# Patient Record
Sex: FEMALE | Race: WHITE | NOT HISPANIC OR LATINO
[De-identification: names, ages, dates, MRNs, and addresses within clinical notes are randomized per-mention and may not be internally consistent; named-entity substitution may affect disease eponyms.]

---

## 2017-01-03 ENCOUNTER — APPOINTMENT (OUTPATIENT)
Dept: CARDIOLOGY | Facility: CLINIC | Age: 67
End: 2017-01-03

## 2017-01-09 ENCOUNTER — NON-APPOINTMENT (OUTPATIENT)
Age: 67
End: 2017-01-09

## 2017-01-09 ENCOUNTER — APPOINTMENT (OUTPATIENT)
Dept: CARDIOLOGY | Facility: CLINIC | Age: 67
End: 2017-01-09

## 2017-01-09 VITALS
TEMPERATURE: 97.4 F | WEIGHT: 227 LBS | RESPIRATION RATE: 12 BRPM | HEIGHT: 61 IN | DIASTOLIC BLOOD PRESSURE: 94 MMHG | HEART RATE: 73 BPM | SYSTOLIC BLOOD PRESSURE: 169 MMHG | BODY MASS INDEX: 42.86 KG/M2 | OXYGEN SATURATION: 98 %

## 2017-01-09 VITALS — HEART RATE: 75 BPM

## 2017-01-09 DIAGNOSIS — I73.9 PERIPHERAL VASCULAR DISEASE, UNSPECIFIED: ICD-10-CM

## 2017-01-10 LAB
ALBUMIN SERPL ELPH-MCNC: 4.1 G/DL
ALP BLD-CCNC: 85 U/L
ALT SERPL-CCNC: 16 U/L
ANION GAP SERPL CALC-SCNC: 13 MMOL/L
AST SERPL-CCNC: 11 U/L
BILIRUB SERPL-MCNC: 0.8 MG/DL
BUN SERPL-MCNC: 20 MG/DL
CALCIUM SERPL-MCNC: 9.8 MG/DL
CHLORIDE SERPL-SCNC: 104 MMOL/L
CO2 SERPL-SCNC: 25 MMOL/L
CREAT SERPL-MCNC: 0.71 MG/DL
GLUCOSE SERPL-MCNC: 171 MG/DL
POTASSIUM SERPL-SCNC: 3.8 MMOL/L
PROT SERPL-MCNC: 6.9 G/DL
SODIUM SERPL-SCNC: 142 MMOL/L

## 2017-01-24 ENCOUNTER — APPOINTMENT (OUTPATIENT)
Dept: INTERNAL MEDICINE | Facility: CLINIC | Age: 67
End: 2017-01-24

## 2017-01-24 VITALS
SYSTOLIC BLOOD PRESSURE: 161 MMHG | RESPIRATION RATE: 12 BRPM | DIASTOLIC BLOOD PRESSURE: 87 MMHG | TEMPERATURE: 98.2 F | BODY MASS INDEX: 42.67 KG/M2 | HEIGHT: 61 IN | WEIGHT: 226 LBS | OXYGEN SATURATION: 97 % | HEART RATE: 84 BPM

## 2017-01-24 VITALS — SYSTOLIC BLOOD PRESSURE: 150 MMHG | DIASTOLIC BLOOD PRESSURE: 80 MMHG

## 2017-01-24 DIAGNOSIS — F41.1 GENERALIZED ANXIETY DISORDER: ICD-10-CM

## 2017-01-26 ENCOUNTER — APPOINTMENT (OUTPATIENT)
Dept: SURGERY | Facility: CLINIC | Age: 67
End: 2017-01-26

## 2017-01-26 VITALS
WEIGHT: 226 LBS | SYSTOLIC BLOOD PRESSURE: 147 MMHG | HEART RATE: 63 BPM | HEIGHT: 62 IN | DIASTOLIC BLOOD PRESSURE: 83 MMHG | BODY MASS INDEX: 41.59 KG/M2

## 2017-01-28 LAB
T3 SERPL-MCNC: 114 NG/DL
T4 FREE SERPL-MCNC: 1.1 NG/DL
THYROGLOB AB SERPL-ACNC: <20 IU/ML
THYROPEROXIDASE AB SERPL IA-ACNC: 11.1 IU/ML
TSH SERPL-ACNC: 1.11 UIU/ML

## 2017-01-29 ENCOUNTER — FORM ENCOUNTER (OUTPATIENT)
Age: 67
End: 2017-01-29

## 2017-01-30 ENCOUNTER — OUTPATIENT (OUTPATIENT)
Dept: OUTPATIENT SERVICES | Facility: HOSPITAL | Age: 67
LOS: 1 days | End: 2017-01-30
Payer: MEDICARE

## 2017-01-30 ENCOUNTER — APPOINTMENT (OUTPATIENT)
Dept: CT IMAGING | Facility: IMAGING CENTER | Age: 67
End: 2017-01-30

## 2017-01-30 DIAGNOSIS — Z00.8 ENCOUNTER FOR OTHER GENERAL EXAMINATION: ICD-10-CM

## 2017-01-30 DIAGNOSIS — Z98.49 CATARACT EXTRACTION STATUS, UNSPECIFIED EYE: Chronic | ICD-10-CM

## 2017-01-30 DIAGNOSIS — I86.8 VARICOSE VEINS OF OTHER SPECIFIED SITES: Chronic | ICD-10-CM

## 2017-01-30 PROCEDURE — 70490 CT SOFT TISSUE NECK W/O DYE: CPT

## 2017-02-02 ENCOUNTER — OTHER (OUTPATIENT)
Age: 67
End: 2017-02-02

## 2017-02-07 ENCOUNTER — NON-APPOINTMENT (OUTPATIENT)
Age: 67
End: 2017-02-07

## 2017-02-07 ENCOUNTER — APPOINTMENT (OUTPATIENT)
Dept: CARDIOLOGY | Facility: CLINIC | Age: 67
End: 2017-02-07

## 2017-02-07 VITALS
SYSTOLIC BLOOD PRESSURE: 160 MMHG | HEIGHT: 62 IN | DIASTOLIC BLOOD PRESSURE: 87 MMHG | BODY MASS INDEX: 41.59 KG/M2 | WEIGHT: 226 LBS | RESPIRATION RATE: 12 BRPM | OXYGEN SATURATION: 100 % | HEART RATE: 69 BPM

## 2017-02-07 VITALS — HEART RATE: 69 BPM | DIASTOLIC BLOOD PRESSURE: 72 MMHG | SYSTOLIC BLOOD PRESSURE: 150 MMHG

## 2017-02-21 ENCOUNTER — APPOINTMENT (OUTPATIENT)
Dept: CARDIOLOGY | Facility: CLINIC | Age: 67
End: 2017-02-21

## 2017-02-24 ENCOUNTER — APPOINTMENT (OUTPATIENT)
Dept: CARDIOLOGY | Facility: CLINIC | Age: 67
End: 2017-02-24

## 2017-02-24 VITALS
BODY MASS INDEX: 41.59 KG/M2 | DIASTOLIC BLOOD PRESSURE: 92 MMHG | SYSTOLIC BLOOD PRESSURE: 173 MMHG | HEIGHT: 62 IN | WEIGHT: 226 LBS

## 2017-03-30 PROBLEM — M25.562 LEFT KNEE PAIN: Status: ACTIVE | Noted: 2017-03-30

## 2017-03-30 PROBLEM — M25.561 RIGHT KNEE PAIN: Status: ACTIVE | Noted: 2017-03-30

## 2017-03-31 ENCOUNTER — APPOINTMENT (OUTPATIENT)
Dept: ORTHOPEDIC SURGERY | Facility: CLINIC | Age: 67
End: 2017-03-31

## 2017-03-31 VITALS
HEIGHT: 61 IN | DIASTOLIC BLOOD PRESSURE: 80 MMHG | WEIGHT: 227 LBS | HEART RATE: 66 BPM | SYSTOLIC BLOOD PRESSURE: 155 MMHG | BODY MASS INDEX: 42.86 KG/M2

## 2017-03-31 DIAGNOSIS — E11.69 TYPE 2 DIABETES MELLITUS WITH OTHER SPECIFIED COMPLICATION: ICD-10-CM

## 2017-03-31 DIAGNOSIS — M25.561 PAIN IN RIGHT KNEE: ICD-10-CM

## 2017-03-31 DIAGNOSIS — E66.9 TYPE 2 DIABETES MELLITUS WITH OTHER SPECIFIED COMPLICATION: ICD-10-CM

## 2017-03-31 DIAGNOSIS — M21.161 VARUS DEFORMITY, NOT ELSEWHERE CLASSIFIED, RIGHT KNEE: ICD-10-CM

## 2017-03-31 DIAGNOSIS — M25.562 PAIN IN LEFT KNEE: ICD-10-CM

## 2017-03-31 DIAGNOSIS — M17.0 BILATERAL PRIMARY OSTEOARTHRITIS OF KNEE: ICD-10-CM

## 2017-04-05 ENCOUNTER — NON-APPOINTMENT (OUTPATIENT)
Age: 67
End: 2017-04-05

## 2017-04-05 ENCOUNTER — APPOINTMENT (OUTPATIENT)
Dept: CARDIOLOGY | Facility: CLINIC | Age: 67
End: 2017-04-05

## 2017-04-05 VITALS
DIASTOLIC BLOOD PRESSURE: 84 MMHG | HEART RATE: 65 BPM | HEIGHT: 61 IN | TEMPERATURE: 97.9 F | BODY MASS INDEX: 44.37 KG/M2 | WEIGHT: 235 LBS | SYSTOLIC BLOOD PRESSURE: 138 MMHG | OXYGEN SATURATION: 98 % | RESPIRATION RATE: 12 BRPM

## 2017-04-07 LAB
ALBUMIN SERPL ELPH-MCNC: 3.9 G/DL
ALP BLD-CCNC: 78 U/L
ALT SERPL-CCNC: 16 U/L
ANION GAP SERPL CALC-SCNC: 14 MMOL/L
AST SERPL-CCNC: 12 U/L
BILIRUB SERPL-MCNC: 0.6 MG/DL
BUN SERPL-MCNC: 19 MG/DL
CALCIUM SERPL-MCNC: 9.8 MG/DL
CHLORIDE SERPL-SCNC: 101 MMOL/L
CHOLEST SERPL-MCNC: 167 MG/DL
CHOLEST/HDLC SERPL: 2.8 RATIO
CO2 SERPL-SCNC: 29 MMOL/L
CREAT SERPL-MCNC: 0.62 MG/DL
GLUCOSE SERPL-MCNC: 113 MG/DL
HDLC SERPL-MCNC: 60 MG/DL
LDLC SERPL CALC-MCNC: 67 MG/DL
POTASSIUM SERPL-SCNC: 4.2 MMOL/L
PROT SERPL-MCNC: 6.8 G/DL
SODIUM SERPL-SCNC: 144 MMOL/L
TRIGL SERPL-MCNC: 201 MG/DL

## 2017-04-17 ENCOUNTER — OUTPATIENT (OUTPATIENT)
Dept: OUTPATIENT SERVICES | Facility: HOSPITAL | Age: 67
LOS: 1 days | End: 2017-04-17
Payer: MEDICARE

## 2017-04-17 VITALS
DIASTOLIC BLOOD PRESSURE: 72 MMHG | OXYGEN SATURATION: 99 % | RESPIRATION RATE: 16 BRPM | HEART RATE: 74 BPM | TEMPERATURE: 97 F | HEIGHT: 61 IN | SYSTOLIC BLOOD PRESSURE: 154 MMHG | WEIGHT: 231.93 LBS

## 2017-04-17 DIAGNOSIS — E11.9 TYPE 2 DIABETES MELLITUS WITHOUT COMPLICATIONS: ICD-10-CM

## 2017-04-17 DIAGNOSIS — E04.2 NONTOXIC MULTINODULAR GOITER: ICD-10-CM

## 2017-04-17 DIAGNOSIS — I86.8 VARICOSE VEINS OF OTHER SPECIFIED SITES: Chronic | ICD-10-CM

## 2017-04-17 DIAGNOSIS — E03.9 HYPOTHYROIDISM, UNSPECIFIED: ICD-10-CM

## 2017-04-17 DIAGNOSIS — Z78.9 OTHER SPECIFIED HEALTH STATUS: Chronic | ICD-10-CM

## 2017-04-17 DIAGNOSIS — Z98.49 CATARACT EXTRACTION STATUS, UNSPECIFIED EYE: Chronic | ICD-10-CM

## 2017-04-17 DIAGNOSIS — G47.33 OBSTRUCTIVE SLEEP APNEA (ADULT) (PEDIATRIC): ICD-10-CM

## 2017-04-17 DIAGNOSIS — K29.70 GASTRITIS, UNSPECIFIED, WITHOUT BLEEDING: ICD-10-CM

## 2017-04-17 DIAGNOSIS — I10 ESSENTIAL (PRIMARY) HYPERTENSION: ICD-10-CM

## 2017-04-17 DIAGNOSIS — I48.91 UNSPECIFIED ATRIAL FIBRILLATION: ICD-10-CM

## 2017-04-17 LAB
BUN SERPL-MCNC: 18 MG/DL — SIGNIFICANT CHANGE UP (ref 7–23)
CALCIUM SERPL-MCNC: 9.7 MG/DL — SIGNIFICANT CHANGE UP (ref 8.4–10.5)
CHLORIDE SERPL-SCNC: 100 MMOL/L — SIGNIFICANT CHANGE UP (ref 98–107)
CO2 SERPL-SCNC: 24 MMOL/L — SIGNIFICANT CHANGE UP (ref 22–31)
CREAT SERPL-MCNC: 0.62 MG/DL — SIGNIFICANT CHANGE UP (ref 0.5–1.3)
GLUCOSE SERPL-MCNC: 80 MG/DL — SIGNIFICANT CHANGE UP (ref 70–99)
HBA1C BLD-MCNC: 7.4 % — HIGH (ref 4–5.6)
HCT VFR BLD CALC: 36 % — SIGNIFICANT CHANGE UP (ref 34.5–45)
HGB BLD-MCNC: 11.4 G/DL — LOW (ref 11.5–15.5)
MCHC RBC-ENTMCNC: 26.8 PG — LOW (ref 27–34)
MCHC RBC-ENTMCNC: 31.7 % — LOW (ref 32–36)
MCV RBC AUTO: 84.7 FL — SIGNIFICANT CHANGE UP (ref 80–100)
PLATELET # BLD AUTO: 303 K/UL — SIGNIFICANT CHANGE UP (ref 150–400)
PMV BLD: 10.5 FL — SIGNIFICANT CHANGE UP (ref 7–13)
POTASSIUM SERPL-MCNC: 3.6 MMOL/L — SIGNIFICANT CHANGE UP (ref 3.5–5.3)
POTASSIUM SERPL-SCNC: 3.6 MMOL/L — SIGNIFICANT CHANGE UP (ref 3.5–5.3)
RBC # BLD: 4.25 M/UL — SIGNIFICANT CHANGE UP (ref 3.8–5.2)
RBC # FLD: 13.4 % — SIGNIFICANT CHANGE UP (ref 10.3–14.5)
SODIUM SERPL-SCNC: 141 MMOL/L — SIGNIFICANT CHANGE UP (ref 135–145)
WBC # BLD: 9.75 K/UL — SIGNIFICANT CHANGE UP (ref 3.8–10.5)
WBC # FLD AUTO: 9.75 K/UL — SIGNIFICANT CHANGE UP (ref 3.8–10.5)

## 2017-04-17 PROCEDURE — 93010 ELECTROCARDIOGRAM REPORT: CPT

## 2017-04-17 RX ORDER — SODIUM CHLORIDE 9 MG/ML
1000 INJECTION, SOLUTION INTRAVENOUS
Qty: 0 | Refills: 0 | Status: DISCONTINUED | OUTPATIENT
Start: 2017-04-26 | End: 2017-04-27

## 2017-04-17 NOTE — H&P PST ADULT - NEGATIVE ENMT SYMPTOMS
no sinus symptoms/no nasal congestion/no ear pain/no post-nasal discharge/no nasal obstruction/no tinnitus/no nasal discharge/no vertigo

## 2017-04-17 NOTE — H&P PST ADULT - NSANTHOSAYNRD_GEN_A_CORE
No. NAVARRO screening performed.  STOP BANG Legend: 0-2 = LOW Risk; 3-4 = INTERMEDIATE Risk; 5-8 = HIGH Risk

## 2017-04-17 NOTE — H&P PST ADULT - MUSCULOSKELETAL
details… detailed exam no calf tenderness/ROM intact/normal strength/no joint warmth/no joint swelling/no joint erythema

## 2017-04-17 NOTE — H&P PST ADULT - HISTORY OF PRESENT ILLNESS
65 yo female 67 yo female with hx 67 yo female with hx Afib (on Xarelto), HTN, DM, HLD, hypothyroidism, anxiety, depression presents to have PST evaluation for total thyroidectomy, complex closure on 4/26/2017. Patient reports, hx of thyroid nodules x 15 years, biopsy done - "benign", yearly evaluated by endocrinologist with thyroid sonogram. Patient states, recent sonogram in 2/2017 showed increased size of thyroid nodules, was referred to Dr. Madden for surgical evaluation. 65 yo female with hx Afib (on Xarelto), HTN, DM, HLD, hypothyroidism, anxiety, depression presents to have PST evaluation for total thyroidectomy, complex closure on 4/26/2017. Patient reports, hx of thyroid nodules x 15 years, biopsy done - "benign", yearly evaluated by endocrinologist with thyroid sonogram. Patient states, recent thyroid sonogram in 2/2017 showed increased size of thyroid nodules, was referred to Dr. Madden for surgical evaluation.

## 2017-04-17 NOTE — H&P PST ADULT - PROBLEM SELECTOR PLAN 1
Scheduled for total thyroidectomy, complex closure on 4/26/2017. labs done and results pending. Surgical scrub & preop instruction given and explained.  Verbalized understanding. Patient c/o numbness & tingling on bilateral hands- Dr. Madden's office was called and notified. Scheduled for total thyroidectomy, complex closure on 4/26/2017. labs done and results pending. Surgical scrub & preop instruction given and explained.  Verbalized understanding. Patient c/o numbness & tingling on bilateral hands- Dr. Madden was made aware.

## 2017-04-17 NOTE — H&P PST ADULT - PMH
AF (atrial fibrillation)  on Xarelto  Anxiety    CAD (coronary artery disease)  minimal CAD per cath result in 2014  Depression    Diabetes    Former smoker    Gastritis    Hiatal hernia    HTN (hypertension)    Hypercholesterolemia    Hypothyroid    Morbid obesity    Murmur    Palpitations AF (atrial fibrillation)  on Xarelto  Anxiety    CAD (coronary artery disease)  minimal CAD per cath result in 2014  Depression    Diabetes    Former smoker    Gastritis    Hiatal hernia    HTN (hypertension)    Hypercholesterolemia    Hypothyroid    Morbid obesity    Murmur    Neuropathy    Palpitations

## 2017-04-17 NOTE — H&P PST ADULT - NEGATIVE OPHTHALMOLOGIC SYMPTOMS
no diplopia/no blurred vision R/no lacrimation R/no photophobia/no blurred vision L/no lacrimation L

## 2017-04-17 NOTE — H&P PST ADULT - RS GEN PE MLT RESP DETAILS PC
no wheezes/clear to auscultation bilaterally/respirations non-labored/no rales/no rhonchi/airway patent/good air movement/breath sounds equal

## 2017-04-17 NOTE — H&P PST ADULT - FAMILY HISTORY
Mother  Still living? No  Family history of stroke, Age at diagnosis: Age Unknown  Family history of hypertension, Age at diagnosis: Age Unknown  Family history of breast cancer, Age at diagnosis: Age Unknown

## 2017-04-17 NOTE — H&P PST ADULT - NEGATIVE NEUROLOGICAL SYMPTOMS
no headache/no tremors/no vertigo/no syncope/no difficulty walking/no focal seizures/no generalized seizures

## 2017-04-17 NOTE — H&P PST ADULT - PSH
History of angioplasty of vein  right leg 2016  S/P cataract extraction and insertion of intraocular lens  bilateral > 15 years ago  Varicose vein  with stripping b/l

## 2017-04-17 NOTE — H&P PST ADULT - PROBLEM SELECTOR PLAN 3
HbgA1C sent, pending result. Blood glucose - Accuchek ordered stat on admit. Instructed to hold novolog AM of surgery, instructed to administer 8 units of Tresiba PM before the surgery (80 % of regular dose) Verbalized understanding. HbgA1C sent, pending result. Blood glucose - Accuchek ordered stat on admit. Instructed to hold Novolog AM of surgery, instructed to administer 8 units of Tresiba PM before the surgery (80 % of regular dose) Verbalized understanding.

## 2017-04-17 NOTE — H&P PST ADULT - PROBLEM SELECTOR PLAN 2
hx of Afib- on Xarelto. Instructed to stop on Xarelto 2 days prior to surgery, and continue with aspirin by cardiologist. Will obtain last cardiologist note with instruction. Last echo report in chart. hx of Afib- on Xarelto. Instructed to stop on Xarelto 2 days prior to surgery, and continue with aspirin by cardiologist.  Last cardiologist note with instruction in chart. Last echo report in chart.

## 2017-04-17 NOTE — H&P PST ADULT - PROBLEM SELECTOR PLAN 5
Instructed to take Cartia, sotalol, losartan AM of surgery with a sip of water. Patient's evaluated by PCP for medical clearance. Will obtain.

## 2017-04-17 NOTE — H&P PST ADULT - ITE SK HX ROS MEA POS PC
rash/rash on left thigh - evaluated by dermatologist rash/"sometimes rash on both legs - evaluated by dermatologist- better now"

## 2017-04-18 LAB — TSH SERPL-ACNC: 1.33 UIU/ML

## 2017-04-25 ENCOUNTER — RESULT REVIEW (OUTPATIENT)
Age: 67
End: 2017-04-25

## 2017-04-26 ENCOUNTER — OUTPATIENT (OUTPATIENT)
Dept: INPATIENT UNIT | Facility: HOSPITAL | Age: 67
LOS: 1 days | Discharge: HOME | End: 2017-04-26

## 2017-04-26 ENCOUNTER — APPOINTMENT (OUTPATIENT)
Dept: SURGERY | Facility: HOSPITAL | Age: 67
End: 2017-04-26

## 2017-04-26 ENCOUNTER — OTHER (OUTPATIENT)
Age: 67
End: 2017-04-26

## 2017-04-26 ENCOUNTER — TRANSCRIPTION ENCOUNTER (OUTPATIENT)
Age: 67
End: 2017-04-26

## 2017-04-26 VITALS
TEMPERATURE: 98 F | HEIGHT: 61 IN | DIASTOLIC BLOOD PRESSURE: 69 MMHG | WEIGHT: 231.93 LBS | RESPIRATION RATE: 16 BRPM | SYSTOLIC BLOOD PRESSURE: 160 MMHG | OXYGEN SATURATION: 100 % | HEART RATE: 62 BPM

## 2017-04-26 DIAGNOSIS — Z98.49 CATARACT EXTRACTION STATUS, UNSPECIFIED EYE: Chronic | ICD-10-CM

## 2017-04-26 DIAGNOSIS — Z78.9 OTHER SPECIFIED HEALTH STATUS: Chronic | ICD-10-CM

## 2017-04-26 DIAGNOSIS — I86.8 VARICOSE VEINS OF OTHER SPECIFIED SITES: Chronic | ICD-10-CM

## 2017-04-26 DIAGNOSIS — E04.2 NONTOXIC MULTINODULAR GOITER: ICD-10-CM

## 2017-04-26 LAB
CALCIUM SERPL-MCNC: 9.4 MG/DL — SIGNIFICANT CHANGE UP (ref 8.4–10.5)
CALCIUM SERPL-MCNC: 9.6 MG/DL — SIGNIFICANT CHANGE UP (ref 8.4–10.5)

## 2017-04-26 RX ORDER — LEVOTHYROXINE SODIUM 125 MCG
75 TABLET ORAL DAILY
Qty: 0 | Refills: 0 | Status: DISCONTINUED | OUTPATIENT
Start: 2017-04-26 | End: 2017-04-27

## 2017-04-26 RX ORDER — LEVOTHYROXINE SODIUM 125 MCG
1 TABLET ORAL
Qty: 30 | Refills: 0 | OUTPATIENT
Start: 2017-04-26 | End: 2017-05-26

## 2017-04-26 RX ORDER — RIVAROXABAN 15 MG-20MG
1 KIT ORAL
Qty: 0 | Refills: 0 | COMMUNITY

## 2017-04-26 RX ORDER — SODIUM CHLORIDE 9 MG/ML
1000 INJECTION, SOLUTION INTRAVENOUS
Qty: 0 | Refills: 0 | Status: DISCONTINUED | OUTPATIENT
Start: 2017-04-26 | End: 2017-04-27

## 2017-04-26 RX ORDER — LOSARTAN POTASSIUM 100 MG/1
100 TABLET, FILM COATED ORAL DAILY
Qty: 0 | Refills: 0 | Status: DISCONTINUED | OUTPATIENT
Start: 2017-04-26 | End: 2017-04-27

## 2017-04-26 RX ORDER — ALPRAZOLAM 0.25 MG
0.5 TABLET ORAL EVERY 8 HOURS
Qty: 0 | Refills: 0 | Status: DISCONTINUED | OUTPATIENT
Start: 2017-04-26 | End: 2017-04-27

## 2017-04-26 RX ORDER — CALCIUM CARBONATE 500(1250)
1 TABLET ORAL
Qty: 0 | Refills: 0 | COMMUNITY
Start: 2017-04-26

## 2017-04-26 RX ORDER — DEXTROSE 50 % IN WATER 50 %
12.5 SYRINGE (ML) INTRAVENOUS ONCE
Qty: 0 | Refills: 0 | Status: DISCONTINUED | OUTPATIENT
Start: 2017-04-26 | End: 2017-04-27

## 2017-04-26 RX ORDER — LEVOTHYROXINE SODIUM 125 MCG
1 TABLET ORAL
Qty: 30 | Refills: 2 | OUTPATIENT
Start: 2017-04-26 | End: 2017-07-24

## 2017-04-26 RX ORDER — DEXTROSE 50 % IN WATER 50 %
25 SYRINGE (ML) INTRAVENOUS ONCE
Qty: 0 | Refills: 0 | Status: DISCONTINUED | OUTPATIENT
Start: 2017-04-26 | End: 2017-04-27

## 2017-04-26 RX ORDER — SOTALOL HCL 120 MG
80 TABLET ORAL DAILY
Qty: 0 | Refills: 0 | Status: DISCONTINUED | OUTPATIENT
Start: 2017-04-26 | End: 2017-04-27

## 2017-04-26 RX ORDER — GLUCAGON INJECTION, SOLUTION 0.5 MG/.1ML
1 INJECTION, SOLUTION SUBCUTANEOUS ONCE
Qty: 0 | Refills: 0 | Status: DISCONTINUED | OUTPATIENT
Start: 2017-04-26 | End: 2017-04-27

## 2017-04-26 RX ORDER — CALCIUM CARBONATE 500(1250)
1 TABLET ORAL
Qty: 0 | Refills: 0 | Status: DISCONTINUED | OUTPATIENT
Start: 2017-04-26 | End: 2017-04-27

## 2017-04-26 RX ORDER — LEVOTHYROXINE SODIUM 125 MCG
1 TABLET ORAL
Qty: 0 | Refills: 0 | COMMUNITY
Start: 2017-04-26

## 2017-04-26 RX ORDER — INSULIN LISPRO 100/ML
VIAL (ML) SUBCUTANEOUS
Qty: 0 | Refills: 0 | Status: DISCONTINUED | OUTPATIENT
Start: 2017-04-26 | End: 2017-04-27

## 2017-04-26 RX ORDER — PANTOPRAZOLE SODIUM 20 MG/1
40 TABLET, DELAYED RELEASE ORAL
Qty: 0 | Refills: 0 | Status: DISCONTINUED | OUTPATIENT
Start: 2017-04-26 | End: 2017-04-27

## 2017-04-26 RX ORDER — SODIUM CHLORIDE 9 MG/ML
500 INJECTION, SOLUTION INTRAVENOUS ONCE
Qty: 0 | Refills: 0 | Status: COMPLETED | OUTPATIENT
Start: 2017-04-26 | End: 2017-04-26

## 2017-04-26 RX ORDER — ACETAMINOPHEN 500 MG
650 TABLET ORAL EVERY 6 HOURS
Qty: 0 | Refills: 0 | Status: DISCONTINUED | OUTPATIENT
Start: 2017-04-26 | End: 2017-04-27

## 2017-04-26 RX ORDER — ASPIRIN/CALCIUM CARB/MAGNESIUM 324 MG
81 TABLET ORAL DAILY
Qty: 0 | Refills: 0 | Status: DISCONTINUED | OUTPATIENT
Start: 2017-04-26 | End: 2017-04-27

## 2017-04-26 RX ORDER — INSULIN LISPRO 100/ML
VIAL (ML) SUBCUTANEOUS AT BEDTIME
Qty: 0 | Refills: 0 | Status: DISCONTINUED | OUTPATIENT
Start: 2017-04-26 | End: 2017-04-27

## 2017-04-26 RX ORDER — ONDANSETRON 8 MG/1
4 TABLET, FILM COATED ORAL ONCE
Qty: 0 | Refills: 0 | Status: COMPLETED | OUTPATIENT
Start: 2017-04-26 | End: 2017-04-26

## 2017-04-26 RX ORDER — LEVOTHYROXINE SODIUM 125 MCG
1 TABLET ORAL
Qty: 0 | Refills: 0 | COMMUNITY

## 2017-04-26 RX ORDER — DEXTROSE 50 % IN WATER 50 %
1 SYRINGE (ML) INTRAVENOUS ONCE
Qty: 0 | Refills: 0 | Status: DISCONTINUED | OUTPATIENT
Start: 2017-04-26 | End: 2017-04-27

## 2017-04-26 RX ORDER — CALCIUM GLUCONATE 100 MG/ML
1 VIAL (ML) INTRAVENOUS ONCE
Qty: 0 | Refills: 0 | Status: COMPLETED | OUTPATIENT
Start: 2017-04-26 | End: 2017-04-26

## 2017-04-26 RX ORDER — FENTANYL CITRATE 50 UG/ML
50 INJECTION INTRAVENOUS
Qty: 0 | Refills: 0 | Status: DISCONTINUED | OUTPATIENT
Start: 2017-04-26 | End: 2017-04-26

## 2017-04-26 RX ADMIN — SODIUM CHLORIDE 50 MILLILITER(S): 9 INJECTION, SOLUTION INTRAVENOUS at 17:39

## 2017-04-26 RX ADMIN — SODIUM CHLORIDE 30 MILLILITER(S): 9 INJECTION, SOLUTION INTRAVENOUS at 12:14

## 2017-04-26 RX ADMIN — FENTANYL CITRATE 50 MICROGRAM(S): 50 INJECTION INTRAVENOUS at 16:15

## 2017-04-26 RX ADMIN — Medication 1 TABLET(S): at 17:45

## 2017-04-26 RX ADMIN — Medication 650 MILLIGRAM(S): at 20:15

## 2017-04-26 RX ADMIN — FENTANYL CITRATE 50 MICROGRAM(S): 50 INJECTION INTRAVENOUS at 15:58

## 2017-04-26 RX ADMIN — Medication 200 MILLIGRAM(S): at 17:00

## 2017-04-26 RX ADMIN — Medication 200 GRAM(S): at 16:00

## 2017-04-26 RX ADMIN — FENTANYL CITRATE 50 MICROGRAM(S): 50 INJECTION INTRAVENOUS at 16:20

## 2017-04-26 RX ADMIN — SODIUM CHLORIDE 1000 MILLILITER(S): 9 INJECTION, SOLUTION INTRAVENOUS at 16:45

## 2017-04-26 RX ADMIN — FENTANYL CITRATE 50 MICROGRAM(S): 50 INJECTION INTRAVENOUS at 16:45

## 2017-04-26 RX ADMIN — Medication 650 MILLIGRAM(S): at 21:22

## 2017-04-26 RX ADMIN — ONDANSETRON 4 MILLIGRAM(S): 8 TABLET, FILM COATED ORAL at 15:58

## 2017-04-26 NOTE — DISCHARGE NOTE ADULT - MEDICATION SUMMARY - MEDICATIONS TO STOP TAKING
I will STOP taking the medications listed below when I get home from the hospital:    levothyroxine 25 mcg (0.025 mg) oral capsule  -- 1 cap(s) by mouth once a day    Xarelto 20 mg oral tablet  -- 1 tab(s) by mouth once a day (in the evening) last dose 4/23/2017 I will STOP taking the medications listed below when I get home from the hospital:  None

## 2017-04-26 NOTE — DISCHARGE NOTE ADULT - PATIENT PORTAL LINK FT
“You can access the Follow[employer] Patient Portal, offered by [facility], by registering with the following website: [email or web address]”

## 2017-04-26 NOTE — DISCHARGE NOTE ADULT - CARE PROVIDER_API CALL
Ba Madden), Plastic Surgery; Surgery  [address] [place] [address]  Phone: [phone]  Fax: [phone]

## 2017-04-26 NOTE — DISCHARGE NOTE ADULT - CARE PLAN
Principal Discharge DX:	Nontoxic multinodular goiter  Secondary Diagnosis:	NAVARRO (obstructive sleep apnea)  Secondary Diagnosis:	Hypothyroid  Secondary Diagnosis:	HTN (hypertension)  Secondary Diagnosis:	AF (atrial fibrillation)  Secondary Diagnosis:	Diabetes  Secondary Diagnosis:	CAD (coronary artery disease) Principal Discharge DX:	Nontoxic multinodular goiter  Goal:	s/p total thyroidectomy  Instructions for follow-up, activity and diet:	keep incision clean and dry  Secondary Diagnosis:	NAVARRO (obstructive sleep apnea)  Secondary Diagnosis:	Hypothyroid  Secondary Diagnosis:	HTN (hypertension)  Secondary Diagnosis:	AF (atrial fibrillation)  Secondary Diagnosis:	Diabetes  Secondary Diagnosis:	CAD (coronary artery disease)

## 2017-04-26 NOTE — DISCHARGE NOTE ADULT - SECONDARY DIAGNOSIS.
NAVARRO (obstructive sleep apnea) Hypothyroid HTN (hypertension) AF (atrial fibrillation) Diabetes CAD (coronary artery disease)

## 2017-04-26 NOTE — DISCHARGE NOTE ADULT - MEDICATION SUMMARY - MEDICATIONS TO TAKE
I will START or STAY ON the medications listed below when I get home from the hospital:    Mariusz Aspirin Regimen 81 mg oral delayed release tablet  -- 1 tab(s) by mouth once a day  -- Indication: For Non-toxic multinodular goiter    Tylenol 500 mg oral tablet  -- 2 tab(s) by mouth every 6 hours, As Needed  -- Indication: For Non-toxic multinodular goiter    calcium carbonate 500 mg (200 mg elemental calcium) oral tablet, chewable  -- 1 tab(s) by mouth 4 times a day  -- Indication: For Non-toxic multinodular goiter    Cartia  mg/24 hours oral capsule, extended release  -- 1 cap(s) by mouth once a day  -- Indication: For Non-toxic multinodular goiter    sotalol 80 mg oral tablet  -- 1 tab(s) by mouth once a day  -- Indication: For Non-toxic multinodular goiter    NovoLOG FlexPen 100 units/mL subcutaneous solution  --  subcutaneous 10 units 3 times daily  -- Indication: For Non-toxic multinodular goiter    Tresiba FlexTouch 200 units/mL subcutaneous solution  --  subcutaneous   -- Indication: For Non-toxic multinodular goiter    losartan-hydroCHLOROthiazide 100mg-25mg oral tablet  -- 1 tab(s) by mouth once a day  -- Indication: For Non-toxic multinodular goiter    ALPRAZolam 0.5 mg oral tablet  --  by mouth ,1 tab As Needed  -- Indication: For Non-toxic multinodular goiter    Amitiza 24 mcg oral capsule  -- 1 cap(s) by mouth 2 times a day  -- Indication: For Non-toxic multinodular goiter    Doc-Q-Lace 100 mg oral capsule  --  by mouth 3 times a day  -- Indication: For Non-toxic multinodular goiter    pantoprazole 40 mg oral delayed release tablet  -- 1 tab(s) by mouth once a day  -- Indication: For Non-toxic multinodular goiter    levothyroxine 75 mcg (0.075 mg) oral capsule  -- 1 cap(s) by mouth once a day  -- Indication: For Non-toxic multinodular goiter    levothyroxine 75 mcg (0.075 mg) oral tablet  -- 1 tab(s) by mouth once a day  -- Indication: For Non-toxic multinodular goiter    levothyroxine 75 mcg (0.075 mg) oral tablet  -- 1 tab(s) by mouth once a day  -- It is very important that you take or use this exactly as directed.  Do not skip doses or discontinue unless directed by your doctor.  Medication should be taken with plenty of water.  Some non-prescription drugs may aggravate your condition.  Read all labels carefully.  If a warning appears, check with your doctor before taking.  Take medication on an empty stomach 1 hour before or 2 to 3 hours after a meal unless otherwise directed by your doctor.    -- Indication: For Non-toxic multinodular goiter    Vitamin D3 2000 intl units oral capsule  -- 1 cap(s) by mouth once a day  -- Indication: For Non-toxic multinodular goiter I will START or STAY ON the medications listed below when I get home from the hospital:    Mariusz Aspirin Regimen 81 mg oral delayed release tablet  -- 1 tab(s) by mouth once a day  -- Indication: For Non-toxic multinodular goiter    Tylenol 500 mg oral tablet  -- 2 tab(s) by mouth every 6 hours, As Needed  -- Indication: For Non-toxic multinodular goiter    calcium carbonate 500 mg (200 mg elemental calcium) oral tablet, chewable  -- 1 tab(s) by mouth 4 times a day  -- Indication: For Non-toxic multinodular goiter    Cartia  mg/24 hours oral capsule, extended release  -- 1 cap(s) by mouth once a day  -- Indication: For Non-toxic multinodular goiter    sotalol 80 mg oral tablet  -- 1 tab(s) by mouth once a day  -- Indication: For Non-toxic multinodular goiter    NovoLOG FlexPen 100 units/mL subcutaneous solution  --  subcutaneous 10 units 3 times daily  -- Indication: For Non-toxic multinodular goiter    Tresiba FlexTouch 200 units/mL subcutaneous solution  --  subcutaneous   -- Indication: For Non-toxic multinodular goiter    losartan-hydroCHLOROthiazide 100mg-25mg oral tablet  -- 1 tab(s) by mouth once a day  -- Indication: For Non-toxic multinodular goiter    ALPRAZolam 0.5 mg oral tablet  --  by mouth ,1 tab As Needed  -- Indication: For Non-toxic multinodular goiter    Amitiza 24 mcg oral capsule  -- 1 cap(s) by mouth 2 times a day  -- Indication: For Non-toxic multinodular goiter    Doc-Q-Lace 100 mg oral capsule  --  by mouth 3 times a day  -- Indication: For Non-toxic multinodular goiter    pantoprazole 40 mg oral delayed release tablet  -- 1 tab(s) by mouth once a day  -- Indication: For Non-toxic multinodular goiter    levothyroxine 75 mcg (0.075 mg) oral capsule  -- 1 cap(s) by mouth once a day  -- Indication: For Non-toxic multinodular goiter    levothyroxine 75 mcg (0.075 mg) oral tablet  -- 1 tab(s) by mouth once a day  -- It is very important that you take or use this exactly as directed.  Do not skip doses or discontinue unless directed by your doctor.  Medication should be taken with plenty of water.  Some non-prescription drugs may aggravate your condition.  Read all labels carefully.  If a warning appears, check with your doctor before taking.  Take medication on an empty stomach 1 hour before or 2 to 3 hours after a meal unless otherwise directed by your doctor.    -- Indication: For Non-toxic multinodular goiter    Vitamin D3 2000 intl units oral capsule  -- 1 cap(s) by mouth once a day  -- Indication: For Non-toxic multinodular goiter

## 2017-04-26 NOTE — DISCHARGE NOTE ADULT - CARE PROVIDERS DIRECT ADDRESSES
[email or web address]

## 2017-04-27 VITALS
OXYGEN SATURATION: 97 % | SYSTOLIC BLOOD PRESSURE: 133 MMHG | RESPIRATION RATE: 18 BRPM | HEART RATE: 85 BPM | DIASTOLIC BLOOD PRESSURE: 56 MMHG | TEMPERATURE: 98 F

## 2017-04-27 RX ORDER — LEVOTHYROXINE SODIUM 125 MCG
1 TABLET ORAL
Qty: 30 | Refills: 2 | OUTPATIENT
Start: 2017-04-27 | End: 2017-07-25

## 2017-04-27 RX ADMIN — Medication 650 MILLIGRAM(S): at 08:13

## 2017-04-27 RX ADMIN — Medication 80 MILLIGRAM(S): at 05:30

## 2017-04-27 RX ADMIN — Medication 1 TABLET(S): at 05:29

## 2017-04-27 RX ADMIN — PANTOPRAZOLE SODIUM 40 MILLIGRAM(S): 20 TABLET, DELAYED RELEASE ORAL at 05:31

## 2017-04-27 RX ADMIN — LOSARTAN POTASSIUM 100 MILLIGRAM(S): 100 TABLET, FILM COATED ORAL at 05:30

## 2017-04-27 RX ADMIN — Medication 0.5 MILLIGRAM(S): at 08:13

## 2017-04-27 RX ADMIN — Medication 75 MICROGRAM(S): at 05:30

## 2017-04-27 RX ADMIN — Medication 650 MILLIGRAM(S): at 08:55

## 2017-05-02 LAB — SURGICAL PATHOLOGY STUDY: SIGNIFICANT CHANGE UP

## 2017-05-09 ENCOUNTER — APPOINTMENT (OUTPATIENT)
Dept: SURGERY | Facility: CLINIC | Age: 67
End: 2017-05-09

## 2017-05-26 ENCOUNTER — APPOINTMENT (OUTPATIENT)
Dept: CARDIOLOGY | Facility: CLINIC | Age: 67
End: 2017-05-26

## 2017-05-26 VITALS
WEIGHT: 236 LBS | DIASTOLIC BLOOD PRESSURE: 89 MMHG | HEIGHT: 61 IN | BODY MASS INDEX: 44.56 KG/M2 | OXYGEN SATURATION: 99 % | HEART RATE: 79 BPM | SYSTOLIC BLOOD PRESSURE: 163 MMHG

## 2017-05-26 DIAGNOSIS — I87.2 VENOUS INSUFFICIENCY (CHRONIC) (PERIPHERAL): ICD-10-CM

## 2017-05-31 ENCOUNTER — APPOINTMENT (OUTPATIENT)
Dept: CARDIOLOGY | Facility: CLINIC | Age: 67
End: 2017-05-31

## 2017-05-31 ENCOUNTER — NON-APPOINTMENT (OUTPATIENT)
Age: 67
End: 2017-05-31

## 2017-05-31 VITALS
SYSTOLIC BLOOD PRESSURE: 154 MMHG | HEIGHT: 61 IN | WEIGHT: 243 LBS | OXYGEN SATURATION: 97 % | DIASTOLIC BLOOD PRESSURE: 74 MMHG | BODY MASS INDEX: 45.88 KG/M2 | HEART RATE: 69 BPM | RESPIRATION RATE: 12 BRPM | TEMPERATURE: 97.7 F

## 2017-05-31 VITALS — SYSTOLIC BLOOD PRESSURE: 128 MMHG | HEART RATE: 66 BPM | DIASTOLIC BLOOD PRESSURE: 70 MMHG

## 2017-05-31 RX ORDER — ROSUVASTATIN CALCIUM 5 MG/1
5 TABLET, FILM COATED ORAL
Qty: 30 | Refills: 1 | Status: DISCONTINUED | COMMUNITY
Start: 2017-01-09 | End: 2017-05-31

## 2017-06-20 ENCOUNTER — FORM ENCOUNTER (OUTPATIENT)
Age: 67
End: 2017-06-20

## 2017-06-21 ENCOUNTER — APPOINTMENT (OUTPATIENT)
Dept: ULTRASOUND IMAGING | Facility: HOSPITAL | Age: 67
End: 2017-06-21

## 2017-06-21 ENCOUNTER — OUTPATIENT (OUTPATIENT)
Dept: OUTPATIENT SERVICES | Facility: HOSPITAL | Age: 67
LOS: 1 days | End: 2017-06-21
Payer: MEDICARE

## 2017-06-21 DIAGNOSIS — Z78.9 OTHER SPECIFIED HEALTH STATUS: Chronic | ICD-10-CM

## 2017-06-21 DIAGNOSIS — I87.2 VENOUS INSUFFICIENCY (CHRONIC) (PERIPHERAL): ICD-10-CM

## 2017-06-21 DIAGNOSIS — Z98.49 CATARACT EXTRACTION STATUS, UNSPECIFIED EYE: Chronic | ICD-10-CM

## 2017-06-21 DIAGNOSIS — I86.8 VARICOSE VEINS OF OTHER SPECIFIED SITES: Chronic | ICD-10-CM

## 2017-06-21 PROCEDURE — 93970 EXTREMITY STUDY: CPT

## 2017-08-08 ENCOUNTER — APPOINTMENT (OUTPATIENT)
Dept: SURGERY | Facility: CLINIC | Age: 67
End: 2017-08-08
Payer: MEDICARE

## 2017-08-08 DIAGNOSIS — E04.1 NONTOXIC SINGLE THYROID NODULE: ICD-10-CM

## 2017-08-08 PROCEDURE — 99213 OFFICE O/P EST LOW 20 MIN: CPT

## 2017-08-18 ENCOUNTER — RX RENEWAL (OUTPATIENT)
Age: 67
End: 2017-08-18

## 2017-09-05 ENCOUNTER — APPOINTMENT (OUTPATIENT)
Dept: CARDIOLOGY | Facility: CLINIC | Age: 67
End: 2017-09-05
Payer: MEDICARE

## 2017-09-05 ENCOUNTER — NON-APPOINTMENT (OUTPATIENT)
Age: 67
End: 2017-09-05

## 2017-09-05 VITALS
OXYGEN SATURATION: 96 % | HEART RATE: 86 BPM | SYSTOLIC BLOOD PRESSURE: 157 MMHG | BODY MASS INDEX: 47.95 KG/M2 | WEIGHT: 254 LBS | DIASTOLIC BLOOD PRESSURE: 86 MMHG | HEIGHT: 61 IN | RESPIRATION RATE: 12 BRPM

## 2017-09-05 VITALS — SYSTOLIC BLOOD PRESSURE: 134 MMHG | DIASTOLIC BLOOD PRESSURE: 82 MMHG | HEART RATE: 78 BPM

## 2017-09-05 PROCEDURE — 93000 ELECTROCARDIOGRAM COMPLETE: CPT

## 2017-09-05 PROCEDURE — 99215 OFFICE O/P EST HI 40 MIN: CPT | Mod: 25

## 2017-10-16 ENCOUNTER — APPOINTMENT (OUTPATIENT)
Dept: CARDIOLOGY | Facility: CLINIC | Age: 67
End: 2017-10-16
Payer: MEDICARE

## 2017-10-16 VITALS
OXYGEN SATURATION: 98 % | TEMPERATURE: 98.3 F | HEIGHT: 61 IN | RESPIRATION RATE: 12 BRPM | DIASTOLIC BLOOD PRESSURE: 97 MMHG | BODY MASS INDEX: 48.33 KG/M2 | WEIGHT: 256 LBS | HEART RATE: 85 BPM | SYSTOLIC BLOOD PRESSURE: 171 MMHG

## 2017-10-16 VITALS — SYSTOLIC BLOOD PRESSURE: 138 MMHG | DIASTOLIC BLOOD PRESSURE: 70 MMHG

## 2017-10-16 DIAGNOSIS — R07.9 CHEST PAIN, UNSPECIFIED: ICD-10-CM

## 2017-10-16 PROCEDURE — 93000 ELECTROCARDIOGRAM COMPLETE: CPT

## 2017-10-16 PROCEDURE — 99215 OFFICE O/P EST HI 40 MIN: CPT | Mod: 25

## 2017-12-05 ENCOUNTER — NON-APPOINTMENT (OUTPATIENT)
Age: 67
End: 2017-12-05

## 2017-12-05 ENCOUNTER — APPOINTMENT (OUTPATIENT)
Dept: CARDIOLOGY | Facility: CLINIC | Age: 67
End: 2017-12-05
Payer: MEDICARE

## 2017-12-05 VITALS
TEMPERATURE: 97.5 F | WEIGHT: 260 LBS | RESPIRATION RATE: 14 BRPM | HEIGHT: 61 IN | SYSTOLIC BLOOD PRESSURE: 129 MMHG | DIASTOLIC BLOOD PRESSURE: 80 MMHG | BODY MASS INDEX: 49.09 KG/M2 | HEART RATE: 93 BPM | OXYGEN SATURATION: 95 %

## 2017-12-05 PROCEDURE — 99215 OFFICE O/P EST HI 40 MIN: CPT | Mod: 25

## 2017-12-05 PROCEDURE — 93000 ELECTROCARDIOGRAM COMPLETE: CPT

## 2017-12-14 ENCOUNTER — APPOINTMENT (OUTPATIENT)
Dept: CARDIOLOGY | Facility: CLINIC | Age: 67
End: 2017-12-14
Payer: MEDICARE

## 2017-12-14 PROCEDURE — 93306 TTE W/DOPPLER COMPLETE: CPT

## 2018-05-19 ENCOUNTER — EMERGENCY (EMERGENCY)
Facility: HOSPITAL | Age: 68
LOS: 1 days | Discharge: LEFT BEFORE DISCHARGE | End: 2018-05-19
Attending: EMERGENCY MEDICINE
Payer: MEDICARE

## 2018-05-19 VITALS
HEART RATE: 99 BPM | DIASTOLIC BLOOD PRESSURE: 128 MMHG | TEMPERATURE: 98 F | OXYGEN SATURATION: 99 % | RESPIRATION RATE: 19 BRPM | SYSTOLIC BLOOD PRESSURE: 191 MMHG

## 2018-05-19 VITALS
DIASTOLIC BLOOD PRESSURE: 75 MMHG | SYSTOLIC BLOOD PRESSURE: 133 MMHG | HEART RATE: 77 BPM | OXYGEN SATURATION: 100 % | RESPIRATION RATE: 18 BRPM | TEMPERATURE: 98 F

## 2018-05-19 DIAGNOSIS — Z98.49 CATARACT EXTRACTION STATUS, UNSPECIFIED EYE: Chronic | ICD-10-CM

## 2018-05-19 DIAGNOSIS — I86.8 VARICOSE VEINS OF OTHER SPECIFIED SITES: Chronic | ICD-10-CM

## 2018-05-19 DIAGNOSIS — Z78.9 OTHER SPECIFIED HEALTH STATUS: Chronic | ICD-10-CM

## 2018-05-19 LAB
ALBUMIN SERPL ELPH-MCNC: 4.1 G/DL — SIGNIFICANT CHANGE UP (ref 3.3–5)
ALP SERPL-CCNC: 79 U/L — SIGNIFICANT CHANGE UP (ref 40–120)
ALT FLD-CCNC: 16 U/L — SIGNIFICANT CHANGE UP (ref 10–45)
ANION GAP SERPL CALC-SCNC: 12 MMOL/L — SIGNIFICANT CHANGE UP (ref 5–17)
APTT BLD: 43.5 SEC — HIGH (ref 27.5–37.4)
AST SERPL-CCNC: 15 U/L — SIGNIFICANT CHANGE UP (ref 10–40)
BASOPHILS # BLD AUTO: 0 K/UL — SIGNIFICANT CHANGE UP (ref 0–0.2)
BASOPHILS NFR BLD AUTO: 0.3 % — SIGNIFICANT CHANGE UP (ref 0–2)
BILIRUB SERPL-MCNC: 0.5 MG/DL — SIGNIFICANT CHANGE UP (ref 0.2–1.2)
BUN SERPL-MCNC: 15 MG/DL — SIGNIFICANT CHANGE UP (ref 7–23)
CALCIUM SERPL-MCNC: 9.5 MG/DL — SIGNIFICANT CHANGE UP (ref 8.4–10.5)
CHLORIDE SERPL-SCNC: 99 MMOL/L — SIGNIFICANT CHANGE UP (ref 96–108)
CO2 SERPL-SCNC: 27 MMOL/L — SIGNIFICANT CHANGE UP (ref 22–31)
CREAT SERPL-MCNC: 0.77 MG/DL — SIGNIFICANT CHANGE UP (ref 0.5–1.3)
EOSINOPHIL # BLD AUTO: 0.1 K/UL — SIGNIFICANT CHANGE UP (ref 0–0.5)
EOSINOPHIL NFR BLD AUTO: 1.1 % — SIGNIFICANT CHANGE UP (ref 0–6)
GLUCOSE SERPL-MCNC: 151 MG/DL — HIGH (ref 70–99)
HCT VFR BLD CALC: 35.7 % — SIGNIFICANT CHANGE UP (ref 34.5–45)
HGB BLD-MCNC: 11.6 G/DL — SIGNIFICANT CHANGE UP (ref 11.5–15.5)
INR BLD: 1.93 RATIO — HIGH (ref 0.88–1.16)
LYMPHOCYTES # BLD AUTO: 3.3 K/UL — SIGNIFICANT CHANGE UP (ref 1–3.3)
LYMPHOCYTES # BLD AUTO: 34.4 % — SIGNIFICANT CHANGE UP (ref 13–44)
MCHC RBC-ENTMCNC: 25.1 PG — LOW (ref 27–34)
MCHC RBC-ENTMCNC: 32.5 GM/DL — SIGNIFICANT CHANGE UP (ref 32–36)
MCV RBC AUTO: 77.1 FL — LOW (ref 80–100)
MONOCYTES # BLD AUTO: 0.5 K/UL — SIGNIFICANT CHANGE UP (ref 0–0.9)
MONOCYTES NFR BLD AUTO: 5.5 % — SIGNIFICANT CHANGE UP (ref 2–14)
NEUTROPHILS # BLD AUTO: 5.7 K/UL — SIGNIFICANT CHANGE UP (ref 1.8–7.4)
NEUTROPHILS NFR BLD AUTO: 58.7 % — SIGNIFICANT CHANGE UP (ref 43–77)
PLATELET # BLD AUTO: 366 K/UL — SIGNIFICANT CHANGE UP (ref 150–400)
POTASSIUM SERPL-MCNC: 3.8 MMOL/L — SIGNIFICANT CHANGE UP (ref 3.5–5.3)
POTASSIUM SERPL-SCNC: 3.8 MMOL/L — SIGNIFICANT CHANGE UP (ref 3.5–5.3)
PROT SERPL-MCNC: 7.8 G/DL — SIGNIFICANT CHANGE UP (ref 6–8.3)
PROTHROM AB SERPL-ACNC: 21.1 SEC — HIGH (ref 9.8–12.7)
RBC # BLD: 4.63 M/UL — SIGNIFICANT CHANGE UP (ref 3.8–5.2)
RBC # FLD: 14.1 % — SIGNIFICANT CHANGE UP (ref 10.3–14.5)
SODIUM SERPL-SCNC: 138 MMOL/L — SIGNIFICANT CHANGE UP (ref 135–145)
TROPONIN T SERPL-MCNC: <0.01 NG/ML — SIGNIFICANT CHANGE UP (ref 0–0.06)
WBC # BLD: 9.7 K/UL — SIGNIFICANT CHANGE UP (ref 3.8–10.5)
WBC # FLD AUTO: 9.7 K/UL — SIGNIFICANT CHANGE UP (ref 3.8–10.5)

## 2018-05-19 PROCEDURE — 93010 ELECTROCARDIOGRAM REPORT: CPT

## 2018-05-19 PROCEDURE — 99285 EMERGENCY DEPT VISIT HI MDM: CPT | Mod: 25

## 2018-05-19 PROCEDURE — 71045 X-RAY EXAM CHEST 1 VIEW: CPT | Mod: 26

## 2018-05-19 RX ORDER — ASPIRIN/CALCIUM CARB/MAGNESIUM 324 MG
162 TABLET ORAL DAILY
Qty: 0 | Refills: 0 | Status: DISCONTINUED | OUTPATIENT
Start: 2018-05-19 | End: 2018-05-19

## 2018-05-19 NOTE — ED ADULT NURSE NOTE - OBJECTIVE STATEMENT
67 y f came to the ed with chest pain. states her pmd sent her to the ed b/c she had too much chest pain to go for a stress test. patient has hx of anxiety and is very anxious. patient is a/ox3. denies any fevers, chills. c/o sob with activity. abdomen is soft and nontender. skin is warm and dry. denies n/v/d. states her pmd gave her 325mg of aspirin before sending her to the ed.

## 2018-05-19 NOTE — ED PROVIDER NOTE - OBJECTIVE STATEMENT
67F hx DM/HTN/high cholesterol p/w chest pain. She describes left sided pain that radiates around the left chest to the back, intermittent for the past 3 weeks. Associated with exertional dyspnea. Improves temporary when she takes xanax. No weakness/numbnes. 67F hx DM/HTN/high cholesterol p/w chest pain. She describes left sided pain that radiates around the left chest to the back, intermittent for the past 3 weeks. Associated with exertional dyspnea. Improves temporarily when she takes xanax. No weakness/numbness/abd pain. Scheduled for a stress test last week but this was held due to her pain. At f/u today she was referred to the ED due to ECG changes.

## 2018-05-19 NOTE — ED PROVIDER NOTE - PMH
AF (atrial fibrillation)  on Xarelto  Anxiety    CAD (coronary artery disease)  minimal CAD per cath result in 2014  Depression    Diabetes    Former smoker    Gastritis    Hiatal hernia    HTN (hypertension)    Hypercholesterolemia    Hypothyroid    Morbid obesity    Murmur    Neuropathy    Palpitations

## 2018-05-19 NOTE — ED PROVIDER NOTE - ATTENDING CONTRIBUTION TO CARE
Dr. Fontaine : I have personally seen and examined this patient at the bedside. I have fully participated in the care of this patient. I have reviewed all pertinent clinical information, including history, physical exam, plan and the Resident's note and agree except as noted.     67F hx DM, HTN, HLD, afib, dvt few years ago on xarelto  p/w left sided chest pain. pain radiates around the left chest to the back, intermittent for the past 3 weeks. + exertional dyspnea. At f/u today she was referred to the ED due to ECG changes. stress test last week notes that was having pain so never went to get it. no hx of PE notes sometimes gets numbness to left arm with this pain. +symptoms at rest but mostly with exertion  Denies f/c/n/v/palpitations/cough/abd.pain/d/c/dysuria/hematuria. no sick contacts/recent travel.    PE:  head; atraumatic normocephalic  eyes: perrla  Heart: rrr s1s2  lungs: ctab  abd: soft, nt nd + bs no rebound/guarding no cva ttp  le: no swelling no calf ttp  back: no midline cervical/thoracic/lumbar ttp    -->unstable angina at rest but mostly exertional/acs vs pna less likely pe will d-cimer--will fu labs cxr; ekg--reach out to pts cardiologist admit vs obs for nuclear stress

## 2018-05-19 NOTE — ED PROVIDER NOTE - MEDICAL DECISION MAKING DETAILS
Jonathan Weil, PGY1 - concern for ACS, but also low risk for PE based on symptoms and prior DVT. Will check troponin, d-dimer followed by CTA chest if positive, CDU vs admit to complete ACS w/u if trops and DVT/PE eval negative.

## 2018-05-19 NOTE — ED PROVIDER NOTE - SHIFT CHANGE DETAILS
Lenka Jennings MD - Attending Physician: Pt here with anginal pain, now with worsening. To admit for stress and cards eval

## 2018-05-19 NOTE — ED PROVIDER NOTE - PROGRESS NOTE DETAILS
Already took 325mg ASA at pcp prior to ED arrival Jonathan Weil, PGY1 - recommended patient stay in observation for stress test tomorrow. She does not want to stay due to back discomfort from the stretcher. Suggested analgesics and repositioning, but she refused. Discussed risks of significant morbidity and mortality from ACS, and that the current workup is not sufficient to rule out life threatening pathologies given her presentation. She expressed understanding of these concerns, and still desires to leave. She is of sound mind to make this decision. Given instructions to return to any worsening of her symptoms, or to return at any time to complete the workup.

## 2018-05-22 ENCOUNTER — INPATIENT (INPATIENT)
Facility: HOSPITAL | Age: 68
LOS: 2 days | Discharge: HOME | DRG: 287 | End: 2018-05-25
Attending: HOSPITALIST | Admitting: HOSPITALIST
Payer: MEDICARE

## 2018-05-22 VITALS
RESPIRATION RATE: 93 BRPM | DIASTOLIC BLOOD PRESSURE: 98 MMHG | OXYGEN SATURATION: 98 % | WEIGHT: 255.07 LBS | HEIGHT: 61 IN | HEART RATE: 95 BPM | TEMPERATURE: 98 F | SYSTOLIC BLOOD PRESSURE: 197 MMHG

## 2018-05-22 DIAGNOSIS — I86.8 VARICOSE VEINS OF OTHER SPECIFIED SITES: Chronic | ICD-10-CM

## 2018-05-22 DIAGNOSIS — R07.9 CHEST PAIN, UNSPECIFIED: ICD-10-CM

## 2018-05-22 DIAGNOSIS — Z98.49 CATARACT EXTRACTION STATUS, UNSPECIFIED EYE: Chronic | ICD-10-CM

## 2018-05-22 DIAGNOSIS — Z78.9 OTHER SPECIFIED HEALTH STATUS: Chronic | ICD-10-CM

## 2018-05-22 LAB
ALBUMIN SERPL ELPH-MCNC: 4.1 G/DL — SIGNIFICANT CHANGE UP (ref 3.3–5)
ALP SERPL-CCNC: 75 U/L — SIGNIFICANT CHANGE UP (ref 40–120)
ALT FLD-CCNC: 13 U/L — SIGNIFICANT CHANGE UP (ref 10–45)
ANION GAP SERPL CALC-SCNC: 13 MMOL/L — SIGNIFICANT CHANGE UP (ref 5–17)
APTT BLD: 32.4 SEC — SIGNIFICANT CHANGE UP (ref 27.5–37.4)
AST SERPL-CCNC: 13 U/L — SIGNIFICANT CHANGE UP (ref 10–40)
BASOPHILS # BLD AUTO: 0.1 K/UL — SIGNIFICANT CHANGE UP (ref 0–0.2)
BASOPHILS NFR BLD AUTO: 0.7 % — SIGNIFICANT CHANGE UP (ref 0–2)
BILIRUB SERPL-MCNC: 0.4 MG/DL — SIGNIFICANT CHANGE UP (ref 0.2–1.2)
BUN SERPL-MCNC: 13 MG/DL — SIGNIFICANT CHANGE UP (ref 7–23)
CALCIUM SERPL-MCNC: 9.2 MG/DL — SIGNIFICANT CHANGE UP (ref 8.4–10.5)
CHLORIDE SERPL-SCNC: 101 MMOL/L — SIGNIFICANT CHANGE UP (ref 96–108)
CK MB CFR SERPL CALC: 2 NG/ML — SIGNIFICANT CHANGE UP (ref 0–3.8)
CK SERPL-CCNC: 96 U/L — SIGNIFICANT CHANGE UP (ref 25–170)
CO2 SERPL-SCNC: 28 MMOL/L — SIGNIFICANT CHANGE UP (ref 22–31)
CREAT SERPL-MCNC: 0.77 MG/DL — SIGNIFICANT CHANGE UP (ref 0.5–1.3)
EOSINOPHIL # BLD AUTO: 0.1 K/UL — SIGNIFICANT CHANGE UP (ref 0–0.5)
EOSINOPHIL NFR BLD AUTO: 1.2 % — SIGNIFICANT CHANGE UP (ref 0–6)
GLUCOSE SERPL-MCNC: 172 MG/DL — HIGH (ref 70–99)
HCT VFR BLD CALC: 32.4 % — LOW (ref 34.5–45)
HGB BLD-MCNC: 10.6 G/DL — LOW (ref 11.5–15.5)
INR BLD: 1.01 RATIO — SIGNIFICANT CHANGE UP (ref 0.88–1.16)
LYMPHOCYTES # BLD AUTO: 2.9 K/UL — SIGNIFICANT CHANGE UP (ref 1–3.3)
LYMPHOCYTES # BLD AUTO: 28 % — SIGNIFICANT CHANGE UP (ref 13–44)
MCHC RBC-ENTMCNC: 24.8 PG — LOW (ref 27–34)
MCHC RBC-ENTMCNC: 32.7 GM/DL — SIGNIFICANT CHANGE UP (ref 32–36)
MCV RBC AUTO: 75.7 FL — LOW (ref 80–100)
MONOCYTES # BLD AUTO: 0.6 K/UL — SIGNIFICANT CHANGE UP (ref 0–0.9)
MONOCYTES NFR BLD AUTO: 5.4 % — SIGNIFICANT CHANGE UP (ref 2–14)
NEUTROPHILS # BLD AUTO: 6.6 K/UL — SIGNIFICANT CHANGE UP (ref 1.8–7.4)
NEUTROPHILS NFR BLD AUTO: 64.6 % — SIGNIFICANT CHANGE UP (ref 43–77)
NT-PROBNP SERPL-SCNC: 96 PG/ML — SIGNIFICANT CHANGE UP (ref 0–300)
PLATELET # BLD AUTO: 343 K/UL — SIGNIFICANT CHANGE UP (ref 150–400)
POTASSIUM SERPL-MCNC: 3.5 MMOL/L — SIGNIFICANT CHANGE UP (ref 3.5–5.3)
POTASSIUM SERPL-SCNC: 3.5 MMOL/L — SIGNIFICANT CHANGE UP (ref 3.5–5.3)
PROT SERPL-MCNC: 7.5 G/DL — SIGNIFICANT CHANGE UP (ref 6–8.3)
PROTHROM AB SERPL-ACNC: 11 SEC — SIGNIFICANT CHANGE UP (ref 9.8–12.7)
RBC # BLD: 4.28 M/UL — SIGNIFICANT CHANGE UP (ref 3.8–5.2)
RBC # FLD: 13.8 % — SIGNIFICANT CHANGE UP (ref 10.3–14.5)
SODIUM SERPL-SCNC: 142 MMOL/L — SIGNIFICANT CHANGE UP (ref 135–145)
TROPONIN T SERPL-MCNC: <0.01 NG/ML — SIGNIFICANT CHANGE UP (ref 0–0.06)
WBC # BLD: 10.3 K/UL — SIGNIFICANT CHANGE UP (ref 3.8–10.5)
WBC # FLD AUTO: 10.3 K/UL — SIGNIFICANT CHANGE UP (ref 3.8–10.5)

## 2018-05-22 PROCEDURE — 99223 1ST HOSP IP/OBS HIGH 75: CPT

## 2018-05-22 PROCEDURE — 99285 EMERGENCY DEPT VISIT HI MDM: CPT | Mod: 25,GC

## 2018-05-22 PROCEDURE — 93010 ELECTROCARDIOGRAM REPORT: CPT

## 2018-05-22 RX ORDER — ASPIRIN/CALCIUM CARB/MAGNESIUM 324 MG
325 TABLET ORAL ONCE
Qty: 0 | Refills: 0 | Status: COMPLETED | OUTPATIENT
Start: 2018-05-22 | End: 2018-05-22

## 2018-05-22 RX ADMIN — Medication 325 MILLIGRAM(S): at 23:39

## 2018-05-22 NOTE — ED PROVIDER NOTE - PHYSICAL EXAMINATION
PHYSICAL EXAM:    GENERAL: Comfortable, no acute distress, morbidly obese   HEAD:  Normocephalic, atraumatic  EYES: EOMI, PERRLA  HEENT: Moist mucous membranes  NECK: Supple  NERVOUS SYSTEM:  Alert & Oriented X3, Motor Strength 5/5 B/L upper and lower extremities  CHEST/LUNG: Clear to auscultation bilaterally  HEART: Regular rate and rhythm, no murmur   ABDOMEN: Soft, Nontender, Nondistended, Bowel sounds present  EXTREMITIES:   No clubbing, cyanosis, or edema  MUSCULOSKELETAL: Tenderness to palpation of L chest and L upper back   SKIN:  warm and dry, no rash

## 2018-05-22 NOTE — ED PROVIDER NOTE - MEDICAL DECISION MAKING DETAILS
66 y/o F c hx HTN, DM2, morbid obesity, AF, depression, anxiety, hypothyroid, HLD, hiatal hernia, poor medication compliance, p/w 3 weeks of worsening CP, r/o MI. Will trend Bernardo tonight. Will likely need either nuclear pharm stress vs Cardiac CT. 66 y/o F c hx HTN, DM2, morbid obesity, AF, depression, anxiety, hypothyroid, HLD, hiatal hernia, poor medication compliance, p/w 3 weeks of worsening CP, r/o MI. Will trend Bernardo tonight. Will likely need either nuclear pharm stress vs Cardiac CT vs .

## 2018-05-22 NOTE — ED PROVIDER NOTE - OBJECTIVE STATEMENT
66 y/o F c hx HTN, DM2, morbid obesity, AF, depression, anxiety, hypothyroid, HLD, hiatal hernia, poor medication compliance, p/w 3 weeks of worsening CP.   Patient was seen her in the ED on 5/19 for CP. EKG showed mild new ST seg depressions in V4-V6. Patient was planned to have a stress test, but left AMA because she didn't want to sleep on the stretcher due to chronic back pain.  Patient reports the chest has persisted. Described the pain as a stabbing, tight pain that started substernally and radiates under her L breast and to her L upper back. The pain is intermittent and comes for several hours and then disappears for several hours. Reports the pain has woken her up from sleep. +orthopnea sleeps with 4 pills at night. No worsening of pain with ambulation, eating, drinking or moving.   Acknowledges chills and nausea. Denies any F, cough, dysuria, constipation, diarrhea, headache or rashes.   Pain is reproducible on palpation.

## 2018-05-22 NOTE — ED PROVIDER NOTE - ATTENDING CONTRIBUTION TO CARE
Dr. Fontaine : I have personally seen and examined this patient at the bedside. I have fully participated in the care of this patient. I have reviewed all pertinent clinical information, including history, physical exam, plan and the Resident's note and agree except as noted.     68 yo F c hx HTN, DM2, morbid obesity, AF, depression, anxiety, hypothyroid, HLD, hiatal hernia, poor medication compliance p/w cp worsening over last 3 weeks. was in the ED 3 days ago signed out ama.   called her cardiologist on monday who told her to go to the ED to get admitted for cardiac work up.  constant pain radiating to left arm with sob, sob worsening on exertion, sleeps with 4 pillows at baseline.  +nausea.  Denies f/c/v/palpitations/cough/abd.pain/d/c/dysuria/hematuria. no sick contacts/recent travel. hx of dvt years ago on xarelto    PE:  head; atraumatic normocephalic  eyes: perrla  Heart: rrr s1s2  lungs: ctab  abd: soft, nt nd + bs no rebound/guarding no cva ttp  le: 1+ swelling no calf ttp  back: no midline cervical/thoracic/lumbar ttp    -->most likely acs will fu labs cxr neg 3 days ago ekg unchanged--juli admit for stress vs cath; cardiology consult

## 2018-05-22 NOTE — ED PROVIDER NOTE - FAMILY HISTORY
Family history of hypertension     Family history of breast cancer     Mother  Still living? Unknown  Family history of stroke, Age at diagnosis: Age Unknown

## 2018-05-22 NOTE — ED ADULT NURSE NOTE - OBJECTIVE STATEMENT
chest pain x 3 weeks; seen here recently for same but left ama; chest pain started saturday; c/o chills, nausea and sob, especially with extertion; now williing to stay and not leave ama; family present and is supportive

## 2018-05-23 DIAGNOSIS — I48.0 PAROXYSMAL ATRIAL FIBRILLATION: ICD-10-CM

## 2018-05-23 DIAGNOSIS — D50.9 IRON DEFICIENCY ANEMIA, UNSPECIFIED: ICD-10-CM

## 2018-05-23 DIAGNOSIS — I20.8 OTHER FORMS OF ANGINA PECTORIS: ICD-10-CM

## 2018-05-23 DIAGNOSIS — E11.9 TYPE 2 DIABETES MELLITUS WITHOUT COMPLICATIONS: ICD-10-CM

## 2018-05-23 DIAGNOSIS — E03.9 HYPOTHYROIDISM, UNSPECIFIED: ICD-10-CM

## 2018-05-23 DIAGNOSIS — F41.9 ANXIETY DISORDER, UNSPECIFIED: ICD-10-CM

## 2018-05-23 LAB
CK MB CFR SERPL CALC: 1.6 NG/ML — SIGNIFICANT CHANGE UP (ref 0–3.8)
CK SERPL-CCNC: 76 U/L — SIGNIFICANT CHANGE UP (ref 25–170)
FERRITIN SERPL-MCNC: 8 NG/ML — LOW (ref 15–150)
IRON SATN MFR SERPL: 25 UG/DL — LOW (ref 30–160)
T4 FREE SERPL-MCNC: 1.5 NG/DL — SIGNIFICANT CHANGE UP (ref 0.9–1.8)
TROPONIN T SERPL-MCNC: <0.01 NG/ML — SIGNIFICANT CHANGE UP (ref 0–0.06)
TSH SERPL-MCNC: 9.36 UIU/ML — HIGH (ref 0.27–4.2)

## 2018-05-23 PROCEDURE — 78452 HT MUSCLE IMAGE SPECT MULT: CPT | Mod: 26

## 2018-05-23 PROCEDURE — 93016 CV STRESS TEST SUPVJ ONLY: CPT

## 2018-05-23 PROCEDURE — 99233 SBSQ HOSP IP/OBS HIGH 50: CPT

## 2018-05-23 PROCEDURE — 93018 CV STRESS TEST I&R ONLY: CPT

## 2018-05-23 RX ORDER — GLUCAGON INJECTION, SOLUTION 0.5 MG/.1ML
1 INJECTION, SOLUTION SUBCUTANEOUS ONCE
Qty: 0 | Refills: 0 | Status: DISCONTINUED | OUTPATIENT
Start: 2018-05-23 | End: 2018-05-25

## 2018-05-23 RX ORDER — DEXTROSE 50 % IN WATER 50 %
15 SYRINGE (ML) INTRAVENOUS ONCE
Qty: 0 | Refills: 0 | Status: DISCONTINUED | OUTPATIENT
Start: 2018-05-23 | End: 2018-05-25

## 2018-05-23 RX ORDER — POTASSIUM CHLORIDE 20 MEQ
40 PACKET (EA) ORAL ONCE
Qty: 0 | Refills: 0 | Status: COMPLETED | OUTPATIENT
Start: 2018-05-23 | End: 2018-05-23

## 2018-05-23 RX ORDER — DOCUSATE SODIUM 100 MG
0 CAPSULE ORAL
Qty: 0 | Refills: 0 | COMMUNITY

## 2018-05-23 RX ORDER — ALPRAZOLAM 0.25 MG
0.5 TABLET ORAL AT BEDTIME
Qty: 0 | Refills: 0 | Status: DISCONTINUED | OUTPATIENT
Start: 2018-05-23 | End: 2018-05-25

## 2018-05-23 RX ORDER — ACETAMINOPHEN 500 MG
650 TABLET ORAL ONCE
Qty: 0 | Refills: 0 | Status: COMPLETED | OUTPATIENT
Start: 2018-05-23 | End: 2018-05-23

## 2018-05-23 RX ORDER — INSULIN DEGLUDEC 100 U/ML
0 INJECTION, SOLUTION SUBCUTANEOUS
Qty: 0 | Refills: 0 | COMMUNITY

## 2018-05-23 RX ORDER — ATORVASTATIN CALCIUM 80 MG/1
40 TABLET, FILM COATED ORAL AT BEDTIME
Qty: 0 | Refills: 0 | Status: DISCONTINUED | OUTPATIENT
Start: 2018-05-23 | End: 2018-05-25

## 2018-05-23 RX ORDER — ACETAMINOPHEN 500 MG
2 TABLET ORAL
Qty: 0 | Refills: 0 | COMMUNITY

## 2018-05-23 RX ORDER — PANTOPRAZOLE SODIUM 20 MG/1
40 TABLET, DELAYED RELEASE ORAL
Qty: 0 | Refills: 0 | Status: DISCONTINUED | OUTPATIENT
Start: 2018-05-23 | End: 2018-05-25

## 2018-05-23 RX ORDER — INSULIN GLARGINE 100 [IU]/ML
10 INJECTION, SOLUTION SUBCUTANEOUS ONCE
Qty: 0 | Refills: 0 | Status: COMPLETED | OUTPATIENT
Start: 2018-05-23 | End: 2018-05-23

## 2018-05-23 RX ORDER — DEXTROSE 50 % IN WATER 50 %
12.5 SYRINGE (ML) INTRAVENOUS ONCE
Qty: 0 | Refills: 0 | Status: DISCONTINUED | OUTPATIENT
Start: 2018-05-23 | End: 2018-05-25

## 2018-05-23 RX ORDER — ASPIRIN/CALCIUM CARB/MAGNESIUM 324 MG
81 TABLET ORAL DAILY
Qty: 0 | Refills: 0 | Status: DISCONTINUED | OUTPATIENT
Start: 2018-05-23 | End: 2018-05-25

## 2018-05-23 RX ORDER — DEXTROSE 50 % IN WATER 50 %
25 SYRINGE (ML) INTRAVENOUS ONCE
Qty: 0 | Refills: 0 | Status: DISCONTINUED | OUTPATIENT
Start: 2018-05-23 | End: 2018-05-25

## 2018-05-23 RX ORDER — INSULIN GLARGINE 100 [IU]/ML
10 INJECTION, SOLUTION SUBCUTANEOUS AT BEDTIME
Qty: 0 | Refills: 0 | Status: DISCONTINUED | OUTPATIENT
Start: 2018-05-23 | End: 2018-05-25

## 2018-05-23 RX ORDER — CHOLECALCIFEROL (VITAMIN D3) 125 MCG
1 CAPSULE ORAL
Qty: 0 | Refills: 0 | COMMUNITY

## 2018-05-23 RX ORDER — PANTOPRAZOLE SODIUM 20 MG/1
1 TABLET, DELAYED RELEASE ORAL
Qty: 0 | Refills: 0 | COMMUNITY

## 2018-05-23 RX ORDER — LEVOTHYROXINE SODIUM 125 MCG
200 TABLET ORAL DAILY
Qty: 0 | Refills: 0 | Status: DISCONTINUED | OUTPATIENT
Start: 2018-05-23 | End: 2018-05-25

## 2018-05-23 RX ORDER — INSULIN LISPRO 100/ML
VIAL (ML) SUBCUTANEOUS AT BEDTIME
Qty: 0 | Refills: 0 | Status: DISCONTINUED | OUTPATIENT
Start: 2018-05-23 | End: 2018-05-25

## 2018-05-23 RX ORDER — INSULIN LISPRO 100/ML
VIAL (ML) SUBCUTANEOUS
Qty: 0 | Refills: 0 | Status: DISCONTINUED | OUTPATIENT
Start: 2018-05-23 | End: 2018-05-25

## 2018-05-23 RX ORDER — SOTALOL HCL 120 MG
1 TABLET ORAL
Qty: 0 | Refills: 0 | COMMUNITY

## 2018-05-23 RX ORDER — ASPIRIN/CALCIUM CARB/MAGNESIUM 324 MG
1 TABLET ORAL
Qty: 0 | Refills: 0 | COMMUNITY

## 2018-05-23 RX ORDER — LUBIPROSTONE 24 UG/1
1 CAPSULE, GELATIN COATED ORAL
Qty: 0 | Refills: 0 | COMMUNITY

## 2018-05-23 RX ORDER — DILTIAZEM HCL 120 MG
180 CAPSULE, EXT RELEASE 24 HR ORAL DAILY
Qty: 0 | Refills: 0 | Status: DISCONTINUED | OUTPATIENT
Start: 2018-05-23 | End: 2018-05-25

## 2018-05-23 RX ORDER — INSULIN ASPART 100 [IU]/ML
0 INJECTION, SOLUTION SUBCUTANEOUS
Qty: 0 | Refills: 0 | COMMUNITY

## 2018-05-23 RX ORDER — FERROUS SULFATE 325(65) MG
325 TABLET ORAL DAILY
Qty: 0 | Refills: 0 | Status: DISCONTINUED | OUTPATIENT
Start: 2018-05-23 | End: 2018-05-25

## 2018-05-23 RX ADMIN — Medication 200 MICROGRAM(S): at 05:32

## 2018-05-23 RX ADMIN — Medication 325 MILLIGRAM(S): at 16:37

## 2018-05-23 RX ADMIN — Medication 650 MILLIGRAM(S): at 21:35

## 2018-05-23 RX ADMIN — Medication 650 MILLIGRAM(S): at 21:05

## 2018-05-23 RX ADMIN — Medication 1: at 13:17

## 2018-05-23 RX ADMIN — Medication 180 MILLIGRAM(S): at 05:32

## 2018-05-23 RX ADMIN — Medication 1: at 08:22

## 2018-05-23 RX ADMIN — Medication 0.5 MILLIGRAM(S): at 01:02

## 2018-05-23 RX ADMIN — ATORVASTATIN CALCIUM 40 MILLIGRAM(S): 80 TABLET, FILM COATED ORAL at 21:05

## 2018-05-23 RX ADMIN — Medication 40 MILLIEQUIVALENT(S): at 05:32

## 2018-05-23 RX ADMIN — Medication 650 MILLIGRAM(S): at 01:02

## 2018-05-23 RX ADMIN — INSULIN GLARGINE 10 UNIT(S): 100 INJECTION, SOLUTION SUBCUTANEOUS at 01:15

## 2018-05-23 RX ADMIN — PANTOPRAZOLE SODIUM 40 MILLIGRAM(S): 20 TABLET, DELAYED RELEASE ORAL at 05:32

## 2018-05-23 RX ADMIN — INSULIN GLARGINE 10 UNIT(S): 100 INJECTION, SOLUTION SUBCUTANEOUS at 21:06

## 2018-05-23 RX ADMIN — Medication 0.5 MILLIGRAM(S): at 21:20

## 2018-05-23 RX ADMIN — Medication 2: at 16:37

## 2018-05-23 RX ADMIN — Medication 81 MILLIGRAM(S): at 08:22

## 2018-05-23 RX ADMIN — Medication 40 MILLIEQUIVALENT(S): at 08:56

## 2018-05-23 NOTE — CONSULT NOTE ADULT - ATTENDING COMMENTS
Patient seen and examined, agree with above assessment and plan as transcribed above.    - Atypical CP, R/o for MI  - f/u echo  - If wnl plan for stress    Alfredo Kolb MD, FACC

## 2018-05-23 NOTE — H&P ADULT - FAMILY HISTORY
Father  Still living? Unknown  Family history of hypertension, Age at diagnosis: Age Unknown     Mother  Still living? Unknown  Family history of stroke, Age at diagnosis: Age Unknown  Family history of breast cancer, Age at diagnosis: Age Unknown

## 2018-05-23 NOTE — H&P ADULT - HISTORY OF PRESENT ILLNESS
68 yo F w/ hx of DM2, Afib (on xarelto), obese, hypothyroidism 2/2 to thyroidectomy for multinodular goiter, anxiety presents with chest pain.    Patient reports 2-week of intermittent chest pain. It is primarily substernal, sometimes under left breast, sometimes achy, sometimes pressure, radiates to back but not to shoulder or jaw. The pain can occur at rest or activity and nothing in particular brings it on. IT is not related to inspiration and she has not missed any dose of xarelto. She reports the pain sometimes goes to back but not always. She has no diaphoresis, no nausea, no vomiting. Last nuclear stress test performed several years ago was normal. She has no family hx of early MI and she is not a smoker. She reports that she takes all her medication as indication. She is able to ambulate without chest pain. She denies orthopnea, worsening leg edema, and PND. She reports no recent viral illness and denies cough, fever, and chills. She was here on 5/19/18 for similar symptoms and left AMA. At that time her EKG was concerning for "st depression" which when compared to prior is largely unchanged. Of note she does have alot of anxiety and thinks about her family's wellbeing all the time.

## 2018-05-23 NOTE — H&P ADULT - ASSESSMENT
66 yo F w/ hx of DM2, Afib (on xarelto), obese, hypothyroidism 2/2 to thyroidectomy for multinodular goiter, anxiety presents with atypical angina.

## 2018-05-23 NOTE — H&P ADULT - PROBLEM SELECTOR PLAN 2
-Patient w/ prior hemoglobin in 10s but denies melena and hematochezia. Would continue to trend but check iron studies. If iron deficiency, will ultimately need GI eval either inpatient or outpatient, per primary team discretion.

## 2018-05-23 NOTE — H&P ADULT - NSHPREVIEWOFSYSTEMS_GEN_ALL_CORE
REVIEW OF SYSTEMS:    CONSTITUTIONAL: No weakness, fevers or chills  ENMT:  No ear pain, No vertigo or throat pain  EYES: No visual changes  or photophobia  NECK: No pain or stiffness  RESPIRATORY: No cough, wheezing, hemoptysis; No shortness of breath  CARDIOVASCULAR: see HPI   GASTROINTESTINAL: No abdominal or epigastric pain. No nausea, vomiting, or hematemesis; No diarrhea or constipation. No melena or hematochezia.  MUSCULOSKELETAL: No joint swelling  or warmth.  GENITOURINARY: No dysuria, frequency or hematuria  NEUROLOGICAL: No numbness or weakness  PSYCHIATRIC: No depression or anhedonia.  ENDOCRINE: No sx hypoglycemic episodes.  SKIN: No itching, burning, rashes, or lesions

## 2018-05-23 NOTE — H&P ADULT - PROBLEM SELECTOR PLAN 1
-Patient presenting w/ atypical symptoms for 2-weeks and CE x2 negative (if considering the most recent one from May 19th). Also her EKG is unchanged largely from before and first EKG by ER has lot of artifact and repeat clearly shows no significant ST depression or signs of ischemia.   -Would repeat CE @ 4AM   -Monitor on telemetry  -Check TTE  -Will get nuclear stress pharmacological as she has had it before and no preclusion based on my hx.

## 2018-05-23 NOTE — H&P ADULT - PROBLEM SELECTOR PLAN 4
-Hold home insulin therapy ; will start basal bolus coverage here but lower basal dose because patient not eating much and risk of hypoglycemia greater than hyperglycemia  -ADA diet.

## 2018-05-23 NOTE — CONSULT NOTE ADULT - SUBJECTIVE AND OBJECTIVE BOX
HISTORY OF PRESENT ILLNESS: 67 year old female with HTN HLD DM obesity PAF on Xarelto with normal coronaries on cardiac cath in May 2016 with historically preserved LV function , PAD s/p laser atherectomy and balloon angioplasty to Right tibial artery June 2016 admitted with complaints of intermittent chest pain that begins and remits spontaneously as well as dyspnea on exertion.     PAST MEDICAL & SURGICAL HISTORY:  Neuropathy  Hiatal hernia  AF (atrial fibrillation): on Xarelto  Anxiety  Depression  Gastritis  Hypercholesterolemia  Diabetes  CAD (coronary artery disease): minimal CAD per cath result in 2014  Hypothyroid  Former smoker  Palpitations  Morbid obesity  Murmur  HTN (hypertension)  History of angioplasty of vein: right leg 2016  Varicose vein: with stripping b/l  S/P cataract extraction and insertion of intraocular lens: bilateral &gt; 15 years ago    	    MEDICATIONS:  aspirin enteric coated 81 milliGRAM(s) Oral daily  diltiazem    milliGRAM(s) Oral daily  ALPRAZolam 0.5 milliGRAM(s) Oral at bedtime PRN  pantoprazole    Tablet 40 milliGRAM(s) Oral before breakfast  glucagon  Injectable 1 milliGRAM(s) IntraMuscular once PRN  insulin glargine Injectable (LANTUS) 10 Unit(s) SubCutaneous at bedtime  insulin lispro (HumaLOG) corrective regimen sliding scale   SubCutaneous three times a day before meals  insulin lispro (HumaLOG) corrective regimen sliding scale   SubCutaneous at bedtime  levothyroxine 200 MICROGram(s) Oral daily        Allergies  No Known Allergies      FAMILY HISTORY:  no prematureCAD  Family history of breast cancer (Mother)  Family history of hypertension (Father)  Family history of stroke (Mother): mom      SOCIAL HISTORY:    [+ ] Non-smoker  [ ] Smoker  [ -] Alcohol      REVIEW OF SYSTEMS:  chest pain , OLIVAREZ  otherwise negative     PHYSICAL EXAM:  T(C): 36.9 (05-23-18 @ 05:30), Max: 37.1 (05-23-18 @ 02:30)  HR: 74 (05-23-18 @ 05:30) (70 - 95)  BP: 131/76 (05-23-18 @ 05:30) (131/76 - 197/98)  RR: 20 (05-23-18 @ 05:30) (16 - 93)  SpO2: 95% (05-23-18 @ 05:30) (95% - 99%)  Wt(kg): --  I&O's Summary      Appearance: Normal	  HEENT:   Normal oral mucosa, PERRL, EOMI	  Lymphatic: No lymphadenopathy  Cardiovascular: Normal S1 S2, No JVD, No murmurs, No edema  Respiratory: Lungs clear to auscultation	  Psychiatry: A & O x 3, Mood & affect appropriate  Gastrointestinal:  Soft, Non-tender, + BS	  Skin: No rashes, No ecchymoses, No cyanosis	  Neurologic: Non-focal  Extremities: Normal range of motion, No clubbing, cyanosis or edema  Vascular: Peripheral pulses palpable 2+ bilaterally    TELEMETRY: NSR  	    ECG:  	NSR TWI in inferolateral leads     RADIOLOGY:   Xray Chest 1 View- PORTABLE-Urgent (05.19.18 @ 14:36) >    IMPRESSION:  Clear lungs.      LABS:	 	    CARDIAC MARKERS:   Troponin T, Serum: <0.01 ng/mL (05.23.18 @ 04:03) x 2                          10.6   10.3  )-----------( 343      ( 22 May 2018 22:01 )             32.4       05-23    142  |  103  |  13  ----------------------------<  151<H>  3.3<L>   |  25  |  0.72    Ca    8.7      23 May 2018 04:03    TPro  7.5  /  Alb  4.1  /  TBili  0.4  /  DBili  x   /  AST  13  /  ALT  13  /  AlkPhos  75  05-22      proBNP: Serum Pro-Brain Natriuretic Peptide: 96 pg/mL (05-22 @ 22:01)      TSH: Thyroid Stimulating Hormone, Serum: 9.36 uIU/mL (05-23 @ 08:21)      < from: Cardiac Cath Lab - Adult (05.02.16 @ 10:14) >  CORONARY VESSELS: The coronary circulation is right dominant.  LM:   --  LM: Normal.  LAD:   --  LAD: Normal.  CX:   --  Circumflex: Normal.  RCA:   --  RCA: Normal.  --  RPDA: Angiography showed minor luminal irregularities with no flow  limiting lesions.  --  RPLS: Normal.  AORTA: The root exhibited upper limit of normal size.  COMPLICATIONS: No complications occurred during the cath lab visit.  DIAGNOSTIC RECOMMENDATIONS: The patient should continue with the present  medications.  Prepared and signed by  Caterina Perkins M.D.    < from: Transthoracic Echocardiogram (03.05.16 @ 15:36) >  Conclusions:  1. Mild mitral annular calcification, otherwise normal  mitral valve. Minimal mitral regurgitation.  2. Aortic valve not well visualized; appears trileaflet  with normal opening. No aortic valve regurgitation seen.  3. Hyperdynamic left ventricle. EF=75-80%.  4. Normal right ventricular size and function.  5. Normal tricuspid valve. Mild-moderate tricuspid  regurgitation.  6. Estimated pulmonary artery systolic pressure equals 39  mm Hg, assuming right atrial pressure equals 8 mm Hg,  consistent with borderline pulmonary pressures.  *** Compared with echocardiogram of 1/6/2012, no  significant changes noted.        ASSESSMENT/PLAN:  67 year old female with HTN HLD DM obesity PAF on Xarelto with normal coronaries on cardiac cath in May 2016 with historically preserved LV function , PAD s/p laser atherectomy and balloon angioplasty to Right tibial artery June 2016 admitted with complaints of intermittent chest pain that begins and remits spontaneously as well as dyspnea on exertion.     -- The patient has ruled out for acute coronary syndrome with negative serial cardiac enzymes  -- EKG without acute ischemia  --D-dimer 5/19 negative  -- check TTE/NST   -- c/w Cardizem CD  -- c/w Xarelto for CVA prevention   -- final recs pending above     Veronica Dias RPA-C

## 2018-05-23 NOTE — H&P ADULT - NSHPPHYSICALEXAM_GEN_ALL_CORE
PHYSICAL EXAM:  Vital Signs Last 24 Hrs  T(C): 36.7 (05-22-18 @ 23:21)  T(F): 98.1 (05-22-18 @ 23:21), Max: 98.4 (05-22-18 @ 21:43)  HR: 80 (05-22-18 @ 23:21) (79 - 95)  BP: 133/60 (05-22-18 @ 23:21)  BP(mean): --  RR: 16 (05-22-18 @ 23:21) (16 - 93)  SpO2: 99% (05-22-18 @ 23:21) (98% - 99%)  Wt(kg): --    Constitutional: NAD, awake and alert, obese appearing   EYES: EOMI  ENT:  Normal Hearing, no tonsillar exudates   Neck: Soft and supple , no thyromegaly   Respiratory: Breath sounds are clear bilaterally, No wheezing, rales or rhonchi  Cardiovascular: S1 and S2, regular rate and rhythm, no Murmurs, gallops or rubs, no JVD,    Gastrointestinal: Bowel Sounds present, soft, nontender, nondistended, no guarding, no rebound  Extremities: No cyanosis or clubbing; warm to touch  Vascular: 2+ peripheral pulses lower ex  Neurological: No focal deficits, CN II-XII intact bilaterally, sensation to light touch intact in all extremities, gait intact.  Musculoskeletal: 5/5 strength b/l upper and lower extremities; no joint swelling.  Skin: No rashes  Psych: no depression or anhedonia, AAOx3, +anxiety during interview   HEME: no bruises, no nose bleeds

## 2018-05-23 NOTE — H&P ADULT - NSHPLABSRESULTS_GEN_ALL_CORE
10.6   10.3  )-----------( 343      ( 22 May 2018 22:01 )             32.4       05-22    142  |  101  |  13  ----------------------------<  172<H>  3.5   |  28  |  0.77    Ca    9.2      22 May 2018 22:01    TPro  7.5  /  Alb  4.1  /  TBili  0.4  /  DBili  x   /  AST  13  /  ALT  13  /  AlkPhos  75  05-22          PT/INR - ( 22 May 2018 22:01 )   PT: 11.0 sec;   INR: 1.01 ratio         PTT - ( 22 May 2018 22:01 )  PTT:32.4 sec        CARDIAC MARKERS ( 22 May 2018 22:01 )  x     / <0.01 ng/mL / 96 U/L / x     / 2.0 ng/mL      I personally reviewed & interpreted the lab findings above; CBC chronic microcytic anemia and CMP unremarkable w/ CEx1 negative.   I personally reviewed & interpreted the radiographic findings; CXR from prior no focal consolidation   I personally reviewed & interpreted the EKG findings; No significant ischemia; first EKG has much artifact and not reliable and actually looks same compared to prior.

## 2018-05-23 NOTE — PROGRESS NOTE ADULT - SUBJECTIVE AND OBJECTIVE BOX
Patient is a 67y old  Female who presents with a chief complaint of chest pain (23 May 2018 00:40)      SUBJECTIVE / OVERNIGHT EVENTS:    MEDICATIONS  (STANDING):  aspirin enteric coated 81 milliGRAM(s) Oral daily  dextrose 50% Injectable 12.5 Gram(s) IV Push once  dextrose 50% Injectable 25 Gram(s) IV Push once  dextrose 50% Injectable 25 Gram(s) IV Push once  diltiazem    milliGRAM(s) Oral daily  insulin glargine Injectable (LANTUS) 10 Unit(s) SubCutaneous at bedtime  insulin lispro (HumaLOG) corrective regimen sliding scale   SubCutaneous three times a day before meals  insulin lispro (HumaLOG) corrective regimen sliding scale   SubCutaneous at bedtime  levothyroxine 200 MICROGram(s) Oral daily  pantoprazole    Tablet 40 milliGRAM(s) Oral before breakfast    MEDICATIONS  (PRN):  ALPRAZolam 0.5 milliGRAM(s) Oral at bedtime PRN anxiety  dextrose 40% Gel 15 Gram(s) Oral once PRN Blood Glucose LESS THAN 70 milliGRAM(s)/deciliter  glucagon  Injectable 1 milliGRAM(s) IntraMuscular once PRN Glucose LESS THAN 70 milligrams/deciliter      Vital Signs Last 24 Hrs  T(C): 36.4 (23 May 2018 13:10), Max: 37.1 (23 May 2018 02:30)  T(F): 97.6 (23 May 2018 13:10), Max: 98.7 (23 May 2018 02:30)  HR: 70 (23 May 2018 13:10) (70 - 95)  BP: 130/67 (23 May 2018 13:10) (130/67 - 197/98)  BP(mean): --  RR: 19 (23 May 2018 13:10) (16 - 93)  SpO2: 99% (23 May 2018 13:10) (95% - 99%)  CAPILLARY BLOOD GLUCOSE      POCT Blood Glucose.: 182 mg/dL (23 May 2018 13:15)  POCT Blood Glucose.: 153 mg/dL (23 May 2018 08:09)  POCT Blood Glucose.: 157 mg/dL (23 May 2018 02:36)  POCT Blood Glucose.: 156 mg/dL (23 May 2018 01:08)    I&O's Summary    23 May 2018 07:01  -  23 May 2018 14:58  --------------------------------------------------------  IN: 480 mL / OUT: 0 mL / NET: 480 mL        PHYSICAL EXAM:  GENERAL: NAD, well-developed  HEAD:  Atraumatic, Normocephalic  CHEST/LUNG: Clear to auscultation bilaterally; No wheeze  HEART: Regular rate and rhythm; No murmurs, rubs, or gallops  ABDOMEN: Soft, Nontender, Nondistended; Bowel sounds present  EXTREMITIES:  2+ Peripheral Pulses, No clubbing, cyanosis, or edema  PSYCH: AAOx3  NEUROLOGY: non-focal  SKIN: No rashes or lesions    LABS:                        10.6   10.3  )-----------( 343      ( 22 May 2018 22:01 )             32.4     05-23    142  |  103  |  13  ----------------------------<  151<H>  3.3<L>   |  25  |  0.72    Ca    8.7      23 May 2018 04:03    TPro  7.5  /  Alb  4.1  /  TBili  0.4  /  DBili  x   /  AST  13  /  ALT  13  /  AlkPhos  75  05-22    PT/INR - ( 22 May 2018 22:01 )   PT: 11.0 sec;   INR: 1.01 ratio         PTT - ( 22 May 2018 22:01 )  PTT:32.4 sec  CARDIAC MARKERS ( 23 May 2018 04:03 )  x     / <0.01 ng/mL / 76 U/L / x     / 1.6 ng/mL  CARDIAC MARKERS ( 22 May 2018 22:01 )  x     / <0.01 ng/mL / 96 U/L / x     / 2.0 ng/mL              RADIOLOGY & ADDITIONAL TESTS:    tele reviewed: sinus 70-90s    Imaging Personally Reviewed:    Consultant(s) Notes Reviewed:  cards    Care Discussed with Consultants/Other Providers: Patient is a 67y old  Female who presents with a chief complaint of chest pain (23 May 2018 00:40)      SUBJECTIVE / OVERNIGHT EVENTS: reports intermitenet episdodes of chest pain both at rest and with exertion. Also OLIVAREZ. no lightheadness, palpitations.     MEDICATIONS  (STANDING):  aspirin enteric coated 81 milliGRAM(s) Oral daily  dextrose 50% Injectable 12.5 Gram(s) IV Push once  dextrose 50% Injectable 25 Gram(s) IV Push once  dextrose 50% Injectable 25 Gram(s) IV Push once  diltiazem    milliGRAM(s) Oral daily  insulin glargine Injectable (LANTUS) 10 Unit(s) SubCutaneous at bedtime  insulin lispro (HumaLOG) corrective regimen sliding scale   SubCutaneous three times a day before meals  insulin lispro (HumaLOG) corrective regimen sliding scale   SubCutaneous at bedtime  levothyroxine 200 MICROGram(s) Oral daily  pantoprazole    Tablet 40 milliGRAM(s) Oral before breakfast    MEDICATIONS  (PRN):  ALPRAZolam 0.5 milliGRAM(s) Oral at bedtime PRN anxiety  dextrose 40% Gel 15 Gram(s) Oral once PRN Blood Glucose LESS THAN 70 milliGRAM(s)/deciliter  glucagon  Injectable 1 milliGRAM(s) IntraMuscular once PRN Glucose LESS THAN 70 milligrams/deciliter      Vital Signs Last 24 Hrs  T(C): 36.4 (23 May 2018 13:10), Max: 37.1 (23 May 2018 02:30)  T(F): 97.6 (23 May 2018 13:10), Max: 98.7 (23 May 2018 02:30)  HR: 70 (23 May 2018 13:10) (70 - 95)  BP: 130/67 (23 May 2018 13:10) (130/67 - 197/98)  BP(mean): --  RR: 19 (23 May 2018 13:10) (16 - 93)  SpO2: 99% (23 May 2018 13:10) (95% - 99%)  CAPILLARY BLOOD GLUCOSE      POCT Blood Glucose.: 182 mg/dL (23 May 2018 13:15)  POCT Blood Glucose.: 153 mg/dL (23 May 2018 08:09)  POCT Blood Glucose.: 157 mg/dL (23 May 2018 02:36)  POCT Blood Glucose.: 156 mg/dL (23 May 2018 01:08)    I&O's Summary    23 May 2018 07:01  -  23 May 2018 14:58  --------------------------------------------------------  IN: 480 mL / OUT: 0 mL / NET: 480 mL        PHYSICAL EXAM:  GENERAL: NAD, well-developed, obese  HEAD:  Atraumatic, Normocephalic  CHEST/LUNG: Clear to auscultation bilaterally; No wheeze  HEART: Regular rate and rhythm; No murmurs, rubs, or gallops  ABDOMEN: Soft, Nontender, Nondistended; Bowel sounds present  EXTREMITIES:  2+ Peripheral Pulses, No clubbing, cyanosis, or edema  PSYCH: AAOx3  NEUROLOGY: non-focal  SKIN: No rashes or lesions    LABS:                        10.6   10.3  )-----------( 343      ( 22 May 2018 22:01 )             32.4     05-23    142  |  103  |  13  ----------------------------<  151<H>  3.3<L>   |  25  |  0.72    Ca    8.7      23 May 2018 04:03    TPro  7.5  /  Alb  4.1  /  TBili  0.4  /  DBili  x   /  AST  13  /  ALT  13  /  AlkPhos  75  05-22    PT/INR - ( 22 May 2018 22:01 )   PT: 11.0 sec;   INR: 1.01 ratio         PTT - ( 22 May 2018 22:01 )  PTT:32.4 sec  CARDIAC MARKERS ( 23 May 2018 04:03 )  x     / <0.01 ng/mL / 76 U/L / x     / 1.6 ng/mL  CARDIAC MARKERS ( 22 May 2018 22:01 )  x     / <0.01 ng/mL / 96 U/L / x     / 2.0 ng/mL              RADIOLOGY & ADDITIONAL TESTS:    tele reviewed: sinus 70-90s    Imaging Personally Reviewed:    Consultant(s) Notes Reviewed:  cards    Care Discussed with Consultants/Other Providers:

## 2018-05-24 DIAGNOSIS — E66.01 MORBID (SEVERE) OBESITY DUE TO EXCESS CALORIES: ICD-10-CM

## 2018-05-24 LAB
ANION GAP SERPL CALC-SCNC: 12 MMOL/L — SIGNIFICANT CHANGE UP (ref 5–17)
BUN SERPL-MCNC: 14 MG/DL — SIGNIFICANT CHANGE UP (ref 7–23)
CALCIUM SERPL-MCNC: 8.5 MG/DL — SIGNIFICANT CHANGE UP (ref 8.4–10.5)
CHLORIDE SERPL-SCNC: 104 MMOL/L — SIGNIFICANT CHANGE UP (ref 96–108)
CO2 SERPL-SCNC: 25 MMOL/L — SIGNIFICANT CHANGE UP (ref 22–31)
CREAT SERPL-MCNC: 0.72 MG/DL — SIGNIFICANT CHANGE UP (ref 0.5–1.3)
GLUCOSE SERPL-MCNC: 173 MG/DL — HIGH (ref 70–99)
HCT VFR BLD CALC: 30.2 % — LOW (ref 34.5–45)
HCT VFR BLD CALC: 32.1 % — LOW (ref 34.5–45)
HGB BLD-MCNC: 10.4 G/DL — LOW (ref 11.5–15.5)
HGB BLD-MCNC: 9.5 G/DL — LOW (ref 11.5–15.5)
MCHC RBC-ENTMCNC: 24.3 PG — LOW (ref 27–34)
MCHC RBC-ENTMCNC: 25 PG — LOW (ref 27–34)
MCHC RBC-ENTMCNC: 31.5 GM/DL — LOW (ref 32–36)
MCHC RBC-ENTMCNC: 32.5 GM/DL — SIGNIFICANT CHANGE UP (ref 32–36)
MCV RBC AUTO: 76.9 FL — LOW (ref 80–100)
MCV RBC AUTO: 77.2 FL — LOW (ref 80–100)
PLATELET # BLD AUTO: 299 K/UL — SIGNIFICANT CHANGE UP (ref 150–400)
PLATELET # BLD AUTO: 318 K/UL — SIGNIFICANT CHANGE UP (ref 150–400)
POTASSIUM SERPL-MCNC: 3.8 MMOL/L — SIGNIFICANT CHANGE UP (ref 3.5–5.3)
POTASSIUM SERPL-SCNC: 3.8 MMOL/L — SIGNIFICANT CHANGE UP (ref 3.5–5.3)
RBC # BLD: 3.91 M/UL — SIGNIFICANT CHANGE UP (ref 3.8–5.2)
RBC # BLD: 4.17 M/UL — SIGNIFICANT CHANGE UP (ref 3.8–5.2)
RBC # FLD: 14 % — SIGNIFICANT CHANGE UP (ref 10.3–14.5)
RBC # FLD: 15.7 % — HIGH (ref 10.3–14.5)
SODIUM SERPL-SCNC: 141 MMOL/L — SIGNIFICANT CHANGE UP (ref 135–145)
WBC # BLD: 8.4 K/UL — SIGNIFICANT CHANGE UP (ref 3.8–10.5)
WBC # BLD: 8.66 K/UL — SIGNIFICANT CHANGE UP (ref 3.8–10.5)
WBC # FLD AUTO: 8.4 K/UL — SIGNIFICANT CHANGE UP (ref 3.8–10.5)
WBC # FLD AUTO: 8.66 K/UL — SIGNIFICANT CHANGE UP (ref 3.8–10.5)

## 2018-05-24 PROCEDURE — 93458 L HRT ARTERY/VENTRICLE ANGIO: CPT | Mod: 26

## 2018-05-24 PROCEDURE — 99152 MOD SED SAME PHYS/QHP 5/>YRS: CPT

## 2018-05-24 PROCEDURE — 99233 SBSQ HOSP IP/OBS HIGH 50: CPT

## 2018-05-24 RX ORDER — DOCUSATE SODIUM 100 MG
100 CAPSULE ORAL THREE TIMES A DAY
Qty: 0 | Refills: 0 | Status: DISCONTINUED | OUTPATIENT
Start: 2018-05-24 | End: 2018-05-25

## 2018-05-24 RX ORDER — LIDOCAINE 4 G/100G
1 CREAM TOPICAL ONCE
Qty: 0 | Refills: 0 | Status: COMPLETED | OUTPATIENT
Start: 2018-05-24 | End: 2018-05-24

## 2018-05-24 RX ORDER — ACETAMINOPHEN 500 MG
650 TABLET ORAL ONCE
Qty: 0 | Refills: 0 | Status: COMPLETED | OUTPATIENT
Start: 2018-05-24 | End: 2018-05-24

## 2018-05-24 RX ORDER — SENNA PLUS 8.6 MG/1
2 TABLET ORAL AT BEDTIME
Qty: 0 | Refills: 0 | Status: DISCONTINUED | OUTPATIENT
Start: 2018-05-24 | End: 2018-05-25

## 2018-05-24 RX ADMIN — Medication 650 MILLIGRAM(S): at 23:11

## 2018-05-24 RX ADMIN — ATORVASTATIN CALCIUM 40 MILLIGRAM(S): 80 TABLET, FILM COATED ORAL at 21:55

## 2018-05-24 RX ADMIN — Medication 2: at 11:40

## 2018-05-24 RX ADMIN — Medication 0.5 MILLIGRAM(S): at 21:56

## 2018-05-24 RX ADMIN — Medication 100 MILLIGRAM(S): at 21:56

## 2018-05-24 RX ADMIN — Medication 200 MICROGRAM(S): at 05:39

## 2018-05-24 RX ADMIN — LIDOCAINE 1 PATCH: 4 CREAM TOPICAL at 23:11

## 2018-05-24 RX ADMIN — Medication 650 MILLIGRAM(S): at 21:55

## 2018-05-24 RX ADMIN — Medication 1: at 08:00

## 2018-05-24 RX ADMIN — Medication 180 MILLIGRAM(S): at 05:39

## 2018-05-24 RX ADMIN — INSULIN GLARGINE 10 UNIT(S): 100 INJECTION, SOLUTION SUBCUTANEOUS at 21:56

## 2018-05-24 RX ADMIN — Medication 325 MILLIGRAM(S): at 08:00

## 2018-05-24 RX ADMIN — SENNA PLUS 2 TABLET(S): 8.6 TABLET ORAL at 21:56

## 2018-05-24 RX ADMIN — PANTOPRAZOLE SODIUM 40 MILLIGRAM(S): 20 TABLET, DELAYED RELEASE ORAL at 05:39

## 2018-05-24 RX ADMIN — Medication 81 MILLIGRAM(S): at 08:00

## 2018-05-24 RX ADMIN — Medication 100 MILLIGRAM(S): at 17:21

## 2018-05-24 NOTE — PROGRESS NOTE ADULT - ATTENDING COMMENTS
Agree with above assessment and plan as outlined above.    - for cath today    Alfredo Kolb MD, FACC

## 2018-05-24 NOTE — PROGRESS NOTE ADULT - SUBJECTIVE AND OBJECTIVE BOX
Patient is a 67y old  Female who presents with a chief complaint of chest pain (23 May 2018 00:40)      SUBJECTIVE / OVERNIGHT EVENTS: Still with intermittent with episode of chest pain. Lilibeth any hematochezia or melena. says she only has blood in her stool when she is constipated and strains but think she has hemorrhoids Reports normal colonoscopy and EGD 2 years ago    MEDICATIONS  (STANDING):  aspirin enteric coated 81 milliGRAM(s) Oral daily  atorvastatin 40 milliGRAM(s) Oral at bedtime  dextrose 50% Injectable 12.5 Gram(s) IV Push once  dextrose 50% Injectable 25 Gram(s) IV Push once  dextrose 50% Injectable 25 Gram(s) IV Push once  diltiazem    milliGRAM(s) Oral daily  ferrous    sulfate 325 milliGRAM(s) Oral daily  insulin glargine Injectable (LANTUS) 10 Unit(s) SubCutaneous at bedtime  insulin lispro (HumaLOG) corrective regimen sliding scale   SubCutaneous three times a day before meals  insulin lispro (HumaLOG) corrective regimen sliding scale   SubCutaneous at bedtime  levothyroxine 200 MICROGram(s) Oral daily  pantoprazole    Tablet 40 milliGRAM(s) Oral before breakfast    MEDICATIONS  (PRN):  ALPRAZolam 0.5 milliGRAM(s) Oral at bedtime PRN anxiety  dextrose 40% Gel 15 Gram(s) Oral once PRN Blood Glucose LESS THAN 70 milliGRAM(s)/deciliter  glucagon  Injectable 1 milliGRAM(s) IntraMuscular once PRN Glucose LESS THAN 70 milligrams/deciliter      Vital Signs Last 24 Hrs  T(C): 36.7 (24 May 2018 12:14), Max: 36.8 (23 May 2018 20:15)  T(F): 98 (24 May 2018 12:14), Max: 98.2 (23 May 2018 20:15)  HR: 67 (24 May 2018 12:14) (67 - 73)  BP: 152/80 (24 May 2018 12:14) (130/67 - 152/80)  BP(mean): --  RR: 18 (24 May 2018 12:14) (18 - 19)  SpO2: 97% (24 May 2018 12:14) (97% - 99%)  CAPILLARY BLOOD GLUCOSE      POCT Blood Glucose.: 245 mg/dL (24 May 2018 11:34)  POCT Blood Glucose.: 172 mg/dL (24 May 2018 07:44)  POCT Blood Glucose.: 205 mg/dL (23 May 2018 21:01)  POCT Blood Glucose.: 244 mg/dL (23 May 2018 16:34)  POCT Blood Glucose.: 182 mg/dL (23 May 2018 13:15)    I&O's Summary    23 May 2018 07:01  -  24 May 2018 07:00  --------------------------------------------------------  IN: 840 mL / OUT: 0 mL / NET: 840 mL    24 May 2018 07:01  -  24 May 2018 13:03  --------------------------------------------------------  IN: 300 mL / OUT: 0 mL / NET: 300 mL        PHYSICAL EXAM:  GENERAL: NAD, well-developed, morbidly obese  HEAD:  Atraumatic, Normocephalic  CHEST/LUNG: Clear to auscultation bilaterally; No wheeze  HEART: Regular rate and rhythm;   ABDOMEN: Soft, Nontender, Nondistended; Bowel sounds present  EXTREMITIES:  2+ Peripheral Pulses, No clubbing, cyanosis, or edema  PSYCH: AAOx3  NEUROLOGY: non-focal  SKIN: No rashes or lesions  LABS:                        9.5    8.66  )-----------( 299      ( 24 May 2018 07:54 )             30.2     05-24    141  |  104  |  14  ----------------------------<  173<H>  3.8   |  25  |  0.72    Ca    8.5      24 May 2018 05:55    TPro  7.5  /  Alb  4.1  /  TBili  0.4  /  DBili  x   /  AST  13  /  ALT  13  /  AlkPhos  75  05-22    PT/INR - ( 22 May 2018 22:01 )   PT: 11.0 sec;   INR: 1.01 ratio         PTT - ( 22 May 2018 22:01 )  PTT:32.4 sec  CARDIAC MARKERS ( 23 May 2018 04:03 )  x     / <0.01 ng/mL / 76 U/L / x     / 1.6 ng/mL  CARDIAC MARKERS ( 22 May 2018 22:01 )  x     / <0.01 ng/mL / 96 U/L / x     / 2.0 ng/mL          RADIOLOGY & ADDITIONAL TESTS:    Imaging Personally Reviewed:  nuclear stress: ------------------------------------------------------------------------  STRESS TEST IMPRESSIONS:  Chest Pain: Developed chest discomfort at 01:00 min of  infusion at a HR of 85 which resolved by 8 minutes of post  infusion, after termination of infusion.  Symptom: atypical angina.  HR Response: Appropriate.  BP Response: Appropriate.  Heart Rhythm: Sinus Rhythm - 72 BPM.  Baseline ECG: Nonspecific ST-T wave abnormality in III,  aVF, nonspecific.  ECG Abnormalities: No ischemic ECG changes.  Arrhythmia: None.  ------------------------------------------------------------------------  PROCEDURE:  7.5 mCi of Tc99m Sestamibi were injected intravenously at  rest. Approximately 65 minutes later, tomographic images  were obtained in a 180 degree arc from right anterior  oblique to left anterior oblique with 64 stops. At a  separate time, 20.2 mCi of Tc99m Sestamibi were injected  intravenously during stress protocol. Approximately 145  minute(s) later, tomographic images were obtained in a 180  degree arc from right anterior oblique to left anterior  oblique with 64 stops. The tomographic slices were  reconstructed in 3 orthogonal planes (short axis,  horizontal long axis and vertical long axis).  Interpretation was performed both by visual and  quantitative analysis.  Rest and stress images were acquired using CZT-based  system with pinhole collimation (MediaPhy c, Verified Identity Pass), and reconstructed using MLEM algorithm.  Images were re-acquired with the patient in a prone  position.  ------------------------------------------------------------------------  NUCLEAR FINDINGS:  Review of raw data shows: The study is of good technical  quality.  The left ventricle was normal in size. There is a small  moderate predominantly reversible defect in the lateral  apex consistent with ischemia with a small area of scar.  ------------------------------------------------------------------------  GATED ANALYSIS:  Post-stress gated wall motion analysis was performed (LVEF  = 64 %;LVEDV = 89 ml.) Reduced systolic thickening of the  lateral apex.  ------------------------------------------------------------------------  IMPRESSIONS:Abnormal Study  * Chest Pain: Developed chest discomfort at 01:00 min of  infusion at a HR of 85 which resolved by 8 minutes of post  infusion, after termination of infusion.  * Symptom: atypical angina.  * HR Response: Appropriate.  * BP Response: Appropriate.  * Heart Rhythm: Sinus Rhythm - 72 BPM.  * Baseline ECG: Nonspecific ST-T wave abnormality in III,  aVF, nonspecific.  * ECG Abnormalities: No ischemic ECG changes.  * Arrhythmia: None.  * Review of raw data shows: The study is of good technical  quality.  * The left ventricle was normal in size. There is a small  moderate predominantly reversible defect in the lateral  apex consistent with ischemia with a small area of scar.  * Post-stress gated wall motion analysis was performed  (LVEF = 64 %;LVEDV = 89 ml.) Reduced systolic thickening  of the lateral apex.    Consultant(s) Notes Reviewed:  katy    tele reviewed: sinus 60-80s    Care Discussed with Consultants/Other Providers:  katy garcia

## 2018-05-24 NOTE — PROGRESS NOTE ADULT - SUBJECTIVE AND OBJECTIVE BOX
c/o chest pressure with radiation to her left arm overnight  no dyspnea  currently pain free  ROS otherwise negative      MEDICATIONS  (STANDING):  aspirin enteric coated 81 milliGRAM(s) Oral daily  atorvastatin 40 milliGRAM(s) Oral at bedtime  dextrose 50% Injectable 12.5 Gram(s) IV Push once  dextrose 50% Injectable 25 Gram(s) IV Push once  dextrose 50% Injectable 25 Gram(s) IV Push once  diltiazem    milliGRAM(s) Oral daily  ferrous    sulfate 325 milliGRAM(s) Oral daily  insulin glargine Injectable (LANTUS) 10 Unit(s) SubCutaneous at bedtime  insulin lispro (HumaLOG) corrective regimen sliding scale   SubCutaneous three times a day before meals  insulin lispro (HumaLOG) corrective regimen sliding scale   SubCutaneous at bedtime  levothyroxine 200 MICROGram(s) Oral daily  pantoprazole    Tablet 40 milliGRAM(s) Oral before breakfast    MEDICATIONS  (PRN):  ALPRAZolam 0.5 milliGRAM(s) Oral at bedtime PRN anxiety  dextrose 40% Gel 15 Gram(s) Oral once PRN Blood Glucose LESS THAN 70 milliGRAM(s)/deciliter  glucagon  Injectable 1 milliGRAM(s) IntraMuscular once PRN Glucose LESS THAN 70 milligrams/deciliter      LABS:                        9.5    8.66  )-----------( 299      ( 24 May 2018 07:54 )             30.2       05-24    141  |  104  |  14  ----------------------------<  173<H>  3.8   |  25  |  0.72    Ca    8.5      24 May 2018 05:55    TPro  7.5  /  Alb  4.1  /  TBili  0.4  /  DBili  x   /  AST  13  /  ALT  13  /  AlkPhos  75  05-22        CARDIAC MARKERS ( 23 May 2018 04:03 )  x     / <0.01 ng/mL / 76 U/L / x     / 1.6 ng/mL  CARDIAC MARKERS ( 22 May 2018 22:01 )  x     / <0.01 ng/mL / 96 U/L / x     / 2.0 ng/mL      Serum Pro-Brain Natriuretic Peptide: 96 pg/mL (05-22 @ 22:01)      PHYSICAL EXAM:  Vital Signs Last 24 Hrs  T(C): 36.6 (24 May 2018 05:23), Max: 36.8 (23 May 2018 20:15)  T(F): 97.9 (24 May 2018 05:23), Max: 98.2 (23 May 2018 20:15)  HR: 71 (24 May 2018 05:23) (70 - 73)  BP: 130/78 (24 May 2018 05:23) (130/67 - 132/85)  BP(mean): --  RR: 18 (24 May 2018 05:23) (18 - 19)  SpO2: 97% (24 May 2018 05:23) (97% - 99%)    HEENT: Normal Oral mucosa, PERRL, EOMI  Lymphatic: No obvious lymphadenopathy, No edema  Cardiovascular: Normal S1S2, No JVD, 1/6 INNA, Peripheral pulses palpable 2+ B/L  Respiratory: Lungs clear to auscultation, normal effort  Gastrointestinal: Abdomen soft, ND, NT, +BS  Skin: Warm, dry, intact. No cyanosis, No rash.  Musculoskeletal: Normal ROM, normal strength  Psychiatric: Appropriate Mood and Affect      DIAGNOSTIC DATA  TELEMETRY: NSR     RADIOLOGY:  IMPRESSION:  Clear lungs.        Cardiac Cath Lab - Adult (05.02.16 @ 10:14) >  CORONARY VESSELS: The coronary circulation is right dominant.  LM:   --  LM: Normal.  LAD:   --  LAD: Normal.  CX:   --  Circumflex: Normal.  RCA:   --  RCA: Normal.  --  RPDA: Angiography showed minor luminal irregularities with no flow  limiting lesions.  --  RPLS: Normal.  AORTA: The root exhibited upper limit of normal size.  COMPLICATIONS: No complications occurred during the cath lab visit.  DIAGNOSTIC RECOMMENDATIONS: The patient should continue with the present  medications.  Prepared and signed by  Caterina Perkins M.D.    < from: Transthoracic Echocardiogram (03.05.16 @ 15:36) >  Conclusions:  1. Mild mitral annular calcification, otherwise normal  mitral valve. Minimal mitral regurgitation.  2. Aortic valve not well visualized; appears trileaflet  with normal opening. No aortic valve regurgitation seen.  3. Hyperdynamic left ventricle. EF=75-80%.  4. Normal right ventricular size and function.  5. Normal tricuspid valve. Mild-moderate tricuspid  regurgitation.  6. Estimated pulmonary artery systolic pressure equals 39  mm Hg, assuming right atrial pressure equals 8 mm Hg,  consistent with borderline pulmonary pressures.  *** Compared with echocardiogram of 1/6/2012, no  significant changes noted.    < from: Nuclear Stress Test-Pharmacologic (05.23.18 @ 12:19) >  Review of raw data shows: The study is of good technical  quality.  The left ventricle was normal in size. There is a small  moderate predominantly reversible defect in the lateral  apex consistent with ischemia with a small area of scar.  ------------------------------------------------------------------------    < end of copied text >      ASSESSMENT AND PLAN: 67 year old female with HTN HLD DM obesity PAF on Xarelto with normal coronaries on cardiac cath in May 2016 with historically preserved LV function , PAD s/p laser atherectomy and balloon angioplasty to Right tibial artery June 2016 admitted with complaints of intermittent chest pain that begins and remits spontaneously as well as dyspnea on exertion .     -- The patient has ruled out for acute coronary syndrome with negative serial cardiac enzymes  -- EKG without acute ischemia  --D-dimer 5/19 negative  -- NST abnormal -- d/w patient/family at bedside - they agree to proceed with cardiac cath   -- Patient's last dose of Xarelto 7am 5/22   -- c/w Cardizem CD  -- final recs pending above   -- will need to resume AC for CVA prevention s/p cath  -- will arrange outpt follow up with Dr Kaylene Dias [facility]

## 2018-05-25 ENCOUNTER — TRANSCRIPTION ENCOUNTER (OUTPATIENT)
Age: 68
End: 2018-05-25

## 2018-05-25 VITALS — DIASTOLIC BLOOD PRESSURE: 89 MMHG | HEART RATE: 65 BPM | SYSTOLIC BLOOD PRESSURE: 131 MMHG

## 2018-05-25 DIAGNOSIS — R07.9 CHEST PAIN, UNSPECIFIED: ICD-10-CM

## 2018-05-25 LAB
ANION GAP SERPL CALC-SCNC: 11 MMOL/L — SIGNIFICANT CHANGE UP (ref 5–17)
BUN SERPL-MCNC: 13 MG/DL — SIGNIFICANT CHANGE UP (ref 7–23)
CALCIUM SERPL-MCNC: 8.4 MG/DL — SIGNIFICANT CHANGE UP (ref 8.4–10.5)
CHLORIDE SERPL-SCNC: 104 MMOL/L — SIGNIFICANT CHANGE UP (ref 96–108)
CO2 SERPL-SCNC: 25 MMOL/L — SIGNIFICANT CHANGE UP (ref 22–31)
CREAT SERPL-MCNC: 0.75 MG/DL — SIGNIFICANT CHANGE UP (ref 0.5–1.3)
GLUCOSE SERPL-MCNC: 168 MG/DL — HIGH (ref 70–99)
HCT VFR BLD CALC: 30.7 % — LOW (ref 34.5–45)
HGB BLD-MCNC: 9.6 G/DL — LOW (ref 11.5–15.5)
MCHC RBC-ENTMCNC: 23.8 PG — LOW (ref 27–34)
MCHC RBC-ENTMCNC: 31.3 GM/DL — LOW (ref 32–36)
MCV RBC AUTO: 76.2 FL — LOW (ref 80–100)
PLATELET # BLD AUTO: 319 K/UL — SIGNIFICANT CHANGE UP (ref 150–400)
POTASSIUM SERPL-MCNC: 3.8 MMOL/L — SIGNIFICANT CHANGE UP (ref 3.5–5.3)
POTASSIUM SERPL-SCNC: 3.8 MMOL/L — SIGNIFICANT CHANGE UP (ref 3.5–5.3)
RBC # BLD: 4.03 M/UL — SIGNIFICANT CHANGE UP (ref 3.8–5.2)
RBC # FLD: 15.4 % — HIGH (ref 10.3–14.5)
SODIUM SERPL-SCNC: 140 MMOL/L — SIGNIFICANT CHANGE UP (ref 135–145)
WBC # BLD: 8.76 K/UL — SIGNIFICANT CHANGE UP (ref 3.8–10.5)
WBC # FLD AUTO: 8.76 K/UL — SIGNIFICANT CHANGE UP (ref 3.8–10.5)

## 2018-05-25 PROCEDURE — 82962 GLUCOSE BLOOD TEST: CPT

## 2018-05-25 PROCEDURE — 80053 COMPREHEN METABOLIC PANEL: CPT

## 2018-05-25 PROCEDURE — 83880 ASSAY OF NATRIURETIC PEPTIDE: CPT

## 2018-05-25 PROCEDURE — 99283 EMERGENCY DEPT VISIT LOW MDM: CPT

## 2018-05-25 PROCEDURE — 83540 ASSAY OF IRON: CPT

## 2018-05-25 PROCEDURE — 97161 PT EVAL LOW COMPLEX 20 MIN: CPT

## 2018-05-25 PROCEDURE — 78452 HT MUSCLE IMAGE SPECT MULT: CPT

## 2018-05-25 PROCEDURE — 93458 L HRT ARTERY/VENTRICLE ANGIO: CPT

## 2018-05-25 PROCEDURE — 93005 ELECTROCARDIOGRAM TRACING: CPT

## 2018-05-25 PROCEDURE — 85027 COMPLETE CBC AUTOMATED: CPT

## 2018-05-25 PROCEDURE — 71045 X-RAY EXAM CHEST 1 VIEW: CPT

## 2018-05-25 PROCEDURE — 99239 HOSP IP/OBS DSCHRG MGMT >30: CPT

## 2018-05-25 PROCEDURE — 84439 ASSAY OF FREE THYROXINE: CPT

## 2018-05-25 PROCEDURE — 85610 PROTHROMBIN TIME: CPT

## 2018-05-25 PROCEDURE — 93017 CV STRESS TEST TRACING ONLY: CPT

## 2018-05-25 PROCEDURE — 82550 ASSAY OF CK (CPK): CPT

## 2018-05-25 PROCEDURE — 80048 BASIC METABOLIC PNL TOTAL CA: CPT

## 2018-05-25 PROCEDURE — 99152 MOD SED SAME PHYS/QHP 5/>YRS: CPT

## 2018-05-25 PROCEDURE — C1887: CPT

## 2018-05-25 PROCEDURE — 82553 CREATINE MB FRACTION: CPT

## 2018-05-25 PROCEDURE — 84443 ASSAY THYROID STIM HORMONE: CPT

## 2018-05-25 PROCEDURE — C1769: CPT

## 2018-05-25 PROCEDURE — C1894: CPT

## 2018-05-25 PROCEDURE — 85379 FIBRIN DEGRADATION QUANT: CPT

## 2018-05-25 PROCEDURE — 82728 ASSAY OF FERRITIN: CPT

## 2018-05-25 PROCEDURE — 83735 ASSAY OF MAGNESIUM: CPT

## 2018-05-25 PROCEDURE — A9500: CPT

## 2018-05-25 PROCEDURE — 84484 ASSAY OF TROPONIN QUANT: CPT

## 2018-05-25 PROCEDURE — 85730 THROMBOPLASTIN TIME PARTIAL: CPT

## 2018-05-25 PROCEDURE — 99285 EMERGENCY DEPT VISIT HI MDM: CPT | Mod: 25

## 2018-05-25 RX ORDER — DOCUSATE SODIUM 100 MG
1 CAPSULE ORAL
Qty: 90 | Refills: 0
Start: 2018-05-25 | End: 2018-06-23

## 2018-05-25 RX ORDER — ATORVASTATIN CALCIUM 80 MG/1
1 TABLET, FILM COATED ORAL
Qty: 30 | Refills: 0
Start: 2018-05-25 | End: 2018-06-23

## 2018-05-25 RX ORDER — FERROUS SULFATE 325(65) MG
1 TABLET ORAL
Qty: 30 | Refills: 0
Start: 2018-05-25 | End: 2018-06-23

## 2018-05-25 RX ORDER — INSULIN DEGLUDEC 100 U/ML
25 INJECTION, SOLUTION SUBCUTANEOUS
Qty: 0 | Refills: 0 | COMMUNITY

## 2018-05-25 RX ORDER — FERROUS SULFATE 325(65) MG
1 TABLET ORAL
Qty: 30 | Refills: 0 | OUTPATIENT
Start: 2018-05-25 | End: 2018-06-23

## 2018-05-25 RX ORDER — RIVAROXABAN 15 MG-20MG
20 KIT ORAL EVERY 24 HOURS
Qty: 0 | Refills: 0 | Status: DISCONTINUED | OUTPATIENT
Start: 2018-05-25 | End: 2018-05-25

## 2018-05-25 RX ORDER — SENNA PLUS 8.6 MG/1
2 TABLET ORAL
Qty: 60 | Refills: 0
Start: 2018-05-25 | End: 2018-06-23

## 2018-05-25 RX ADMIN — Medication 325 MILLIGRAM(S): at 11:06

## 2018-05-25 RX ADMIN — Medication 100 MILLIGRAM(S): at 05:16

## 2018-05-25 RX ADMIN — Medication 2: at 08:11

## 2018-05-25 RX ADMIN — Medication 200 MICROGRAM(S): at 05:16

## 2018-05-25 RX ADMIN — Medication 180 MILLIGRAM(S): at 05:16

## 2018-05-25 RX ADMIN — PANTOPRAZOLE SODIUM 40 MILLIGRAM(S): 20 TABLET, DELAYED RELEASE ORAL at 05:16

## 2018-05-25 RX ADMIN — Medication 81 MILLIGRAM(S): at 11:06

## 2018-05-25 RX ADMIN — RIVAROXABAN 20 MILLIGRAM(S): KIT at 11:05

## 2018-05-25 NOTE — DISCHARGE NOTE ADULT - PATIENT PORTAL LINK FT
You can access the [facility] Patient Portal, offered by [facility], by registering with the following website: [email or web address]

## 2018-05-25 NOTE — PROGRESS NOTE ADULT - ATTENDING COMMENTS
Agree with above.   -prelim cath report: normal coronary arteries  -follow up official report  -no further cardiac workup needed at this time    Olu Paul MD

## 2018-05-25 NOTE — PROGRESS NOTE ADULT - PROBLEM SELECTOR PLAN 3
labs consistent with iron deficieny, no active s/s of bleeding,   -discussed need for outpatient GI workup including colonoscopy +/- EGD, patient understands  -start ferrous sulfate daily  -cbc as needed  -send fecal occult
labs consistent with iron deficiency, no active s/s of bleeding, says she only has blood in stool when constipated, otherwise never had   -reports normal colonoscopy and EGD 2 years ago  -discussed need for repeat outpatient GI workup including colonoscopy +/- EGD, patient understands  -c/w ferrous sulfate daily, stool softners  -cbc as needed  -send fecal occult
-tsh elevated at 9 but FT4 wnl 1.5  -c/w synthroid 200mcg, unsure when last dose change was  -outpatient endocrine followup with Dr José,  1 week for TFT check

## 2018-05-25 NOTE — PROGRESS NOTE ADULT - ASSESSMENT
66 yo F w/ hx of DM2, Afib (on xarelto), obese, hypothyroidism 2/2 to thyroidectomy for multinodular goiter, anxiety presents with atypical angina with positive stress test now for cath
68 yo F w/ hx of DM2, Afib (on xarelto), obese, hypothyroidism 2/2 to thyroidectomy for multinodular goiter, anxiety presents with atypical angina with positive stress test now for cath
68 yo F w/ hx of DM2, Afib (on xarelto), obese, hypothyroidism 2/2 to thyroidectomy for multinodular goiter, anxiety presents with atypical angina.

## 2018-05-25 NOTE — DISCHARGE NOTE ADULT - SECONDARY DIAGNOSIS.
Iron deficiency anemia Paroxysmal atrial fibrillation Type 2 diabetes mellitus without complication, with long-term current use of insulin Hypothyroid

## 2018-05-25 NOTE — PROGRESS NOTE ADULT - PROBLEM SELECTOR PLAN 4
FS stable  -c/w lantus 10 units qhs  -c/w JOSE  -monitor FS
FS stable  -c/w lantus 10 units qhs  -c/w JOSE  -monitor FS
FS stable  -c/w lantus 10 units qhs (home dose is 25), can increase to 15 units on discharge  -c/w JOSE  -monitor FS

## 2018-05-25 NOTE — DISCHARGE NOTE ADULT - CARE PROVIDER_API CALL
Aurora Mix), Cardiovascular Disease; Internal Medicine  [address]  [address]  [place] [address]  Phone: [phone]  Fax: [phone]    Elza Sarmiento), EndocrinologyMetabDiabetes; Internal Medicine  [address] [place] [address]  Phone: [phone]  Fax: [phone]    tammy elizondo  Phone: (   )    -  Fax: (   )    -

## 2018-05-25 NOTE — DISCHARGE NOTE ADULT - ADDITIONAL INSTRUCTIONS
please follow up with your primary care in 1 week  follow up cardiologist (Dr. Maurice on 6/1/18 at 1: 45pm), and endocrinologist  and gastroenterologist in 2 ~ 4 weeks.

## 2018-05-25 NOTE — DISCHARGE NOTE ADULT - MEDICATION SUMMARY - MEDICATIONS TO CHANGE
I will SWITCH the dose or number of times a day I take the medications listed below when I get home from the hospital:    Tresiba FlexTouch 100 units/mL subcutaneous solution  -- 25 unit(s) subcutaneous once a day (at bedtime)

## 2018-05-25 NOTE — PROGRESS NOTE ADULT - PROBLEM SELECTOR PLAN 2
-tsh elevated at 9 but FT4 wnl 1.5  -c/w synthroid 200mcg, f/u with patient when last dose change
-tsh elevated at 9 but FT4 wnl 1.5  -c/w synthroid 200mcg, unsure when last dose was
labs consistent with iron deficiency, no active s/s of bleeding, says she only has blood in stool when constipated, otherwise never had   -reports normal colonoscopy and EGD 2 years ago  -discussed need for repeat outpatient GI workup including colonoscopy +/- EGD, patient understands  -c/w ferrous sulfate daily, stool softners  -cbc as needed  -send fecal occult (not sent)
No lymphadedenopathy

## 2018-05-25 NOTE — PROGRESS NOTE ADULT - SUBJECTIVE AND OBJECTIVE BOX
Patient is a 67y old  Female who presents with a chief complaint of chest pain (25 May 2018 09:45)      SUBJECTIVE / OVERNIGHT EVENTS: No overnight events. Denies any chest pain and sob. No n/v.    MEDICATIONS  (STANDING):  aspirin enteric coated 81 milliGRAM(s) Oral daily  atorvastatin 40 milliGRAM(s) Oral at bedtime  dextrose 50% Injectable 12.5 Gram(s) IV Push once  dextrose 50% Injectable 25 Gram(s) IV Push once  dextrose 50% Injectable 25 Gram(s) IV Push once  diltiazem    milliGRAM(s) Oral daily  docusate sodium 100 milliGRAM(s) Oral three times a day  ferrous    sulfate 325 milliGRAM(s) Oral daily  insulin glargine Injectable (LANTUS) 10 Unit(s) SubCutaneous at bedtime  insulin lispro (HumaLOG) corrective regimen sliding scale   SubCutaneous three times a day before meals  insulin lispro (HumaLOG) corrective regimen sliding scale   SubCutaneous at bedtime  levothyroxine 200 MICROGram(s) Oral daily  pantoprazole    Tablet 40 milliGRAM(s) Oral before breakfast  rivaroxaban 20 milliGRAM(s) Oral every 24 hours  senna 2 Tablet(s) Oral at bedtime    MEDICATIONS  (PRN):  ALPRAZolam 0.5 milliGRAM(s) Oral at bedtime PRN anxiety  dextrose 40% Gel 15 Gram(s) Oral once PRN Blood Glucose LESS THAN 70 milliGRAM(s)/deciliter  glucagon  Injectable 1 milliGRAM(s) IntraMuscular once PRN Glucose LESS THAN 70 milligrams/deciliter      Vital Signs Last 24 Hrs  T(C): 36.8 (25 May 2018 04:50), Max: 37.2 (24 May 2018 17:13)  T(F): 98.2 (25 May 2018 04:50), Max: 98.9 (24 May 2018 17:13)  HR: 70 (25 May 2018 09:58) (67 - 76)  BP: 142/82 (25 May 2018 09:58) (117/72 - 160/82)  BP(mean): --  RR: 18 (25 May 2018 04:50) (18 - 20)  SpO2: 97% (25 May 2018 04:50) (95% - 99%)  CAPILLARY BLOOD GLUCOSE      POCT Blood Glucose.: 233 mg/dL (25 May 2018 07:49)  POCT Blood Glucose.: 211 mg/dL (24 May 2018 21:06)  POCT Blood Glucose.: 149 mg/dL (24 May 2018 15:59)  POCT Blood Glucose.: 245 mg/dL (24 May 2018 11:34)    I&O's Summary    24 May 2018 07:01  -  25 May 2018 07:00  --------------------------------------------------------  IN: 787 mL / OUT: 0 mL / NET: 787 mL    25 May 2018 07:01  -  25 May 2018 10:42  --------------------------------------------------------  IN: 240 mL / OUT: 0 mL / NET: 240 mL        PHYSICAL EXAM:  GENERAL: NAD, well-developed  HEAD:  Atraumatic, Normocephalic  CHEST/LUNG: Clear to auscultation bilaterally; No wheeze  HEART: Regular rate and rhythm;  ABDOMEN: Soft, Nontender, Nondistended; Bowel sounds present  EXTREMITIES:  2+ Peripheral Pulses, No clubbing, cyanosis, or edema. R Radial access site with no swelling, bleeding or tenderness  PSYCH: AAOx3  NEUROLOGY: non-focal    LABS:                        9.6    8.76  )-----------( 319      ( 25 May 2018 07:44 )             30.7     05-25    140  |  104  |  13  ----------------------------<  168<H>  3.8   |  25  |  0.75    Ca    8.4      25 May 2018 06:24  Mg     2.0     05-25        RADIOLOGY & ADDITIONAL TESTS:    Imaging Personally Reviewed:    Consultant(s) Notes Reviewed:      Care Discussed with Consultants/Other Providers: Patient is a 67y old  Female who presents with a chief complaint of chest pain (25 May 2018 09:45)      SUBJECTIVE / OVERNIGHT EVENTS: No overnight events. Denies any chest pain and sob. No n/v.    MEDICATIONS  (STANDING):  aspirin enteric coated 81 milliGRAM(s) Oral daily  atorvastatin 40 milliGRAM(s) Oral at bedtime  dextrose 50% Injectable 12.5 Gram(s) IV Push once  dextrose 50% Injectable 25 Gram(s) IV Push once  dextrose 50% Injectable 25 Gram(s) IV Push once  diltiazem    milliGRAM(s) Oral daily  docusate sodium 100 milliGRAM(s) Oral three times a day  ferrous    sulfate 325 milliGRAM(s) Oral daily  insulin glargine Injectable (LANTUS) 10 Unit(s) SubCutaneous at bedtime  insulin lispro (HumaLOG) corrective regimen sliding scale   SubCutaneous three times a day before meals  insulin lispro (HumaLOG) corrective regimen sliding scale   SubCutaneous at bedtime  levothyroxine 200 MICROGram(s) Oral daily  pantoprazole    Tablet 40 milliGRAM(s) Oral before breakfast  rivaroxaban 20 milliGRAM(s) Oral every 24 hours  senna 2 Tablet(s) Oral at bedtime    MEDICATIONS  (PRN):  ALPRAZolam 0.5 milliGRAM(s) Oral at bedtime PRN anxiety  dextrose 40% Gel 15 Gram(s) Oral once PRN Blood Glucose LESS THAN 70 milliGRAM(s)/deciliter  glucagon  Injectable 1 milliGRAM(s) IntraMuscular once PRN Glucose LESS THAN 70 milligrams/deciliter      Vital Signs Last 24 Hrs  T(C): 36.8 (25 May 2018 04:50), Max: 37.2 (24 May 2018 17:13)  T(F): 98.2 (25 May 2018 04:50), Max: 98.9 (24 May 2018 17:13)  HR: 70 (25 May 2018 09:58) (67 - 76)  BP: 142/82 (25 May 2018 09:58) (117/72 - 160/82)  BP(mean): --  RR: 18 (25 May 2018 04:50) (18 - 20)  SpO2: 97% (25 May 2018 04:50) (95% - 99%)  CAPILLARY BLOOD GLUCOSE      POCT Blood Glucose.: 233 mg/dL (25 May 2018 07:49)  POCT Blood Glucose.: 211 mg/dL (24 May 2018 21:06)  POCT Blood Glucose.: 149 mg/dL (24 May 2018 15:59)  POCT Blood Glucose.: 245 mg/dL (24 May 2018 11:34)    I&O's Summary    24 May 2018 07:01  -  25 May 2018 07:00  --------------------------------------------------------  IN: 787 mL / OUT: 0 mL / NET: 787 mL    25 May 2018 07:01  -  25 May 2018 10:42  --------------------------------------------------------  IN: 240 mL / OUT: 0 mL / NET: 240 mL        PHYSICAL EXAM:  GENERAL: NAD, well-developed  HEAD:  Atraumatic, Normocephalic  CHEST/LUNG: Clear to auscultation bilaterally; No wheeze  HEART: Regular rate and rhythm;  ABDOMEN: Soft, Nontender, Nondistended; Bowel sounds present  EXTREMITIES:  2+ Peripheral Pulses, No clubbing, cyanosis, or edema. R Radial access site with no swelling, bleeding or tenderness  PSYCH: AAOx3  NEUROLOGY: non-focal    LABS:                        9.6    8.76  )-----------( 319      ( 25 May 2018 07:44 )             30.7     05-25    140  |  104  |  13  ----------------------------<  168<H>  3.8   |  25  |  0.75    Ca    8.4      25 May 2018 06:24  Mg     2.0     05-25        RADIOLOGY & ADDITIONAL TESTS:    tele reviewed: sinus 60-70s, Pafib x 140s few beats    Imaging Personally Reviewed:    Consultant(s) Notes Reviewed:  cardio    Care Discussed with Consultants/Other Providers:

## 2018-05-25 NOTE — PROGRESS NOTE ADULT - PROBLEM SELECTOR PLAN 7
BMI 48, rec weight loss, diet, nutrition    Dispo: pending results of cath
BMI 48, rec weight loss, diet, nutrition    Dispo: discharge home today with outpatient cards, GI, PCP, endocrine followup    spent 45 minutes on discharge process time

## 2018-05-25 NOTE — DISCHARGE NOTE ADULT - PLAN OF CARE
needs further workup your had a nuclear stress test and cardiac catherization. No stents were placed  followup with your cardiologist, primary care, and gastro doctor for further workup your iron is very low followup with your GI for upper endoscopy and colonoscopy  continue iron pills and stool softners continue your medications continue your insulin  monitor your fingersticks regularly. remain chest pain free your had a nuclear stress test and cardiac catheterization. No stents were placed.  followup with your cardiologist, primary care, and gastro doctor for further workup your tsh was a little elevated but your T4 was wnl continue with your synthroid 200mcg, we did not make an adjustment at this time  followup with your endocrinologist to repeat your thyroid function tests next wek followup with your GI for upper endoscopy and colonoscopy  continue iron pills and stool softners. continue your medications.  Atrial fibrillation is the most common heart rhythm problem.  The condition puts you at risk for has stroke and heart attack  It helps if you control your blood pressure, not drink more than 1-2 alcohol drinks per day, cut down on caffeine, getting treatment for over active thyroid gland, and get regular exercise  Call your doctor if you feel your heart racing or beating unusually, chest tightness or pain, lightheaded, faint, shortness of breath especially with exercise  It is important to take your heart medication as prescribed  You may be on anticoagulation which is very important to take as directed - you may need blood work to monitor drug levels continue your insulin  monitor your fingersticks regularly.  HgA1C this admission.  Make sure you get your HgA1c checked every three months.  If you take oral diabetes medications, check your blood glucose two times a day.  If you take insulin, check your blood glucose before meals and at bedtime.  It's important not to skip any meals.  Keep a log of your blood glucose results and always take it with you to your doctor appointments.  Keep a list of your current medications including injectables and over the counter medications and bring this medication list with you to all your doctor appointments.  If you have not seen your ophthalmologist this year call for appointment.  Check your feet daily for redness, sores, or openings. Do not self treat. If no improvement in two days call your primary care physician for an appointment.  Low blood sugar (hypoglycemia) is a blood sugar below 70mg/dl. Check your blood sugar if you feel signs/symptoms of hypoglycemia. If your blood sugar is below 70 take 15 grams of carbohydrates (ex 4 oz of apple juice, 3-4 glucose tablets, or 4-6 oz of regular soda) wait 15 minutes and repeat blood sugar to make sure it comes up above 70.  If your blood sugar is above 70 and you are due for a meal, have a meal.  If you are not due for a meal have a snack.  This snack helps keeps your blood sugar at a safe range. continue with your synthroid 200mcg, we did not make an adjustment at this time  follow up with your endocrinologist to repeat your thyroid function tests next week

## 2018-05-25 NOTE — DISCHARGE NOTE ADULT - HOSPITAL COURSE
adfsf 66 yo F w/ hx of DM2, Afib (on xarelto), obese, hypothyroidism 2/2 to thyroidectomy for multinodular goiter, anxiety presents with atypical angina, D-dimer negative, E x2 with positive nuclear stress on 5/23, s/p diagnostic cardiac cath on 5/24.  will f/u with Dr. Maurice. Hx of Hypothyroidism w/ elevated THS at 9 but FT4 wnl 1.5, c/w synthroid 200mcg, repeat TFP w/ PMD.   Iron deficiency anemia w/o active s/s of bleeding, normal colonoscopy and EGD 2 years ago as per pt. will repeat outpatient GI workup including colonoscopy +/- EGD, c/w ferrous sulfate daily, stool softners  Type 2 diabetes mellitus with long-term current use of insulin. c/w lantus 10 units qhs  -c/w JOSE  -monitor FS.      Problem/Plan - 5:  ·  Problem: Paroxysmal atrial fibrillation.  Plan: has been NSR while inpatient  -xarelto on hold for cath  -c/w dilt for rate control  -monitor on tele.      Problem/Plan - 6:  Problem: Anxiety. Plan: -c/w home xanax prn.     Problem/Plan - 7:  ·  Problem: Morbid obesity with body mass index (BMI) of 45.0 to 49.9 in adult.  Plan: BMI 48, rec weight loss, diet, nutrition    Dispo: pending results of cath. 66 yo F w/ hx of DM2, Afib (on xarelto), obese, hypothyroidism 2/2 to thyroidectomy for multinodular goiter, anxiety presents with atypical angina, D-dimer negative, E x2 with positive nuclear stress on 5/23, s/p diagnostic cardiac cath on 5/24.  will f/u with Dr. Maurice. Hx of Hypothyroidism w/ elevated THS at 9 but FT4 wnl 1.5, c/w synthroid 200mcg, repeat TFP w/ PMD.   Iron deficiency anemia w/o active s/s of bleeding, normal colonoscopy and EGD 2 years ago as per pt. will repeat outpatient GI workup including colonoscopy +/- EGD, c/w ferrous sulfate daily, stool softners.  Type 2 diabetes mellitus with long-term current use of insulin. c/w Lantus 10 units qhs  Paroxysmal atrial fibrillation. maintains NSR while inpatient. c/w Xarelto and Cardizem  PT is stable to discharge to home and f/u PMD, cardio, GI, and endo. 68 yo F w/ hx of DM2, Afib (on xarelto), obese, hypothyroidism 2/2 to thyroidectomy for multinodular goiter, anxiety presents with atypical angina, D-dimer negative, normal 02 sat. CE negative x 2 Had positive nuclear stress on 5/23, s/p diagnostic cardiac cath with nonobstructive CAD on 5/24. No tele events.  Cards rec f/u with Dr. Maurice. For her Hx of Hypothyroidism w/ elevated THS at 9 but FT4 wnl so will c/w synthroid 200mcg so will repeat TFTS outpatient with endocrine. Bloodwork revealed anemia with no active s/s of bleeding. Anemia secondary to Iron deficiency anemia w/o active s/s of bleeding, normal colonoscopy and EGD 2 years ago as per pt. will repeat outpatient GI workup including colonoscopy +/- EGD, c/w ferrous sulfate daily, stool softeners for her Type 2 diabetes mellitus continue insulin at home. For her Paroxysmal atrial fibrillation, c/w Xarelto and Cardizem. Patient had morbid obesity with BMI of 48 was counseled of diet and weight loss. PT is HD stable to discharge to home and f/u PMD, cardio, GI, and endo.    Discharge Diagnosis:  Chest Pain  Iron deficiency anemia  Hypothyroidism  Type 2 diabetes mellitus without complication on insulin  Morbid Obesity BMI 48  Paroxysmal atrial fibrillation  Anxiety

## 2018-05-25 NOTE — DISCHARGE NOTE ADULT - CARE PLAN
Principal Discharge DX:	Chest pain, unspecified type  Goal:	needs further workup  Assessment and plan of treatment:	your had a nuclear stress test and cardiac catherization. No stents were placed  followup with your cardiologist, primary care, and gastro doctor for further workup  Secondary Diagnosis:	Iron deficiency anemia  Goal:	your iron is very low  Assessment and plan of treatment:	followup with your GI for upper endoscopy and colonoscopy  continue iron pills and stool softners  Secondary Diagnosis:	Paroxysmal atrial fibrillation  Assessment and plan of treatment:	continue your medications  Secondary Diagnosis:	Type 2 diabetes mellitus without complication, with long-term current use of insulin  Assessment and plan of treatment:	continue your insulin  monitor your fingersticks regularly.  Secondary Diagnosis:	Hypothyroid Principal Discharge DX:	Chest pain, unspecified type  Goal:	remain chest pain free  Assessment and plan of treatment:	your had a nuclear stress test and cardiac catheterization. No stents were placed.  followup with your cardiologist, primary care, and gastro doctor for further workup  Secondary Diagnosis:	Iron deficiency anemia  Goal:	your iron is very low  Assessment and plan of treatment:	followup with your GI for upper endoscopy and colonoscopy  continue iron pills and stool softners  Secondary Diagnosis:	Paroxysmal atrial fibrillation  Assessment and plan of treatment:	continue your medications  Secondary Diagnosis:	Type 2 diabetes mellitus without complication, with long-term current use of insulin  Assessment and plan of treatment:	continue your insulin  monitor your fingersticks regularly.  Secondary Diagnosis:	Hypothyroid  Goal:	your tsh was a little elevated but your T4 was wnl  Assessment and plan of treatment:	continue with your synthroid 200mcg, we did not make an adjustment at this time  followup with your endocrinologist to repeat your thyroid function tests next wek Principal Discharge DX:	Chest pain, unspecified type  Goal:	remain chest pain free  Assessment and plan of treatment:	your had a nuclear stress test and cardiac catheterization. No stents were placed.  followup with your cardiologist, primary care, and gastro doctor for further workup  Secondary Diagnosis:	Iron deficiency anemia  Goal:	your iron is very low  Assessment and plan of treatment:	followup with your GI for upper endoscopy and colonoscopy  continue iron pills and stool softners.  Secondary Diagnosis:	Paroxysmal atrial fibrillation  Assessment and plan of treatment:	continue your medications.  Atrial fibrillation is the most common heart rhythm problem.  The condition puts you at risk for has stroke and heart attack  It helps if you control your blood pressure, not drink more than 1-2 alcohol drinks per day, cut down on caffeine, getting treatment for over active thyroid gland, and get regular exercise  Call your doctor if you feel your heart racing or beating unusually, chest tightness or pain, lightheaded, faint, shortness of breath especially with exercise  It is important to take your heart medication as prescribed  You may be on anticoagulation which is very important to take as directed - you may need blood work to monitor drug levels  Secondary Diagnosis:	Type 2 diabetes mellitus without complication, with long-term current use of insulin  Assessment and plan of treatment:	continue your insulin  monitor your fingersticks regularly.  HgA1C this admission.  Make sure you get your HgA1c checked every three months.  If you take oral diabetes medications, check your blood glucose two times a day.  If you take insulin, check your blood glucose before meals and at bedtime.  It's important not to skip any meals.  Keep a log of your blood glucose results and always take it with you to your doctor appointments.  Keep a list of your current medications including injectables and over the counter medications and bring this medication list with you to all your doctor appointments.  If you have not seen your ophthalmologist this year call for appointment.  Check your feet daily for redness, sores, or openings. Do not self treat. If no improvement in two days call your primary care physician for an appointment.  Low blood sugar (hypoglycemia) is a blood sugar below 70mg/dl. Check your blood sugar if you feel signs/symptoms of hypoglycemia. If your blood sugar is below 70 take 15 grams of carbohydrates (ex 4 oz of apple juice, 3-4 glucose tablets, or 4-6 oz of regular soda) wait 15 minutes and repeat blood sugar to make sure it comes up above 70.  If your blood sugar is above 70 and you are due for a meal, have a meal.  If you are not due for a meal have a snack.  This snack helps keeps your blood sugar at a safe range.  Secondary Diagnosis:	Hypothyroid  Goal:	your tsh was a little elevated but your T4 was wnl  Assessment and plan of treatment:	continue with your synthroid 200mcg, we did not make an adjustment at this time  follow up with your endocrinologist to repeat your thyroid function tests next week

## 2018-05-25 NOTE — PROGRESS NOTE ADULT - PROBLEM SELECTOR PLAN 1
atypical symptoms for 2 weeks and CE x2 with positive nuclear stress on 5/23  -discussed with cards NAY Brooks, plan for cardiac cath today  -c/w aspirin 81mg daily, start lipitor 40mg qhs  -D-dimer is negative  -outpatient cards f/u Dr Maurice
improved, MSK vs GERD  abnormal stress s/p cath on 5/24 with non obstructive Coronaries, no intervention f/u official report  -per cards, can be discharged home no further inpatient workup  -c/w aspirin 81mg daily, lower lipitor to 20mg qhs  -D-dimer is negative, normal 02, on xarelto already  -outpatient cards f/u Dr Maurice
-atypical symptoms for 2 weeks and CE x2   -Monitor on telemetry  -per cards check TTE and nuclear stress pharmacological

## 2018-05-25 NOTE — PROGRESS NOTE ADULT - PROBLEM SELECTOR PROBLEM 7
Morbid obesity with body mass index (BMI) of 45.0 to 49.9 in adult
Morbid obesity with body mass index (BMI) of 45.0 to 49.9 in adult

## 2018-05-25 NOTE — DISCHARGE NOTE ADULT - PROVIDER TOKENS
TOKEN:'[id number]',TOKEN:'[id number]',FREE:[LAST:[caro],FIRST:[tammy],PHONE:[(   )    -],FAX:[(   )    -]]

## 2018-05-25 NOTE — PROGRESS NOTE ADULT - PROBLEM SELECTOR PROBLEM 4
Type 2 diabetes mellitus without complication, with long-term current use of insulin

## 2018-05-25 NOTE — PROGRESS NOTE ADULT - PROBLEM SELECTOR PLAN 5
-c/w xarelto for a/c  -c/w rayt for rate control
has been NSR while inpatient  -xarelto on hold for cath  -c/w dilt for rate control  -monitor on tele
has been NSR while inpatient  -restart xarelto today  -c/w dilt for rate control  -monitor on tele

## 2018-05-25 NOTE — PROGRESS NOTE ADULT - SUBJECTIVE AND OBJECTIVE BOX
pt seen and examined, no complaints on exam.   no chest pain overnight  tele with PAT 6.6 sec      ALPRAZolam 0.5 milliGRAM(s) Oral at bedtime PRN  aspirin enteric coated 81 milliGRAM(s) Oral daily  atorvastatin 40 milliGRAM(s) Oral at bedtime  dextrose 40% Gel 15 Gram(s) Oral once PRN  dextrose 50% Injectable 12.5 Gram(s) IV Push once  dextrose 50% Injectable 25 Gram(s) IV Push once  dextrose 50% Injectable 25 Gram(s) IV Push once  diltiazem    milliGRAM(s) Oral daily  docusate sodium 100 milliGRAM(s) Oral three times a day  ferrous    sulfate 325 milliGRAM(s) Oral daily  glucagon  Injectable 1 milliGRAM(s) IntraMuscular once PRN  insulin glargine Injectable (LANTUS) 10 Unit(s) SubCutaneous at bedtime  insulin lispro (HumaLOG) corrective regimen sliding scale   SubCutaneous three times a day before meals  insulin lispro (HumaLOG) corrective regimen sliding scale   SubCutaneous at bedtime  levothyroxine 200 MICROGram(s) Oral daily  pantoprazole    Tablet 40 milliGRAM(s) Oral before breakfast  senna 2 Tablet(s) Oral at bedtime                            10.4   8.4   )-----------( 318      ( 24 May 2018 13:46 )             32.1       Hemoglobin: 10.4 g/dL (05-24 @ 13:46)  Hemoglobin: 9.5 g/dL (05-24 @ 07:54)  Hemoglobin: 10.6 g/dL (05-22 @ 22:01)      05-25    140  |  104  |  13  ----------------------------<  168<H>  3.8   |  25  |  0.75    Ca    8.4      25 May 2018 06:24  Mg     2.0     05-25      Creatinine Trend: 0.75<--, 0.72<--, 0.72<--, 0.77<--, 0.77<--    COAGS:     CARDIAC MARKERS ( 23 May 2018 04:03 )  x     / <0.01 ng/mL / 76 U/L / x     / 1.6 ng/mL  CARDIAC MARKERS ( 22 May 2018 22:01 )  x     / <0.01 ng/mL / 96 U/L / x     / 2.0 ng/mL        T(C): 36.8 (05-25-18 @ 04:50), Max: 37.2 (05-24-18 @ 17:13)  HR: 69 (05-25-18 @ 04:50) (67 - 76)  BP: 152/80 (05-25-18 @ 04:50) (117/72 - 160/82)  RR: 18 (05-25-18 @ 04:50) (18 - 20)  SpO2: 97% (05-25-18 @ 04:50) (95% - 99%)  Wt(kg): --    I&O's Summary    24 May 2018 07:01  -  25 May 2018 07:00  --------------------------------------------------------  IN: 787 mL / OUT: 0 mL / NET: 787 mL      HEENT: Normal Oral mucosa, PERRL, EOMI  Lymphatic: No obvious lymphadenopathy, No edema  Cardiovascular: Normal S1S2, No JVD, 1/6 INNA, Peripheral pulses palpable 2+ B/L  Respiratory: Lungs clear to auscultation, normal effort  Gastrointestinal: Abdomen soft, ND, NT, +BS  Skin: Warm, dry, intact. No cyanosis, No rash.  Musculoskeletal: Normal ROM, normal strength  Psychiatric: Appropriate Mood and Affect      DIAGNOSTIC DATA  TELEMETRY: NSR     RADIOLOGY:  IMPRESSION:  Clear lungs.        Cardiac Cath Lab - Adult (05.02.16 @ 10:14) >  CORONARY VESSELS: The coronary circulation is right dominant.  LM:   --  LM: Normal.  LAD:   --  LAD: Normal.  CX:   --  Circumflex: Normal.  RCA:   --  RCA: Normal.  --  RPDA: Angiography showed minor luminal irregularities with no flow  limiting lesions.  --  RPLS: Normal.  AORTA: The root exhibited upper limit of normal size.  COMPLICATIONS: No complications occurred during the cath lab visit.  DIAGNOSTIC RECOMMENDATIONS: The patient should continue with the present  medications.  Prepared and signed by  Caterina Perkins M.D.    < from: Transthoracic Echocardiogram (03.05.16 @ 15:36) >  Conclusions:  1. Mild mitral annular calcification, otherwise normal  mitral valve. Minimal mitral regurgitation.  2. Aortic valve not well visualized; appears trileaflet  with normal opening. No aortic valve regurgitation seen.  3. Hyperdynamic left ventricle. EF=75-80%.  4. Normal right ventricular size and function.  5. Normal tricuspid valve. Mild-moderate tricuspid  regurgitation.  6. Estimated pulmonary artery systolic pressure equals 39  mm Hg, assuming right atrial pressure equals 8 mm Hg,  consistent with borderline pulmonary pressures.  *** Compared with echocardiogram of 1/6/2012, no  significant changes noted.    < from: Nuclear Stress Test-Pharmacologic (05.23.18 @ 12:19) >  Review of raw data shows: The study is of good technical  quality.  The left ventricle was normal in size. There is a small  moderate predominantly reversible defect in the lateral  apex consistent with ischemia with a small area of scar.  ------------------------------------------------------------------------    < end of copied text >      ASSESSMENT AND PLAN: 67 year old female with HTN HLD DM obesity PAF on Xarelto with normal coronaries on cardiac cath in May 2016 with historically preserved LV function , PAD s/p laser atherectomy and balloon angioplasty to Right tibial artery June 2016 admitted with complaints of intermittent chest pain that begins and remits spontaneously as well as dyspnea on exertion .   s/p cath with non obs Coronaries     -- The patient has ruled out for acute coronary syndrome with negative serial cardiac enzymes  -- EKG without acute ischemia  -- NST abnormal --   -- Cath with non obs coronaries   -- Patient's last dose of Xarelto 7am 5/22 ,restart A/C as per medicine   -- c/w Cardizem CD  -- will arrange outpt follow up with Dr Maurice   D/W Dr Kolb

## 2018-05-25 NOTE — DISCHARGE NOTE ADULT - MEDICATION SUMMARY - MEDICATIONS TO TAKE
I will START or STAY ON the medications listed below when I get home from the hospital:    aspirin 81 mg oral tablet  -- 1 tab(s) by mouth once a day  -- Indication: For Chest pain    Cartia  mg/24 hours oral capsule, extended release  -- 1 cap(s) by mouth once a day  -- Indication: For Paroxysmal atrial fibrillation    Xarelto 20 mg oral tablet  -- 1 tab(s) by mouth once a day (in the evening)  -- Indication: For Paroxysmal atrial fibrillation    HumaLOG KwikPen 100 units/mL injectable solution  -- based on sliding scale   -- Indication: For Type 2 diabetes mellitus without complication, with long-term current use of insulin    Tresiba FlexTouch 100 units/mL subcutaneous solution  -- 15 unit(s) subcutaneous once a day (at bedtime)  -- Indication: For Type 2 diabetes mellitus without complication, with long-term current use of insulin    losartan-hydroCHLOROthiazide 100 mg-25 mg oral tablet  -- 1 tab(s) by mouth once a day  -- Indication: For Hypertension    ALPRAZolam 0.5 mg oral tablet  --  by mouth ,1 tab As Needed  -- Indication: For Anxiety    pantoprazole 40 mg oral delayed release tablet  -- 1 tab(s) by mouth once a day  -- Indication: For Iron deficiency anemia    Synthroid 200 mcg (0.2 mg) oral tablet  -- 1 tab(s) by mouth once a day  -- Indication: For Hypothyroidism, unspecified type

## 2018-05-25 NOTE — PHYSICAL THERAPY INITIAL EVALUATION ADULT - PERTINENT HX OF CURRENT PROBLEM, REHAB EVAL
Pt is a 67 y.o. female admitted w/ CP and SOB. Hospital Course: (-) Bernardo, EKG without ischemia, NST abnormal and cath with no obstructed coronary arteries Pt w/ PMH of HTN HLD DM obesity PAF on Xarelto, PAD s/p laser atherectomy and balloon angioplasty to Right tibial artery June 2016.

## 2018-05-25 NOTE — PHYSICAL THERAPY INITIAL EVALUATION ADULT - ADDITIONAL COMMENTS
As per pt, pt lives in a private house w/ 4 steps to enter and no handrail. Pt lives w/ , daughter and grandchildren. Pt was independent w/ all ADLs and mobility PTA, however ambulation was limited to one block due to SOB. Pt owns straight cane.

## 2018-05-25 NOTE — PROGRESS NOTE ADULT - PROBLEM SELECTOR PLAN 6
-c/w home xanax prn    Dispo: pending results of TTE and nuclear stress
-c/w [place] xanax prn
-c/w [place] xanax prn

## 2018-08-09 PROBLEM — K44.9 DIAPHRAGMATIC HERNIA WITHOUT OBSTRUCTION OR GANGRENE: Chronic | Status: ACTIVE | Noted: 2017-04-17

## 2018-08-09 PROBLEM — G62.9 POLYNEUROPATHY, UNSPECIFIED: Chronic | Status: ACTIVE | Noted: 2017-04-17

## 2018-09-26 ENCOUNTER — APPOINTMENT (OUTPATIENT)
Dept: CARDIOLOGY | Facility: CLINIC | Age: 68
End: 2018-09-26
Payer: MEDICARE

## 2018-09-26 ENCOUNTER — NON-APPOINTMENT (OUTPATIENT)
Age: 68
End: 2018-09-26

## 2018-09-26 VITALS — DIASTOLIC BLOOD PRESSURE: 69 MMHG | OXYGEN SATURATION: 97 % | SYSTOLIC BLOOD PRESSURE: 153 MMHG | HEART RATE: 76 BPM

## 2018-09-26 PROCEDURE — 99214 OFFICE O/P EST MOD 30 MIN: CPT

## 2018-09-26 PROCEDURE — 93000 ELECTROCARDIOGRAM COMPLETE: CPT

## 2019-01-30 ENCOUNTER — APPOINTMENT (OUTPATIENT)
Dept: CARDIOLOGY | Facility: CLINIC | Age: 69
End: 2019-01-30

## 2020-08-10 ENCOUNTER — EMERGENCY (EMERGENCY)
Facility: HOSPITAL | Age: 70
LOS: 1 days | Discharge: HOME | End: 2020-08-10
Attending: EMERGENCY MEDICINE
Payer: MEDICARE

## 2020-08-10 VITALS
DIASTOLIC BLOOD PRESSURE: 85 MMHG | SYSTOLIC BLOOD PRESSURE: 151 MMHG | HEART RATE: 89 BPM | WEIGHT: 263.01 LBS | RESPIRATION RATE: 22 BRPM | OXYGEN SATURATION: 97 % | TEMPERATURE: 99 F | HEIGHT: 61 IN

## 2020-08-10 DIAGNOSIS — I86.8 VARICOSE VEINS OF OTHER SPECIFIED SITES: Chronic | ICD-10-CM

## 2020-08-10 DIAGNOSIS — Z78.9 OTHER SPECIFIED HEALTH STATUS: Chronic | ICD-10-CM

## 2020-08-10 DIAGNOSIS — Z98.49 CATARACT EXTRACTION STATUS, UNSPECIFIED EYE: Chronic | ICD-10-CM

## 2020-08-10 PROCEDURE — 99282 EMERGENCY DEPT VISIT SF MDM: CPT

## 2020-08-10 PROCEDURE — 99283 EMERGENCY DEPT VISIT LOW MDM: CPT

## 2020-08-10 NOTE — ED ADULT TRIAGE NOTE - CHIEF COMPLAINT QUOTE
constipation for 5 days and bladder pain a/w retention. Patient states last time fully urinating was the 3pm  patient O2 was 87 on RA, pt placed on 2L NC with improvement to 97%

## 2020-08-11 VITALS
SYSTOLIC BLOOD PRESSURE: 141 MMHG | DIASTOLIC BLOOD PRESSURE: 83 MMHG | HEART RATE: 96 BPM | TEMPERATURE: 98 F | OXYGEN SATURATION: 96 % | RESPIRATION RATE: 20 BRPM

## 2020-08-11 NOTE — ED PROVIDER NOTE - NSFOLLOWUPCLINICS_GEN_ALL_ED_FT
[facility] - [facility]  Neurosurgery/Spine  [address] [place] [address]  Phone: [phone]  Fax:   Follow Up Time: 1-3 Days

## 2020-08-11 NOTE — ED PROVIDER NOTE - NSFOLLOWUPINSTRUCTIONS_ED_ALL_ED_FT
- Please follow up with your Primary Care Doctor within 48 hours.     - Please follow up with Neurosurgery within the week to discuss more definitive management of your presentation today.     - Tylenol up to 650 mg every 6 hours as needed for pain.    - Be sure to return to the ED if you develop new or worsening symptoms. Specific signs and symptoms to be vigilant of: fever or chills, chest pain, difficulty breathing, palpitations, loss of consciousness, weakness in the arms or legs, numbness or tingling, difficulty urinating or making a bowel movement, or any other distressing symptoms.

## 2020-08-11 NOTE — ED PROVIDER NOTE - ENMT, MLM
Telephone Encounter by Zohra Davidson at 06/02/17 09:35 AM     Author:  Zohra Davidson Service:  (none) Author Type:       Filed:  06/02/17 09:38 AM Encounter Date:  6/2/2017 Status:  Addendum     :  Zohra Davidson ()              JERICHO PLUMMER    Patient Age: 88 year old    ACCT STATUS:   MESSAGE:[KS1.1T]   Pt following up on order for Alexis[KS1.1M]moises[KS1.2M]n lab service to come to [facility] to do Pt's labs.  See previous message re issue.  Rolf said they never rec'd order.  Advised Clinical Asst will be returning call[KS1.1M]   Next and Last Visit with Provider and Department  Next visit with CHATO GUERIN is on 08/12/2017 at 11:35 AM in [facility]  Next visit with FAMILY PRACTICE is on 08/12/2017 at 11:35 AM in [facility]  Last visit with CHATO GUERIN was on 05/04/2017 at  2:40 PM in [facility]  Last visit with FAMILY PRACTICE was on 05/04/2017 at  2:40 PM in [facility]     WEIGHT AND HEIGHT: As of 05/04/2017 weight is 143 lbs.(64.864 kg).   BMI is 24.81 kg/(m^2) calculated from:     Height 5' 3\" (1.6 m) as of 4/21/17     Weight 140 lb (63.504 kg) as of 4/21/17      Allergies      Allergen   Reactions   • Protonix [Pantoprazole Sodium Sesquihydrate]  Rash   • Augmentin  Diarrhea     Severe Diarrhea    • Azithromycin  Nausea and Vomiting and Other - See Comments     Hospitalized due to Nausea & Emeis, Dehydration caused A- Fib    • Lyrica [Pregabalin]  Swelling     Swollen feet    • Methadone Hcl  Swelling   • Neurontin [Gabapentin]  Swelling     Swollen feet    • Simvastatin  Other - See Comments     Caused decrease in urine output      • Voltaren [Diclofenac Sodium]  Swelling   • Atenolol       PER PT. GETS WEAK    • Bactrim     • Fentanyl [Alcohol-Fentanyl]  Nausea Only     dizzy, urination    • Goodys Extra Strength  Other - See Comments     Gets pt jittery    • Omeprazole  Hives and Itching   • Propoxyphene       Current  outpatient prescriptions       Medication  Sig Dispense Refill   • [START ON 6/6/2017] oxycodone (ROXICODONE) 5 MG immediate release tablet Take 1 Tab by mouth every 12 (twelve) hours as needed for Pain. 60 Tab 0   • [START ON 7/6/2017] oxycodone (ROXICODONE) 5 MG immediate release tablet Take 1 Tab by mouth every 12 (twelve) hours as needed for Pain. 60 Tab 0   • alprazolam (XANAX) 0.25 MG tablet Take 1 Tab by mouth 2 (two) times daily as needed for Anxiety. 30 Tab 0   • LANTUS SOLOSTAR 100 UNIT/ML SOPN INJECT 15 UNITS INTO THE SKIN DAILY. 3 mL 1   • nortriptyline (PAMELOR) 10 MG Cap Take 1 Cap by mouth 2 (two) times daily. 180 Cap 0   • warfarin (COUMADIN) 5 MG tablet Take 1 tab (5 mg) Sun/Tue/Thu  and 0.5 tabs (2.5 mg) all other days and as directed. 70 Tab 0   • cilostazol (PLETAL) 100 MG tablet TAKE 1 TABLET TWICE DAILY 180 Tab 1   • oxycodone (ROXICODONE) 5 MG immediate release tablet Take 1 Tab by mouth every 12 (twelve) hours as needed for Pain. 60 Tab 0   • BD PEN NEEDLE ISAURA U/F 32G X 4 MM MISC   4   • pravastatin (PRAVACHOL) 80 MG tablet Take 1 Tab by mouth daily. 90 Tab 1   • metoprolol (LOPRESSOR) 50 MG tablet Take 1 Tab by mouth 2 (two) times daily. 180 Tab 1   • Insulin Syringe-Needle U-100 30G X 5/16\" 0.3 ML MISC Use as directed with Lantus 100 Each 1   • furosemide (LASIX) 40 MG tablet Take 1 Tab by mouth daily. 60 Tab 3   • SITagliptin Phosphate (JANUVIA) 100 MG tablet Take 0.5 Tabs by mouth daily. 90 Tab 1   • Famotidine (PEPCID) 20 MG tablet Take 1 Tab by mouth 2 (two) times daily. 180 Tab 3   • Acetaminophen (TYLENOL ARTHRITIS PAIN OR) Take  by mouth daily as needed.     • Polyethylene Glycol 3350 (MIRALAX OR) Take  by mouth 2 (two) times daily.     • MULTIPLE VITAMIN OR 1 tablet daily        PHARMACY to use:           Pharmacy preference(s) on file:    [facility]  [address]    CALL BACK INFO:[KS1.1T] Ok to leave response (including  medical information) with family member or on answering machine[KS1.1M]  ROUTING:[KS1.1T] Patient's physician/staff[KS1.1M]        PCP: Ulises Ralph MD         INS: Payor: MEDICARE - ASSIGNED / Plan: *No Plan* / Product Type: *No Product type* / Note: This is the primary coverage, but no account was found for this location or the patient's primary location.   ADDRESS:  [address] [place] [id number][KS1.1T]       Revision History        User Key Date/Time User Provider Type Action    > [N/A] 06/02/17 09:38 AM Zohra Davidson  Addend     KS1.2 06/02/17 09:37 AM Zohra Davidson  Sign     KS1.1 06/02/17 09:35 AM Zohra Davidson      M - Manual, T - Template             Airway patent, Nasal mucosa clear. Mouth with normal mucosa. Throat has no vesicles, no oropharyngeal exudates and uvula is midline.

## 2020-08-11 NOTE — ED PROVIDER NOTE - PATIENT PORTAL LINK FT
You can access the FollowMyHealth Patient Portal offered by [facility] by registering at the following website: [email or web address]. By joining [facility]’s FollowMyHealth portal, you will also be able to view your health information using other applications (apps) compatible with our system.

## 2020-08-11 NOTE — ED PROVIDER NOTE - OBJECTIVE STATEMENT
Taurus STEIN: Patient is a 71 yo F with history of DM, HTN, high cholesterol, morbid obesity, hypothyroid, anxiety here for constipation x 5 days. She took Milk of Magnesia and had a bowel movement while waiting in the waiting room. She states she usually gets urinary retention when she is constipated and also urinated while waiting to be seen. Denies a history of abdominal surgeries. No nausea, vomiting. No chest pain, fevers, chills, cough. Patient states she feels much better, doesn't feel like she needs any testing now and will see her doctor tomorrow.

## 2020-08-11 NOTE — ED PROVIDER NOTE - CROS ED SKIN ALL NEG
Patient called to obtain refill of Oxycodone 5-325mg.     Pharmacy:  [facility]: [phone]    Please call patient when complete:  [phone] negative...

## 2020-08-11 NOTE — ED ADULT NURSE NOTE - OBJECTIVE STATEMENT
was constipated x 4 days; took MOM; also c/o urinary retention; states that while she was in pt states she was constipated x 4 days; took MOM; also c/o urinary retention; states that while she was in the ED waiting room here and urinated and had bowel movement; states she feels better now; pt is fully alert and oriented; denies any pain or sob

## 2020-08-11 NOTE — ED ADULT NURSE NOTE - NSIMPLEMENTINTERV_GEN_ALL_ED
Implemented All Fall with Harm Risk Interventions:  [place] to call system. Call bell, personal items and telephone within reach. Instruct patient to call for assistance. Room bathroom lighting operational. Non-slip footwear when patient is off stretcher. Physically safe environment: no spills, clutter or unnecessary equipment. Stretcher in lowest position, wheels locked, appropriate side rails in place. Provide visual cue, wrist band, yellow gown, etc. Monitor gait and stability. Monitor for mental status changes and reorient to person, place, and time. Review medications for side effects contributing to fall risk. Reinforce activity limits and safety measures with patient and family. Provide visual clues: red socks.

## 2021-01-25 ENCOUNTER — APPOINTMENT (OUTPATIENT)
Dept: PULMONOLOGY | Facility: CLINIC | Age: 71
End: 2021-01-25
Payer: MEDICARE

## 2021-01-25 VITALS
HEART RATE: 71 BPM | WEIGHT: 266 LBS | DIASTOLIC BLOOD PRESSURE: 78 MMHG | OXYGEN SATURATION: 97 % | TEMPERATURE: 97.8 F | BODY MASS INDEX: 53.63 KG/M2 | HEIGHT: 59 IN | SYSTOLIC BLOOD PRESSURE: 124 MMHG

## 2021-01-25 DIAGNOSIS — R06.2 WHEEZING: ICD-10-CM

## 2021-01-25 PROCEDURE — 99204 OFFICE O/P NEW MOD 45 MIN: CPT

## 2021-01-29 LAB — SARS-COV-2 N GENE NPH QL NAA+PROBE: NOT DETECTED

## 2021-02-03 ENCOUNTER — APPOINTMENT (OUTPATIENT)
Dept: PULMONOLOGY | Facility: CLINIC | Age: 71
End: 2021-02-03
Payer: MEDICARE

## 2021-02-03 VITALS
DIASTOLIC BLOOD PRESSURE: 72 MMHG | HEART RATE: 71 BPM | SYSTOLIC BLOOD PRESSURE: 128 MMHG | OXYGEN SATURATION: 93 % | TEMPERATURE: 99.1 F

## 2021-02-03 DIAGNOSIS — R06.02 SHORTNESS OF BREATH: ICD-10-CM

## 2021-02-03 PROCEDURE — 94727 GAS DIL/WSHOT DETER LNG VOL: CPT

## 2021-02-03 PROCEDURE — 99213 OFFICE O/P EST LOW 20 MIN: CPT | Mod: 25

## 2021-02-03 PROCEDURE — 94729 DIFFUSING CAPACITY: CPT

## 2021-02-03 PROCEDURE — 94060 EVALUATION OF WHEEZING: CPT

## 2021-02-03 NOTE — DISCUSSION/SUMMARY
[FreeTextEntry1] : She is a 70 year old woman with a history of hypertension, hyperlipidemia, hypothyroidism, diabetes and asthma. She has a BMI of 54. \par \par Has been complaining of OLIVAREZ and wheezing. Has had this for over two years. Also has a dry cough. \par \par Her asthma symptoms are better controlled now. \par \par Advised to continue with Symbicort. \par \par Follow up in three months. Sooner if necessary. \par

## 2021-02-03 NOTE — PHYSICAL EXAM
[No Acute Distress] : no acute distress [Normal Oropharynx] : normal oropharynx [No Neck Mass] : no neck mass [Normal S1, S2] : normal s1, s2 [Clear to Auscultation Bilaterally] : clear to auscultation bilaterally [No Abnormalities] : no abnormalities [No HSM] : no hsm [No Edema] : no edema [No Focal Deficits] : no focal deficits [Oriented x3] : oriented x3 [TextBox_44] : Thyroidectomy scar

## 2021-02-03 NOTE — PROCEDURE
[FreeTextEntry1] : PFT 2/3/21: No obstruction. Moderate restriction. Marked reduction in DLCO. No significant improvement after bronchodilator.

## 2021-02-03 NOTE — PHYSICAL EXAM
[No Acute Distress] : no acute distress [No Neck Mass] : no neck mass [Normal S1, S2] : normal s1, s2 [Clear to Auscultation Bilaterally] : clear to auscultation bilaterally [No Abnormalities] : no abnormalities [No HSM] : no hsm [No Edema] : no edema [No Focal Deficits] : no focal deficits [Oriented x3] : oriented x3 [TextBox_44] : Thyroidectomy scar

## 2021-02-03 NOTE — REVIEW OF SYSTEMS
[Postnasal Drip] : postnasal drip [SOB on Exertion] : sob on exertion [GERD] : gerd [Anxiety] : anxiety [Diabetes] : diabetes [Thyroid Problem] : thyroid problem [Fatigue] : no fatigue [Cough] : no cough [Hemoptysis] : no hemoptysis [Sputum] : no sputum [Wheezing] : no wheezing [Chest Discomfort] : no chest discomfort [Hay Fever] : no hay fever [Dysuria] : no dysuria [Myalgias] : no myalgias [Rash] : no rash [Headache] : no headache

## 2021-02-03 NOTE — HISTORY OF PRESENT ILLNESS
[Former] : former [Never] : never [TextBox_4] : She is a 70 year old woman with a history of hypertension, hyperlipidemia, hypothyroidism, diabetes and asthma. \par \par Has been complaining of OLIVAREZ and wheezing. Has had this for over two years. Also has a dry cough. \par \par She is feeling a little better after starting in Symbicort. \par  [YearQuit] : 2000

## 2021-02-03 NOTE — DISCUSSION/SUMMARY
[FreeTextEntry1] : She is a 70 year old woman with a history of hypertension, hyperlipidemia, hypothyroidism, diabetes and asthma. She has a BMI of 54. \par \par Has been complaining of OLIVAREZ and wheezing. Has had this for over two years. Also has a dry cough. \par \par Her asthma symptoms are not well controlled. Advised to resume Symbicort. Proper inhaler technique reviewed with the patient. \par \par A PFT will be obtained after a nasopharyngeal swab for COVID-19-PCR has been obtained and the result is negative. \par \par Weight loss is advisable. \par \par Follow up in one month. Further recommendations to follow the results of the above.

## 2021-02-03 NOTE — REVIEW OF SYSTEMS
[Nasal Congestion] : nasal congestion [Postnasal Drip] : postnasal drip [Cough] : cough [Wheezing] : wheezing [SOB on Exertion] : sob on exertion [GERD] : gerd [Anxiety] : anxiety [Diabetes] : diabetes [Thyroid Problem] : thyroid problem [Fever] : no fever [Fatigue] : no fatigue [Chills] : no chills [Hemoptysis] : no hemoptysis [Sputum] : no sputum [Chest Discomfort] : no chest discomfort [Hay Fever] : no hay fever [Dysuria] : no dysuria [Myalgias] : no myalgias [Rash] : no rash [Headache] : no headache

## 2021-02-03 NOTE — HISTORY OF PRESENT ILLNESS
[Former] : former [Never] : never [TextBox_4] : She is a 70 year old woman with a history of hypertension, hyperlipidemia, hypothyroidism, diabetes and asthma. \par \par Has been complaining of OLIVAREZ and wheezing. Has had this for over two years. Also has a dry cough. \par \par No fever, chills or sweats. No sick contacts. \par \par  [YearQuit] : 2000

## 2021-04-29 ENCOUNTER — RESULT REVIEW (OUTPATIENT)
Age: 71
End: 2021-04-29

## 2021-05-05 ENCOUNTER — APPOINTMENT (OUTPATIENT)
Dept: PULMONOLOGY | Facility: CLINIC | Age: 71
End: 2021-05-05

## 2021-05-17 ENCOUNTER — APPOINTMENT (OUTPATIENT)
Dept: OBGYN | Facility: CLINIC | Age: 71
End: 2021-05-17
Payer: MEDICARE

## 2021-05-17 ENCOUNTER — ASOB RESULT (OUTPATIENT)
Age: 71
End: 2021-05-17

## 2021-05-17 PROCEDURE — 76830 TRANSVAGINAL US NON-OB: CPT

## 2021-05-21 ENCOUNTER — RESULT REVIEW (OUTPATIENT)
Age: 71
End: 2021-05-21

## 2021-05-27 NOTE — DISCHARGE NOTE ADULT - THE PATIENT HAS
Event Note Follow-up/Nutrition Services Tertiary Survey Event Note [place]/Event Note Pediatric Hematology/Event Note no difficulties

## 2021-06-22 ENCOUNTER — APPOINTMENT (OUTPATIENT)
Dept: GYNECOLOGIC ONCOLOGY | Facility: CLINIC | Age: 71
End: 2021-06-22
Payer: MEDICARE

## 2021-06-22 VITALS
HEIGHT: 59 IN | HEART RATE: 80 BPM | WEIGHT: 244.05 LBS | DIASTOLIC BLOOD PRESSURE: 102 MMHG | SYSTOLIC BLOOD PRESSURE: 194 MMHG | BODY MASS INDEX: 49.2 KG/M2

## 2021-06-22 DIAGNOSIS — Z80.3 FAMILY HISTORY OF MALIGNANT NEOPLASM OF BREAST: ICD-10-CM

## 2021-06-22 DIAGNOSIS — B37.3 CANDIDIASIS OF VULVA AND VAGINA: ICD-10-CM

## 2021-06-22 PROCEDURE — 99204 OFFICE O/P NEW MOD 45 MIN: CPT

## 2021-06-25 ENCOUNTER — NON-APPOINTMENT (OUTPATIENT)
Age: 71
End: 2021-06-25

## 2021-06-25 DIAGNOSIS — N76.0 ACUTE VAGINITIS: ICD-10-CM

## 2021-06-25 DIAGNOSIS — B96.89 ACUTE VAGINITIS: ICD-10-CM

## 2021-06-25 LAB
BACTERIA UR CULT: NORMAL
CANCER AG19-9 SERPL-ACNC: 23 U/ML
CANDIDA VAG CYTO: DETECTED
CEA SERPL-MCNC: 2.5 NG/ML
G VAGINALIS+PREV SP MTYP VAG QL MICRO: DETECTED
T VAGINALIS VAG QL WET PREP: NOT DETECTED

## 2021-08-02 ENCOUNTER — ASOB RESULT (OUTPATIENT)
Age: 71
End: 2021-08-02

## 2021-08-02 ENCOUNTER — APPOINTMENT (OUTPATIENT)
Dept: OBGYN | Facility: CLINIC | Age: 71
End: 2021-08-02
Payer: MEDICARE

## 2021-08-02 PROCEDURE — 76830 TRANSVAGINAL US NON-OB: CPT

## 2021-08-16 PROBLEM — N83.299 COMPLEX OVARIAN CYST: Status: RESOLVED | Noted: 2021-06-22 | Resolved: 2021-08-16

## 2021-08-16 PROBLEM — R93.89 THICKENED ENDOMETRIUM: Status: ACTIVE | Noted: 2021-08-16

## 2021-08-17 ENCOUNTER — APPOINTMENT (OUTPATIENT)
Dept: GYNECOLOGIC ONCOLOGY | Facility: CLINIC | Age: 71
End: 2021-08-17
Payer: MEDICARE

## 2021-08-17 VITALS
OXYGEN SATURATION: 94 % | DIASTOLIC BLOOD PRESSURE: 88 MMHG | HEIGHT: 59 IN | HEART RATE: 74 BPM | WEIGHT: 249.5 LBS | BODY MASS INDEX: 50.3 KG/M2 | SYSTOLIC BLOOD PRESSURE: 176 MMHG

## 2021-08-17 DIAGNOSIS — R93.89 ABNORMAL FINDINGS ON DIAGNOSTIC IMAGING OF OTHER SPECIFIED BODY STRUCTURES: ICD-10-CM

## 2021-08-17 DIAGNOSIS — N83.299 OTHER OVARIAN CYST, UNSPECIFIED SIDE: ICD-10-CM

## 2021-08-17 PROCEDURE — 99213 OFFICE O/P EST LOW 20 MIN: CPT

## 2021-08-17 RX ORDER — METRONIDAZOLE 7.5 MG/G
0.75 GEL VAGINAL
Qty: 5 | Refills: 0 | Status: DISCONTINUED | COMMUNITY
Start: 2021-06-25 | End: 2021-08-17

## 2021-08-17 RX ORDER — HYDRALAZINE HYDROCHLORIDE 10 MG/1
10 TABLET ORAL
Qty: 60 | Refills: 0 | Status: DISCONTINUED | COMMUNITY
Start: 2017-02-07 | End: 2021-08-17

## 2021-08-17 RX ORDER — BUDESONIDE AND FORMOTEROL FUMARATE DIHYDRATE 160; 4.5 UG/1; UG/1
160-4.5 AEROSOL RESPIRATORY (INHALATION) TWICE DAILY
Qty: 1 | Refills: 2 | Status: DISCONTINUED | COMMUNITY
Start: 2021-01-25 | End: 2021-08-17

## 2021-08-20 ENCOUNTER — APPOINTMENT (OUTPATIENT)
Dept: UROGYNECOLOGY | Facility: CLINIC | Age: 71
End: 2021-08-20
Payer: MEDICARE

## 2021-08-20 VITALS
SYSTOLIC BLOOD PRESSURE: 162 MMHG | HEIGHT: 59 IN | DIASTOLIC BLOOD PRESSURE: 76 MMHG | WEIGHT: 251 LBS | TEMPERATURE: 97.8 F | BODY MASS INDEX: 50.6 KG/M2 | HEART RATE: 76 BPM

## 2021-08-20 VITALS — TEMPERATURE: 97.8 F

## 2021-08-20 DIAGNOSIS — N39.46 MIXED INCONTINENCE: ICD-10-CM

## 2021-08-20 PROCEDURE — 99204 OFFICE O/P NEW MOD 45 MIN: CPT

## 2021-08-20 PROCEDURE — 51701 INSERT BLADDER CATHETER: CPT

## 2021-08-20 NOTE — PHYSICAL EXAM
[Chaperone Present] : A chaperone was present in the examining room during all aspects of the physical examination [No Acute Distress] : in no acute distress [Well developed] : well developed [Well Nourished] : ~L well nourished [Oriented x3] : oriented to person, place, and time [Respirations regular] : ~T respiratory rate was regular [Warm and Dry] : was warm and dry to touch [Labia Majora] : were normal [Labia Minora] : were normal [Normal Appearance] : general appearance was normal [Atrophy] : atrophy [Uterine Adnexae] : were not tender and not enlarged [Normal] : no abnormalities [Post Void Residual ____ml] : post void residual was [unfilled] ml [Exam Deferred] : was deferred [No Edema] : ~T edema was not present [Tenderness] : ~T no ~M abdominal tenderness observed [Distended] : not distended [No Joint Swelling] : there was swelling of the joints [de-identified] : significant peripheral pitting edema in the lower extremities up to the level of the thigh  [FreeTextEntry3] : (-) empty supine CST [de-identified] : No prolapse noted

## 2021-08-20 NOTE — REASON FOR VISIT
[Initial Visit ___] : an initial visit for [unfilled] [Questionnaire Received] : Patient questionnaire received [Intake Form Reviewed] : Patient intake form with past medical history, surgical history, family history and social history reviewed today [Urinary Incontinence] : urinary incontinence [Spouse] : spouse [Urine Frequency] : urine frequency

## 2021-08-20 NOTE — DISCUSSION/SUMMARY
[FreeTextEntry1] : Virginia is a 70 yo who presents with her  today due to mixed urinary incontinence, urinary urgency and frequency. She has significant peripheral edema and discussed with patient/pt  that she should consider compression stockings, and elevation of lower extremities at night. Discussed that significant peripheral edema as such can contribute to her nocturia. Treatment options for the stress incontinence were discussed and included doing nothing, behavioral modification, Kegel's exercises with and without PT, medications, a pessary or intravaginal devices, periurethral bulking, and surgical correction with a suburethral sling v Martinez v autologous sling at the time as her surgery with Gyn/Onc. She was told that surgery for МАРИЯ would not fix her OAB symptoms. In regards to her OAB, discussed behavioral modifications with patient.  We discussed that we will send urine off for culture as it was positive for nitrites and leukocytes.  We also discussed the importance of good glucose control.  IUGA forms on МАРИЯ, OAB and Urodynamics given to patient and pt . \par \par RTO for urodynamics and follow up visit. All questions answered. Pt instructed to call office if any further questions/concerns arise.  Questions were answered

## 2021-08-20 NOTE — HISTORY OF PRESENT ILLNESS
[Cystocele (Obstetric)] : no [Uterine Prolapse] : no [Vaginal Wall Prolapse] : mild [Rectal Prolapse] : no [Unable To Restrain Bowel Movement] : severe [Urinary Frequency] : no [Feelings Of Urinary Urgency] : severe [Urinary Tract Infection] : severe [] : no [FreeTextEntry4] : intermittent, seeing Gyn/Onc [FreeTextEntry5] : i [de-identified] : for several years [de-identified] : for several years [de-identified] : every 15 minutes for about a year [de-identified] : changes clothes 5x/day [de-identified] : every 1.5 hours  about a year  [FreeTextEntry1] : Virginia is a 72 yo who presents, with her  today, for mixed urinary incontinence, urge predominant and, urinary urgency and frequency. She is scheduling RA-TLH/BSO with Dr. Lange due to persistent R ovarian mass, thickened endometrium and intermittent vaginal spotting. \par \par PMHx: depression, HTN, hernia, constipation, thyroid dz, DM\par PSHx: thyroidectomy\par \par

## 2021-08-20 NOTE — PROCEDURE
[FreeTextEntry1] : Sterile straight catheterization was performed to measure a postvoid residual volume and rule out urinary tract infection which was 30 cc\par

## 2021-08-23 LAB
APPEARANCE: CLEAR
BACTERIA: ABNORMAL
BILIRUB UR QL STRIP: NORMAL
BILIRUBIN URINE: NEGATIVE
BLOOD URINE: NEGATIVE
CLARITY UR: NORMAL
COLLECTION METHOD: NORMAL
COLOR: YELLOW
GLUCOSE QUALITATIVE U: NEGATIVE
GLUCOSE UR-MCNC: NORMAL
HCG UR QL: 1 EU/DL
HGB UR QL STRIP.AUTO: NORMAL
HYALINE CASTS: 3 /LPF
KETONES UR-MCNC: NORMAL
KETONES URINE: NORMAL
LEUKOCYTE ESTERASE UR QL STRIP: NORMAL
LEUKOCYTE ESTERASE URINE: ABNORMAL
MICROSCOPIC-UA: NORMAL
NITRITE UR QL STRIP: POSITIVE
NITRITE URINE: POSITIVE
PH UR STRIP: 5.5
PH URINE: 6
PROT UR STRIP-MCNC: 30
PROTEIN URINE: ABNORMAL
RED BLOOD CELLS URINE: 2 /HPF
SP GR UR STRIP: 1.01
SPECIFIC GRAVITY URINE: 1.02
SQUAMOUS EPITHELIAL CELLS: 0 /HPF
UROBILINOGEN URINE: NORMAL
WHITE BLOOD CELLS URINE: 78 /HPF

## 2021-08-30 ENCOUNTER — NON-APPOINTMENT (OUTPATIENT)
Age: 71
End: 2021-08-30

## 2021-09-08 ENCOUNTER — APPOINTMENT (OUTPATIENT)
Dept: GYNECOLOGIC ONCOLOGY | Facility: CLINIC | Age: 71
End: 2021-09-08
Payer: MEDICARE

## 2021-09-08 VITALS
WEIGHT: 252 LBS | DIASTOLIC BLOOD PRESSURE: 111 MMHG | BODY MASS INDEX: 50.8 KG/M2 | SYSTOLIC BLOOD PRESSURE: 185 MMHG | HEIGHT: 59 IN

## 2021-09-08 PROCEDURE — 99215 OFFICE O/P EST HI 40 MIN: CPT

## 2021-09-14 ENCOUNTER — APPOINTMENT (OUTPATIENT)
Dept: OBGYN | Facility: CLINIC | Age: 71
End: 2021-09-14

## 2021-09-16 ENCOUNTER — OUTPATIENT (OUTPATIENT)
Dept: OUTPATIENT SERVICES | Facility: HOSPITAL | Age: 71
LOS: 1 days | End: 2021-09-16
Payer: MEDICARE

## 2021-09-16 ENCOUNTER — APPOINTMENT (OUTPATIENT)
Dept: UROGYNECOLOGY | Facility: CLINIC | Age: 71
End: 2021-09-16
Payer: MEDICARE

## 2021-09-16 VITALS — TEMPERATURE: 97.2 F | HEART RATE: 74 BPM | SYSTOLIC BLOOD PRESSURE: 160 MMHG | DIASTOLIC BLOOD PRESSURE: 80 MMHG

## 2021-09-16 DIAGNOSIS — N39.3 STRESS INCONTINENCE (FEMALE) (MALE): ICD-10-CM

## 2021-09-16 DIAGNOSIS — Z98.49 CATARACT EXTRACTION STATUS, UNSPECIFIED EYE: Chronic | ICD-10-CM

## 2021-09-16 DIAGNOSIS — Z01.818 ENCOUNTER FOR OTHER PREPROCEDURAL EXAMINATION: ICD-10-CM

## 2021-09-16 DIAGNOSIS — I86.8 VARICOSE VEINS OF OTHER SPECIFIED SITES: Chronic | ICD-10-CM

## 2021-09-16 DIAGNOSIS — Z78.9 OTHER SPECIFIED HEALTH STATUS: Chronic | ICD-10-CM

## 2021-09-16 PROCEDURE — 51729 CYSTOMETROGRAM W/VP&UP: CPT | Mod: 26

## 2021-09-16 PROCEDURE — 51797 INTRAABDOMINAL PRESSURE TEST: CPT | Mod: 26

## 2021-09-16 PROCEDURE — 51784 ANAL/URINARY MUSCLE STUDY: CPT | Mod: 26

## 2021-09-16 PROCEDURE — 51797 INTRAABDOMINAL PRESSURE TEST: CPT

## 2021-09-16 PROCEDURE — 51729 CYSTOMETROGRAM W/VP&UP: CPT

## 2021-09-16 PROCEDURE — 51784 ANAL/URINARY MUSCLE STUDY: CPT

## 2021-09-20 PROBLEM — N39.3 STRESS INCONTINENCE IN FEMALE: Status: ACTIVE | Noted: 2021-09-16

## 2021-09-20 PROBLEM — N39.3 STRESS INCONTINENCE: Noted: 2021-09-20

## 2021-09-21 ENCOUNTER — APPOINTMENT (OUTPATIENT)
Dept: OBGYN | Facility: CLINIC | Age: 71
End: 2021-09-21
Payer: MEDICARE

## 2021-09-21 ENCOUNTER — ASOB RESULT (OUTPATIENT)
Age: 71
End: 2021-09-21

## 2021-09-21 PROCEDURE — 76830 TRANSVAGINAL US NON-OB: CPT

## 2021-09-24 ENCOUNTER — NON-APPOINTMENT (OUTPATIENT)
Age: 71
End: 2021-09-24

## 2021-09-30 ENCOUNTER — OUTPATIENT (OUTPATIENT)
Dept: OUTPATIENT SERVICES | Facility: HOSPITAL | Age: 71
LOS: 1 days | End: 2021-09-30
Payer: MEDICARE

## 2021-09-30 VITALS
SYSTOLIC BLOOD PRESSURE: 175 MMHG | TEMPERATURE: 98 F | HEIGHT: 60 IN | WEIGHT: 250 LBS | OXYGEN SATURATION: 98 % | DIASTOLIC BLOOD PRESSURE: 84 MMHG | RESPIRATION RATE: 16 BRPM | HEART RATE: 69 BPM

## 2021-09-30 DIAGNOSIS — I10 ESSENTIAL (PRIMARY) HYPERTENSION: ICD-10-CM

## 2021-09-30 DIAGNOSIS — Z98.49 CATARACT EXTRACTION STATUS, UNSPECIFIED EYE: Chronic | ICD-10-CM

## 2021-09-30 DIAGNOSIS — N39.46 MIXED INCONTINENCE: ICD-10-CM

## 2021-09-30 DIAGNOSIS — I86.8 VARICOSE VEINS OF OTHER SPECIFIED SITES: Chronic | ICD-10-CM

## 2021-09-30 DIAGNOSIS — R93.89 ABNORMAL FINDINGS ON DIAGNOSTIC IMAGING OF OTHER SPECIFIED BODY STRUCTURES: ICD-10-CM

## 2021-09-30 DIAGNOSIS — Z78.9 OTHER SPECIFIED HEALTH STATUS: Chronic | ICD-10-CM

## 2021-09-30 DIAGNOSIS — E11.9 TYPE 2 DIABETES MELLITUS WITHOUT COMPLICATIONS: ICD-10-CM

## 2021-09-30 DIAGNOSIS — E03.9 HYPOTHYROIDISM, UNSPECIFIED: ICD-10-CM

## 2021-09-30 LAB
A1C WITH ESTIMATED AVERAGE GLUCOSE RESULT: 7.4 % — HIGH (ref 4–5.6)
ANION GAP SERPL CALC-SCNC: 13 MMOL/L — SIGNIFICANT CHANGE UP (ref 7–14)
APPEARANCE UR: CLEAR — SIGNIFICANT CHANGE UP
BACTERIA # UR AUTO: ABNORMAL
BILIRUB UR-MCNC: NEGATIVE — SIGNIFICANT CHANGE UP
BLD GP AB SCN SERPL QL: NEGATIVE — SIGNIFICANT CHANGE UP
BUN SERPL-MCNC: 14 MG/DL — SIGNIFICANT CHANGE UP (ref 7–23)
CALCIUM SERPL-MCNC: 9.3 MG/DL — SIGNIFICANT CHANGE UP (ref 8.4–10.5)
CHLORIDE SERPL-SCNC: 105 MMOL/L — SIGNIFICANT CHANGE UP (ref 98–107)
CO2 SERPL-SCNC: 25 MMOL/L — SIGNIFICANT CHANGE UP (ref 22–31)
COLOR SPEC: SIGNIFICANT CHANGE UP
CREAT SERPL-MCNC: 0.69 MG/DL — SIGNIFICANT CHANGE UP (ref 0.5–1.3)
DIFF PNL FLD: NEGATIVE — SIGNIFICANT CHANGE UP
EPI CELLS # UR: 1 /HPF — SIGNIFICANT CHANGE UP (ref 0–5)
ESTIMATED AVERAGE GLUCOSE: 166 — SIGNIFICANT CHANGE UP
GLUCOSE SERPL-MCNC: 99 MG/DL — SIGNIFICANT CHANGE UP (ref 70–99)
GLUCOSE UR QL: NEGATIVE — SIGNIFICANT CHANGE UP
HCT VFR BLD CALC: 36.8 % — SIGNIFICANT CHANGE UP (ref 34.5–45)
HGB BLD-MCNC: 11.7 G/DL — SIGNIFICANT CHANGE UP (ref 11.5–15.5)
KETONES UR-MCNC: NEGATIVE — SIGNIFICANT CHANGE UP
LEUKOCYTE ESTERASE UR-ACNC: ABNORMAL
MCHC RBC-ENTMCNC: 26.4 PG — LOW (ref 27–34)
MCHC RBC-ENTMCNC: 31.8 GM/DL — LOW (ref 32–36)
MCV RBC AUTO: 82.9 FL — SIGNIFICANT CHANGE UP (ref 80–100)
NITRITE UR-MCNC: NEGATIVE — SIGNIFICANT CHANGE UP
NRBC # BLD: 0 /100 WBCS — SIGNIFICANT CHANGE UP
NRBC # FLD: 0 K/UL — SIGNIFICANT CHANGE UP
PH UR: 6.5 — SIGNIFICANT CHANGE UP (ref 5–8)
PLATELET # BLD AUTO: 337 K/UL — SIGNIFICANT CHANGE UP (ref 150–400)
POTASSIUM SERPL-MCNC: 4 MMOL/L — SIGNIFICANT CHANGE UP (ref 3.5–5.3)
POTASSIUM SERPL-SCNC: 4 MMOL/L — SIGNIFICANT CHANGE UP (ref 3.5–5.3)
PROT UR-MCNC: NEGATIVE — SIGNIFICANT CHANGE UP
RBC # BLD: 4.44 M/UL — SIGNIFICANT CHANGE UP (ref 3.8–5.2)
RBC # FLD: 13.7 % — SIGNIFICANT CHANGE UP (ref 10.3–14.5)
RBC CASTS # UR COMP ASSIST: 1 /HPF — SIGNIFICANT CHANGE UP (ref 0–4)
RH IG SCN BLD-IMP: POSITIVE — SIGNIFICANT CHANGE UP
SODIUM SERPL-SCNC: 143 MMOL/L — SIGNIFICANT CHANGE UP (ref 135–145)
SP GR SPEC: 1.01 — SIGNIFICANT CHANGE UP (ref 1–1.05)
UROBILINOGEN FLD QL: SIGNIFICANT CHANGE UP
WBC # BLD: 7.32 K/UL — SIGNIFICANT CHANGE UP (ref 3.8–10.5)
WBC # FLD AUTO: 7.32 K/UL — SIGNIFICANT CHANGE UP (ref 3.8–10.5)
WBC UR QL: 16 /HPF — HIGH (ref 0–5)

## 2021-09-30 PROCEDURE — 93010 ELECTROCARDIOGRAM REPORT: CPT

## 2021-09-30 RX ORDER — LOSARTAN/HYDROCHLOROTHIAZIDE 100MG-25MG
1 TABLET ORAL
Qty: 0 | Refills: 0 | DISCHARGE

## 2021-09-30 RX ORDER — DILTIAZEM HCL 120 MG
1 CAPSULE, EXT RELEASE 24 HR ORAL
Qty: 0 | Refills: 0 | DISCHARGE

## 2021-09-30 RX ORDER — RIVAROXABAN 15 MG-20MG
1 KIT ORAL
Qty: 0 | Refills: 0 | DISCHARGE

## 2021-09-30 RX ORDER — ASPIRIN/CALCIUM CARB/MAGNESIUM 324 MG
1 TABLET ORAL
Qty: 0 | Refills: 0 | DISCHARGE

## 2021-09-30 RX ORDER — ALPRAZOLAM 0.25 MG
0 TABLET ORAL
Qty: 0 | Refills: 0 | DISCHARGE

## 2021-09-30 RX ORDER — INSULIN LISPRO 100/ML
0 VIAL (ML) SUBCUTANEOUS
Qty: 0 | Refills: 0 | DISCHARGE

## 2021-09-30 NOTE — H&P PST ADULT - MUSCULOSKELETAL COMMENTS
Pt c/o of chronic lower back and cervical disc pain - uses cane for support with walking Diffuse lower back pain and decreased strength Pt c/o of chronic lower back and cervical disc pain and decreased ROM - uses cane for support with walking

## 2021-09-30 NOTE — H&P PST ADULT - ENDOCRINE COMMENTS
Hx Hypothyroid r/t thyroidectomy for nodule -- stable  on current dose - Hx DM /Insulin - pt 's and only takes Tresiba when she thinks she needs it

## 2021-09-30 NOTE — H&P PST ADULT - HISTORY OF PRESENT ILLNESS
Pt is a 71 yr old female scheduled for Robotic Assisted Total Laparoscopic Hysterectomy Bilateral Slapingo oophorectomy Cysto Poss Staging with Anisa tentatively 10/12/21 - pt hx thickened endometrium , pelvic and abdominal pain, occasional vaginal bleeding  and mixed  incontinence - pt states she has uterine fibroid, hiatal hernia. Hx HTN, Hypothyroid ( thyroidectomy for nodule) DM/ Insulin , - pt is poor historian   Pt denies COVID   Pt has had Pfizer vaccine   Patient instructed to contact surgeon's office concerning COVID test prior to surgery      Pt is a 71 yr old female scheduled for Robotic Assisted Total Laparoscopic Hysterectomy Bilateral Slapingo oophorectomy Cysto Poss Staging with Anisa tentatively 10/12/21 - pt hx thickened endometrium , pelvic and abdominal pain, occasional vaginal bleeding  and mixed  incontinence - pt states she has uterine fibroid, hiatal hernia. Hx HTN, Hypothyroid ( thyroidectomy for nodule) DM/ Insulin, afib in past, PAD,  , - pt is poor historian   Pt denies COVID   Pt has had Pfizer vaccine   Patient instructed to contact surgeon's office concerning COVID test prior to surgery      Pt is a 71 yr old morbidly obese  female scheduled for Robotic Assisted Total Laparoscopic Hysterectomy Bilateral Salpingo oophorectomy Cysto Poss Staging with Dr Lange tentatively 10/12/21 - pt hx thickened endometrium , pelvic and abdominal pain, occasional vaginal bleeding  and mixed  incontinence - pt states she has uterine fibroid, hiatal hernia. Hx HTN, Hypothyroid ( thyroidectomy for nodule) DM/ Insulin ( poor consistency with insulin use) , afib in past, PAD, - pt is poor historian   Pt denies COVID   Pt has had Pfizer vaccine   Patient instructed to contact surgeon's office concerning COVID test prior to surgery

## 2021-09-30 NOTE — H&P PST ADULT - NSICDXPASTMEDICALHX_GEN_ALL_CORE_FT
PAST MEDICAL HISTORY:  AF (atrial fibrillation) on Xarelto    Anxiety     CAD (coronary artery disease) minimal CAD per cath result in 2014    Depression     Diabetes     Former smoker     Gastritis     Hiatal hernia     HTN (hypertension)     Hypercholesterolemia     Hypothyroid     Incontinence     Morbid obesity     Murmur     Neuropathy     Palpitations     Thickened endometrium     Uterine cyst      PAST MEDICAL HISTORY:  AF (atrial fibrillation) on Xarelto    Anxiety     CAD (coronary artery disease) minimal CAD per cath result in 2014    Depression     Diabetes     Former smoker     Gastritis     Hiatal hernia     History of TIAs     HTN (hypertension)     Hypercholesterolemia     Hypothyroid     Incontinence     Loss of vision right eye    Morbid obesity     Murmur     Neuropathy     Palpitations     Thickened endometrium     Uterine cyst      PAST MEDICAL HISTORY:  AF (atrial fibrillation) on Xarelto    Anxiety     CAD (coronary artery disease) minimal CAD per cath result in 2014    Depression     Diabetes Insulin - pt is inconsistent with insulin dosing    Former smoker     Gastritis     Hiatal hernia     History of TIAs     HTN (hypertension)     Hypercholesterolemia     Hypothyroid     Incontinence     Loss of vision right eye    Morbid obesity     Murmur     Neuropathy     Palpitations     Thickened endometrium     Uterine cyst

## 2021-09-30 NOTE — H&P PST ADULT - NSICDXPASTSURGICALHX_GEN_ALL_CORE_FT
PAST SURGICAL HISTORY:  History of angioplasty of vein right leg 2016    S/P cataract extraction and insertion of intraocular lens bilateral > 15 years ago    Varicose vein with stripping b/l

## 2021-09-30 NOTE — H&P PST ADULT - CARDIOVASCULAR SYMPTOMS
Pt has B/L swelling LE - varicose veins/peripheral edema Pt has B/L swelling LE - varicose veins- vascular changes and swelling in LE/peripheral edema

## 2021-09-30 NOTE — H&P PST ADULT - GENITOURINARY COMMENTS
Pt c/o of Pt c/o of occasional vaginal bleeding with pelvic pain and thickened endometrium Pt c/o of occasional vaginal bleeding with pelvic pain and thickened endometrium - pt c/o of incontinence

## 2021-09-30 NOTE — H&P PST ADULT - NSICDXFAMILYHX_GEN_ALL_CORE_FT
FAMILY HISTORY:  Father  Still living? Unknown  Family history of hypertension, Age at diagnosis: Age Unknown    Mother  Still living? Unknown  Family history of breast cancer, Age at diagnosis: Age Unknown  Family history of stroke, Age at diagnosis: Age Unknown

## 2021-09-30 NOTE — H&P PST ADULT - PROBLEM SELECTOR PLAN 1
Pt scheduled for surgery for Robotic Assisted Total Laparscopic Hysterectomy BSO, Cysto with Dr Lange  and preop instructions including instructions for taking own GI protection  and for Chlorhexidine use in showering on the day of surgery, given verbally and with use of  written materials, and patient confirming understanding of such instructions using  teach back method.  Pt has seen PCP and Cardio and will request MC and CC from them - will request Echo report

## 2021-09-30 NOTE — H&P PST ADULT - PROBLEM SELECTOR PLAN 2
Pt told not to take Novolog am DOS  Pt to call PCP and request preop Tresiba instructions for night before surgery as pt does not have Endo at this time and only takes this insulin occasionally

## 2021-09-30 NOTE — H&P PST ADULT - MALLAMPATI CLASS
with phonation/Class III - visualization of the soft palate and the base of the uvula with phonation/Class IV (difficult) - the soft palate is not visible at all

## 2021-10-04 ENCOUNTER — NON-APPOINTMENT (OUTPATIENT)
Age: 71
End: 2021-10-04

## 2021-10-06 ENCOUNTER — APPOINTMENT (OUTPATIENT)
Dept: UROGYNECOLOGY | Facility: CLINIC | Age: 71
End: 2021-10-06

## 2021-10-09 ENCOUNTER — APPOINTMENT (OUTPATIENT)
Dept: DISASTER EMERGENCY | Facility: CLINIC | Age: 71
End: 2021-10-09

## 2021-10-09 DIAGNOSIS — Z01.818 ENCOUNTER FOR OTHER PREPROCEDURAL EXAMINATION: ICD-10-CM

## 2021-10-11 ENCOUNTER — NON-APPOINTMENT (OUTPATIENT)
Age: 71
End: 2021-10-11

## 2021-10-12 ENCOUNTER — APPOINTMENT (OUTPATIENT)
Dept: GYNECOLOGIC ONCOLOGY | Facility: HOSPITAL | Age: 71
End: 2021-10-12

## 2021-11-11 ENCOUNTER — FORM ENCOUNTER (OUTPATIENT)
Age: 71
End: 2021-11-11

## 2021-12-08 NOTE — PATIENT PROFILE ADULT. - NS PRO TALK SOMEONE YN
no Spironolactone Pregnancy And Lactation Text: This medication can cause feminization of the male fetus and should be avoided during pregnancy. The active metabolite is also found in breast milk.

## 2022-01-24 PROBLEM — R93.89 ABNORMAL FINDINGS ON DIAGNOSTIC IMAGING OF OTHER SPECIFIED BODY STRUCTURES: Chronic | Status: ACTIVE | Noted: 2021-09-30

## 2022-01-24 PROBLEM — Z86.73 PERSONAL HISTORY OF TRANSIENT ISCHEMIC ATTACK (TIA), AND CEREBRAL INFARCTION WITHOUT RESIDUAL DEFICITS: Chronic | Status: ACTIVE | Noted: 2021-09-30

## 2022-01-24 PROBLEM — H54.7 UNSPECIFIED VISUAL LOSS: Chronic | Status: ACTIVE | Noted: 2021-09-30

## 2022-01-24 PROBLEM — N85.8 OTHER SPECIFIED NONINFLAMMATORY DISORDERS OF UTERUS: Chronic | Status: ACTIVE | Noted: 2021-09-30

## 2022-01-24 PROBLEM — R32 UNSPECIFIED URINARY INCONTINENCE: Chronic | Status: ACTIVE | Noted: 2021-09-30

## 2022-02-14 ENCOUNTER — APPOINTMENT (OUTPATIENT)
Dept: GYNECOLOGIC ONCOLOGY | Facility: CLINIC | Age: 72
End: 2022-02-14

## 2022-02-22 ENCOUNTER — APPOINTMENT (OUTPATIENT)
Dept: GYNECOLOGIC ONCOLOGY | Facility: CLINIC | Age: 72
End: 2022-02-22
Payer: MEDICARE

## 2022-02-22 VITALS — HEART RATE: 87 BPM | DIASTOLIC BLOOD PRESSURE: 92 MMHG | HEIGHT: 59 IN | SYSTOLIC BLOOD PRESSURE: 181 MMHG

## 2022-02-22 PROCEDURE — 99215 OFFICE O/P EST HI 40 MIN: CPT

## 2022-02-22 RX ORDER — FLUCONAZOLE 150 MG/1
150 TABLET ORAL
Qty: 2 | Refills: 1 | Status: DISCONTINUED | COMMUNITY
Start: 2021-06-22 | End: 2022-02-22

## 2022-02-22 RX ORDER — NITROFURANTOIN (MONOHYDRATE/MACROCRYSTALS) 25; 75 MG/1; MG/1
100 CAPSULE ORAL
Qty: 10 | Refills: 0 | Status: DISCONTINUED | COMMUNITY
Start: 2021-08-23 | End: 2022-02-22

## 2022-02-22 RX ORDER — NITROGLYCERIN 0.4 MG/1
0.4 TABLET SUBLINGUAL
Refills: 0 | Status: DISCONTINUED | COMMUNITY
End: 2022-02-22

## 2022-02-22 RX ORDER — LEVOTHYROXINE SODIUM 0.07 MG/1
75 TABLET ORAL
Qty: 30 | Refills: 0 | Status: DISCONTINUED | COMMUNITY
Start: 2017-05-24 | End: 2022-02-22

## 2022-03-25 ENCOUNTER — NON-APPOINTMENT (OUTPATIENT)
Age: 72
End: 2022-03-25

## 2022-04-06 ENCOUNTER — APPOINTMENT (OUTPATIENT)
Dept: GYNECOLOGIC ONCOLOGY | Facility: CLINIC | Age: 72
End: 2022-04-06
Payer: MEDICARE

## 2022-04-06 VITALS
DIASTOLIC BLOOD PRESSURE: 119 MMHG | SYSTOLIC BLOOD PRESSURE: 203 MMHG | HEART RATE: 76 BPM | BODY MASS INDEX: 46.77 KG/M2 | HEIGHT: 59 IN | WEIGHT: 232 LBS

## 2022-04-06 DIAGNOSIS — Z09 ENCOUNTER FOR FOLLOW-UP EXAMINATION AFTER COMPLETED TREATMENT FOR CONDITIONS OTHER THAN MALIGNANT NEOPLASM: ICD-10-CM

## 2022-04-06 DIAGNOSIS — R93.89 ABNORMAL FINDINGS ON DIAGNOSTIC IMAGING OF OTHER SPECIFIED BODY STRUCTURES: ICD-10-CM

## 2022-04-06 DIAGNOSIS — N83.299 OTHER OVARIAN CYST, UNSPECIFIED SIDE: ICD-10-CM

## 2022-04-06 PROCEDURE — 99215 OFFICE O/P EST HI 40 MIN: CPT

## 2022-05-16 ENCOUNTER — APPOINTMENT (OUTPATIENT)
Dept: UROGYNECOLOGY | Facility: CLINIC | Age: 72
End: 2022-05-16

## 2022-06-08 ENCOUNTER — NON-APPOINTMENT (OUTPATIENT)
Age: 72
End: 2022-06-08

## 2022-06-14 ENCOUNTER — APPOINTMENT (OUTPATIENT)
Dept: GYNECOLOGIC ONCOLOGY | Facility: HOSPITAL | Age: 72
End: 2022-06-14

## 2022-06-29 ENCOUNTER — APPOINTMENT (OUTPATIENT)
Dept: GYNECOLOGIC ONCOLOGY | Facility: CLINIC | Age: 72
End: 2022-06-29

## 2022-06-29 ENCOUNTER — NON-APPOINTMENT (OUTPATIENT)
Age: 72
End: 2022-06-29

## 2023-01-05 NOTE — H&P PST ADULT - FUNCTIONAL LEVEL PRIOR: DRESSING
Labs no longer consistent with prediabetes. Cholesterol unremarkable.  Keep up the good work ! (0) independent

## 2023-01-25 NOTE — ED PROVIDER NOTE - DATA REVIEWED, MDM
Concerta Rx sent yesterday, increased to 54.  Intuniv Rx sent today.     Patient, Darrel Strange calling for medication refill. Medication(s) set up as pending orders from medication list.    Caller has been advised that their call does not guarantee an immediate refill. This refill will be reviewed within 72 hours by a qualified provider who will determine whether he or she can refill the medication.    Patient has contacted the pharmacy?  No    Additional information:     Patient’s preferred pharmacy has been noted and populated.    [id number] - [place], [place] -  [facility]   [facility] [address]  Phone: [phone] Fax: [phone]   Unable to fill per protocol as MD authorization required.  Last office visit - 08/25/17  Reason for visit- Pre-op  Medication Requested- Guanfacine, Concerta  Last refill date-07/27/18 with 0 refills  Next scheduled appointment-None    Please advise.  Script set to e-scribe with approval.       Admission vital signs/nurses notes

## 2023-02-07 NOTE — PATIENT PROFILE ADULT. - AS SC BRADEN ACTIVITY
Diagnosis (Required): Atopic Dermatitis/Eczematous Dermatitis Is Azathioprine Contraindicated?: No (2) chairfast Dupixent Dosing: 600 mg SC day 0 then 300 mg SC every other week Dupixent Monitoring Guidelines: There is no laboratory monitoring requirement with Dupixent. Detail Level: Zone Pregnancy And Lactation Warning Text: There have not been adverse fetal risks in women taking Dupixent while pregnant. It is unknown if this medication is excreted in breast milk.

## 2023-02-21 NOTE — ED ADULT NURSE NOTE - NEURO ASSESSMENT
February 21, 2023       Rossana Bruno, TICO  [address] [facility] [address]  Via In Basket      Patient: Chapin Carranza   YOB: 1988   Date of Visit: 2/21/2023       Dear Dr. Bruno:    I saw your patient, Chapin Carranza, for an evaluation. Below are my notes for this visit with her.    If you have questions, please do not hesitate to call me.      Sincerely,        Lexx Asher MD        CC: No Recipients  Lexx Asher MD  2/22/2023  5:43 PM  Signed    Gen Surg Office Consultation with Inoffice Procedure    HPI    Chapin is a 34 year old female who is known to me.  I have seen in the past for sebaceous cysts.  She now presents with a cyst on her vulva to the left of midline.  I brought her to our procedure room excised it today.  She tolerated the procedure well and will see me again in a couple weeks.    Past Medical History  History reviewed. No pertinent past medical history.     Surgical History  History reviewed. No pertinent surgical history.     Social History  Social History     Tobacco Use   • Smoking status: Every Day     Packs/day: 0.50     Types: Cigarettes   • Smokeless tobacco: Never   Vaping Use   • Vaping Use: never used   Substance Use Topics   • Alcohol use: Never   • Drug use: Yes     Frequency: 1.0 times per week     Types: Marijuana       Family History  Family History   Problem Relation Age of Onset   • Asthma Mother    • Cancer Mother    • Cancer, Lung Mother         Allergies  ALLERGIES:  Patient has no known allergies.    Medications  (Not in a hospital admission)      Review of Systems  Review of Systems   Constitutional: Negative.    HENT: Negative.    Eyes: Negative.    Respiratory: Negative.    Cardiovascular: Negative.    Gastrointestinal: Negative.    Endocrine: Negative.    Genitourinary: Negative.    Musculoskeletal: Negative.    Skin: Negative.    Allergic/Immunologic: Negative.    Neurological: Negative.    Hematological: Negative.     Psychiatric/Behavioral: Negative.    All other systems reviewed and are negative.       Physical Exam  Physical Exam  HENT:      Head: Normocephalic.   Eyes:      Pupils: Pupils are equal, round, and reactive to light.   Cardiovascular:      Rate and Rhythm: Normal rate and regular rhythm.   Pulmonary:      Breath sounds: Normal breath sounds.   Abdominal:      General: Bowel sounds are normal.      Palpations: Abdomen is soft.   Musculoskeletal:         General: Normal range of motion.      Cervical back: Neck supple.   Skin:     General: Skin is warm and dry.      Comments: 1.5 cm EIC on vulva--left of midline   Neurological:      Mental Status: She is alert and oriented to person, place, and time.         Procedure  Preoperative diagnosis:  EIC of vulva (1.5 cm)  Postoperative diagnosis:  Same  Procedure:  Excision of EIC vulva  Surgeon:  Lb  Anesthesia:  Local  Details of the Procedure: Patient was positioned supine.  She was prepped and draped.  The cyst was anesthetized with 2% lidocaine.  I then used a 6 mm dermatome and removed the cyst.  The cyst was acutely inflamed.       Assessment/Plan    EIC vulva--status post excision    Plan: Patient tolerated procedure well and follow-up in 2 to 3 weeks.    Risks, benefits, alternatives, expectations and preparations were discussed with the patient, who understands and agrees.   Medical compliance with plan discussed and risks of non-compliance reviewed.    Patient education completed on disease process, etiology & prognosis.    Patient expresses understanding of the plan.    Return to clinic as clinically indicated as discussed with patient who verbalized understanding of & agreement with the plan.      Signatures  Lexx Asher MD             WDL

## 2023-04-17 NOTE — H&P PST ADULT - NEGATIVE HEMATOLOGY SYMPTOMS
no nose bleeding/no gum bleeding Sski Pregnancy And Lactation Text: This medication is Pregnancy Category D and isn't considered safe during pregnancy. It is excreted in breast milk.

## 2023-05-01 ENCOUNTER — LABORATORY RESULT (OUTPATIENT)
Age: 73
End: 2023-05-01

## 2023-05-01 ENCOUNTER — APPOINTMENT (OUTPATIENT)
Dept: CARDIOLOGY | Facility: CLINIC | Age: 73
End: 2023-05-01
Payer: MEDICARE

## 2023-05-01 ENCOUNTER — NON-APPOINTMENT (OUTPATIENT)
Age: 73
End: 2023-05-01

## 2023-05-01 VITALS — OXYGEN SATURATION: 98 % | SYSTOLIC BLOOD PRESSURE: 140 MMHG | DIASTOLIC BLOOD PRESSURE: 100 MMHG | HEART RATE: 71 BPM

## 2023-05-01 VITALS — WEIGHT: 234 LBS | OXYGEN SATURATION: 97 % | HEART RATE: 78 BPM | BODY MASS INDEX: 47.26 KG/M2 | TEMPERATURE: 97.6 F

## 2023-05-01 DIAGNOSIS — E89.0 POSTPROCEDURAL HYPOTHYROIDISM: ICD-10-CM

## 2023-05-01 DIAGNOSIS — Z86.79 PERSONAL HISTORY OF OTHER DISEASES OF THE CIRCULATORY SYSTEM: ICD-10-CM

## 2023-05-01 DIAGNOSIS — Z86.39 PERSONAL HISTORY OF OTHER ENDOCRINE, NUTRITIONAL AND METABOLIC DISEASE: ICD-10-CM

## 2023-05-01 DIAGNOSIS — Z00.00 ENCOUNTER FOR GENERAL ADULT MEDICAL EXAMINATION W/OUT ABNORMAL FINDINGS: ICD-10-CM

## 2023-05-01 PROCEDURE — 99204 OFFICE O/P NEW MOD 45 MIN: CPT

## 2023-05-01 PROCEDURE — 93000 ELECTROCARDIOGRAM COMPLETE: CPT

## 2023-05-01 NOTE — REVIEW OF SYSTEMS
[Chest Discomfort] : chest discomfort [Joint Pain] : joint pain [Joint Swelling] : joint swelling [Joint Stiffness] : joint stiffness [Negative] : Heme/Lymph

## 2023-05-02 NOTE — HISTORY OF PRESENT ILLNESS
[FreeTextEntry1] : BRENNEN VALDEZ  is a 72 year F w/ pmhx of DM, HTN, HLD, Depression, Anxiety, [place], Spinal stenosis, legally blind (1 eye), AFib, Arthritis, who presents today to establish care. Pt states today that she is feeling weakness in b/l LE, and intermittent chest pain that the pt reports is a 7/10 that lasts for about 5 minutes w/ palpitations. Pt also endorses new b/l LE leg swelling.\par \par Of note pt was cleared for epidural  in 2018? and stopped xarelto and sotolol, and never resumed.\par About 2 years ago pt states that she had a "stroke" in her eye. \par \par  Otherwise the patient denies fever, chills, sore throat, loss of taste or smell, muscle aches weight loss, malaise, rash, recent travel, insect bites, alteration bowel habits, headaches, weakness, abdominal  pain, bloating, changes in urination, visual disturbances, shortness of breath, dizziness, palpitations.\par \par The patient also presents for continued care of diabetes mellitus. Patient is following the diabetic regimen. Patient is tolerating hypoglycemic agents and following the diet. Patient denies any visual changes, blood in the urine, abdominal pain, nausea, vomiting, myalgias, arthralgias, paresthesia’s and syncope. The patient denies any chest discomfort, shortness of breath or new neurologic signs or symptoms. Patient denies any polyuria, worsening nocturia, or polydipsia.\par \par The patient here for evaluation of high blood pressure. Patient is currently tolerating the current antihypertensive regime and they deny headaches, stiff neck, visual changes, or PND. The patient has been trying to stay on a low-sodium diet.\par \par The patient is here for follow-up of elevated cholesterol. Patient is currently tolerating medication and denies muscle pain, joint pain, back pain,  urinary changes , nausea, vomiting, abdominal pain or diarrhea. The patient is trying to follow a low cholesterol diet.\par \par \par \par \par

## 2023-05-05 DIAGNOSIS — R31.9 HEMATURIA, UNSPECIFIED: ICD-10-CM

## 2023-05-05 LAB
25(OH)D3 SERPL-MCNC: 27.8 NG/ML
ALBUMIN SERPL ELPH-MCNC: 4.3 G/DL
ALDOSTERONE SERUM: 4.2 NG/DL
ALP BLD-CCNC: 76 U/L
ALT SERPL-CCNC: 14 U/L
ANION GAP SERPL CALC-SCNC: 15 MMOL/L
APPEARANCE: CLEAR
AST SERPL-CCNC: 16 U/L
BACTERIA: NEGATIVE /HPF
BASOPHILS # BLD AUTO: 0.07 K/UL
BASOPHILS NFR BLD AUTO: 0.8 %
BILIRUB DIRECT SERPL-MCNC: 0.2 MG/DL
BILIRUB INDIRECT SERPL-MCNC: 0.7 MG/DL
BILIRUB SERPL-MCNC: 0.9 MG/DL
BILIRUBIN URINE: NEGATIVE
BLOOD URINE: ABNORMAL
BUN SERPL-MCNC: 14 MG/DL
CA-I SERPL-SCNC: 5.1 MG/DL
CALCIUM SERPL-MCNC: 9.9 MG/DL
CALCIUM SERPL-MCNC: 9.9 MG/DL
CAST: 2 /LPF
CHLORIDE SERPL-SCNC: 108 MMOL/L
CHOLEST SERPL-MCNC: 247 MG/DL
CK SERPL-CCNC: 100 U/L
CO2 SERPL-SCNC: 24 MMOL/L
COLOR: YELLOW
CREAT SERPL-MCNC: 0.69 MG/DL
CREAT SPEC-SCNC: 30 MG/DL
EGFR: 91 ML/MIN/1.73M2
EOSINOPHIL # BLD AUTO: 0.21 K/UL
EOSINOPHIL NFR BLD AUTO: 2.5 %
EPITHELIAL CELLS: 4 /HPF
ERYTHROCYTE [SEDIMENTATION RATE] IN BLOOD BY WESTERGREN METHOD: 30 MM/HR
ESTIMATED AVERAGE GLUCOSE: 180 MG/DL
GLUCOSE QUALITATIVE U: NEGATIVE MG/DL
GLUCOSE SERPL-MCNC: 132 MG/DL
HBA1C MFR BLD HPLC: 7.9 %
HCT VFR BLD CALC: 40.4 %
HDLC SERPL-MCNC: 53 MG/DL
HGB BLD-MCNC: 13 G/DL
IMM GRANULOCYTES NFR BLD AUTO: 0.5 %
KETONES URINE: NEGATIVE MG/DL
LDLC SERPL CALC-MCNC: 156 MG/DL
LEUKOCYTE ESTERASE URINE: ABNORMAL
LYMPHOCYTES # BLD AUTO: 2.76 K/UL
LYMPHOCYTES NFR BLD AUTO: 33 %
MAGNESIUM SERPL-MCNC: 1.9 MG/DL
MAN DIFF?: NORMAL
MCHC RBC-ENTMCNC: 27.8 PG
MCHC RBC-ENTMCNC: 32.2 GM/DL
MCV RBC AUTO: 86.5 FL
METANEPHRINE, PL: 10.9 PG/ML
MICROALBUMIN 24H UR DL<=1MG/L-MCNC: 5 MG/DL
MICROALBUMIN/CREAT 24H UR-RTO: 169 MG/G
MICROSCOPIC-UA: NORMAL
MONOCYTES # BLD AUTO: 0.39 K/UL
MONOCYTES NFR BLD AUTO: 4.7 %
NEUTROPHILS # BLD AUTO: 4.9 K/UL
NEUTROPHILS NFR BLD AUTO: 58.5 %
NITRITE URINE: NEGATIVE
NONHDLC SERPL-MCNC: 194 MG/DL
NORMETANEPHRINE, PL: 96.5 PG/ML
NT-PROBNP SERPL-MCNC: 347 PG/ML
OSMOLALITY SERPL: 307 MOSMOL/KG
PARATHYROID HORMONE INTACT: 36 PG/ML
PH URINE: 6.5
PHOSPHATE SERPL-MCNC: 3.2 MG/DL
PLATELET # BLD AUTO: 250 K/UL
POTASSIUM SERPL-SCNC: 4.7 MMOL/L
PROT SERPL-MCNC: 7.5 G/DL
PROTEIN URINE: NORMAL MG/DL
RBC # BLD: 4.67 M/UL
RBC # FLD: 12.9 %
RED BLOOD CELLS URINE: 1 /HPF
RENIN PLASMA: 2.5 PG/ML
REVIEW: NORMAL
SODIUM SERPL-SCNC: 147 MMOL/L
SPECIFIC GRAVITY URINE: 1.01
T3RU NFR SERPL: 0.9 TBI
T4 FREE SERPL-MCNC: 1.7 NG/DL
T4 SERPL-MCNC: 10.8 UG/DL
TRIGL SERPL-MCNC: 191 MG/DL
TSH SERPL-ACNC: 1.98 UIU/ML
URATE SERPL-MCNC: 3.5 MG/DL
UROBILINOGEN URINE: 0.2 MG/DL
WBC # FLD AUTO: 8.37 K/UL
WHITE BLOOD CELLS URINE: 45 /HPF

## 2023-05-08 ENCOUNTER — APPOINTMENT (OUTPATIENT)
Dept: UROLOGY | Facility: CLINIC | Age: 73
End: 2023-05-08
Payer: MEDICARE

## 2023-05-08 VITALS
OXYGEN SATURATION: 69 % | HEART RATE: 69 BPM | RESPIRATION RATE: 14 BRPM | TEMPERATURE: 96 F | SYSTOLIC BLOOD PRESSURE: 164 MMHG | HEIGHT: 59 IN | BODY MASS INDEX: 46.37 KG/M2 | DIASTOLIC BLOOD PRESSURE: 84 MMHG | WEIGHT: 230 LBS

## 2023-05-08 DIAGNOSIS — N39.41 URGE INCONTINENCE: ICD-10-CM

## 2023-05-08 DIAGNOSIS — R82.81 PYURIA: ICD-10-CM

## 2023-05-08 LAB
CORTICOSTEROID BIND GLOBULIN: 1.8 MG/DL
CORTIS SERPL-MCNC: 6.5 UG/DL
CORTISOL, FREE: 0.36 UG/DL
PFCX: 5.5 %

## 2023-05-08 PROCEDURE — 99204 OFFICE O/P NEW MOD 45 MIN: CPT

## 2023-05-09 NOTE — HISTORY OF PRESENT ILLNESS
[FreeTextEntry1] : 72 year old F with cc of microscopic hematuria and pyuria. Pt was seen by cardiologist for new pt eval and found to have blood in the urine. Review of UA most impressive for pyuria. UA states moderate blood but only 1 RBC on micro. WBC was 45 however.  Has long hx of DM and a1c was 7.9.  Denies gross hematuria.  No hx of stones. Unclear issues with UTIs. Prior tobacco hx, quit 40y ago, 5 pack years. No family hx of  malignancy. Mom with hx of breast ca. \par \par At baseline, she has bothersome urgency and frequency. She wears depends for leakage. She reports skin bother for the leakage. She will use 10 pullups in a day. She wakes up 3-4 times overnight. She is going every 1-2h if she curbs her fluid intake. She has issues with constipation. Has had difficulty in managing her constipation. Has had bothersome urinary issues for over 3y. never tried a bladder medicine. \par \par Review of UDS shows detrusor instability. Never saw Dr Logan again.

## 2023-05-09 NOTE — ASSESSMENT
[FreeTextEntry1] : Suspect OAB but may have UTI making sx worse. Had UDS in the past that showed overactivity. Pt with a lot of issues with constipation that makes OAB meds worrisome for exacerbation. \par --UA, UCx. will call pt with results\par --To GI for management of constipation. \par --Trial of myrbetriq

## 2023-05-09 NOTE — PHYSICAL EXAM
[General Appearance - Well Developed] : well developed [General Appearance - Well Nourished] : well nourished [General Appearance - In No Acute Distress] : no acute distress [Exaggerated Use Of Accessory Muscles For Inspiration] : no accessory muscle use [Abdomen Soft] : soft [Abdomen Tenderness] : non-tender [Costovertebral Angle Tenderness] : no ~M costovertebral angle tenderness [Oriented To Time, Place, And Person] : oriented to person, place, and time [FreeTextEntry1] : R eye vision issues

## 2023-05-11 ENCOUNTER — NON-APPOINTMENT (OUTPATIENT)
Age: 73
End: 2023-05-11

## 2023-05-11 LAB
APPEARANCE: CLEAR
BACTERIA UR CULT: NORMAL
BACTERIA: ABNORMAL /HPF
BILIRUBIN URINE: NEGATIVE
BLOOD URINE: ABNORMAL
CAST: 0 /LPF
COLOR: YELLOW
EPITHELIAL CELLS: 4 /HPF
GLUCOSE QUALITATIVE U: 100 MG/DL
KETONES URINE: NEGATIVE MG/DL
LEUKOCYTE ESTERASE URINE: ABNORMAL
MICROSCOPIC-UA: NORMAL
NITRITE URINE: NEGATIVE
PH URINE: 7
PROTEIN URINE: NEGATIVE MG/DL
RED BLOOD CELLS URINE: 1 /HPF
SPECIFIC GRAVITY URINE: 1.01
UROBILINOGEN URINE: 0.2 MG/DL
WHITE BLOOD CELLS URINE: 2 /HPF

## 2023-05-19 NOTE — ED ADULT NURSE NOTE - CAS DISCH ACCOMP BY
05.18.23 at 1322, Per Ligia MOORE, message forwarded to PSR Team to schedule, \"Please contact patient and schedule with one of the NP providers, please use nurse only spot.\"   Spouse

## 2023-05-23 ENCOUNTER — NON-APPOINTMENT (OUTPATIENT)
Age: 73
End: 2023-05-23

## 2023-05-23 PROCEDURE — 93241 XTRNL ECG REC>48HR<7D: CPT

## 2023-05-30 ENCOUNTER — APPOINTMENT (OUTPATIENT)
Dept: CARDIOLOGY | Facility: CLINIC | Age: 73
End: 2023-05-30

## 2023-05-30 ENCOUNTER — APPOINTMENT (OUTPATIENT)
Dept: OBGYN | Facility: CLINIC | Age: 73
End: 2023-05-30
Payer: MEDICARE

## 2023-05-30 ENCOUNTER — ASOB RESULT (OUTPATIENT)
Age: 73
End: 2023-05-30

## 2023-05-30 PROCEDURE — 76857 US EXAM PELVIC LIMITED: CPT

## 2023-05-30 PROCEDURE — 76830 TRANSVAGINAL US NON-OB: CPT

## 2023-06-14 ENCOUNTER — OUTPATIENT (OUTPATIENT)
Dept: OUTPATIENT SERVICES | Facility: HOSPITAL | Age: 73
LOS: 1 days | End: 2023-06-14
Payer: MEDICARE

## 2023-06-14 ENCOUNTER — APPOINTMENT (OUTPATIENT)
Dept: GYNECOLOGIC ONCOLOGY | Facility: CLINIC | Age: 73
End: 2023-06-14
Payer: MEDICARE

## 2023-06-14 ENCOUNTER — APPOINTMENT (OUTPATIENT)
Dept: CT IMAGING | Facility: HOSPITAL | Age: 73
End: 2023-06-14
Payer: MEDICARE

## 2023-06-14 ENCOUNTER — FORM ENCOUNTER (OUTPATIENT)
Age: 73
End: 2023-06-14

## 2023-06-14 VITALS
SYSTOLIC BLOOD PRESSURE: 165 MMHG | WEIGHT: 236 LBS | BODY MASS INDEX: 47.58 KG/M2 | HEIGHT: 59 IN | DIASTOLIC BLOOD PRESSURE: 110 MMHG

## 2023-06-14 DIAGNOSIS — C55 MALIGNANT NEOPLASM OF UTERUS, PART UNSPECIFIED: ICD-10-CM

## 2023-06-14 DIAGNOSIS — R33.9 RETENTION OF URINE, UNSPECIFIED: ICD-10-CM

## 2023-06-14 DIAGNOSIS — Z98.49 CATARACT EXTRACTION STATUS, UNSPECIFIED EYE: Chronic | ICD-10-CM

## 2023-06-14 DIAGNOSIS — I86.8 VARICOSE VEINS OF OTHER SPECIFIED SITES: Chronic | ICD-10-CM

## 2023-06-14 DIAGNOSIS — R30.9 PAINFUL MICTURITION, UNSPECIFIED: ICD-10-CM

## 2023-06-14 DIAGNOSIS — Z78.9 OTHER SPECIFIED HEALTH STATUS: Chronic | ICD-10-CM

## 2023-06-14 DIAGNOSIS — R39.13 SPLITTING OF URINARY STREAM: ICD-10-CM

## 2023-06-14 DIAGNOSIS — E78.00 PURE HYPERCHOLESTEROLEMIA, UNSPECIFIED: ICD-10-CM

## 2023-06-14 DIAGNOSIS — C54.1 MALIGNANT NEOPLASM OF ENDOMETRIUM: ICD-10-CM

## 2023-06-14 DIAGNOSIS — N81.10 CYSTOCELE, UNSPECIFIED: ICD-10-CM

## 2023-06-14 DIAGNOSIS — K59.00 CONSTIPATION, UNSPECIFIED: ICD-10-CM

## 2023-06-14 PROBLEM — R30.0 DYSURIA: Status: ACTIVE | Noted: 2023-06-14

## 2023-06-14 PROBLEM — R35.0 URINARY FREQUENCY: Status: ACTIVE | Noted: 2021-06-22

## 2023-06-14 PROBLEM — R10.2 PELVIC PAIN: Status: ACTIVE | Noted: 2023-06-14

## 2023-06-14 PROBLEM — Z82.49 FAMILY HISTORY OF HYPERTENSION: Status: ACTIVE | Noted: 2023-06-14

## 2023-06-14 PROBLEM — R35.1 NOCTURIA: Status: ACTIVE | Noted: 2021-08-20

## 2023-06-14 PROBLEM — R39.15 URINARY URGENCY: Status: ACTIVE | Noted: 2021-08-20

## 2023-06-14 PROBLEM — R39.89 BLADDER PAIN: Status: ACTIVE | Noted: 2023-06-14

## 2023-06-14 PROCEDURE — 74177 CT ABD & PELVIS W/CONTRAST: CPT | Mod: MH

## 2023-06-14 PROCEDURE — 99215 OFFICE O/P EST HI 40 MIN: CPT

## 2023-06-14 PROCEDURE — 74177 CT ABD & PELVIS W/CONTRAST: CPT | Mod: 26,MH

## 2023-06-15 ENCOUNTER — NON-APPOINTMENT (OUTPATIENT)
Age: 73
End: 2023-06-15

## 2023-06-15 ENCOUNTER — APPOINTMENT (OUTPATIENT)
Age: 73
End: 2023-06-15
Payer: MEDICARE

## 2023-06-15 ENCOUNTER — RESULT CHARGE (OUTPATIENT)
Age: 73
End: 2023-06-15

## 2023-06-15 ENCOUNTER — APPOINTMENT (OUTPATIENT)
Dept: CARDIOLOGY | Facility: CLINIC | Age: 73
End: 2023-06-15
Payer: MEDICARE

## 2023-06-15 VITALS
HEIGHT: 59 IN | HEART RATE: 71 BPM | OXYGEN SATURATION: 96 % | TEMPERATURE: 97.8 F | WEIGHT: 236 LBS | DIASTOLIC BLOOD PRESSURE: 90 MMHG | SYSTOLIC BLOOD PRESSURE: 170 MMHG | BODY MASS INDEX: 47.58 KG/M2

## 2023-06-15 DIAGNOSIS — R35.0 FREQUENCY OF MICTURITION: ICD-10-CM

## 2023-06-15 DIAGNOSIS — Z78.9 OTHER SPECIFIED HEALTH STATUS: ICD-10-CM

## 2023-06-15 DIAGNOSIS — Z82.49 FAMILY HISTORY OF ISCHEMIC HEART DISEASE AND OTHER DISEASES OF THE CIRCULATORY SYSTEM: ICD-10-CM

## 2023-06-15 DIAGNOSIS — R39.15 URGENCY OF URINATION: ICD-10-CM

## 2023-06-15 DIAGNOSIS — R39.89 OTHER SYMPTOMS AND SIGNS INVOLVING THE GENITOURINARY SYSTEM: ICD-10-CM

## 2023-06-15 DIAGNOSIS — R30.0 DYSURIA: ICD-10-CM

## 2023-06-15 DIAGNOSIS — R10.2 PELVIC AND PERINEAL PAIN: ICD-10-CM

## 2023-06-15 DIAGNOSIS — R35.1 NOCTURIA: ICD-10-CM

## 2023-06-15 LAB
BILIRUB UR QL STRIP: NEGATIVE
CLARITY UR: CLEAR
COLLECTION METHOD: NORMAL
GLUCOSE UR-MCNC: 100
HCG UR QL: 0.2 EU/DL
HGB UR QL STRIP.AUTO: NORMAL
KETONES UR-MCNC: NEGATIVE
LEUKOCYTE ESTERASE UR QL STRIP: NORMAL
NITRITE UR QL STRIP: NEGATIVE
PH UR STRIP: 7
PROT UR STRIP-MCNC: NEGATIVE
SP GR UR STRIP: 1.01

## 2023-06-15 PROCEDURE — 93000 ELECTROCARDIOGRAM COMPLETE: CPT

## 2023-06-15 PROCEDURE — 51701 INSERT BLADDER CATHETER: CPT | Mod: 59

## 2023-06-15 PROCEDURE — 81003 URINALYSIS AUTO W/O SCOPE: CPT | Mod: QW

## 2023-06-15 PROCEDURE — 99214 OFFICE O/P EST MOD 30 MIN: CPT | Mod: 25

## 2023-06-15 PROCEDURE — 99214 OFFICE O/P EST MOD 30 MIN: CPT

## 2023-06-15 RX ORDER — HYDROCHLOROTHIAZIDE 25 MG/1
25 TABLET ORAL
Refills: 0 | Status: DISCONTINUED | COMMUNITY
Start: 2023-05-01 | End: 2023-06-15

## 2023-06-15 NOTE — PHYSICAL EXAM
[Chaperone Present] : A chaperone was present in the examining room during all aspects of the physical examination [No Acute Distress] : in no acute distress [Well developed] : well developed [Well Nourished] : ~L well nourished [Oriented x3] : oriented to person, place, and time [Normal Memory] : ~T memory was ~L unimpaired [Normal Mood/Affect] : mood and affect are normal [No Edema] : ~T edema was not present [None] : no CVA tenderness [Warm and Dry] : was warm and dry to touch [Normal Gait] : gait was normal [Labia Majora] : were normal [Labia Minora] : were normal [Normal Appearance] : general appearance was normal [Atrophy] : atrophy [No Bleeding] : there was no active vaginal bleeding [2] : 2 [Normal] : no abnormalities [Post Void Residual ____ml] : post void residual was [unfilled] ml [Cough] : no cough [Tenderness] : ~T no ~M abdominal tenderness observed [Distended] : not distended [Inguinal LAD] : no adenopathy was noted in the inguinal lymph nodes [Aa ____] : Aa [unfilled] [Ba ____] : Ba [unfilled] [C ____] : C [unfilled] [GH ____] : GH [unfilled] [PB ____] : PB [unfilled] [TVL ____] : TVL  [unfilled] [Ap ____] : Ap [unfilled] [Bp ____] : Bp [unfilled] [D ____] : D [unfilled] [FreeTextEntry3] : negative CST

## 2023-06-15 NOTE — DISCUSSION/SUMMARY
[FreeTextEntry1] : \par VIrginia has LULU, planning hysterectomy with GYN/ONC. Negative CST, no POP, normal PVR.\par \par Previously UDS with DO, no МАРИЯ\par \par 1. LULU: We discussed possible etiologies of her symptoms including both stress urinary incontinence and overactive bladder. We reviewed management options for both conditions. I recommend further workup of her urinary symptoms with urodynamic testing. We reviewed behavioral modifications and bladder training. Written and verbal instructions  were provided to her as well. She will return to my office for urodynamics and followup with me to discuss results and management options further.\par \par Specifically regarding combined case discussed мария options,  We discussed management options including observation, pelvic floor exercises with or without physical therapy, pessary, impressa, medications including imipramine and surgical management including urethral bulking agents, fascial sling, joel and midurethral sling with mesh.\par \par 2. POP: rectocele on exam, symptomatic with difficulty evacuating, discussed observation/PFE/pessary/posterior repair\par \par [] UDS\par gemtesa trial\par post repair with GYN/ONC \par

## 2023-06-15 NOTE — OB HISTORY
[Total Preg ___] : : [unfilled] [Vaginal ___] : [unfilled] vaginal delivery(s) [unknown] : the patient is unsure of the date of her LMP [Last Pap Smear ___] : date of last pap smear was on [unfilled] [Abnormal Pap Smear] : normal pap smear [Taking Estrogens] : is not taking estrogen replacement [Sexually Active] : is not sexually active

## 2023-06-15 NOTE — REASON FOR VISIT
[Urinary Incontinence] : urinary incontinence [Urine Frequency] : urine frequency [Urinary Urgency] : urinary urgency [Other: ___] : [unfilled] [Initial Visit ___] : an initial visit for [unfilled] [Questionnaire Received] : Patient questionnaire received [Intake Form Reviewed] : Patient intake form with past medical history, surgical history, family history and social history reviewed today

## 2023-06-15 NOTE — HISTORY OF PRESENT ILLNESS
[Cystocele (Obstetric)] : no [Uterine Prolapse] : no [Vaginal Wall Prolapse] : mild [Unable To Restrain Bowel Movement] : severe [Urinary Frequency] : no [Feelings Of Urinary Urgency] : severe [x3+] : nocturia three or more  times a night [Urinary Tract Infection] : severe [] : no [Pelvic Pain] : no [Vaginal Pain] : no [Rectal Prolapse] : no [Constipation Obstructed Defecation] : moderate [FreeTextEntry1] : \par Virginia has a history of endometrial cancer and is planning to have surgery with ONC .She has a long history of OAB complaints and was seen by Dr. Evans she was also seen by urology with urgency/frequency and MH., she reports constipation, difficulty evacuating stool, she reports that she does not feel a bulge but notices it is hard to have a BM, pt dificulty to get history from and daughter is present\par \par \par UDS with Dr. Evans 2021: DO at 50cc\par refilled with large leak\par negative CST at 179cc\par \par u/a and cx reviewed- 1 RBC\par pelvic sono gram reviewed  \par UDS by Dr. Evans reviewed

## 2023-06-16 LAB
ALBUMIN SERPL ELPH-MCNC: 4.2 G/DL
ALP BLD-CCNC: 84 U/L
ALT SERPL-CCNC: 15 U/L
ANION GAP SERPL CALC-SCNC: 14 MMOL/L
AST SERPL-CCNC: 17 U/L
BILIRUB DIRECT SERPL-MCNC: 0.2 MG/DL
BILIRUB INDIRECT SERPL-MCNC: 0.7 MG/DL
BILIRUB SERPL-MCNC: 0.9 MG/DL
BUN SERPL-MCNC: 10 MG/DL
CALCIUM SERPL-MCNC: 9.4 MG/DL
CHLORIDE SERPL-SCNC: 104 MMOL/L
CHOLEST SERPL-MCNC: 252 MG/DL
CK SERPL-CCNC: 78 U/L
CO2 SERPL-SCNC: 26 MMOL/L
CREAT SERPL-MCNC: 0.71 MG/DL
EGFR: 89 ML/MIN/1.73M2
ESTIMATED AVERAGE GLUCOSE: 189 MG/DL
GLUCOSE SERPL-MCNC: 208 MG/DL
HBA1C MFR BLD HPLC: 8.2 %
HDLC SERPL-MCNC: 54 MG/DL
LDLC SERPL CALC-MCNC: 163 MG/DL
LDLC SERPL DIRECT ASSAY-MCNC: 165 MG/DL
NONHDLC SERPL-MCNC: 199 MG/DL
POTASSIUM SERPL-SCNC: 4.3 MMOL/L
PROT SERPL-MCNC: 7.1 G/DL
SODIUM SERPL-SCNC: 144 MMOL/L
TRIGL SERPL-MCNC: 177 MG/DL

## 2023-06-19 LAB
APPEARANCE: CLEAR
BACTERIA UR CULT: ABNORMAL
BACTERIA: NEGATIVE /HPF
BILIRUBIN URINE: NEGATIVE
BLOOD URINE: NEGATIVE
CAST: 0 /LPF
COLOR: YELLOW
EPITHELIAL CELLS: 0 /HPF
GLUCOSE QUALITATIVE U: 100 MG/DL
KETONES URINE: NEGATIVE MG/DL
LEUKOCYTE ESTERASE URINE: ABNORMAL
MICROSCOPIC-UA: NORMAL
NITRITE URINE: POSITIVE
PH URINE: 7
PROTEIN URINE: NEGATIVE MG/DL
RED BLOOD CELLS URINE: 0 /HPF
REVIEW: NORMAL
SPECIFIC GRAVITY URINE: 1.01
UROBILINOGEN URINE: 1 MG/DL
WHITE BLOOD CELLS URINE: 17 /HPF

## 2023-06-19 NOTE — HISTORY OF PRESENT ILLNESS
[FreeTextEntry1] : 73 yo female with pmh of DM, HTN, HLD, Depression, Anxiety, [place], Spinal stenosis, legally blind (1 eye), AFib, Arthritis, who presents today for medical clearance. \par \par I was asked to see this delightful patient today for Pre-op Evaluation  prior to  total laparoscopic hysterectomy with bilateral salpingo oophorectomy, sentinel lymph node mapping with   Dr. Champion at fax number   _______  .  The patient denies fever, cough, wheezing, sputum production, hemoptysis, dyspnea, congestion, diarrhea, constipation, nausea, vomiting, bright red blood per rectum, melena, angina, chest pain, palpitations, diaphoresis, PND, incontinence, frequency, urgency, dysuria, edema, joint pain, headache, weakness, numbness and dizziness. The date of the planned procedure is: TBD\par \par The patient presents for evaluation of high blood pressure. Patient is currently tolerating the current  antihypertensive regime and they deny headaches, stiff neck, visual changes, pedal Edema or PND. They also are here for follow-up of elevated cholesterol. Patient is currently tolerating medication and denies muscle pain, joint pain, back pain, tea colored urine ,nausea, vomiting, abdominal pain or diarrhea. The patient is trying to follow a low cholesterol diet.\par \par Pt states she pmh of afib, recent extended holter shows no afib, frequent atrial ectopy as well as 2 VE runs. Toprol 25mg was started ta last visit. \par \par Pt with newly diagnoses endometrial CA

## 2023-06-21 ENCOUNTER — APPOINTMENT (OUTPATIENT)
Dept: CT IMAGING | Facility: CLINIC | Age: 73
End: 2023-06-21
Payer: MEDICARE

## 2023-06-21 PROCEDURE — 75574 CT ANGIO HRT W/3D IMAGE: CPT

## 2023-06-22 PROBLEM — R33.9 UNABLE TO EMPTY BLADDER: Status: ACTIVE | Noted: 2023-06-15

## 2023-06-22 PROBLEM — R30.9 PAIN WITH URINATION: Status: ACTIVE | Noted: 2023-06-15

## 2023-06-22 PROBLEM — N81.10 FEMALE BLADDER PROLAPSE: Status: ACTIVE | Noted: 2023-06-15

## 2023-06-22 PROBLEM — C55 UTERINE CANCER: Status: ACTIVE | Noted: 2023-06-15

## 2023-06-22 PROBLEM — E78.00 HIGH CHOLESTEROL: Status: ACTIVE | Noted: 2023-06-15

## 2023-06-22 PROBLEM — R39.13 INTERMITTENT URINARY STREAM: Status: ACTIVE | Noted: 2023-06-15

## 2023-06-22 PROBLEM — K59.00 CONSTIPATION: Status: ACTIVE | Noted: 2023-06-15

## 2023-06-22 NOTE — END OF VISIT
[FreeTextEntry3] : This note was written by Amanda Colvin, acting as a scribe for Dr. Cheryl Champion.\par This note accurately reflects the work and decisions made by me.\par

## 2023-06-22 NOTE — HISTORY OF PRESENT ILLNESS
[FreeTextEntry1] : 71yo  [place]  referred by Dr. Rebolledo, presents to office to discuss newly diagnosed endometrial cancer. She is a former patient of mine, last seen on 21, for evaluation of persistent right ovarian cyst, thick endometrium, normal . She was referred to Dr. Amarilys Lange due to my transition in practice location. She was advised to have TRH, BSO and met with Dr. Fitzpatrick in  but never proceeded with surgery. Patient reports she was fearful and unsure if procedure was truly needed. She admits to new onset vaginal bleeding in May of 2023. She described the bleeding as heavy like a period and is still experiencing intermittent bleeding that varies in amount. She also endorses midline pelvic pain over the last month as well. She has been experiencing urinary incontinence for a while now and wears depends daily. She saw Dr. Rebolledo for evaluation and had EMB performed, which revealed adenocarcinoma, IHC stains in progress. Denies unintentional weight loss, change in bladder or bowel habits, hematochezia. \par \par EMB 23: Adenocarcinoma, IHC stains in progress.\par \par Pelvic US-23-AV uterus 8.04 x 5.2 x 5.4cm, 18.88mm lining, ovaries not clearly visualized, previous right sided mass was not seen. \par \par Health maintenance-\par Pap smear- 2021- WNL\par Mammo- 3 years ago\par Colonoscopy- 3-4 years\par DEXA- Years ago\par

## 2023-06-22 NOTE — REVIEW OF SYSTEMS
[Negative] : Musculoskeletal [Abn Vag Bleeding] : abnormal vaginal bleeding [Incontinence] : incontinence [de-identified] : pelvic pain

## 2023-06-22 NOTE — PHYSICAL EXAM
[Chaperone Present] : A chaperone was present in the examining room during all aspects of the physical examination [Abnormal] : Examination of breasts: Abnormal [Normal] : Recto-Vaginal Exam: Normal [FreeTextEntry1] : Name of chaperone: Amanda Colvin PA-C. [de-identified] : varicose veins noted [de-identified] : suprapubic tenderness [de-identified] : 1cm raised polyp, superior aspect of left breast, with hyperpigmentation centrally [de-identified] : mild rectocele; no nodularities [de-identified] : Of note, rectovaginal examination was limited due to patient's discomfort.

## 2023-06-22 NOTE — CHIEF COMPLAINT
[Other: _____] : [unfilled] [FreeTextEntry1] : [facility] \par [address] \[place] [address]\par \par Endometrial cancer

## 2023-06-22 NOTE — ASSESSMENT
[FreeTextEntry1] : 73 yo female with history of ovarian cysts, now with pelvic pain and new diagnosis of endometrial cancer.\par \par I discussed at length with the patient the nature, purpose, risks, benefits, and alternatives of pelvic examination under anesthesia, robotic assisted total laparoscopic hysterectomy with bilateral salpingo-oophorectomy, sentinel lymph node mapping with node biopsies.  The patient understands the risks to include (but not be limited to) bowel injury, bleeding (with the possible need for transfusion), bladder or ureteral injury, infections, deep venous thrombosis, and damian-operative death.  The patient also understands that her surgery may not be able to be performed laparoscopically and that she may need a laparotomy (either vertical midline or pfannensteil).  She also understands the limitations of laparoscopic surgery and the possibility of missing a surgical complication with need for subsequent re-exploration.  She agrees to proceed.  She asked numerous questions which were answered to her satisfaction. She also understands the rationale for a possible cystoscopy at the completion of the procedure and the potential risks of cystoscopy.\par \par All questions and concerns were answered to patient's apparent satisfaction.\par

## 2023-06-26 ENCOUNTER — APPOINTMENT (OUTPATIENT)
Dept: UROGYNECOLOGY | Facility: CLINIC | Age: 73
End: 2023-06-26
Payer: MEDICARE

## 2023-06-26 PROCEDURE — 51729 CYSTOMETROGRAM W/VP&UP: CPT

## 2023-06-26 PROCEDURE — 51741 ELECTRO-UROFLOWMETRY FIRST: CPT

## 2023-06-26 PROCEDURE — 51784 ANAL/URINARY MUSCLE STUDY: CPT

## 2023-06-26 PROCEDURE — 51797 INTRAABDOMINAL PRESSURE TEST: CPT

## 2023-06-27 ENCOUNTER — APPOINTMENT (OUTPATIENT)
Dept: CARDIOLOGY | Facility: CLINIC | Age: 73
End: 2023-06-27
Payer: MEDICARE

## 2023-06-27 VITALS
OXYGEN SATURATION: 96 % | HEART RATE: 74 BPM | DIASTOLIC BLOOD PRESSURE: 90 MMHG | SYSTOLIC BLOOD PRESSURE: 125 MMHG | TEMPERATURE: 98.6 F

## 2023-06-27 DIAGNOSIS — Z01.810 ENCOUNTER FOR PREPROCEDURAL CARDIOVASCULAR EXAMINATION: ICD-10-CM

## 2023-06-27 PROCEDURE — 93306 TTE W/DOPPLER COMPLETE: CPT

## 2023-06-27 PROCEDURE — 99214 OFFICE O/P EST MOD 30 MIN: CPT

## 2023-07-05 ENCOUNTER — APPOINTMENT (OUTPATIENT)
Dept: UROGYNECOLOGY | Facility: CLINIC | Age: 73
End: 2023-07-05

## 2023-07-06 ENCOUNTER — RESULT REVIEW (OUTPATIENT)
Age: 73
End: 2023-07-06

## 2023-07-06 ENCOUNTER — OUTPATIENT (OUTPATIENT)
Dept: OUTPATIENT SERVICES | Facility: HOSPITAL | Age: 73
LOS: 1 days | End: 2023-07-06
Payer: MEDICARE

## 2023-07-06 ENCOUNTER — NON-APPOINTMENT (OUTPATIENT)
Age: 73
End: 2023-07-06

## 2023-07-06 DIAGNOSIS — I86.8 VARICOSE VEINS OF OTHER SPECIFIED SITES: Chronic | ICD-10-CM

## 2023-07-06 DIAGNOSIS — C54.1 MALIGNANT NEOPLASM OF ENDOMETRIUM: ICD-10-CM

## 2023-07-06 DIAGNOSIS — Z98.49 CATARACT EXTRACTION STATUS, UNSPECIFIED EYE: Chronic | ICD-10-CM

## 2023-07-06 DIAGNOSIS — Z78.9 OTHER SPECIFIED HEALTH STATUS: Chronic | ICD-10-CM

## 2023-07-06 LAB — SURGICAL PATHOLOGY STUDY: SIGNIFICANT CHANGE UP

## 2023-07-06 PROCEDURE — 88321 CONSLTJ&REPRT SLD PREP ELSWR: CPT

## 2023-07-11 ENCOUNTER — NON-APPOINTMENT (OUTPATIENT)
Age: 73
End: 2023-07-11

## 2023-07-19 ENCOUNTER — OUTPATIENT (OUTPATIENT)
Dept: OUTPATIENT SERVICES | Facility: HOSPITAL | Age: 73
LOS: 1 days | End: 2023-07-19
Payer: MEDICARE

## 2023-07-19 VITALS
WEIGHT: 233.91 LBS | DIASTOLIC BLOOD PRESSURE: 62 MMHG | HEIGHT: 60.05 IN | RESPIRATION RATE: 16 BRPM | TEMPERATURE: 97 F | SYSTOLIC BLOOD PRESSURE: 149 MMHG | OXYGEN SATURATION: 100 % | HEART RATE: 64 BPM

## 2023-07-19 DIAGNOSIS — Z98.49 CATARACT EXTRACTION STATUS, UNSPECIFIED EYE: Chronic | ICD-10-CM

## 2023-07-19 DIAGNOSIS — Z01.818 ENCOUNTER FOR OTHER PREPROCEDURAL EXAMINATION: ICD-10-CM

## 2023-07-19 DIAGNOSIS — Z78.9 OTHER SPECIFIED HEALTH STATUS: Chronic | ICD-10-CM

## 2023-07-19 DIAGNOSIS — I86.8 VARICOSE VEINS OF OTHER SPECIFIED SITES: Chronic | ICD-10-CM

## 2023-07-19 DIAGNOSIS — E89.0 POSTPROCEDURAL HYPOTHYROIDISM: Chronic | ICD-10-CM

## 2023-07-19 PROBLEM — Z01.810 PREOP CARDIOVASCULAR EXAM: Status: ACTIVE | Noted: 2023-06-15

## 2023-07-19 LAB
A1C WITH ESTIMATED AVERAGE GLUCOSE RESULT: 8.2 % — HIGH (ref 4–5.6)
ABO RH CONFIRMATION: SIGNIFICANT CHANGE UP
ANION GAP SERPL CALC-SCNC: 4 MMOL/L — LOW (ref 5–17)
APPEARANCE UR: ABNORMAL
APTT BLD: 34.1 SEC — SIGNIFICANT CHANGE UP (ref 27.5–35.5)
BACTERIA # UR AUTO: ABNORMAL
BASOPHILS # BLD AUTO: 0.07 K/UL — SIGNIFICANT CHANGE UP (ref 0–0.2)
BASOPHILS NFR BLD AUTO: 0.8 % — SIGNIFICANT CHANGE UP (ref 0–2)
BILIRUB UR-MCNC: NEGATIVE — SIGNIFICANT CHANGE UP
BLD GP AB SCN SERPL QL: SIGNIFICANT CHANGE UP
BUN SERPL-MCNC: 13 MG/DL — SIGNIFICANT CHANGE UP (ref 7–23)
CALCIUM SERPL-MCNC: 9.5 MG/DL — SIGNIFICANT CHANGE UP (ref 8.5–10.1)
CHLORIDE SERPL-SCNC: 108 MMOL/L — SIGNIFICANT CHANGE UP (ref 96–108)
CO2 SERPL-SCNC: 30 MMOL/L — SIGNIFICANT CHANGE UP (ref 22–31)
COLOR SPEC: YELLOW — SIGNIFICANT CHANGE UP
CREAT SERPL-MCNC: 0.79 MG/DL — SIGNIFICANT CHANGE UP (ref 0.5–1.3)
DIFF PNL FLD: ABNORMAL
EGFR: 78 ML/MIN/1.73M2 — SIGNIFICANT CHANGE UP
EOSINOPHIL # BLD AUTO: 0.27 K/UL — SIGNIFICANT CHANGE UP (ref 0–0.5)
EOSINOPHIL NFR BLD AUTO: 3.1 % — SIGNIFICANT CHANGE UP (ref 0–6)
EPI CELLS # UR: SIGNIFICANT CHANGE UP
ESTIMATED AVERAGE GLUCOSE: 189 MG/DL — HIGH (ref 68–114)
GLUCOSE SERPL-MCNC: 112 MG/DL — HIGH (ref 70–99)
GLUCOSE UR QL: NEGATIVE — SIGNIFICANT CHANGE UP
HCT VFR BLD CALC: 38.6 % — SIGNIFICANT CHANGE UP (ref 34.5–45)
HGB BLD-MCNC: 12.7 G/DL — SIGNIFICANT CHANGE UP (ref 11.5–15.5)
IMM GRANULOCYTES NFR BLD AUTO: 0.7 % — SIGNIFICANT CHANGE UP (ref 0–0.9)
INR BLD: 1.03 RATIO — SIGNIFICANT CHANGE UP (ref 0.88–1.16)
KETONES UR-MCNC: NEGATIVE — SIGNIFICANT CHANGE UP
LEUKOCYTE ESTERASE UR-ACNC: ABNORMAL
LYMPHOCYTES # BLD AUTO: 2.38 K/UL — SIGNIFICANT CHANGE UP (ref 1–3.3)
LYMPHOCYTES # BLD AUTO: 27.5 % — SIGNIFICANT CHANGE UP (ref 13–44)
MAGNESIUM SERPL-MCNC: 2 MG/DL — SIGNIFICANT CHANGE UP (ref 1.6–2.6)
MCHC RBC-ENTMCNC: 28.3 PG — SIGNIFICANT CHANGE UP (ref 27–34)
MCHC RBC-ENTMCNC: 32.9 GM/DL — SIGNIFICANT CHANGE UP (ref 32–36)
MCV RBC AUTO: 86 FL — SIGNIFICANT CHANGE UP (ref 80–100)
MONOCYTES # BLD AUTO: 0.5 K/UL — SIGNIFICANT CHANGE UP (ref 0–0.9)
MONOCYTES NFR BLD AUTO: 5.8 % — SIGNIFICANT CHANGE UP (ref 2–14)
NEUTROPHILS # BLD AUTO: 5.39 K/UL — SIGNIFICANT CHANGE UP (ref 1.8–7.4)
NEUTROPHILS NFR BLD AUTO: 62.1 % — SIGNIFICANT CHANGE UP (ref 43–77)
NITRITE UR-MCNC: NEGATIVE — SIGNIFICANT CHANGE UP
PH UR: 7 — SIGNIFICANT CHANGE UP (ref 5–8)
PHOSPHATE SERPL-MCNC: 3.6 MG/DL — SIGNIFICANT CHANGE UP (ref 2.5–4.5)
PLATELET # BLD AUTO: 265 K/UL — SIGNIFICANT CHANGE UP (ref 150–400)
POTASSIUM SERPL-MCNC: 4.1 MMOL/L — SIGNIFICANT CHANGE UP (ref 3.5–5.3)
POTASSIUM SERPL-SCNC: 4.1 MMOL/L — SIGNIFICANT CHANGE UP (ref 3.5–5.3)
PROT UR-MCNC: SIGNIFICANT CHANGE UP MG/DL
PROTHROM AB SERPL-ACNC: 12 SEC — SIGNIFICANT CHANGE UP (ref 10.5–13.4)
RBC # BLD: 4.49 M/UL — SIGNIFICANT CHANGE UP (ref 3.8–5.2)
RBC # FLD: 12.8 % — SIGNIFICANT CHANGE UP (ref 10.3–14.5)
RBC CASTS # UR COMP ASSIST: SIGNIFICANT CHANGE UP /HPF (ref 0–4)
SODIUM SERPL-SCNC: 142 MMOL/L — SIGNIFICANT CHANGE UP (ref 135–145)
SP GR SPEC: 1.01 — SIGNIFICANT CHANGE UP (ref 1.01–1.02)
UROBILINOGEN FLD QL: NEGATIVE — SIGNIFICANT CHANGE UP
WBC # BLD: 8.67 K/UL — SIGNIFICANT CHANGE UP (ref 3.8–10.5)
WBC # FLD AUTO: 8.67 K/UL — SIGNIFICANT CHANGE UP (ref 3.8–10.5)
WBC UR QL: ABNORMAL /HPF (ref 0–5)

## 2023-07-19 PROCEDURE — 85610 PROTHROMBIN TIME: CPT

## 2023-07-19 PROCEDURE — 85025 COMPLETE CBC W/AUTO DIFF WBC: CPT

## 2023-07-19 PROCEDURE — 83735 ASSAY OF MAGNESIUM: CPT

## 2023-07-19 PROCEDURE — 86901 BLOOD TYPING SEROLOGIC RH(D): CPT

## 2023-07-19 PROCEDURE — 87077 CULTURE AEROBIC IDENTIFY: CPT

## 2023-07-19 PROCEDURE — 80048 BASIC METABOLIC PNL TOTAL CA: CPT

## 2023-07-19 PROCEDURE — 85730 THROMBOPLASTIN TIME PARTIAL: CPT

## 2023-07-19 PROCEDURE — 84100 ASSAY OF PHOSPHORUS: CPT

## 2023-07-19 PROCEDURE — 86900 BLOOD TYPING SEROLOGIC ABO: CPT

## 2023-07-19 PROCEDURE — 87186 SC STD MICRODIL/AGAR DIL: CPT

## 2023-07-19 PROCEDURE — 81001 URINALYSIS AUTO W/SCOPE: CPT

## 2023-07-19 PROCEDURE — 99214 OFFICE O/P EST MOD 30 MIN: CPT | Mod: 25

## 2023-07-19 PROCEDURE — 87086 URINE CULTURE/COLONY COUNT: CPT

## 2023-07-19 PROCEDURE — 86850 RBC ANTIBODY SCREEN: CPT

## 2023-07-19 PROCEDURE — 83036 HEMOGLOBIN GLYCOSYLATED A1C: CPT

## 2023-07-19 PROCEDURE — 36415 COLL VENOUS BLD VENIPUNCTURE: CPT

## 2023-07-19 RX ORDER — LEVOTHYROXINE SODIUM 125 MCG
1 TABLET ORAL
Qty: 0 | Refills: 0 | DISCHARGE

## 2023-07-19 RX ORDER — HYDROCHLOROTHIAZIDE 25 MG
1 TABLET ORAL
Qty: 0 | Refills: 0 | DISCHARGE

## 2023-07-19 RX ORDER — ALPRAZOLAM 0.25 MG
1 TABLET ORAL
Qty: 0 | Refills: 0 | DISCHARGE

## 2023-07-19 RX ORDER — INSULIN DEGLUDEC 100 U/ML
15 INJECTION, SOLUTION SUBCUTANEOUS
Qty: 0 | Refills: 0 | DISCHARGE

## 2023-07-19 RX ORDER — DILTIAZEM HCL 120 MG
1 CAPSULE, EXT RELEASE 24 HR ORAL
Qty: 0 | Refills: 0 | DISCHARGE

## 2023-07-19 RX ORDER — INSULIN ASPART 100 [IU]/ML
0 INJECTION, SOLUTION SUBCUTANEOUS
Qty: 0 | Refills: 0 | DISCHARGE

## 2023-07-19 RX ORDER — LOSARTAN POTASSIUM 100 MG/1
1 TABLET, FILM COATED ORAL
Qty: 0 | Refills: 0 | DISCHARGE

## 2023-07-19 NOTE — H&P PST ADULT - HISTORY OF PRESENT ILLNESS
Pt is a 71 yr old morbidly obese  female scheduled for Robotic Assisted Total Laparoscopic Hysterectomy Bilateral Salpingo oophorectomy Cysto Poss Staging with Dr Lange tentatively 10/12/21 - pt hx thickened endometrium , pelvic and abdominal pain, occasional vaginal bleeding  and mixed  incontinence - pt states she has uterine fibroid, hiatal hernia. Hx HTN, Hypothyroid ( thyroidectomy for nodule) DM/ Insulin ( poor consistency with insulin use) , afib in past, PAD, - pt is poor historian   Pt denies COVID   Pt has had Pfizer vaccine   Patient instructed to contact surgeon's office concerning COVID test prior to surgery      73 year old female diagnosed with uterine cancer; c/o of post menopausal bleeding and abdominal pain; work up done; She presents to [facility] for planned pelvic exam under anesthesia robotic assited total laparoscopic hysterectomy bilateral salpingo-oophorectomy  73 year old female diagnosed with uterine cancer; c/o of post menopausal bleeding and abdominal pain; work up done; She presents to [facility] for planned pelvic exam under anesthesia robotic assisted total laparoscopic hysterectomy bilateral salpingo-oophorectomy, sentinel lymph node mapping with lymph node biopsies, cystoscopy.

## 2023-07-19 NOTE — H&P PST ADULT - ASSESSMENT
Plan:  1. PST instructions given ; NPO status/  instructions to be given by ASU   2. Pt instructed to take following meds on day of surgery:   3. Pt instructed to take routine evening medications unless indicated   4. Stop NSAIDS ( Aspirin Alev Motrin Mobic Diclofenac), herbal supplements , MVI , Vitamin fish oil 7 days prior to surgery  unless   directed by surgeon or cardiologist;   5. Medical Optimization  with    6. EZ wash instructions given & mupirocin instructions given  7. Labs EKG CXR as per surgeon request   8. Pt denies covid symptoms shortness of breath fever cough      73 year old female diagnosed with uterine cancer; c/o of post menopausal bleeding and abdominal pain; work up done; She presents to PST for planned pelvic exam under anesthesia robotic assited total laparoscopic hysterectomy bilateral salpingo-oophorectomy       Plan:  1. PST instructions given ; NPO status/  instructions to be given by ASU   2. Pt instructed to take following meds on day of surgery: metoprolol amlodipine levothyroxine pantoprazole if taking xanax DOS instructed pt to notify anesthesia   3. Pt instructed to take routine evening medications unless indicated   4. Stop NSAIDS ( Aspirin Alev Motrin Mobic Diclofenac), herbal supplements , MVI , Vitamin fish oil 7 days prior to surgery  unless   directed by surgeon or cardiologist;   5. Medical & Cardiac  Optimization  with Dr Blandon   6. EZ wash instructions given   7. Labs EKG  as per surgeon request   8. Pt denies covid symptoms shortness of breath fever cough   9. OR booking notified Latex allergy spoke with Bev

## 2023-07-19 NOTE — H&P PST ADULT - CARDIOVASCULAR
details… regular rate and rhythm/S1 S2 present/no murmur regular rate and rhythm/S1 S2 present/no murmur/peripheral edema

## 2023-07-19 NOTE — H&P PST ADULT - OTHER CARE PROVIDERS
Dr Porras [phone] Cardio none Dr Dominguez Fontaine [phone] cardio Dr Miles nicolas [phone]

## 2023-07-19 NOTE — H&P PST ADULT - ATTENDING COMMENTS
Patient seen in presurgical holding area with her daughter Alea present for the informed consent discussion.  R/B/A were reviewed & understood; consent is signed & witnessed in the chart.

## 2023-07-19 NOTE — H&P PST ADULT - NSICDXPASTSURGICALHX_GEN_ALL_CORE_FT
PAST SURGICAL HISTORY:  History of angioplasty of vein right leg 2016    S/P cataract extraction and insertion of intraocular lens bilateral > 15 years ago    Varicose vein with stripping b/l     PAST SURGICAL HISTORY:  H/O thyroidectomy     History of angioplasty of vein right leg 2016    S/P cataract extraction and insertion of intraocular lens bilateral > 15 years ago    Varicose vein with stripping b/l

## 2023-07-19 NOTE — HISTORY OF PRESENT ILLNESS
[FreeTextEntry1] : 73 yo female with pmh of DM, HTN, HLD, Depression, Anxiety, [place], Spinal stenosis, legally blind (1 eye), AFib, Arthritis, who presents today for medical clearance. \par \par I was asked to see this delightful patient today for Pre-op Evaluation  prior to  total laparoscopic hysterectomy with bilateral salpingo oophorectomy, sentinel lymph node mapping with   Dr. Champion at fax number   _______  .  The patient denies fever, cough, wheezing, sputum production, hemoptysis, dyspnea, congestion, diarrhea, constipation, nausea, vomiting, bright red blood per rectum, melena, angina, chest pain, palpitations, diaphoresis, PND, incontinence, frequency, urgency, dysuria, edema, joint pain, headache, weakness, numbness and dizziness. The date of the planned procedure is: July 2023\par \par The patient presents for evaluation of high blood pressure. Patient is currently tolerating the current  antihypertensive regime and they deny headaches, stiff neck, visual changes, pedal Edema or PND. They also are here for follow-up of elevated cholesterol. Patient is currently tolerating medication and denies muscle pain, joint pain, back pain, tea colored urine ,nausea, vomiting, abdominal pain or diarrhea. The patient is trying to follow a low cholesterol diet.\par \par Pt states she pmh of afib, recent extended holter shows no afib, frequent atrial ectopy as well as 2 VE runs. Toprol 25mg was started ta last visit. \par \par Pt with newly diagnoses endometrial CA

## 2023-07-19 NOTE — H&P PST ADULT - NSICDXPASTMEDICALHX_GEN_ALL_CORE_FT
PAST MEDICAL HISTORY:  AF (atrial fibrillation) on Xarelto    Anxiety     CAD (coronary artery disease) minimal CAD per cath result in 2014    Depression     Diabetes Insulin - pt is inconsistent with insulin dosing    Former smoker     Gastritis     Hiatal hernia     History of TIAs     HTN (hypertension)     Hypercholesterolemia     Hypothyroid     Incontinence     Loss of vision right eye    Morbid obesity     Murmur     Neuropathy     Palpitations     Thickened endometrium     Uterine cyst      PAST MEDICAL HISTORY:  AF (atrial fibrillation) on Xarelto    Anxiety     Blind right eye     CAD (coronary artery disease) minimal CAD per cath result in 2014    Constipation     Depression     Edema of lower extremity     Former smoker     Gastritis     Hiatal hernia     History of TIAs     [place] (hard of hearing)     HTN (hypertension)     Hypercholesterolemia     Hypothyroid     Incontinence     Loss of vision right eye    Morbid obesity     Murmur     Neuropathy     OAB (overactive bladder)     Palpitations     Risk factors for obstructive sleep apnea     Spinal stenosis     Thickened endometrium     Thyroid nodule     Type 2 diabetes mellitus     Uterine cancer     Uterine cyst     Varicose veins      PAST MEDICAL HISTORY:  AF (atrial fibrillation) on Xarelto    Anxiety     Blind right eye     CAD (coronary artery disease) minimal CAD per cath result in 2014    Constipation     Depression     Edema of lower extremity     Former smoker     Gastritis     H/O Helicobacter infection     Hiatal hernia     History of TIAs     [place] (hard of hearing)     HTN (hypertension)     Hypercholesterolemia     Hypothyroid     Incontinence     Loss of vision right eye    Morbid obesity     Murmur     Neuropathy     OAB (overactive bladder)     Palpitations     Risk factors for obstructive sleep apnea     Spinal stenosis     Thickened endometrium     Thyroid nodule     Type 2 diabetes mellitus     Uterine cancer     Uterine cyst     Varicose veins

## 2023-07-20 DIAGNOSIS — Z01.818 ENCOUNTER FOR OTHER PREPROCEDURAL EXAMINATION: ICD-10-CM

## 2023-07-20 NOTE — CHART NOTE - NSCHARTNOTEFT_GEN_A_CORE
HGB AIC 8.2% from blood work drawn in PST 7/19/2023. Diagnosis of uterine cancer. Dr. Fuentes aware. Surgical coordinator made aware of results.

## 2023-07-23 LAB
-  AMIKACIN: SIGNIFICANT CHANGE UP
-  AMOXICILLIN/CLAVULANIC ACID: SIGNIFICANT CHANGE UP
-  AMPICILLIN/SULBACTAM: SIGNIFICANT CHANGE UP
-  AMPICILLIN: SIGNIFICANT CHANGE UP
-  AZTREONAM: SIGNIFICANT CHANGE UP
-  CEFAZOLIN: SIGNIFICANT CHANGE UP
-  CEFEPIME: SIGNIFICANT CHANGE UP
-  CEFOXITIN: SIGNIFICANT CHANGE UP
-  CEFTRIAXONE: SIGNIFICANT CHANGE UP
-  CEFUROXIME: SIGNIFICANT CHANGE UP
-  CIPROFLOXACIN: SIGNIFICANT CHANGE UP
-  ERTAPENEM: SIGNIFICANT CHANGE UP
-  GENTAMICIN: SIGNIFICANT CHANGE UP
-  LEVOFLOXACIN: SIGNIFICANT CHANGE UP
-  MEROPENEM: SIGNIFICANT CHANGE UP
-  NITROFURANTOIN: SIGNIFICANT CHANGE UP
-  PIPERACILLIN/TAZOBACTAM: SIGNIFICANT CHANGE UP
-  TOBRAMYCIN: SIGNIFICANT CHANGE UP
-  TRIMETHOPRIM/SULFAMETHOXAZOLE: SIGNIFICANT CHANGE UP
CULTURE RESULTS: SIGNIFICANT CHANGE UP
METHOD TYPE: SIGNIFICANT CHANGE UP
ORGANISM # SPEC MICROSCOPIC CNT: SIGNIFICANT CHANGE UP
ORGANISM # SPEC MICROSCOPIC CNT: SIGNIFICANT CHANGE UP
SPECIMEN SOURCE: SIGNIFICANT CHANGE UP

## 2023-07-27 ENCOUNTER — OUTPATIENT (OUTPATIENT)
Dept: OUTPATIENT SERVICES | Facility: HOSPITAL | Age: 73
LOS: 1 days | Discharge: STILL ADMITTED OR ADMINISTRATIVE | End: 2023-07-27
Payer: MEDICARE

## 2023-07-27 DIAGNOSIS — I86.8 VARICOSE VEINS OF OTHER SPECIFIED SITES: Chronic | ICD-10-CM

## 2023-07-27 DIAGNOSIS — Z98.49 CATARACT EXTRACTION STATUS, UNSPECIFIED EYE: Chronic | ICD-10-CM

## 2023-07-27 DIAGNOSIS — Z78.9 OTHER SPECIFIED HEALTH STATUS: Chronic | ICD-10-CM

## 2023-07-27 DIAGNOSIS — E89.0 POSTPROCEDURAL HYPOTHYROIDISM: Chronic | ICD-10-CM

## 2023-07-27 PROBLEM — Z86.19 PERSONAL HISTORY OF OTHER INFECTIOUS AND PARASITIC DISEASES: Chronic | Status: ACTIVE | Noted: 2023-07-19

## 2023-07-27 PROBLEM — H91.90 UNSPECIFIED HEARING LOSS, UNSPECIFIED EAR: Chronic | Status: ACTIVE | Noted: 2023-07-19

## 2023-07-27 PROBLEM — E11.9 TYPE 2 DIABETES MELLITUS WITHOUT COMPLICATIONS: Chronic | Status: ACTIVE | Noted: 2023-07-19

## 2023-07-27 PROBLEM — E04.1 NONTOXIC SINGLE THYROID NODULE: Chronic | Status: ACTIVE | Noted: 2023-07-19

## 2023-07-27 PROBLEM — N32.81 OVERACTIVE BLADDER: Chronic | Status: ACTIVE | Noted: 2023-07-19

## 2023-07-27 PROBLEM — H54.40 BLINDNESS, ONE EYE, UNSPECIFIED EYE: Chronic | Status: ACTIVE | Noted: 2023-07-19

## 2023-07-27 PROBLEM — R60.0 LOCALIZED EDEMA: Chronic | Status: ACTIVE | Noted: 2023-07-19

## 2023-07-27 PROBLEM — Z91.89 OTHER SPECIFIED PERSONAL RISK FACTORS, NOT ELSEWHERE CLASSIFIED: Chronic | Status: ACTIVE | Noted: 2023-07-19

## 2023-07-27 PROBLEM — C55 MALIGNANT NEOPLASM OF UTERUS, PART UNSPECIFIED: Chronic | Status: ACTIVE | Noted: 2023-07-19

## 2023-07-27 PROBLEM — M48.00 SPINAL STENOSIS, SITE UNSPECIFIED: Chronic | Status: ACTIVE | Noted: 2023-07-19

## 2023-07-27 PROBLEM — I83.90 ASYMPTOMATIC VARICOSE VEINS OF UNSPECIFIED LOWER EXTREMITY: Chronic | Status: ACTIVE | Noted: 2023-07-19

## 2023-07-27 PROBLEM — K59.00 CONSTIPATION, UNSPECIFIED: Chronic | Status: ACTIVE | Noted: 2023-07-19

## 2023-07-27 PROCEDURE — 90839 PSYTX CRISIS INITIAL 60 MIN: CPT | Mod: 95

## 2023-07-28 ENCOUNTER — TRANSCRIPTION ENCOUNTER (OUTPATIENT)
Age: 73
End: 2023-07-28

## 2023-07-28 ENCOUNTER — OUTPATIENT (OUTPATIENT)
Dept: INPATIENT UNIT | Facility: HOSPITAL | Age: 73
LOS: 1 days | Discharge: HOME | End: 2023-07-28
Payer: MEDICARE

## 2023-07-28 ENCOUNTER — APPOINTMENT (OUTPATIENT)
Dept: UROGYNECOLOGY | Facility: HOSPITAL | Age: 73
End: 2023-07-28

## 2023-07-28 ENCOUNTER — RESULT REVIEW (OUTPATIENT)
Age: 73
End: 2023-07-28

## 2023-07-28 VITALS
OXYGEN SATURATION: 99 % | WEIGHT: 233.91 LBS | DIASTOLIC BLOOD PRESSURE: 84 MMHG | SYSTOLIC BLOOD PRESSURE: 174 MMHG | RESPIRATION RATE: 20 BRPM | HEIGHT: 60 IN | TEMPERATURE: 98 F | HEART RATE: 72 BPM

## 2023-07-28 DIAGNOSIS — C56.9 MALIGNANT NEOPLASM OF UNSPECIFIED OVARY: ICD-10-CM

## 2023-07-28 DIAGNOSIS — E89.0 POSTPROCEDURAL HYPOTHYROIDISM: Chronic | ICD-10-CM

## 2023-07-28 DIAGNOSIS — Z78.9 OTHER SPECIFIED HEALTH STATUS: Chronic | ICD-10-CM

## 2023-07-28 DIAGNOSIS — C54.1 MALIGNANT NEOPLASM OF ENDOMETRIUM: ICD-10-CM

## 2023-07-28 DIAGNOSIS — R32 UNSPECIFIED URINARY INCONTINENCE: ICD-10-CM

## 2023-07-28 DIAGNOSIS — Z98.49 CATARACT EXTRACTION STATUS, UNSPECIFIED EYE: Chronic | ICD-10-CM

## 2023-07-28 DIAGNOSIS — N83.8 OTHER NONINFLAMMATORY DISORDERS OF OVARY, FALLOPIAN TUBE AND BROAD LIGAMENT: ICD-10-CM

## 2023-07-28 DIAGNOSIS — E11.9 TYPE 2 DIABETES MELLITUS WITHOUT COMPLICATIONS: ICD-10-CM

## 2023-07-28 DIAGNOSIS — I48.91 UNSPECIFIED ATRIAL FIBRILLATION: ICD-10-CM

## 2023-07-28 DIAGNOSIS — R39.15 URGENCY OF URINATION: ICD-10-CM

## 2023-07-28 DIAGNOSIS — Z87.891 PERSONAL HISTORY OF NICOTINE DEPENDENCE: ICD-10-CM

## 2023-07-28 DIAGNOSIS — E78.00 PURE HYPERCHOLESTEROLEMIA, UNSPECIFIED: ICD-10-CM

## 2023-07-28 DIAGNOSIS — I25.10 ATHEROSCLEROTIC HEART DISEASE OF NATIVE CORONARY ARTERY WITHOUT ANGINA PECTORIS: ICD-10-CM

## 2023-07-28 DIAGNOSIS — I10 ESSENTIAL (PRIMARY) HYPERTENSION: ICD-10-CM

## 2023-07-28 DIAGNOSIS — Z79.4 LONG TERM (CURRENT) USE OF INSULIN: ICD-10-CM

## 2023-07-28 DIAGNOSIS — D25.9 LEIOMYOMA OF UTERUS, UNSPECIFIED: ICD-10-CM

## 2023-07-28 DIAGNOSIS — I86.8 VARICOSE VEINS OF OTHER SPECIFIED SITES: Chronic | ICD-10-CM

## 2023-07-28 DIAGNOSIS — N30.80 OTHER CYSTITIS WITHOUT HEMATURIA: ICD-10-CM

## 2023-07-28 DIAGNOSIS — K66.0 PERITONEAL ADHESIONS (POSTPROCEDURAL) (POSTINFECTION): ICD-10-CM

## 2023-07-28 DIAGNOSIS — N95.0 POSTMENOPAUSAL BLEEDING: ICD-10-CM

## 2023-07-28 DIAGNOSIS — Z79.01 LONG TERM (CURRENT) USE OF ANTICOAGULANTS: ICD-10-CM

## 2023-07-28 DIAGNOSIS — D22.9 MELANOCYTIC NEVI, UNSPECIFIED: ICD-10-CM

## 2023-07-28 DIAGNOSIS — N88.8 OTHER SPECIFIED NONINFLAMMATORY DISORDERS OF CERVIX UTERI: ICD-10-CM

## 2023-07-28 DIAGNOSIS — E66.01 MORBID (SEVERE) OBESITY DUE TO EXCESS CALORIES: ICD-10-CM

## 2023-07-28 LAB
GLUCOSE BLDC GLUCOMTR-MCNC: 101 MG/DL — HIGH (ref 70–99)
GLUCOSE BLDC GLUCOMTR-MCNC: 101 MG/DL — HIGH (ref 70–99)
GLUCOSE BLDC GLUCOMTR-MCNC: 106 MG/DL — HIGH (ref 70–99)
GLUCOSE BLDC GLUCOMTR-MCNC: 195 MG/DL — HIGH (ref 70–99)

## 2023-07-28 PROCEDURE — 38570 LAPAROSCOPY LYMPH NODE BIOP: CPT | Mod: 80

## 2023-07-28 PROCEDURE — 58571 TLH W/T/O 250 G OR LESS: CPT | Mod: 80

## 2023-07-28 PROCEDURE — 88307 TISSUE EXAM BY PATHOLOGIST: CPT | Mod: 26

## 2023-07-28 PROCEDURE — 71046 X-RAY EXAM CHEST 2 VIEWS: CPT

## 2023-07-28 PROCEDURE — 11402 EXC TR-EXT B9+MARG 1.1-2 CM: CPT

## 2023-07-28 PROCEDURE — 88305 TISSUE EXAM BY PATHOLOGIST: CPT | Mod: 26

## 2023-07-28 PROCEDURE — C1889: CPT

## 2023-07-28 PROCEDURE — 80048 BASIC METABOLIC PNL TOTAL CA: CPT

## 2023-07-28 PROCEDURE — 88305 TISSUE EXAM BY PATHOLOGIST: CPT

## 2023-07-28 PROCEDURE — 88104 CYTOPATH FL NONGYN SMEARS: CPT | Mod: 26

## 2023-07-28 PROCEDURE — C9399: CPT

## 2023-07-28 PROCEDURE — 38900 IO MAP OF SENT LYMPH NODE: CPT | Mod: 50

## 2023-07-28 PROCEDURE — 36415 COLL VENOUS BLD VENIPUNCTURE: CPT

## 2023-07-28 PROCEDURE — 88307 TISSUE EXAM BY PATHOLOGIST: CPT

## 2023-07-28 PROCEDURE — S2900: CPT

## 2023-07-28 PROCEDURE — 38900 IO MAP OF SENT LYMPH NODE: CPT | Mod: 80,50

## 2023-07-28 PROCEDURE — 58571 TLH W/T/O 250 G OR LESS: CPT

## 2023-07-28 PROCEDURE — 88309 TISSUE EXAM BY PATHOLOGIST: CPT | Mod: 26

## 2023-07-28 PROCEDURE — 38570 LAPAROSCOPY LYMPH NODE BIOP: CPT

## 2023-07-28 PROCEDURE — 52204 CYSTOSCOPY W/BIOPSY(S): CPT

## 2023-07-28 PROCEDURE — 85027 COMPLETE CBC AUTOMATED: CPT

## 2023-07-28 PROCEDURE — 84100 ASSAY OF PHOSPHORUS: CPT

## 2023-07-28 PROCEDURE — 82962 GLUCOSE BLOOD TEST: CPT

## 2023-07-28 PROCEDURE — 88304 TISSUE EXAM BY PATHOLOGIST: CPT

## 2023-07-28 PROCEDURE — 88104 CYTOPATH FL NONGYN SMEARS: CPT

## 2023-07-28 PROCEDURE — 88309 TISSUE EXAM BY PATHOLOGIST: CPT

## 2023-07-28 PROCEDURE — 88304 TISSUE EXAM BY PATHOLOGIST: CPT | Mod: 26

## 2023-07-28 PROCEDURE — 83735 ASSAY OF MAGNESIUM: CPT

## 2023-07-28 RX ORDER — SIMETHICONE 80 MG/1
80 TABLET, CHEWABLE ORAL EVERY 6 HOURS
Refills: 0 | Status: DISCONTINUED | OUTPATIENT
Start: 2023-07-29 | End: 2023-07-29

## 2023-07-28 RX ORDER — PANTOPRAZOLE SODIUM 20 MG/1
40 TABLET, DELAYED RELEASE ORAL
Refills: 0 | Status: DISCONTINUED | OUTPATIENT
Start: 2023-07-29 | End: 2023-07-29

## 2023-07-28 RX ORDER — OXYCODONE HYDROCHLORIDE 5 MG/1
5 TABLET ORAL ONCE
Refills: 0 | Status: DISCONTINUED | OUTPATIENT
Start: 2023-07-29 | End: 2023-07-29

## 2023-07-28 RX ORDER — ACETAMINOPHEN 500 MG
1000 TABLET ORAL ONCE
Refills: 0 | Status: COMPLETED | OUTPATIENT
Start: 2023-07-28 | End: 2023-07-28

## 2023-07-28 RX ORDER — OXYCODONE HYDROCHLORIDE 5 MG/1
10 TABLET ORAL EVERY 4 HOURS
Refills: 0 | Status: DISCONTINUED | OUTPATIENT
Start: 2023-07-29 | End: 2023-07-29

## 2023-07-28 RX ORDER — ACETAMINOPHEN 500 MG
975 TABLET ORAL EVERY 6 HOURS
Refills: 0 | Status: DISCONTINUED | OUTPATIENT
Start: 2023-07-29 | End: 2023-07-29

## 2023-07-28 RX ORDER — HEPARIN SODIUM 5000 [USP'U]/ML
5000 INJECTION INTRAVENOUS; SUBCUTANEOUS EVERY 8 HOURS
Refills: 0 | Status: DISCONTINUED | OUTPATIENT
Start: 2023-07-29 | End: 2023-07-29

## 2023-07-28 RX ORDER — HEPARIN SODIUM 5000 [USP'U]/ML
5000 INJECTION INTRAVENOUS; SUBCUTANEOUS ONCE
Refills: 0 | Status: COMPLETED | OUTPATIENT
Start: 2023-07-28 | End: 2023-07-28

## 2023-07-28 RX ORDER — SODIUM CHLORIDE 9 MG/ML
1000 INJECTION, SOLUTION INTRAVENOUS
Refills: 0 | Status: DISCONTINUED | OUTPATIENT
Start: 2023-07-29 | End: 2023-07-29

## 2023-07-28 RX ORDER — DEXTROSE 50 % IN WATER 50 %
25 SYRINGE (ML) INTRAVENOUS ONCE
Refills: 0 | Status: DISCONTINUED | OUTPATIENT
Start: 2023-07-29 | End: 2023-07-29

## 2023-07-28 RX ORDER — OXYCODONE HYDROCHLORIDE 5 MG/1
5 TABLET ORAL
Refills: 0 | Status: DISCONTINUED | OUTPATIENT
Start: 2023-07-29 | End: 2023-07-29

## 2023-07-28 RX ORDER — ONDANSETRON 8 MG/1
4 TABLET, FILM COATED ORAL ONCE
Refills: 0 | Status: DISCONTINUED | OUTPATIENT
Start: 2023-07-28 | End: 2023-07-29

## 2023-07-28 RX ORDER — FENTANYL CITRATE 50 UG/ML
50 INJECTION INTRAVENOUS
Refills: 0 | Status: DISCONTINUED | OUTPATIENT
Start: 2023-07-29 | End: 2023-07-29

## 2023-07-28 RX ORDER — SODIUM CHLORIDE 9 MG/ML
1000 INJECTION, SOLUTION INTRAVENOUS
Refills: 0 | Status: DISCONTINUED | OUTPATIENT
Start: 2023-07-28 | End: 2023-07-29

## 2023-07-28 RX ORDER — DEXTROSE 50 % IN WATER 50 %
12.5 SYRINGE (ML) INTRAVENOUS ONCE
Refills: 0 | Status: DISCONTINUED | OUTPATIENT
Start: 2023-07-29 | End: 2023-07-29

## 2023-07-28 RX ORDER — FENTANYL CITRATE 50 UG/ML
25 INJECTION INTRAVENOUS
Refills: 0 | Status: DISCONTINUED | OUTPATIENT
Start: 2023-07-28 | End: 2023-07-29

## 2023-07-28 RX ORDER — ONDANSETRON 8 MG/1
4 TABLET, FILM COATED ORAL ONCE
Refills: 0 | Status: DISCONTINUED | OUTPATIENT
Start: 2023-07-29 | End: 2023-07-29

## 2023-07-28 RX ORDER — GLUCAGON INJECTION, SOLUTION 0.5 MG/.1ML
1 INJECTION, SOLUTION SUBCUTANEOUS ONCE
Refills: 0 | Status: DISCONTINUED | OUTPATIENT
Start: 2023-07-29 | End: 2023-07-29

## 2023-07-28 RX ORDER — LEVOTHYROXINE SODIUM 125 MCG
175 TABLET ORAL DAILY
Refills: 0 | Status: DISCONTINUED | OUTPATIENT
Start: 2023-07-29 | End: 2023-07-29

## 2023-07-28 RX ORDER — INSULIN LISPRO 100/ML
VIAL (ML) SUBCUTANEOUS
Refills: 0 | Status: DISCONTINUED | OUTPATIENT
Start: 2023-07-29 | End: 2023-07-29

## 2023-07-28 RX ORDER — AMLODIPINE BESYLATE 2.5 MG/1
5 TABLET ORAL DAILY
Refills: 0 | Status: DISCONTINUED | OUTPATIENT
Start: 2023-07-29 | End: 2023-07-29

## 2023-07-28 RX ORDER — DEXTROSE 50 % IN WATER 50 %
15 SYRINGE (ML) INTRAVENOUS ONCE
Refills: 0 | Status: DISCONTINUED | OUTPATIENT
Start: 2023-07-29 | End: 2023-07-29

## 2023-07-28 RX ORDER — OXYCODONE HYDROCHLORIDE 5 MG/1
5 TABLET ORAL ONCE
Refills: 0 | Status: DISCONTINUED | OUTPATIENT
Start: 2023-07-28 | End: 2023-07-29

## 2023-07-28 RX ORDER — METOPROLOL TARTRATE 50 MG
25 TABLET ORAL DAILY
Refills: 0 | Status: DISCONTINUED | OUTPATIENT
Start: 2023-07-29 | End: 2023-07-29

## 2023-07-28 RX ORDER — IBUPROFEN 200 MG
600 TABLET ORAL EVERY 6 HOURS
Refills: 0 | Status: COMPLETED | OUTPATIENT
Start: 2023-07-29 | End: 2024-06-26

## 2023-07-28 RX ORDER — ONDANSETRON 8 MG/1
8 TABLET, FILM COATED ORAL EVERY 8 HOURS
Refills: 0 | Status: DISCONTINUED | OUTPATIENT
Start: 2023-07-29 | End: 2023-07-29

## 2023-07-28 RX ORDER — HYDROMORPHONE HYDROCHLORIDE 2 MG/ML
0.5 INJECTION INTRAMUSCULAR; INTRAVENOUS; SUBCUTANEOUS
Refills: 0 | Status: DISCONTINUED | OUTPATIENT
Start: 2023-07-28 | End: 2023-07-29

## 2023-07-28 RX ORDER — GABAPENTIN 400 MG/1
300 CAPSULE ORAL ONCE
Refills: 0 | Status: COMPLETED | OUTPATIENT
Start: 2023-07-28 | End: 2023-07-28

## 2023-07-28 RX ORDER — LOSARTAN POTASSIUM 100 MG/1
100 TABLET, FILM COATED ORAL DAILY
Refills: 0 | Status: DISCONTINUED | OUTPATIENT
Start: 2023-07-29 | End: 2023-07-29

## 2023-07-28 RX ORDER — CELECOXIB 200 MG/1
400 CAPSULE ORAL ONCE
Refills: 0 | Status: COMPLETED | OUTPATIENT
Start: 2023-07-28 | End: 2023-07-28

## 2023-07-28 RX ADMIN — HEPARIN SODIUM 5000 UNIT(S): 5000 INJECTION INTRAVENOUS; SUBCUTANEOUS at 14:24

## 2023-07-28 RX ADMIN — Medication 1000 MILLIGRAM(S): at 14:24

## 2023-07-28 RX ADMIN — CELECOXIB 400 MILLIGRAM(S): 200 CAPSULE ORAL at 14:25

## 2023-07-28 RX ADMIN — GABAPENTIN 300 MILLIGRAM(S): 400 CAPSULE ORAL at 14:25

## 2023-07-28 NOTE — ASU PATIENT PROFILE, ADULT - NSICDXPASTMEDICALHX_GEN_ALL_CORE_FT
PAST MEDICAL HISTORY:  AF (atrial fibrillation) on Xarelto    Anxiety     Blind right eye     CAD (coronary artery disease) minimal CAD per cath result in 2014    Constipation     Depression     Edema of lower extremity     Former smoker     Gastritis     H/O Helicobacter infection     Hiatal hernia     History of TIAs     [place] (hard of hearing)     HTN (hypertension)     Hypercholesterolemia     Hypothyroid     Incontinence     Loss of vision right eye    Morbid obesity     Murmur     Neuropathy     OAB (overactive bladder)     Palpitations     Risk factors for obstructive sleep apnea     Spinal stenosis     Thickened endometrium     Thyroid nodule     Type 2 diabetes mellitus     Uterine cancer     Uterine cyst     Varicose veins

## 2023-07-28 NOTE — BRIEF OPERATIVE NOTE - NSICDXBRIEFPROCEDURE_GEN_ALL_CORE_FT
PROCEDURES:  Hysterectomy, total, robot-assisted, laparoscopic, using da Kendall Xi, with bilateral salpingo-oophorectomy and cystoscopy 28-Jul-2023 22:12:45  [place]  [place] lymph node mapping 28-Jul-2023 22:12:51  [place]  Skin tag removal 28-Jul-2023 22:13:00  [place]

## 2023-07-28 NOTE — BRIEF OPERATIVE NOTE - OPERATION/FINDINGS
8 week size uterus, mobile, with adhesions between bladder and uterus, lymph nodes with adhesions to side wall. [place] lymph node mapping. Cystoscopy with nodularity noted in posterior trigone mucosa, minimal bleeding, area with biopsy taken away from ureteral orifice, right UO with thickening and redundant tissue noted. Bilateral ureteral jets noted, bladder absent of suture and intact dome noted.

## 2023-07-28 NOTE — ASU PATIENT PROFILE, ADULT - FALL HARM RISK - HARM RISK INTERVENTIONS

## 2023-07-28 NOTE — ASU PATIENT PROFILE, ADULT - NSICDXPASTSURGICALHX_GEN_ALL_CORE_FT
PAST SURGICAL HISTORY:  H/O thyroidectomy     History of angioplasty of vein right leg 2016    S/P cataract extraction and insertion of intraocular lens bilateral > 15 years ago    Varicose vein with stripping b/l

## 2023-07-28 NOTE — BRIEF OPERATIVE NOTE - SPECIMENS
1. Right and left sentinel lymph node, 2. uterus/cervix/bilateral fallopian tubes/bilateral ovaries, 3. Posterior bladder mucosa biopsy, and 4. skin biopsy

## 2023-07-29 ENCOUNTER — TRANSCRIPTION ENCOUNTER (OUTPATIENT)
Age: 73
End: 2023-07-29

## 2023-07-29 VITALS
TEMPERATURE: 98 F | HEART RATE: 61 BPM | SYSTOLIC BLOOD PRESSURE: 130 MMHG | OXYGEN SATURATION: 97 % | DIASTOLIC BLOOD PRESSURE: 58 MMHG | RESPIRATION RATE: 17 BRPM

## 2023-07-29 LAB
ANION GAP SERPL CALC-SCNC: 5 MMOL/L — SIGNIFICANT CHANGE UP (ref 5–17)
BUN SERPL-MCNC: 13 MG/DL — SIGNIFICANT CHANGE UP (ref 7–23)
CALCIUM SERPL-MCNC: 8.5 MG/DL — SIGNIFICANT CHANGE UP (ref 8.5–10.1)
CHLORIDE SERPL-SCNC: 111 MMOL/L — HIGH (ref 96–108)
CO2 SERPL-SCNC: 24 MMOL/L — SIGNIFICANT CHANGE UP (ref 22–31)
CREAT SERPL-MCNC: 0.7 MG/DL — SIGNIFICANT CHANGE UP (ref 0.5–1.3)
EGFR: 90 ML/MIN/1.73M2 — SIGNIFICANT CHANGE UP
GLUCOSE BLDC GLUCOMTR-MCNC: 204 MG/DL — HIGH (ref 70–99)
GLUCOSE BLDC GLUCOMTR-MCNC: 222 MG/DL — HIGH (ref 70–99)
GLUCOSE BLDC GLUCOMTR-MCNC: 250 MG/DL — HIGH (ref 70–99)
GLUCOSE SERPL-MCNC: 228 MG/DL — HIGH (ref 70–99)
HCT VFR BLD CALC: 35.3 % — SIGNIFICANT CHANGE UP (ref 34.5–45)
HGB BLD-MCNC: 11.5 G/DL — SIGNIFICANT CHANGE UP (ref 11.5–15.5)
MAGNESIUM SERPL-MCNC: 1.9 MG/DL — SIGNIFICANT CHANGE UP (ref 1.6–2.6)
MCHC RBC-ENTMCNC: 28.3 PG — SIGNIFICANT CHANGE UP (ref 27–34)
MCHC RBC-ENTMCNC: 32.6 GM/DL — SIGNIFICANT CHANGE UP (ref 32–36)
MCV RBC AUTO: 86.9 FL — SIGNIFICANT CHANGE UP (ref 80–100)
PHOSPHATE SERPL-MCNC: 4.4 MG/DL — SIGNIFICANT CHANGE UP (ref 2.5–4.5)
PLATELET # BLD AUTO: 256 K/UL — SIGNIFICANT CHANGE UP (ref 150–400)
POTASSIUM SERPL-MCNC: 4.2 MMOL/L — SIGNIFICANT CHANGE UP (ref 3.5–5.3)
POTASSIUM SERPL-SCNC: 4.2 MMOL/L — SIGNIFICANT CHANGE UP (ref 3.5–5.3)
RBC # BLD: 4.06 M/UL — SIGNIFICANT CHANGE UP (ref 3.8–5.2)
RBC # FLD: 13 % — SIGNIFICANT CHANGE UP (ref 10.3–14.5)
SODIUM SERPL-SCNC: 140 MMOL/L — SIGNIFICANT CHANGE UP (ref 135–145)
WBC # BLD: 8.59 K/UL — SIGNIFICANT CHANGE UP (ref 3.8–10.5)
WBC # FLD AUTO: 8.59 K/UL — SIGNIFICANT CHANGE UP (ref 3.8–10.5)

## 2023-07-29 PROCEDURE — 71046 X-RAY EXAM CHEST 2 VIEWS: CPT | Mod: 26

## 2023-07-29 RX ORDER — PROCHLORPERAZINE MALEATE 5 MG
5 TABLET ORAL ONCE
Refills: 0 | Status: COMPLETED | OUTPATIENT
Start: 2023-07-29 | End: 2023-07-29

## 2023-07-29 RX ORDER — ACETAMINOPHEN 500 MG
2 TABLET ORAL
Qty: 40 | Refills: 0
Start: 2023-07-29 | End: 2023-08-02

## 2023-07-29 RX ORDER — METRONIDAZOLE 500 MG
500 TABLET ORAL ONCE
Refills: 0 | Status: COMPLETED | OUTPATIENT
Start: 2023-07-29 | End: 2023-07-29

## 2023-07-29 RX ORDER — IBUPROFEN 200 MG
1 TABLET ORAL
Qty: 20 | Refills: 0
Start: 2023-07-29 | End: 2023-08-02

## 2023-07-29 RX ORDER — APIXABAN 2.5 MG/1
1 TABLET, FILM COATED ORAL
Qty: 60 | Refills: 0
Start: 2023-07-29 | End: 2023-08-27

## 2023-07-29 RX ORDER — APIXABAN 2.5 MG/1
1 TABLET, FILM COATED ORAL
Qty: 28 | Refills: 0
Start: 2023-07-29 | End: 2023-08-11

## 2023-07-29 RX ORDER — KETOROLAC TROMETHAMINE 30 MG/ML
15 SYRINGE (ML) INJECTION EVERY 6 HOURS
Refills: 0 | Status: DISCONTINUED | OUTPATIENT
Start: 2023-07-29 | End: 2023-07-29

## 2023-07-29 RX ORDER — IBUPROFEN 200 MG
600 TABLET ORAL EVERY 6 HOURS
Refills: 0 | Status: DISCONTINUED | OUTPATIENT
Start: 2023-07-29 | End: 2023-07-29

## 2023-07-29 RX ORDER — CEFAZOLIN SODIUM 1 G
2000 VIAL (EA) INJECTION ONCE
Refills: 0 | Status: COMPLETED | OUTPATIENT
Start: 2023-07-29 | End: 2023-07-29

## 2023-07-29 RX ADMIN — Medication 25 MILLIGRAM(S): at 09:03

## 2023-07-29 RX ADMIN — Medication 975 MILLIGRAM(S): at 05:43

## 2023-07-29 RX ADMIN — Medication 2: at 17:02

## 2023-07-29 RX ADMIN — HEPARIN SODIUM 5000 UNIT(S): 5000 INJECTION INTRAVENOUS; SUBCUTANEOUS at 05:43

## 2023-07-29 RX ADMIN — AMLODIPINE BESYLATE 5 MILLIGRAM(S): 2.5 TABLET ORAL at 09:03

## 2023-07-29 RX ADMIN — Medication 975 MILLIGRAM(S): at 06:18

## 2023-07-29 RX ADMIN — Medication 975 MILLIGRAM(S): at 12:13

## 2023-07-29 RX ADMIN — Medication 975 MILLIGRAM(S): at 17:03

## 2023-07-29 RX ADMIN — Medication 5 MILLIGRAM(S): at 00:07

## 2023-07-29 RX ADMIN — PANTOPRAZOLE SODIUM 40 MILLIGRAM(S): 20 TABLET, DELAYED RELEASE ORAL at 05:46

## 2023-07-29 RX ADMIN — Medication 2: at 07:53

## 2023-07-29 RX ADMIN — Medication 15 MILLIGRAM(S): at 06:18

## 2023-07-29 RX ADMIN — Medication 15 MILLIGRAM(S): at 05:44

## 2023-07-29 RX ADMIN — Medication 2: at 12:13

## 2023-07-29 RX ADMIN — SODIUM CHLORIDE 100 MILLILITER(S): 9 INJECTION, SOLUTION INTRAVENOUS at 05:28

## 2023-07-29 RX ADMIN — Medication 1 TABLET(S): at 16:18

## 2023-07-29 RX ADMIN — Medication 175 MICROGRAM(S): at 05:44

## 2023-07-29 NOTE — DISCHARGE NOTE PROVIDER - NSDCMRMEDTOKEN_GEN_ALL_CORE_FT
acetaminophen 500 mg oral tablet: 2 tab(s) orally every 6 hours  acetaminophen 500 mg oral tablet: 2 tab(s) orally every 6 hours  ALPRAZolam 1 mg oral tablet: 1 orally once a day  ALPRAZolam 2 mg oral tablet: 1 orally once a day (at bedtime)  amLODIPine 5 mg oral tablet: 1 orally once a day  atorvastatin 40 mg oral tablet: 1 orally once a day  ibuprofen 600 mg oral tablet: 1 tab(s) orally every 6 hours  ibuprofen 600 mg oral tablet: 1 tab(s) orally every 6 hours  levothyroxine 175 mcg (0.175 mg) oral tablet: 1 orally once a day  losartan 25 mg oral tablet: 1 orally once a day (at bedtime)  metoprolol succinate 25 mg oral capsule, extended release: 1 orally once a day  Myrbetriq 50 mg oral tablet, extended release: 1 orally once a day  NovoLOG 100 units/mL injectable solution: injectable slicing scale  pantoprazole 40 mg oral delayed release tablet: 1 tab(s) orally once a day  Systane Balance ophthalmic solution: 1 in each eye  Tresiba 100 units/mL subcutaneous solution: subcutaneous once a day (at bedtime) 12-15 units depending on Blood sugar   acetaminophen 500 mg oral tablet: 2 tab(s) orally every 6 hours  ALPRAZolam 1 mg oral tablet: 1 orally once a day  ALPRAZolam 2 mg oral tablet: 1 orally once a day (at bedtime)  amLODIPine 5 mg oral tablet: 1 orally once a day  atorvastatin 40 mg oral tablet: 1 orally once a day  Eliquis 2.5 mg oral tablet: 1 tab(s) orally 2 times a day  ibuprofen 600 mg oral tablet: 1 tab(s) orally every 6 hours  levothyroxine 175 mcg (0.175 mg) oral tablet: 1 orally once a day  losartan 25 mg oral tablet: 1 orally once a day (at bedtime)  metoprolol succinate 25 mg oral capsule, extended release: 1 orally once a day  Myrbetriq 50 mg oral tablet, extended release: 1 orally once a day  NovoLOG 100 units/mL injectable solution: injectable slicing scale  pantoprazole 40 mg oral delayed release tablet: 1 tab(s) orally once a day  Systane Balance ophthalmic solution: 1 in each eye  Tresiba 100 units/mL subcutaneous solution: subcutaneous once a day (at bedtime) 12-15 units depending on Blood sugar

## 2023-07-29 NOTE — PATIENT PROFILE ADULT - FUNCTIONAL ASSESSMENT - BASIC MOBILITY 6.
3-calculated by average/Not able to assess (calculate score using [facility] averaging method)

## 2023-07-29 NOTE — DISCHARGE NOTE PROVIDER - NSDCFUSCHEDAPPT_GEN_ALL_CORE_FT
Dominguez Herbert  [facility]  CARDIOLOGY [address]   Scheduled Appointment: 09/05/2023

## 2023-07-29 NOTE — DISCHARGE NOTE NURSING/CASE MANAGEMENT/SOCIAL WORK - NSDCPEFALRISK_GEN_ALL_CORE
[facility]  [address]  [place] [address]  Phone: [phone]  Fax: [phone] January 4, 2017      Nadia Parks PA-C  [address] [facility] [place] [address]    Patient: Sarah Ulloa   MR Number: [id number]   YOB: 1984   Date of Visit: 1/3/2017     Dear Dr. Parks:    Thank you for referring Sarah Ulloa to me for evaluation. Below are the relevant portions of my assessment and plan of care.    1. Gastroesophageal reflux disease, esophagitis presence not specified    2. Morbid obesity with BMI of 45.0-49.9, adult      Assessment:       1. Gastroesophageal reflux disease, esophagitis presence not specified    2. Morbid obesity with BMI of 45.0-49.9, adult        Plan:       1. Gastroesophageal reflux disease, esophagitis presence not specified  May see some improvement in her symptoms with weight loss. If they do persist, then certainly the weight loss would help improve outcome of surgical treatment     2. Morbid obesity with BMI of 45.0-49.9, adult     - Phentermine (ADIPEX-P) 37.5 mg tablet; Take 1 tablet (37.5 mg total) by mouth before breakfast. Dispense: 30 tablet; Refill: 0     Exercise 30 min 3 days a week this month.      Wean artificial sweeteners. Replace with water.      3 meals a day made up of the following:  Unlimited green vegetables, tomatoes, mushrooms, spaghetti squash, cauliflower, meat, poultry, seafood, eggs and hard cheeses.   Avoid fried foods  Dressings, seasonings, condiments, etc should have less than 2 g sugars.   beans or nuts can have 1 x a day.   2-3 servings of citrus fruits, berries, pineapple or melon a day (1 cup)  Milk and plain yogurt     [email or web address]     Patient warned of common side effects of phentermine including anxiety, insomnia, palpitations and increased blood pressure. It was also explained that it is for short-term usage along with diet and exercise, and that stopping the medication without  making lifestyle changes will result in regain of weight. Patient states understanding.     Start Phentermine with 1/2 pill a day to see if that will control your appetite. Go up to a full pill when needed.      Return in about 4 weeks     If you have questions, please do not hesitate to call me. I look forward to following Sarah along with you.    Sincerely,      Uzma Sanchez MD   Medical Weight Loss   [facility]     TARAH/evelia      For information on Fall & Injury Prevention, visit: [email or web address] OR  [email or web address] OR  https://www.cdc.gov/steadi/patient.html

## 2023-07-29 NOTE — PROGRESS NOTE ADULT - ATTENDING COMMENTS
Fellow Attestation: Patient was seen and evaluated at bedtime. Minimal pain, with out chest pain/dyspnea, voiding and passing flatus. Labs WNL. Known hx of urinary incontinence continued. Incisions c/d/i and benign exam. Plan: post extubation emesis, O2 % on RA, CXR read pending. Pt to have abx (previously on 3 day course) while here. Otherwise meeting milestones and anticipate dc home.

## 2023-07-29 NOTE — PATIENT PROFILE ADULT - FALL HARM RISK - HARM RISK INTERVENTIONS
Assistance with ambulation/Assistance OOB with selected safe patient handling equipment/Communicate Risk of Fall with Harm to all staff/Discuss with provider need for PT consult/Monitor gait and stability/Provide patient with walking aids - walker, cane, crutches/Reinforce activity limits and safety measures with patient and family/Sit up slowly, dangle for a short time, stand at bedside before walking/Tailored Fall Risk Interventions/Use of alarms - bed, chair and/or voice tab/Visual Cue: Yellow wristband and red socks/Bed in lowest position, wheels locked, appropriate side rails in place/Call bell, personal items and telephone in reach/Instruct patient to call for assistance before getting out of bed or chair/Non-slip footwear when patient is out of bed/[place] to call system/Physically safe environment - no spills, clutter or unnecessary equipment/Purposeful Proactive Rounding/Room/bathroom lighting operational, light cord in reach

## 2023-07-29 NOTE — PROGRESS NOTE ADULT - ASSESSMENT
A/P: BRENNEN VALDEZ is a 74yo now POD#1 s/p Pelvic EUA, RA TLH, BSO, SLNM with node biopsies, removal of left breast polyp, possible cystoscopy, possible laparotomy ; anterior chest skin tag removal    Gen: VSS  Neuro: Pain well controlled on current regimen  CV: h/o afib, HTN, HLD; c/w metoprolol, amlodipine, and losartan-HCTZ with hold parameters  Pulm: incentive spirometer use encouraged. Episode of aspiration post op, however asymptomatic currently and saturating well on NC. CXR pending, will f/u results.  GI: pending flatus, for diabetic diet ; c/w pantoprazole for GERD  : Escoto removed, voiding spontaneously w primafit in place  Heme: AM labs pending  Endocrine: hypothyroid and TIIDM, c/w synthroid and ISS, FS 100s-200s, will c/w monitoring  ID: afebrile, no signs/sxs of infection, AM CBC pending - will f/u white count due to aspiration post op.   DVT ppx: ambulation encouraged, SCDs when in bed, lovenox  Dispo: pending AM labs and CXR

## 2023-07-29 NOTE — DISCHARGE NOTE NURSING/CASE MANAGEMENT/SOCIAL WORK - PATIENT PORTAL LINK FT
You can access the FollowMyHealth Patient Portal offered by [facility] by registering at the following website: [email or web address]. By joining [facility]’s FollowMyHealth portal, you will also be able to view your health information using other applications (apps) compatible with our system.

## 2023-07-29 NOTE — DISCHARGE NOTE PROVIDER - NSDCCPCAREPLAN_GEN_ALL_CORE_FT
PRINCIPAL DISCHARGE DIAGNOSIS  Diagnosis: S/P hysterectomy  Assessment and Plan of Treatment: - Please contact the office for any pain uncontrolled by medication, excessive bleeding or Fever>100.4  - Please call the office for a follow-up with your doctor in 2 weeks  - You can take naproxen 500mg every 12 hours and tylenol 975mg every 6 hours as needed for pain.  - Oxycodone should be used for severe pain only  - You may walk and climb stairs as often as youd like, no vigorous activity, do not lift anything greater than 10lbs, nothing per vagina x 6 weeks, do not drive while on pain medication.  - Stool softeners (like senna) and mild laxatives like miralax can help with bowel regularity. Constipation is a common complaint after surgery and straining should be avoided.     PRINCIPAL DISCHARGE DIAGNOSIS  Diagnosis: S/P hysterectomy  Assessment and Plan of Treatment: - Please contact the office for any pain uncontrolled by medication, excessive bleeding or Fever>100.4  - Please call the office for a follow-up with your doctor in 2 weeks  - You can take naproxen 500mg every 12 hours and tylenol 975mg every 6 hours as needed for pain.  - You may walk and climb stairs as often as youd like, no vigorous activity, do not lift anything greater than 10lbs, nothing per vagina x 6 weeks, do not drive while on pain medication.  - Stool softeners (like senna) and mild laxatives like miralax can help with bowel regularity. Constipation is a common complaint after surgery and straining should be avoided.

## 2023-07-29 NOTE — DISCHARGE NOTE PROVIDER - CARE PROVIDER_API CALL
Cheryl Champion  Gynecologic Oncology  [address] [place] [address]  Phone: [phone]  Fax: [phone]  Follow Up Time: 2 weeks

## 2023-07-29 NOTE — PROGRESS NOTE ADULT - SUBJECTIVE AND OBJECTIVE BOX
BRENNEN VALDEZ is a 74yo now POD#1 s/p Pelvic EUA, RA TLH, BSO, SLNM with node biopsies, removal of left breast polyp, possible cystoscopy, possible laparotomy ; anterior chest skin tag removal    Patient was seen and examined at bedside.  Episode of vomiting immediately post extubation.   Currently denies nausea/vomiting. Not yet attempted PO diet.  Pain is controlled with PRN medication.  Not yet ambulating.  Denies SOB, CP, fevers/chills.  - flatus/-BM/+ voiding    T(C): 36.4 (07-29-23 @ 06:05), Max: 36.4 (07-28-23 @ 13:42)  HR: 70 (07-29-23 @ 06:05) (60 - 72)  BP: 144/79 (07-29-23 @ 06:05) (110/84 - 174/84)  RR: 18 (07-29-23 @ 01:45) (12 - 20)  SpO2: 98% (07-29-23 @ 06:05) (94% - 100%)    Gen: NAD, AOx3  Abdomen: soft, nondistended, appropriately tender; clean dry and intact incisions  VE: no active vaginal bleeding  Extrem: no calf tenderness or edema     Labs:   Pending

## 2023-08-01 ENCOUNTER — APPOINTMENT (OUTPATIENT)
Dept: UROGYNECOLOGY | Facility: CLINIC | Age: 73
End: 2023-08-01

## 2023-08-03 ENCOUNTER — APPOINTMENT (OUTPATIENT)
Dept: UROGYNECOLOGY | Facility: CLINIC | Age: 73
End: 2023-08-03

## 2023-08-07 LAB — SURGICAL PATHOLOGY STUDY: SIGNIFICANT CHANGE UP

## 2023-08-23 ENCOUNTER — APPOINTMENT (OUTPATIENT)
Dept: GYNECOLOGIC ONCOLOGY | Facility: CLINIC | Age: 73
End: 2023-08-23
Payer: MEDICARE

## 2023-08-23 VITALS
OXYGEN SATURATION: 96 % | SYSTOLIC BLOOD PRESSURE: 140 MMHG | WEIGHT: 235 LBS | HEART RATE: 73 BPM | TEMPERATURE: 97.7 F | DIASTOLIC BLOOD PRESSURE: 88 MMHG | HEIGHT: 59 IN | BODY MASS INDEX: 47.37 KG/M2

## 2023-08-23 DIAGNOSIS — R11.0 NAUSEA: ICD-10-CM

## 2023-08-23 PROCEDURE — 99024 POSTOP FOLLOW-UP VISIT: CPT

## 2023-08-24 ENCOUNTER — APPOINTMENT (OUTPATIENT)
Dept: UROGYNECOLOGY | Facility: CLINIC | Age: 73
End: 2023-08-24
Payer: MEDICARE

## 2023-08-24 VITALS
HEIGHT: 61 IN | WEIGHT: 234 LBS | BODY MASS INDEX: 44.18 KG/M2 | SYSTOLIC BLOOD PRESSURE: 213 MMHG | DIASTOLIC BLOOD PRESSURE: 83 MMHG

## 2023-08-24 DIAGNOSIS — N32.81 OVERACTIVE BLADDER: ICD-10-CM

## 2023-08-24 LAB
APPEARANCE: CLEAR
BACTERIA: NEGATIVE /HPF
BILIRUB UR QL STRIP: NORMAL
BILIRUBIN URINE: NEGATIVE
BLOOD URINE: NEGATIVE
CAST: 0 /LPF
CLARITY UR: CLEAR
COLOR: YELLOW
EPITHELIAL CELLS: 0 /HPF
GLUCOSE QUALITATIVE U: 100 MG/DL
GLUCOSE UR-MCNC: 100
HCG UR QL: 0.2 EU/DL
HGB UR QL STRIP.AUTO: NORMAL
KETONES UR-MCNC: NORMAL
KETONES URINE: NEGATIVE MG/DL
LEUKOCYTE ESTERASE UR QL STRIP: NORMAL
LEUKOCYTE ESTERASE URINE: ABNORMAL
MICROSCOPIC-UA: NORMAL
NITRITE UR QL STRIP: NORMAL
NITRITE URINE: NEGATIVE
PH UR STRIP: 6
PH URINE: 6.5
PROT UR STRIP-MCNC: NORMAL
PROTEIN URINE: NEGATIVE MG/DL
RED BLOOD CELLS URINE: 1 /HPF
SP GR UR STRIP: 1.02
SPECIFIC GRAVITY URINE: 1.01
UROBILINOGEN URINE: 0.2 MG/DL
WHITE BLOOD CELLS URINE: 0 /HPF

## 2023-08-24 PROCEDURE — 99214 OFFICE O/P EST MOD 30 MIN: CPT

## 2023-08-24 PROCEDURE — 81003 URINALYSIS AUTO W/O SCOPE: CPT | Mod: QW

## 2023-08-24 NOTE — DISCUSSION/SUMMARY
[FreeTextEntry1] :  [place] presents for eval of rectocele and OAB symptoms, discussed management of constipation.   Rectocele: discussed options for observation, PFE, pessary and rectocele repair concomitant repair not done due to scheduling discussed Miralax  OAB: will manage constipation, reeval OAB symptoms

## 2023-08-24 NOTE — HISTORY OF PRESENT ILLNESS
[Cystocele (Obstetric)] : no [Uterine Prolapse] : no [Vaginal Wall Prolapse] : mild [Rectal Prolapse] : no [Unable To Restrain Bowel Movement] : severe [Urinary Frequency] : no [Feelings Of Urinary Urgency] : severe [x3+] : nocturia three or more  times a night [Urinary Tract Infection] : severe [Constipation Obstructed Defecation] : moderate [] : no [Pelvic Pain] : no [Vaginal Pain] : no [FreeTextEntry1] :  [place] presents for eval of constipation and OAB she recently had a hysterectomy with GYN/ONC she reports the constipation has worsening and she feels a bulge she reports there is sometimes 1 wk prior to BM  she presents with her daughter who gives history   urine cx ordered 8/23 with ONC

## 2023-08-24 NOTE — PHYSICAL EXAM
[Chaperone Present] : A chaperone was present in the examining room during all aspects of the physical examination [No Acute Distress] : in no acute distress [Well developed] : well developed [Well Nourished] : ~L well nourished [Oriented x3] : oriented to person, place, and time [Normal Memory] : ~T memory was ~L unimpaired [Normal Mood/Affect] : mood and affect are normal [Cough] : no cough [No Edema] : ~T edema was not present [Warm and Dry] : was warm and dry to touch [Normal Gait] : gait was normal [Labia Majora] : were normal [Labia Minora] : were normal [Normal Appearance] : general appearance was normal [Atrophy] : atrophy [No Bleeding] : there was no active vaginal bleeding [2] : 2 [Aa ____] : Aa [unfilled] [Ba ____] : Ba [unfilled] [C ____] : C [unfilled] [GH ____] : GH [unfilled] [PB ____] : PB [unfilled] [TVL ____] : TVL  [unfilled] [Ap ____] : Ap [unfilled] [Bp ____] : Bp [unfilled] [D ____] : D [unfilled] [Absent] : absent [Normal] : no abnormalities [Post Void Residual ____ml] : post void residual was [unfilled] ml [FreeTextEntry3] : negative CST

## 2023-08-28 LAB — BACTERIA UR CULT: ABNORMAL

## 2023-08-29 ENCOUNTER — APPOINTMENT (OUTPATIENT)
Dept: UROGYNECOLOGY | Facility: CLINIC | Age: 73
End: 2023-08-29

## 2023-08-30 DIAGNOSIS — C55 MALIGNANT NEOPLASM OF UTERUS, PART UNSPECIFIED: ICD-10-CM

## 2023-08-30 DIAGNOSIS — F33.2 MAJOR DEPRESSIVE DISORDER, RECURRENT SEVERE WITHOUT PSYCHOTIC FEATURES: ICD-10-CM

## 2023-08-30 DIAGNOSIS — F41.1 GENERALIZED ANXIETY DISORDER: ICD-10-CM

## 2023-08-31 ENCOUNTER — APPOINTMENT (OUTPATIENT)
Dept: UROGYNECOLOGY | Facility: CLINIC | Age: 73
End: 2023-08-31

## 2023-09-05 ENCOUNTER — APPOINTMENT (OUTPATIENT)
Dept: CARDIOLOGY | Facility: CLINIC | Age: 73
End: 2023-09-05

## 2023-09-07 NOTE — DISCUSSION/SUMMARY
[Clean] : was clean [Dry] : was dry [Intact] : was intact [None] : had no drainage [Normal Skin] : normal appearance [Doing Well] : is doing well [No Sign of Infection] : is showing no signs of infection [Diagnosis/Stage ___] : Given this data, a diagnosis of [unfilled] is rendered. [Findings] : These findings were discussed with [unfilled] in detail. She understood and accepted the rationale for this recommendation and also understood the serious impact that these findings could have upon her prognosis for survival. [Erythema] : was not erythematous [Ecchymosis] : was not ecchymotic [Seroma] : had no seroma [Firm] : soft [Tender] : nontender [Abnormal Bowel Sounds] : normal bowel sounds [Rebound] : no rebound tenderness [Guarding] : no guarding [Incisional Hernia] : no incisional hernia [Mass] : no palpable mass [External Genitalia Abnormal] : normal external genitalia [Vaginal Exam Abnormal] : normal vaginal exam [de-identified] : Coco Cruz MA was present the entire time of gynecological exam.  [FreeTextEntry1] : OPERATIVE FINDINGS: 8 to 9 weeks anteverted uterus, mobile with adhesions between the bladder and the uterus. Lymph nodes with adhesions with retroperitoneal inflammation. [place] lymph node mapping successful bilaterally. Cystoscopy with reticulation and slight nodularity at the posterior trigone with focal biopsy performed. Ilemostasis noted. Right ureteral orifice with redundant tissue circumferentially. Bilateral ureteral jets present and bladder absence of suture and noted to be intact. Normal upper abdominal anatomic survey with no visible evidence of extrauterine disease.   PATHOLOGY:  1.  Right pelvic sentinel lymph node: -   Six (6) lymph nodes, negative for metastatic carcinoma.  2.  Left pelvic sentinel lymph node: -   Two (2) lymph nodes, negative for metastatic carcinoma.  3.  Uterus, bilateral tubes and ovaries and cervix: -   Endometrioid carcinoma, NOS-mismatch repair deficient endometrioid carcinoma FIGO grade 1 [pT1a, pN0(sn)] -   Leiomyoma and medial calcific sclerosis of the small vessels. -   Ectocervix and endocervix with nabothian cyst. -   Bilateral fallopian tubes with no pathologic change.  Right paratubal cyst. -   Bilateral ovaries with serous cysts.  4.  Posterior bladder mucosa biopsy: -   Bladder mucosa with cystitis cystica/ cystitis glandularis and lymphoid aggregates.  Deeper sections are examined.  5.  Left chest wall skin tag: -   Intradermal nevus.  6.  Pelvic washings: -   Negative for malignant cells. -   Cytospin preparation shows sheets of benign mesothelial cells and inflammatory cells.

## 2023-09-07 NOTE — END OF VISIT
[FreeTextEntry3] : Written by Coco Cruz, acting as a scribe for Dr. Cheryl Champion. This note accurately reflects the work and decisions made by me.

## 2023-09-07 NOTE — ASSESSMENT
[FreeTextEntry1] : The patient is healing well without complication. We discussed ongoing recuperation and reviewed final pathology results in detail - a copy was provided to the patient. Will f/u for MMR testing as final path appears incomplete. Given cystitis noted on final path, will send her urine out for further testing. She should continue f/u with Dr. Kim - she has appointment tomorrow. Will send Zofran for patient's nausea although I recommend she use an enema to relieve any constipation as this may be cause of her bloating & therefore her nausea. We discussed signs and symptoms of cancer recurrence and reviewed recommended surveillance plan (RTO in 3-4 months). The patient stated and expressed a good understanding of the above information, all of her questions were answered to her apparent satisfaction.

## 2023-09-07 NOTE — REASON FOR VISIT
[Post Op] : post op visit [de-identified] : 7/28/23 [de-identified] : HANANE GOINS TLH BSO SLNM w/LN biopsies, cystoscopy, and anterior chest wall skin tag removal for endometrial cancer at  [de-identified] : The patient is healing remarkably well. She denies a change in appetite, or a change in weight. She denies VB/VD. She denies CP/SOB.  Pt endorses nausea without vomiting & tolerates PO intake overall well - has some nausea after meals.  She endorses abdominal bloating & a vaginal discomfort/pain described as "needles poking inside my vagina".  Has worsening incontinence since surgery. She endured a bladder infection prior to surgery & cystitis is noted on final path.

## 2023-09-07 NOTE — PLAN
[TextEntry] : Follow up PMD as needed for ongoing medical issues Imaging as clinically indicated The risk of recurrence, signs and symptoms  and surveillance plan were reviewed in detail.  Return in 4 months or PRN if symptoms arise. All questions were answered to her apparent satisfaction.

## 2023-09-21 ENCOUNTER — APPOINTMENT (OUTPATIENT)
Dept: UROGYNECOLOGY | Facility: CLINIC | Age: 73
End: 2023-09-21

## 2023-10-18 NOTE — ED ADULT NURSE NOTE - INTEGUMENTARY WDL
Color consistent with ethnicity/race, warm, dry intact, resilient. Arava Counseling:  Patient counseled regarding adverse effects of Arava including but not limited to nausea, vomiting, abnormalities in liver function tests. Patients may develop mouth sores, rash, diarrhea, and abnormalities in blood counts. The patient understands that monitoring is required including LFTs and blood counts.  There is a rare possibility of scarring of the liver and lung problems that can occur when taking methotrexate. Persistent nausea, loss of appetite, pale stools, dark urine, cough, and shortness of breath should be reported immediately. Patient advised to discontinue Arava treatment and consult with a physician prior to attempting conception. The patient will have to undergo a treatment to eliminate Arava from the body prior to conception.

## 2023-11-11 NOTE — ASU PREOP CHECKLIST - SPO2 (%)
100 Laboratory results reviewed and discussed with patient/family. CBC shows anemia, BMP shows no significant PIERCE. Troponin is negative.   Lipase is WNL.   Chest X-rays reviewed by me, and shows no actue findings. No Pneumothorax, no free air, no effusions, and these findings discussed with patient.  Patient remained hemodynamically stable during the course of ED stay. Discussed with patient about the results of the diagnostic studies. Discussed with admitting physician and MAR, patient is admitted to Medicine for further evaluation and care.

## 2024-01-24 ENCOUNTER — TRANSCRIPTION ENCOUNTER (OUTPATIENT)
Age: 74
End: 2024-01-24

## 2024-01-24 ENCOUNTER — APPOINTMENT (OUTPATIENT)
Dept: GYNECOLOGIC ONCOLOGY | Facility: CLINIC | Age: 74
End: 2024-01-24
Payer: MEDICARE

## 2024-01-24 VITALS
HEIGHT: 60 IN | WEIGHT: 231 LBS | BODY MASS INDEX: 45.35 KG/M2 | RESPIRATION RATE: 14 BRPM | DIASTOLIC BLOOD PRESSURE: 86 MMHG | OXYGEN SATURATION: 98 % | SYSTOLIC BLOOD PRESSURE: 148 MMHG | HEART RATE: 78 BPM

## 2024-01-24 PROCEDURE — 99459 PELVIC EXAMINATION: CPT

## 2024-01-24 PROCEDURE — 99213 OFFICE O/P EST LOW 20 MIN: CPT

## 2024-01-24 PROCEDURE — G2211 COMPLEX E/M VISIT ADD ON: CPT

## 2024-02-13 NOTE — PLAN
[TextEntry] : Follow up PMD as needed for ongoing medical issues Imaging as clinically indicated - CT A/P The risk of recurrence, signs and symptoms  and surveillance plan were reviewed in detail.  Return in 4 months or PRN if symptoms arise. All questions were answered to her apparent satisfaction.

## 2024-02-13 NOTE — ASSESSMENT
[FreeTextEntry1] : 73 year old who has a history of endometrial cancer. The patient is doing well based on today's exam, clinically NELI. Given her reported pain, I am ordering CT A/P to r/o recurrent disease. I am inclined to believe her pain is related to scar tissue given it is not worsening over time, per pt.

## 2024-02-13 NOTE — HISTORY OF PRESENT ILLNESS
[FreeTextEntry1] : 73 year old returns for interval oncologic surveillance. She endorses lower, transverse pelvic pain which is intermittent since her surgery. Otherwise, no other complaints including no vaginal or rectal bleeding, chest pain or shortness of breath. Normal bowel and bladder function.   Postoperatively, pt endured UTI which was treated with antibiotics. Patient follows with Dr. Kim (Uro/Gyn) for management of overactive bladder & rectocele. Scheduled f/u in August was cancelled.  Health maintenance, per pt: Pap smear- 4/2021- WNL Mammo- 3 years ago Colonoscopy- 3-4 years DEXA- years ago

## 2024-02-13 NOTE — REASON FOR VISIT
[FreeTextEntry1] : [facility] [phone] at [address] [place] [address]  Stage IA Endometrial Cancer - 7/28/23  Active surveillance

## 2024-02-13 NOTE — PHYSICAL EXAM
[Absent] : Adnexa(ae): Absent [Normal] : Anus and perineum: Normal sphincter tone, no masses, no prolapse. [FreeTextEntry1] : Coco Cruz MA was present the entire time of gynecological exam.  [de-identified] : Laparoscpic scars, mild to moderate suprapubic tenderness

## 2024-03-15 NOTE — H&P PST ADULT - ALLERGIC/IMMUNOLOGIC COMMENTS
Addended by: BLAIR CURRAN on: 3/15/2024 12:26 PM     Modules accepted: Orders     see allergy section

## 2024-03-22 ENCOUNTER — NON-APPOINTMENT (OUTPATIENT)
Age: 74
End: 2024-03-22

## 2024-03-23 ENCOUNTER — INPATIENT (INPATIENT)
Facility: HOSPITAL | Age: 74
LOS: 9 days | Discharge: HOME HEALTH | DRG: 871 | End: 2024-04-02
Attending: HOSPITALIST | Admitting: STUDENT IN AN ORGANIZED HEALTH CARE EDUCATION/TRAINING PROGRAM
Payer: MEDICARE

## 2024-03-23 VITALS
HEART RATE: 101 BPM | RESPIRATION RATE: 23 BRPM | OXYGEN SATURATION: 95 % | SYSTOLIC BLOOD PRESSURE: 126 MMHG | HEIGHT: 61 IN | WEIGHT: 231.04 LBS | TEMPERATURE: 100 F | DIASTOLIC BLOOD PRESSURE: 80 MMHG

## 2024-03-23 DIAGNOSIS — E89.0 POSTPROCEDURAL HYPOTHYROIDISM: Chronic | ICD-10-CM

## 2024-03-23 DIAGNOSIS — Z98.49 CATARACT EXTRACTION STATUS, UNSPECIFIED EYE: Chronic | ICD-10-CM

## 2024-03-23 DIAGNOSIS — Z78.9 OTHER SPECIFIED HEALTH STATUS: Chronic | ICD-10-CM

## 2024-03-23 DIAGNOSIS — I86.8 VARICOSE VEINS OF OTHER SPECIFIED SITES: Chronic | ICD-10-CM

## 2024-03-23 PROCEDURE — 99291 CRITICAL CARE FIRST HOUR: CPT

## 2024-03-23 NOTE — ED ADULT TRIAGE NOTE - CHIEF COMPLAINT QUOTE
abdominal pain, vomiting, chills, fever, cough, diarrhea starting x2days ago  went to UC, NEG chest xray & flu/cov/RSV

## 2024-03-23 NOTE — ED ADULT NURSE NOTE - OBJECTIVE STATEMENT
73Y female, A&Ox4, pmh of hysterectomy x7 months ago for uterine cancer (states 6 month check up was good), presents to the ED c/o abd pain, vomiting, chills, fever, cough, and diarrhea x2 days, nausea x3 days. endorses lower back and leg pain (states it is not new but is worse). endorses weakness states she hasn't been able to keep much down for 2 days. states she feels very anxious. denies cp, sob

## 2024-03-24 DIAGNOSIS — Z29.9 ENCOUNTER FOR PROPHYLACTIC MEASURES, UNSPECIFIED: ICD-10-CM

## 2024-03-24 DIAGNOSIS — J96.01 ACUTE RESPIRATORY FAILURE WITH HYPOXIA: ICD-10-CM

## 2024-03-24 DIAGNOSIS — N12 TUBULO-INTERSTITIAL NEPHRITIS, NOT SPECIFIED AS ACUTE OR CHRONIC: ICD-10-CM

## 2024-03-24 DIAGNOSIS — E78.5 HYPERLIPIDEMIA, UNSPECIFIED: ICD-10-CM

## 2024-03-24 DIAGNOSIS — Z85.42 PERSONAL HISTORY OF MALIGNANT NEOPLASM OF OTHER PARTS OF UTERUS: ICD-10-CM

## 2024-03-24 DIAGNOSIS — E11.9 TYPE 2 DIABETES MELLITUS WITHOUT COMPLICATIONS: ICD-10-CM

## 2024-03-24 DIAGNOSIS — E87.1 HYPO-OSMOLALITY AND HYPONATREMIA: ICD-10-CM

## 2024-03-24 DIAGNOSIS — E03.9 HYPOTHYROIDISM, UNSPECIFIED: ICD-10-CM

## 2024-03-24 DIAGNOSIS — I48.91 UNSPECIFIED ATRIAL FIBRILLATION: ICD-10-CM

## 2024-03-24 DIAGNOSIS — I10 ESSENTIAL (PRIMARY) HYPERTENSION: ICD-10-CM

## 2024-03-24 DIAGNOSIS — M48.00 SPINAL STENOSIS, SITE UNSPECIFIED: ICD-10-CM

## 2024-03-24 DIAGNOSIS — Z90.710 ACQUIRED ABSENCE OF BOTH CERVIX AND UTERUS: Chronic | ICD-10-CM

## 2024-03-24 DIAGNOSIS — A41.9 SEPSIS, UNSPECIFIED ORGANISM: ICD-10-CM

## 2024-03-24 DIAGNOSIS — N17.9 ACUTE KIDNEY FAILURE, UNSPECIFIED: ICD-10-CM

## 2024-03-24 LAB
-  K. PNEUMONIAE GROUP: SIGNIFICANT CHANGE UP
ADD ON TEST-SPECIMEN IN LAB: SIGNIFICANT CHANGE UP
ALBUMIN SERPL ELPH-MCNC: 3.4 G/DL — SIGNIFICANT CHANGE UP (ref 3.3–5)
ALP SERPL-CCNC: 89 U/L — SIGNIFICANT CHANGE UP (ref 40–120)
ALT FLD-CCNC: 19 U/L — SIGNIFICANT CHANGE UP (ref 10–45)
AMORPH SED URNS QL MICRO: PRESENT
ANION GAP SERPL CALC-SCNC: 14 MMOL/L — SIGNIFICANT CHANGE UP (ref 5–17)
ANION GAP SERPL CALC-SCNC: 20 MMOL/L — HIGH (ref 5–17)
APPEARANCE UR: ABNORMAL
AST SERPL-CCNC: 45 U/L — HIGH (ref 10–40)
BACTERIA # UR AUTO: ABNORMAL /HPF
BASE EXCESS BLDV CALC-SCNC: -2.2 MMOL/L — LOW (ref -2–3)
BASOPHILS # BLD AUTO: 0.05 K/UL — SIGNIFICANT CHANGE UP (ref 0–0.2)
BASOPHILS NFR BLD AUTO: 0.3 % — SIGNIFICANT CHANGE UP (ref 0–2)
BILIRUB SERPL-MCNC: 2.7 MG/DL — HIGH (ref 0.2–1.2)
BILIRUB UR-MCNC: NEGATIVE — SIGNIFICANT CHANGE UP
BUN SERPL-MCNC: 26 MG/DL — HIGH (ref 7–23)
BUN SERPL-MCNC: 27 MG/DL — HIGH (ref 7–23)
CA-I SERPL-SCNC: 1.06 MMOL/L — LOW (ref 1.15–1.33)
CALCIUM SERPL-MCNC: 7.5 MG/DL — LOW (ref 8.4–10.5)
CALCIUM SERPL-MCNC: 8.2 MG/DL — LOW (ref 8.4–10.5)
CAST: 8 /LPF — HIGH (ref 0–4)
CHLORIDE BLDV-SCNC: 94 MMOL/L — LOW (ref 96–108)
CHLORIDE SERPL-SCNC: 92 MMOL/L — LOW (ref 96–108)
CHLORIDE SERPL-SCNC: 99 MMOL/L — SIGNIFICANT CHANGE UP (ref 96–108)
CO2 BLDV-SCNC: 25 MMOL/L — SIGNIFICANT CHANGE UP (ref 22–26)
CO2 SERPL-SCNC: 19 MMOL/L — LOW (ref 22–31)
CO2 SERPL-SCNC: 19 MMOL/L — LOW (ref 22–31)
COLOR SPEC: YELLOW — SIGNIFICANT CHANGE UP
CREAT SERPL-MCNC: 1.54 MG/DL — HIGH (ref 0.5–1.3)
CREAT SERPL-MCNC: 1.69 MG/DL — HIGH (ref 0.5–1.3)
DIFF PNL FLD: ABNORMAL
EGFR: 31 ML/MIN/1.73M2 — LOW
EGFR: 35 ML/MIN/1.73M2 — LOW
EOSINOPHIL # BLD AUTO: 0 K/UL — SIGNIFICANT CHANGE UP (ref 0–0.5)
EOSINOPHIL NFR BLD AUTO: 0 % — SIGNIFICANT CHANGE UP (ref 0–6)
FLUAV AG NPH QL: SIGNIFICANT CHANGE UP
FLUBV AG NPH QL: SIGNIFICANT CHANGE UP
GAS PNL BLDV: 127 MMOL/L — LOW (ref 136–145)
GAS PNL BLDV: SIGNIFICANT CHANGE UP
GAS PNL BLDV: SIGNIFICANT CHANGE UP
GLUCOSE BLDC GLUCOMTR-MCNC: 284 MG/DL — HIGH (ref 70–99)
GLUCOSE BLDC GLUCOMTR-MCNC: 317 MG/DL — HIGH (ref 70–99)
GLUCOSE BLDC GLUCOMTR-MCNC: 336 MG/DL — HIGH (ref 70–99)
GLUCOSE BLDV-MCNC: 414 MG/DL — HIGH (ref 70–99)
GLUCOSE SERPL-MCNC: 290 MG/DL — HIGH (ref 70–99)
GLUCOSE SERPL-MCNC: 415 MG/DL — HIGH (ref 70–99)
GLUCOSE UR QL: >=1000 MG/DL
GRAM STN FLD: ABNORMAL
GRAM STN FLD: ABNORMAL
HCO3 BLDV-SCNC: 24 MMOL/L — SIGNIFICANT CHANGE UP (ref 22–29)
HCT VFR BLD CALC: 34.4 % — LOW (ref 34.5–45)
HCT VFR BLDA CALC: 41 % — SIGNIFICANT CHANGE UP (ref 34.5–46.5)
HGB BLD CALC-MCNC: 13.8 G/DL — SIGNIFICANT CHANGE UP (ref 11.7–16.1)
HGB BLD-MCNC: 11.5 G/DL — SIGNIFICANT CHANGE UP (ref 11.5–15.5)
HOROWITZ INDEX BLDV+IHG-RTO: SIGNIFICANT CHANGE UP
HYALINE CASTS # UR AUTO: PRESENT
IMM GRANULOCYTES NFR BLD AUTO: 1 % — HIGH (ref 0–0.9)
KETONES UR-MCNC: NEGATIVE MG/DL — SIGNIFICANT CHANGE UP
LACTATE BLDV-MCNC: 4.1 MMOL/L — CRITICAL HIGH (ref 0.5–2)
LACTATE SERPL-SCNC: 2.5 MMOL/L — HIGH (ref 0.5–2)
LEUKOCYTE ESTERASE UR-ACNC: ABNORMAL
LYMPHOCYTES # BLD AUTO: 0.89 K/UL — LOW (ref 1–3.3)
LYMPHOCYTES # BLD AUTO: 5 % — LOW (ref 13–44)
MAGNESIUM SERPL-MCNC: 1.4 MG/DL — LOW (ref 1.6–2.6)
MCHC RBC-ENTMCNC: 27.6 PG — SIGNIFICANT CHANGE UP (ref 27–34)
MCHC RBC-ENTMCNC: 33.4 GM/DL — SIGNIFICANT CHANGE UP (ref 32–36)
MCV RBC AUTO: 82.5 FL — SIGNIFICANT CHANGE UP (ref 80–100)
METHOD TYPE: SIGNIFICANT CHANGE UP
MONOCYTES # BLD AUTO: 0.67 K/UL — SIGNIFICANT CHANGE UP (ref 0–0.9)
MONOCYTES NFR BLD AUTO: 3.8 % — SIGNIFICANT CHANGE UP (ref 2–14)
NEUTROPHILS # BLD AUTO: 15.99 K/UL — HIGH (ref 1.8–7.4)
NEUTROPHILS NFR BLD AUTO: 89.9 % — HIGH (ref 43–77)
NITRITE UR-MCNC: NEGATIVE — SIGNIFICANT CHANGE UP
NRBC # BLD: 0 /100 WBCS — SIGNIFICANT CHANGE UP (ref 0–0)
PCO2 BLDV: 44 MMHG — HIGH (ref 39–42)
PH BLDV: 7.34 — SIGNIFICANT CHANGE UP (ref 7.32–7.43)
PH UR: 6 — SIGNIFICANT CHANGE UP (ref 5–8)
PHOSPHATE SERPL-MCNC: 2.6 MG/DL — SIGNIFICANT CHANGE UP (ref 2.5–4.5)
PLATELET # BLD AUTO: 204 K/UL — SIGNIFICANT CHANGE UP (ref 150–400)
PO2 BLDV: 20 MMHG — LOW (ref 25–45)
POTASSIUM BLDV-SCNC: 3.8 MMOL/L — SIGNIFICANT CHANGE UP (ref 3.5–5.1)
POTASSIUM SERPL-MCNC: 3.6 MMOL/L — SIGNIFICANT CHANGE UP (ref 3.5–5.3)
POTASSIUM SERPL-MCNC: 4.7 MMOL/L — SIGNIFICANT CHANGE UP (ref 3.5–5.3)
POTASSIUM SERPL-SCNC: 3.6 MMOL/L — SIGNIFICANT CHANGE UP (ref 3.5–5.3)
POTASSIUM SERPL-SCNC: 4.7 MMOL/L — SIGNIFICANT CHANGE UP (ref 3.5–5.3)
PROT SERPL-MCNC: 6.8 G/DL — SIGNIFICANT CHANGE UP (ref 6–8.3)
PROT UR-MCNC: 100 MG/DL
RBC # BLD: 4.17 M/UL — SIGNIFICANT CHANGE UP (ref 3.8–5.2)
RBC # FLD: 12.9 % — SIGNIFICANT CHANGE UP (ref 10.3–14.5)
RBC CASTS # UR COMP ASSIST: 14 /HPF — HIGH (ref 0–4)
REVIEW: SIGNIFICANT CHANGE UP
RSV RNA NPH QL NAA+NON-PROBE: SIGNIFICANT CHANGE UP
SAO2 % BLDV: 31.9 % — LOW (ref 67–88)
SARS-COV-2 RNA SPEC QL NAA+PROBE: SIGNIFICANT CHANGE UP
SODIUM SERPL-SCNC: 131 MMOL/L — LOW (ref 135–145)
SODIUM SERPL-SCNC: 132 MMOL/L — LOW (ref 135–145)
SP GR SPEC: >1.03 — HIGH (ref 1–1.03)
SPECIMEN SOURCE: SIGNIFICANT CHANGE UP
SPECIMEN SOURCE: SIGNIFICANT CHANGE UP
SQUAMOUS # UR AUTO: 1 /HPF — SIGNIFICANT CHANGE UP (ref 0–5)
TROPONIN T, HIGH SENSITIVITY RESULT: 40 NG/L — SIGNIFICANT CHANGE UP (ref 0–51)
UROBILINOGEN FLD QL: 0.2 MG/DL — SIGNIFICANT CHANGE UP (ref 0.2–1)
WBC # BLD: 17.77 K/UL — HIGH (ref 3.8–10.5)
WBC # FLD AUTO: 17.77 K/UL — HIGH (ref 3.8–10.5)
WBC UR QL: 237 /HPF — HIGH (ref 0–5)

## 2024-03-24 PROCEDURE — 99232 SBSQ HOSP IP/OBS MODERATE 35: CPT

## 2024-03-24 PROCEDURE — 72132 CT LUMBAR SPINE W/DYE: CPT | Mod: 26,MC

## 2024-03-24 PROCEDURE — 99223 1ST HOSP IP/OBS HIGH 75: CPT | Mod: FS

## 2024-03-24 PROCEDURE — 72129 CT CHEST SPINE W/DYE: CPT | Mod: 26,MC

## 2024-03-24 PROCEDURE — 71275 CT ANGIOGRAPHY CHEST: CPT | Mod: 26,MC

## 2024-03-24 PROCEDURE — 71045 X-RAY EXAM CHEST 1 VIEW: CPT | Mod: 26

## 2024-03-24 PROCEDURE — 74177 CT ABD & PELVIS W/CONTRAST: CPT | Mod: 26,MC

## 2024-03-24 RX ORDER — APIXABAN 2.5 MG/1
2.5 TABLET, FILM COATED ORAL EVERY 12 HOURS
Refills: 0 | Status: DISCONTINUED | OUTPATIENT
Start: 2024-03-24 | End: 2024-03-25

## 2024-03-24 RX ORDER — ALPRAZOLAM 0.25 MG
0.5 TABLET ORAL AT BEDTIME
Refills: 0 | Status: DISCONTINUED | OUTPATIENT
Start: 2024-03-24 | End: 2024-03-25

## 2024-03-24 RX ORDER — SODIUM CHLORIDE 9 MG/ML
500 INJECTION INTRAMUSCULAR; INTRAVENOUS; SUBCUTANEOUS ONCE
Refills: 0 | Status: COMPLETED | OUTPATIENT
Start: 2024-03-24 | End: 2024-03-24

## 2024-03-24 RX ORDER — ASPIRIN/CALCIUM CARB/MAGNESIUM 324 MG
81 TABLET ORAL DAILY
Refills: 0 | Status: DISCONTINUED | OUTPATIENT
Start: 2024-03-24 | End: 2024-04-02

## 2024-03-24 RX ORDER — DEXTROSE 50 % IN WATER 50 %
25 SYRINGE (ML) INTRAVENOUS ONCE
Refills: 0 | Status: DISCONTINUED | OUTPATIENT
Start: 2024-03-24 | End: 2024-03-25

## 2024-03-24 RX ORDER — METOPROLOL TARTRATE 50 MG
5 TABLET ORAL ONCE
Refills: 0 | Status: COMPLETED | OUTPATIENT
Start: 2024-03-24 | End: 2024-03-24

## 2024-03-24 RX ORDER — LOSARTAN POTASSIUM 100 MG/1
1 TABLET, FILM COATED ORAL
Refills: 0 | DISCHARGE

## 2024-03-24 RX ORDER — PIPERACILLIN AND TAZOBACTAM 4; .5 G/20ML; G/20ML
3.38 INJECTION, POWDER, LYOPHILIZED, FOR SOLUTION INTRAVENOUS ONCE
Refills: 0 | Status: COMPLETED | OUTPATIENT
Start: 2024-03-24 | End: 2024-03-24

## 2024-03-24 RX ORDER — SODIUM CHLORIDE 9 MG/ML
1000 INJECTION, SOLUTION INTRAVENOUS
Refills: 0 | Status: DISCONTINUED | OUTPATIENT
Start: 2024-03-24 | End: 2024-03-25

## 2024-03-24 RX ORDER — PANTOPRAZOLE SODIUM 20 MG/1
40 TABLET, DELAYED RELEASE ORAL
Refills: 0 | Status: DISCONTINUED | OUTPATIENT
Start: 2024-03-24 | End: 2024-04-02

## 2024-03-24 RX ORDER — DEXTROSE 50 % IN WATER 50 %
12.5 SYRINGE (ML) INTRAVENOUS ONCE
Refills: 0 | Status: DISCONTINUED | OUTPATIENT
Start: 2024-03-24 | End: 2024-03-25

## 2024-03-24 RX ORDER — ACETAMINOPHEN 500 MG
650 TABLET ORAL EVERY 6 HOURS
Refills: 0 | Status: DISCONTINUED | OUTPATIENT
Start: 2024-03-24 | End: 2024-04-02

## 2024-03-24 RX ORDER — DEXTROSE 50 % IN WATER 50 %
15 SYRINGE (ML) INTRAVENOUS ONCE
Refills: 0 | Status: DISCONTINUED | OUTPATIENT
Start: 2024-03-24 | End: 2024-03-24

## 2024-03-24 RX ORDER — GABAPENTIN 400 MG/1
1 CAPSULE ORAL
Refills: 0 | DISCHARGE

## 2024-03-24 RX ORDER — SODIUM CHLORIDE 9 MG/ML
1000 INJECTION INTRAMUSCULAR; INTRAVENOUS; SUBCUTANEOUS ONCE
Refills: 0 | Status: COMPLETED | OUTPATIENT
Start: 2024-03-24 | End: 2024-03-24

## 2024-03-24 RX ORDER — MIRABEGRON 50 MG/1
1 TABLET, EXTENDED RELEASE ORAL
Refills: 0 | DISCHARGE

## 2024-03-24 RX ORDER — ONDANSETRON 8 MG/1
4 TABLET, FILM COATED ORAL EVERY 8 HOURS
Refills: 0 | Status: DISCONTINUED | OUTPATIENT
Start: 2024-03-24 | End: 2024-04-02

## 2024-03-24 RX ORDER — DEXTROSE 50 % IN WATER 50 %
25 SYRINGE (ML) INTRAVENOUS ONCE
Refills: 0 | Status: DISCONTINUED | OUTPATIENT
Start: 2024-03-24 | End: 2024-03-24

## 2024-03-24 RX ORDER — INSULIN LISPRO 100/ML
VIAL (ML) SUBCUTANEOUS AT BEDTIME
Refills: 0 | Status: DISCONTINUED | OUTPATIENT
Start: 2024-03-24 | End: 2024-03-24

## 2024-03-24 RX ORDER — PIPERACILLIN AND TAZOBACTAM 4; .5 G/20ML; G/20ML
3.38 INJECTION, POWDER, LYOPHILIZED, FOR SOLUTION INTRAVENOUS ONCE
Refills: 0 | Status: COMPLETED | OUTPATIENT
Start: 2024-03-25 | End: 2024-03-25

## 2024-03-24 RX ORDER — ACETAMINOPHEN 500 MG
1000 TABLET ORAL ONCE
Refills: 0 | Status: COMPLETED | OUTPATIENT
Start: 2024-03-24 | End: 2024-03-24

## 2024-03-24 RX ORDER — DEXTROSE 50 % IN WATER 50 %
15 SYRINGE (ML) INTRAVENOUS ONCE
Refills: 0 | Status: DISCONTINUED | OUTPATIENT
Start: 2024-03-24 | End: 2024-03-25

## 2024-03-24 RX ORDER — INSULIN LISPRO 100/ML
VIAL (ML) SUBCUTANEOUS AT BEDTIME
Refills: 0 | Status: DISCONTINUED | OUTPATIENT
Start: 2024-03-24 | End: 2024-03-25

## 2024-03-24 RX ORDER — GLUCAGON INJECTION, SOLUTION 0.5 MG/.1ML
1 INJECTION, SOLUTION SUBCUTANEOUS ONCE
Refills: 0 | Status: DISCONTINUED | OUTPATIENT
Start: 2024-03-24 | End: 2024-03-25

## 2024-03-24 RX ORDER — METOPROLOL TARTRATE 50 MG
25 TABLET ORAL DAILY
Refills: 0 | Status: DISCONTINUED | OUTPATIENT
Start: 2024-03-24 | End: 2024-03-24

## 2024-03-24 RX ORDER — DEXTROSE 50 % IN WATER 50 %
12.5 SYRINGE (ML) INTRAVENOUS ONCE
Refills: 0 | Status: DISCONTINUED | OUTPATIENT
Start: 2024-03-24 | End: 2024-03-24

## 2024-03-24 RX ORDER — METOPROLOL TARTRATE 50 MG
1 TABLET ORAL
Refills: 0 | DISCHARGE

## 2024-03-24 RX ORDER — INSULIN GLARGINE 100 [IU]/ML
16 INJECTION, SOLUTION SUBCUTANEOUS AT BEDTIME
Refills: 0 | Status: DISCONTINUED | OUTPATIENT
Start: 2024-03-24 | End: 2024-03-25

## 2024-03-24 RX ORDER — KETOROLAC TROMETHAMINE 30 MG/ML
15 SYRINGE (ML) INJECTION ONCE
Refills: 0 | Status: DISCONTINUED | OUTPATIENT
Start: 2024-03-24 | End: 2024-03-24

## 2024-03-24 RX ORDER — LEVOTHYROXINE SODIUM 125 MCG
175 TABLET ORAL DAILY
Refills: 0 | Status: DISCONTINUED | OUTPATIENT
Start: 2024-03-24 | End: 2024-04-02

## 2024-03-24 RX ORDER — INSULIN LISPRO 100/ML
VIAL (ML) SUBCUTANEOUS
Refills: 0 | Status: DISCONTINUED | OUTPATIENT
Start: 2024-03-24 | End: 2024-03-24

## 2024-03-24 RX ORDER — ALPRAZOLAM 0.25 MG
1 TABLET ORAL
Refills: 0 | DISCHARGE

## 2024-03-24 RX ORDER — VANCOMYCIN HCL 1 G
1000 VIAL (EA) INTRAVENOUS ONCE
Refills: 0 | Status: COMPLETED | OUTPATIENT
Start: 2024-03-24 | End: 2024-03-24

## 2024-03-24 RX ORDER — AMIODARONE HYDROCHLORIDE 400 MG/1
150 TABLET ORAL ONCE
Refills: 0 | Status: COMPLETED | OUTPATIENT
Start: 2024-03-24 | End: 2024-03-24

## 2024-03-24 RX ORDER — PIPERACILLIN AND TAZOBACTAM 4; .5 G/20ML; G/20ML
3.38 INJECTION, POWDER, LYOPHILIZED, FOR SOLUTION INTRAVENOUS EVERY 8 HOURS
Refills: 0 | Status: DISCONTINUED | OUTPATIENT
Start: 2024-03-25 | End: 2024-03-26

## 2024-03-24 RX ORDER — SODIUM CHLORIDE 9 MG/ML
1000 INJECTION, SOLUTION INTRAVENOUS ONCE
Refills: 0 | Status: COMPLETED | OUTPATIENT
Start: 2024-03-24 | End: 2024-03-24

## 2024-03-24 RX ORDER — LANOLIN ALCOHOL/MO/W.PET/CERES
3 CREAM (GRAM) TOPICAL AT BEDTIME
Refills: 0 | Status: DISCONTINUED | OUTPATIENT
Start: 2024-03-24 | End: 2024-04-02

## 2024-03-24 RX ORDER — PIPERACILLIN AND TAZOBACTAM 4; .5 G/20ML; G/20ML
3.38 INJECTION, POWDER, LYOPHILIZED, FOR SOLUTION INTRAVENOUS EVERY 8 HOURS
Refills: 0 | Status: DISCONTINUED | OUTPATIENT
Start: 2024-03-24 | End: 2024-03-24

## 2024-03-24 RX ORDER — INSULIN LISPRO 100/ML
VIAL (ML) SUBCUTANEOUS
Refills: 0 | Status: DISCONTINUED | OUTPATIENT
Start: 2024-03-24 | End: 2024-03-25

## 2024-03-24 RX ORDER — GLUCAGON INJECTION, SOLUTION 0.5 MG/.1ML
1 INJECTION, SOLUTION SUBCUTANEOUS ONCE
Refills: 0 | Status: DISCONTINUED | OUTPATIENT
Start: 2024-03-24 | End: 2024-03-24

## 2024-03-24 RX ORDER — ATORVASTATIN CALCIUM 80 MG/1
1 TABLET, FILM COATED ORAL
Refills: 0 | DISCHARGE

## 2024-03-24 RX ADMIN — Medication 400 MILLIGRAM(S): at 01:07

## 2024-03-24 RX ADMIN — Medication 650 MILLIGRAM(S): at 23:55

## 2024-03-24 RX ADMIN — Medication 1000 MILLIGRAM(S): at 09:47

## 2024-03-24 RX ADMIN — SODIUM CHLORIDE 500 MILLILITER(S): 9 INJECTION INTRAMUSCULAR; INTRAVENOUS; SUBCUTANEOUS at 09:19

## 2024-03-24 RX ADMIN — PIPERACILLIN AND TAZOBACTAM 25 GRAM(S): 4; .5 INJECTION, POWDER, LYOPHILIZED, FOR SOLUTION INTRAVENOUS at 17:37

## 2024-03-24 RX ADMIN — Medication 25 MILLIGRAM(S): at 03:42

## 2024-03-24 RX ADMIN — SODIUM CHLORIDE 1000 MILLILITER(S): 9 INJECTION INTRAMUSCULAR; INTRAVENOUS; SUBCUTANEOUS at 01:07

## 2024-03-24 RX ADMIN — AMIODARONE HYDROCHLORIDE 150 MILLIGRAM(S): 400 TABLET ORAL at 07:26

## 2024-03-24 RX ADMIN — Medication 5 MILLIGRAM(S): at 17:23

## 2024-03-24 RX ADMIN — Medication 0.5 MILLIGRAM(S): at 04:23

## 2024-03-24 RX ADMIN — SODIUM CHLORIDE 1000 MILLILITER(S): 9 INJECTION INTRAMUSCULAR; INTRAVENOUS; SUBCUTANEOUS at 02:34

## 2024-03-24 RX ADMIN — Medication 5 MILLIGRAM(S): at 08:43

## 2024-03-24 RX ADMIN — AMIODARONE HYDROCHLORIDE 600 MILLIGRAM(S): 400 TABLET ORAL at 06:47

## 2024-03-24 RX ADMIN — Medication 4: at 10:30

## 2024-03-24 RX ADMIN — INSULIN GLARGINE 16 UNIT(S): 100 INJECTION, SOLUTION SUBCUTANEOUS at 21:57

## 2024-03-24 RX ADMIN — PIPERACILLIN AND TAZOBACTAM 200 GRAM(S): 4; .5 INJECTION, POWDER, LYOPHILIZED, FOR SOLUTION INTRAVENOUS at 14:37

## 2024-03-24 RX ADMIN — Medication 5 MILLIGRAM(S): at 04:24

## 2024-03-24 RX ADMIN — ONDANSETRON 4 MILLIGRAM(S): 8 TABLET, FILM COATED ORAL at 17:37

## 2024-03-24 RX ADMIN — SODIUM CHLORIDE 1000 MILLILITER(S): 9 INJECTION, SOLUTION INTRAVENOUS at 04:52

## 2024-03-24 RX ADMIN — Medication 650 MILLIGRAM(S): at 17:38

## 2024-03-24 RX ADMIN — Medication 5 MILLIGRAM(S): at 06:13

## 2024-03-24 RX ADMIN — Medication 0.5 MILLIGRAM(S): at 21:27

## 2024-03-24 RX ADMIN — Medication 250 MILLIGRAM(S): at 03:43

## 2024-03-24 RX ADMIN — PIPERACILLIN AND TAZOBACTAM 200 GRAM(S): 4; .5 INJECTION, POWDER, LYOPHILIZED, FOR SOLUTION INTRAVENOUS at 02:30

## 2024-03-24 RX ADMIN — Medication 1000 MILLIGRAM(S): at 01:37

## 2024-03-24 RX ADMIN — Medication 8: at 17:22

## 2024-03-24 RX ADMIN — APIXABAN 2.5 MILLIGRAM(S): 2.5 TABLET, FILM COATED ORAL at 17:23

## 2024-03-24 RX ADMIN — Medication 30 MILLILITER(S): at 20:38

## 2024-03-24 RX ADMIN — Medication 2: at 21:58

## 2024-03-24 RX ADMIN — Medication 400 MILLIGRAM(S): at 09:23

## 2024-03-24 RX ADMIN — Medication 650 MILLIGRAM(S): at 18:08

## 2024-03-24 NOTE — CHART NOTE - NSCHARTNOTEFT_GEN_A_CORE
Search Terms: Christy Carias, 1950Search Date: 03/24/2024 16:47:50 PM  Searching on behalf of: [address] [place] [address]  The Drug Utilization Report below displays all of the controlled substance prescriptions, if any, that your patient has filled in the last twelve months. The information displayed on this report is compiled from pharmacy submissions to the Department, and accurately reflects the information as submitted by the pharmacies.    This report was requested by: Rob Atkins | Reference #: [id number]    Practitioner Count: 1  Pharmacy Count: 1  Current Opioid Prescriptions: 0  Current Benzodiazepine Prescriptions: 1  Current Stimulant Prescriptions: 0      Patient Demographic Information (PDI)       PDI	First Name	Last Name	Birth Date	Gender	Street Address	[facility]	Zip Code  A	Christy Carias	1950	Female	[address]	[facility]	[id number]    Prescription Information      PDI Filter:    PDI	Current Rx	Drug Type	Rx Written	Rx Dispensed	Drug	Quantity	Days Supply	Prescriber Name	Prescriber HEMAL #	Payment Method	Dispenser  A	Y	B	03/06/2024	03/08/2024	alprazolam 2 mg tablet	90	30	Danny Gray MD	[id number]	Medicare	[id number]  A	N	B	12/27/2023	12/28/2023	alprazolam 2 mg tablet	90	30	Danny Gray MD	[id number]	Medicare	[id number]  A	N	B	11/07/2023	11/07/2023	alprazolam 2 mg tablet	90	30	Danny Gray MD	[id number]	Medicare	[id number]  A	N	B	06/12/2023	06/15/2023	alprazolam 2 mg tablet	90	30	Danny Gray MD	[id number]	Medicare	[id number]  A	N	B	05/03/2023	05/04/2023	alprazolam 2 mg tablet	90	30	Danny Gray MD	[id number]	Medicare	[id number]

## 2024-03-24 NOTE — H&P ADULT - PROBLEM SELECTOR PLAN 2
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

## 2024-03-24 NOTE — H&P ADULT - PROBLEM SELECTOR PLAN 4
Previously on eliquis 2.5 mg BID  - per pt, eliquis stopped 07/2023 for planned hysterectomy, was never advised to resume and therefor has been off AC since then  - Afib RVR up to 160s this admission  - s/p lopressor 5 mg IVP x3, amio 150 mg IV x1 in ED  - likely exacerbated by current sepsis    Plan:  - restart eliquis 2.5 mg BID  - tachycardia likely in part 2/2 sepsis, trend as underlying infection improves  - consider restarting metoprolol PO   - K >4, Mg >2 Previously on eliquis 2.5 mg BID  - per pt, eliquis stopped 07/2023 for planned hysterectomy, was never advised to resume and therefor has been off AC since then  - Afib RVR up to 160s this admission  - s/p lopressor 5 mg IVP x3, amio 150 mg IV x1 in ED  - likely exacerbated by current sepsis    Plan:  - restart eliquis 2.5 mg BID  - tachycardia likely in part 2/2 sepsis, trend as underlying infection improves  - avoid beta blockers while currently septic  - K >4, Mg >2 Previously on eliquis 2.5 mg BID  - per pt, eliquis stopped 07/2023 for planned hysterectomy, was never advised to resume and therefor has been off AC since then  - Afib RVR up to 160s this admission  - s/p lopressor 5 mg IVP x3, amio 150 mg IV x1 in ED  - likely 2/2 sepsis     Plan:  - restart eliquis 2.5 mg BID  - tachycardia likely in part 2/2 sepsis, trend as underlying infection improves   - avoid beta blockers ISO current sepsis   - K >4, Mg >2

## 2024-03-24 NOTE — H&P ADULT - ATTENDING COMMENTS
Pt  seen and examined. Briefly,73F with  paroxysmal Afib,HTN, HLD, DMT2, spinal stenosis, Anxiety  who presents with c/o  n/v, diarrhea, malaise x 3 days. On exam, pt is febrile, tachycardic, found to be in afib with RVR. Abd with suprapubic tenderness, + CVA tenderness bilaterally, R>L .   Labs notable for leukocytosis, UA suggestive of UTI, DEEPA, elevated lactate. 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  Pt admitted for sepsis 2/2 pyelonephritis, Afib w/RVR.  s/p IVF resuscitation 3.5L   c/w Zosyn  f/up bld cx  restart eliquis 2.5 mg BID for Afib  hold antihypertensives and BB in setting of sepsis and hypotension  Telemetry monitoring  monitor BMP , f/up repeat lactate

## 2024-03-24 NOTE — CONSULT NOTE ADULT - ASSESSMENT
73-year-old female history of A-fib on Eliquis?,  HTN, HLD, DM, anxiety, presenting to the ED for 2 days of weakness, not getting out of bed, chills. shortness of breath, diarrhea, nausea and vomiting. NO UTI symptoms.  On physical with mild R CVAT. Pt is febrile to 103 in the ED, tachy int he 140's, last BP: 127/71  WBC: 17 Cr: 1.54 UA with moderate LE, 237 wbc, few bacteria. urine glucose>1000.   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     - NO acute  intervention  - Continue empiric abx   - f/u urine culture   - Trend wbc and Cr   - Maintain aguiar catheter for maximal drainage   - Rest of care per primary team     d/w Resident Dr. High    73-year-old female history of A-fib on Eliquis?,  HTN, HLD, DM, anxiety, presenting to the ED for 2 days of weakness, not getting out of bed, chills. shortness of breath, diarrhea, nausea and vomiting. NO UTI symptoms.  On physical with mild R CVAT. Pt is febrile to 103 in the ED, tachy int he 140's, last BP: 127/71  WBC: 17 Cr: 1.54 UA with moderate LE, 237 wbc, few bacteria. urine glucose>1000.   CTAP shows mild right hydro and air within collecting system. No change in enhancement of the kidney.     Plan  - NO acute  intervention  - Continue empiric abx   - f/u urine culture   - Trend wbc and Cr   - Maintain aguiar catheter for maximal drainage   - can consider repeat imaging if patient fails to improve clinically to assess for possible abscess.   - Rest of care per primary team   - Please call urology for further questions or concerns or patient is not improving on IV abx.       Discussed with Dr. Cooper   73-year-old female history of A-fib on Eliquis?,  HTN, HLD, DM, anxiety, presenting to the ED for 2 days of weakness, not getting out of bed, chills. shortness of breath, diarrhea, nausea and vomiting. NO UTI symptoms.  On physical with mild R CVAT. Pt is febrile to 103 in the ED, tachy int he 140's, last BP: 127/71  WBC: 17 Cr: 1.54 UA with moderate LE, 237 wbc, few bacteria. urine glucose>1000.   CTAP shows mild right hydro and air within collecting system. No change in enhancement of the kidney. Imaging reviewed, hydro likely reactive with no change in the enhancement between the left and right kidney.     Plan  - NO acute  intervention  - Continue empiric abx   - f/u urine culture   - Trend wbc and Cr   - Maintain aguiar catheter for maximal drainage   - can consider repeat imaging if patient fails to improve clinically to assess for possible abscess.   - Rest of care per primary team   - Please call urology for further questions or concerns or patient is not improving on IV abx.       Discussed with Dr. Cooper

## 2024-03-24 NOTE — ED PROVIDER NOTE - ATTENDING CONTRIBUTION TO CARE
MD Francisco:  patient seen and evaluated personally.   I agree with the History & Physical,  Impression & Plan other than what was detailed in my note.  MD Francisco  73-year-old female history of paroxysmal A-fib, hypertension, hyperlipidemia, diabetes, presenting to the emergency department 3 days of feeling generally unwell, weak.  She is having mild abdominal discomfort, she is having diarrhea as well.  She has been having chills but not any fevers.  She been having some nausea.  Positive shortness of breath but no chest pain.  No back pain.  No history of blood clots.  She feels warm on exam, she is mildly tachycardic, she is ill-appearing, will get rectal temp, clear to auscultation bilaterally she has no wheezing rales or rhonchi, no history of COPD or asthma that is reported, no history of pulmonary embolism.  She has a large habitus her abdominal exam is somewhat limited but she does have tenderness in the lower abdomen no leg swelling or edema noted.  Differential is broad at this point in time.  With cough chills shortness of breath potential UTI/viral illness/pneumonia/pulmonary embolism, ACS seems unlikely but given short of breath will send troponin proBNP, will get CT rule out PE as well as CT abdomen pelvis look for signs of infectious etiology that could be causing fever any surgical pathology.

## 2024-03-24 NOTE — H&P ADULT - NSICDXPASTMEDICALHX_GEN_ALL_CORE_FT
PAST MEDICAL HISTORY:  AF (atrial fibrillation) on Xarelto    Anxiety     Blind right eye     CAD (coronary artery disease) minimal CAD per cath result in 2014    Constipation     Depression     Edema of lower extremity     Former smoker     Gastritis     H/O Helicobacter infection     Hiatal hernia     History of TIAs     [place] (hard of hearing)     HTN (hypertension)     Hypercholesterolemia     Hypothyroid     Incontinence     Loss of vision right eye    Morbid obesity     Murmur     Neuropathy     OAB (overactive bladder)     Palpitations     Risk factors for obstructive sleep apnea     Spinal stenosis     Thickened endometrium     Thyroid nodule     Type 2 diabetes mellitus     Uterine cancer     Uterine cyst     Varicose veins

## 2024-03-24 NOTE — ED PROVIDER NOTE - PROGRESS NOTE DETAILS
Attending Maryam:  urology consulted for emphysematous pyelo, pt given broad abx when fever, tachycardia noted, she is ill appearing, tachypnic, repositioned in bed now more comfortable, did have soft bp earlier w/ maps 60's, no chf on prior echo.lungs cta b/l

## 2024-03-24 NOTE — PATIENT PROFILE ADULT - FALL HARM RISK - HARM RISK INTERVENTIONS

## 2024-03-24 NOTE — H&P ADULT - PROBLEM SELECTOR PROBLEM 4
Atrial fibrillation with RVR
Patient requests all Lab, Cardiology, and Radiology Results on their Discharge Instructions

## 2024-03-24 NOTE — ED PROVIDER NOTE - PHYSICAL EXAMINATION
Const: Well-nourished, Well-developed, appearing stated age.  Eyes: no conjunctival injection, and symmetrical lids.  HEENT: Head NCAT, no lesions. Atraumatic external nose and ears. Moist MM.  Neck: Symmetric, trachea midline.   CVS: +S1/S2  RESP: Unlabored respiratory effort. Clear to auscultation bilaterally.  GI: Nontender/Nondistended  MSK: Normocephalic/Atraumatic,  No midline tenderness.  Skin: Warm, dry and intact.   Neuro: CNs II-XII grossly intact. Motor & Sensation grossly intact.  Rectal tone intact.  Psych: Awake, Alert, & Oriented (AAO) x3. Appropriate mood and affect.

## 2024-03-24 NOTE — H&P ADULT - PROBLEM SELECTOR PLAN 1
T 103.1, WBC 17.5k with neutrophilic predominance, HR 140s  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, mild hydroureteronephritis to bladder   - CT chest with no evidence of pneumonia, CXR with bibasilar atelectasis  - sepsis most likely 2/2 pyelonephritis  - s/p 3.5 L IVF   - s/p vanc/zosyn in ED  - f/u blood cultures, urine cultures  - prior urine cultures reviewed, E coli and Proteus mirabilis, pansensitive    Plan:  - d/c vancomycin  - continue empiric zosyn, narrow as able pending culture/susceptibility results  - f/u blood, urine cultures   - adequately fluid resuscitated at this time, but no contraindication to additional fluids if BPs downtrending  - if no clinical improvement, repeat abdominal imaging to r/o abscess  - trend WBC, fever curve

## 2024-03-24 NOTE — H&P ADULT - PROBLEM SELECTOR PLAN 10
- multi-level disc degeneration and lumbar spinal stenosis seen on CT  - no acute findings on CT  - pain control as needed

## 2024-03-24 NOTE — PATIENT PROFILE ADULT - FUNCTIONAL ASSESSMENT - BASIC MOBILITY 6.
3-calculated by average/Not able to assess (calculate score using [facility] averaging method)

## 2024-03-24 NOTE — ED ADULT NURSE REASSESSMENT NOTE - NS ED NURSE REASSESS COMMENT FT1
folley catheter placed as per MD order. 2 RN and ED tech at bedside. pt tolerated well. 200cc of yellow urine drained. sediment noted in urine.
Received a 73-year-old female history of A-fib on Eliquis?, aaox4 ambulatory with 1 assist in the ED with PMHx  HTN, HLD, DM, anxiety, presenting to the ED for 2 days of weakness, not getting out of bed.   patient having multiple episodes of nonbloody diarrhea nausea but no vomiting. Patient was febrile in the ED. Patient c/o nausea, PA made aware. Patient pending admission. Tachycardic on monitor.

## 2024-03-24 NOTE — H&P ADULT - PROBLEM SELECTOR PLAN 6
Singulair        Last Written Prescription Date: 4/17/17  Last Fill Quantity: 90, # refills: 1    Last Office Visit with [facility], [facility] or [facility] prescribing provider:  10/11/17   Future Office Visit:       Date of Last Asthma Action Plan Letter:   Asthma Action Plan Q1 Year    Topic Date Due     Asthma Action Plan - yearly  11/08/2017      Asthma Control Test:   ACT Total Scores 4/18/2017   ACT TOTAL SCORE -   ASTHMA ER VISITS -   ASTHMA HOSPITALIZATIONS -   ACT TOTAL SCORE (Goal Greater than or Equal to 20) 11   In the past 12 months, how many times did you visit the emergency room for your asthma without being admitted to the hospital? 1   In the past 12 months, how many times were you hospitalized overnight because of your asthma? 0       Date of Last Spirometry Test:   No results found for this or any previous visit.             last a1c 8.2% in 07/2023  - glucose 300s-400 this admission  - home meds: Tresiba 20 units QHS, novolog sliding scale (unclear how patient determines dose)    Plan:   - lantus 16 units QHS  - moderate dose admelog ISS TID with meals + QHS  - f/u a1c

## 2024-03-24 NOTE — H&P ADULT - NSHPPHYSICALEXAM_GEN_ALL_CORE
T(C): 37.9 (03-24-24 @ 10:22), Max: 39.5 (03-24-24 @ 01:16)  HR: 134 (03-24-24 @ 12:15) (101 - 150)  BP: 97/71 (03-24-24 @ 12:15) (86/62 - 127/71)  RR: 22 (03-24-24 @ 10:08) (22 - 36)  SpO2: 98% (03-24-24 @ 10:08) (88% - 100%)    CONSTITUTIONAL: Non-toxic, no apparent distress  EYES: PERRLA and symmetric, EOMI, No conjunctival or scleral injection, non-icteric  ENMT: Oral mucosa with moist membranes. Normal dentition; no pharyngeal injection or exudates  NECK: Supple, symmetric and without tracheal deviation   RESP: Mildly tachypneic, no use of accessory muscles; CTA b/l, no WRR  CV: irregularly irregular, HRs 120s-140s, no MRG; no JVD; no peripheral edema  GI: Soft, TTP over suprapubic region, otherwise abdomen nontender. No rebound, no guarding, no palpable masses  : + CVA tenderness bilaterally, R>L. [place] in place, yellow urine in bag   MSK: No digital clubbing or cyanosis; normal muscle bulk and tone, no gross deformities  SKIN: No rashes or ulcers noted; no subcutaneous nodules or induration palpable  NEURO: CN II-XII intact; sensation intact in upper and lower extremities b/l to light touch, no gross focal deficits   PSYCH: Appropriate insight/judgment; A+O x 3, mood and affect appropriate, recent/remote memory intact

## 2024-03-24 NOTE — H&P ADULT - PROBLEM SELECTOR PLAN 9
Previously on atorvastatin, per pt no longer taking this medication & atorvastatin listed as allergy but does not recall reaction  - review outpatient records/clarify with daughter regarding indication for stopping statin  - hold statin for now

## 2024-03-24 NOTE — ED ADULT NURSE REASSESSMENT NOTE - NSFALLRISKINTERV_ED_ALL_ED
Assistance with ambulation/Communicate fall risk and risk factors to all staff, patient, and family/Provide visual cue: yellow wristband, yellow gown, etc/Reinforce activity limits and safety measures with patient and family/Call bell, personal items and telephone in reach/Instruct patient to call for assistance before getting out of bed/chair/stretcher/Non-slip footwear applied when patient is off stretcher/[place] to call system/Physically safe environment - no spills, clutter or unnecessary equipment/Purposeful Proactive Rounding/Room/bathroom lighting operational, light cord in reach

## 2024-03-24 NOTE — CONSULT NOTE ADULT - SUBJECTIVE AND OBJECTIVE BOX
HPI:  73-year-old female history of A-fib on Eliquis?,  HTN, HLD, DM, anxiety, presenting to the ED for 2 days of weakness, not getting out of bed, chills. shortness of breath, diarrhea, nausea and vomiting. NO UTI symptoms. Last seen Dr. Moy in may 2023 for OAB and urinary incontinence: started on Myrbetriq     PAST MEDICAL & SURGICAL HISTORY:  HTN (hypertension)      Murmur      Morbid obesity      Palpitations      Former smoker      Hypothyroid      CAD (coronary artery disease)  minimal CAD per cath result in 2014      Hypercholesterolemia      Gastritis      Depression      Anxiety      AF (atrial fibrillation)  on Xarelto      Hiatal hernia      Neuropathy      Uterine cyst      Incontinence      Thickened endometrium      History of TIAs      Loss of vision  right eye      Blind right eye      [place] (hard of hearing)      Uterine cancer      Type 2 diabetes mellitus      Constipation      Spinal stenosis      Edema of lower extremity      Varicose veins      Thyroid nodule      OAB (overactive bladder)      Risk factors for obstructive sleep apnea      H/O Helicobacter infection      S/P cataract extraction and insertion of intraocular lens  bilateral > 15 years ago      Varicose vein  with stripping b/l      History of angioplasty of vein  right leg 2016      H/O thyroidectomy        FAMILY HISTORY:  Family history of stroke (Mother)  mom    Family history of hypertension (Father)    Family history of breast cancer (Mother)     No known  malignancy   SOCIAL HISTORY: Retired   Tobacco hx: smoker/nonsmoker   MEDICATIONS  (STANDING):  metoprolol tartrate Injectable 5 milliGRAM(s) IV Push Once    MEDICATIONS  (PRN):    Allergies    Lipitor (Nausea)  latex (Rash)    Intolerances        REVIEW OF SYSTEMS: Pertinent positives and negatives as stated in HPI, otherwise negative    Vital signs  T(C): 36.7 (03-24-24 @ 05:27), Max: 39.5 (03-24-24 @ 01:16)  HR: 138 (03-24-24 @ 05:27)  BP: 127/71 (03-24-24 @ 05:27)  SpO2: 100% (03-24-24 @ 05:27)  Wt(kg): --    Physical Exam  Gen: uncomfortable   HEENT: normocephalic, atraumatic, no scleral icterus  CV: S1 S2, RRR,  no JVD  Abd: Soft, RLQ and mild R CVAT.   MSK:  No CVAT, Moving all extremities, full ROM in all extremities, No edema present  NEURO: A&Ox3, no focal neurological deficits, CN 2-12 grossly intact  SKIN: warm, dry, no rash.    LABS:        03-24 @ 02:31    WBC 17.77 / Hct 34.4  / SCr --       03-24 @ 01:15    WBC --    / Hct --    / SCr 1.54     03-24    131<L>  |  92<L>  |  26<H>  ----------------------------<  415<H>  4.7   |  19<L>  |  1.54<H>    Ca    8.2<L>      24 Mar 2024 01:15    TPro  6.8  /  Alb  3.4  /  TBili  2.7<H>  /  DBili  x   /  AST  45<H>  /  ALT  19  /  AlkPhos  89  03-24      Urinalysis Basic - ( 24 Mar 2024 05:04 )    Color: Yellow / Appearance: Cloudy / SG: >1.030 / pH: x  Gluc: x / Ketone: Negative mg/dL  / Bili: Negative / Urobili: 0.2 mg/dL   Blood: x / Protein: 100 mg/dL / Nitrite: Negative   Leuk Esterase: Moderate / RBC: 14 /HPF /  /HPF   Sq Epi: x / Non Sq Epi: 1 /HPF / Bacteria: Few /HPF        Urine Cx:   Blood Cx:    Radiology:  < from: CT Abdomen and Pelvis w/ IV Cont (03.24.24 @ 03:08) >    ACC: [id number] EXAM:  CT ANGIO CHEST PULM ART [facility]   ORDERED BY: TIFFANIE   [email or web address]     ACC: [id number] EXAM:  CT REFORM SPINE L IC   ORDERED BY: TIFFANIE PADILLA     ACC: [id number] EXAM:  CT ABDOMEN AND PELVIS IC   ORDERED BY: GRACE KANG     ACC: [id number] EXAM:  CT REFORM SPINE T  IC   ORDERED BY: TIFFANIE PADILLA     PROCEDURE DATE:  03/24/2024          INTERPRETATION:  CLINICAL INFORMATION: Abdominal pain, vomiting, chills,   fever, cough, diarrhea, back pain. Evaluate for pneumonia, pulmonary   embolism, intra-abdominal pathology.    COMPARISON: Coronary CT 6/21/2023, CT chest 4/7/2016, CT abdomen pelvis   6/14/2023    CONTRAST/COMPLICATIONS:  IV Contrast: Omnipaque 350 90 cc administered   10 cc discarded  Oral Contrast: NONE  Complications: None reported at time of study completion    PROCEDURE:  CT Angiography of the Chest was performed followed by portal venous phase   imaging of the Abdomen and Pelvis.  Sagittal and coronal reformats were performed as well as 3D (MIP)   reconstructions.    FINDINGS:  CHEST:  LUNGS AND LARGE AIRWAYS: Patent central airways. Bibasilar dependent   scattered areas of linear atelectasis.  PLEURA: No pleural effusion.  VESSELS: Aortic and coronary artery atherosclerotic calcifications. No   main or lobar pulmonary embolism. Limited evaluation of the few   predominantly upper lobe segmental and multiple subsegmental branches   secondary to respiratory motion.  HEART: Heart size is normal. No pericardial effusion.  MEDIASTINUM AND TA: No lymphadenopathy.  CHEST WALL AND LOWER NECK: Thyroidectomy..    ABDOMEN AND PELVIS:  LIVER: Within normal limits.  BILE DUCTS: Normal caliber.  GALLBLADDER: Within normal limits.  SPLEEN: Within normal limits.  PANCREAS: Diffuse fatty atrophy.  ADRENALS: Within normal limits.  KIDNEYS/URETERS: Mild right hydroureteronephrosis with urothelial   enhancement and air within the collecting system. Multiple wedge-shaped   areas of hypoenhancement within the right kidney and surrounding right   perinephric edema. Bilateral subcentimeter hypodensities, too small to   characterize. The left kidney enhances symmetrically. No left   hydronephrosis. No obstructing nephrolithiasis.    BLADDER: Wall thickening versus underdistention.  REPRODUCTIVE ORGANS: Hysterectomy.    BOWEL: No bowel obstruction. Under distended colon limits complete   evaluation. Appendix is normal.  PERITONEUM: Trace ascites in the right paracolic gutter.  VESSELS: Atherosclerotic changes.  RETROPERITONEUM/LYMPH NODES: Bilateral inguinallymphadenopathy, likely   reactive. No retroperitoneal lymphadenopathy.  ABDOMINAL WALL: Within normal limits.  BONES: Degenerative changes.    THORACIC AND LUMBAR SPINE REFORMATS:  ALIGNMENT:  No subluxations. Trace retrolisthesis of L5 on S1.  VERTEBRAL BODIES:  Normal in height. No fracture. Multilevel degenerative   spondylosis and degenerative disc disease is present. Findings include   small marginal osteophytes, loss of normal disc space height with   endplate sclerosis and vacuum disc phenomenon, and lower lumbar spine   facet joint space compartment narrowing with hypertrophic osteophytes at   L4-L5.  SPINAL CANAL AND NEURAL FORAMINA: No significant spinal canal or neural   foraminal stenosis.  SOFT TISSUES: Disc protrusions at L3-O8T0-K4 and L5-S1 resulting in at   least mild spinal canal stenosis. At least mild bilateral neuroforaminal   stenosis at multiple levels of lower lumbar spine. No high degree   neuroforaminal or central canal compromise.  MISCELLANEOUS:  None.    IMPRESSION:  CT CHEST:  Limited by respiratory motion. No gross pulmonary embolism. No pneumonia.    CT ABDOMEN AND PELVIS:  Right pyelonephritis with emphysematous pyelitis and mild   hydroureteronephrosis to the bladder.    CT THORACIC AND LUMBAR SPINE:  No acute fracture or traumatic malalignment.  Multilevel degenerative changes above.    --- End of Report ---    < end of copied text >

## 2024-03-24 NOTE — ED PROVIDER NOTE - OBJECTIVE STATEMENT
73-year-old female history of A-fib on Eliquis?,  HTN, HLD, DM, anxiety, presenting to the ED for 2 days of weakness, not getting out of bed.   patient having multiple episodes of nonbloody diarrhea nausea but no vomiting. patient denying chest pain, trouble breathing, abdominal pain, dysuria, hematuria.

## 2024-03-24 NOTE — H&P ADULT - NSHPLABSRESULTS_GEN_ALL_CORE
.  LABS:                         11.5   17.77 )-----------( 204      ( 24 Mar 2024 02:31 )             34.4     03-24    131<L>  |  92<L>  |  26<H>  ----------------------------<  415<H>  4.7   |  19<L>  |  1.54<H>    Ca    8.2<L>      24 Mar 2024 01:15    TPro  6.8  /  Alb  3.4  /  TBili  2.7<H>  /  DBili  x   /  AST  45<H>  /  ALT  19  /  AlkPhos  89  03-24      Urinalysis Basic - ( 24 Mar 2024 05:04 )    Color: Yellow / Appearance: Cloudy / SG: >1.030 / pH: x  Gluc: x / Ketone: Negative mg/dL  / Bili: Negative / Urobili: 0.2 mg/dL   Blood: x / Protein: 100 mg/dL / Nitrite: Negative   Leuk Esterase: Moderate / RBC: 14 /HPF /  /HPF   Sq Epi: x / Non Sq Epi: 1 /HPF / Bacteria: Few /HPF        Lactate, Blood: 3.0 mmol/L (03-24 @ 06:19)      RADIOLOGY, EKG & ADDITIONAL TESTS: Reviewed.

## 2024-03-24 NOTE — H&P ADULT - PROBLEM SELECTOR PLAN 5
Not on O2 at baseline  - on 2L NC this admission  - CXR with bibasilar atelectasis, no consolidations  - CT chest with clear lungs  - possibly ISO Afib RVR  - no evidence of PNA, ACS, PE, CHF based on labs/imaging   - wean O2 as tolerated

## 2024-03-24 NOTE — H&P ADULT - NSHPREVIEWOFSYSTEMS_GEN_ALL_CORE
REVIEW OF SYSTEMS:    CONSTITUTIONAL: + weakness, fever, chills   EYES/ENT: No visual changes;  No vertigo or throat pain   NECK/BACK: No pain or stiffness. + back pain (stable from baseline)   RESPIRATORY: No cough, wheezing, hemoptysis; No shortness of breath  CARDIOVASCULAR: No chest pain or palpitations  GASTROINTESTINAL:  + suprapubic pain. No epigastric pain. + Nausea, vomiting. No hematemesis. + diarrhea. No melena or hematochezia.  GENITOURINARY: + dysuria, no frequency or hematuria.   NEUROLOGICAL: No numbness or weakness  SKIN: No itching, rashes

## 2024-03-24 NOTE — H&P ADULT - ASSESSMENT
73-year-old female with history of paroxysmal A-fib (previously on eliquis, d/c'd 7/2023), hypertension, hyperlipidemia, diabetes, spinal stenosis, anxiety, p/w 3 days of n/v, diarrhea, malaise. Meeting SIRS criteria and found to have +UA with evidence of pyelonephritis/emphysematous pyelitis on CT. Course c/b Afib RVR.

## 2024-03-24 NOTE — H&P ADULT - NSICDXPASTSURGICALHX_GEN_ALL_CORE_FT
PAST SURGICAL HISTORY:  H/O thyroidectomy     H/O: hysterectomy     History of angioplasty of vein right leg 2016    S/P cataract extraction and insertion of intraocular lens bilateral > 15 years ago    Varicose vein with stripping b/l

## 2024-03-24 NOTE — ED PROVIDER NOTE - CLINICAL SUMMARY MEDICAL DECISION MAKING FREE TEXT BOX
73-year-old female history of A-fib on Eliquis?,  HTN, HLD, DM, anxiety, presenting to the ED for 2 days of weakness, not getting out of bed.   patient having multiple episodes of nonbloody diarrhea nausea but no vomiting. patient denying chest pain, trouble breathing, abdominal pain, dysuria, hematuria.   will get CT belly, labs, sepsis workup.

## 2024-03-24 NOTE — H&P ADULT - HISTORY OF PRESENT ILLNESS
73-year-old female history of A-fib (previously on eliquis, off since 7/2023 after hysterectomy),  HTN, HLD, DM, anxiety, presenting to the ED for 2 days of weakness, not getting out of bed.  Patient having multiple episodes of nonbloody diarrhea, nausea, occasional vomiting (last episode 3/23). Also complains of general malaise, increased fatigue, chills at home x2-3 days. Initially presented to Urgent Care and was directed to present to ED for further evaluation. Denies chest pain, dyspnea, orthopnea, abdominal pain, hematemesis, melena/hematochezia, nasal congestion, sore throat. Endorses flank pain, suprapubic and inguinal pain. Endorsing back pain, chronic 2/2 spinal stenosis.     In ED, initially with T 99.9, , BP 120s/80s, SpO2 95% on RA -> then developed fevers with T 103.1, Afib on tele with HRs 140s-150s, BPs as low as 80s/60s. S/p IVF bolus 3.5 L total, acetaminophen, vanc/zosyn x1, lopressor 5 mg IV x3 -> amio 150 mg x1. Subsequent improvement in BPs and fevers,   73-year-old female history of A-fib (previously on eliquis, off since 7/2023 after hysterectomy),  HTN, HLD, DM, anxiety, presenting to the ED for 2 days of weakness, not getting out of bed.  Patient having multiple episodes of nonbloody diarrhea, nausea, occasional vomiting (last episode 3/23). Also complains of general malaise, increased fatigue, chills at home x2-3 days. Initially presented to Urgent Care and was directed to present to ED for further evaluation. Denies chest pain, dyspnea, orthopnea, abdominal pain, hematemesis, melena/hematochezia, nasal congestion, sore throat. Endorses flank pain, suprapubic and inguinal pain. Endorsing back pain, chronic 2/2 spinal stenosis.     In ED, initially with T 99.9, , BP 120s/80s, SpO2 95% on RA -> then developed fevers with T 103.1, Afib on tele with HRs 140s-150s, BPs as low as 80s/60s. S/p IVF bolus 3.5 L total, acetaminophen, vanc/zosyn x1, lopressor 5 mg IV x3 -> amio 150 mg x1. Subsequent improvement in BPs and fevers, also endorses some improvement in nausea. No episodes of emesis since presentation to ED.

## 2024-03-24 NOTE — H&P ADULT - PROBLEM SELECTOR PLAN 3
SCr on admission 1.54  - baseline Cr 0.70 in 7/2023  - per patient, no known history of CKD  - likely prerenal ISO sepsis, pyelo; +/- ATN 2/2 hemodynamic instability   - s/p fluid resuscitation in ED  - trend SCr

## 2024-03-24 NOTE — ED PROVIDER NOTE - NSICDXPASTMEDICALHX_GEN_ALL_CORE_FT
PAST MEDICAL HISTORY:  AF (atrial fibrillation) on Xarelto    Anxiety     Blind right eye     CAD (coronary artery disease) minimal CAD per cath result in 2014    Constipation     Depression     Edema of lower extremity     Former smoker     Gastritis     H/O Helicobacter infection     Hiatal hernia     History of TIAs     [place] (hard of hearing)     HTN (hypertension)     Hypercholesterolemia     Hypothyroid     Incontinence     Loss of vision right eye    Morbid obesity     Murmur     Neuropathy     OAB (overactive bladder)     Palpitations     Risk factors for obstructive sleep apnea     Spinal stenosis     Thickened endometrium     Thyroid nodule     Type 2 diabetes mellitus     Uterine cancer     Uterine cyst     Varicose veins

## 2024-03-24 NOTE — H&P ADULT - PROBLEM SELECTOR PLAN 12
Diet: CC  DVT:   Dispo: pending course, PT Diet: CC  DVT: eliquis 2.5 mg BID   Dispo: pending course, PT

## 2024-03-24 NOTE — H&P ADULT - PROBLEM SELECTOR PLAN 7
Na 131 on admission, baseline 140s  - corrected for glucose 415 - > Na 136-139  - likely primarily pseudohyponatremia ISO hyperglycemia, + decreased PO intake  - trend

## 2024-03-25 DIAGNOSIS — A41.50 GRAM-NEGATIVE SEPSIS, UNSPECIFIED: ICD-10-CM

## 2024-03-25 DIAGNOSIS — F41.9 ANXIETY DISORDER, UNSPECIFIED: ICD-10-CM

## 2024-03-25 DIAGNOSIS — A41.9 SEPSIS, UNSPECIFIED ORGANISM: ICD-10-CM

## 2024-03-25 LAB
A1C WITH ESTIMATED AVERAGE GLUCOSE RESULT: 9.8 % — HIGH (ref 4–5.6)
ALBUMIN SERPL ELPH-MCNC: 2.5 G/DL — LOW (ref 3.3–5)
ALP SERPL-CCNC: 76 U/L — SIGNIFICANT CHANGE UP (ref 40–120)
ALT FLD-CCNC: 12 U/L — SIGNIFICANT CHANGE UP (ref 10–45)
ANION GAP SERPL CALC-SCNC: 18 MMOL/L — HIGH (ref 5–17)
ANISOCYTOSIS BLD QL: SLIGHT — SIGNIFICANT CHANGE UP
AST SERPL-CCNC: 13 U/L — SIGNIFICANT CHANGE UP (ref 10–40)
BASOPHILS # BLD AUTO: 0 K/UL — SIGNIFICANT CHANGE UP (ref 0–0.2)
BASOPHILS NFR BLD AUTO: 0 % — SIGNIFICANT CHANGE UP (ref 0–2)
BILIRUB SERPL-MCNC: 1.4 MG/DL — HIGH (ref 0.2–1.2)
BUN SERPL-MCNC: 31 MG/DL — HIGH (ref 7–23)
BURR CELLS BLD QL SMEAR: SLIGHT — SIGNIFICANT CHANGE UP
CALCIUM SERPL-MCNC: 7.9 MG/DL — LOW (ref 8.4–10.5)
CHLORIDE SERPL-SCNC: 99 MMOL/L — SIGNIFICANT CHANGE UP (ref 96–108)
CO2 SERPL-SCNC: 17 MMOL/L — LOW (ref 22–31)
CREAT ?TM UR-MCNC: 108 MG/DL — SIGNIFICANT CHANGE UP
CREAT SERPL-MCNC: 1.85 MG/DL — HIGH (ref 0.5–1.3)
EGFR: 28 ML/MIN/1.73M2 — LOW
ELLIPTOCYTES BLD QL SMEAR: SLIGHT — SIGNIFICANT CHANGE UP
EOSINOPHIL # BLD AUTO: 0 K/UL — SIGNIFICANT CHANGE UP (ref 0–0.5)
EOSINOPHIL NFR BLD AUTO: 0 % — SIGNIFICANT CHANGE UP (ref 0–6)
ESTIMATED AVERAGE GLUCOSE: 235 MG/DL — HIGH (ref 68–114)
GLUCOSE BLDC GLUCOMTR-MCNC: 219 MG/DL — HIGH (ref 70–99)
GLUCOSE BLDC GLUCOMTR-MCNC: 279 MG/DL — HIGH (ref 70–99)
GLUCOSE BLDC GLUCOMTR-MCNC: 308 MG/DL — HIGH (ref 70–99)
GLUCOSE BLDC GLUCOMTR-MCNC: 311 MG/DL — HIGH (ref 70–99)
GLUCOSE SERPL-MCNC: 282 MG/DL — HIGH (ref 70–99)
HCT VFR BLD CALC: 36 % — SIGNIFICANT CHANGE UP (ref 34.5–45)
HGB BLD-MCNC: 11.3 G/DL — LOW (ref 11.5–15.5)
LACTATE SERPL-SCNC: 2.2 MMOL/L — HIGH (ref 0.5–2)
LYMPHOCYTES # BLD AUTO: 0.78 K/UL — LOW (ref 1–3.3)
LYMPHOCYTES # BLD AUTO: 4.3 % — LOW (ref 13–44)
MAGNESIUM SERPL-MCNC: 1.7 MG/DL — SIGNIFICANT CHANGE UP (ref 1.6–2.6)
MANUAL SMEAR VERIFICATION: SIGNIFICANT CHANGE UP
MCHC RBC-ENTMCNC: 26.9 PG — LOW (ref 27–34)
MCHC RBC-ENTMCNC: 31.4 GM/DL — LOW (ref 32–36)
MCV RBC AUTO: 85.7 FL — SIGNIFICANT CHANGE UP (ref 80–100)
MICROCYTES BLD QL: SLIGHT — SIGNIFICANT CHANGE UP
MONOCYTES # BLD AUTO: 0.47 K/UL — SIGNIFICANT CHANGE UP (ref 0–0.9)
MONOCYTES NFR BLD AUTO: 2.6 % — SIGNIFICANT CHANGE UP (ref 2–14)
NEUTROPHILS # BLD AUTO: 16.97 K/UL — HIGH (ref 1.8–7.4)
NEUTROPHILS NFR BLD AUTO: 87.9 % — HIGH (ref 43–77)
NEUTS BAND # BLD: 5.2 % — SIGNIFICANT CHANGE UP (ref 0–8)
PHOSPHATE SERPL-MCNC: 3.1 MG/DL — SIGNIFICANT CHANGE UP (ref 2.5–4.5)
PLAT MORPH BLD: NORMAL — SIGNIFICANT CHANGE UP
PLATELET # BLD AUTO: 210 K/UL — SIGNIFICANT CHANGE UP (ref 150–400)
POIKILOCYTOSIS BLD QL AUTO: SLIGHT — SIGNIFICANT CHANGE UP
POLYCHROMASIA BLD QL SMEAR: SLIGHT — SIGNIFICANT CHANGE UP
POTASSIUM SERPL-MCNC: 3.5 MMOL/L — SIGNIFICANT CHANGE UP (ref 3.5–5.3)
POTASSIUM SERPL-SCNC: 3.5 MMOL/L — SIGNIFICANT CHANGE UP (ref 3.5–5.3)
PROT SERPL-MCNC: 5.7 G/DL — LOW (ref 6–8.3)
RBC # BLD: 4.2 M/UL — SIGNIFICANT CHANGE UP (ref 3.8–5.2)
RBC # FLD: 13.2 % — SIGNIFICANT CHANGE UP (ref 10.3–14.5)
RBC BLD AUTO: ABNORMAL
SODIUM SERPL-SCNC: 134 MMOL/L — LOW (ref 135–145)
SODIUM UR-SCNC: 10 MMOL/L — SIGNIFICANT CHANGE UP
TSH SERPL-MCNC: 2.01 UIU/ML — SIGNIFICANT CHANGE UP (ref 0.27–4.2)
WBC # BLD: 18.23 K/UL — HIGH (ref 3.8–10.5)
WBC # FLD AUTO: 18.23 K/UL — HIGH (ref 3.8–10.5)

## 2024-03-25 PROCEDURE — 99223 1ST HOSP IP/OBS HIGH 75: CPT | Mod: GC

## 2024-03-25 PROCEDURE — 99232 SBSQ HOSP IP/OBS MODERATE 35: CPT | Mod: GC

## 2024-03-25 PROCEDURE — 93010 ELECTROCARDIOGRAM REPORT: CPT

## 2024-03-25 RX ORDER — INSULIN LISPRO 100/ML
VIAL (ML) SUBCUTANEOUS
Refills: 0 | Status: COMPLETED | OUTPATIENT
Start: 2024-03-25 | End: 2024-03-27

## 2024-03-25 RX ORDER — SODIUM CHLORIDE 9 MG/ML
1000 INJECTION, SOLUTION INTRAVENOUS
Refills: 0 | Status: DISCONTINUED | OUTPATIENT
Start: 2024-03-25 | End: 2024-03-26

## 2024-03-25 RX ORDER — METOPROLOL TARTRATE 50 MG
5 TABLET ORAL ONCE
Refills: 0 | Status: DISCONTINUED | OUTPATIENT
Start: 2024-03-25 | End: 2024-03-25

## 2024-03-25 RX ORDER — SODIUM CHLORIDE 9 MG/ML
1000 INJECTION, SOLUTION INTRAVENOUS ONCE
Refills: 0 | Status: COMPLETED | OUTPATIENT
Start: 2024-03-25 | End: 2024-03-25

## 2024-03-25 RX ORDER — ACETAMINOPHEN 500 MG
1000 TABLET ORAL ONCE
Refills: 0 | Status: COMPLETED | OUTPATIENT
Start: 2024-03-25 | End: 2024-03-25

## 2024-03-25 RX ORDER — CHLORHEXIDINE GLUCONATE 213 G/1000ML
1 SOLUTION TOPICAL DAILY
Refills: 0 | Status: DISCONTINUED | OUTPATIENT
Start: 2024-03-25 | End: 2024-04-02

## 2024-03-25 RX ORDER — SODIUM CHLORIDE 9 MG/ML
1000 INJECTION, SOLUTION INTRAVENOUS
Refills: 0 | Status: DISCONTINUED | OUTPATIENT
Start: 2024-03-25 | End: 2024-03-28

## 2024-03-25 RX ORDER — GLUCAGON INJECTION, SOLUTION 0.5 MG/.1ML
1 INJECTION, SOLUTION SUBCUTANEOUS ONCE
Refills: 0 | Status: DISCONTINUED | OUTPATIENT
Start: 2024-03-25 | End: 2024-03-28

## 2024-03-25 RX ORDER — DILTIAZEM HCL 120 MG
15 CAPSULE, EXT RELEASE 24 HR ORAL ONCE
Refills: 0 | Status: COMPLETED | OUTPATIENT
Start: 2024-03-25 | End: 2024-03-25

## 2024-03-25 RX ORDER — APIXABAN 2.5 MG/1
5 TABLET, FILM COATED ORAL EVERY 12 HOURS
Refills: 0 | Status: DISCONTINUED | OUTPATIENT
Start: 2024-03-25 | End: 2024-04-02

## 2024-03-25 RX ORDER — DILTIAZEM HCL 120 MG
10 CAPSULE, EXT RELEASE 24 HR ORAL ONCE
Refills: 0 | Status: COMPLETED | OUTPATIENT
Start: 2024-03-25 | End: 2024-03-25

## 2024-03-25 RX ORDER — INSULIN LISPRO 100/ML
VIAL (ML) SUBCUTANEOUS AT BEDTIME
Refills: 0 | Status: COMPLETED | OUTPATIENT
Start: 2024-03-25 | End: 2024-03-27

## 2024-03-25 RX ORDER — DEXTROSE 50 % IN WATER 50 %
25 SYRINGE (ML) INTRAVENOUS ONCE
Refills: 0 | Status: DISCONTINUED | OUTPATIENT
Start: 2024-03-25 | End: 2024-03-28

## 2024-03-25 RX ORDER — MAGNESIUM SULFATE 500 MG/ML
2 VIAL (ML) INJECTION ONCE
Refills: 0 | Status: COMPLETED | OUTPATIENT
Start: 2024-03-25 | End: 2024-03-25

## 2024-03-25 RX ORDER — METOPROLOL TARTRATE 50 MG
5 TABLET ORAL ONCE
Refills: 0 | Status: COMPLETED | OUTPATIENT
Start: 2024-03-25 | End: 2024-03-25

## 2024-03-25 RX ORDER — ALPRAZOLAM 0.25 MG
0.5 TABLET ORAL DAILY
Refills: 0 | Status: DISCONTINUED | OUTPATIENT
Start: 2024-03-25 | End: 2024-03-30

## 2024-03-25 RX ORDER — DEXTROSE 50 % IN WATER 50 %
12.5 SYRINGE (ML) INTRAVENOUS ONCE
Refills: 0 | Status: DISCONTINUED | OUTPATIENT
Start: 2024-03-25 | End: 2024-03-28

## 2024-03-25 RX ORDER — INSULIN GLARGINE 100 [IU]/ML
20 INJECTION, SOLUTION SUBCUTANEOUS AT BEDTIME
Refills: 0 | Status: DISCONTINUED | OUTPATIENT
Start: 2024-03-25 | End: 2024-03-26

## 2024-03-25 RX ORDER — DILTIAZEM HCL 120 MG
60 CAPSULE, EXT RELEASE 24 HR ORAL
Refills: 0 | Status: DISCONTINUED | OUTPATIENT
Start: 2024-03-25 | End: 2024-03-25

## 2024-03-25 RX ORDER — DILTIAZEM HCL 120 MG
12.5 CAPSULE, EXT RELEASE 24 HR ORAL
Qty: 125 | Refills: 0 | Status: DISCONTINUED | OUTPATIENT
Start: 2024-03-25 | End: 2024-03-28

## 2024-03-25 RX ORDER — DEXTROSE 50 % IN WATER 50 %
15 SYRINGE (ML) INTRAVENOUS ONCE
Refills: 0 | Status: DISCONTINUED | OUTPATIENT
Start: 2024-03-25 | End: 2024-03-28

## 2024-03-25 RX ORDER — INSULIN LISPRO 100/ML
5 VIAL (ML) SUBCUTANEOUS
Refills: 0 | Status: DISCONTINUED | OUTPATIENT
Start: 2024-03-25 | End: 2024-03-26

## 2024-03-25 RX ORDER — DILTIAZEM HCL 120 MG
30 CAPSULE, EXT RELEASE 24 HR ORAL
Refills: 0 | Status: DISCONTINUED | OUTPATIENT
Start: 2024-03-25 | End: 2024-03-25

## 2024-03-25 RX ORDER — ALPRAZOLAM 0.25 MG
1 TABLET ORAL AT BEDTIME
Refills: 0 | Status: DISCONTINUED | OUTPATIENT
Start: 2024-03-25 | End: 2024-03-30

## 2024-03-25 RX ORDER — POTASSIUM CHLORIDE 20 MEQ
40 PACKET (EA) ORAL ONCE
Refills: 0 | Status: COMPLETED | OUTPATIENT
Start: 2024-03-25 | End: 2024-03-25

## 2024-03-25 RX ADMIN — Medication 6: at 09:35

## 2024-03-25 RX ADMIN — Medication 15 MILLIGRAM(S): at 11:16

## 2024-03-25 RX ADMIN — Medication 25 GRAM(S): at 10:03

## 2024-03-25 RX ADMIN — PANTOPRAZOLE SODIUM 40 MILLIGRAM(S): 20 TABLET, DELAYED RELEASE ORAL at 06:53

## 2024-03-25 RX ADMIN — APIXABAN 2.5 MILLIGRAM(S): 2.5 TABLET, FILM COATED ORAL at 05:22

## 2024-03-25 RX ADMIN — Medication 650 MILLIGRAM(S): at 20:00

## 2024-03-25 RX ADMIN — SODIUM CHLORIDE 100 MILLILITER(S): 9 INJECTION, SOLUTION INTRAVENOUS at 05:21

## 2024-03-25 RX ADMIN — Medication 60 MILLIGRAM(S): at 18:04

## 2024-03-25 RX ADMIN — Medication 5 MILLIGRAM(S): at 05:21

## 2024-03-25 RX ADMIN — CHLORHEXIDINE GLUCONATE 1 APPLICATION(S): 213 SOLUTION TOPICAL at 12:00

## 2024-03-25 RX ADMIN — Medication 3 MILLIGRAM(S): at 00:13

## 2024-03-25 RX ADMIN — Medication 40 MILLIEQUIVALENT(S): at 10:03

## 2024-03-25 RX ADMIN — Medication 30 MILLILITER(S): at 10:04

## 2024-03-25 RX ADMIN — Medication 650 MILLIGRAM(S): at 19:33

## 2024-03-25 RX ADMIN — Medication 5 MILLIGRAM(S): at 10:04

## 2024-03-25 RX ADMIN — Medication 10 MG/HR: at 19:32

## 2024-03-25 RX ADMIN — PIPERACILLIN AND TAZOBACTAM 25 GRAM(S): 4; .5 INJECTION, POWDER, LYOPHILIZED, FOR SOLUTION INTRAVENOUS at 13:41

## 2024-03-25 RX ADMIN — Medication 0.5 MILLIGRAM(S): at 10:33

## 2024-03-25 RX ADMIN — Medication 175 MICROGRAM(S): at 06:02

## 2024-03-25 RX ADMIN — Medication 400 MILLIGRAM(S): at 03:29

## 2024-03-25 RX ADMIN — Medication 8: at 18:01

## 2024-03-25 RX ADMIN — INSULIN GLARGINE 20 UNIT(S): 100 INJECTION, SOLUTION SUBCUTANEOUS at 21:39

## 2024-03-25 RX ADMIN — Medication 81 MILLIGRAM(S): at 11:56

## 2024-03-25 RX ADMIN — Medication 5 UNIT(S): at 17:59

## 2024-03-25 RX ADMIN — PIPERACILLIN AND TAZOBACTAM 25 GRAM(S): 4; .5 INJECTION, POWDER, LYOPHILIZED, FOR SOLUTION INTRAVENOUS at 00:10

## 2024-03-25 RX ADMIN — Medication 1 MILLIGRAM(S): at 21:39

## 2024-03-25 RX ADMIN — SODIUM CHLORIDE 1000 MILLILITER(S): 9 INJECTION, SOLUTION INTRAVENOUS at 03:29

## 2024-03-25 RX ADMIN — Medication 30 MILLIGRAM(S): at 11:58

## 2024-03-25 RX ADMIN — PIPERACILLIN AND TAZOBACTAM 25 GRAM(S): 4; .5 INJECTION, POWDER, LYOPHILIZED, FOR SOLUTION INTRAVENOUS at 21:39

## 2024-03-25 RX ADMIN — PIPERACILLIN AND TAZOBACTAM 25 GRAM(S): 4; .5 INJECTION, POWDER, LYOPHILIZED, FOR SOLUTION INTRAVENOUS at 06:01

## 2024-03-25 RX ADMIN — Medication 5 MILLIGRAM(S): at 00:54

## 2024-03-25 RX ADMIN — Medication 10 MILLIGRAM(S): at 19:32

## 2024-03-25 RX ADMIN — APIXABAN 5 MILLIGRAM(S): 2.5 TABLET, FILM COATED ORAL at 18:05

## 2024-03-25 RX ADMIN — Medication 8: at 13:36

## 2024-03-25 RX ADMIN — ONDANSETRON 4 MILLIGRAM(S): 8 TABLET, FILM COATED ORAL at 10:04

## 2024-03-25 NOTE — PROGRESS NOTE ADULT - PROBLEM SELECTOR PLAN 3
SCr on admission 1.54  - baseline Cr 0.70 in 7/2023  - per patient, no known history of CKD  - likely 2/2 ATN ISO hemodynamic instability   - unlikely prerenal given continued worsening after fluid resuscitation  - f/u urine lytes  - trend SCr  - maintain aguiar  - f/u bladder scan

## 2024-03-25 NOTE — CONSULT NOTE ADULT - SUBJECTIVE AND OBJECTIVE BOX
Patient is a 73 Female who presents with a chief complaint of malaise     HPI:  73 F PMH AF, HTN, DM (A1c 9.8), BMI>50, presenting to the ED with weakness, diarrhea and n/v.     Associated with fevers. Here patient febrile to 103, AF w/ RVR, leukocytosis of 18. UA with pyuria and bacteriuria. Found to have high grade Klebsiella bacteremia. 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 On Zosyn. ID consulted for recommendations.     REVIEW OF SYSTEMS      prior hospital charts reviewed [V]  primary team notes reviewed [V]  other consultant notes reviewed [V]    PAST MEDICAL & SURGICAL HISTORY:  HTN (hypertension)    Murmur    Morbid obesity    Palpitations    Former smoker    Hypothyroid    CAD (coronary artery disease)  minimal CAD per cath result in 2014    Hypercholesterolemia    Gastritis    Depression    Anxiety    AF (atrial fibrillation)  on Xarelto    Hiatal hernia    Neuropathy    Uterine cyst    Incontinence    Thickened endometrium    History of TIAs    Loss of vision  right eye    Blind right eye    [place] (hard of hearing)    Uterine cancer    Type 2 diabetes mellitus    Constipation    Spinal stenosis    Edema of lower extremity    Varicose veins    Thyroid nodule    OAB (overactive bladder)    Risk factors for obstructive sleep apnea    H/O Helicobacter infection    S/P cataract extraction and insertion of intraocular lens  bilateral > 15 years ago    Varicose vein  with stripping b/l    History of angioplasty of vein  right leg 2016    H/O thyroidectomy    H/O: hysterectomy    SOCIAL HISTORY:      FAMILY HISTORY:  Family history of stroke (Mother)  mom    Family history of hypertension (Father)    Family history of breast cancer (Mother)    Allergies  Lipitor (Nausea)  latex (Rash)    ANTIMICROBIALS:  piperacillin/tazobactam IVPB.. 3.375 every 8 hours    ANTIMICROBIALS (past 90 days):  MEDICATIONS  (STANDING):  piperacillin/tazobactam IVPB.   200 mL/Hr IV Intermittent (03-24-24 @ 14:37)    piperacillin/tazobactam IVPB.-   25 mL/Hr IV Intermittent (03-24-24 @ 17:37)    piperacillin/tazobactam IVPB.-   25 mL/Hr IV Intermittent (03-25-24 @ 00:10)    piperacillin/tazobactam IVPB..   25 mL/Hr IV Intermittent (03-25-24 @ 06:01)    piperacillin/tazobactam IVPB...   200 mL/Hr IV Intermittent (03-24-24 @ 02:30)    vancomycin  IVPB.   250 mL/Hr IV Intermittent (03-24-24 @ 03:43)    OTHER MEDS:   MEDICATIONS  (STANDING):  acetaminophen     Tablet .. 650 every 6 hours PRN  ALPRAZolam 1 at bedtime  ALPRAZolam 0.5 daily PRN  aluminum hydroxide/magnesium hydroxide/simethicone Suspension 30 every 4 hours PRN  apixaban 2.5 every 12 hours  aspirin enteric coated 81 daily  dextrose 50% Injectable 25 once  dextrose 50% Injectable 12.5 once  dextrose 50% Injectable 25 once  dextrose Oral Gel 15 once PRN  diltiazem    Tablet 30 four times a day  glucagon  Injectable 1 once  insulin glargine Injectable (LANTUS) 16 at bedtime  insulin lispro (ADMELOG) corrective regimen sliding scale  three times a day before meals  insulin lispro (ADMELOG) corrective regimen sliding scale  at bedtime  levothyroxine 175 daily  melatonin 3 at bedtime PRN  ondansetron Injectable 4 every 8 hours PRN  pantoprazole    Tablet 40 before breakfast    VITALS:  Vital Signs Last 24 Hrs  T(F): 98.3 (03-25-24 @ 11:59), Max: 103.1 (03-24-24 @ 01:16)    Vital Signs Last 24 Hrs  HR: 129 (03-25-24 @ 11:59) (129 - 160)  BP: 112/71 (03-25-24 @ 11:59) (95/57 - 147/74)  RR: 18 (03-25-24 @ 11:59)  SpO2: 95% (03-25-24 @ 11:59) (91% - 98%)  Wt(kg): --    EXAM:      Labs:                        11.3   18.23 )-----------( 210      ( 25 Mar 2024 07:32 )             36.0     03-25    134<L>  |  99  |  31<H>  ----------------------------<  282<H>  3.5   |  17<L>  |  1.85<H>    Ca    7.9<L>      25 Mar 2024 07:31  Phos  3.1     03-25  Mg     1.7     03-25    TPro  5.7<L>  /  Alb  2.5<L>  /  TBili  1.4<H>  /  DBili  x   /  AST  13  /  ALT  12  /  AlkPhos  76  03-25    WBC Trend:  WBC Count: 18.23 (03-25-24 @ 07:32)  WBC Count: 17.77 (03-24-24 @ 02:31)    Auto Neutrophil #: 16.97 K/uL (03-25-24 @ 07:32)  Band Neutrophils %: 5.2 % (03-25-24 @ 07:32)  Auto Neutrophil #: 15.99 K/uL (03-24-24 @ 02:31)  Auto Neutrophil #: 5.39 K/uL (07-19-23 @ 14:34)    Creatine Trend:  Creatinine: 1.85 (03-25)  Creatinine: 1.69 (03-24)  Creatinine: 1.54 (03-24)    Liver Biochemical Testing Trend:  Alanine Aminotransferase (ALT/SGPT): 12 (03-25)  Alanine Aminotransferase (ALT/SGPT): 19 (03-24)  Aspartate Aminotransferase (AST/SGOT): 13 (03-25-24 @ 07:31)  Aspartate Aminotransferase (AST/SGOT): 45 (03-24-24 @ 01:15)  Bilirubin Total: 1.4 (03-25)  Bilirubin Total: 2.7 (03-24)    Auto Eosinophil %: 0.0 % (03-25-24 @ 07:32)  Auto Eosinophil %: 0.0 % (03-24-24 @ 02:31)    Urinalysis Basic - ( 25 Mar 2024 07:31 )    Color: x / Appearance: x / SG: x / pH: x  Gluc: 282 mg/dL / Ketone: x  / Bili: x / Urobili: x   Blood: x / Protein: x / Nitrite: x   Leuk Esterase: x / RBC: x / WBC x   Sq Epi: x / Non Sq Epi: x / Bacteria: x    MICROBIOLOGY:    Culture - Blood (collected 24 Mar 2024 01:10)  Source: .Blood Blood-Peripheral  Preliminary Report:    Growth in aerobic and anaerobic bottles: Klebsiella pneumoniae    See previous culture [id number]    Culture - Blood (collected 24 Mar 2024 01:00)  Source: .Blood Blood-Peripheral  Preliminary Report:    Growth in aerobic and anaerobic bottles: Klebsiella pneumoniae    Direct identification is available within approximately 3-5    hours either by Blood Panel Multiplexed PCR or Direct    MALDI-TOF. Details: [email or web address]  Organism: Blood Culture PCR  Organism: Blood Culture PCR    Sensitivities:      Method Type: PCR      -  K. pneumoniae group: Detec (K. pneumoniae, K. quasipneumoniae, K. variicola)    Culture - Urine (collected 19 Jul 2023 14:34)  Source: Clean Catch None  Final Report:    50,000 - 99,000 CFU/mL Proteus mirabilis    <10,000 CFU/ml Normal Urogenital andrea present  Organism: Proteus mirabilis  Organism: Proteus mirabilis    Sensitivities:      Method Type: RENAY      -  Amikacin: S <=16      -  Amoxicillin/Clavulanic Acid: S <=8/4      -  Ampicillin: S <=8 These ampicillin results predict results for amoxicillin      -  Ampicillin/Sulbactam: S <=4/2 Enterobacter, Klebsiella aerogenes, Citrobacter, and Serratia may develop resistance during prolonged therapy (3-4 days)      -  Aztreonam: S <=4      -  Cefazolin: S <=2 For uncomplicated UTI with K. pneumoniae, E. coli, or P. mirablis: RENAY <=16 is sensitive and RENAY >=32 is resistant. This also predicts results for oral agents cefaclor, cefdinir, cefpodoxime, cefprozil, cefuroxime axetil, cephalexin and locarbef for uncomplicated UTI. Note that some isolates may be susceptible to these agents while testing resistant to cefazolin.      -  Cefepime: S <=2      -  Cefoxitin: S <=8      -  Ceftriaxone: S <=1 Enterobacter, Klebsiella aerogenes, Citrobacter, and Serratia may develop resistance during prolonged therapy      -  Cefuroxime: S <=4      -  Ciprofloxacin: S <=0.25      -  Ertapenem: S <=0.5      -  Gentamicin: S <=2      -  Levofloxacin: S <=0.5      -  Meropenem: S <=1      -  Nitrofurantoin: R >64 Should not be used to treat pyelonephritis      -  Piperacillin/Tazobactam: S <=8      -  Tobramycin: S <=2      -  Trimethoprim/Sulfamethoxazole: S <=0.5/9.5    Troponin T, High Sensitivity Result: 40 (03-24)  Troponin T, High Sensitivity Result: 39 (03-24)    Lactate, Blood: 2.2 (03-25 @ 07:35)  Lactate, Blood: 2.5 (03-24 @ 20:30)  Blood Gas Venous - Lactate: 2.5 (03-24 @ 08:00)  Lactate, Blood: 3.0 (03-24 @ 06:19)    A1C with Estimated Average Glucose Result: 9.8 % (03-25-24 @ 07:33)    RADIOLOGY:  imaging below personally reviewed    < from: CT Thoracic Spine Reform w/ IV Cont (03.24.24 @ 03:08) >  IMPRESSION:  CT CHEST:  Limited by respiratory motion. No gross pulmonary embolism. No pneumonia.    CT ABDOMEN AND PELVIS:  Right pyelonephritis with emphysematous pyelitis and mild   hydroureteronephrosis to the bladder.    CT THORACIC AND LUMBAR SPINE:  No acute fracture or traumatic malalignment.  Multilevel degenerative changes above.    < end of copied text > Patient is a 73 Female who presents with a chief complaint of malaise     HPI:  73 F PMH AF, HTN, DM (A1c 9.8), BMI>50, presenting to the ED with weakness, diarrhea and n/v.     Associated with fevers. Here patient febrile to 103, AF w/ RVR, leukocytosis of 18. UA with pyuria and bacteriuria. Found to have high grade Klebsiella bacteremia. 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 On Zosyn. ID consulted for recommendations.     REVIEW OF SYSTEMS  Limited  Endorses + abd pain and + nausea    prior hospital charts reviewed [V]  primary team notes reviewed [V]  other consultant notes reviewed [V]    PAST MEDICAL & SURGICAL HISTORY:  HTN (hypertension)    Murmur    Morbid obesity    Palpitations    Former smoker    Hypothyroid    CAD (coronary artery disease)  minimal CAD per cath result in 2014    Hypercholesterolemia    Gastritis    Depression    Anxiety    AF (atrial fibrillation)  on Xarelto    Hiatal hernia    Neuropathy    Uterine cyst    Incontinence    Thickened endometrium    History of TIAs    Loss of vision  right eye    Blind right eye    [place] (hard of hearing)    Uterine cancer    Type 2 diabetes mellitus    Constipation    Spinal stenosis    Edema of lower extremity    Varicose veins    Thyroid nodule    OAB (overactive bladder)    Risk factors for obstructive sleep apnea    H/O Helicobacter infection    S/P cataract extraction and insertion of intraocular lens  bilateral > 15 years ago    Varicose vein  with stripping b/l    History of angioplasty of vein  right leg 2016    H/O thyroidectomy    H/O: hysterectomy    SOCIAL HISTORY:  No known sick contacts per      FAMILY HISTORY:  Family history of stroke (Mother)  mom    Family history of hypertension (Father)    Family history of breast cancer (Mother)    Allergies  Lipitor (Nausea)  latex (Rash)    ANTIMICROBIALS:  piperacillin/tazobactam IVPB.. 3.375 every 8 hours    ANTIMICROBIALS (past 90 days):  MEDICATIONS  (STANDING):  piperacillin/tazobactam IVPB.   200 mL/Hr IV Intermittent (03-24-24 @ 14:37)    piperacillin/tazobactam IVPB.-   25 mL/Hr IV Intermittent (03-24-24 @ 17:37)    piperacillin/tazobactam IVPB.-   25 mL/Hr IV Intermittent (03-25-24 @ 00:10)    piperacillin/tazobactam IVPB..   25 mL/Hr IV Intermittent (03-25-24 @ 06:01)    piperacillin/tazobactam IVPB...   200 mL/Hr IV Intermittent (03-24-24 @ 02:30)    vancomycin  IVPB.   250 mL/Hr IV Intermittent (03-24-24 @ 03:43)    OTHER MEDS:   MEDICATIONS  (STANDING):  acetaminophen     Tablet .. 650 every 6 hours PRN  ALPRAZolam 1 at bedtime  ALPRAZolam 0.5 daily PRN  aluminum hydroxide/magnesium hydroxide/simethicone Suspension 30 every 4 hours PRN  apixaban 2.5 every 12 hours  aspirin enteric coated 81 daily  dextrose 50% Injectable 25 once  dextrose 50% Injectable 12.5 once  dextrose 50% Injectable 25 once  dextrose Oral Gel 15 once PRN  diltiazem    Tablet 30 four times a day  glucagon  Injectable 1 once  insulin glargine Injectable (LANTUS) 16 at bedtime  insulin lispro (ADMELOG) corrective regimen sliding scale  three times a day before meals  insulin lispro (ADMELOG) corrective regimen sliding scale  at bedtime  levothyroxine 175 daily  melatonin 3 at bedtime PRN  ondansetron Injectable 4 every 8 hours PRN  pantoprazole    Tablet 40 before breakfast    VITALS:  Vital Signs Last 24 Hrs  T(F): 98.3 (03-25-24 @ 11:59), Max: 103.1 (03-24-24 @ 01:16)    Vital Signs Last 24 Hrs  HR: 129 (03-25-24 @ 11:59) (129 - 160)  BP: 112/71 (03-25-24 @ 11:59) (95/57 - 147/74)  RR: 18 (03-25-24 @ 11:59)  SpO2: 95% (03-25-24 @ 11:59) (91% - 98%)  Wt(kg): --    EXAM:  General: Mild distress  HEENT: NCAT  CV: S1+S2  Lungs: No respiratory distress, CTAB  Abd: Soft  : No suprapubic tenderness  Neuro: Calm   Ext: No cyanosis  Skin: No rash    Labs:                        11.3   18.23 )-----------( 210      ( 25 Mar 2024 07:32 )             36.0     03-25    134<L>  |  99  |  31<H>  ----------------------------<  282<H>  3.5   |  17<L>  |  1.85<H>    Ca    7.9<L>      25 Mar 2024 07:31  Phos  3.1     03-25  Mg     1.7     03-25    TPro  5.7<L>  /  Alb  2.5<L>  /  TBili  1.4<H>  /  DBili  x   /  AST  13  /  ALT  12  /  AlkPhos  76  03-25    WBC Trend:  WBC Count: 18.23 (03-25-24 @ 07:32)  WBC Count: 17.77 (03-24-24 @ 02:31)    Auto Neutrophil #: 16.97 K/uL (03-25-24 @ 07:32)  Band Neutrophils %: 5.2 % (03-25-24 @ 07:32)  Auto Neutrophil #: 15.99 K/uL (03-24-24 @ 02:31)  Auto Neutrophil #: 5.39 K/uL (07-19-23 @ 14:34)    Creatine Trend:  Creatinine: 1.85 (03-25)  Creatinine: 1.69 (03-24)  Creatinine: 1.54 (03-24)    Liver Biochemical Testing Trend:  Alanine Aminotransferase (ALT/SGPT): 12 (03-25)  Alanine Aminotransferase (ALT/SGPT): 19 (03-24)  Aspartate Aminotransferase (AST/SGOT): 13 (03-25-24 @ 07:31)  Aspartate Aminotransferase (AST/SGOT): 45 (03-24-24 @ 01:15)  Bilirubin Total: 1.4 (03-25)  Bilirubin Total: 2.7 (03-24)    Auto Eosinophil %: 0.0 % (03-25-24 @ 07:32)  Auto Eosinophil %: 0.0 % (03-24-24 @ 02:31)    Urinalysis Basic - ( 25 Mar 2024 07:31 )    Color: x / Appearance: x / SG: x / pH: x  Gluc: 282 mg/dL / Ketone: x  / Bili: x / Urobili: x   Blood: x / Protein: x / Nitrite: x   Leuk Esterase: x / RBC: x / WBC x   Sq Epi: x / Non Sq Epi: x / Bacteria: x    MICROBIOLOGY:    Culture - Blood (collected 24 Mar 2024 01:10)  Source: .Blood Blood-Peripheral  Preliminary Report:    Growth in aerobic and anaerobic bottles: Klebsiella pneumoniae    See previous culture [id number]    Culture - Blood (collected 24 Mar 2024 01:00)  Source: .Blood Blood-Peripheral  Preliminary Report:    Growth in aerobic and anaerobic bottles: Klebsiella pneumoniae    Direct identification is available within approximately 3-5    hours either by Blood Panel Multiplexed PCR or Direct    MALDI-TOF. Details: [email or web address]  Organism: Blood Culture PCR  Organism: Blood Culture PCR    Sensitivities:      Method Type: PCR      -  K. pneumoniae group: Detec (K. pneumoniae, K. quasipneumoniae, K. variicola)    Culture - Urine (collected 19 Jul 2023 14:34)  Source: Clean Catch None  Final Report:    50,000 - 99,000 CFU/mL Proteus mirabilis    <10,000 CFU/ml Normal Urogenital andrea present  Organism: Proteus mirabilis  Organism: Proteus mirabilis    Sensitivities:      Method Type: RENAY      -  Amikacin: S <=16      -  Amoxicillin/Clavulanic Acid: S <=8/4      -  Ampicillin: S <=8 These ampicillin results predict results for amoxicillin      -  Ampicillin/Sulbactam: S <=4/2 Enterobacter, Klebsiella aerogenes, Citrobacter, and Serratia may develop resistance during prolonged therapy (3-4 days)      -  Aztreonam: S <=4      -  Cefazolin: S <=2 For uncomplicated UTI with K. pneumoniae, E. coli, or P. mirablis: RENAY <=16 is sensitive and RENAY >=32 is resistant. This also predicts results for oral agents cefaclor, cefdinir, cefpodoxime, cefprozil, cefuroxime axetil, cephalexin and locarbef for uncomplicated UTI. Note that some isolates may be susceptible to these agents while testing resistant to cefazolin.      -  Cefepime: S <=2      -  Cefoxitin: S <=8      -  Ceftriaxone: S <=1 Enterobacter, Klebsiella aerogenes, Citrobacter, and Serratia may develop resistance during prolonged therapy      -  Cefuroxime: S <=4      -  Ciprofloxacin: S <=0.25      -  Ertapenem: S <=0.5      -  Gentamicin: S <=2      -  Levofloxacin: S <=0.5      -  Meropenem: S <=1      -  Nitrofurantoin: R >64 Should not be used to treat pyelonephritis      -  Piperacillin/Tazobactam: S <=8      -  Tobramycin: S <=2      -  Trimethoprim/Sulfamethoxazole: S <=0.5/9.5    Troponin T, High Sensitivity Result: 40 (03-24)  Troponin T, High Sensitivity Result: 39 (03-24)    Lactate, Blood: 2.2 (03-25 @ 07:35)  Lactate, Blood: 2.5 (03-24 @ 20:30)  Blood Gas Venous - Lactate: 2.5 (03-24 @ 08:00)  Lactate, Blood: 3.0 (03-24 @ 06:19)    A1C with Estimated Average Glucose Result: 9.8 % (03-25-24 @ 07:33)    RADIOLOGY:  imaging below personally reviewed    < from: CT Thoracic Spine Reform w/ IV Cont (03.24.24 @ 03:08) >  IMPRESSION:  CT CHEST:  Limited by respiratory motion. No gross pulmonary embolism. No pneumonia.    CT ABDOMEN AND PELVIS:  Right pyelonephritis with emphysematous pyelitis and mild   hydroureteronephrosis to the bladder.    CT THORACIC AND LUMBAR SPINE:  No acute fracture or traumatic malalignment.  Multilevel degenerative changes above.    < end of copied text >

## 2024-03-25 NOTE — PROGRESS NOTE ADULT - PROBLEM SELECTOR PLAN 1
T 103.1, WBC 17.5k with neutrophilic predominance, HR 140s  - sepsis 2/2 pyelonephritis, with K. pneumoniae group bacteremia   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, mild hydroureteronephritis to bladder   - CT chest with no evidence of pneumonia, CXR with bibasilar atelectasis  - s/p 3.5 L IVF   - s/p vanc/zosyn in ED  - f/u blood cultures, urine cultures  - prior urine cultures reviewed, E coli and Proteus mirabilis, pansensitive    Plan:  - continue empiric zosyn   - ID consulted, f/u recs  - BCx with Klebsiella pneumoniae, susceptibilities pending  - repeat BCx q48h until clear   - urology following for emphysematous pyelitis   - if no clinical improvement, repeat abdominal imaging to r/o abscess  - trend WBC, fever curve

## 2024-03-25 NOTE — PROGRESS NOTE ADULT - PROBLEM SELECTOR PLAN 2
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, mild hydroureteronephritis to bladder   - voids independently, no chronic catheter  - previous UTIs with E coli and Proteus mirabilis, pansensitive  - s/p vanc/zosyn in ED    Plan:  - urology following, appreciate recs  - per urology, no acute  intervention at this time  - continue empiric zosyn  - maintain aguiar  - if not improving clinically, repeat imaging to assess for possible abscess

## 2024-03-25 NOTE — CONSULT NOTE ADULT - ASSESSMENT
Incomplete Note    Pt to be seen today  73 F PMH AF, HTN, DM (A1c 9.8), BMI>50, presenting to the ED with weakness, diarrhea and n/v.     A1c 9.8  Fevers, leukocytosis  DEEPA, AF w/ RVR  UA with pyuria and bacteriuria  High grade Klebsiella bacteremia  CT with R pyelonephritis with emphysematous pyelitis and mild hydroureteronephrosis to the bladder  On Zosyn    Overall;  Fevers, leukocytosis, emphysematous pyelitis, sepsis, bacteremia high grade Klebsiella Pneumoniae     Suggest;  Continue empiric broad spectrum abx pending sensitivities   Repeat blood cx Q48h until clear   F/u urine cx  Check TTE

## 2024-03-25 NOTE — PROGRESS NOTE ADULT - PROBLEM SELECTOR PLAN 4
Previously on eliquis 2.5 mg BID  - per pt, eliquis stopped 07/2023 for planned hysterectomy, was never advised to resume and therefore has been off AC since then  - Afib RVR up to 160s this admission  - s/p lopressor 5 mg IVP x5, amio 150 mg IV x1 in ED  - likely 2/2 sepsis   - minimal improvement with lopressor IV, fluids    Plan:  - eliquis 2.5 mg BID  - diltiazem 15 mg IVP x1, then 30 mg PO QID   - cardiology consulted, f/u further recommendations   - K >4, Mg >2  - TTE after better rate control is achieved

## 2024-03-25 NOTE — PHYSICAL THERAPY INITIAL EVALUATION ADULT - GENERAL OBSERVATIONS, REHAB EVAL
Received pt bedside on unit, grandson present. +tele, IV. /83, , O2 sats 98% on room air Received pt seated in chair, NAD, + 2L NC.

## 2024-03-25 NOTE — PHYSICAL THERAPY INITIAL EVALUATION ADULT - PERTINENT HX OF CURRENT PROBLEM, REHAB EVAL
73 y/oF admitted 3/24 presenting with 2 days of weakness, not getting out of bed. Patient having multiple episodes of nonbloody diarrhea, nausea, occasional vomiting (last episode 3/23). Also complains of general malaise, increased fatigue, chills at home x2-3 days. Initially presented to Urgent Care and was directed to present to ED for further evaluation. Endorses flank pain, suprapubic and inguinal pain. Endorsing back pain, chronic 2/2 spinal stenosis. In ED, initially with T 99.9, , BP 120s/80s, SpO2 95% on RA -> then developed fevers with T 103.1, Afib on tele with HRs 140s-150s, BPs as low as 80s/60s. S/p IVF bolus 3.5 L total, acetaminophen, vanc/zosyn x1, lopressor 5 mg IV x3 -> amio 150 mg x1. Subsequent improvement in BPs and fevers, also endorses some improvement in nausea. No episodes of emesis since presentation to ED. As per H&P,  Meeting SIRS criteria and found to have +UA with evidence of pyelonephritis/emphysematous pyelitis on CT. Course c/b Afib RVR. PMH A-fib (previously on eliquis, off since 7/2023 after hysterectomy),  HTN, HLD, DM, anxiety 73-year-old female with history of paroxysmal A-fib (previously on eliquis, d/c'd 7/2023), hypertension, hyperlipidemia, diabetes, spinal stenosis, anxiety, p/w 3 days of n/v, diarrhea, malaise. Admitted with severe sepsis 2/2 pyelonephritis/emphysematous pyelitis, with gram negative bacteremia. Course c/b DEEPA, Afib RVR. Now s/p ablation 3/28.

## 2024-03-25 NOTE — CONSULT NOTE ADULT - SUBJECTIVE AND OBJECTIVE BOX
DATE OF SERVICE: 03-25-24 @ 15:56    CHIEF COMPLAINT:Patient is a 73y old  Female who presents with a chief complaint of nausea, diarrhea, malaise (25 Mar 2024 13:16)      HISTORY OF PRESENT ILLNESS:HPI:  73-year-old female history of A-fib (previously on eliquis, off since 7/2023 after hysterectomy),  HTN, HLD, DM, anxiety, presenting to the ED for 2 days of weakness, not getting out of bed.  Patient having multiple episodes of nonbloody diarrhea, nausea, occasional vomiting (last episode 3/23). Also complains of general malaise, increased fatigue, chills at home x2-3 days. Initially presented to Urgent Care and was directed to present to ED for further evaluation. Denies chest pain, dyspnea, orthopnea, abdominal pain, hematemesis, melena/hematochezia, nasal congestion, sore throat. Endorses flank pain, suprapubic and inguinal pain. Endorsing back pain, chronic 2/2 spinal stenosis.     In ED, initially with T 99.9, , BP 120s/80s, SpO2 95% on RA -> then developed fevers with T 103.1, Afib on tele with HRs 140s-150s, BPs as low as 80s/60s. S/p IVF bolus 3.5 L total, acetaminophen, vanc/zosyn x1, lopressor 5 mg IV x3 -> amio 150 mg x1. Subsequent improvement in BPs and fevers, also endorses some improvement in nausea. No episodes of emesis since presentation to ED.    (24 Mar 2024 12:50)      PAST MEDICAL & SURGICAL HISTORY:  HTN (hypertension)      Murmur      Morbid obesity      Palpitations      Former smoker      Hypothyroid      CAD (coronary artery disease)  minimal CAD per cath result in 2014      Hypercholesterolemia      Gastritis      Depression      Anxiety      AF (atrial fibrillation)  on Xarelto      Hiatal hernia      Neuropathy      Uterine cyst      Incontinence      Thickened endometrium      History of TIAs      Loss of vision  right eye      Blind right eye      [place] (hard of hearing)      Uterine cancer      Type 2 diabetes mellitus      Constipation      Spinal stenosis      Edema of lower extremity      Varicose veins      Thyroid nodule      OAB (overactive bladder)      Risk factors for obstructive sleep apnea      H/O Helicobacter infection      S/P cataract extraction and insertion of intraocular lens  bilateral > 15 years ago      Varicose vein  with stripping b/l      History of angioplasty of vein  right leg 2016      H/O thyroidectomy      H/O: hysterectomy              MEDICATIONS:  apixaban 2.5 milliGRAM(s) Oral every 12 hours  aspirin enteric coated 81 milliGRAM(s) Oral daily  diltiazem    Tablet 60 milliGRAM(s) Oral four times a day    piperacillin/tazobactam IVPB.. 3.375 Gram(s) IV Intermittent every 8 hours      acetaminophen     Tablet .. 650 milliGRAM(s) Oral every 6 hours PRN  ALPRAZolam 1 milliGRAM(s) Oral at bedtime  ALPRAZolam 0.5 milliGRAM(s) Oral daily PRN  melatonin 3 milliGRAM(s) Oral at bedtime PRN  ondansetron Injectable 4 milliGRAM(s) IV Push every 8 hours PRN    aluminum hydroxide/magnesium hydroxide/simethicone Suspension 30 milliLiter(s) Oral every 4 hours PRN  pantoprazole    Tablet 40 milliGRAM(s) Oral before breakfast    dextrose 50% Injectable 25 Gram(s) IV Push once  dextrose 50% Injectable 12.5 Gram(s) IV Push once  dextrose 50% Injectable 25 Gram(s) IV Push once  dextrose Oral Gel 15 Gram(s) Oral once PRN  glucagon  Injectable 1 milliGRAM(s) IntraMuscular once  insulin glargine Injectable (LANTUS) 20 Unit(s) SubCutaneous at bedtime  insulin lispro (ADMELOG) corrective regimen sliding scale   SubCutaneous three times a day before meals  insulin lispro (ADMELOG) corrective regimen sliding scale   SubCutaneous at bedtime  insulin lispro Injectable (ADMELOG) 5 Unit(s) SubCutaneous three times a day before meals  levothyroxine 175 MICROGram(s) Oral daily    artificial  tears Solution 1 Drop(s) Both EYES every 1 hour PRN  chlorhexidine 2% Cloths 1 Application(s) Topical daily  dextrose 5%. 1000 milliLiter(s) IV Continuous <Continuous>  dextrose 5%. 1000 milliLiter(s) IV Continuous <Continuous>  lactated ringers. 1000 milliLiter(s) IV Continuous <Continuous>      FAMILY HISTORY:  Family history of stroke (Mother)  mom    Family history of hypertension (Father)    Family history of breast cancer (Mother)        Non-contributory    SOCIAL HISTORY:    Denies     Allergies    Lipitor (Nausea)  latex (Rash)    Intolerances    	    REVIEW OF SYSTEMS:  CONSTITUTIONAL: No fever  EYES: No eye pain, visual disturbances, or discharge  ENMT:  No difficulty hearing, tinnitus  NECK: No pain or stiffness  RESPIRATORY: No cough, wheezing, + sob   CARDIOVASCULAR: No chest pain, palpitations, passing out, dizziness, or leg swelling  GASTROINTESTINAL:  No nausea, vomiting, diarrhea or constipation. No melena.  GENITOURINARY: No dysuria, hematuria  NEUROLOGICAL: No stroke like symptoms  SKIN: No burning or lesions   ENDOCRINE: No heat or cold intolerance  MUSCULOSKELETAL: No joint pain or swelling  PSYCHIATRIC: No  anxiety, mood swings  HEME/LYMPH: No bleeding gums  ALLERGY AND IMMUNOLOGIC: No hives or eczema	    All other ROS negative    PHYSICAL EXAM:  T(C): 36.8 (03-25-24 @ 11:59), Max: 38.3 (03-25-24 @ 03:25)  HR: 129 (03-25-24 @ 11:59) (129 - 160)  BP: 112/71 (03-25-24 @ 11:59) (95/57 - 147/74)  RR: 18 (03-25-24 @ 11:59) (18 - 20)  SpO2: 95% (03-25-24 @ 11:59) (91% - 98%)  Wt(kg): --  I&O's Summary    24 Mar 2024 07:01  -  25 Mar 2024 07:00  --------------------------------------------------------  IN: 1550 mL / OUT: 650 mL / NET: 900 mL    25 Mar 2024 07:01  -  25 Mar 2024 15:56  --------------------------------------------------------  IN: 0 mL / OUT: 400 mL / NET: -400 mL        Appearance: Normal	  HEENT:   Normal oral mucosa, EOMI	  Cardiovascular:  S1 S2, No JVD,    Respiratory: Lungs clear to auscultation	  Psychiatry: Alert  Gastrointestinal:  Soft, Non-tender, + BS	  Skin: No rashes   Neurologic: Non-focal  Extremities:  + 1 LE pitting edema   Vascular: Peripheral pulses palpable    	    	  	  CARDIAC MARKERS:  Labs personally reviewed by me                                  11.3   18.23 )-----------( 210      ( 25 Mar 2024 07:32 )             36.0     03-25    134<L>  |  99  |  31<H>  ----------------------------<  282<H>  3.5   |  17<L>  |  1.85<H>    Ca    7.9<L>      25 Mar 2024 07:31  Phos  3.1     03-25  Mg     1.7     03-25    TPro  5.7<L>  /  Alb  2.5<L>  /  TBili  1.4<H>  /  DBili  x   /  AST  13  /  ALT  12  /  AlkPhos  76  03-25          EKG: Personally reviewed by me - pending   Radiology: Personally reviewed by me - IMPRESSION:  CT CHEST:  Limited by respiratory motion. No gross pulmonary embolism. No pneumonia.    CT ABDOMEN AND PELVIS:  Right pyelonephritis with emphysematous pyelitis and mild   hydroureteronephrosis to the bladder.    CT THORACIC AND LUMBAR SPINE:  No acute fracture or traumatic malalignment.  Multilevel degenerative changes above.        Assessment /Plan:   73-year-old female history of TIA, A-fib (previously on eliquis, off since 7/2023 after hysterectomy),  HTN, HLD, DM, anxiety, consulted for AFIB RVR with SOB    1. AFIB RVR   - [id number] score 8   - on tele currently AFIB @ 125 bpm  - on Eliquis 2.5 mg BID ----> should be Eliquis 5mg BID (patient does not meet 2.5mg criteria)   - Cardizem increased to 60mg q6h  - check TSH  - cont to monitor on tele for occult arrythmia   - c/w supplemental oxygen   - NO ecg in patient chart - please obtain     2. Heart Failure/SOB  - CTA chest no pulm embolism   - ProBnP 4459   - c/w supplemental oxygen   - keep pulse ox above 90%  - Start Lasix 20mg IV dailly   - check TTE to assess structural heart defect and EF   - kidney functions wnl     3. Hx of CVA   - on Eliquis 5mg BID  - neuro checks q4h     4. HTN   - controlled   - will monitor blood pressure    5. DM  - A1c 9.8  - Endo recs appreciated   - consider SGLT inhibitor at discharge            Differential diagnosis and plan of care discussed with patient after the evaluation. Counseling on diet, nutritional counseling, weight management, exercise and medication compliance was done.   Advanced care planning/advanced directives discussed with patient/family. DNR status including forceful chest compressions to attempt to restart the heart, ventilator support/artificial breathing, electric shock, artificial nutrition, health care proxy, Molst form all discussed with pt. Pt wishes to consider. More than fifteen minutes spent on discussing advanced directives.     GOVIND Mohan, DO [facility]  Cardiovascular Medicine  [address] [place] [address] Dr, [address]  Available for call or text via [employer] TE[employer]  Office [phone]   DATE OF SERVICE: 03-25-24 @ 15:56    CHIEF COMPLAINT:Patient is a 73y old  Female who presents with a chief complaint of nausea, diarrhea, malaise (25 Mar 2024 13:16)      HISTORY OF PRESENT ILLNESS:HPI:  73-year-old female history of A-fib (previously on eliquis, off since 7/2023 after hysterectomy),  HTN, HLD, DM, anxiety, presenting to the ED for 2 days of weakness, not getting out of bed.  Patient having multiple episodes of nonbloody diarrhea, nausea, occasional vomiting (last episode 3/23). Also complains of general malaise, increased fatigue, chills at home x2-3 days. Initially presented to Urgent Care and was directed to present to ED for further evaluation. Denies chest pain, dyspnea, orthopnea, abdominal pain, hematemesis, melena/hematochezia, nasal congestion, sore throat. Endorses flank pain, suprapubic and inguinal pain. Endorsing back pain, chronic 2/2 spinal stenosis.     In ED, initially with T 99.9, , BP 120s/80s, SpO2 95% on RA -> then developed fevers with T 103.1, Afib on tele with HRs 140s-150s, BPs as low as 80s/60s. S/p IVF bolus 3.5 L total, acetaminophen, vanc/zosyn x1, lopressor 5 mg IV x3 -> amio 150 mg x1. Subsequent improvement in BPs and fevers, also endorses some improvement in nausea. No episodes of emesis since presentation to ED.    (24 Mar 2024 12:50)      PAST MEDICAL & SURGICAL HISTORY:  HTN (hypertension)      Murmur      Morbid obesity      Palpitations      Former smoker      Hypothyroid      CAD (coronary artery disease)  minimal CAD per cath result in 2014      Hypercholesterolemia      Gastritis      Depression      Anxiety      AF (atrial fibrillation)  on Xarelto      Hiatal hernia      Neuropathy      Uterine cyst      Incontinence      Thickened endometrium      History of TIAs      Loss of vision  right eye      Blind right eye      [place] (hard of hearing)      Uterine cancer      Type 2 diabetes mellitus      Constipation      Spinal stenosis      Edema of lower extremity      Varicose veins      Thyroid nodule      OAB (overactive bladder)      Risk factors for obstructive sleep apnea      H/O Helicobacter infection      S/P cataract extraction and insertion of intraocular lens  bilateral > 15 years ago      Varicose vein  with stripping b/l      History of angioplasty of vein  right leg 2016      H/O thyroidectomy      H/O: hysterectomy              MEDICATIONS:  apixaban 2.5 milliGRAM(s) Oral every 12 hours  aspirin enteric coated 81 milliGRAM(s) Oral daily  diltiazem    Tablet 60 milliGRAM(s) Oral four times a day    piperacillin/tazobactam IVPB.. 3.375 Gram(s) IV Intermittent every 8 hours      acetaminophen     Tablet .. 650 milliGRAM(s) Oral every 6 hours PRN  ALPRAZolam 1 milliGRAM(s) Oral at bedtime  ALPRAZolam 0.5 milliGRAM(s) Oral daily PRN  melatonin 3 milliGRAM(s) Oral at bedtime PRN  ondansetron Injectable 4 milliGRAM(s) IV Push every 8 hours PRN    aluminum hydroxide/magnesium hydroxide/simethicone Suspension 30 milliLiter(s) Oral every 4 hours PRN  pantoprazole    Tablet 40 milliGRAM(s) Oral before breakfast    dextrose 50% Injectable 25 Gram(s) IV Push once  dextrose 50% Injectable 12.5 Gram(s) IV Push once  dextrose 50% Injectable 25 Gram(s) IV Push once  dextrose Oral Gel 15 Gram(s) Oral once PRN  glucagon  Injectable 1 milliGRAM(s) IntraMuscular once  insulin glargine Injectable (LANTUS) 20 Unit(s) SubCutaneous at bedtime  insulin lispro (ADMELOG) corrective regimen sliding scale   SubCutaneous three times a day before meals  insulin lispro (ADMELOG) corrective regimen sliding scale   SubCutaneous at bedtime  insulin lispro Injectable (ADMELOG) 5 Unit(s) SubCutaneous three times a day before meals  levothyroxine 175 MICROGram(s) Oral daily    artificial  tears Solution 1 Drop(s) Both EYES every 1 hour PRN  chlorhexidine 2% Cloths 1 Application(s) Topical daily  dextrose 5%. 1000 milliLiter(s) IV Continuous <Continuous>  dextrose 5%. 1000 milliLiter(s) IV Continuous <Continuous>  lactated ringers. 1000 milliLiter(s) IV Continuous <Continuous>      FAMILY HISTORY:  Family history of stroke (Mother)  mom    Family history of hypertension (Father)    Family history of breast cancer (Mother)        Non-contributory    SOCIAL HISTORY:    Denies     Allergies    Lipitor (Nausea)  latex (Rash)    Intolerances    	    REVIEW OF SYSTEMS:  CONSTITUTIONAL: No fever  EYES: No eye pain, visual disturbances, or discharge  ENMT:  No difficulty hearing, tinnitus  NECK: No pain or stiffness  RESPIRATORY: No cough, wheezing, + sob   CARDIOVASCULAR: No chest pain, palpitations, passing out, dizziness, or leg swelling  GASTROINTESTINAL:  No nausea, vomiting, diarrhea or constipation. No melena.  GENITOURINARY: No dysuria, hematuria  NEUROLOGICAL: No stroke like symptoms  SKIN: No burning or lesions   ENDOCRINE: No heat or cold intolerance  MUSCULOSKELETAL: No joint pain or swelling  PSYCHIATRIC: No  anxiety, mood swings  HEME/LYMPH: No bleeding gums  ALLERGY AND IMMUNOLOGIC: No hives or eczema	    All other ROS negative    PHYSICAL EXAM:  T(C): 36.8 (03-25-24 @ 11:59), Max: 38.3 (03-25-24 @ 03:25)  HR: 129 (03-25-24 @ 11:59) (129 - 160)  BP: 112/71 (03-25-24 @ 11:59) (95/57 - 147/74)  RR: 18 (03-25-24 @ 11:59) (18 - 20)  SpO2: 95% (03-25-24 @ 11:59) (91% - 98%)  Wt(kg): --  I&O's Summary    24 Mar 2024 07:01  -  25 Mar 2024 07:00  --------------------------------------------------------  IN: 1550 mL / OUT: 650 mL / NET: 900 mL    25 Mar 2024 07:01  -  25 Mar 2024 15:56  --------------------------------------------------------  IN: 0 mL / OUT: 400 mL / NET: -400 mL        Appearance: Normal	  HEENT:   Normal oral mucosa, EOMI	  Cardiovascular:  S1 S2, No JVD,    Respiratory: Lungs clear to auscultation	  Psychiatry: Alert  Gastrointestinal:  Soft, Non-tender, + BS	  Skin: No rashes   Neurologic: Non-focal  Extremities:  + 1 LE pitting edema   Vascular: Peripheral pulses palpable    	    	  	  CARDIAC MARKERS:  Labs personally reviewed by me                                  11.3   18.23 )-----------( 210      ( 25 Mar 2024 07:32 )             36.0     03-25    134<L>  |  99  |  31<H>  ----------------------------<  282<H>  3.5   |  17<L>  |  1.85<H>    Ca    7.9<L>      25 Mar 2024 07:31  Phos  3.1     03-25  Mg     1.7     03-25    TPro  5.7<L>  /  Alb  2.5<L>  /  TBili  1.4<H>  /  DBili  x   /  AST  13  /  ALT  12  /  AlkPhos  76  03-25          EKG: Personally reviewed by me - pending   Radiology: Personally reviewed by me - IMPRESSION:  CT CHEST:  Limited by respiratory motion. No gross pulmonary embolism. No pneumonia.    CT ABDOMEN AND PELVIS:  Right pyelonephritis with emphysematous pyelitis and mild   hydroureteronephrosis to the bladder.    CT THORACIC AND LUMBAR SPINE:  No acute fracture or traumatic malalignment.  Multilevel degenerative changes above.        Assessment /Plan:   73-year-old female history of TIA, A-fib (previously on eliquis, off since 7/2023 after hysterectomy),  HTN, HLD, DM, anxiety, consulted for AFIB RVR with SOB    1. AFIB RVR   - [id number] score 8   - on tele currently AFIB @ 125 bpm  - on Eliquis 2.5 mg BID ----> should be Eliquis 5mg BID (patient does not meet 2.5mg criteria)   - Cardizem increased to 60mg q6h with inadequate response, will initiate Cardizem gtt @ 10mg/hour  - check TSH, TTE  - cont to monitor on tele   - c/w supplemental oxygen   - No ecg in patient chart - please obtain       2. Heart Failure/SOB  - CTA chest no pulm embolism   - ProBnP 4459   - c/w supplemental oxygen   - keep pulse ox above 90%  - Start Lasix 20mg IV daily   - check TTE to assess structural heart defect and EF   - kidney functions wnl     3. Hx of CVA   - on Eliquis 5mg BID  - neuro checks q4h     4. HTN   - controlled   - will monitor blood pressure    5. DM  - A1c 9.8  - Endo recs appreciated   - consider SGLT inhibitor at discharge            Differential diagnosis and plan of care discussed with patient after the evaluation. Counseling on diet, nutritional counseling, weight management, exercise and medication compliance was done.   Advanced care planning/advanced directives discussed with patient/family. DNR status including forceful chest compressions to attempt to restart the heart, ventilator support/artificial breathing, electric shock, artificial nutrition, health care proxy, Molst form all discussed with pt. Pt wishes to consider. More than fifteen minutes spent on discussing advanced directives.     GOVIND Mohan DO [facility]  Cardiovascular Medicine  [address] [place] [address] Dr, [address]  Available for call or text via [employer] TEAM[employer]  Office [phone]

## 2024-03-25 NOTE — CONSULT NOTE ADULT - ATTENDING COMMENTS
This is a 72 y/o F w/ PMHx of AF, HTN, HLD, DM, anxiety who is presenting to [facility] 3/24/24 with weakness, N/V/D, chills, fatigue. Initially went to UC, then directed to ED, w/ flank and suprapubic pain.   In the ER, initially afebrile, then had fevers to 103.1, AF w/ RVR to 150s and hypotensive to 80s/60s. Pt was started on broad therapy w/ Vancomycin/Zosyn. Pt was continued on Zosyn.   Labs w/ leukocytosis to 17 -> 18,  DEEPA to 1.54 to 1.85, lactate 2.8, U/A 237 WBCs  CT A/P w/ R pyelo, with emphysematous pyelitis and mild hydroureteronephrosis to the bladder, BCx w/ Klebsiella pneumoniae     #Klebsiella pneumoniae bacteremia  #Emphysematous pyelitis   #Severe sepsis   #Lactic acidosis   #DEEPA  #AF w/ RVR    Overall, 72 y/o F w/ PMHx of AF, HTN, HLD, DM, anxiety who is presenting with severe sepsis, AF w/ RVR 2/2 Klebsiella pneumoniae bacteremia, R pyelonephritis with emphysematous pyelitis. Pt acutely ill, still with significant leukocytosis, clinically ill appearing, which is worsening by fevers and AF w/ RVR. Would repeat BCx and continue w/ Zosyn for now, will need interval CT A/P and close urology follow up.     Plan:   1. C/w Zosyn 3.375 g q8 for now, f/u BCx sensitivities   2. Repeat blood cx Q48h until clear   3. If not clinically improving, continues to have fevers, worsening leukocytosis would repeat CT A/P w/ IV contrast   4. Close urology follow up as emphysematous pyelitis is quite severe and could require nephrectomy     Thank you for this consult. Inpatient ID team will follow.    Paolo Love M.D.  Attending Physician  Division of Infectious Diseases  Department of Medicine    Please contact through MS Teams message.  Office: [phone] (after 5 PM or weekend) This is a 72 y/o F w/ PMHx of AF, HTN, HLD, DM, anxiety who is presenting to [facility] 3/24/24 with weakness, N/V/D, chills, fatigue. Initially went to UC, then directed to ED, w/ flank and suprapubic pain.   In the ER, initially afebrile, then had fevers to 103.1, AF w/ RVR to 150s and hypotensive to 80s/60s. Pt was started on broad therapy w/ Vancomycin/Zosyn. Pt was continued on Zosyn.   Labs w/ leukocytosis to 17 -> 18,  DEEPA to 1.54 to 1.85, lactate 2.8, U/A 237 WBCs  CT A/P w/ R pyelo, with emphysematous pyelitis and mild hydroureteronephrosis to the bladder, BCx w/ Klebsiella pneumoniae     #Klebsiella pneumoniae bacteremia  #Emphysematous pyelitis   #Severe sepsis   #Lactic acidosis   #DEEPA  #AF w/ RVR    Overall, 72 y/o F w/ PMHx of AF, HTN, HLD, DM, anxiety who is presenting with severe sepsis, AF w/ RVR 2/2 Klebsiella pneumoniae bacteremia, R pyelonephritis with emphysematous pyelitis. Pt acutely ill, still with significant leukocytosis, clinically ill appearing, which is worsening by fevers and AF w/ RVR. Would repeat BCx and continue w/ Zosyn for now, will need interval CT A/P and close urology follow up.     Plan:   1. C/w Zosyn 3.375 g q8 for now, f/u BCx sensitivities   2. Repeat blood cx Q48h until clear   3. If not clinically improving, continues to have fevers, worsening leukocytosis would repeat CT A/P w/ IV contrast     Discussed with urology in detail, low threshold for repeat CT A/P w/ IV contrast in the coming days if not clinically improving. Will follow closely.     Thank you for this consult. Inpatient ID team will follow.    Paolo Love M.D.  Attending Physician  Division of Infectious Diseases  Department of Medicine    Please contact through MS Teams message.  Office: [phone] (after 5 PM or weekend)

## 2024-03-25 NOTE — PROGRESS NOTE ADULT - PROBLEM SELECTOR PLAN 5
- glucose 300s-400 this admission  - home meds: Tresiba 20 units QHS, novolog sliding scale (unclear how patient determines dose)  - a1c 9.8 (8.2 in 7/2023)  - increase to lantus 20 units QHS  - start admelog 5 units TIDAC (hold if NPO)  - moderate dose admelog ISS TID with meals + QHS  - f/u lipid panel

## 2024-03-25 NOTE — PHYSICAL THERAPY INITIAL EVALUATION ADULT - ADDITIONAL COMMENTS
Pt lives with  in apartment in private house, 4 small stairs to enter. Ambulates with cane, in the last few weeks has been using rolling walker. Has bath shower with grab bars.  assists with dressing, showering. Uses scooter when in the supermarket.

## 2024-03-25 NOTE — PROGRESS NOTE ADULT - SUBJECTIVE AND OBJECTIVE BOX
BRENNEN VALDEZ  73y  Female    Patient is a 73y old  Female who presents with a chief complaint of nausea, diarrhea, malaise (25 Mar 2024 13:16)    INTERVAL HPI/OVERNIGHT EVENTS:  - Afib RVR with HRs up to 150s overnight, s/p lopressor 5 mg IV x2 with minimal improvement  - s/p 1L LR bolus -> maintenance IVF  - acetaminophen IV x1 for fever  - feeling "terrible" this morning, notes persistent nausea, flank pain, suprapubic pain, chills  - denies chest pain, palpitations, dizziness, dyspnea   - tolerating PO intake, no emesis this admission  - UOP 575cc     T(C): 36.8 (03-25-24 @ 11:59), Max: 38.3 (03-25-24 @ 03:25)  HR: 129 (03-25-24 @ 11:59) (129 - 160)  BP: 112/71 (03-25-24 @ 11:59) (95/57 - 147/74)  RR: 18 (03-25-24 @ 11:59) (18 - 20)  SpO2: 95% (03-25-24 @ 11:59) (91% - 98%)  Wt(kg): --Vital Signs Last 24 Hrs  T(C): 36.8 (25 Mar 2024 11:59), Max: 38.3 (25 Mar 2024 03:25)  T(F): 98.3 (25 Mar 2024 11:59), Max: 100.9 (25 Mar 2024 03:25)  HR: 129 (25 Mar 2024 11:59) (129 - 160)  BP: 112/71 (25 Mar 2024 11:59) (95/57 - 147/74)  BP(mean): --  RR: 18 (25 Mar 2024 11:59) (18 - 20)  SpO2: 95% (25 Mar 2024 11:59) (91% - 98%)    Parameters below as of 25 Mar 2024 11:59  Patient On (Oxygen Delivery Method): room air    PHYSICAL EXAM:  CONSTITUTIONAL: ill-appearing, awake/alert  EYES: PERRLA and symmetric, EOMI, No conjunctival or scleral injection, non-icteric  ENMT: Oral mucosa with moist membranes  NECK: Supple, symmetric and without tracheal deviation   RESP: CTA b/l, no WRR, unlabored respirations   CV: irregularly irregular, HRs 130s-150s, no MRG; no JVD; no peripheral edema  GI: Soft, TTP over suprapubic region, otherwise abdomen nontender. No rebound, no guarding, no palpable masses  : + CVA tenderness bilaterally, R>L. Escoto in place, yellow urine in bag   MSK: No digital clubbing or cyanosis; normal muscle bulk and tone, no gross deformities  SKIN: No rashes or ulcers noted; no subcutaneous nodules or induration palpable  NEURO: facial movements symmetric, moves all extremities against gravity, no gross focal deficits   PSYCH: Appropriate insight/judgment; A+O x 3, mood and affect appropriate, recent/remote memory intact    Consultant(s) Notes Reviewed:  [x ] YES  [ ] NO  Care Discussed with Consultants/Other Providers [ x] YES  [ ] NO    LABS:                        11.3   18.23 )-----------( 210      ( 25 Mar 2024 07:32 )             36.0     03-25    134<L>  |  99  |  31<H>  ----------------------------<  282<H>  3.5   |  17<L>  |  1.85<H>    Ca    7.9<L>      25 Mar 2024 07:31  Phos  3.1     03-25  Mg     1.7     03-25    TPro  5.7<L>  /  Alb  2.5<L>  /  TBili  1.4<H>  /  DBili  x   /  AST  13  /  ALT  12  /  AlkPhos  76  03-25        Urinalysis Basic - ( 25 Mar 2024 07:31 )    Color: x / Appearance: x / SG: x / pH: x  Gluc: 282 mg/dL / Ketone: x  / Bili: x / Urobili: x   Blood: x / Protein: x / Nitrite: x   Leuk Esterase: x / RBC: x / WBC x   Sq Epi: x / Non Sq Epi: x / Bacteria: x      CAPILLARY BLOOD GLUCOSE      POCT Blood Glucose.: 311 mg/dL (25 Mar 2024 13:11)  POCT Blood Glucose.: 279 mg/dL (25 Mar 2024 08:46)  POCT Blood Glucose.: 284 mg/dL (24 Mar 2024 21:43)  POCT Blood Glucose.: 317 mg/dL (24 Mar 2024 17:18)        Urinalysis Basic - ( 25 Mar 2024 07:31 )    Color: x / Appearance: x / SG: x / pH: x  Gluc: 282 mg/dL / Ketone: x  / Bili: x / Urobili: x   Blood: x / Protein: x / Nitrite: x   Leuk Esterase: x / RBC: x / WBC x   Sq Epi: x / Non Sq Epi: x / Bacteria: x        RADIOLOGY & ADDITIONAL TESTS:    Imaging Personally Reviewed:  [ ] YES  [ ] NO    HEALTH ISSUES - PROBLEM Dx:  Sepsis secondary to UTI    Pyelonephritis    DEEPA (acute kidney injury)    Atrial fibrillation with RVR    Acute respiratory failure with hypoxia    Diabetes mellitus    Hyponatremia    Hypertension    Hyperlipidemia    Spinal stenosis    Hypothyroidism    History of uterine cancer    Need for prophylactic measure

## 2024-03-25 NOTE — CHART NOTE - NSCHARTNOTEFT_GEN_A_CORE
Notified by RN for Afib with RVR >140s. BP 95/62    Ordered 1L LR bolus and tylenol for fever of 100.9 rectal temp.  Evaluated pt at bedside, asympomaic, AO3, denies chest pain, palpiations. Appears comfortable, slighly diaphoretic and cold, had blankets off because patient was feeling hot earlier.     Will repeat BP after IVF is finished. If BP is stable and HR remains high, will give IV Lopressor 5 mg Notified by RN for Afib with RVR >140s. BP 95/62    Ordered 1L LR bolus and tylenol for fever of 100.9 rectal temp.  Evaluated pt at bedside, asympomaic, AO3, denies chest pain, palpiations. Appears comfortable, slighly diaphoretic and cold, had blankets off because patient was feeling hot earlier.     Will repeat BP after IVF is finished. If BP is stable and HR remains high, will give IV Lopressor 5 mg      Update 5:06AM  BP after 1L LR bolus is 102/71, gave IV lopressor 5mg and stared on maintenance IV fluids

## 2024-03-25 NOTE — CONSULT NOTE ADULT - TIME BILLING
Reviewing the chart, interpreting lab data, discussing case with fellow, interview and examination of patient, and documentation. Pt is at high risk of mortality due to her disease. Reviewing the chart, interpreting lab data, discussing case with fellow and urology, interview and examination of patient, and documentation. Pt is at high risk of mortality due to her disease.

## 2024-03-25 NOTE — PHYSICAL THERAPY INITIAL EVALUATION ADULT - NSPTDISCHREC_GEN_A_CORE
TBD when functional assessment is completed, likely subacute rehab if pt/spouse refuse, pt would benefit from home PT services and supervision/assist for ALL mobility 2/2 decreased strength and balance/Sub-acute Rehab

## 2024-03-26 ENCOUNTER — RESULT REVIEW (OUTPATIENT)
Age: 74
End: 2024-03-26

## 2024-03-26 LAB
-  AMPICILLIN/SULBACTAM: SIGNIFICANT CHANGE UP
-  AMPICILLIN: SIGNIFICANT CHANGE UP
-  AZTREONAM: SIGNIFICANT CHANGE UP
-  CEFAZOLIN: SIGNIFICANT CHANGE UP
-  CEFEPIME: SIGNIFICANT CHANGE UP
-  CEFOXITIN: SIGNIFICANT CHANGE UP
-  CEFTRIAXONE: SIGNIFICANT CHANGE UP
-  CIPROFLOXACIN: SIGNIFICANT CHANGE UP
-  ERTAPENEM: SIGNIFICANT CHANGE UP
-  GENTAMICIN: SIGNIFICANT CHANGE UP
-  IMIPENEM: SIGNIFICANT CHANGE UP
-  LEVOFLOXACIN: SIGNIFICANT CHANGE UP
-  MEROPENEM: SIGNIFICANT CHANGE UP
-  PIPERACILLIN/TAZOBACTAM: SIGNIFICANT CHANGE UP
-  TOBRAMYCIN: SIGNIFICANT CHANGE UP
-  TRIMETHOPRIM/SULFAMETHOXAZOLE: SIGNIFICANT CHANGE UP
ALBUMIN SERPL ELPH-MCNC: 2.4 G/DL — LOW (ref 3.3–5)
ALP SERPL-CCNC: 115 U/L — SIGNIFICANT CHANGE UP (ref 40–120)
ALT FLD-CCNC: 13 U/L — SIGNIFICANT CHANGE UP (ref 10–45)
ANION GAP SERPL CALC-SCNC: 16 MMOL/L — SIGNIFICANT CHANGE UP (ref 5–17)
AST SERPL-CCNC: 16 U/L — SIGNIFICANT CHANGE UP (ref 10–40)
BASOPHILS # BLD AUTO: 0.03 K/UL — SIGNIFICANT CHANGE UP (ref 0–0.2)
BASOPHILS NFR BLD AUTO: 0.2 % — SIGNIFICANT CHANGE UP (ref 0–2)
BILIRUB SERPL-MCNC: 1.1 MG/DL — SIGNIFICANT CHANGE UP (ref 0.2–1.2)
BUN SERPL-MCNC: 28 MG/DL — HIGH (ref 7–23)
CALCIUM SERPL-MCNC: 8 MG/DL — LOW (ref 8.4–10.5)
CHLORIDE SERPL-SCNC: 100 MMOL/L — SIGNIFICANT CHANGE UP (ref 96–108)
CHOLEST SERPL-MCNC: 102 MG/DL — SIGNIFICANT CHANGE UP
CO2 SERPL-SCNC: 17 MMOL/L — LOW (ref 22–31)
CREAT SERPL-MCNC: 1.74 MG/DL — HIGH (ref 0.5–1.3)
CULTURE RESULTS: ABNORMAL
CULTURE RESULTS: ABNORMAL
EGFR: 30 ML/MIN/1.73M2 — LOW
EOSINOPHIL # BLD AUTO: 0.02 K/UL — SIGNIFICANT CHANGE UP (ref 0–0.5)
EOSINOPHIL NFR BLD AUTO: 0.1 % — SIGNIFICANT CHANGE UP (ref 0–6)
GLUCOSE BLDC GLUCOMTR-MCNC: 112 MG/DL — HIGH (ref 70–99)
GLUCOSE BLDC GLUCOMTR-MCNC: 118 MG/DL — HIGH (ref 70–99)
GLUCOSE BLDC GLUCOMTR-MCNC: 167 MG/DL — HIGH (ref 70–99)
GLUCOSE BLDC GLUCOMTR-MCNC: 183 MG/DL — HIGH (ref 70–99)
GLUCOSE BLDC GLUCOMTR-MCNC: 221 MG/DL — HIGH (ref 70–99)
GLUCOSE SERPL-MCNC: 166 MG/DL — HIGH (ref 70–99)
GRAM STN FLD: ABNORMAL
HCT VFR BLD CALC: 34.4 % — LOW (ref 34.5–45)
HDLC SERPL-MCNC: 19 MG/DL — LOW
HGB BLD-MCNC: 11.3 G/DL — LOW (ref 11.5–15.5)
IMM GRANULOCYTES NFR BLD AUTO: 0.6 % — SIGNIFICANT CHANGE UP (ref 0–0.9)
LIPID PNL WITH DIRECT LDL SERPL: 51 MG/DL — SIGNIFICANT CHANGE UP
LYMPHOCYTES # BLD AUTO: 1.11 K/UL — SIGNIFICANT CHANGE UP (ref 1–3.3)
LYMPHOCYTES # BLD AUTO: 6.1 % — LOW (ref 13–44)
MAGNESIUM SERPL-MCNC: 2.2 MG/DL — SIGNIFICANT CHANGE UP (ref 1.6–2.6)
MCHC RBC-ENTMCNC: 28.2 PG — SIGNIFICANT CHANGE UP (ref 27–34)
MCHC RBC-ENTMCNC: 32.8 GM/DL — SIGNIFICANT CHANGE UP (ref 32–36)
MCV RBC AUTO: 85.8 FL — SIGNIFICANT CHANGE UP (ref 80–100)
METHOD TYPE: SIGNIFICANT CHANGE UP
MONOCYTES # BLD AUTO: 1.32 K/UL — HIGH (ref 0–0.9)
MONOCYTES NFR BLD AUTO: 7.3 % — SIGNIFICANT CHANGE UP (ref 2–14)
MRSA PCR RESULT.: SIGNIFICANT CHANGE UP
NEUTROPHILS # BLD AUTO: 15.57 K/UL — HIGH (ref 1.8–7.4)
NEUTROPHILS NFR BLD AUTO: 85.7 % — HIGH (ref 43–77)
NON HDL CHOLESTEROL: 83 MG/DL — SIGNIFICANT CHANGE UP
NRBC # BLD: 0 /100 WBCS — SIGNIFICANT CHANGE UP (ref 0–0)
ORGANISM # SPEC MICROSCOPIC CNT: ABNORMAL
PHOSPHATE SERPL-MCNC: 2.2 MG/DL — LOW (ref 2.5–4.5)
PLATELET # BLD AUTO: 219 K/UL — SIGNIFICANT CHANGE UP (ref 150–400)
POTASSIUM SERPL-MCNC: 4 MMOL/L — SIGNIFICANT CHANGE UP (ref 3.5–5.3)
POTASSIUM SERPL-SCNC: 4 MMOL/L — SIGNIFICANT CHANGE UP (ref 3.5–5.3)
PROT SERPL-MCNC: 6 G/DL — SIGNIFICANT CHANGE UP (ref 6–8.3)
RBC # BLD: 4.01 M/UL — SIGNIFICANT CHANGE UP (ref 3.8–5.2)
RBC # FLD: 13.3 % — SIGNIFICANT CHANGE UP (ref 10.3–14.5)
S AUREUS DNA NOSE QL NAA+PROBE: SIGNIFICANT CHANGE UP
SODIUM SERPL-SCNC: 133 MMOL/L — LOW (ref 135–145)
SPECIMEN SOURCE: SIGNIFICANT CHANGE UP
TRIGL SERPL-MCNC: 190 MG/DL — HIGH
WBC # BLD: 18.15 K/UL — HIGH (ref 3.8–10.5)
WBC # FLD AUTO: 18.15 K/UL — HIGH (ref 3.8–10.5)

## 2024-03-26 PROCEDURE — 76770 US EXAM ABDO BACK WALL COMP: CPT | Mod: 26

## 2024-03-26 PROCEDURE — 99232 SBSQ HOSP IP/OBS MODERATE 35: CPT | Mod: GC

## 2024-03-26 PROCEDURE — 93306 TTE W/DOPPLER COMPLETE: CPT | Mod: 26

## 2024-03-26 PROCEDURE — 99233 SBSQ HOSP IP/OBS HIGH 50: CPT

## 2024-03-26 RX ORDER — INSULIN LISPRO 100/ML
8 VIAL (ML) SUBCUTANEOUS
Refills: 0 | Status: COMPLETED | OUTPATIENT
Start: 2024-03-26 | End: 2024-03-27

## 2024-03-26 RX ORDER — SODIUM CHLORIDE 9 MG/ML
1000 INJECTION, SOLUTION INTRAVENOUS
Refills: 0 | Status: DISCONTINUED | OUTPATIENT
Start: 2024-03-26 | End: 2024-03-26

## 2024-03-26 RX ORDER — CEFTRIAXONE 500 MG/1
2000 INJECTION, POWDER, FOR SOLUTION INTRAMUSCULAR; INTRAVENOUS EVERY 24 HOURS
Refills: 0 | Status: COMPLETED | OUTPATIENT
Start: 2024-03-26 | End: 2024-04-02

## 2024-03-26 RX ORDER — ACETAMINOPHEN 500 MG
1000 TABLET ORAL ONCE
Refills: 0 | Status: COMPLETED | OUTPATIENT
Start: 2024-03-26 | End: 2024-03-26

## 2024-03-26 RX ORDER — FUROSEMIDE 40 MG
20 TABLET ORAL ONCE
Refills: 0 | Status: COMPLETED | OUTPATIENT
Start: 2024-03-26 | End: 2024-03-26

## 2024-03-26 RX ORDER — INSULIN GLARGINE 100 [IU]/ML
24 INJECTION, SOLUTION SUBCUTANEOUS AT BEDTIME
Refills: 0 | Status: DISCONTINUED | OUTPATIENT
Start: 2024-03-26 | End: 2024-03-27

## 2024-03-26 RX ORDER — SODIUM CHLORIDE 9 MG/ML
1000 INJECTION INTRAMUSCULAR; INTRAVENOUS; SUBCUTANEOUS
Refills: 0 | Status: DISCONTINUED | OUTPATIENT
Start: 2024-03-26 | End: 2024-03-26

## 2024-03-26 RX ADMIN — Medication 2: at 17:58

## 2024-03-26 RX ADMIN — PANTOPRAZOLE SODIUM 40 MILLIGRAM(S): 20 TABLET, DELAYED RELEASE ORAL at 08:35

## 2024-03-26 RX ADMIN — Medication 175 MICROGRAM(S): at 05:38

## 2024-03-26 RX ADMIN — Medication 5 UNIT(S): at 09:38

## 2024-03-26 RX ADMIN — SODIUM CHLORIDE 100 MILLILITER(S): 9 INJECTION INTRAMUSCULAR; INTRAVENOUS; SUBCUTANEOUS at 05:37

## 2024-03-26 RX ADMIN — Medication 10 MG/HR: at 07:05

## 2024-03-26 RX ADMIN — PIPERACILLIN AND TAZOBACTAM 25 GRAM(S): 4; .5 INJECTION, POWDER, LYOPHILIZED, FOR SOLUTION INTRAVENOUS at 05:37

## 2024-03-26 RX ADMIN — CEFTRIAXONE 100 MILLIGRAM(S): 500 INJECTION, POWDER, FOR SOLUTION INTRAMUSCULAR; INTRAVENOUS at 13:44

## 2024-03-26 RX ADMIN — Medication 400 MILLIGRAM(S): at 05:40

## 2024-03-26 RX ADMIN — Medication 8 UNIT(S): at 13:40

## 2024-03-26 RX ADMIN — Medication 2: at 13:39

## 2024-03-26 RX ADMIN — Medication 20 MILLIGRAM(S): at 17:58

## 2024-03-26 RX ADMIN — Medication 650 MILLIGRAM(S): at 21:00

## 2024-03-26 RX ADMIN — Medication 10 MG/HR: at 08:29

## 2024-03-26 RX ADMIN — Medication 4: at 09:38

## 2024-03-26 RX ADMIN — Medication 81 MILLIGRAM(S): at 13:39

## 2024-03-26 RX ADMIN — Medication 650 MILLIGRAM(S): at 19:55

## 2024-03-26 RX ADMIN — APIXABAN 5 MILLIGRAM(S): 2.5 TABLET, FILM COATED ORAL at 17:58

## 2024-03-26 RX ADMIN — CHLORHEXIDINE GLUCONATE 1 APPLICATION(S): 213 SOLUTION TOPICAL at 13:38

## 2024-03-26 RX ADMIN — Medication 8 UNIT(S): at 17:59

## 2024-03-26 RX ADMIN — APIXABAN 5 MILLIGRAM(S): 2.5 TABLET, FILM COATED ORAL at 05:40

## 2024-03-26 RX ADMIN — INSULIN GLARGINE 24 UNIT(S): 100 INJECTION, SOLUTION SUBCUTANEOUS at 22:02

## 2024-03-26 RX ADMIN — Medication 1 MILLIGRAM(S): at 22:02

## 2024-03-26 NOTE — PROGRESS NOTE ADULT - PROBLEM SELECTOR PLAN 2
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 to ceftriaxone     Plan:  - urology following, appreciate recs  - per urology, no acute  intervention at this time  - d/c zosyn, start CTX 2g q24h as above  - maintain aguiar  - if not improving clinically or persistent bacteremia, repeat imaging to assess for possible abscess

## 2024-03-26 NOTE — PROGRESS NOTE ADULT - SUBJECTIVE AND OBJECTIVE BOX
Infectious Diseases Follow Up:    Patient is a 73y old  Female who presents with a chief complaint of nausea, diarrhea, malaise (26 Mar 2024 09:55)      Interval History/ROS:  Febrile ON, AF w/ RVR needing Diltiazem drip   Pt not feeling well, has continued back pain, fatigue, lack of appetite     Allergies  Lipitor (Nausea)  latex (Rash)        ANTIMICROBIALS:  piperacillin/tazobactam IVPB.. 3.375 every 8 hours      Current Abx:     Previous Abx     OTHER MEDS:  MEDICATIONS  (STANDING):  acetaminophen     Tablet .. 650 every 6 hours PRN  ALPRAZolam 1 at bedtime  ALPRAZolam 0.5 daily PRN  aluminum hydroxide/magnesium hydroxide/simethicone Suspension 30 every 4 hours PRN  apixaban 5 every 12 hours  aspirin enteric coated 81 daily  dextrose 50% Injectable 25 once  dextrose 50% Injectable 12.5 once  dextrose 50% Injectable 25 once  dextrose Oral Gel 15 once PRN  diltiazem Infusion 10 <Continuous>  glucagon  Injectable 1 once  insulin glargine Injectable (LANTUS) 20 at bedtime  insulin lispro (ADMELOG) corrective regimen sliding scale  three times a day before meals  insulin lispro (ADMELOG) corrective regimen sliding scale  at bedtime  insulin lispro Injectable (ADMELOG) 5 three times a day before meals  levothyroxine 175 daily  melatonin 3 at bedtime PRN  ondansetron Injectable 4 every 8 hours PRN  pantoprazole    Tablet 40 before breakfast      Vital Signs Last 24 Hrs  T(C): 36.7 (26 Mar 2024 08:40), Max: 38.6 (26 Mar 2024 05:30)  T(F): 98 (26 Mar 2024 08:40), Max: 101.4 (26 Mar 2024 05:30)  HR: 132 (26 Mar 2024 08:40) (92 - 140)  BP: 104/68 (26 Mar 2024 08:40) (104/68 - 119/65)  BP(mean): 80 (26 Mar 2024 08:40) (80 - 80)  RR: 18 (26 Mar 2024 08:40) (18 - 95)  SpO2: 96% (26 Mar 2024 08:40) (94% - 97%)    Parameters below as of 26 Mar 2024 08:40  Patient On (Oxygen Delivery Method): nasal cannula        PHYSICAL EXAM:  GENERAL: NAD, tired appearing   HEAD:  Atraumatic, Normocephalic  EYES: EOMI, conjunctiva and sclera clear  CHEST/LUNG: Clear to auscultation bilaterally; No wheeze  HEART: =Tachy   ABDOMEN: Soft, generalized mild tenderness, Nondistended; Bowel sounds present  EXTREMITIES:  2+ Peripheral Pulses, No clubbing, cyanosis, or edema  PSYCH: AAOx3                          11.3   18.15 )-----------( 219      ( 26 Mar 2024 07:27 )             34.4       03-26    133<L>  |  100  |  28<H>  ----------------------------<  166<H>  4.0   |  17<L>  |  1.74<H>    Ca    8.0<L>      26 Mar 2024 07:27  Phos  2.2     03-26  Mg     2.2     03-26    TPro  6.0  /  Alb  2.4<L>  /  TBili  1.1  /  DBili  x   /  AST  16  /  ALT  13  /  AlkPhos  115  03-26      Urinalysis Basic - ( 26 Mar 2024 07:27 )    Color: x / Appearance: x / SG: x / pH: x  Gluc: 166 mg/dL / Ketone: x  / Bili: x / Urobili: x   Blood: x / Protein: x / Nitrite: x   Leuk Esterase: x / RBC: x / WBC x   Sq Epi: x / Non Sq Epi: x / Bacteria: x        MICROBIOLOGY:  v  Clean Catch Clean Catch (Midstream)  03-24-24   >100,000 CFU/ml Gram Negative Rods  --  --      .Blood Blood-Peripheral  03-24-24   Growth in aerobic and anaerobic bottles: Klebsiella pneumoniae  See previous culture [id number]  --    Growth in aerobic and anaerobic bottles: Gram Negative Rods      .Blood Blood-Peripheral  03-24-24   Growth in aerobic and anaerobic bottles: Klebsiella pneumoniae  Direct identification is available within approximately 3-5  hours either by Blood Panel Multiplexed PCR or Direct  MALDI-TOF. Details: [email or web address]  --  Blood Culture PCR  Klebsiella pneumoniae                RADIOLOGY:

## 2024-03-26 NOTE — PROGRESS NOTE ADULT - PROBLEM SELECTOR PLAN 5
- glucose 300s-400 this admission  - home meds: Tresiba 20 units QHS, novolog sliding scale (unclear how patient determines dose)  - a1c 9.8 (8.2 in 7/2023)  - increase to lantus 20 units QHS  - start admelog 5 units TIDAC (hold if NPO)  - moderate dose admelog ISS TID with meals + QHS  - f/u lipid panel - glucose 300s-400 this admission  - home meds: Tresiba 20 units QHS, novolog sliding scale (unclear how patient determines dose)  - a1c 9.8 (8.2 in 7/2023)  - increase to lantus 24 units QHS  - admelog 8 units TIDAC (hold if NPO)  - moderate dose admelog ISS TID with meals + QHS  - f/u lipid panel

## 2024-03-26 NOTE — PROGRESS NOTE ADULT - PROBLEM SELECTOR PLAN 3
SCr on admission 1.54 -> 1.69 -> 1.85 -> 1.74  - baseline Cr 0.70 in 7/2023  - per patient, no known history of CKD  - likely 2/2 ATN ISO hemodynamic instability +/- prerenal ISO sepsis, however no improvement with initial fluid resuscitation  - FeNa 0.1% c/w prerenal DEEPA  - trend Cr, now slightly improved   - maintain aguiar SCr on admission 1.54 -> 1.69 -> 1.85 -> 1.74  - baseline Cr 0.70 in 7/2023  - per patient, no known history of CKD  - likely 2/2 ATN ISO hemodynamic instability +/- prerenal ISO sepsis, however no improvement with initial fluid resuscitation  - FeNa 0.1% c/w prerenal DEEPA  - trend Cr, now slightly improved   - maintain aguiar  - f/u renal/bladder US

## 2024-03-26 NOTE — PROGRESS NOTE ADULT - ASSESSMENT
This is a 72 y/o F w/ PMHx of AF, HTN, HLD, DM, anxiety who is presenting to [facility] 3/24/24 with weakness, N/V/D, chills, fatigue. Initially went to UC, then directed to ED, w/ flank and suprapubic pain.   In the ER, initially afebrile, then had fevers to 103.1, AF w/ RVR to 150s and hypotensive to 80s/60s. Pt was started on broad therapy w/ Vancomycin/Zosyn. Pt was continued on Zosyn.   Labs w/ leukocytosis to 17 -> 18,  DEEPA to 1.54 to 1.85, lactate 2.8, U/A 237 WBCs  CT A/P w/ R pyelo, with emphysematous pyelitis and mild hydroureteronephrosis to the bladder, BCx w/ Klebsiella pneumoniae     #Klebsiella pneumoniae bacteremia  #Emphysematous pyelitis   #Severe sepsis   #Lactic acidosis   #DEEPA  #AF w/ RVR    Overall, 72 y/o F w/ PMHx of AF, HTN, HLD, DM, anxiety who is presenting with severe sepsis, AF w/ RVR 2/2 Klebsiella pneumoniae bacteremia, R pyelonephritis with emphysematous pyelitis. Pt acutely ill, still with significant leukocytosis, clinically ill appearing, which is worsening by fevers and AF w/ RVR. Would repeat BCx and continue w/ Zosyn for now, will need interval CT A/P and close urology follow up.     Plan:   1. Change to Ceftriaxone 2 g q24  2. Repeat blood cx Q48h until clear   3. If not clinically improving, continues to have fevers, worsening leukocytosis would repeat CT A/P w/ IV contrast     Discussed with urology in detail, low threshold for repeat CT A/P w/ IV contrast in the coming days if not clinically improving. Will follow closely.     Thank you for this consult. Inpatient ID team will follow.    Paolo Love M.D.  Attending Physician  Division of Infectious Diseases  Department of Medicine

## 2024-03-26 NOTE — PROGRESS NOTE ADULT - PROBLEM SELECTOR PLAN 4
Previously on eliquis 2.5 mg BID, metoprolol succinate  - rate control and AC discontinued 7/2023 after extended holter monitor without evidence of A fib   - Afib RVR up to 160s this admission, likely 2/2 sepsis   - s/p lopressor 5 mg IVP x5, amio 150 mg IV x1 in ED  - now on cardizem gtt at 10/hr    Plan:  - cardiology following, appreciate recs   - eliquis 5 mg BID (pt does not meet criteria for reduced dose eliquis)  - continue cardizem gtt 10/hr  - d/w cardiology plan for increased rate control given HRs 130s-140s this AM on cardizem gtt   - K >4, Mg >2  - f/u TTE Previously on eliquis 2.5 mg BID, metoprolol succinate  - rate control and AC discontinued 7/2023 after extended holter monitor without evidence of A fib   - Afib RVR up to 160s this admission, likely 2/2 sepsis   - s/p lopressor 5 mg IVP x5, amio 150 mg IV x1 in ED  - now on cardizem gtt at 10/hr    Plan:  - cardiology following, appreciate recs   - eliquis 5 mg BID (pt does not meet criteria for reduced dose eliquis)  - continue cardizem gtt 10/hr  - d/w cardiology plan for increased rate control given HRs 130s-140s this AM on cardizem gtt   - K >4, Mg >2  - f/u TTE  - if normal EF, may consider increasing cardizem gtt for improved rate control, however would be contraindicated ISO low EF

## 2024-03-26 NOTE — PROGRESS NOTE ADULT - SUBJECTIVE AND OBJECTIVE BOX
BRENNEN VALDEZ  73y  Female    Patient is a 73y old  Female who presents with a chief complaint of nausea, diarrhea, malaise (26 Mar 2024 07:36)    INTERVAL HPI/OVERNIGHT EVENTS:  - started on cardizem gtt 3/25  - initially with improvement in HRs, then febrile to 101.4 overnight, HRs increased to 130s-140s  - s/p acetaminophen IV, HRs persistently 130s-140s despite tx of fever  - started on maintenance IVF overnight  - patient reports feeling a little better this morning, still with intermittent nausea, suprapubic and flank pain  - denies chest pain, dyspnea, palpitations, dizziness  - no new concerns    T(C): 38.6 (03-26-24 @ 05:30), Max: 38.6 (03-26-24 @ 05:30)  HR: 140 (03-26-24 @ 04:32) (92 - 149)  BP: 114/76 (03-26-24 @ 04:32) (101/67 - 119/65)  RR: 18 (03-26-24 @ 04:32) (18 - 95)  SpO2: 94% (03-26-24 @ 04:32) (93% - 98%)  Wt(kg): --Vital Signs Last 24 Hrs  T(C): 38.6 (26 Mar 2024 05:30), Max: 38.6 (26 Mar 2024 05:30)  T(F): 101.4 (26 Mar 2024 05:30), Max: 101.4 (26 Mar 2024 05:30)  HR: 140 (26 Mar 2024 04:32) (92 - 149)  BP: 114/76 (26 Mar 2024 04:32) (101/67 - 119/65)  BP(mean): --  RR: 18 (26 Mar 2024 04:32) (18 - 95)  SpO2: 94% (26 Mar 2024 04:32) (93% - 98%)    Parameters below as of 26 Mar 2024 04:32  Patient On (Oxygen Delivery Method): nasal cannula  O2 Flow (L/min): 2    PHYSICAL EXAM:  CONSTITUTIONAL: ill-appearing, awake/alert  EYES: PERRLA and symmetric, EOMI, No conjunctival or scleral injection, non-icteric  ENMT: Oral mucosa with moist membranes  NECK: Supple, symmetric and without tracheal deviation   RESP: CTA b/l, no WRR, mildly tachypneic but no accessory muscle use   CV: irregularly irregular, HRs 130s-150s, no MRG; no JVD; no peripheral edema  GI: Soft, TTP over suprapubic region, otherwise abdomen nontender. No rebound, no guarding, no palpable masses  : + CVA tenderness bilaterally, R>L. Escoto in place, dark yellow urine in bag   MSK: No digital clubbing or cyanosis; normal muscle bulk and tone, no gross deformities  SKIN: No rashes or ulcers noted; no subcutaneous nodules or induration palpable  NEURO: facial movements symmetric, moves all extremities against gravity, no gross focal deficits   PSYCH: Appropriate insight/judgment; A+O x 3, mood and affect appropriate, recent/remote memory intact    Consultant(s) Notes Reviewed:  [x ] YES  [ ] NO  Care Discussed with Consultants/Other Providers [ x] YES  [ ] NO    LABS:                        11.3   18.15 )-----------( 219      ( 26 Mar 2024 07:27 )             34.4     03-26    133<L>  |  100  |  28<H>  ----------------------------<  166<H>  4.0   |  17<L>  |  1.74<H>    Ca    8.0<L>      26 Mar 2024 07:27  Phos  2.2     03-26  Mg     2.2     03-26    TPro  6.0  /  Alb  2.4<L>  /  TBili  1.1  /  DBili  x   /  AST  16  /  ALT  13  /  AlkPhos  115  03-26        Urinalysis Basic - ( 26 Mar 2024 07:27 )    Color: x / Appearance: x / SG: x / pH: x  Gluc: 166 mg/dL / Ketone: x  / Bili: x / Urobili: x   Blood: x / Protein: x / Nitrite: x   Leuk Esterase: x / RBC: x / WBC x   Sq Epi: x / Non Sq Epi: x / Bacteria: x      CAPILLARY BLOOD GLUCOSE      POCT Blood Glucose.: 219 mg/dL (25 Mar 2024 21:34)  POCT Blood Glucose.: 308 mg/dL (25 Mar 2024 17:37)  POCT Blood Glucose.: 311 mg/dL (25 Mar 2024 13:11)        Urinalysis Basic - ( 26 Mar 2024 07:27 )    Color: x / Appearance: x / SG: x / pH: x  Gluc: 166 mg/dL / Ketone: x  / Bili: x / Urobili: x   Blood: x / Protein: x / Nitrite: x   Leuk Esterase: x / RBC: x / WBC x   Sq Epi: x / Non Sq Epi: x / Bacteria: x        RADIOLOGY & ADDITIONAL TESTS:    Imaging Personally Reviewed:  [ ] YES  [ ] NO    HEALTH ISSUES - PROBLEM Dx:  Severe sepsis with acute organ dysfunction due to gram-negative bacteria    Pyelonephritis    DEEPA (acute kidney injury)    Atrial fibrillation with RVR    Acute respiratory failure with hypoxia    Diabetes mellitus    Hyponatremia    Hypertension    Hyperlipidemia    Spinal stenosis    Hypothyroidism    Anxiety    Need for prophylactic measure    Sepsis secondary to UTI    History of uterine cancer

## 2024-03-26 NOTE — PROGRESS NOTE ADULT - PROBLEM SELECTOR PLAN 1
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, mild hydroureteronephritis to bladder   - CT chest with no evidence of pneumonia, CXR with bibasilar atelectasis  - s/p 3.5 L IVF   - s/p vanc/zosyn in ED  - BCx 3/24 with Klebsiella pneumoniae, susceptible to zosyn  - repeat BCx 3/25 pending   - prior urine cultures reviewed, E coli and Proteus mirabilis, pansensitive    Plan:  - ID following, appreciate recs  - continue empiric zosyn   - ID consulted, f/u recs  - BCx with Klebsiella pneumoniae, susceptible to zosyn  - f/u repeat BCx 3/25  - repeat BCx q48h until clear   - urology following for emphysematous pyelitis   - if no clinical improvement, repeat abdominal imaging to r/o abscess  - trend WBC, fever curve 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, mild hydroureteronephritis to bladder   - CT chest with no evidence of pneumonia, CXR with bibasilar atelectasis  - s/p 3.5 L IVF   - s/p vanc/zosyn in ED  - BCx 3/24 with Klebsiella pneumoniae, susceptible to ceftriaxone/zosyn  - repeat BCx 3/25 pending   - prior urine cultures reviewed, E coli and Proteus mirabilis, pansensitive    Plan:  - ID following, appreciate recs  - transition from zosyn to ceftriaxone 2g q24h (per susceptibility data, ID recs)  - f/u repeat BCx 3/25  - repeat BCx q48h until clear   - urology following for emphysematous pyelitis   - if no clinical improvement or persistently positive BCx, repeat abdominal imaging to r/o abscess  - trend WBC, fever curve

## 2024-03-26 NOTE — PROGRESS NOTE ADULT - ASSESSMENT
73-year-old female with history of paroxysmal A-fib (previously on eliquis, d/c'd 7/2023), hypertension, hyperlipidemia, diabetes, spinal stenosis, anxiety, p/w 3 days of n/v, diarrhea, malaise. Admitted with severe sepsis 2/2 pyelonephritis/emphysematous pyelitis, with gram negative bacteremia. Course c/b DEEPA, Afib RVR.

## 2024-03-26 NOTE — PROGRESS NOTE ADULT - SUBJECTIVE AND OBJECTIVE BOX
DATE OF SERVICE: 03-26-24 @ 09:55    Patient is a 73y old  Female who presents with a chief complaint of nausea, diarrhea, malaise (26 Mar 2024 07:36)      INTERVAL HISTORY: Febrile overnight, reports nausea this AM    REVIEW OF SYSTEMS:  CONSTITUTIONAL: No weakness  EYES/ENT: No visual changes;  No throat pain   NECK: No pain or stiffness  RESPIRATORY: No cough, wheezing; No shortness of breath  CARDIOVASCULAR: No chest pain or palpitations  GASTROINTESTINAL: No abdominal  pain. No nausea, vomiting, or hematemesis  GENITOURINARY: No dysuria, frequency or hematuria  NEUROLOGICAL: No stroke like symptoms  SKIN: No rashes    TELEMETRY Personally reviewed: AFIB   	  MEDICATIONS:  diltiazem Infusion 10 mG/Hr IV Continuous <Continuous>        PHYSICAL EXAM:  T(C): 36.7 (03-26-24 @ 08:40), Max: 38.6 (03-26-24 @ 05:30)  HR: 132 (03-26-24 @ 08:40) (92 - 149)  BP: 104/68 (03-26-24 @ 08:40) (104/68 - 119/65)  RR: 18 (03-26-24 @ 08:40) (18 - 95)  SpO2: 96% (03-26-24 @ 08:40) (94% - 98%)  Wt(kg): --  I&O's Summary    25 Mar 2024 07:01  -  26 Mar 2024 07:00  --------------------------------------------------------  IN: 830 mL / OUT: 1200 mL / NET: -370 mL          Appearance: In no distress	  HEENT:    PERRL, EOMI	  Cardiovascular:  S1 S2, No JVD  Respiratory: Lungs clear to auscultation	  Gastrointestinal:  Soft, Non-tender, + BS	  Vascularature:  No edema of LE  Psychiatric: Appropriate affect   Neuro: no acute focal deficits                               11.3   18.15 )-----------( 219      ( 26 Mar 2024 07:27 )             34.4     03-26    133<L>  |  100  |  28<H>  ----------------------------<  166<H>  4.0   |  17<L>  |  1.74<H>    Ca    8.0<L>      26 Mar 2024 07:27  Phos  2.2     03-26  Mg     2.2     03-26    TPro  6.0  /  Alb  2.4<L>  /  TBili  1.1  /  DBili  x   /  AST  16  /  ALT  13  /  AlkPhos  115  03-26        Labs personally reviewed      ASSESSMENT/PLAN: 	  73-year-old female history of TIA, A-fib (previously on eliquis, off since 7/2023 after hysterectomy),  HTN, HLD, DM, anxiety, consulted for AFIB RVR with SOB    1. AFIB RVR   - [id number] score 8   - on tele currently AFIB @ 125 bpm  - on Eliquis 2.5 mg BID ----> should be Eliquis 5mg BID (patient does not meet 2.5mg criteria)   - Cardizem increased to 60mg q6h with inadequate response, will initiate Cardizem gtt @ 10mg/hour  - check TSH, TTE  - cont to monitor on tele   - c/w supplemental oxygen   - No ecg in patient chart - please obtain       2. Heart Failure/SOB  - CTA chest no pulm embolism   - ProBnP 4459   - c/w supplemental oxygen   - keep pulse ox above 90%  - Start Lasix 20mg IV daily   - check TTE to assess structural heart defect and EF   - kidney functions wnl     3. Hx of CVA   - on Eliquis 5mg BID  - neuro checks q4h     4. HTN   - controlled   - will monitor blood pressure    5. DM  - A1c 9.8  - Endo recs appreciated   - consider SGLT inhibitor at discharge            REBECA Richards DO [facility]  Cardiovascular Medicine  [address], [address]  Available via call or text on [employer] TE[employer]  Office: [phone]   DATE OF SERVICE: 03-26-24 @ 09:55    Patient is a 73y old  Female who presents with a chief complaint of nausea, diarrhea, malaise (26 Mar 2024 07:36)      INTERVAL HISTORY: Febrile overnight, reports nausea this AM    REVIEW OF SYSTEMS:  CONSTITUTIONAL: No weakness  EYES/ENT: No visual changes;  No throat pain   NECK: No pain or stiffness  RESPIRATORY: No cough, wheezing; No shortness of breath  CARDIOVASCULAR: No chest pain or palpitations  GASTROINTESTINAL: No abdominal  pain. No nausea, vomiting, or hematemesis  GENITOURINARY: No dysuria, frequency or hematuria  NEUROLOGICAL: No stroke like symptoms  SKIN: No rashes    TELEMETRY Personally reviewed: AFIB   	  MEDICATIONS:  diltiazem Infusion 10 mG/Hr IV Continuous <Continuous>        PHYSICAL EXAM:  T(C): 36.7 (03-26-24 @ 08:40), Max: 38.6 (03-26-24 @ 05:30)  HR: 132 (03-26-24 @ 08:40) (92 - 149)  BP: 104/68 (03-26-24 @ 08:40) (104/68 - 119/65)  RR: 18 (03-26-24 @ 08:40) (18 - 95)  SpO2: 96% (03-26-24 @ 08:40) (94% - 98%)  Wt(kg): --  I&O's Summary    25 Mar 2024 07:01  -  26 Mar 2024 07:00  --------------------------------------------------------  IN: 830 mL / OUT: 1200 mL / NET: -370 mL          Appearance: In no distress	  HEENT:    PERRL, EOMI	  Cardiovascular:  S1 S2, No JVD  Respiratory: Lungs clear to auscultation	  Gastrointestinal:  Soft, Non-tender, + BS	  Vascularature:  No edema of LE  Psychiatric: Appropriate affect   Neuro: no acute focal deficits                               11.3   18.15 )-----------( 219      ( 26 Mar 2024 07:27 )             34.4     03-26    133<L>  |  100  |  28<H>  ----------------------------<  166<H>  4.0   |  17<L>  |  1.74<H>    Ca    8.0<L>      26 Mar 2024 07:27  Phos  2.2     03-26  Mg     2.2     03-26    TPro  6.0  /  Alb  2.4<L>  /  TBili  1.1  /  DBili  x   /  AST  16  /  ALT  13  /  AlkPhos  115  03-26        Labs personally reviewed      ASSESSMENT/PLAN: 	  73-year-old female history of TIA, A-fib (previously on eliquis, off since 7/2023 after hysterectomy),  HTN, HLD, DM, anxiety, consulted for AFIB RVR with SOB    1. AFIB RVR   - [id number] score 8   - on tele currently AFIB @ 125 bpm  - on Eliquis 2.5 mg BID ----> should be Eliquis 5mg BID (patient does not meet 2.5mg criteria)   - Cardizem increased to 60mg q6h with inadequate response, initiated Cardizem gtt @ 10mg/hour  - Given DEEPA/CKD will hold off on Dig  - TTE with preserved EF  - cont to monitor on tele   - c/w supplemental oxygen       2. Heart Failure/SOB  - CTA chest no pulm embolism   - ProBnP 4459   - c/w supplemental oxygen   - keep pulse ox above 90%  - Start Lasix 20mg IV daily   - check TTE to assess structural heart defect and EF   - kidney function wnl     3. Hx of CVA   - on Eliquis 5mg BID  - neuro checks q4h     4. HTN   - controlled   - will monitor blood pressure    5. DM  - A1c 9.8  - Endo recs appreciated   - consider SGLT inhibitor at discharge            REBECA Richards DO [facility]  Cardiovascular Medicine  [address], [address]  Available via call or text on [employer] TE[employer]  Office: [phone]

## 2024-03-27 DIAGNOSIS — R74.8 ABNORMAL LEVELS OF OTHER SERUM ENZYMES: ICD-10-CM

## 2024-03-27 LAB
-  AMOXICILLIN/CLAVULANIC ACID: SIGNIFICANT CHANGE UP
-  AMOXICILLIN/CLAVULANIC ACID: SIGNIFICANT CHANGE UP
-  AMPICILLIN/SULBACTAM: SIGNIFICANT CHANGE UP
-  AMPICILLIN/SULBACTAM: SIGNIFICANT CHANGE UP
-  AMPICILLIN: SIGNIFICANT CHANGE UP
-  AMPICILLIN: SIGNIFICANT CHANGE UP
-  AZTREONAM: SIGNIFICANT CHANGE UP
-  AZTREONAM: SIGNIFICANT CHANGE UP
-  CEFAZOLIN: SIGNIFICANT CHANGE UP
-  CEFAZOLIN: SIGNIFICANT CHANGE UP
-  CEFEPIME: SIGNIFICANT CHANGE UP
-  CEFEPIME: SIGNIFICANT CHANGE UP
-  CEFOXITIN: SIGNIFICANT CHANGE UP
-  CEFOXITIN: SIGNIFICANT CHANGE UP
-  CEFTRIAXONE: SIGNIFICANT CHANGE UP
-  CEFTRIAXONE: SIGNIFICANT CHANGE UP
-  CEFUROXIME: SIGNIFICANT CHANGE UP
-  CEFUROXIME: SIGNIFICANT CHANGE UP
-  CIPROFLOXACIN: SIGNIFICANT CHANGE UP
-  CIPROFLOXACIN: SIGNIFICANT CHANGE UP
-  ERTAPENEM: SIGNIFICANT CHANGE UP
-  ERTAPENEM: SIGNIFICANT CHANGE UP
-  GENTAMICIN: SIGNIFICANT CHANGE UP
-  GENTAMICIN: SIGNIFICANT CHANGE UP
-  IMIPENEM: SIGNIFICANT CHANGE UP
-  LEVOFLOXACIN: SIGNIFICANT CHANGE UP
-  LEVOFLOXACIN: SIGNIFICANT CHANGE UP
-  MEROPENEM: SIGNIFICANT CHANGE UP
-  MEROPENEM: SIGNIFICANT CHANGE UP
-  NITROFURANTOIN: SIGNIFICANT CHANGE UP
-  NITROFURANTOIN: SIGNIFICANT CHANGE UP
-  PIPERACILLIN/TAZOBACTAM: SIGNIFICANT CHANGE UP
-  PIPERACILLIN/TAZOBACTAM: SIGNIFICANT CHANGE UP
-  TOBRAMYCIN: SIGNIFICANT CHANGE UP
-  TOBRAMYCIN: SIGNIFICANT CHANGE UP
-  TRIMETHOPRIM/SULFAMETHOXAZOLE: SIGNIFICANT CHANGE UP
-  TRIMETHOPRIM/SULFAMETHOXAZOLE: SIGNIFICANT CHANGE UP
ADD ON TEST-SPECIMEN IN LAB: SIGNIFICANT CHANGE UP
ALBUMIN SERPL ELPH-MCNC: 2.4 G/DL — LOW (ref 3.3–5)
ALP SERPL-CCNC: 176 U/L — HIGH (ref 40–120)
ALT FLD-CCNC: 20 U/L — SIGNIFICANT CHANGE UP (ref 10–45)
ANION GAP SERPL CALC-SCNC: 15 MMOL/L — SIGNIFICANT CHANGE UP (ref 5–17)
AST SERPL-CCNC: 24 U/L — SIGNIFICANT CHANGE UP (ref 10–40)
BASOPHILS # BLD AUTO: 0.04 K/UL — SIGNIFICANT CHANGE UP (ref 0–0.2)
BASOPHILS NFR BLD AUTO: 0.3 % — SIGNIFICANT CHANGE UP (ref 0–2)
BILIRUB SERPL-MCNC: 0.9 MG/DL — SIGNIFICANT CHANGE UP (ref 0.2–1.2)
BUN SERPL-MCNC: 26 MG/DL — HIGH (ref 7–23)
CALCIUM SERPL-MCNC: 8.4 MG/DL — SIGNIFICANT CHANGE UP (ref 8.4–10.5)
CHLORIDE SERPL-SCNC: 101 MMOL/L — SIGNIFICANT CHANGE UP (ref 96–108)
CO2 SERPL-SCNC: 19 MMOL/L — LOW (ref 22–31)
CREAT SERPL-MCNC: 1.5 MG/DL — HIGH (ref 0.5–1.3)
CULTURE RESULTS: ABNORMAL
EGFR: 36 ML/MIN/1.73M2 — LOW
EOSINOPHIL # BLD AUTO: 0.03 K/UL — SIGNIFICANT CHANGE UP (ref 0–0.5)
EOSINOPHIL NFR BLD AUTO: 0.2 % — SIGNIFICANT CHANGE UP (ref 0–6)
GGT SERPL-CCNC: 182 U/L — HIGH (ref 8–40)
GLUCOSE BLDC GLUCOMTR-MCNC: 103 MG/DL — HIGH (ref 70–99)
GLUCOSE BLDC GLUCOMTR-MCNC: 109 MG/DL — HIGH (ref 70–99)
GLUCOSE BLDC GLUCOMTR-MCNC: 126 MG/DL — HIGH (ref 70–99)
GLUCOSE BLDC GLUCOMTR-MCNC: 132 MG/DL — HIGH (ref 70–99)
GLUCOSE SERPL-MCNC: 124 MG/DL — HIGH (ref 70–99)
HCT VFR BLD CALC: 33.6 % — LOW (ref 34.5–45)
HGB BLD-MCNC: 10.8 G/DL — LOW (ref 11.5–15.5)
IMM GRANULOCYTES NFR BLD AUTO: 1.1 % — HIGH (ref 0–0.9)
LYMPHOCYTES # BLD AUTO: 1.36 K/UL — SIGNIFICANT CHANGE UP (ref 1–3.3)
LYMPHOCYTES # BLD AUTO: 9.2 % — LOW (ref 13–44)
MAGNESIUM SERPL-MCNC: 2.2 MG/DL — SIGNIFICANT CHANGE UP (ref 1.6–2.6)
MCHC RBC-ENTMCNC: 28 PG — SIGNIFICANT CHANGE UP (ref 27–34)
MCHC RBC-ENTMCNC: 32.1 GM/DL — SIGNIFICANT CHANGE UP (ref 32–36)
MCV RBC AUTO: 87 FL — SIGNIFICANT CHANGE UP (ref 80–100)
METHOD TYPE: SIGNIFICANT CHANGE UP
METHOD TYPE: SIGNIFICANT CHANGE UP
MONOCYTES # BLD AUTO: 1.2 K/UL — HIGH (ref 0–0.9)
MONOCYTES NFR BLD AUTO: 8.1 % — SIGNIFICANT CHANGE UP (ref 2–14)
NEUTROPHILS # BLD AUTO: 12.06 K/UL — HIGH (ref 1.8–7.4)
NEUTROPHILS NFR BLD AUTO: 81.1 % — HIGH (ref 43–77)
NRBC # BLD: 0 /100 WBCS — SIGNIFICANT CHANGE UP (ref 0–0)
ORGANISM # SPEC MICROSCOPIC CNT: ABNORMAL
PHOSPHATE SERPL-MCNC: 2.3 MG/DL — LOW (ref 2.5–4.5)
PLATELET # BLD AUTO: 216 K/UL — SIGNIFICANT CHANGE UP (ref 150–400)
POTASSIUM SERPL-MCNC: 3.6 MMOL/L — SIGNIFICANT CHANGE UP (ref 3.5–5.3)
POTASSIUM SERPL-SCNC: 3.6 MMOL/L — SIGNIFICANT CHANGE UP (ref 3.5–5.3)
PROT SERPL-MCNC: 5.9 G/DL — LOW (ref 6–8.3)
RBC # BLD: 3.86 M/UL — SIGNIFICANT CHANGE UP (ref 3.8–5.2)
RBC # FLD: 13.2 % — SIGNIFICANT CHANGE UP (ref 10.3–14.5)
SODIUM SERPL-SCNC: 135 MMOL/L — SIGNIFICANT CHANGE UP (ref 135–145)
SPECIMEN SOURCE: SIGNIFICANT CHANGE UP
WBC # BLD: 14.85 K/UL — HIGH (ref 3.8–10.5)
WBC # FLD AUTO: 14.85 K/UL — HIGH (ref 3.8–10.5)

## 2024-03-27 PROCEDURE — 99233 SBSQ HOSP IP/OBS HIGH 50: CPT | Mod: GC

## 2024-03-27 PROCEDURE — 99497 ADVNCD CARE PLAN 30 MIN: CPT | Mod: GC

## 2024-03-27 PROCEDURE — 99233 SBSQ HOSP IP/OBS HIGH 50: CPT

## 2024-03-27 RX ORDER — INSULIN LISPRO 100/ML
VIAL (ML) SUBCUTANEOUS EVERY 6 HOURS
Refills: 0 | Status: DISCONTINUED | OUTPATIENT
Start: 2024-03-28 | End: 2024-03-28

## 2024-03-27 RX ORDER — ACETAMINOPHEN 500 MG
500 TABLET ORAL ONCE
Refills: 0 | Status: COMPLETED | OUTPATIENT
Start: 2024-03-27 | End: 2024-03-27

## 2024-03-27 RX ORDER — INSULIN GLARGINE 100 [IU]/ML
12 INJECTION, SOLUTION SUBCUTANEOUS AT BEDTIME
Refills: 0 | Status: DISCONTINUED | OUTPATIENT
Start: 2024-03-27 | End: 2024-03-28

## 2024-03-27 RX ORDER — DIGOXIN 250 MCG
500 TABLET ORAL ONCE
Refills: 0 | Status: COMPLETED | OUTPATIENT
Start: 2024-03-27 | End: 2024-03-27

## 2024-03-27 RX ORDER — SODIUM,POTASSIUM PHOSPHATES 278-250MG
1 POWDER IN PACKET (EA) ORAL ONCE
Refills: 0 | Status: COMPLETED | OUTPATIENT
Start: 2024-03-27 | End: 2024-03-27

## 2024-03-27 RX ORDER — DIPHENHYDRAMINE HYDROCHLORIDE AND LIDOCAINE HYDROCHLORIDE AND ALUMINUM HYDROXIDE AND MAGNESIUM HYDRO
5 KIT DAILY
Refills: 0 | Status: DISCONTINUED | OUTPATIENT
Start: 2024-03-27 | End: 2024-04-02

## 2024-03-27 RX ORDER — METOPROLOL TARTRATE 50 MG
12.5 TABLET ORAL
Refills: 0 | Status: DISCONTINUED | OUTPATIENT
Start: 2024-03-27 | End: 2024-03-27

## 2024-03-27 RX ORDER — FUROSEMIDE 40 MG
20 TABLET ORAL ONCE
Refills: 0 | Status: COMPLETED | OUTPATIENT
Start: 2024-03-27 | End: 2024-03-27

## 2024-03-27 RX ADMIN — Medication 10 MG/HR: at 05:54

## 2024-03-27 RX ADMIN — CHLORHEXIDINE GLUCONATE 1 APPLICATION(S): 213 SOLUTION TOPICAL at 12:55

## 2024-03-27 RX ADMIN — Medication 12.5 MG/HR: at 21:15

## 2024-03-27 RX ADMIN — PANTOPRAZOLE SODIUM 40 MILLIGRAM(S): 20 TABLET, DELAYED RELEASE ORAL at 08:46

## 2024-03-27 RX ADMIN — Medication 20 MILLIGRAM(S): at 15:15

## 2024-03-27 RX ADMIN — Medication 81 MILLIGRAM(S): at 12:57

## 2024-03-27 RX ADMIN — Medication 12.5 MG/HR: at 10:14

## 2024-03-27 RX ADMIN — APIXABAN 5 MILLIGRAM(S): 2.5 TABLET, FILM COATED ORAL at 05:37

## 2024-03-27 RX ADMIN — Medication 10 MG/HR: at 07:32

## 2024-03-27 RX ADMIN — CEFTRIAXONE 100 MILLIGRAM(S): 500 INJECTION, POWDER, FOR SOLUTION INTRAMUSCULAR; INTRAVENOUS at 13:09

## 2024-03-27 RX ADMIN — Medication 8 UNIT(S): at 08:46

## 2024-03-27 RX ADMIN — Medication 500 MILLIGRAM(S): at 06:53

## 2024-03-27 RX ADMIN — Medication 1 MILLIGRAM(S): at 21:15

## 2024-03-27 RX ADMIN — Medication 650 MILLIGRAM(S): at 04:30

## 2024-03-27 RX ADMIN — Medication 1 TABLET(S): at 10:14

## 2024-03-27 RX ADMIN — Medication 200 MILLIGRAM(S): at 05:52

## 2024-03-27 RX ADMIN — DIPHENHYDRAMINE HYDROCHLORIDE AND LIDOCAINE HYDROCHLORIDE AND ALUMINUM HYDROXIDE AND MAGNESIUM HYDRO 5 MILLILITER(S): KIT at 21:15

## 2024-03-27 RX ADMIN — Medication 8 UNIT(S): at 12:58

## 2024-03-27 RX ADMIN — Medication 8 UNIT(S): at 17:51

## 2024-03-27 RX ADMIN — Medication 500 MICROGRAM(S): at 15:15

## 2024-03-27 RX ADMIN — INSULIN GLARGINE 12 UNIT(S): 100 INJECTION, SOLUTION SUBCUTANEOUS at 21:37

## 2024-03-27 RX ADMIN — Medication 175 MICROGRAM(S): at 05:37

## 2024-03-27 RX ADMIN — APIXABAN 5 MILLIGRAM(S): 2.5 TABLET, FILM COATED ORAL at 17:50

## 2024-03-27 RX ADMIN — Medication 650 MILLIGRAM(S): at 03:33

## 2024-03-27 NOTE — PROGRESS NOTE ADULT - SUBJECTIVE AND OBJECTIVE BOX
DATE OF SERVICE: 03-27-24 @ 17:12    Patient is a 73y old  Female who presents with a chief complaint of nausea, diarrhea, malaise (27 Mar 2024 14:17)      INTERVAL HISTORY: Feels ok.     REVIEW OF SYSTEMS:  CONSTITUTIONAL: No weakness  EYES/ENT: No visual changes;  No throat pain   NECK: No pain or stiffness  RESPIRATORY: No cough, wheezing; No shortness of breath  CARDIOVASCULAR: No chest pain or palpitations  GASTROINTESTINAL: No abdominal  pain. No nausea, vomiting, or hematemesis  GENITOURINARY: No dysuria, frequency or hematuria  NEUROLOGICAL: No stroke like symptoms  SKIN: No rashes    TELEMETRY Personally reviewed: -140  	  MEDICATIONS:  diltiazem Infusion 12.5 mG/Hr IV Continuous <Continuous>        PHYSICAL EXAM:  T(C): 37.4 (03-27-24 @ 11:45), Max: 38.6 (03-27-24 @ 04:50)  HR: 122 (03-27-24 @ 11:45) (100 - 124)  BP: 116/70 (03-27-24 @ 11:45) (113/73 - 122/75)  RR: 18 (03-27-24 @ 11:45) (18 - 18)  SpO2: 95% (03-27-24 @ 11:45) (94% - 98%)  Wt(kg): --  I&O's Summary    26 Mar 2024 07:01  -  27 Mar 2024 07:00  --------------------------------------------------------  IN: 1400 mL / OUT: 2400 mL / NET: -1000 mL    27 Mar 2024 07:01  -  27 Mar 2024 17:12  --------------------------------------------------------  IN: 964 mL / OUT: 700 mL / NET: 264 mL          Appearance: In no distress	  HEENT:    PERRL, EOMI	  Cardiovascular:  S1 S2, No JVD  Respiratory: Lungs clear to auscultation	  Gastrointestinal:  Soft, Non-tender, + BS	  Vascularature:  + edema of LE  Psychiatric: Appropriate affect   Neuro: no acute focal deficits                               10.8   14.85 )-----------( 216      ( 27 Mar 2024 07:03 )             33.6     03-27    135  |  101  |  26<H>  ----------------------------<  124<H>  3.6   |  19<L>  |  1.50<H>    Ca    8.4      27 Mar 2024 07:03  Phos  2.3     03-27  Mg     2.2     03-27    TPro  5.9<L>  /  Alb  2.4<L>  /  TBili  0.9  /  DBili  x   /  AST  24  /  ALT  20  /  AlkPhos  176<H>  03-27        Labs personally reviewed      ASSESSMENT/PLAN: 	    73-year-old female history of TIA, A-fib (previously on eliquis, off since 7/2023 after hysterectomy),  HTN, HLD, DM, anxiety, consulted for AFIB RVR with SOB    1. AFIB RVR   - [id number] score 8   - on tele currently AFIB @ 125 bpm  - on Eliquis 2.5 mg BID ----> should be Eliquis 5mg BID (patient does not meet 2.5mg criteria)   - Cont Cardizem gtt @ 10mg/hour  - 3/27 given Dig 500mcg IV once  - TTE with preserved EF  - cont to monitor on tele   - c/w supplemental oxygen   - EP consult appreciated--> plan for JOAQUIN/DCCV tomorrow      2. Heart Failure/SOB  - CTA chest no pulm embolism   - ProBnP 4459   - c/w supplemental oxygen   - keep pulse ox above 90%  - Start Lasix 20mg IV daily   - check TTE to assess structural heart defect and EF   - kidney function wnl     3. Hx of CVA   - on Eliquis 5mg BID  - neuro checks q4h     4. HTN   - controlled   - will monitor blood pressure    5. DM  - A1c 9.8  - Endo recs appreciated   - consider SGLT inhibitor at discharge          Lina Pickering, AG-NP   [facility],  [facility]  Cardiovascular Medicine  [address], [address]  Available through call or text on [employer] TE[employer]  Office: [phone]   DATE OF SERVICE: 03-27-24 @ 17:12    Patient is a 73y old  Female who presents with a chief complaint of nausea, diarrhea, malaise (27 Mar 2024 14:17)      INTERVAL HISTORY: Feels ok.     REVIEW OF SYSTEMS:  CONSTITUTIONAL: No weakness  EYES/ENT: No visual changes;  No throat pain   NECK: No pain or stiffness  RESPIRATORY: No cough, wheezing; No shortness of breath  CARDIOVASCULAR: No chest pain or palpitations  GASTROINTESTINAL: No abdominal  pain. No nausea, vomiting, or hematemesis  GENITOURINARY: No dysuria, frequency or hematuria  NEUROLOGICAL: No stroke like symptoms  SKIN: No rashes    TELEMETRY Personally reviewed: -140  	  MEDICATIONS:  diltiazem Infusion 12.5 mG/Hr IV Continuous <Continuous>        PHYSICAL EXAM:  T(C): 37.4 (03-27-24 @ 11:45), Max: 38.6 (03-27-24 @ 04:50)  HR: 122 (03-27-24 @ 11:45) (100 - 124)  BP: 116/70 (03-27-24 @ 11:45) (113/73 - 122/75)  RR: 18 (03-27-24 @ 11:45) (18 - 18)  SpO2: 95% (03-27-24 @ 11:45) (94% - 98%)  Wt(kg): --  I&O's Summary    26 Mar 2024 07:01  -  27 Mar 2024 07:00  --------------------------------------------------------  IN: 1400 mL / OUT: 2400 mL / NET: -1000 mL    27 Mar 2024 07:01  -  27 Mar 2024 17:12  --------------------------------------------------------  IN: 964 mL / OUT: 700 mL / NET: 264 mL          Appearance: In no distress	  HEENT:    PERRL, EOMI	  Cardiovascular:  S1 S2, No JVD  Respiratory: Lungs clear to auscultation	  Gastrointestinal:  Soft, Non-tender, + BS	  Vascularature:  + edema of LE  Psychiatric: Appropriate affect   Neuro: no acute focal deficits                               10.8   14.85 )-----------( 216      ( 27 Mar 2024 07:03 )             33.6     03-27    135  |  101  |  26<H>  ----------------------------<  124<H>  3.6   |  19<L>  |  1.50<H>    Ca    8.4      27 Mar 2024 07:03  Phos  2.3     03-27  Mg     2.2     03-27    TPro  5.9<L>  /  Alb  2.4<L>  /  TBili  0.9  /  DBili  x   /  AST  24  /  ALT  20  /  AlkPhos  176<H>  03-27        Labs personally reviewed      ASSESSMENT/PLAN: 	    73-year-old female history of TIA, A-fib (previously on eliquis, off since 7/2023 after hysterectomy),  HTN, HLD, DM, anxiety, consulted for AFIB RVR with SOB    1. AFIB RVR   - [id number] score 8   - on tele currently AFIB @ 125 bpm  - on Eliquis 2.5 mg BID ----> should be Eliquis 5mg BID (patient does not meet 2.5mg criteria)   - Cont Cardizem gtt @ 12.5mg/hour  - 3/27 given Dig 500mcg IV once  - TTE with preserved EF  - cont to monitor on tele   - c/w supplemental oxygen   - EP consult appreciated--> plan for JOAQUIN/DCCV tomorrow      2. Acute diastolic Heart Failure/SOB  - CTA chest no pulm embolism   - ProBnP 4459   - c/w supplemental oxygen   - keep pulse ox above 90%  -  Lasix 20mg IV daily   - check TTE to assess structural heart defect and EF   - kidney function wnl     3. Hx of CVA   - on Eliquis 5mg BID  - neuro checks q4h     4. HTN   - controlled   - will monitor blood pressure    5. DM  - A1c 9.8  - Endo recs appreciated   - consider SGLT inhibitor at discharge          Lina Pickering, AG-NP   [facility] DO [facility]  Cardiovascular Medicine  [address] [place] [address], [address]  Available through call or text on [employer]  Office: [phone]

## 2024-03-27 NOTE — PROGRESS NOTE ADULT - WHAT MATTERS MOST
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  She is very clear that she would never want to be resuscitated or placed on mechanical ventilation.  She has stated previously she wishes to be dnr/dni and again affirms her decision with her family at bedside. Per daughter, she has been declining at home for the past year since her uterine cancer diagnosis and overall is less mobile and active. Assigned her eldest daughter and  hcp

## 2024-03-27 NOTE — CONSULT NOTE ADULT - SUBJECTIVE AND OBJECTIVE BOX
CHIEF COMPLAINT: AF w/ RVR iso sepsis     HISTORY OF PRESENT ILLNESS:      Allergies    Lipitor (Nausea)  latex (Rash)    Intolerances    	    MEDICATIONS:  apixaban 5 milliGRAM(s) Oral every 12 hours  aspirin enteric coated 81 milliGRAM(s) Oral daily  digoxin  Injectable 500 MICROGram(s) IV Push once  diltiazem Infusion 12.5 mG/Hr IV Continuous <Continuous>  furosemide   Injectable 20 milliGRAM(s) IV Push once    cefTRIAXone   IVPB 2000 milliGRAM(s) IV Intermittent every 24 hours      acetaminophen     Tablet .. 650 milliGRAM(s) Oral every 6 hours PRN  ALPRAZolam 1 milliGRAM(s) Oral at bedtime  ALPRAZolam 0.5 milliGRAM(s) Oral daily PRN  melatonin 3 milliGRAM(s) Oral at bedtime PRN  ondansetron Injectable 4 milliGRAM(s) IV Push every 8 hours PRN    aluminum hydroxide/magnesium hydroxide/simethicone Suspension 30 milliLiter(s) Oral every 4 hours PRN  pantoprazole    Tablet 40 milliGRAM(s) Oral before breakfast    dextrose 50% Injectable 25 Gram(s) IV Push once  dextrose 50% Injectable 12.5 Gram(s) IV Push once  dextrose 50% Injectable 25 Gram(s) IV Push once  dextrose Oral Gel 15 Gram(s) Oral once PRN  glucagon  Injectable 1 milliGRAM(s) IntraMuscular once  insulin glargine Injectable (LANTUS) 24 Unit(s) SubCutaneous at bedtime  insulin lispro (ADMELOG) corrective regimen sliding scale   SubCutaneous three times a day before meals  insulin lispro (ADMELOG) corrective regimen sliding scale   SubCutaneous at bedtime  insulin lispro Injectable (ADMELOG) 8 Unit(s) SubCutaneous three times a day before meals  levothyroxine 175 MICROGram(s) Oral daily    artificial  tears Solution 1 Drop(s) Both EYES every 1 hour PRN  chlorhexidine 2% Cloths 1 Application(s) Topical daily  dextrose 5%. 1000 milliLiter(s) IV Continuous <Continuous>  dextrose 5%. 1000 milliLiter(s) IV Continuous <Continuous>      PAST MEDICAL & SURGICAL HISTORY:  HTN (hypertension)      Murmur      Morbid obesity      Palpitations      Former smoker      Hypothyroid      CAD (coronary artery disease)  minimal CAD per cath result in 2014      Hypercholesterolemia      Gastritis      Depression      Anxiety      AF (atrial fibrillation)  on Xarelto      Hiatal hernia      Neuropathy      Uterine cyst      Incontinence      Thickened endometrium      History of TIAs      Loss of vision  right eye      Blind right eye      [place] (hard of hearing)      Uterine cancer      Type 2 diabetes mellitus      Constipation      Spinal stenosis      Edema of lower extremity      Varicose veins      Thyroid nodule      OAB (overactive bladder)      Risk factors for obstructive sleep apnea      H/O Helicobacter infection      S/P cataract extraction and insertion of intraocular lens  bilateral > 15 years ago      Varicose vein  with stripping b/l      History of angioplasty of vein  right leg 2016      H/O thyroidectomy      H/O: hysterectomy          FAMILY HISTORY:  Family history of stroke (Mother)  mom    Family history of hypertension (Father)    Family history of breast cancer (Mother)        SOCIAL HISTORY:    [ ]Non-smoker  [ ] Smoker  [ ] Alcohol      REVIEW OF SYSTEMS:  See HPI. Otherwise, 10 point ROS done and otherwise negative.    PHYSICAL EXAM:  T(C): 37.4 (03-27-24 @ 11:45), Max: 38.6 (03-27-24 @ 04:50)  HR: 122 (03-27-24 @ 11:45) (100 - 124)  BP: 116/70 (03-27-24 @ 11:45) (113/73 - 122/75)  RR: 18 (03-27-24 @ 11:45) (18 - 18)  SpO2: 95% (03-27-24 @ 11:45) (94% - 98%)  Wt(kg): --  I&O's Summary    26 Mar 2024 07:01  -  27 Mar 2024 07:00  --------------------------------------------------------  IN: 1400 mL / OUT: 2400 mL / NET: -1000 mL    27 Mar 2024 07:01  -  27 Mar 2024 14:18  --------------------------------------------------------  IN: 144 mL / OUT: 0 mL / NET: 144 mL        Appearance: Alert. NAD	  HEENT:   NC/AT	  Cardiovascular: +S1S2 RRR no m/g/r  Respiratory: CTA B/L	  Psychiatry: A & O x 3, Mood & affect appropriate  Gastrointestinal:  Soft, NT.ND., + BS	  Skin: No rashes	  Neurologic: Non-focal  Extremities: No edema BLE  Vascular: Peripheral pulses palpable 2+ bilaterally      LABS:	 	    CBC Full  -  ( 27 Mar 2024 07:03 )  WBC Count : 14.85 K/uL  Hemoglobin : 10.8 g/dL  Hematocrit : 33.6 %  Platelet Count - Automated : 216 K/uL  Mean Cell Volume : 87.0 fl  Mean Cell Hemoglobin : 28.0 pg  Mean Cell Hemoglobin Concentration : 32.1 gm/dL  Auto Neutrophil # : 12.06 K/uL  Auto Lymphocyte # : 1.36 K/uL  Auto Monocyte # : 1.20 K/uL  Auto Eosinophil # : 0.03 K/uL  Auto Basophil # : 0.04 K/uL  Auto Neutrophil % : 81.1 %  Auto Lymphocyte % : 9.2 %  Auto Monocyte % : 8.1 %  Auto Eosinophil % : 0.2 %  Auto Basophil % : 0.3 %    03-27    135  |  101  |  26<H>  ----------------------------<  124<H>  3.6   |  19<L>  |  1.50<H>  03-26    133<L>  |  100  |  28<H>  ----------------------------<  166<H>  4.0   |  17<L>  |  1.74<H>    Ca    8.4      27 Mar 2024 07:03  Ca    8.0<L>      26 Mar 2024 07:27  Phos  2.3     03-27  Phos  2.2     03-26  Mg     2.2     03-27  Mg     2.2     03-26    TPro  5.9<L>  /  Alb  2.4<L>  /  TBili  0.9  /  DBili  x   /  AST  24  /  ALT  20  /  AlkPhos  176<H>  03-27  TPro  6.0  /  Alb  2.4<L>  /  TBili  1.1  /  DBili  x   /  AST  16  /  ALT  13  /  AlkPhos  115  03-26      proBNP:   Lipid Profile:   HgA1c:   TSH:       CARDIAC MARKERS:                TELEMETRY: 	    ECG:  	  RADIOLOGY:  OTHER: 	    PREVIOUS DIAGNOSTIC TESTING:    Echocardiogram:    Catheterization:    Stress Test:  	  	  ASSESSMENT/PLAN: 	     CHIEF COMPLAINT: AF w/ RVR iso sepsis     HISTORY OF PRESENT ILLNESS:  73-year-old female history of paroxysmal AF (previously on Eliquis discontinued since 7/2023 after hysterectomy and was never advised to resume),  HTN, HLD, DM, anxiety, presenting to the ED for 2 days of weakness, not getting out of bed, multiple episodes of nonbloody diarrhea, nausea, occasional vomiting (last episode 3/23). Also complains of general malaise, increased fatigue, chills at home x 2-3 days. Initially presented to Urgent Care and was directed to present to ED for further evaluation. Denies chest pain, dyspnea, orthopnea, abdominal pain, hematemesis, melena/hematochezia, nasal congestion, sore throat. Endorses flank pain, suprapubic and inguinal pain. Endorsing back pain, chronic 2/2 spinal stenosis.     In ED, initially with T 99.9, , BP 120s/80s, SpO2 95% on RA -> then developed fevers with T 103.1, tele revealing AF with HRs 140s-150s, BPs as low as 80s/60s. S/p IVF bolus 3.5 L total, acetaminophen, vanc/zosyn x1, lopressor 5 mg IV x3 -> amio 150 mg x1. Subsequent improvement in BP's and fevers, also endorses some improvement in nausea. No episodes of emesis since presentation to ED.     TTE this admission revealing normal BiV function, started on Eliquis. + Leukocytosis, though downtrending, remains febrile. +BCX Klebsiella pneumoniae. EP consulted for rhythm control secondary to AF w/ RVR, on Dilt gtt and being Dig loaded per general Cardiology.     Allergies    Lipitor (Nausea)  latex (Rash)    Intolerances    	    MEDICATIONS:  apixaban 5 milliGRAM(s) Oral every 12 hours  aspirin enteric coated 81 milliGRAM(s) Oral daily  digoxin  Injectable 500 MICROGram(s) IV Push once  diltiazem Infusion 12.5 mG/Hr IV Continuous <Continuous>  furosemide   Injectable 20 milliGRAM(s) IV Push once    cefTRIAXone   IVPB 2000 milliGRAM(s) IV Intermittent every 24 hours      acetaminophen     Tablet .. 650 milliGRAM(s) Oral every 6 hours PRN  ALPRAZolam 1 milliGRAM(s) Oral at bedtime  ALPRAZolam 0.5 milliGRAM(s) Oral daily PRN  melatonin 3 milliGRAM(s) Oral at bedtime PRN  ondansetron Injectable 4 milliGRAM(s) IV Push every 8 hours PRN    aluminum hydroxide/magnesium hydroxide/simethicone Suspension 30 milliLiter(s) Oral every 4 hours PRN  pantoprazole    Tablet 40 milliGRAM(s) Oral before breakfast    dextrose 50% Injectable 25 Gram(s) IV Push once  dextrose 50% Injectable 12.5 Gram(s) IV Push once  dextrose 50% Injectable 25 Gram(s) IV Push once  dextrose Oral Gel 15 Gram(s) Oral once PRN  glucagon  Injectable 1 milliGRAM(s) IntraMuscular once  insulin glargine Injectable (LANTUS) 24 Unit(s) SubCutaneous at bedtime  insulin lispro (ADMELOG) corrective regimen sliding scale   SubCutaneous three times a day before meals  insulin lispro (ADMELOG) corrective regimen sliding scale   SubCutaneous at bedtime  insulin lispro Injectable (ADMELOG) 8 Unit(s) SubCutaneous three times a day before meals  levothyroxine 175 MICROGram(s) Oral daily    artificial  tears Solution 1 Drop(s) Both EYES every 1 hour PRN  chlorhexidine 2% Cloths 1 Application(s) Topical daily  dextrose 5%. 1000 milliLiter(s) IV Continuous <Continuous>  dextrose 5%. 1000 milliLiter(s) IV Continuous <Continuous>      PAST MEDICAL & SURGICAL HISTORY:  HTN (hypertension)      Murmur      Morbid obesity      Palpitations      Former smoker      Hypothyroid      CAD (coronary artery disease)  minimal CAD per cath result in 2014      Hypercholesterolemia      Gastritis      Depression      Anxiety      AF (atrial fibrillation)  on Xarelto      Hiatal hernia      Neuropathy      Uterine cyst      Incontinence      Thickened endometrium      History of TIAs      Loss of vision  right eye      Blind right eye      [place] (hard of hearing)      Uterine cancer      Type 2 diabetes mellitus      Constipation      Spinal stenosis      Edema of lower extremity      Varicose veins      Thyroid nodule      OAB (overactive bladder)      Risk factors for obstructive sleep apnea      H/O Helicobacter infection      S/P cataract extraction and insertion of intraocular lens  bilateral > 15 years ago      Varicose vein  with stripping b/l      History of angioplasty of vein  right leg 2016      H/O thyroidectomy      H/O: hysterectomy          FAMILY HISTORY:  Family history of stroke (Mother)  mom    Family history of hypertension (Father)    Family history of breast cancer (Mother)        SOCIAL HISTORY:    [ ]Non-smoker  [ ] Smoker  [ ] Alcohol      REVIEW OF SYSTEMS:  See HPI. Otherwise, 10 point ROS done and otherwise negative.    PHYSICAL EXAM:  T(C): 37.4 (03-27-24 @ 11:45), Max: 38.6 (03-27-24 @ 04:50)  HR: 122 (03-27-24 @ 11:45) (100 - 124)  BP: 116/70 (03-27-24 @ 11:45) (113/73 - 122/75)  RR: 18 (03-27-24 @ 11:45) (18 - 18)  SpO2: 95% (03-27-24 @ 11:45) (94% - 98%)  Wt(kg): --  I&O's Summary    26 Mar 2024 07:01  -  27 Mar 2024 07:00  --------------------------------------------------------  IN: 1400 mL / OUT: 2400 mL / NET: -1000 mL    27 Mar 2024 07:01  -  27 Mar 2024 14:18  --------------------------------------------------------  IN: 144 mL / OUT: 0 mL / NET: 144 mL        Appearance: Alert. NAD	  HEENT:   NC/AT	  Cardiovascular: +S1S2 RRR no m/g/r  Respiratory: CTA B/L	  Psychiatry: A & O x 3, Mood & affect appropriate  Gastrointestinal:  Soft, NT.ND., + BS	  Skin: No rashes	  Neurologic: Non-focal  Extremities: No edema BLE  Vascular: Peripheral pulses palpable 2+ bilaterally      LABS:	 	    CBC Full  -  ( 27 Mar 2024 07:03 )  WBC Count : 14.85 K/uL  Hemoglobin : 10.8 g/dL  Hematocrit : 33.6 %  Platelet Count - Automated : 216 K/uL  Mean Cell Volume : 87.0 fl  Mean Cell Hemoglobin : 28.0 pg  Mean Cell Hemoglobin Concentration : 32.1 gm/dL  Auto Neutrophil # : 12.06 K/uL  Auto Lymphocyte # : 1.36 K/uL  Auto Monocyte # : 1.20 K/uL  Auto Eosinophil # : 0.03 K/uL  Auto Basophil # : 0.04 K/uL  Auto Neutrophil % : 81.1 %  Auto Lymphocyte % : 9.2 %  Auto Monocyte % : 8.1 %  Auto Eosinophil % : 0.2 %  Auto Basophil % : 0.3 %    03-27    135  |  101  |  26<H>  ----------------------------<  124<H>  3.6   |  19<L>  |  1.50<H>  03-26    133<L>  |  100  |  28<H>  ----------------------------<  166<H>  4.0   |  17<L>  |  1.74<H>    Ca    8.4      27 Mar 2024 07:03  Ca    8.0<L>      26 Mar 2024 07:27  Phos  2.3     03-27  Phos  2.2     03-26  Mg     2.2     03-27  Mg     2.2     03-26    TPro  5.9<L>  /  Alb  2.4<L>  /  TBili  0.9  /  DBili  x   /  AST  24  /  ALT  20  /  AlkPhos  176<H>  03-27  TPro  6.0  /  Alb  2.4<L>  /  TBili  1.1  /  DBili  x   /  AST  16  /  ALT  13  /  AlkPhos  115  03-26      proBNP: Pro-Brain Natriuretic Peptide: 4459 pg/mL (03.24.24 @ 01:15)  TSH: Thyroid Stimulating Hormone, Serum: 2.01 uIU/mL (03.25.24 @ 07:34)      TELEMETRY: 	 's to 140's    ECG:  	< from: 12 Lead ECG (03.25.24 @ 17:01) >  Ventricular Rate 136 BPM    QRS Duration 82 ms    Q-T Interval 294 ms    QTC Calculation(Bazett) 442 ms    R Axis 30 degrees    T Axis -32 degrees    Diagnosis Line ATRIAL FIBRILLATION WITH RAPID VENTRICULAR RESPONSE  SEPTAL INFARCT , AGE UNDETERMINED  ABNORMAL ECG  WHEN COMPARED WITH ECG OF `3/24/24 00:37  NO SIGNIFICANT CHANGE WAS FOUND    < end of copied text >    RADIOLOGY:  < from: CT Abdomen and Pelvis w/ IV Cont (03.24.24 @ 03:08) >  IMPRESSION:  CT CHEST:  Limited by respiratory motion. No gross pulmonary embolism. No pneumonia.    CT ABDOMEN AND PELVIS:  Right pyelonephritis with emphysematous pyelitis and mild   hydroureteronephrosis to the bladder.    CT THORACIC AND LUMBAR SPINE:  No acute fracture or traumatic malalignment.  Multilevel degenerative changes above.    < end of copied text >      PREVIOUS DIAGNOSTIC TESTING:    Echocardiogram: < from: TTE W or WO Ultrasound Enhancing Agent (03.26.24 @ 11:37) >  _______________________________________________________________________________________     CONCLUSIONS:      1. Left ventricular cavity is normal in size. Left ventricular wall thickness is normal. Left ventricular systolic function is normal with an ejection fraction of 57 % bySimpson's method of disks. There are no regional wall motion abnormalities seen.   2. There is mild (grade 1) left ventricular diastolic dysfunction.   3. Normal right ventricular cavity size, with normal wall thickness, and probably normal systolic function.   4. The left atrium is mildly dilated.   5. The right atrium is normal in size.   6. No pericardial effusion seen.    ________________________________________________________________________________________  FINDINGS:     Left Ventricle:  The left ventricular cavity is normal in size. Left ventricular wall thickness is normal. Left ventricular systolic function is normal with a calculated ejection fraction of 57 % by the Cantrell's biplane method of disks. There are no regional wall motion abnormalities seen. There is mild (grade 1) left ventricular diastolic dysfunction. No left ventricular hypertrophy. There is normal LV mass and concentric remodeling.     Right Ventricle:  The right ventricle is not well visualized. The right ventricular cavity is normal in size, with normal wall thickness and probably normal systolic function. Tricuspid annular plane systolic excursion (TAPSE) is 1.5 cm (normal >=1.7 cm). Tricuspid annular tissue Doppler S' is 11.5 cm/s (normal >10 cm/s).     Left Atrium:  The left atrium is mildly dilated with an indexed volume of 41.05 ml/m².     Right Atrium:  The right atrium is normal in size with an indexed volume of 24.59 ml/m².     Interatrial Septum:  The interatrial septum appears intact.     Aortic Valve:  The aortic valve appears trileaflet with normal systolic excursion. There is no aortic valve stenosis. There is no evidence of aortic regurgitation.     Mitral Valve:  Structurally normal mitral valve with normal leaflet excursion. There is calcification of the mitral valve annulus. There is no mitral valve stenosis. There is trace mitral regurgitation.     Tricuspid Valve:  Structurally normal tricuspid valve with normal leaflet excursion. There is mild to moderate tricuspid regurgitation. Estimated pulmonary artery systolic pressure is 39 mmHg.     Pulmonic Valve:  The pulmonic valve was not well visualized. There is trace pulmonic regurgitation.     Aorta:  The aortic root appears normal in size. The aortic root at the sinuses of Valsalva is normal in size, measuring 3.53 cm (indexed 1.69 cm/m²). The ascending aorta diameter is aneurysmal, measuring 4.19 cm (indexed 2.00 cm/m²).     Pericardium:  No pericardial effusion seen.     Systemic Veins:  The inferior vena cava is dilated measuring 2.37 cm in diameter, (dilated >2.1cm) with abnormal inspiratory collapse (abnormal <50%) consistent with elevated right atrial pressure (~15, range 10-20mmHg).  ____________________________________________________________________  QUANTITATIVE DATA:  Left Ventricle Measurements: (Indexed to BSA)     IVSd (2D):   1.2 cm  LVPWd (2D):  1.0 cm  LVIDd (2D):  3.9 cm  LVIDs (2D):  2.7 cm  LV Mass:     149 g  71.2 g/m²  LV Vol d, MOD A2C: 104.0 ml 49.76 ml/m²  LV Vol d, MOD A4C: 104.0 ml 49.76 ml/m²  LV Vol d, MOD BP:  105.3 ml 50.38 ml/m²  LV Vol s, MOD A2C: 46.3 ml  22.15 ml/m²  LV Vol s, MOD A4C: 43.9 ml  21.00 ml/m²  LV Vol s, MOD BP:  45.0 ml  21.51 ml/m²  LVOT SV MOD BP:    60.3 ml  LV EF% MOD BP:     57 %     MV E Vmax:    1.08m/s  e' lateral:   7.18 cm/s  e' medial:    5.96 cm/s  E/e' lateral: 15.04  E/e' medial:  18.12  E/e' Average: 16.44    Aorta Measurements: (Normal range) (Indexed to BSA)     Sinuses of Valsalva: 3.53 cm (2.7 - 3.3 cm)  Ao Asc prox:         4.19 cm       Left Atrium Measurements: (Indexed to BSA)  LA Diam 2D:        3.54 cm  LA Vol s, MOD A4C: 81.70 ml.  LA Vol s, MOD A2C: 89.70 ml.  LA Vol s, MOD BP:  85.80 ml  41.05 ml/m²    Right Ventricle Measurements: Right Atrial Measurements:     TAPSE: 1.5 cm                 RA Vol:       51.40 ml                                RA Vol Index: 24.59 ml/m²       LVOT / RVOT/ Qp/Qs Data: (Indexed to BSA)  LVOT Diameter: 2.01 cm  LVOT Vmax:     0.91 m/s  LVOT VTI:      15.60 cm  LVOT SV:       49.5 ml  23.68 ml/m²    Mitral Valve Measurements:     MV E Vmax: 1.1 m/s       Tricuspid Valve Measurements:     TR Vmax:          2.5 m/s  TR Peak Gradient: 24.4 mmHg  RA Pressure:      15 mmHg  PASP:             39 mmHg    ________________________________________________________________________________________  Electronically signed on 3/26/2024 at 4:07:28 PM by Shadi Rossi M.D.    < end of copied text >    	     CHIEF COMPLAINT: AF w/ RVR iso sepsis     HISTORY OF PRESENT ILLNESS:  73-year-old female history of paroxysmal AF (previously on Eliquis discontinued since 7/2023 after hysterectomy and was never advised to resume),  HTN, HLD, DM, anxiety, presenting to the ED for 2 days of weakness, not getting out of bed, multiple episodes of nonbloody diarrhea, nausea, occasional vomiting (last episode 3/23). Also complains of general malaise, increased fatigue, chills at home x 2-3 days. Initially presented to Urgent Care and was directed to present to ED for further evaluation. Denies chest pain, dyspnea, orthopnea, abdominal pain, hematemesis, melena/hematochezia, nasal congestion, sore throat. Endorses flank pain, suprapubic and inguinal pain. Endorsing back pain, chronic 2/2 spinal stenosis.     In ED, initially with T 99.9, , BP 120s/80s, SpO2 95% on RA -> then developed fevers with T 103.1, tele revealing AF with HRs 140s-150s, BPs as low as 80s/60s. S/p IVF bolus 3.5 L total, acetaminophen, vanc/zosyn x1, lopressor 5 mg IV x3 -> amio 150 mg x1. Subsequent improvement in BP's and fevers, also endorses some improvement in nausea. No episodes of emesis since presentation to ED.     TTE this admission revealing normal BiV function, started on Eliquis. + Leukocytosis, though downtrending, remains febrile. +BCX Klebsiella pneumoniae. EP consulted for rhythm control secondary to AF w/ RVR, on Dilt gtt and being Dig loaded per general Cardiology.     Allergies    Lipitor (Nausea)  latex (Rash)    Intolerances    	    MEDICATIONS:  apixaban 5 milliGRAM(s) Oral every 12 hours  aspirin enteric coated 81 milliGRAM(s) Oral daily  digoxin  Injectable 500 MICROGram(s) IV Push once  diltiazem Infusion 12.5 mG/Hr IV Continuous <Continuous>  furosemide   Injectable 20 milliGRAM(s) IV Push once    cefTRIAXone   IVPB 2000 milliGRAM(s) IV Intermittent every 24 hours      acetaminophen     Tablet .. 650 milliGRAM(s) Oral every 6 hours PRN  ALPRAZolam 1 milliGRAM(s) Oral at bedtime  ALPRAZolam 0.5 milliGRAM(s) Oral daily PRN  melatonin 3 milliGRAM(s) Oral at bedtime PRN  ondansetron Injectable 4 milliGRAM(s) IV Push every 8 hours PRN    aluminum hydroxide/magnesium hydroxide/simethicone Suspension 30 milliLiter(s) Oral every 4 hours PRN  pantoprazole    Tablet 40 milliGRAM(s) Oral before breakfast    dextrose 50% Injectable 25 Gram(s) IV Push once  dextrose 50% Injectable 12.5 Gram(s) IV Push once  dextrose 50% Injectable 25 Gram(s) IV Push once  dextrose Oral Gel 15 Gram(s) Oral once PRN  glucagon  Injectable 1 milliGRAM(s) IntraMuscular once  insulin glargine Injectable (LANTUS) 24 Unit(s) SubCutaneous at bedtime  insulin lispro (ADMELOG) corrective regimen sliding scale   SubCutaneous three times a day before meals  insulin lispro (ADMELOG) corrective regimen sliding scale   SubCutaneous at bedtime  insulin lispro Injectable (ADMELOG) 8 Unit(s) SubCutaneous three times a day before meals  levothyroxine 175 MICROGram(s) Oral daily    artificial  tears Solution 1 Drop(s) Both EYES every 1 hour PRN  chlorhexidine 2% Cloths 1 Application(s) Topical daily  dextrose 5%. 1000 milliLiter(s) IV Continuous <Continuous>  dextrose 5%. 1000 milliLiter(s) IV Continuous <Continuous>      PAST MEDICAL & SURGICAL HISTORY:  HTN (hypertension)      Murmur      Morbid obesity      Palpitations      Former smoker      Hypothyroid      CAD (coronary artery disease)  minimal CAD per cath result in 2014      Hypercholesterolemia      Gastritis      Depression      Anxiety      AF (atrial fibrillation)  on Xarelto      Hiatal hernia      Neuropathy      Uterine cyst      Incontinence      Thickened endometrium      History of TIAs      Loss of vision  right eye      Blind right eye      [place] hearing[place]      Uterine cancer      Type 2 diabetes mellitus      Constipation      Spinal stenosis      Edema of lower extremity      Varicose veins      Thyroid nodule      OAB (overactive bladder)      Risk factors for obstructive sleep apnea      H/O Helicobacter infection      S/P cataract extraction and insertion of intraocular lens  bilateral > 15 years ago      Varicose vein  with stripping b/l      History of angioplasty of vein  right leg 2016      H/O thyroidectomy      H/O: hysterectomy          FAMILY HISTORY:  Family history of stroke (Mother)  mom    Family history of hypertension (Father)    Family history of breast cancer (Mother)        SOCIAL HISTORY:    Denies smoking, EtOH, illicit drug use    REVIEW OF SYSTEMS:  See HPI. Otherwise, 10 point ROS done and otherwise negative.    PHYSICAL EXAM:  T(C): 37.4 (03-27-24 @ 11:45), Max: 38.6 (03-27-24 @ 04:50)  HR: 122 (03-27-24 @ 11:45) (100 - 124)  BP: 116/70 (03-27-24 @ 11:45) (113/73 - 122/75)  RR: 18 (03-27-24 @ 11:45) (18 - 18)  SpO2: 95% (03-27-24 @ 11:45) (94% - 98%)  Wt(kg): --  I&O's Summary    26 Mar 2024 07:01  -  27 Mar 2024 07:00  --------------------------------------------------------  IN: 1400 mL / OUT: 2400 mL / NET: -1000 mL    27 Mar 2024 07:01  -  27 Mar 2024 14:18  --------------------------------------------------------  IN: 144 mL / OUT: 0 mL / NET: 144 mL        Appearance: Alert. NAD	  HEENT:   NC/AT	  Cardiovascular: +S1S2 irreg no m/g/r  Respiratory: CTA B/L	  Psychiatry: A & O x 3, Mood & affect appropriate  Gastrointestinal:  Soft, obese, NT.ND., + BS	  Skin: No rashes	masses or lesions  Neurologic: Non-focal  Extremities: No edema BLE  Vascular: Peripheral pulses palpable 2+ bilaterally      LABS:	 	    CBC Full  -  ( 27 Mar 2024 07:03 )  WBC Count : 14.85 K/uL  Hemoglobin : 10.8 g/dL  Hematocrit : 33.6 %  Platelet Count - Automated : 216 K/uL  Mean Cell Volume : 87.0 fl  Mean Cell Hemoglobin : 28.0 pg  Mean Cell Hemoglobin Concentration : 32.1 gm/dL  Auto Neutrophil # : 12.06 K/uL  Auto Lymphocyte # : 1.36 K/uL  Auto Monocyte # : 1.20 K/uL  Auto Eosinophil # : 0.03 K/uL  Auto Basophil # : 0.04 K/uL  Auto Neutrophil % : 81.1 %  Auto Lymphocyte % : 9.2 %  Auto Monocyte % : 8.1 %  Auto Eosinophil % : 0.2 %  Auto Basophil % : 0.3 %    03-27    135  |  101  |  26<H>  ----------------------------<  124<H>  3.6   |  19<L>  |  1.50<H>  03-26    133<L>  |  100  |  28<H>  ----------------------------<  166<H>  4.0   |  17<L>  |  1.74<H>    Ca    8.4      27 Mar 2024 07:03  Ca    8.0<L>      26 Mar 2024 07:27  Phos  2.3     03-27  Phos  2.2     03-26  Mg     2.2     03-27  Mg     2.2     03-26    TPro  5.9<L>  /  Alb  2.4<L>  /  TBili  0.9  /  DBili  x   /  AST  24  /  ALT  20  /  AlkPhos  176<H>  03-27  TPro  6.0  /  Alb  2.4<L>  /  TBili  1.1  /  DBili  x   /  AST  16  /  ALT  13  /  AlkPhos  115  03-26      proBNP: Pro-Brain Natriuretic Peptide: 4459 pg/mL (03.24.24 @ 01:15)  TSH: Thyroid Stimulating Hormone, Serum: 2.01 uIU/mL (03.25.24 @ 07:34)      TELEMETRY: 	 's to 140's    ECG:  	< from: 12 Lead ECG (03.25.24 @ 17:01) >  Ventricular Rate 136 BPM    QRS Duration 82 ms    Q-T Interval 294 ms    QTC Calculation(Bazett) 442 ms    R Axis 30 degrees    T Axis -32 degrees    Diagnosis Line ATRIAL FIBRILLATION WITH RAPID VENTRICULAR RESPONSE  SEPTAL INFARCT , AGE UNDETERMINED  ABNORMAL ECG  WHEN COMPARED WITH ECG OF `3/24/24 00:37  NO SIGNIFICANT CHANGE WAS FOUND    < end of copied text >    RADIOLOGY:  < from: CT Abdomen and Pelvis w/ IV Cont (03.24.24 @ 03:08) >  IMPRESSION:  CT CHEST:  Limited by respiratory motion. No gross pulmonary embolism. No pneumonia.    CT ABDOMEN AND PELVIS:  Right pyelonephritis with emphysematous pyelitis and mild   hydroureteronephrosis to the bladder.    CT THORACIC AND LUMBAR SPINE:  No acute fracture or traumatic malalignment.  Multilevel degenerative changes above.    < end of copied text >      PREVIOUS DIAGNOSTIC TESTING:    Echocardiogram: < from: TTE W or WO Ultrasound Enhancing Agent (03.26.24 @ 11:37) >  _______________________________________________________________________________________     CONCLUSIONS:      1. Left ventricular cavity is normal in size. Left ventricular wall thickness is normal. Left ventricular systolic function is normal with an ejection fraction of 57 % bySimpson's method of disks. There are no regional wall motion abnormalities seen.   2. There is mild (grade 1) left ventricular diastolic dysfunction.   3. Normal right ventricular cavity size, with normal wall thickness, and probably normal systolic function.   4. The left atrium is mildly dilated.   5. The right atrium is normal in size.   6. No pericardial effusion seen.    ________________________________________________________________________________________  FINDINGS:     Left Ventricle:  The left ventricular cavity is normal in size. Left ventricular wall thickness is normal. Left ventricular systolic function is normal with a calculated ejection fraction of 57 % by the Cantrell's biplane method of disks. There are no regional wall motion abnormalities seen. There is mild (grade 1) left ventricular diastolic dysfunction. No left ventricular hypertrophy. There is normal LV mass and concentric remodeling.     Right Ventricle:  The right ventricle is not well visualized. The right ventricular cavity is normal in size, with normal wall thickness and probably normal systolic function. Tricuspid annular plane systolic excursion (TAPSE) is 1.5 cm (normal >=1.7 cm). Tricuspid annular tissue Doppler S' is 11.5 cm/s (normal >10 cm/s).     Left Atrium:  The left atrium is mildly dilated with an indexed volume of 41.05 ml/m².     Right Atrium:  The right atrium is normal in size with an indexed volume of 24.59 ml/m².     Interatrial Septum:  The interatrial septum appears intact.     Aortic Valve:  The aortic valve appears trileaflet with normal systolic excursion. There is no aortic valve stenosis. There is no evidence of aortic regurgitation.     Mitral Valve:  Structurally normal mitral valve with normal leaflet excursion. There is calcification of the mitral valve annulus. There is no mitral valve stenosis. There is trace mitral regurgitation.     Tricuspid Valve:  Structurally normal tricuspid valve with normal leaflet excursion. There is mild to moderate tricuspid regurgitation. Estimated pulmonary artery systolic pressure is 39 mmHg.     Pulmonic Valve:  The pulmonic valve was not well visualized. There is trace pulmonic regurgitation.     Aorta:  The aortic root appears normal in size. The aortic root at the sinuses of Valsalva is normal in size, measuring 3.53 cm (indexed 1.69 cm/m²). The ascending aorta diameter is aneurysmal, measuring 4.19 cm (indexed 2.00 cm/m²).     Pericardium:  No pericardial effusion seen.     Systemic Veins:  The inferior vena cava is dilated measuring 2.37 cm in diameter, (dilated >2.1cm) with abnormal inspiratory collapse (abnormal <50%) consistent with elevated right atrial pressure (~15, range 10-20mmHg).  ____________________________________________________________________  QUANTITATIVE DATA:  Left Ventricle Measurements: (Indexed to BSA)     IVSd (2D):   1.2 cm  LVPWd (2D):  1.0 cm  LVIDd (2D):  3.9 cm  LVIDs (2D):  2.7 cm  LV Mass:     149 g  71.2 g/m²  LV Vol d, MOD A2C: 104.0 ml 49.76 ml/m²  LV Vol d, MOD A4C: 104.0 ml 49.76 ml/m²  LV Vol d, MOD BP:  105.3 ml 50.38 ml/m²  LV Vol s, MOD A2C: 46.3 ml  22.15 ml/m²  LV Vol s, MOD A4C: 43.9 ml  21.00 ml/m²  LV Vol s, MOD BP:  45.0 ml  21.51 ml/m²  LVOT SV MOD BP:    60.3 ml  LV EF% MOD BP:     57 %     MV E Vmax:    1.08m/s  e' lateral:   7.18 cm/s  e' medial:    5.96 cm/s  E/e' lateral: 15.04  E/e' medial:  18.12  E/e' Average: 16.44    Aorta Measurements: (Normal range) (Indexed to BSA)     Sinuses of Valsalva: 3.53 cm (2.7 - 3.3 cm)  Ao Asc prox:         4.19 cm       Left Atrium Measurements: (Indexed to BSA)  LA Diam 2D:        3.54 cm  LA Vol s, MOD A4C: 81.70 ml.  LA Vol s, MOD A2C: 89.70 ml.  LA Vol s, MOD BP:  85.80 ml  41.05 ml/m²    Right Ventricle Measurements: Right Atrial Measurements:     TAPSE: 1.5 cm                 RA Vol:       51.40 ml                                RA Vol Index: 24.59 ml/m²       LVOT / RVOT/ Qp/Qs Data: (Indexed to BSA)  LVOT Diameter: 2.01 cm  LVOT Vmax:     0.91 m/s  LVOT VTI:      15.60 cm  LVOT SV:       49.5 ml  23.68 ml/m²    Mitral Valve Measurements:     MV E Vmax: 1.1 m/s       Tricuspid Valve Measurements:     TR Vmax:          2.5 m/s  TR Peak Gradient: 24.4 mmHg  RA Pressure:      15 mmHg  PASP:             39 mmHg    ________________________________________________________________________________________  Electronically signed on 3/26/2024 at 4:07:28 PM by Shadi Rossi M.D.    < end of copied text >

## 2024-03-27 NOTE — PROGRESS NOTE ADULT - SUBJECTIVE AND OBJECTIVE BOX
BRENNEN VALDEZ  73y  Female    Patient is a 73y old  Female who presents with a chief complaint of nausea, diarrhea, malaise (26 Mar 2024 10:11)    INTERVAL HPI/OVERNIGHT EVENTS:  - narrowed from zosyn to CTX 2g/24h  - febrile to 101.4 -> tylenol  - HRs 120s-130s, afib on tele  - s/p lasix 20 mg IV x1 3/26, out 2.4L (net -1L)  - feeling ok this morning, slightly improved. Still endorsing flank/abdominal discomfort and intermittent nausea. Denies dyspnea, chest pain, palpitations, dizziness    T(C): 38.1 (03-27-24 @ 06:49), Max: 38.6 (03-27-24 @ 04:50)  HR: 124 (03-27-24 @ 04:51) (100 - 124)  BP: 119/73 (03-27-24 @ 04:51) (110/72 - 120/75)  RR: 18 (03-27-24 @ 04:51) (18 - 18)  SpO2: 94% (03-27-24 @ 04:51) (94% - 98%)  Wt(kg): --Vital Signs Last 24 Hrs  T(C): 38.1 (27 Mar 2024 06:49), Max: 38.6 (27 Mar 2024 04:50)  T(F): 100.6 (27 Mar 2024 06:49), Max: 101.4 (27 Mar 2024 04:50)  HR: 124 (27 Mar 2024 04:51) (100 - 124)  BP: 119/73 (27 Mar 2024 04:51) (110/72 - 120/75)  BP(mean): --  RR: 18 (27 Mar 2024 04:51) (18 - 18)  SpO2: 94% (27 Mar 2024 04:51) (94% - 98%)    Parameters below as of 27 Mar 2024 04:51  Patient On (Oxygen Delivery Method): nasal cannula    PHYSICAL EXAM:  CONSTITUTIONAL: sleeping in bed, wakes to voice, NAD   EYES: PERRLA and symmetric, EOMI, No conjunctival or scleral injection, non-icteric  ENMT: Oral mucosa with moist membranes  NECK: Supple   RESP: CTA b/l, no WRR, dyspneic-appearing but no accessory muscle use   CV: irregularly irregular, tachycardic, no MRG; no JVD; no peripheral edema  GI: Soft, TTP over suprapubic region, otherwise abdomen nontender. No rebound, no guarding, no palpable masses  : + CVA tenderness bilaterally, R>L. Escoto in place, yellow urine in bag  MSK: No digital clubbing or cyanosis; normal muscle bulk and tone, no gross deformities  SKIN: No rashes or ulcers noted; no subcutaneous nodules or induration palpable  NEURO: facial movements symmetric, moves all extremities against gravity, no gross focal deficits   PSYCH: Appropriate insight/judgment; A+O x 3, mood and affect appropriate, recent/remote memory intact    Consultant(s) Notes Reviewed:  [x ] YES  [ ] NO  Care Discussed with Consultants/Other Providers [ x] YES  [ ] NO    LABS:                        10.8   14.85 )-----------( 216      ( 27 Mar 2024 07:03 )             33.6     03-27    135  |  101  |  26<H>  ----------------------------<  124<H>  3.6   |  19<L>  |  1.50<H>    Ca    8.4      27 Mar 2024 07:03  Phos  2.3     03-27  Mg     2.2     03-27    TPro  5.9<L>  /  Alb  2.4<L>  /  TBili  0.9  /  DBili  x   /  AST  24  /  ALT  20  /  AlkPhos  176<H>  03-27        Urinalysis Basic - ( 27 Mar 2024 07:03 )    Color: x / Appearance: x / SG: x / pH: x  Gluc: 124 mg/dL / Ketone: x  / Bili: x / Urobili: x   Blood: x / Protein: x / Nitrite: x   Leuk Esterase: x / RBC: x / WBC x   Sq Epi: x / Non Sq Epi: x / Bacteria: x      CAPILLARY BLOOD GLUCOSE      POCT Blood Glucose.: 126 mg/dL (27 Mar 2024 08:35)  POCT Blood Glucose.: 118 mg/dL (26 Mar 2024 22:01)  POCT Blood Glucose.: 112 mg/dL (26 Mar 2024 21:22)  POCT Blood Glucose.: 167 mg/dL (26 Mar 2024 17:29)  POCT Blood Glucose.: 183 mg/dL (26 Mar 2024 13:37)  POCT Blood Glucose.: 221 mg/dL (26 Mar 2024 09:01)        Urinalysis Basic - ( 27 Mar 2024 07:03 )    Color: x / Appearance: x / SG: x / pH: x  Gluc: 124 mg/dL / Ketone: x  / Bili: x / Urobili: x   Blood: x / Protein: x / Nitrite: x   Leuk Esterase: x / RBC: x / WBC x   Sq Epi: x / Non Sq Epi: x / Bacteria: x        RADIOLOGY & ADDITIONAL TESTS:    Imaging Personally Reviewed:  [ ] YES  [ ] NO    HEALTH ISSUES - PROBLEM Dx:  Severe sepsis with acute organ dysfunction due to gram-negative bacteria    Pyelonephritis    DEEPA (acute kidney injury)    Atrial fibrillation with RVR    Acute respiratory failure with hypoxia    Diabetes mellitus    Hyponatremia    Hypertension    Hyperlipidemia    Spinal stenosis    Hypothyroidism    Anxiety    Need for prophylactic measure    Sepsis secondary to UTI    History of uterine cancer

## 2024-03-27 NOTE — PROGRESS NOTE ADULT - PROBLEM SELECTOR PLAN 4
Previously on eliquis 2.5 mg BID, metoprolol succinate  - rate control and AC discontinued 7/2023 after extended holter monitor without evidence of A fib   - Afib RVR up to 160s this admission, likely 2/2 sepsis   - s/p lopressor 5 mg IVP x5, amio 150 mg IV x1 in ED  - now on cardizem gtt at 10/hr  - TTE 3/26 with EF 57%, mild diastolic dysfunction, normal RV function    Plan:  - cardiology following, appreciate recs   - eliquis 5 mg BID  - continue cardizem gtt 10/hr  - if need for improved rate control will uptitrate cardizem, but allowing some tachycardia ISO severe sepsis. Goal rate <110  - K >4, Mg >2  - s/p lasix 20 mg IV x1 3/26 given dyspnea SCr on admission 1.54 -> 1.69 -> 1.85 -> 1.74 -> 1.5  - baseline Cr 0.70 in 7/2023  - per patient, no known history of CKD  - likely 2/2 ATN ISO hemodynamic instability +/- prerenal ISO sepsis  - FeNa 0.1% c/w prerenal DEEPA  - trend Cr, improving  - maintain aguiar  - renal US with R renal cyst, no hydronephrosis, possible small focus of air in collecting system c/w prev CT, no abscess

## 2024-03-27 NOTE — PROGRESS NOTE ADULT - PROBLEM SELECTOR PLAN 1
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, mild hydroureteronephritis to bladder   - CT chest with no evidence of pneumonia, CXR with bibasilar atelectasis  - s/p ~5 L IVF   - BCx 3/24 with Klebsiella pneumoniae, susceptible to ceftriaxone/zosyn  - prior urine cultures reviewed, E coli and Proteus mirabilis, pansensitive  - still with intermittent fevers, but WBC downtrending and subjectively slightly improved    Plan:  - ID following, appreciate recs  - continue ceftriaxone 2g q24h (per susceptibility data, ID recs)  - repeat BCx 3/25 positive, repeat BCx today 3/27 pending  - repeat BCx q48h until clear   - urology following for emphysematous pyelitis   - if no clinical improvement or persistently positive BCx, repeat abdominal imaging to r/o abscess  - trend WBC, fever curve

## 2024-03-27 NOTE — PROGRESS NOTE ADULT - PROBLEM SELECTOR PLAN 5
- glucose 300s-400 this admission  - home meds: Tresiba 20 units QHS, novolog sliding scale (unclear how patient determines dose)  - a1c 9.8 (8.2 in 7/2023)  - lantus 24 units QHS  - admelog 8 units TIDAC (hold if NPO)  - moderate dose admelog ISS TID with meals + QHS  - f/u lipid panel

## 2024-03-27 NOTE — PROGRESS NOTE ADULT - PROBLEM SELECTOR PLAN 12
Diet: CC  DVT: eliquis 5 mg BID  Dispo: pending course, PT eval Alk phos 176, normal AST/ALT/ TBili  - trend  - if not improving/continually uptrending will obtain RUQ US

## 2024-03-27 NOTE — PROGRESS NOTE ADULT - SUBJECTIVE AND OBJECTIVE BOX
Infectious Diseases Follow Up:    Patient is a 73y old  Female who presents with a chief complaint of nausea, diarrhea, malaise (27 Mar 2024 08:43)      Interval History/ROS:  Febrile ON, per daughter patient did not have a good night, feeling anxious, not feeling well at all     Allergies  Lipitor (Nausea)  latex (Rash)        ANTIMICROBIALS:  cefTRIAXone   IVPB 2000 every 24 hours      Current Abx:     Previous Abx     OTHER MEDS:  MEDICATIONS  (STANDING):  acetaminophen     Tablet .. 650 every 6 hours PRN  ALPRAZolam 0.5 daily PRN  ALPRAZolam 1 at bedtime  aluminum hydroxide/magnesium hydroxide/simethicone Suspension 30 every 4 hours PRN  apixaban 5 every 12 hours  aspirin enteric coated 81 daily  dextrose 50% Injectable 25 once  dextrose 50% Injectable 12.5 once  dextrose 50% Injectable 25 once  dextrose Oral Gel 15 once PRN  diltiazem Infusion 12.5 <Continuous>  glucagon  Injectable 1 once  insulin glargine Injectable (LANTUS) 24 at bedtime  insulin lispro (ADMELOG) corrective regimen sliding scale  three times a day before meals  insulin lispro (ADMELOG) corrective regimen sliding scale  at bedtime  insulin lispro Injectable (ADMELOG) 8 three times a day before meals  levothyroxine 175 daily  melatonin 3 at bedtime PRN  ondansetron Injectable 4 every 8 hours PRN  pantoprazole    Tablet 40 before breakfast      Vital Signs Last 24 Hrs  T(C): 38.1 (27 Mar 2024 06:49), Max: 38.6 (27 Mar 2024 04:50)  T(F): 100.6 (27 Mar 2024 06:49), Max: 101.4 (27 Mar 2024 04:50)  HR: 124 (27 Mar 2024 04:51) (100 - 124)  BP: 119/73 (27 Mar 2024 04:51) (110/72 - 120/75)  BP(mean): --  RR: 18 (27 Mar 2024 04:51) (18 - 18)  SpO2: 94% (27 Mar 2024 04:51) (94% - 98%)    Parameters below as of 27 Mar 2024 04:51  Patient On (Oxygen Delivery Method): nasal cannula        PHYSICAL EXAM:  GENERAL: NAD, tired/ill appearing   HEAD:  Atraumatic, Normocephalic  EYES: EOMI, conjunctiva and sclera clear  CHEST/LUNG: Clear to auscultation bilaterally; No wheeze  HEART: +Tachy   ABDOMEN: Soft, generalized mild tenderness, Nondistended; Bowel sounds present  EXTREMITIES:  2+ Peripheral Pulses, No clubbing, cyanosis, or edema  PSYCH: AAOx3                          10.8   14.85 )-----------( 216      ( 27 Mar 2024 07:03 )             33.6       03-27    135  |  101  |  26<H>  ----------------------------<  124<H>  3.6   |  19<L>  |  1.50<H>    Ca    8.4      27 Mar 2024 07:03  Phos  2.3     03-27  Mg     2.2     03-27    TPro  5.9<L>  /  Alb  2.4<L>  /  TBili  0.9  /  DBili  x   /  AST  24  /  ALT  20  /  AlkPhos  176<H>  03-27      Urinalysis Basic - ( 27 Mar 2024 07:03 )    Color: x / Appearance: x / SG: x / pH: x  Gluc: 124 mg/dL / Ketone: x  / Bili: x / Urobili: x   Blood: x / Protein: x / Nitrite: x   Leuk Esterase: x / RBC: x / WBC x   Sq Epi: x / Non Sq Epi: x / Bacteria: x        MICROBIOLOGY:  v  .Blood Blood-Peripheral  03-25-24   Growth in aerobic bottle: Gram Negative Rods  Growth in anaerobic bottle: Gram Negative Rods  --    Growth in aerobic bottle: Gram Negative Rods  Growth in anaerobic bottle: Gram Negative Rods      Clean Catch Clean Catch (Midstream)  03-24-24   >100,000 CFU/ml Gram Negative Rods  --  --      .Blood Blood-Peripheral  03-24-24   Growth in aerobic and anaerobic bottles: Klebsiella pneumoniae  See previous culture [id number]  --    Growth in aerobic and anaerobic bottles: Gram Negative Rods      .Blood Blood-Peripheral  03-24-24   Growth in aerobic and anaerobic bottles: Klebsiella pneumoniae  Direct identification is available within approximately 3-5  hours either by Blood Panel Multiplexed PCR or Direct  MALDI-TOF. Details: [email or web address]  --  Blood Culture PCR  Klebsiella pneumoniae                RADIOLOGY:

## 2024-03-27 NOTE — PROGRESS NOTE ADULT - PROBLEM SELECTOR PLAN 3
SCr on admission 1.54 -> 1.69 -> 1.85 -> 1.74 -> 1.5  - baseline Cr 0.70 in 7/2023  - per patient, no known history of CKD  - likely 2/2 ATN ISO hemodynamic instability +/- prerenal ISO sepsis  - FeNa 0.1% c/w prerenal DEEPA  - trend Cr, improving  - maintain aguiar  - renal US with R renal cyst, no hydronephrosis, possible small focus of air in collecting system c/w prev CT, no abscess Previously on eliquis 2.5 mg BID, metoprolol succinate  - rate control and AC discontinued 7/2023 after extended holter monitor without evidence of A fib   - Afib RVR up to 160s this admission, likely 2/2 sepsis   - s/p lopressor 5 mg IVP x5, amio 150 mg IV x1 in ED  - now on cardizem gtt at 10/hr  - TTE 3/26 with EF 57%, mild diastolic dysfunction, normal RV function    Plan:  - cardiology following, appreciate recs   - eliquis 5 mg BID  - continue cardizem gtt, increase to 12.5/hr given persistent tachycardia >110  - K >4, Mg >2  - s/p lasix 20 mg IV x1 3/26 given dyspnea, reassess need for further diuresis daily

## 2024-03-27 NOTE — CONSULT NOTE ADULT - ASSESSMENT
73-year-old female history of paroxysmal AF (previously on Eliquis discontinued since 7/2023 after hysterectomy and was never advised to resume),  HTN, HLD, DM, anxiety, presenting to the ED for 2 days of weakness, not getting out of bed, multiple episodes of nonbloody diarrhea, nausea, occasional vomiting (last episode 3/23), general malaise, increased fatigue, chills at home x 2-3 days. Endorsing back pain, chronic 2/2 spinal stenosis. In ED, febrile T 103.1, tachycardic with tele revealing AF with HRs 140s-150s s/p Lopressor and Amio, BP's as low as 80s/60s s/p IVF bolus 3.5 L with improvement. TTE this admission revealing normal BiV function, started on Eliquis. + Leukocytosis, though downtrending, remains febrile. +BCX Klebsiella pneumoniae, EP consulted for rhythm control secondary to AF w/ RVR, on Dilt gtt and being Dig loaded per general Cardiology.     1) Klebsiella pneumoniae bacteremia, likely 2/2 R pyelonephritis   2) DEEPA   3) AF w/ RVR     - Can continue Dilt gtt for rate control, Dig added  - NPO s/p mn for JOAQUIN DCCV, cannot use Amiodarone until appendage is cleared by JOAQUIN. Cannot use CT secondary to DEEPA   - Continue tele monitoring, replete and correct lytes for K>4 and Mg>2   - Treat underlying infx/rest of care per primary team 73-year-old female history of paroxysmal AF (previously on Eliquis discontinued since 7/2023 after hysterectomy and was never advised to resume),  HTN, HLD, DM, anxiety, presenting to the ED for 2 days of weakness, not getting out of bed, multiple episodes of nonbloody diarrhea, nausea, occasional vomiting (last episode 3/23), general malaise, increased fatigue, chills at home x 2-3 days. Endorsing back pain, chronic 2/2 spinal stenosis. In ED, febrile T 103.1, tachycardic with tele revealing AF with HRs 140s-150s s/p Lopressor and Amio, BP's as low as 80s/60s s/p IVF bolus 3.5 L with improvement. TTE this admission revealing normal BiV function, started on Eliquis. + Leukocytosis, though downtrending, remains febrile. +BCX Klebsiella pneumoniae, EP consulted for rhythm control secondary to AF w/ RVR, on Dilt gtt and being Dig loaded per general Cardiology.     1) Klebsiella pneumoniae bacteremia, likely 2/2 R pyelonephritis   2) DEEPA   3) AF w/ RVR     - Can continue Dilt gtt for rate control, Dig added  - NPO s/p mn for JOAQUIN DCCV, cannot use Amiodarone until appendage is cleared by JOAQUIN. Cannot use CT secondary to DEEPA   - Eliquis 5mg bid, continue  - Continue tele monitoring, replete and correct lytes for K>4 and Mg>2   - Treat underlying infx/rest of care per primary team

## 2024-03-27 NOTE — PROGRESS NOTE ADULT - PROBLEM SELECTOR PLAN 2
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, mild hydroureteronephritis to bladder   - voids independently, no chronic catheter  - previous UTIs with E coli and Proteus mirabilis, pansensitive  - blood cultures with Klebsiella pneumoniae, suscept. to ceftriaxone   - UCx with gram negative rods     Plan:  - urology following, appreciate recs  - per urology, no acute  intervention at this time  - continue CTX 2g q24h as above  - maintain aguiar  - if not improving clinically or persistent bacteremia, repeat imaging to assess for possible abscess later this week

## 2024-03-27 NOTE — PROGRESS NOTE ADULT - ASSESSMENT
This is a 72 y/o F w/ PMHx of AF, HTN, HLD, DM, anxiety who is presenting to [facility] 3/24/24 with weakness, N/V/D, chills, fatigue. Initially went to UC, then directed to ED, w/ flank and suprapubic pain.   In the ER, initially afebrile, then had fevers to 103.1, AF w/ RVR to 150s and hypotensive to 80s/60s. Pt was started on broad therapy w/ Vancomycin/Zosyn. Pt was continued on Zosyn.   Labs w/ leukocytosis to 17 -> 18,  DEEPA to 1.54 to 1.85, lactate 2.8, U/A 237 WBCs  CT A/P w/ R pyelo, with emphysematous pyelitis and mild hydroureteronephrosis to the bladder, BCx w/ Klebsiella pneumoniae     #Klebsiella pneumoniae bacteremia  #Emphysematous pyelitis   #Severe sepsis   #Lactic acidosis   #DEEPA  #AF w/ RVR    Overall, 72 y/o F w/ PMHx of AF, HTN, HLD, DM, anxiety who is presenting with severe sepsis, AF w/ RVR 2/2 Klebsiella pneumoniae bacteremia, R pyelonephritis with emphysematous pyelitis. Pt acutely ill, still with significant leukocytosis, clinically ill appearing, which is worsening by fevers and AF w/ RVR. Would repeat BCx and continue w/ Zosyn for now, will need interval CT A/P and close urology follow up.   Pt with persistent bacteremia, Klebsiella sensitive, now on Ceftriaxone, however still febrile, clinically ill appearing     Plan:   1. Ceftriaxone 2 g q24  2. Repeat blood cx Q48h until clear   3. Would repeat CT A/P w/ IV contrast tomorrow if still clinically worsening     Discussed in detail with patient's two daughters     Thank you for this consult. Inpatient ID team will follow.    Paolo Love M.D.  Attending Physician  Division of Infectious Diseases  Department of Medicine

## 2024-03-28 ENCOUNTER — RESULT REVIEW (OUTPATIENT)
Age: 74
End: 2024-03-28

## 2024-03-28 LAB
ALBUMIN SERPL ELPH-MCNC: 2.5 G/DL — LOW (ref 3.3–5)
ALP SERPL-CCNC: 235 U/L — HIGH (ref 40–120)
ALT FLD-CCNC: 28 U/L — SIGNIFICANT CHANGE UP (ref 10–45)
ANION GAP SERPL CALC-SCNC: 13 MMOL/L — SIGNIFICANT CHANGE UP (ref 5–17)
AST SERPL-CCNC: 25 U/L — SIGNIFICANT CHANGE UP (ref 10–40)
BASOPHILS # BLD AUTO: 0.05 K/UL — SIGNIFICANT CHANGE UP (ref 0–0.2)
BASOPHILS NFR BLD AUTO: 0.3 % — SIGNIFICANT CHANGE UP (ref 0–2)
BILIRUB SERPL-MCNC: 0.6 MG/DL — SIGNIFICANT CHANGE UP (ref 0.2–1.2)
BUN SERPL-MCNC: 25 MG/DL — HIGH (ref 7–23)
CALCIUM SERPL-MCNC: 8.3 MG/DL — LOW (ref 8.4–10.5)
CHLORIDE SERPL-SCNC: 101 MMOL/L — SIGNIFICANT CHANGE UP (ref 96–108)
CO2 SERPL-SCNC: 22 MMOL/L — SIGNIFICANT CHANGE UP (ref 22–31)
CREAT SERPL-MCNC: 1.44 MG/DL — HIGH (ref 0.5–1.3)
CULTURE RESULTS: ABNORMAL
EGFR: 38 ML/MIN/1.73M2 — LOW
EOSINOPHIL # BLD AUTO: 0.07 K/UL — SIGNIFICANT CHANGE UP (ref 0–0.5)
EOSINOPHIL NFR BLD AUTO: 0.4 % — SIGNIFICANT CHANGE UP (ref 0–6)
GLUCOSE BLDC GLUCOMTR-MCNC: 156 MG/DL — HIGH (ref 70–99)
GLUCOSE BLDC GLUCOMTR-MCNC: 321 MG/DL — HIGH (ref 70–99)
GLUCOSE BLDC GLUCOMTR-MCNC: 365 MG/DL — HIGH (ref 70–99)
GLUCOSE BLDC GLUCOMTR-MCNC: 406 MG/DL — HIGH (ref 70–99)
GLUCOSE BLDC GLUCOMTR-MCNC: 426 MG/DL — HIGH (ref 70–99)
GLUCOSE SERPL-MCNC: 176 MG/DL — HIGH (ref 70–99)
GRAM STN FLD: ABNORMAL
HCT VFR BLD CALC: 35.7 % — SIGNIFICANT CHANGE UP (ref 34.5–45)
HGB BLD-MCNC: 11.5 G/DL — SIGNIFICANT CHANGE UP (ref 11.5–15.5)
IMM GRANULOCYTES NFR BLD AUTO: 2.1 % — HIGH (ref 0–0.9)
LYMPHOCYTES # BLD AUTO: 1.34 K/UL — SIGNIFICANT CHANGE UP (ref 1–3.3)
LYMPHOCYTES # BLD AUTO: 8.1 % — LOW (ref 13–44)
MAGNESIUM SERPL-MCNC: 2 MG/DL — SIGNIFICANT CHANGE UP (ref 1.6–2.6)
MCHC RBC-ENTMCNC: 27.5 PG — SIGNIFICANT CHANGE UP (ref 27–34)
MCHC RBC-ENTMCNC: 32.2 GM/DL — SIGNIFICANT CHANGE UP (ref 32–36)
MCV RBC AUTO: 85.4 FL — SIGNIFICANT CHANGE UP (ref 80–100)
MONOCYTES # BLD AUTO: 1.16 K/UL — HIGH (ref 0–0.9)
MONOCYTES NFR BLD AUTO: 7 % — SIGNIFICANT CHANGE UP (ref 2–14)
NEUTROPHILS # BLD AUTO: 13.61 K/UL — HIGH (ref 1.8–7.4)
NEUTROPHILS NFR BLD AUTO: 82.1 % — HIGH (ref 43–77)
NRBC # BLD: 0 /100 WBCS — SIGNIFICANT CHANGE UP (ref 0–0)
PHOSPHATE SERPL-MCNC: 2.4 MG/DL — LOW (ref 2.5–4.5)
PLATELET # BLD AUTO: 321 K/UL — SIGNIFICANT CHANGE UP (ref 150–400)
POTASSIUM SERPL-MCNC: 3.7 MMOL/L — SIGNIFICANT CHANGE UP (ref 3.5–5.3)
POTASSIUM SERPL-SCNC: 3.7 MMOL/L — SIGNIFICANT CHANGE UP (ref 3.5–5.3)
PROT SERPL-MCNC: 6.1 G/DL — SIGNIFICANT CHANGE UP (ref 6–8.3)
RBC # BLD: 4.18 M/UL — SIGNIFICANT CHANGE UP (ref 3.8–5.2)
RBC # FLD: 13.1 % — SIGNIFICANT CHANGE UP (ref 10.3–14.5)
SODIUM SERPL-SCNC: 136 MMOL/L — SIGNIFICANT CHANGE UP (ref 135–145)
WBC # BLD: 16.58 K/UL — HIGH (ref 3.8–10.5)
WBC # FLD AUTO: 16.58 K/UL — HIGH (ref 3.8–10.5)

## 2024-03-28 PROCEDURE — 99232 SBSQ HOSP IP/OBS MODERATE 35: CPT | Mod: GC

## 2024-03-28 PROCEDURE — 93010 ELECTROCARDIOGRAM REPORT: CPT | Mod: 76

## 2024-03-28 PROCEDURE — 99233 SBSQ HOSP IP/OBS HIGH 50: CPT

## 2024-03-28 PROCEDURE — 76705 ECHO EXAM OF ABDOMEN: CPT | Mod: 26

## 2024-03-28 PROCEDURE — 92960 CARDIOVERSION ELECTRIC EXT: CPT

## 2024-03-28 PROCEDURE — 93325 DOPPLER ECHO COLOR FLOW MAPG: CPT | Mod: 26

## 2024-03-28 PROCEDURE — 93320 DOPPLER ECHO COMPLETE: CPT | Mod: 26

## 2024-03-28 PROCEDURE — 93312 ECHO TRANSESOPHAGEAL: CPT | Mod: 26

## 2024-03-28 RX ORDER — INSULIN LISPRO 100/ML
8 VIAL (ML) SUBCUTANEOUS
Refills: 0 | Status: DISCONTINUED | OUTPATIENT
Start: 2024-03-28 | End: 2024-03-29

## 2024-03-28 RX ORDER — DEXTROSE 50 % IN WATER 50 %
25 SYRINGE (ML) INTRAVENOUS ONCE
Refills: 0 | Status: DISCONTINUED | OUTPATIENT
Start: 2024-03-28 | End: 2024-03-29

## 2024-03-28 RX ORDER — METOPROLOL TARTRATE 50 MG
50 TABLET ORAL DAILY
Refills: 0 | Status: DISCONTINUED | OUTPATIENT
Start: 2024-03-28 | End: 2024-03-29

## 2024-03-28 RX ORDER — INSULIN GLARGINE 100 [IU]/ML
24 INJECTION, SOLUTION SUBCUTANEOUS AT BEDTIME
Refills: 0 | Status: DISCONTINUED | OUTPATIENT
Start: 2024-03-28 | End: 2024-03-29

## 2024-03-28 RX ORDER — AMIODARONE HYDROCHLORIDE 400 MG/1
200 TABLET ORAL DAILY
Refills: 0 | Status: CANCELLED | OUTPATIENT
Start: 2024-04-04 | End: 2024-04-02

## 2024-03-28 RX ORDER — SODIUM CHLORIDE 9 MG/ML
1000 INJECTION, SOLUTION INTRAVENOUS
Refills: 0 | Status: DISCONTINUED | OUTPATIENT
Start: 2024-03-28 | End: 2024-03-29

## 2024-03-28 RX ORDER — INSULIN LISPRO 100/ML
VIAL (ML) SUBCUTANEOUS
Refills: 0 | Status: DISCONTINUED | OUTPATIENT
Start: 2024-03-28 | End: 2024-03-29

## 2024-03-28 RX ORDER — DEXTROSE 50 % IN WATER 50 %
15 SYRINGE (ML) INTRAVENOUS ONCE
Refills: 0 | Status: DISCONTINUED | OUTPATIENT
Start: 2024-03-28 | End: 2024-03-29

## 2024-03-28 RX ORDER — AMIODARONE HYDROCHLORIDE 400 MG/1
TABLET ORAL
Refills: 0 | Status: DISCONTINUED | OUTPATIENT
Start: 2024-03-28 | End: 2024-04-02

## 2024-03-28 RX ORDER — INSULIN LISPRO 100/ML
VIAL (ML) SUBCUTANEOUS AT BEDTIME
Refills: 0 | Status: DISCONTINUED | OUTPATIENT
Start: 2024-03-28 | End: 2024-03-29

## 2024-03-28 RX ORDER — GLUCAGON INJECTION, SOLUTION 0.5 MG/.1ML
1 INJECTION, SOLUTION SUBCUTANEOUS ONCE
Refills: 0 | Status: DISCONTINUED | OUTPATIENT
Start: 2024-03-28 | End: 2024-03-29

## 2024-03-28 RX ORDER — DEXTROSE 50 % IN WATER 50 %
12.5 SYRINGE (ML) INTRAVENOUS ONCE
Refills: 0 | Status: DISCONTINUED | OUTPATIENT
Start: 2024-03-28 | End: 2024-03-29

## 2024-03-28 RX ORDER — AMIODARONE HYDROCHLORIDE 400 MG/1
400 TABLET ORAL EVERY 12 HOURS
Refills: 0 | Status: DISCONTINUED | OUTPATIENT
Start: 2024-03-28 | End: 2024-04-02

## 2024-03-28 RX ADMIN — Medication 1: at 05:35

## 2024-03-28 RX ADMIN — Medication 1 MILLIGRAM(S): at 22:16

## 2024-03-28 RX ADMIN — DIPHENHYDRAMINE HYDROCHLORIDE AND LIDOCAINE HYDROCHLORIDE AND ALUMINUM HYDROXIDE AND MAGNESIUM HYDRO 5 MILLILITER(S): KIT at 12:53

## 2024-03-28 RX ADMIN — CHLORHEXIDINE GLUCONATE 1 APPLICATION(S): 213 SOLUTION TOPICAL at 12:36

## 2024-03-28 RX ADMIN — Medication 12.5 MG/HR: at 07:45

## 2024-03-28 RX ADMIN — Medication 650 MILLIGRAM(S): at 05:35

## 2024-03-28 RX ADMIN — Medication 50 MILLIGRAM(S): at 14:06

## 2024-03-28 RX ADMIN — Medication 5: at 18:02

## 2024-03-28 RX ADMIN — INSULIN GLARGINE 24 UNIT(S): 100 INJECTION, SOLUTION SUBCUTANEOUS at 22:15

## 2024-03-28 RX ADMIN — Medication 175 MICROGRAM(S): at 05:35

## 2024-03-28 RX ADMIN — CEFTRIAXONE 100 MILLIGRAM(S): 500 INJECTION, POWDER, FOR SOLUTION INTRAMUSCULAR; INTRAVENOUS at 14:06

## 2024-03-28 RX ADMIN — APIXABAN 5 MILLIGRAM(S): 2.5 TABLET, FILM COATED ORAL at 05:35

## 2024-03-28 RX ADMIN — Medication 4: at 12:54

## 2024-03-28 RX ADMIN — Medication 4: at 22:15

## 2024-03-28 RX ADMIN — PANTOPRAZOLE SODIUM 40 MILLIGRAM(S): 20 TABLET, DELAYED RELEASE ORAL at 12:53

## 2024-03-28 RX ADMIN — AMIODARONE HYDROCHLORIDE 400 MILLIGRAM(S): 400 TABLET ORAL at 12:53

## 2024-03-28 RX ADMIN — Medication 8 UNIT(S): at 18:02

## 2024-03-28 RX ADMIN — Medication 650 MILLIGRAM(S): at 22:16

## 2024-03-28 RX ADMIN — APIXABAN 5 MILLIGRAM(S): 2.5 TABLET, FILM COATED ORAL at 18:01

## 2024-03-28 RX ADMIN — Medication 81 MILLIGRAM(S): at 12:53

## 2024-03-28 RX ADMIN — Medication 0.5 MILLIGRAM(S): at 05:53

## 2024-03-28 NOTE — PROGRESS NOTE ADULT - ASSESSMENT
73-year-old female history of paroxysmal AF (previously on Eliquis discontinued since 7/2023 after hysterectomy and was never advised to resume),  HTN, HLD, DM, anxiety, presenting to the ED for 2 days of weakness, not getting out of bed, multiple episodes of nonbloody diarrhea, nausea, occasional vomiting (last episode 3/23), general malaise, increased fatigue, chills at home x 2-3 days. Endorsing back pain, chronic 2/2 spinal stenosis. In ED, febrile T 103.1, tachycardic with tele revealing AF with HRs 140s-150s s/p Lopressor and Amio, BP's as low as 80s/60s s/p IVF bolus 3.5 L with improvement. TTE this admission revealing normal BiV function, started on Eliquis. + Leukocytosis, though downtrending, remains febrile. +BCX Klebsiella pneumoniae, EP consulted for rhythm control secondary to AF w/ RVR, on Dilt gtt and being Dig loaded per general Cardiology.     1) Klebsiella pneumoniae bacteremia, likely 2/2 R pyelonephritis   2) DEEPA   3) AF w/ RVR     - S/p JOAQUIN DCCV, successful. SR 70's  - D/c Dilt gtt  - Start Toprol XL 50mg qd  - Start Amiodarone to maintain SR. Initiate Amio 400bid x 1 week followed by 200mg qd thereafter. While on Amio monitor LFT's and TFT's (TSH 2.01). She will need outpatient monitoring of TFT/LFT's and yearly opthalmologic evals and PFT's   - Eliquis 5mg bid, continue  - Continue tele monitoring, replete and correct lytes for K>4 and Mg>2. Replete phos this AM  - Treat underlying infx/rest of care per primary team. ID following, BCX pending, for repeat C/A/P for ongoing fevers/leukocytosis

## 2024-03-28 NOTE — PROVIDER CONTACT NOTE (OTHER) - BACKGROUND
Pt admitted for sepsis 2/2 to UTI
admitted for diarrhoea, Nausea, sepsis, A Fib with RVR
Pt admitted for sepsis 2/2 uti  and afib rvr
admitted for diarrhoea, Nausea, sepsis

## 2024-03-28 NOTE — PROGRESS NOTE ADULT - ASSESSMENT
This is a 72 y/o F w/ PMHx of AF, HTN, HLD, DM, anxiety who is presenting to [facility] 3/24/24 with weakness, N/V/D, chills, fatigue. Initially went to UC, then directed to ED, w/ flank and suprapubic pain.   In the ER, initially afebrile, then had fevers to 103.1, AF w/ RVR to 150s and hypotensive to 80s/60s. Pt was started on broad therapy w/ Vancomycin/Zosyn. Pt was continued on Zosyn.   Labs w/ leukocytosis to 17 -> 18,  DEEPA to 1.54 to 1.85, lactate 2.8, U/A 237 WBCs  CT A/P w/ R pyelo, with emphysematous pyelitis and mild hydroureteronephrosis to the bladder, BCx w/ Klebsiella pneumoniae     #Klebsiella pneumoniae bacteremia  #Emphysematous pyelitis   #Severe sepsis   #Lactic acidosis   #DEEPA  #AF w/ RVR    Overall, 72 y/o F w/ PMHx of AF, HTN, HLD, DM, anxiety who is presenting with severe sepsis, AF w/ RVR 2/2 Klebsiella pneumoniae bacteremia, R pyelonephritis with emphysematous pyelitis. Pt acutely ill, still with significant leukocytosis, clinically ill appearing, which is worsening by fevers and AF w/ RVR. Would repeat BCx and continue w/ Zosyn for now, will need interval CT A/P and close urology follow up.   Pt with persistent bacteremia, Klebsiella sensitive, now on Ceftriaxone, however still febrile, clinically ill appearing. Pending JOAQUIN/DCCV on 3/28 for AF w/ RVR.     Plan:   1. Ceftriaxone 2 g q24  2. Repeat blood cx Q48h until clear   3. Obtain CT A/P w/ IV contrast     Discussed in detail with patient's daughter and primary team     Thank you for this consult. Inpatient ID team will follow.    Paolo Love M.D.  Attending Physician  Division of Infectious Diseases  Department of Medicine

## 2024-03-28 NOTE — PROGRESS NOTE ADULT - SUBJECTIVE AND OBJECTIVE BOX
BRENNEN VALDEZ  73y  Female    Patient is a 73y old  Female who presents with a chief complaint of nausea, diarrhea, malaise (28 Mar 2024 10:43)    INTERVAL HPI/OVERNIGHT EVENTS:  - HRs initially 90s-110s overnight, increased this morning with max 190 on tele, mostly sustaining 120s  - Tmax 99.1 overnight but subjective fever treated with tylenol  - NPO since midnight for DCCV with EP today  - per daughter at bedside, patient looking a little better today. Was able to get out of bed on her own, seems to have slept better  - pt states she is feeling "no good" this morning, endorsing subjective fevers, intermittent abdominal discomfort, chronic back pain, weakness. Denies chest pain, dyspnea, palpitations    T(C): 36.8 (03-28-24 @ 11:38), Max: 37.6 (03-28-24 @ 00:26)  HR: 89 (03-28-24 @ 11:38) (66 - 118)  BP: 111/62 (03-28-24 @ 11:38) (100/54 - 129/68)  RR: 18 (03-28-24 @ 11:38) (18 - 33)  SpO2: 95% (03-28-24 @ 11:38) (93% - 98%)  Wt(kg): --Vital Signs Last 24 Hrs  T(C): 36.8 (28 Mar 2024 11:38), Max: 37.6 (28 Mar 2024 00:26)  T(F): 98.2 (28 Mar 2024 11:38), Max: 99.6 (28 Mar 2024 00:26)  HR: 89 (28 Mar 2024 11:38) (66 - 118)  BP: 111/62 (28 Mar 2024 11:38) (100/54 - 129/68)  BP(mean): 78 (28 Mar 2024 11:38) (78 - 80)  RR: 18 (28 Mar 2024 11:38) (18 - 33)  SpO2: 95% (28 Mar 2024 11:38) (93% - 98%)    Parameters below as of 28 Mar 2024 11:38  Patient On (Oxygen Delivery Method): nasal cannula    PHYSICAL EXAM:  CONSTITUTIONAL: awake, lying in bed, diaphoretic (s/p acetaminophen for subj fever)  EYES: PERRLA and symmetric, EOMI, No conjunctival or scleral injection, non-icteric  ENMT: Oral mucosa with moist membranes  NECK: Supple   RESP: CTA b/l, no WRR, dyspneic-appearing but no accessory muscle use   CV: irregularly irregular, tachycardic, no MRG; no JVD; no peripheral edema  GI: Soft, TTP over suprapubic region, otherwise abdomen nontender. No rebound, no guarding, no palpable masses  : + CVA tenderness bilaterally, R>L. Escoto in place, dark yellow urine in bag  MSK: No digital clubbing or cyanosis; normal muscle bulk and tone, no gross deformities  SKIN: No rashes or ulcers noted; no subcutaneous nodules or induration palpable  NEURO: facial movements symmetric, moves all extremities against gravity, no gross focal deficits   PSYCH: Appropriate insight/judgment; A+O x 3, mood and affect appropriate, recent/remote memory intact    Consultant(s) Notes Reviewed:  [x ] YES  [ ] NO  Care Discussed with Consultants/Other Providers [ x] YES  [ ] NO    LABS:                        11.5   16.58 )-----------( 321      ( 28 Mar 2024 07:09 )             35.7     03-28    136  |  101  |  25<H>  ----------------------------<  176<H>  3.7   |  22  |  1.44<H>    Ca    8.3<L>      28 Mar 2024 07:09  Phos  2.4     03-28  Mg     2.0     03-28    TPro  6.1  /  Alb  2.5<L>  /  TBili  0.6  /  DBili  x   /  AST  25  /  ALT  28  /  AlkPhos  235<H>  03-28        Urinalysis Basic - ( 28 Mar 2024 07:09 )    Color: x / Appearance: x / SG: x / pH: x  Gluc: 176 mg/dL / Ketone: x  / Bili: x / Urobili: x   Blood: x / Protein: x / Nitrite: x   Leuk Esterase: x / RBC: x / WBC x   Sq Epi: x / Non Sq Epi: x / Bacteria: x      CAPILLARY BLOOD GLUCOSE      POCT Blood Glucose.: 156 mg/dL (28 Mar 2024 05:34)  POCT Blood Glucose.: 132 mg/dL (27 Mar 2024 21:26)  POCT Blood Glucose.: 103 mg/dL (27 Mar 2024 17:08)  POCT Blood Glucose.: 109 mg/dL (27 Mar 2024 12:50)        Urinalysis Basic - ( 28 Mar 2024 07:09 )    Color: x / Appearance: x / SG: x / pH: x  Gluc: 176 mg/dL / Ketone: x  / Bili: x / Urobili: x   Blood: x / Protein: x / Nitrite: x   Leuk Esterase: x / RBC: x / WBC x   Sq Epi: x / Non Sq Epi: x / Bacteria: x        RADIOLOGY & ADDITIONAL TESTS:    Imaging Personally Reviewed:  [ ] YES  [ ] NO    HEALTH ISSUES - PROBLEM Dx:  Severe sepsis with acute organ dysfunction due to gram-negative bacteria    Pyelonephritis    Atrial fibrillation with RVR    DEEPA (acute kidney injury)    Diabetes mellitus    Hyponatremia    Hypertension    Hyperlipidemia    Spinal stenosis    Hypothyroidism    Anxiety    Elevated alkaline phosphatase level    Need for prophylactic measure    Sepsis secondary to UTI    Acute respiratory failure with hypoxia    History of uterine cancer

## 2024-03-28 NOTE — PROGRESS NOTE ADULT - SUBJECTIVE AND OBJECTIVE BOX
DATE OF SERVICE: 03-28-24 @ 10:43    Patient is a 73y old  Female who presents with a chief complaint of nausea, diarrhea, malaise (28 Mar 2024 10:19)      INTERVAL HISTORY:     REVIEW OF SYSTEMS:  CONSTITUTIONAL: No weakness  EYES/ENT: No visual changes;  No throat pain   NECK: No pain or stiffness  RESPIRATORY: No cough, wheezing; No shortness of breath  CARDIOVASCULAR: No chest pain or palpitations  GASTROINTESTINAL: No abdominal  pain. No nausea, vomiting, or hematemesis  GENITOURINARY: No dysuria, frequency or hematuria  NEUROLOGICAL: No stroke like symptoms  SKIN: No rashes    TELEMETRY Personally reviewed: AF  (up to 140s)  	  MEDICATIONS:        PHYSICAL EXAM:  T(C): 37.3 (03-28-24 @ 09:30), Max: 37.6 (03-28-24 @ 00:26)  HR: 66 (03-28-24 @ 10:30) (66 - 122)  BP: 116/57 (03-28-24 @ 10:30) (100/54 - 129/68)  RR: 23 (03-28-24 @ 10:30) (18 - 33)  SpO2: 96% (03-28-24 @ 10:30) (93% - 98%)  Wt(kg): --  I&O's Summary    27 Mar 2024 07:01  -  28 Mar 2024 07:00  --------------------------------------------------------  IN: 1284 mL / OUT: 2200 mL / NET: -916 mL      Height (cm): 152.4 (03-28 @ 08:40)  Weight (kg): 118.8 (03-28 @ 08:40)  BMI (kg/m2): 51.2 (03-28 @ 08:40)  BSA (m2): 2.09 (03-28 @ 08:40)    Appearance: In no distress	  HEENT:    PERRL, EOMI	  Cardiovascular:  S1 S2, No JVD  Respiratory: Lungs clear to auscultation	  Gastrointestinal:  Soft, Non-tender, + BS	  Vascularature:  + edema of LE  Psychiatric: Appropriate affect   Neuro: no acute focal deficits                               11.5   16.58 )-----------( 321      ( 28 Mar 2024 07:09 )             35.7     03-28    136  |  101  |  25<H>  ----------------------------<  176<H>  3.7   |  22  |  1.44<H>    Ca    8.3<L>      28 Mar 2024 07:09  Phos  2.4     03-28  Mg     2.0     03-28    TPro  6.1  /  Alb  2.5<L>  /  TBili  0.6  /  DBili  x   /  AST  25  /  ALT  28  /  AlkPhos  235<H>  03-28        Labs personally reviewed      ASSESSMENT/PLAN: 	  73-year-old female history of TIA, A-fib (previously on eliquis, off since 7/2023 after hysterectomy),  HTN, HLD, DM, anxiety, consulted for AFIB RVR with SOB    1. AFIB RVR   - [id number] score 8   - on tele currently AFIB @ 125 bpm  - on Eliquis 2.5 mg BID ----> should be Eliquis 5mg BID (patient does not meet 2.5mg criteria)   - Cont Cardizem gtt @ 12.5mg/hour  - 3/27 given Dig 500mcg IV once  - TTE with preserved EF  - cont to monitor on tele   - c/w supplemental oxygen   - EP consult appreciated--> plan for JOAQUIN/DCCV today      2. Acute diastolic Heart Failure/SOB  - CTA chest no pulm embolism   - ProBnP 4459   - c/w supplemental oxygen   - keep pulse ox above 90%  -  Lasix 20mg IV daily   - check TTE to assess structural heart defect and EF   - kidney function wnl     3. Hx of CVA   - on Eliquis 5mg BID  - neuro checks q4h     4. HTN   - controlled   - will monitor blood pressure    5. DM  - A1c 9.8  - Endo recs appreciated   - consider SGLT inhibitor at discharge            Lina Pickering, TIANA-GOVIND Carey, DO [facility]  Cardiovascular Medicine  [address], [address]  Available through call or text on [employer] TEAM[employer]  Office: [phone]   DATE OF SERVICE: 03-28-24 @ 10:43    Patient is a 73y old  Female who presents with a chief complaint of nausea, diarrhea, malaise (28 Mar 2024 10:19)      INTERVAL HISTORY: Feels ok.     REVIEW OF SYSTEMS:  CONSTITUTIONAL: No weakness  EYES/ENT: No visual changes;  No throat pain   NECK: No pain or stiffness  RESPIRATORY: No cough, wheezing; No shortness of breath  CARDIOVASCULAR: No chest pain or palpitations  GASTROINTESTINAL: No abdominal  pain. No nausea, vomiting, or hematemesis  GENITOURINARY: No dysuria, frequency or hematuria  NEUROLOGICAL: No stroke like symptoms  SKIN: No rashes    TELEMETRY Personally reviewed: AF  (up to 140s)--> s/p DCCV NSR  	  MEDICATIONS:        PHYSICAL EXAM:  T(C): 37.3 (03-28-24 @ 09:30), Max: 37.6 (03-28-24 @ 00:26)  HR: 66 (03-28-24 @ 10:30) (66 - 122)  BP: 116/57 (03-28-24 @ 10:30) (100/54 - 129/68)  RR: 23 (03-28-24 @ 10:30) (18 - 33)  SpO2: 96% (03-28-24 @ 10:30) (93% - 98%)  Wt(kg): --  I&O's Summary    27 Mar 2024 07:01  -  28 Mar 2024 07:00  --------------------------------------------------------  IN: 1284 mL / OUT: 2200 mL / NET: -916 mL      Height (cm): 152.4 (03-28 @ 08:40)  Weight (kg): 118.8 (03-28 @ 08:40)  BMI (kg/m2): 51.2 (03-28 @ 08:40)  BSA (m2): 2.09 (03-28 @ 08:40)    Appearance: In no distress	  HEENT:    PERRL, EOMI	  Cardiovascular:  S1 S2, No JVD  Respiratory: Lungs clear to auscultation	  Gastrointestinal:  Soft, Non-tender, + BS	  Vascularature:  + edema of LE  Psychiatric: Appropriate affect   Neuro: no acute focal deficits                               11.5   16.58 )-----------( 321      ( 28 Mar 2024 07:09 )             35.7     03-28    136  |  101  |  25<H>  ----------------------------<  176<H>  3.7   |  22  |  1.44<H>    Ca    8.3<L>      28 Mar 2024 07:09  Phos  2.4     03-28  Mg     2.0     03-28    TPro  6.1  /  Alb  2.5<L>  /  TBili  0.6  /  DBili  x   /  AST  25  /  ALT  28  /  AlkPhos  235<H>  03-28        Labs personally reviewed  < from: TTE W or WO Ultrasound Enhancing Agent (03.26.24 @ 11:37) >      1. Left ventricular cavity is normal in size. Left ventricular wall thickness is normal. Left ventricular systolic function is normal with an ejection fraction of 57 % bySimpson's method of disks. There are no regional wall motion abnormalities seen.   2. There is mild (grade 1) left ventricular diastolic dysfunction.   3. Normal right ventricular cavity size, with normal wall thickness, and probably normal systolic function.   4. The left atrium is mildly dilated.   5. The right atrium is normal in size.   6. No pericardial effusion seen.    < end of copied text >      ASSESSMENT/PLAN: 	  73-year-old female history of TIA, A-fib (previously on eliquis, off since 7/2023 after hysterectomy),  HTN, HLD, DM, anxiety, consulted for AFIB RVR with SOB    1. AFIB RVR   - [id number] score 8   - on tele currently AFIB @ 125 bpm  - on Eliquis 2.5 mg BID ----> should be Eliquis 5mg BID (patient does not meet 2.5mg criteria)   - 3/27 given Dig 500mcg IV once  - TTE with preserved EF  - cont to monitor on tele   - c/w supplemental oxygen   - EP consult appreciated-->s/p successful JOAQUIN/DCCV today now in SR  - c/w Toprol 50mg PO daily      2. Acute diastolic Heart Failure/SOB  - CTA chest no pulm embolism   - ProBnP 4459   - c/w supplemental oxygen   - keep pulse ox above 90%  -  Cont Lasix 20mg IV daily   - TTE with normal EF, no WMA and grade I DD  - kidney function wnl     3. Hx of CVA   - on Eliquis 5mg BID  - neuro checks q4h     4. HTN   - controlled   - will monitor blood pressure    5. DM  - A1c 9.8  - Endo recs appreciated   - consider SGLT inhibitor at discharge            TIANA Gracia-NP   Jimmy Carey DO [facility]  Cardiovascular Medicine  [address] [place] [address], [address]  Available through call or text on [employer] TEAMs  Office: [phone]   tablet; Refill: 0    10. Nausea  Refilled zofran. - ondansetron (ZOFRAN) 4 MG tablet; Take 1 tablet by mouth every 8 hours as needed for Nausea or Vomiting  Dispense: 30 tablet; Refill: 0    11. Chronic obstructive pulmonary disease, unspecified COPD type ([address])  She used to follow with pulmonology, Dr. Yudith Shaffer, but hasn't seen him for a few years. We will refer her back to Dr. Yudith Shaffer for follow up. - fluticasone-umeclidin-vilant (TRELEGY ELLIPTA) 100-62.5-25 MCG/ACT AEPB inhaler; Inhale 1 puff into the lungs daily  Dispense: 28 each; Refill: 2  - albuterol sulfate HFA (PROVENTIL;VENTOLIN;PROAIR) 108 (90 Base) MCG/ACT inhaler; Inhale 2 puffs into the lungs every 6 hours as needed for Wheezing  Dispense: 1 each; Refill: 2    12. Fibromyalgia  Refilled medication. Checked PDMP. No signs of misuse. - carisoprodol (SOMA) 350 MG tablet; Take 1 tablet by mouth 4 times daily for 30 days. Dispense: 120 tablet; Refill: 0    13. Vertigo  Refilled medication.  - meclizine (ANTIVERT) 25 MG tablet; Take 1 tablet by mouth 3 times daily as needed for Dizziness or Nausea  Dispense: 90 tablet; Refill: 1      Return in about 3 months (around 2/9/2024) for Controlled Medication.             Electronically signed by Day Morales PA-C on 11/9/2023

## 2024-03-28 NOTE — PROGRESS NOTE ADULT - PROBLEM SELECTOR PLAN 5
- glucose 300s-400 this admission  - home meds: Tresiba 20 units QHS, novolog sliding scale (unclear how patient determines dose)  - a1c 9.8 (8.2 in 7/2023)  - lantus 24 units QHS (reduced to 12 units overnight 3/27 while NPO)  - admelog 8 units TIDAC (hold if NPO)  - moderate dose admelog ISS TID with meals + QHS  - f/u lipid panel

## 2024-03-28 NOTE — PROGRESS NOTE ADULT - PROBLEM SELECTOR PLAN 1
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, mild hydroureteronephritis to bladder   - CT chest with no evidence of pneumonia, CXR with bibasilar atelectasis  - s/p ~5 L IVF   - BCx 3/24, 3/25 with Klebsiella pneumoniae, susceptible to ceftriaxone/zosyn  - UCx with klebsiella, proteus  - still with intermittent fevers but fever curve improving, WBC mildly increased to 16k but subjectively improving    Plan:  - ID following, appreciate recs  - continue ceftriaxone 2g q24h (per susceptibility data, ID recs)  - repeat BCx 3/25 positive, repeat BCx 3/27 pending  - repeat BCx q48h until clear   - urology following for emphysematous pyelitis   - if no clinical improvement or persistently positive BCx, repeat abdominal imaging to r/o abscess - pt reassessed after DCCV and symptomatically improving, will defer CT at this time and reassess tomorrow 3/29.   > If 3/27 BCx positive, will get urgent CT A/P with IV contrast   - trend WBC, fever curve

## 2024-03-28 NOTE — PROGRESS NOTE ADULT - SUBJECTIVE AND OBJECTIVE BOX
24H hour events: overnight/this AM AF w/ RVR for JOAQUIN DCCV this AM    MEDICATIONS:  apixaban 5 milliGRAM(s) Oral every 12 hours  aspirin enteric coated 81 milliGRAM(s) Oral daily    cefTRIAXone   IVPB 2000 milliGRAM(s) IV Intermittent every 24 hours      acetaminophen     Tablet .. 650 milliGRAM(s) Oral every 6 hours PRN  ALPRAZolam 1 milliGRAM(s) Oral at bedtime  ALPRAZolam 0.5 milliGRAM(s) Oral daily PRN  melatonin 3 milliGRAM(s) Oral at bedtime PRN  ondansetron Injectable 4 milliGRAM(s) IV Push every 8 hours PRN    aluminum hydroxide/magnesium hydroxide/simethicone Suspension 30 milliLiter(s) Oral every 4 hours PRN  pantoprazole    Tablet 40 milliGRAM(s) Oral before breakfast    dextrose 50% Injectable 25 Gram(s) IV Push once  dextrose 50% Injectable 12.5 Gram(s) IV Push once  dextrose 50% Injectable 25 Gram(s) IV Push once  dextrose Oral Gel 15 Gram(s) Oral once PRN  glucagon  Injectable 1 milliGRAM(s) IntraMuscular once  insulin glargine Injectable (LANTUS) 12 Unit(s) SubCutaneous at bedtime  insulin lispro (ADMELOG) corrective regimen sliding scale   SubCutaneous every 6 hours  levothyroxine 175 MICROGram(s) Oral daily    artificial  tears Solution 1 Drop(s) Both EYES every 1 hour PRN  chlorhexidine 2% Cloths 1 Application(s) Topical daily  dextrose 5%. 1000 milliLiter(s) IV Continuous <Continuous>  dextrose 5%. 1000 milliLiter(s) IV Continuous <Continuous>  FIRST- Mouthwash  BLM 5 milliLiter(s) Swish and Spit daily      REVIEW OF SYSTEMS:  See HPI, otherwise ROS negative.    PHYSICAL EXAM:  T(C): 37.3 (03-28-24 @ 09:30), Max: 37.6 (03-28-24 @ 00:26)  HR: 78 (03-28-24 @ 10:15) (66 - 122)  BP: 115/58 (03-28-24 @ 10:15) (100/54 - 129/68)  RR: 22 (03-28-24 @ 10:15) (18 - 33)  SpO2: 95% (03-28-24 @ 10:15) (93% - 98%)  Wt(kg): --  I&O's Summary    27 Mar 2024 07:01  -  28 Mar 2024 07:00  --------------------------------------------------------  IN: 1284 mL / OUT: 2200 mL / NET: -916 mL        Appearance: Alert. NAD	  Cardiovascular: +S1S2 irregular no m/g/r  Respiratory: CTA B/L	  Psychiatry: A & O x 3, Mood & affect appropriate  Gastrointestinal:  Soft, NT. ND. +BS	  Skin: No rashes	masses or lesions  Neurologic: Non-focal  Extremities: No edema BLE  Vascular: Peripheral pulses palpable 2+ bilaterally      LABS:	 	    CBC Full  -  ( 28 Mar 2024 07:09 )  WBC Count : 16.58 K/uL  Hemoglobin : 11.5 g/dL  Hematocrit : 35.7 %  Platelet Count - Automated : 321 K/uL  Mean Cell Volume : 85.4 fl  Mean Cell Hemoglobin : 27.5 pg  Mean Cell Hemoglobin Concentration : 32.2 gm/dL  Auto Neutrophil # : 13.61 K/uL  Auto Lymphocyte # : 1.34 K/uL  Auto Monocyte # : 1.16 K/uL  Auto Eosinophil # : 0.07 K/uL  Auto Basophil # : 0.05 K/uL  Auto Neutrophil % : 82.1 %  Auto Lymphocyte % : 8.1 %  Auto Monocyte % : 7.0 %  Auto Eosinophil % : 0.4 %  Auto Basophil % : 0.3 %    03-28    136  |  101  |  25<H>  ----------------------------<  176<H>  3.7   |  22  |  1.44<H>  03-27    135  |  101  |  26<H>  ----------------------------<  124<H>  3.6   |  19<L>  |  1.50<H>    Ca    8.3<L>      28 Mar 2024 07:09  Ca    8.4      27 Mar 2024 07:03  Phos  2.4     03-28  Phos  2.3     03-27  Mg     2.0     03-28  Mg     2.2     03-27    TPro  6.1  /  Alb  2.5<L>  /  TBili  0.6  /  DBili  x   /  AST  25  /  ALT  28  /  AlkPhos  235<H>  03-28  TPro  5.9<L>  /  Alb  2.4<L>  /  TBili  0.9  /  DBili  x   /  AST  24  /  ALT  20  /  AlkPhos  176<H>  03-27      proBNP: Pro-Brain Natriuretic Peptide: 4459 pg/mL (03.24.24 @ 01:15)      TSH: Thyroid Stimulating Hormone, Serum: 2.01 uIU/mL (03.25.24 @ 07:34)    TELEMETRY: AF w/ RVR  	     24H hour events: overnight/this AM AF w/ RVR for JOAQUIN DCCV this AM    MEDICATIONS:  apixaban 5 milliGRAM(s) Oral every 12 hours  aspirin enteric coated 81 milliGRAM(s) Oral daily    cefTRIAXone   IVPB 2000 milliGRAM(s) IV Intermittent every 24 hours      acetaminophen     Tablet .. 650 milliGRAM(s) Oral every 6 hours PRN  ALPRAZolam 1 milliGRAM(s) Oral at bedtime  ALPRAZolam 0.5 milliGRAM(s) Oral daily PRN  melatonin 3 milliGRAM(s) Oral at bedtime PRN  ondansetron Injectable 4 milliGRAM(s) IV Push every 8 hours PRN    aluminum hydroxide/magnesium hydroxide/simethicone Suspension 30 milliLiter(s) Oral every 4 hours PRN  pantoprazole    Tablet 40 milliGRAM(s) Oral before breakfast    dextrose 50% Injectable 25 Gram(s) IV Push once  dextrose 50% Injectable 12.5 Gram(s) IV Push once  dextrose 50% Injectable 25 Gram(s) IV Push once  dextrose Oral Gel 15 Gram(s) Oral once PRN  glucagon  Injectable 1 milliGRAM(s) IntraMuscular once  insulin glargine Injectable (LANTUS) 12 Unit(s) SubCutaneous at bedtime  insulin lispro (ADMELOG) corrective regimen sliding scale   SubCutaneous every 6 hours  levothyroxine 175 MICROGram(s) Oral daily    artificial  tears Solution 1 Drop(s) Both EYES every 1 hour PRN  chlorhexidine 2% Cloths 1 Application(s) Topical daily  dextrose 5%. 1000 milliLiter(s) IV Continuous <Continuous>  dextrose 5%. 1000 milliLiter(s) IV Continuous <Continuous>  FIRST- Mouthwash  BLM 5 milliLiter(s) Swish and Spit daily      REVIEW OF SYSTEMS:  See HPI, otherwise ROS negative.    PHYSICAL EXAM:  T(C): 37.3 (03-28-24 @ 09:30), Max: 37.6 (03-28-24 @ 00:26)  HR: 78 (03-28-24 @ 10:15) (66 - 122)  BP: 115/58 (03-28-24 @ 10:15) (100/54 - 129/68)  RR: 22 (03-28-24 @ 10:15) (18 - 33)  SpO2: 95% (03-28-24 @ 10:15) (93% - 98%)  Wt(kg): --  I&O's Summary    27 Mar 2024 07:01  -  28 Mar 2024 07:00  --------------------------------------------------------  IN: 1284 mL / OUT: 2200 mL / NET: -916 mL        Appearance: Alert. NAD	  Cardiovascular: +S1S2 irregular no m/g/r  Respiratory: CTA B/L	  Psychiatry: A & O x 3, Mood & affect appropriate  Gastrointestinal:  Soft, NT. ND. +BS	  Skin: No rashes	masses or lesions  Neurologic: Non-focal  Extremities: No edema BLE  Vascular: Peripheral pulses palpable 2+ bilaterally      LABS:	 	    CBC Full  -  ( 28 Mar 2024 07:09 )  WBC Count : 16.58 K/uL  Hemoglobin : 11.5 g/dL  Hematocrit : 35.7 %  Platelet Count - Automated : 321 K/uL  Mean Cell Volume : 85.4 fl  Mean Cell Hemoglobin : 27.5 pg  Mean Cell Hemoglobin Concentration : 32.2 gm/dL  Auto Neutrophil # : 13.61 K/uL  Auto Lymphocyte # : 1.34 K/uL  Auto Monocyte # : 1.16 K/uL  Auto Eosinophil # : 0.07 K/uL  Auto Basophil # : 0.05 K/uL  Auto Neutrophil % : 82.1 %  Auto Lymphocyte % : 8.1 %  Auto Monocyte % : 7.0 %  Auto Eosinophil % : 0.4 %  Auto Basophil % : 0.3 %    03-28    136  |  101  |  25<H>  ----------------------------<  176<H>  3.7   |  22  |  1.44<H>  03-27    135  |  101  |  26<H>  ----------------------------<  124<H>  3.6   |  19<L>  |  1.50<H>    Ca    8.3<L>      28 Mar 2024 07:09  Ca    8.4      27 Mar 2024 07:03  Phos  2.4     03-28  Phos  2.3     03-27  Mg     2.0     03-28  Mg     2.2     03-27    TPro  6.1  /  Alb  2.5<L>  /  TBili  0.6  /  DBili  x   /  AST  25  /  ALT  28  /  AlkPhos  235<H>  03-28  TPro  5.9<L>  /  Alb  2.4<L>  /  TBili  0.9  /  DBili  x   /  AST  24  /  ALT  20  /  AlkPhos  176<H>  03-27      proBNP: Pro-Brain Natriuretic Peptide: 4459 pg/mL (03.24.24 @ 01:15)      TSH: Thyroid Stimulating Hormone, Serum: 2.01 uIU/mL (03.25.24 @ 07:34)    TELEMETRY: AF w/ RVR    RADIOLOGY:  < from: CT Abdomen and Pelvis w/ IV Cont (03.24.24 @ 03:08) >  IMPRESSION:  CT CHEST:  Limited by respiratory motion. No gross pulmonary embolism. No pneumonia.    CT ABDOMEN AND PELVIS:  Right pyelonephritis with emphysematous pyelitis and mild   hydroureteronephrosis to the bladder.    CT THORACIC AND LUMBAR SPINE:  No acute fracture or traumatic malalignment.  Multilevel degenerative changes above.    < end of copied text >      PREVIOUS DIAGNOSTIC TESTING:    Echocardiogram: < from: TTE W or WO Ultrasound Enhancing Agent (03.26.24 @ 11:37) >  _______________________________________________________________________________________     CONCLUSIONS:      1. Left ventricular cavity is normal in size. Left ventricular wall thickness is normal. Left ventricular systolic function is normal with an ejection fraction of 57 % bySimpson's method of disks. There are no regional wall motion abnormalities seen.   2. There is mild (grade 1) left ventricular diastolic dysfunction.   3. Normal right ventricular cavity size, with normal wall thickness, and probably normal systolic function.   4. The left atrium is mildly dilated.   5. The right atrium is normal in size.   6. No pericardial effusion seen.    ________________________________________________________________________________________  FINDINGS:     Left Ventricle:  The left ventricular cavity is normal in size. Left ventricular wall thickness is normal. Left ventricular systolic function is normal with a calculated ejection fraction of 57 % by the Cantrell's biplane method of disks. There are no regional wall motion abnormalities seen. There is mild (grade 1) left ventricular diastolic dysfunction. No left ventricular hypertrophy. There is normal LV mass and concentric remodeling.     Right Ventricle:  The right ventricle is not well visualized. The right ventricular cavity is normal in size, with normal wall thickness and probably normal systolic function. Tricuspid annular plane systolic excursion (TAPSE) is 1.5 cm (normal >=1.7 cm). Tricuspid annular tissue Doppler S' is 11.5 cm/s (normal >10 cm/s).     Left Atrium:  The left atrium is mildly dilated with an indexed volume of 41.05 ml/m².     Right Atrium:  The right atrium is normal in size with an indexed volume of 24.59 ml/m².     Interatrial Septum:  The interatrial septum appears intact.     Aortic Valve:  The aortic valve appears trileaflet with normal systolic excursion. There is no aortic valve stenosis. There is no evidence of aortic regurgitation.     Mitral Valve:  Structurally normal mitral valve with normal leaflet excursion. There is calcification of the mitral valve annulus. There is no mitral valve stenosis. There is trace mitral regurgitation.     Tricuspid Valve:  Structurally normal tricuspid valve with normal leaflet excursion. There is mild to moderate tricuspid regurgitation. Estimated pulmonary artery systolic pressure is 39 mmHg.     Pulmonic Valve:  The pulmonic valve was not well visualized. There is trace pulmonic regurgitation.     Aorta:  The aortic root appears normal in size. The aortic root at the sinuses of Valsalva is normal in size, measuring 3.53 cm (indexed 1.69 cm/m²). The ascending aorta diameter is aneurysmal, measuring 4.19 cm (indexed 2.00 cm/m²).     Pericardium:  No pericardial effusion seen.     Systemic Veins:  The inferior vena cava is dilated measuring 2.37 cm in diameter, (dilated >2.1cm) with abnormal inspiratory collapse (abnormal <50%) consistent with elevated right atrial pressure (~15, range 10-20mmHg).  ____________________________________________________________________  QUANTITATIVE DATA:  Left Ventricle Measurements: (Indexed to BSA)     IVSd (2D):   1.2 cm  LVPWd (2D):  1.0 cm  LVIDd (2D):  3.9 cm  LVIDs (2D):  2.7 cm  LV Mass:     149 g  71.2 g/m²  LV Vol d, MOD A2C: 104.0 ml 49.76 ml/m²  LV Vol d, MOD A4C: 104.0 ml 49.76 ml/m²  LV Vol d, MOD BP:  105.3 ml 50.38 ml/m²  LV Vol s, MOD A2C: 46.3 ml  22.15 ml/m²  LV Vol s, MOD A4C: 43.9 ml  21.00 ml/m²  LV Vol s, MOD BP:  45.0 ml  21.51 ml/m²  LVOT SV MOD BP:    60.3 ml  LV EF% MOD BP:     57 %     MV E Vmax:    1.08m/s  e' lateral:   7.18 cm/s  e' medial:    5.96 cm/s  E/e' lateral: 15.04  E/e' medial:  18.12  E/e' Average: 16.44    Aorta Measurements: (Normal range) (Indexed to BSA)     Sinuses of Valsalva: 3.53 cm (2.7 - 3.3 cm)  Ao Asc prox:         4.19 cm       Left Atrium Measurements: (Indexed to BSA)  LA Diam 2D:        3.54 cm  LA Vol s, MOD A4C: 81.70 ml.  LA Vol s, MOD A2C: 89.70 ml.  LA Vol s, MOD BP:  85.80 ml  41.05 ml/m²    Right Ventricle Measurements: Right Atrial Measurements:     TAPSE: 1.5 cm                 RA Vol:       51.40 ml                                RA Vol Index: 24.59 ml/m²       LVOT / RVOT/ Qp/Qs Data: (Indexed to BSA)  LVOT Diameter: 2.01 cm  LVOT Vmax:     0.91 m/s  LVOT VTI:      15.60 cm  LVOT SV:       49.5 ml  23.68 ml/m²    Mitral Valve Measurements:     MV E Vmax: 1.1 m/s       Tricuspid Valve Measurements:     TR Vmax:          2.5 m/s  TR Peak Gradient: 24.4 mmHg  RA Pressure:      15 mmHg  PASP:             39 mmHg    ________________________________________________________________________________________  Electronically signed on 3/26/2024 at 4:07:28 PM by Shadi Rossi M.D.    < end of copied text >

## 2024-03-28 NOTE — PROGRESS NOTE ADULT - PROBLEM SELECTOR PLAN 2
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 to ceftriaxone   - UCx with gram negative rods     Plan:  - urology following, appreciate recs  - per urology, no acute  intervention at this time  - continue CTX 2g q24h as above  - per urology, maintain aguiar until infection is completely treated and afebrile x24h  - if not clinically improving, will repeat CT abdomen/pelvis with IV cont as above

## 2024-03-28 NOTE — PROGRESS NOTE ADULT - SUBJECTIVE AND OBJECTIVE BOX
Infectious Diseases Follow Up:    Patient is a 73y old  Female who presents with a chief complaint of nausea, diarrhea, malaise (28 Mar 2024 07:57)      Interval History/ROS:  Pt afebrile ON, per daughter at bedside, pt is feeling a bit better, was able to eat last night.   WBC remains elevated at 16.     Allergies  Lipitor (Nausea)  latex (Rash)        ANTIMICROBIALS:  cefTRIAXone   IVPB 2000 every 24 hours      Current Abx:     Previous Abx     OTHER MEDS:  MEDICATIONS  (STANDING):  acetaminophen     Tablet .. 650 every 6 hours PRN  ALPRAZolam 1 at bedtime  ALPRAZolam 0.5 daily PRN  aluminum hydroxide/magnesium hydroxide/simethicone Suspension 30 every 4 hours PRN  apixaban 5 every 12 hours  aspirin enteric coated 81 daily  dextrose 50% Injectable 25 once  dextrose 50% Injectable 12.5 once  dextrose 50% Injectable 25 once  dextrose Oral Gel 15 once PRN  glucagon  Injectable 1 once  insulin glargine Injectable (LANTUS) 12 at bedtime  insulin lispro (ADMELOG) corrective regimen sliding scale  every 6 hours  levothyroxine 175 daily  melatonin 3 at bedtime PRN  ondansetron Injectable 4 every 8 hours PRN  pantoprazole    Tablet 40 before breakfast      Vital Signs Last 24 Hrs  T(C): 37.3 (28 Mar 2024 09:30), Max: 37.6 (28 Mar 2024 00:26)  T(F): 99.2 (28 Mar 2024 09:30), Max: 99.6 (28 Mar 2024 00:26)  HR: 67 (28 Mar 2024 09:45) (67 - 122)  BP: 124/67 (28 Mar 2024 09:45) (100/54 - 129/68)  BP(mean): 80 (28 Mar 2024 08:40) (80 - 80)  RR: 26 (28 Mar 2024 09:45) (18 - 33)  SpO2: 94% (28 Mar 2024 09:45) (93% - 98%)    Parameters below as of 28 Mar 2024 09:45  Patient On (Oxygen Delivery Method): nasal cannula  O2 Flow (L/min): 4      PHYSICAL EXAM:  GENERAL: NAD, tired/ill appearing   HEAD:  Atraumatic, Normocephalic  EYES: EOMI, conjunctiva and sclera clear  CHEST/LUNG: Clear to auscultation bilaterally; No wheeze  HEART: +Tachy   ABDOMEN: Soft, generalized mild tenderness, Nondistended; Bowel sounds present  EXTREMITIES:  2+ Peripheral Pulses, No clubbing, cyanosis, or edema  PSYCH: AAOx3                          11.5   16.58 )-----------( 321      ( 28 Mar 2024 07:09 )             35.7       03-28    136  |  101  |  25<H>  ----------------------------<  176<H>  3.7   |  22  |  1.44<H>    Ca    8.3<L>      28 Mar 2024 07:09  Phos  2.4     03-28  Mg     2.0     03-28    TPro  6.1  /  Alb  2.5<L>  /  TBili  0.6  /  DBili  x   /  AST  25  /  ALT  28  /  AlkPhos  235<H>  03-28      Urinalysis Basic - ( 28 Mar 2024 07:09 )    Color: x / Appearance: x / SG: x / pH: x  Gluc: 176 mg/dL / Ketone: x  / Bili: x / Urobili: x   Blood: x / Protein: x / Nitrite: x   Leuk Esterase: x / RBC: x / WBC x   Sq Epi: x / Non Sq Epi: x / Bacteria: x        MICROBIOLOGY:  v  .Blood Blood-Peripheral  03-25-24   Growth in aerobic and anaerobic bottles: Klebsiella pneumoniae  See previous culture [id number]  --    Growth in aerobic bottle: Gram Negative Rods  Growth in anaerobic bottle: Gram Negative Rods      Clean Catch Clean Catch (Midstream)  03-24-24   >100,000 CFU/ml Proteus mirabilis  >100,000 CFU/ml Klebsiella pneumoniae  --  Proteus mirabilis  Klebsiella pneumoniae      .Blood Blood-Peripheral  03-24-24   Growth in aerobic and anaerobic bottles: Klebsiella pneumoniae  See previous culture [id number]  --    Growth in aerobic and anaerobic bottles: Gram Negative Rods      .Blood Blood-Peripheral  03-24-24   Growth in aerobic and anaerobic bottles: Klebsiella pneumoniae  Direct identification is available within approximately 3-5  hours either by Blood Panel Multiplexed PCR or Direct  MALDI-TOF. Details: [email or web address]  --  Blood Culture PCR  Klebsiella pneumoniae                RADIOLOGY:

## 2024-03-28 NOTE — PROVIDER CONTACT NOTE (OTHER) - SITUATION
A Fib with  to 160 on tele, temp 99.2, pt stated she is anxious, BP 95/62
Pt w/ Afib on cardizem gtt, HR spiking up to 150s'160s non sustaining, rectal temp of 101.4 and MEWS score of 7
A Fib with  to 170 on tele, temp 100.7, pt stated she is anxious
pt febrile w/ 101.9 rectal temp and HR up to 190s afib nonsustaining

## 2024-03-28 NOTE — PROVIDER CONTACT NOTE (OTHER) - ACTION/TREATMENT ORDERED:
IV tylenol 500mg given for rectal temp of 101.4. No other new orders at this time
seen by MD, Fluid bolus ordered.       +
Give PRN PO tylenol for fever, no new orders at this time for HR since pt is going for cardioversion and on cardizem gtt
Tylenol given as ordered. Pt received Xanax earlier, melatonin given for sleep

## 2024-03-28 NOTE — PROVIDER CONTACT NOTE (OTHER) - REASON
pt febrile w/ 101.9 rectal temp and HR up to 190s afib nonsustaining
A Fib with  to 170 on tele, temp 100.7
Pt w/ Afib HR up to 160's non sustaining, rectal temp of 101.4 and MEWS score of 7
A Fib with  to 160s on tele, temp 99.2

## 2024-03-28 NOTE — PROVIDER CONTACT NOTE (OTHER) - ASSESSMENT
alert and oriented x4, anxious, Had 1 loose BM. Temp 100.7, B/P 147/74,
Pt A&Ox4, on cardizem gtt @10ml/hr. Pt complaining of nervousness and anxiety. Denies chest pain, palpitations, or sob. Escoto remains WDL. Rectal temp of 101.4. /73, 94% on 2L
Pt A&Ox4, VSS apart from elevated HR. Pt denies cp or sob. States feeling nervous because of the cardioversion that's planned for today 3/28. NPO overnight for cardioversion. Cardizem gtt infusing @12ml/hr.
alert and oriented x4, anxious, Had 1 loose BM. Temp 99.2, B/P 95/62

## 2024-03-28 NOTE — PROGRESS NOTE ADULT - PROBLEM SELECTOR PLAN 3
Previously on eliquis 2.5 mg BID, metoprolol succinate  - rate control and AC discontinued 7/2023 after extended holter monitor without evidence of A fib   - Afib RVR up to 160s this admission, likely 2/2 sepsis   - s/p lopressor 5 mg IVP x5, amio 150 mg IV x1 in ED  - now on cardizem gtt at 12.5/hr  - s/p digoxin 500 mcg IV x1 3/27, HRs still uncontrolled  - TTE 3/26 with EF 57%, mild diastolic dysfunction, normal RV function    Plan:  - cardiology following, appreciate recs   - eliquis 5 mg BID  - JOAQUIN DCCV with EP this AM  - post-DCCV will start amiodarone, metoprolol tartrate per EP   - K >4, Mg >2  - hold further lasix today, reassess need for diuresis daily

## 2024-03-28 NOTE — PROGRESS NOTE ADULT - PROBLEM SELECTOR PLAN 4
SCr on admission 1.54 -> 1.69 -> 1.85 -> 1.74 -> 1.5  - baseline Cr 0.70 in 7/2023  - per patient, no known history of CKD  - likely 2/2 ATN ISO hemodynamic instability +/- prerenal ISO sepsis  - FeNa 0.1% c/w prerenal DEEPA  - trend Cr, improving  - maintain aguiar (until infection treated completely and afebrile x24h per urology)  - renal US with R renal cyst, no hydronephrosis, possible small focus of air in collecting system c/w prev CT, no abscess

## 2024-03-28 NOTE — PROGRESS NOTE ADULT - TIME BILLING
Reviewing the chart, interpreting lab data, discussing case with team, patient's daughter, interview and examination of patient, and documentation. Pt is at high risk of mortality due to her disease.
Reviewing the chart, interpreting lab data, discussing case with patient's family, interview and examination of patient, and documentation. Pt is at high risk of mortality due to her disease.
Reviewing the chart, interpreting lab data, discussing case with team and patient's daughter, interview and examination of patient, and documentation. Pt is at high risk of mortality due to her disease.

## 2024-03-29 LAB
ALBUMIN SERPL ELPH-MCNC: 2.6 G/DL — LOW (ref 3.3–5)
ALP SERPL-CCNC: 187 U/L — HIGH (ref 40–120)
ALT FLD-CCNC: 23 U/L — SIGNIFICANT CHANGE UP (ref 10–45)
ANION GAP SERPL CALC-SCNC: 12 MMOL/L — SIGNIFICANT CHANGE UP (ref 5–17)
AST SERPL-CCNC: 9 U/L — LOW (ref 10–40)
BASOPHILS # BLD AUTO: 0 K/UL — SIGNIFICANT CHANGE UP (ref 0–0.2)
BASOPHILS NFR BLD AUTO: 0 % — SIGNIFICANT CHANGE UP (ref 0–2)
BILIRUB SERPL-MCNC: 0.3 MG/DL — SIGNIFICANT CHANGE UP (ref 0.2–1.2)
BUN SERPL-MCNC: 32 MG/DL — HIGH (ref 7–23)
CALCIUM SERPL-MCNC: 7.9 MG/DL — LOW (ref 8.4–10.5)
CHLORIDE SERPL-SCNC: 102 MMOL/L — SIGNIFICANT CHANGE UP (ref 96–108)
CO2 SERPL-SCNC: 22 MMOL/L — SIGNIFICANT CHANGE UP (ref 22–31)
CREAT SERPL-MCNC: 1.22 MG/DL — SIGNIFICANT CHANGE UP (ref 0.5–1.3)
CULTURE RESULTS: ABNORMAL
EGFR: 46 ML/MIN/1.73M2 — LOW
EOSINOPHIL # BLD AUTO: 0 K/UL — SIGNIFICANT CHANGE UP (ref 0–0.5)
EOSINOPHIL NFR BLD AUTO: 0 % — SIGNIFICANT CHANGE UP (ref 0–6)
GLUCOSE BLDC GLUCOMTR-MCNC: 273 MG/DL — HIGH (ref 70–99)
GLUCOSE BLDC GLUCOMTR-MCNC: 293 MG/DL — HIGH (ref 70–99)
GLUCOSE BLDC GLUCOMTR-MCNC: 333 MG/DL — HIGH (ref 70–99)
GLUCOSE BLDC GLUCOMTR-MCNC: 341 MG/DL — HIGH (ref 70–99)
GLUCOSE SERPL-MCNC: 307 MG/DL — HIGH (ref 70–99)
HCT VFR BLD CALC: 32.8 % — LOW (ref 34.5–45)
HGB BLD-MCNC: 10.1 G/DL — LOW (ref 11.5–15.5)
LYMPHOCYTES # BLD AUTO: 0.59 K/UL — LOW (ref 1–3.3)
LYMPHOCYTES # BLD AUTO: 4.5 % — LOW (ref 13–44)
MAGNESIUM SERPL-MCNC: 2.3 MG/DL — SIGNIFICANT CHANGE UP (ref 1.6–2.6)
MANUAL SMEAR VERIFICATION: SIGNIFICANT CHANGE UP
MCHC RBC-ENTMCNC: 27.2 PG — SIGNIFICANT CHANGE UP (ref 27–34)
MCHC RBC-ENTMCNC: 30.8 GM/DL — LOW (ref 32–36)
MCV RBC AUTO: 88.4 FL — SIGNIFICANT CHANGE UP (ref 80–100)
MONOCYTES # BLD AUTO: 0 K/UL — SIGNIFICANT CHANGE UP (ref 0–0.9)
MONOCYTES NFR BLD AUTO: 0 % — LOW (ref 2–14)
NEUTROPHILS # BLD AUTO: 12.26 K/UL — HIGH (ref 1.8–7.4)
NEUTROPHILS NFR BLD AUTO: 92.8 % — HIGH (ref 43–77)
PHOSPHATE SERPL-MCNC: 3 MG/DL — SIGNIFICANT CHANGE UP (ref 2.5–4.5)
PLAT MORPH BLD: NORMAL — SIGNIFICANT CHANGE UP
PLATELET # BLD AUTO: 325 K/UL — SIGNIFICANT CHANGE UP (ref 150–400)
POTASSIUM SERPL-MCNC: 4.1 MMOL/L — SIGNIFICANT CHANGE UP (ref 3.5–5.3)
POTASSIUM SERPL-SCNC: 4.1 MMOL/L — SIGNIFICANT CHANGE UP (ref 3.5–5.3)
PROT SERPL-MCNC: 6 G/DL — SIGNIFICANT CHANGE UP (ref 6–8.3)
RBC # BLD: 3.71 M/UL — LOW (ref 3.8–5.2)
RBC # FLD: 13.1 % — SIGNIFICANT CHANGE UP (ref 10.3–14.5)
RBC BLD AUTO: SIGNIFICANT CHANGE UP
SODIUM SERPL-SCNC: 136 MMOL/L — SIGNIFICANT CHANGE UP (ref 135–145)
VARIANT LYMPHS # BLD: 2.7 % — SIGNIFICANT CHANGE UP (ref 0–6)
WBC # BLD: 13.21 K/UL — HIGH (ref 3.8–10.5)
WBC # FLD AUTO: 13.21 K/UL — HIGH (ref 3.8–10.5)

## 2024-03-29 PROCEDURE — 99232 SBSQ HOSP IP/OBS MODERATE 35: CPT | Mod: GC

## 2024-03-29 PROCEDURE — 99232 SBSQ HOSP IP/OBS MODERATE 35: CPT

## 2024-03-29 RX ORDER — INSULIN LISPRO 100/ML
8 VIAL (ML) SUBCUTANEOUS
Refills: 0 | Status: DISCONTINUED | OUTPATIENT
Start: 2024-03-29 | End: 2024-03-30

## 2024-03-29 RX ORDER — DEXTROSE 50 % IN WATER 50 %
12.5 SYRINGE (ML) INTRAVENOUS ONCE
Refills: 0 | Status: DISCONTINUED | OUTPATIENT
Start: 2024-03-29 | End: 2024-04-02

## 2024-03-29 RX ORDER — DEXTROSE 50 % IN WATER 50 %
25 SYRINGE (ML) INTRAVENOUS ONCE
Refills: 0 | Status: DISCONTINUED | OUTPATIENT
Start: 2024-03-29 | End: 2024-04-02

## 2024-03-29 RX ORDER — DEXTROSE 50 % IN WATER 50 %
15 SYRINGE (ML) INTRAVENOUS ONCE
Refills: 0 | Status: DISCONTINUED | OUTPATIENT
Start: 2024-03-29 | End: 2024-04-02

## 2024-03-29 RX ORDER — METOPROLOL TARTRATE 50 MG
25 TABLET ORAL DAILY
Refills: 0 | Status: DISCONTINUED | OUTPATIENT
Start: 2024-03-30 | End: 2024-04-02

## 2024-03-29 RX ORDER — INSULIN LISPRO 100/ML
6 VIAL (ML) SUBCUTANEOUS ONCE
Refills: 0 | Status: COMPLETED | OUTPATIENT
Start: 2024-03-29 | End: 2024-03-29

## 2024-03-29 RX ORDER — GLUCAGON INJECTION, SOLUTION 0.5 MG/.1ML
1 INJECTION, SOLUTION SUBCUTANEOUS ONCE
Refills: 0 | Status: DISCONTINUED | OUTPATIENT
Start: 2024-03-29 | End: 2024-04-02

## 2024-03-29 RX ORDER — SODIUM CHLORIDE 9 MG/ML
1000 INJECTION, SOLUTION INTRAVENOUS
Refills: 0 | Status: DISCONTINUED | OUTPATIENT
Start: 2024-03-29 | End: 2024-04-02

## 2024-03-29 RX ORDER — INSULIN LISPRO 100/ML
8 VIAL (ML) SUBCUTANEOUS ONCE
Refills: 0 | Status: COMPLETED | OUTPATIENT
Start: 2024-03-29 | End: 2024-03-29

## 2024-03-29 RX ORDER — INSULIN LISPRO 100/ML
VIAL (ML) SUBCUTANEOUS AT BEDTIME
Refills: 0 | Status: DISCONTINUED | OUTPATIENT
Start: 2024-03-29 | End: 2024-04-02

## 2024-03-29 RX ORDER — INSULIN GLARGINE 100 [IU]/ML
24 INJECTION, SOLUTION SUBCUTANEOUS AT BEDTIME
Refills: 0 | Status: DISCONTINUED | OUTPATIENT
Start: 2024-03-29 | End: 2024-03-30

## 2024-03-29 RX ORDER — INSULIN LISPRO 100/ML
VIAL (ML) SUBCUTANEOUS
Refills: 0 | Status: DISCONTINUED | OUTPATIENT
Start: 2024-03-29 | End: 2024-04-02

## 2024-03-29 RX ORDER — POLYETHYLENE GLYCOL 3350 17 G/17G
17 POWDER, FOR SOLUTION ORAL
Refills: 0 | Status: DISCONTINUED | OUTPATIENT
Start: 2024-03-29 | End: 2024-04-02

## 2024-03-29 RX ADMIN — APIXABAN 5 MILLIGRAM(S): 2.5 TABLET, FILM COATED ORAL at 17:32

## 2024-03-29 RX ADMIN — INSULIN GLARGINE 24 UNIT(S): 100 INJECTION, SOLUTION SUBCUTANEOUS at 21:31

## 2024-03-29 RX ADMIN — Medication 30 MILLILITER(S): at 23:11

## 2024-03-29 RX ADMIN — Medication 8 UNIT(S): at 13:13

## 2024-03-29 RX ADMIN — DIPHENHYDRAMINE HYDROCHLORIDE AND LIDOCAINE HYDROCHLORIDE AND ALUMINUM HYDROXIDE AND MAGNESIUM HYDRO 5 MILLILITER(S): KIT at 11:29

## 2024-03-29 RX ADMIN — Medication 50 MILLIGRAM(S): at 06:15

## 2024-03-29 RX ADMIN — Medication 8: at 17:31

## 2024-03-29 RX ADMIN — AMIODARONE HYDROCHLORIDE 400 MILLIGRAM(S): 400 TABLET ORAL at 06:16

## 2024-03-29 RX ADMIN — Medication 8 UNIT(S): at 17:31

## 2024-03-29 RX ADMIN — Medication 8: at 13:11

## 2024-03-29 RX ADMIN — Medication 650 MILLIGRAM(S): at 21:33

## 2024-03-29 RX ADMIN — CHLORHEXIDINE GLUCONATE 1 APPLICATION(S): 213 SOLUTION TOPICAL at 16:19

## 2024-03-29 RX ADMIN — Medication 81 MILLIGRAM(S): at 11:28

## 2024-03-29 RX ADMIN — Medication 175 MICROGRAM(S): at 06:15

## 2024-03-29 RX ADMIN — Medication 2: at 21:32

## 2024-03-29 RX ADMIN — Medication 3 MILLIGRAM(S): at 23:11

## 2024-03-29 RX ADMIN — Medication 6 UNIT(S): at 09:15

## 2024-03-29 RX ADMIN — Medication 1 MILLIGRAM(S): at 21:32

## 2024-03-29 RX ADMIN — Medication 650 MILLIGRAM(S): at 22:00

## 2024-03-29 RX ADMIN — CEFTRIAXONE 100 MILLIGRAM(S): 500 INJECTION, POWDER, FOR SOLUTION INTRAMUSCULAR; INTRAVENOUS at 13:10

## 2024-03-29 RX ADMIN — AMIODARONE HYDROCHLORIDE 400 MILLIGRAM(S): 400 TABLET ORAL at 17:32

## 2024-03-29 RX ADMIN — APIXABAN 5 MILLIGRAM(S): 2.5 TABLET, FILM COATED ORAL at 06:15

## 2024-03-29 RX ADMIN — Medication 8 UNIT(S): at 09:00

## 2024-03-29 RX ADMIN — PANTOPRAZOLE SODIUM 40 MILLIGRAM(S): 20 TABLET, DELAYED RELEASE ORAL at 09:02

## 2024-03-29 RX ADMIN — Medication 650 MILLIGRAM(S): at 02:46

## 2024-03-29 NOTE — PROGRESS NOTE ADULT - PROBLEM SELECTOR PLAN 3
Previously on eliquis 2.5 mg BID, metoprolol succinate  - rate control and AC discontinued 7/2023 after extended holter monitor without evidence of A fib   - Afib RVR up to 160s this admission, likely 2/2 sepsis   - s/p lopressor 5 mg IVP x5, amio 150 mg IV x1 in ED  - now on cardizem gtt at 12.5/hr  - s/p digoxin 500 mcg IV x1 3/27, HRs still uncontrolled  - TTE 3/26 with EF 57%, mild diastolic dysfunction, normal RV function    Plan:  - cardiology following, appreciate recs   - eliquis 5 mg BID  - JOAQUIN DCCV with EP this AM  - post-DCCV will start amiodarone, metoprolol tartrate per EP   - K >4, Mg >2  - hold further lasix today, reassess need for diuresis daily Previously on eliquis 2.5 mg BID, metoprolol succinate  - rate control and AC discontinued 7/2023 after extended holter monitor without evidence of A fib   - Afib RVR up to 160s this admission, likely 2/2 sepsis   - s/p lopressor 5 mg IVP x5, amio 150 mg IV x1, cardizem gtt, digoxin 500 mcg x1 with minimal improvement  - now s/p DCCV 3/28, successful  - started Toprol XL 50mg qd -> DECREASED to 25 mg QD given HR 40s  - amio 400bid x 1 week (3/28-4/4), then 200mg qd thereafter  > outpatient monitoring of TFT/LFT's and yearly opthalmologic evals and PFT's while on amio  - Eliquis 5mg bid, continue  - TTE 3/26 with EF 57%, mild diastolic dysfunction, normal RV function  - appreciate cardiology and EP recs

## 2024-03-29 NOTE — PROGRESS NOTE ADULT - SUBJECTIVE AND OBJECTIVE BOX
Infectious Diseases Follow Up:    Patient is a 73y old  Female who presents with a chief complaint of nausea, diarrhea, malaise (29 Mar 2024 09:53)      Interval History/ROS:  Afebrile ON, pt is now clinically improving, sitting in chair, feeling better overall     Allergies  Lipitor (Nausea)  latex (Rash)        ANTIMICROBIALS:  cefTRIAXone   IVPB 2000 every 24 hours      Current Abx:     Previous Abx     OTHER MEDS:  MEDICATIONS  (STANDING):  acetaminophen     Tablet .. 650 every 6 hours PRN  ALPRAZolam 1 at bedtime  ALPRAZolam 0.5 daily PRN  aluminum hydroxide/magnesium hydroxide/simethicone Suspension 30 every 4 hours PRN  aMIOdarone    Tablet 400 every 12 hours  aMIOdarone    Tablet    apixaban 5 every 12 hours  aspirin enteric coated 81 daily  dextrose 50% Injectable 12.5 once  dextrose 50% Injectable 25 once  dextrose 50% Injectable 25 once  dextrose Oral Gel 15 once PRN  glucagon  Injectable 1 once  insulin glargine Injectable (LANTUS) 24 at bedtime  insulin lispro (ADMELOG) corrective regimen sliding scale  three times a day before meals  insulin lispro (ADMELOG) corrective regimen sliding scale  at bedtime  insulin lispro Injectable (ADMELOG) 8 three times a day before meals  levothyroxine 175 daily  melatonin 3 at bedtime PRN  metoprolol succinate ER 50 daily  ondansetron Injectable 4 every 8 hours PRN  pantoprazole    Tablet 40 before breakfast  polyethylene glycol 3350 17 two times a day      Vital Signs Last 24 Hrs  T(C): 36.3 (29 Mar 2024 09:52), Max: 36.8 (28 Mar 2024 11:38)  T(F): 97.4 (29 Mar 2024 09:52), Max: 98.2 (28 Mar 2024 11:38)  HR: 56 (29 Mar 2024 09:52) (55 - 89)  BP: 152/81 (29 Mar 2024 09:52) (111/62 - 152/81)  BP(mean): 105 (29 Mar 2024 09:52) (78 - 105)  RR: 18 (29 Mar 2024 09:52) (18 - 24)  SpO2: 91% (29 Mar 2024 09:52) (91% - 96%)    Parameters below as of 29 Mar 2024 09:52  Patient On (Oxygen Delivery Method): room air        PHYSICAL EXAM:  GENERAL: NAD, appearing more energetic   HEAD:  Atraumatic, Normocephalic  EYES: EOMI, conjunctiva and sclera clear  CHEST/LUNG: Clear to auscultation bilaterally; No wheeze  HEART: RRR  ABDOMEN: Soft, non tender, Nondistended; Bowel sounds present  EXTREMITIES:  2+ Peripheral Pulses, No clubbing, cyanosis, or edema  PSYCH: AAOx3                            10.1   13.21 )-----------( 325      ( 29 Mar 2024 06:57 )             32.8       03-29    136  |  102  |  32<H>  ----------------------------<  307<H>  4.1   |  22  |  1.22    Ca    7.9<L>      29 Mar 2024 06:58  Phos  3.0     03-29  Mg     2.3     03-29    TPro  6.0  /  Alb  2.6<L>  /  TBili  0.3  /  DBili  x   /  AST  9<L>  /  ALT  23  /  AlkPhos  187<H>  03-29      Urinalysis Basic - ( 29 Mar 2024 06:58 )    Color: x / Appearance: x / SG: x / pH: x  Gluc: 307 mg/dL / Ketone: x  / Bili: x / Urobili: x   Blood: x / Protein: x / Nitrite: x   Leuk Esterase: x / RBC: x / WBC x   Sq Epi: x / Non Sq Epi: x / Bacteria: x        MICROBIOLOGY:  v  .Blood Blood-Peripheral  03-27-24   No growth at 24 hours  --  --      .Blood Blood-Peripheral  03-27-24   No growth at 24 hours  --  --      .Blood Blood-Peripheral  03-25-24   Growth in aerobic and anaerobic bottles: Klebsiella pneumoniae  See previous culture [id number]  --    Growth in aerobic bottle: Gram Negative Rods  Growth in anaerobic bottle: Gram Negative Rods      Clean Catch Clean Catch (Midstream)  03-24-24   >100,000 CFU/ml Proteus mirabilis  >100,000 CFU/ml Klebsiella pneumoniae  --  Proteus mirabilis  Klebsiella pneumoniae      .Blood Blood-Peripheral  03-24-24   Growth in aerobic and anaerobic bottles: Klebsiella pneumoniae  See previous culture [id number]  --    Growth in aerobic and anaerobic bottles: Gram Negative Rods      .Blood Blood-Peripheral  03-24-24   Growth in aerobic and anaerobic bottles: Klebsiella pneumoniae  Direct identification is available within approximately 3-5  hours either by Blood Panel Multiplexed PCR or Direct  MALDI-TOF. Details: [email or web address]  --  Blood Culture PCR  Klebsiella pneumoniae                RADIOLOGY:

## 2024-03-29 NOTE — CHART NOTE - NSCHARTNOTEFT_GEN_A_CORE
73-year-old female history of A-fib on Eliquis?,  HTN, HLD, DM, anxiety, presenting to the ED for 2 days of weakness, not getting out of bed, chills. shortness of breath, diarrhea, nausea and vomiting. NO UTI symptoms.  On physical with mild R CVAT. Pt is febrile to 103 in the ED, tachy int he 140's, last BP: 127/71  WBC: 17 Cr: 1.54 UA with moderate LE, 237 wbc, few bacteria. urine glucose>1000.   CTAP shows mild right hydro and air within collecting system. No change in enhancement of the kidney. Imaging reviewed, hydro likely reactive with no change in the enhancement between the left and right kidney.     PAtient initially febrile, now AF>24 hours, with recent blood cultures negative on appropriate antibiotics.  Repeat ultrasound performed 3/25 with no abscess in the parenchyma  ID following    Plan  - NO acute  intervention as patient is improving.   - Continue empiric abx   - Cultures consistent with   - Maintain aguiar catheter for maximal drainage, placed by primary team, TOV per primary team after patient improved from infectious stand point.   - can consider repeat imaging if patient worsens clinically to assess for possible abscess.   - Rest of care per primary team   - Please call urology for further questions or concerns or patient is not improving on IV abx.       Discussed with Dr. Cooper

## 2024-03-29 NOTE — PROGRESS NOTE ADULT - SUBJECTIVE AND OBJECTIVE BOX
DATE OF SERVICE: 03-29-24 @ 13:47    Patient is a 73y old  Female who presents with a chief complaint of nausea, diarrhea, malaise (29 Mar 2024 10:39)      INTERVAL HISTORY: Feels ok.     REVIEW OF SYSTEMS:  CONSTITUTIONAL: No weakness  EYES/ENT: No visual changes;  No throat pain   NECK: No pain or stiffness  RESPIRATORY: No cough, wheezing; No shortness of breath  CARDIOVASCULAR: No chest pain or palpitations  GASTROINTESTINAL: No abdominal  pain. No nausea, vomiting, or hematemesis  GENITOURINARY: No dysuria, frequency or hematuria  NEUROLOGICAL: No stroke like symptoms  SKIN: No rashes    TELEMETRY Personally reviewed: SR 50-80  	  MEDICATIONS:  aMIOdarone    Tablet 400 milliGRAM(s) Oral every 12 hours  aMIOdarone    Tablet   Oral         PHYSICAL EXAM:  T(C): 36.4 (03-29-24 @ 12:30), Max: 36.6 (03-28-24 @ 16:09)  HR: 61 (03-29-24 @ 12:30) (55 - 75)  BP: 124/65 (03-29-24 @ 12:30) (116/69 - 152/81)  RR: 18 (03-29-24 @ 12:30) (18 - 18)  SpO2: 97% (03-29-24 @ 12:30) (91% - 97%)  Wt(kg): --  I&O's Summary    28 Mar 2024 07:01  -  29 Mar 2024 07:00  --------------------------------------------------------  IN: 497.5 mL / OUT: 1300 mL / NET: -802.5 mL          Appearance: In no distress	  HEENT:    PERRL, EOMI	  Cardiovascular:  S1 S2, No JVD  Respiratory: Lungs clear to auscultation	  Gastrointestinal:  Soft, Non-tender, + BS	  Vascularature:  No edema of LE  Psychiatric: Appropriate affect   Neuro: no acute focal deficits                               10.1   13.21 )-----------( 325      ( 29 Mar 2024 06:57 )             32.8     03-29    136  |  102  |  32<H>  ----------------------------<  307<H>  4.1   |  22  |  1.22    Ca    7.9<L>      29 Mar 2024 06:58  Phos  3.0     03-29  Mg     2.3     03-29    TPro  6.0  /  Alb  2.6<L>  /  TBili  0.3  /  DBili  x   /  AST  9<L>  /  ALT  23  /  AlkPhos  187<H>  03-29        Labs personally reviewed      ASSESSMENT/PLAN: 	    73-year-old female history of TIA, A-fib (previously on eliquis, off since 7/2023 after hysterectomy),  HTN, HLD, DM, anxiety, consulted for AFIB RVR with SOB    1. AFIB RVR   - [id number] score 8   - on tele currently AFIB @ 125 bpm  - on Eliquis 2.5 mg BID ----> should be Eliquis 5mg BID (patient does not meet 2.5mg criteria)   - 3/27 given Dig 500mcg IV once  - TTE with preserved EF  - cont to monitor on tele   - c/w supplemental oxygen   - EP consult appreciated-->s/p successful JOAQUIN/DCCV today now in SR  - c/w Toprol 25mg PO daily      2. Acute diastolic Heart Failure/SOB  - CTA chest no pulm embolism   - ProBnP 4459   - now successfully weaned of supplemental oxygen  - TTE with normal EF, no WMA and grade I DD  - Now appears euvolemic. Plan for DC on home HCTZ 25mg PO daily.      3. Hx of CVA   - on Eliquis 5mg BID  - neuro checks q4h     4. HTN   - on amlodipine 5mg PO daily and losartan 100mg Po daily at home  - Hold off on resuming losartan until OP follow up  - c/w Toprol    5. DM  - A1c 9.8  - Endo recs appreciated   - consider SGLT inhibitor at discharge      Will need close OP follow up for BP check and repeat BMP      TIANA Gracia-GOVIND Carey DO [facility]  Cardiovascular Medicine  [address], [address]  Available through call or text on [employer]  Office: [phone]   DATE OF SERVICE: 03-29-24 @ 13:47    Patient is a 73y old  Female who presents with a chief complaint of nausea, diarrhea, malaise (29 Mar 2024 10:39)      INTERVAL HISTORY: Feels ok.     REVIEW OF SYSTEMS:  CONSTITUTIONAL: No weakness  EYES/ENT: No visual changes;  No throat pain   NECK: No pain or stiffness  RESPIRATORY: No cough, wheezing; No shortness of breath  CARDIOVASCULAR: No chest pain or palpitations  GASTROINTESTINAL: No abdominal  pain. No nausea, vomiting, or hematemesis  GENITOURINARY: No dysuria, frequency or hematuria  NEUROLOGICAL: No stroke like symptoms  SKIN: No rashes    TELEMETRY Personally reviewed: SR 50-80  	  MEDICATIONS:  aMIOdarone    Tablet 400 milliGRAM(s) Oral every 12 hours  aMIOdarone    Tablet   Oral         PHYSICAL EXAM:  T(C): 36.4 (03-29-24 @ 12:30), Max: 36.6 (03-28-24 @ 16:09)  HR: 61 (03-29-24 @ 12:30) (55 - 75)  BP: 124/65 (03-29-24 @ 12:30) (116/69 - 152/81)  RR: 18 (03-29-24 @ 12:30) (18 - 18)  SpO2: 97% (03-29-24 @ 12:30) (91% - 97%)  Wt(kg): --  I&O's Summary    28 Mar 2024 07:01  -  29 Mar 2024 07:00  --------------------------------------------------------  IN: 497.5 mL / OUT: 1300 mL / NET: -802.5 mL          Appearance: In no distress	  HEENT:    PERRL, EOMI	  Cardiovascular:  S1 S2, No JVD  Respiratory: Lungs clear to auscultation	  Gastrointestinal:  Soft, Non-tender, + BS	  Vascularature:  No edema of LE  Psychiatric: Appropriate affect   Neuro: no acute focal deficits                               10.1   13.21 )-----------( 325      ( 29 Mar 2024 06:57 )             32.8     03-29    136  |  102  |  32<H>  ----------------------------<  307<H>  4.1   |  22  |  1.22    Ca    7.9<L>      29 Mar 2024 06:58  Phos  3.0     03-29  Mg     2.3     03-29    TPro  6.0  /  Alb  2.6<L>  /  TBili  0.3  /  DBili  x   /  AST  9<L>  /  ALT  23  /  AlkPhos  187<H>  03-29        Labs personally reviewed      ASSESSMENT/PLAN: 	    73-year-old female history of TIA, A-fib (previously on eliquis, off since 7/2023 after hysterectomy),  HTN, HLD, DM, anxiety, consulted for AFIB RVR with SOB    1. AFIB RVR   - [id number] score 8   - on tele currently AFIB @ 125 bpm  - on Eliquis 2.5 mg BID ----> should be Eliquis 5mg BID (patient does not meet 2.5mg criteria)   - 3/27 given Dig 500mcg IV once  - TTE with preserved EF  - cont to monitor on tele   - c/w supplemental oxygen   - EP consult appreciated-->s/p successful JOAQUIN/DCCV today now in SR  - Amio load  - c/w Toprol 25mg PO daily      2. Acute diastolic Heart Failure/SOB  - CTA chest no pulm embolism   - ProBnP 4459   - now successfully weaned of supplemental oxygen  - TTE with normal EF, no WMA and grade I DD  - Now appears euvolemic. Plan for DC on home medication HCTZ 25mg PO daily.      3. Hx of CVA   - on Eliquis 5mg BID  - neuro checks q4h     4. HTN   - on amlodipine 5mg PO daily and losartan 100mg Po daily at home  - Hold off on resuming ARB until OP follow up  - c/w Toprol    5. DM  - A1c 9.8  - Endo recs appreciated       Will need close OP follow up for BP check and repeat BMP      TIANA Gracia-GOVIND Carey DO [facility]  Cardiovascular Medicine  [address], [address]  Available through call or text on [employer] TEAM[employer]  Office: [phone]

## 2024-03-29 NOTE — PROGRESS NOTE ADULT - PROBLEM SELECTOR PLAN 4
SCr on admission 1.54 -> 1.69 -> 1.85 -> 1.74 -> 1.5  - baseline Cr 0.70 in 7/2023  - per patient, no known history of CKD  - likely 2/2 ATN ISO hemodynamic instability +/- prerenal ISO sepsis  - FeNa 0.1% c/w prerenal DEEPA  - trend Cr, improving  - maintain aguiar (until infection treated completely and afebrile x24h per urology)  - renal US with R renal cyst, no hydronephrosis, possible small focus of air in collecting system c/w prev CT, no abscess SCr on admission 1.54 -> 1.69 -> 1.85 -> 1.74 -> 1.5 -> 1.2  - baseline Cr 0.70 in 7/2023  - per patient, no known history of CKD  - likely 2/2 ATN ISO hemodynamic instability +/- prerenal ISO sepsis  - FeNa 0.1% c/w prerenal DEEPA  - trend Cr, improving  - maintain aguiar (until infection treated completely and afebrile x24h per urology)  - renal US with R renal cyst, no hydronephrosis, possible small focus of air in collecting system c/w prev CT, no abscess

## 2024-03-29 NOTE — PROGRESS NOTE ADULT - SUBJECTIVE AND OBJECTIVE BOX
BRENNEN VALDEZ  73y  Female    Patient is a 73y old  Female who presents with a chief complaint of nausea, diarrhea, malaise (29 Mar 2024 07:56)    INTERVAL HPI/OVERNIGHT EVENTS:  - s/p DCCV 3/28  - afebrile overnight  - HRs as low as 40s overnight, mostly 50s-60s NSR. BPs stable 110s-130s/60s  - feeling better this morning, still weaker than baseline but improved. Endorses cough, runny nose. Denies abdominal pain, flank pain. Feels slightly constipated. Denies headaches, dizziness, palpitations, dyspnea, chest pain  - BCx 3/27 NGTD    T(C): 36.3 (03-29-24 @ 05:03), Max: 36.8 (03-28-24 @ 11:38)  HR: 60 (03-29-24 @ 05:03) (55 - 89)  BP: 116/69 (03-29-24 @ 05:03) (111/62 - 137/78)  RR: 18 (03-29-24 @ 05:03) (18 - 33)  SpO2: 96% (03-29-24 @ 05:03) (94% - 98%)  Wt(kg): --Vital Signs Last 24 Hrs  T(C): 36.3 (29 Mar 2024 05:03), Max: 36.8 (28 Mar 2024 11:38)  T(F): 97.4 (29 Mar 2024 05:03), Max: 98.2 (28 Mar 2024 11:38)  HR: 60 (29 Mar 2024 05:03) (55 - 89)  BP: 116/69 (29 Mar 2024 05:03) (111/62 - 137/78)  BP(mean): 84 (28 Mar 2024 16:09) (78 - 84)  RR: 18 (29 Mar 2024 05:03) (18 - 33)  SpO2: 96% (29 Mar 2024 05:03) (94% - 98%)    Parameters below as of 29 Mar 2024 05:03  Patient On (Oxygen Delivery Method): nasal cannula      PHYSICAL EXAM:  CONSTITUTIONAL: awake, sitting upright in chair, NAD  EYES: PERRLA and symmetric, EOMI, No conjunctival or scleral injection, non-icteric  ENMT: Oral mucosa with moist membranes  NECK: Supple   RESP: CTA b/l, no WRR, unlabored respirations on 2L NC  CV: RRR, +S1S2, no MRG  GI: Soft, non-distended, non-TTP, no masses.  : Slight R flank tenderness, improved from prior. Escoto in place, yellow urine in bag  MSK: No digital clubbing or cyanosis; normal muscle bulk and tone, no gross deformities  SKIN: No rashes or ulcers noted; no subcutaneous nodules or induration palpable  NEURO: facial movements symmetric, moves all extremities against gravity, no gross focal deficits   PSYCH: Appropriate insight/judgment; A+O x 3, mood and affect appropriate, recent/remote memory intact    Consultant(s) Notes Reviewed:  [x ] YES  [ ] NO  Care Discussed with Consultants/Other Providers [ x] YES  [ ] NO    LABS:                        10.1   13.21 )-----------( 325      ( 29 Mar 2024 06:57 )             32.8     03-29    136  |  102  |  32<H>  ----------------------------<  307<H>  4.1   |  22  |  1.22    Ca    7.9<L>      29 Mar 2024 06:58  Phos  3.0     03-29  Mg     2.3     03-29    TPro  6.0  /  Alb  2.6<L>  /  TBili  0.3  /  DBili  x   /  AST  9<L>  /  ALT  23  /  AlkPhos  187<H>  03-29        Urinalysis Basic - ( 29 Mar 2024 06:58 )    Color: x / Appearance: x / SG: x / pH: x  Gluc: 307 mg/dL / Ketone: x  / Bili: x / Urobili: x   Blood: x / Protein: x / Nitrite: x   Leuk Esterase: x / RBC: x / WBC x   Sq Epi: x / Non Sq Epi: x / Bacteria: x      CAPILLARY BLOOD GLUCOSE      POCT Blood Glucose.: 293 mg/dL (29 Mar 2024 08:39)  POCT Blood Glucose.: 406 mg/dL (28 Mar 2024 21:48)  POCT Blood Glucose.: 426 mg/dL (28 Mar 2024 19:50)  POCT Blood Glucose.: 365 mg/dL (28 Mar 2024 17:15)  POCT Blood Glucose.: 321 mg/dL (28 Mar 2024 12:34)        Urinalysis Basic - ( 29 Mar 2024 06:58 )    Color: x / Appearance: x / SG: x / pH: x  Gluc: 307 mg/dL / Ketone: x  / Bili: x / Urobili: x   Blood: x / Protein: x / Nitrite: x   Leuk Esterase: x / RBC: x / WBC x   Sq Epi: x / Non Sq Epi: x / Bacteria: x        RADIOLOGY & ADDITIONAL TESTS:    Imaging Personally Reviewed:  [ ] YES  [ ] NO    HEALTH ISSUES - PROBLEM Dx:  Severe sepsis with acute organ dysfunction due to gram-negative bacteria    Pyelonephritis    Atrial fibrillation with RVR    DEEPA (acute kidney injury)    Diabetes mellitus    Hyponatremia    Hypertension    Hyperlipidemia    Spinal stenosis    Hypothyroidism    Anxiety    Elevated alkaline phosphatase level    Need for prophylactic measure    Sepsis secondary to UTI    Acute respiratory failure with hypoxia    History of uterine cancer

## 2024-03-29 NOTE — PROGRESS NOTE ADULT - ASSESSMENT
This is a 72 y/o F w/ PMHx of AF, HTN, HLD, DM, anxiety who is presenting to [facility] 3/24/24 with weakness, N/V/D, chills, fatigue. Initially went to UC, then directed to ED, w/ flank and suprapubic pain.   In the ER, initially afebrile, then had fevers to 103.1, AF w/ RVR to 150s and hypotensive to 80s/60s. Pt was started on broad therapy w/ Vancomycin/Zosyn. Pt was continued on Zosyn.   Labs w/ leukocytosis to 17 -> 18,  DEEPA to 1.54 to 1.85, lactate 2.8, U/A 237 WBCs  CT A/P w/ R pyelo, with emphysematous pyelitis and mild hydroureteronephrosis to the bladder, BCx w/ Klebsiella pneumoniae     #Klebsiella pneumoniae bacteremia  #Emphysematous pyelitis   #Severe sepsis   #Lactic acidosis   #DEEPA  #AF w/ RVR    Overall, 72 y/o F w/ PMHx of AF, HTN, HLD, DM, anxiety who is presenting with severe sepsis, AF w/ RVR 2/2 Klebsiella pneumoniae bacteremia, R pyelonephritis with emphysematous pyelitis. Pt acutely ill, still with significant leukocytosis, clinically ill appearing, which is worsening by fevers and AF w/ RVR. Would repeat BCx and continue w/ Zosyn for now, will need interval CT A/P and close urology follow up.   Pt with persistent bacteremia, Klebsiella sensitive, now on Ceftriaxone, however still febrile, clinically ill appearing. S/p JOAQUIN/DCCV on 3/28 for AF w/ RVR.   Now pt is afebrile, clinically well appearing, WBC downtrending, BCx from 3/27 NGTD.   Will hold off on repeat CT A/P     Plan:   1. Ceftriaxone 2 g q24 for now, when improved and ready for discharge, can change to Ciprofloxacin to finish 14 day course from negative BCx     D/w primary team, family at bedside    Thank you for this consult. Inpatient ID team will follow.    Paolo Love M.D.  Attending Physician  Division of Infectious Diseases  Department of Medicine This is a 74 y/o F w/ PMHx of AF, HTN, HLD, DM, anxiety who is presenting to [facility] 3/24/24 with weakness, N/V/D, chills, fatigue. Initially went to UC, then directed to ED, w/ flank and suprapubic pain.   In the ER, initially afebrile, then had fevers to 103.1, AF w/ RVR to 150s and hypotensive to 80s/60s. Pt was started on broad therapy w/ Vancomycin/Zosyn. Pt was continued on Zosyn.   Labs w/ leukocytosis to 17 -> 18,  DEEPA to 1.54 to 1.85, lactate 2.8, U/A 237 WBCs  CT A/P w/ R pyelo, with emphysematous pyelitis and mild hydroureteronephrosis to the bladder, BCx w/ Klebsiella pneumoniae     #Klebsiella pneumoniae bacteremia  #Emphysematous pyelitis   #Severe sepsis   #Lactic acidosis   #DEEPA  #AF w/ RVR    Overall, 74 y/o F w/ PMHx of AF, HTN, HLD, DM, anxiety who is presenting with severe sepsis, AF w/ RVR 2/2 Klebsiella pneumoniae bacteremia, R pyelonephritis with emphysematous pyelitis. Pt acutely ill, still with significant leukocytosis, clinically ill appearing, which is worsening by fevers and AF w/ RVR. Would repeat BCx and continue w/ Zosyn for now, will need interval CT A/P and close urology follow up.   Pt with persistent bacteremia, Klebsiella sensitive, now on Ceftriaxone, however still febrile, clinically ill appearing. S/p JOAQUIN/DCCV on 3/28 for AF w/ RVR.   Now pt is afebrile, clinically well appearing, WBC downtrending, BCx from 3/27 NGTD.   Will hold off on repeat CT A/P     Plan:   1. Ceftriaxone 2 g q24 for now, when improved and ready for discharge, can change to Ciprofloxacin to finish 14 day course from negative BCx   2. If any signs of clinical deterioration, (fever, significant elevation in leukocytosis, etc) would repeat BCx x 2 and obtain CT A/P w/ IV contrast for possible abscess      D/w primary team, family at bedside    Thank you for this consult. Inpatient ID team will follow.    Paolo Love M.D.  Attending Physician  Division of Infectious Diseases  Department of Medicine

## 2024-03-29 NOTE — PROGRESS NOTE ADULT - PROBLEM SELECTOR PLAN 2
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 to ceftriaxone   - UCx with gram negative rods     Plan:  - urology following, appreciate recs  - per urology, no acute  intervention at this time  - continue CTX 2g q24h as above  - per urology, maintain aguiar until infection is completely treated and afebrile x24h  - if not clinically improving, will repeat CT abdomen/pelvis with IV cont as above 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, mild hydroureteronephrosis to bladder   - voids independently, no chronic catheter  - previous UTIs with E coli and Proteus mirabilis, pansensitive  - blood cultures with Klebsiella pneumoniae, suscept. to ceftriaxone   - UCx with gram negative rods   - overall improving    Plan:  - urology following, appreciate recs  - per urology, no acute  intervention at this time  - continue CTX 2g q24h as above  - per urology, maintain aguiar until clinically improved and afebrile x24h  - if not clinically improving, will repeat CT abdomen/pelvis with IV cont as above

## 2024-03-29 NOTE — DISCHARGE NOTE PROVIDER - NSDCQMAMI_CARD_ALL_CORE
To CW: FYI - Patient reviewed note and wanted to make sure you knew she was exercising regularly, as that was on her after visit instructions. She is already completing that. Advised her I would pass the message on. Thank you!   No

## 2024-03-29 NOTE — PROGRESS NOTE ADULT - PROBLEM SELECTOR PLAN 1
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, mild hydroureteronephritis to bladder   - CT chest with no evidence of pneumonia, CXR with bibasilar atelectasis  - s/p ~5 L IVF   - BCx 3/24, 3/25 with Klebsiella pneumoniae, susceptible to ceftriaxone/zosyn  - UCx with klebsiella, proteus  - still with intermittent fevers but fever curve improving, WBC mildly increased to 16k but subjectively improving    Plan:  - ID following, appreciate recs  - continue ceftriaxone 2g q24h (per susceptibility data, ID recs)  - repeat BCx 3/25 positive, repeat BCx 3/27 pending  - repeat BCx q48h until clear   - urology following for emphysematous pyelitis   - if no clinical improvement or persistently positive BCx, repeat abdominal imaging to r/o abscess - pt reassessed after DCCV and symptomatically improving, will defer CT at this time and reassess tomorrow 3/29.   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, mild hydroureteronephritis to bladder   - CT chest with no evidence of pneumonia, CXR with bibasilar atelectasis  - s/p ~5 L IVF   - BCx 3/24, 3/25 with Klebsiella pneumoniae, susceptible to ceftriaxone/zosyn  - UCx with klebsiella, proteus  - afebrile x24h, leukocytosis improving, overall clinically improving on abx   - BCx 3/27 NGTD    Plan:  - ID following, appreciate recs  - continue ceftriaxone 2g q24h (per susceptibility data, ID recs)  - repeat BCx 3/25 positive, repeat BCx 3/27 NGTD  - urology following for emphysematous pyelitis   - clinically improving, hold off on repeat CT abdomen for now  > If 3/27 BCx positive, will send repeat BCx and get urgent CT A/P with IV contrast

## 2024-03-29 NOTE — PROGRESS NOTE ADULT - ASSESSMENT
73-year-old female history of paroxysmal AF (previously on Eliquis discontinued since 7/2023 after hysterectomy and was never advised to resume),  HTN, HLD, DM, anxiety, presenting to the ED for 2 days of weakness, not getting out of bed, multiple episodes of nonbloody diarrhea, nausea, occasional vomiting (last episode 3/23), general malaise, increased fatigue, chills at home x 2-3 days. Endorsing back pain, chronic 2/2 spinal stenosis. In ED, febrile T 103.1, tachycardic with tele revealing AF with HRs 140s-150s s/p Lopressor and Amio, BP's as low as 80s/60s s/p IVF bolus 3.5 L with improvement. TTE this admission revealing normal BiV function, started on Eliquis. + Leukocytosis, though downtrending, remains febrile. +BCX Klebsiella pneumoniae, EP consulted for rhythm control secondary to AF w/ RVR, on Dilt gtt and being Dig loaded per general Cardiology.     1) Klebsiella pneumoniae bacteremia, likely 2/2 R pyelonephritis   2) DEEPA   3) AF w/ RVR     - S/p JOAQUIN DCCV 3/28, successful. SR 70's  - D/c Dilt gtt  - Started Toprol XL 50mg qd, continue  - Started Amiodarone to maintain SR. Initiate Amio 400bid x 1 week followed by 200mg qd thereafter. While on Amio monitor LFT's and TFT's (TSH 2.01). She will need outpatient monitoring of TFT/LFT's and yearly opthalmologic evals and PFT's   - Eliquis 5mg bid, continue  - Continue tele monitoring, replete and correct lytes for K>4 and Mg>2.   - Treat underlying infx/rest of care per primary team. ID following, BCX 3/27 NTD, leukocytosis downtrending, afebrile overnight/this AM    No further EP intervention, will sign off  73-year-old female history of paroxysmal AF (previously on Eliquis discontinued since 7/2023 after hysterectomy and was never advised to resume),  HTN, HLD, DM, anxiety, presenting to the ED for 2 days of weakness, not getting out of bed, multiple episodes of nonbloody diarrhea, nausea, occasional vomiting (last episode 3/23), general malaise, increased fatigue, chills at home x 2-3 days. Endorsing back pain, chronic 2/2 spinal stenosis. In ED, febrile T 103.1, tachycardic with tele revealing AF with HRs 140s-150s s/p Lopressor and Amio, BP's as low as 80s/60s s/p IVF bolus 3.5 L with improvement. TTE this admission revealing normal BiV function, started on Eliquis. + Leukocytosis, though downtrending, remains febrile. +BCX Klebsiella pneumoniae, EP consulted for rhythm control secondary to AF w/ RVR, on Dilt gtt and being Dig loaded per general Cardiology.     1) Klebsiella pneumoniae bacteremia, likely 2/2 R pyelonephritis   2) DEEPA   3) AF w/ RVR     - S/p JOAQUIN DCCV 3/28, successful. SR 70's  - D/c Dilt gtt  - Started Toprol XL 50mg qd, continue  - Started Amiodarone to maintain SR. Initiate Amio 400bid x 1 week followed by 200mg qd thereafter. While on Amio monitor LFT's and TFT's (TSH 2.01). She will need outpatient monitoring of TFT/LFT's and yearly opthalmologic evals and PFT's.Patient to f/u outpatient cards Dr. Wheatley   - Eliquis 5mg bid, continue  - Continue tele monitoring, replete and correct lytes for K>4 and Mg>2.   - Treat underlying infx/rest of care per primary team. ID following, BCX 3/27 NTD, leukocytosis downtrending, afebrile overnight/this AM    No further EP intervention, will sign off

## 2024-03-29 NOTE — PROGRESS NOTE ADULT - SUBJECTIVE AND OBJECTIVE BOX
24H hour events: s/p JOAQUIN DCCV to NSR 3/28, maintaining SR     MEDICATIONS:  aMIOdarone    Tablet 400 milliGRAM(s) Oral every 12 hours  aMIOdarone    Tablet   Oral   apixaban 5 milliGRAM(s) Oral every 12 hours  aspirin enteric coated 81 milliGRAM(s) Oral daily  metoprolol succinate ER 50 milliGRAM(s) Oral daily    cefTRIAXone   IVPB 2000 milliGRAM(s) IV Intermittent every 24 hours      acetaminophen     Tablet .. 650 milliGRAM(s) Oral every 6 hours PRN  ALPRAZolam 1 milliGRAM(s) Oral at bedtime  ALPRAZolam 0.5 milliGRAM(s) Oral daily PRN  melatonin 3 milliGRAM(s) Oral at bedtime PRN  ondansetron Injectable 4 milliGRAM(s) IV Push every 8 hours PRN    aluminum hydroxide/magnesium hydroxide/simethicone Suspension 30 milliLiter(s) Oral every 4 hours PRN  pantoprazole    Tablet 40 milliGRAM(s) Oral before breakfast  polyethylene glycol 3350 17 Gram(s) Oral two times a day    dextrose 50% Injectable 25 Gram(s) IV Push once  dextrose 50% Injectable 12.5 Gram(s) IV Push once  dextrose 50% Injectable 25 Gram(s) IV Push once  dextrose Oral Gel 15 Gram(s) Oral once PRN  glucagon  Injectable 1 milliGRAM(s) IntraMuscular once  insulin glargine Injectable (LANTUS) 24 Unit(s) SubCutaneous at bedtime  insulin lispro (ADMELOG) corrective regimen sliding scale   SubCutaneous three times a day before meals  insulin lispro (ADMELOG) corrective regimen sliding scale   SubCutaneous at bedtime  insulin lispro Injectable (ADMELOG) 8 Unit(s) SubCutaneous three times a day before meals  levothyroxine 175 MICROGram(s) Oral daily    artificial  tears Solution 1 Drop(s) Both EYES every 1 hour PRN  chlorhexidine 2% Cloths 1 Application(s) Topical daily  dextrose 5%. 1000 milliLiter(s) IV Continuous <Continuous>  dextrose 5%. 1000 milliLiter(s) IV Continuous <Continuous>  FIRST- Mouthwash  BLM 5 milliLiter(s) Swish and Spit daily      REVIEW OF SYSTEMS:  See HPI, otherwise ROS negative.    PHYSICAL EXAM:  T(C): 36.3 (03-29-24 @ 05:03), Max: 36.8 (03-28-24 @ 11:38)  HR: 60 (03-29-24 @ 05:03) (55 - 89)  BP: 116/69 (03-29-24 @ 05:03) (111/62 - 137/78)  RR: 18 (03-29-24 @ 05:03) (18 - 24)  SpO2: 96% (03-29-24 @ 05:03) (95% - 96%)  Wt(kg): --  I&O's Summary    28 Mar 2024 07:01  -  29 Mar 2024 07:00  --------------------------------------------------------  IN: 497.5 mL / OUT: 1300 mL / NET: -802.5 mL        Appearance: Alert. NAD	  Cardiovascular: +S1S2 RRR no m/g/r  Respiratory: CTA B/L	  Psychiatry: A & O x 3, Mood & affect appropriate  Gastrointestinal:  Soft, NT. ND. +BS	  Skin: No rashes	  Neurologic: Non-focal  Extremities: No edema BLE  Vascular: Peripheral pulses palpable 2+ bilaterally      LABS:	 	    CBC Full  -  ( 29 Mar 2024 06:57 )  WBC Count : 13.21 K/uL  Hemoglobin : 10.1 g/dL  Hematocrit : 32.8 %  Platelet Count - Automated : 325 K/uL  Mean Cell Volume : 88.4 fl  Mean Cell Hemoglobin : 27.2 pg  Mean Cell Hemoglobin Concentration : 30.8 gm/dL  Auto Neutrophil # : 12.26 K/uL  Auto Lymphocyte # : 0.59 K/uL  Auto Monocyte # : 0.00 K/uL  Auto Eosinophil # : 0.00 K/uL  Auto Basophil # : 0.00 K/uL  Auto Neutrophil % : 92.8 %  Auto Lymphocyte % : 4.5 %  Auto Monocyte % : 0.0 %  Auto Eosinophil % : 0.0 %  Auto Basophil % : 0.0 %    03-29    136  |  102  |  32<H>  ----------------------------<  307<H>  4.1   |  22  |  1.22  03-28    136  |  101  |  25<H>  ----------------------------<  176<H>  3.7   |  22  |  1.44<H>    Ca    7.9<L>      29 Mar 2024 06:58  Ca    8.3<L>      28 Mar 2024 07:09  Phos  3.0     03-29  Phos  2.4     03-28  Mg     2.3     03-29  Mg     2.0     03-28    TPro  6.0  /  Alb  2.6<L>  /  TBili  0.3  /  DBili  x   /  AST  9<L>  /  ALT  23  /  AlkPhos  187<H>  03-29  TPro  6.1  /  Alb  2.5<L>  /  TBili  0.6  /  DBili  x   /  AST  25  /  ALT  28  /  AlkPhos  235<H>  03-28      proBNP: Pro-Brain Natriuretic Peptide: 4459 pg/mL (03.24.24 @ 01:15)      TSH: Thyroid Stimulating Hormone, Serum: 2.01 uIU/mL (03.25.24 @ 07:34)      TELEMETRY:  50's-70's

## 2024-03-30 LAB
ALBUMIN SERPL ELPH-MCNC: 3.1 G/DL — LOW (ref 3.3–5)
ALP SERPL-CCNC: 157 U/L — HIGH (ref 40–120)
ALT FLD-CCNC: 19 U/L — SIGNIFICANT CHANGE UP (ref 10–45)
ANION GAP SERPL CALC-SCNC: 13 MMOL/L — SIGNIFICANT CHANGE UP (ref 5–17)
AST SERPL-CCNC: 9 U/L — LOW (ref 10–40)
BASOPHILS # BLD AUTO: 0.03 K/UL — SIGNIFICANT CHANGE UP (ref 0–0.2)
BASOPHILS NFR BLD AUTO: 0.2 % — SIGNIFICANT CHANGE UP (ref 0–2)
BILIRUB SERPL-MCNC: 0.3 MG/DL — SIGNIFICANT CHANGE UP (ref 0.2–1.2)
BUN SERPL-MCNC: 33 MG/DL — HIGH (ref 7–23)
CALCIUM SERPL-MCNC: 8.4 MG/DL — SIGNIFICANT CHANGE UP (ref 8.4–10.5)
CHLORIDE SERPL-SCNC: 103 MMOL/L — SIGNIFICANT CHANGE UP (ref 96–108)
CO2 SERPL-SCNC: 22 MMOL/L — SIGNIFICANT CHANGE UP (ref 22–31)
CREAT SERPL-MCNC: 1.06 MG/DL — SIGNIFICANT CHANGE UP (ref 0.5–1.3)
EGFR: 55 ML/MIN/1.73M2 — LOW
EOSINOPHIL # BLD AUTO: 0.02 K/UL — SIGNIFICANT CHANGE UP (ref 0–0.5)
EOSINOPHIL NFR BLD AUTO: 0.1 % — SIGNIFICANT CHANGE UP (ref 0–6)
FLUAV AG NPH QL: SIGNIFICANT CHANGE UP
FLUBV AG NPH QL: SIGNIFICANT CHANGE UP
GLUCOSE BLDC GLUCOMTR-MCNC: 168 MG/DL — HIGH (ref 70–99)
GLUCOSE BLDC GLUCOMTR-MCNC: 189 MG/DL — HIGH (ref 70–99)
GLUCOSE BLDC GLUCOMTR-MCNC: 230 MG/DL — HIGH (ref 70–99)
GLUCOSE BLDC GLUCOMTR-MCNC: 262 MG/DL — HIGH (ref 70–99)
GLUCOSE SERPL-MCNC: 246 MG/DL — HIGH (ref 70–99)
HCT VFR BLD CALC: 33.1 % — LOW (ref 34.5–45)
HGB BLD-MCNC: 10.7 G/DL — LOW (ref 11.5–15.5)
IMM GRANULOCYTES NFR BLD AUTO: 4.5 % — HIGH (ref 0–0.9)
LYMPHOCYTES # BLD AUTO: 1.44 K/UL — SIGNIFICANT CHANGE UP (ref 1–3.3)
LYMPHOCYTES # BLD AUTO: 10.5 % — LOW (ref 13–44)
MAGNESIUM SERPL-MCNC: 2.2 MG/DL — SIGNIFICANT CHANGE UP (ref 1.6–2.6)
MCHC RBC-ENTMCNC: 27.6 PG — SIGNIFICANT CHANGE UP (ref 27–34)
MCHC RBC-ENTMCNC: 32.3 GM/DL — SIGNIFICANT CHANGE UP (ref 32–36)
MCV RBC AUTO: 85.3 FL — SIGNIFICANT CHANGE UP (ref 80–100)
MONOCYTES # BLD AUTO: 0.82 K/UL — SIGNIFICANT CHANGE UP (ref 0–0.9)
MONOCYTES NFR BLD AUTO: 6 % — SIGNIFICANT CHANGE UP (ref 2–14)
NEUTROPHILS # BLD AUTO: 10.78 K/UL — HIGH (ref 1.8–7.4)
NEUTROPHILS NFR BLD AUTO: 78.7 % — HIGH (ref 43–77)
NRBC # BLD: 0 /100 WBCS — SIGNIFICANT CHANGE UP (ref 0–0)
PHOSPHATE SERPL-MCNC: 2.5 MG/DL — SIGNIFICANT CHANGE UP (ref 2.5–4.5)
PLATELET # BLD AUTO: 457 K/UL — HIGH (ref 150–400)
POTASSIUM SERPL-MCNC: 3.7 MMOL/L — SIGNIFICANT CHANGE UP (ref 3.5–5.3)
POTASSIUM SERPL-SCNC: 3.7 MMOL/L — SIGNIFICANT CHANGE UP (ref 3.5–5.3)
PROT SERPL-MCNC: 6.6 G/DL — SIGNIFICANT CHANGE UP (ref 6–8.3)
RBC # BLD: 3.88 M/UL — SIGNIFICANT CHANGE UP (ref 3.8–5.2)
RBC # FLD: 13.1 % — SIGNIFICANT CHANGE UP (ref 10.3–14.5)
RSV RNA NPH QL NAA+NON-PROBE: SIGNIFICANT CHANGE UP
SARS-COV-2 RNA SPEC QL NAA+PROBE: SIGNIFICANT CHANGE UP
SODIUM SERPL-SCNC: 138 MMOL/L — SIGNIFICANT CHANGE UP (ref 135–145)
WBC # BLD: 13.7 K/UL — HIGH (ref 3.8–10.5)
WBC # FLD AUTO: 13.7 K/UL — HIGH (ref 3.8–10.5)

## 2024-03-30 PROCEDURE — 99232 SBSQ HOSP IP/OBS MODERATE 35: CPT | Mod: GC

## 2024-03-30 RX ORDER — ALPRAZOLAM 0.25 MG
1 TABLET ORAL AT BEDTIME
Refills: 0 | Status: DISCONTINUED | OUTPATIENT
Start: 2024-03-30 | End: 2024-04-02

## 2024-03-30 RX ORDER — INSULIN LISPRO 100/ML
11 VIAL (ML) SUBCUTANEOUS
Refills: 0 | Status: DISCONTINUED | OUTPATIENT
Start: 2024-03-30 | End: 2024-03-31

## 2024-03-30 RX ORDER — AMLODIPINE BESYLATE 2.5 MG/1
5 TABLET ORAL DAILY
Refills: 0 | Status: DISCONTINUED | OUTPATIENT
Start: 2024-03-30 | End: 2024-04-02

## 2024-03-30 RX ORDER — INSULIN GLARGINE 100 [IU]/ML
27 INJECTION, SOLUTION SUBCUTANEOUS AT BEDTIME
Refills: 0 | Status: DISCONTINUED | OUTPATIENT
Start: 2024-03-30 | End: 2024-03-31

## 2024-03-30 RX ORDER — ALPRAZOLAM 0.25 MG
0.5 TABLET ORAL DAILY
Refills: 0 | Status: DISCONTINUED | OUTPATIENT
Start: 2024-03-30 | End: 2024-04-02

## 2024-03-30 RX ADMIN — AMIODARONE HYDROCHLORIDE 400 MILLIGRAM(S): 400 TABLET ORAL at 17:43

## 2024-03-30 RX ADMIN — Medication 4: at 09:14

## 2024-03-30 RX ADMIN — Medication 8 UNIT(S): at 09:15

## 2024-03-30 RX ADMIN — Medication 1 MILLIGRAM(S): at 21:02

## 2024-03-30 RX ADMIN — APIXABAN 5 MILLIGRAM(S): 2.5 TABLET, FILM COATED ORAL at 05:51

## 2024-03-30 RX ADMIN — APIXABAN 5 MILLIGRAM(S): 2.5 TABLET, FILM COATED ORAL at 17:43

## 2024-03-30 RX ADMIN — AMIODARONE HYDROCHLORIDE 400 MILLIGRAM(S): 400 TABLET ORAL at 05:51

## 2024-03-30 RX ADMIN — Medication 2: at 13:04

## 2024-03-30 RX ADMIN — PANTOPRAZOLE SODIUM 40 MILLIGRAM(S): 20 TABLET, DELAYED RELEASE ORAL at 08:06

## 2024-03-30 RX ADMIN — Medication 81 MILLIGRAM(S): at 11:50

## 2024-03-30 RX ADMIN — Medication 2: at 22:05

## 2024-03-30 RX ADMIN — Medication 11 UNIT(S): at 13:04

## 2024-03-30 RX ADMIN — CEFTRIAXONE 100 MILLIGRAM(S): 500 INJECTION, POWDER, FOR SOLUTION INTRAMUSCULAR; INTRAVENOUS at 13:07

## 2024-03-30 RX ADMIN — Medication 25 MILLIGRAM(S): at 08:45

## 2024-03-30 RX ADMIN — Medication 175 MICROGRAM(S): at 05:51

## 2024-03-30 RX ADMIN — Medication 11 UNIT(S): at 17:41

## 2024-03-30 RX ADMIN — DIPHENHYDRAMINE HYDROCHLORIDE AND LIDOCAINE HYDROCHLORIDE AND ALUMINUM HYDROXIDE AND MAGNESIUM HYDRO 5 MILLILITER(S): KIT at 11:50

## 2024-03-30 RX ADMIN — Medication 2: at 17:41

## 2024-03-30 RX ADMIN — AMLODIPINE BESYLATE 5 MILLIGRAM(S): 2.5 TABLET ORAL at 11:50

## 2024-03-30 RX ADMIN — INSULIN GLARGINE 27 UNIT(S): 100 INJECTION, SOLUTION SUBCUTANEOUS at 22:00

## 2024-03-30 RX ADMIN — POLYETHYLENE GLYCOL 3350 17 GRAM(S): 17 POWDER, FOR SOLUTION ORAL at 05:52

## 2024-03-30 RX ADMIN — CHLORHEXIDINE GLUCONATE 1 APPLICATION(S): 213 SOLUTION TOPICAL at 11:51

## 2024-03-30 NOTE — PROGRESS NOTE ADULT - PROBLEM SELECTOR PLAN 5
- glucose 300s-400 this admission  - home meds: Tresiba 20 units QHS, novolog sliding scale (unclear how patient determines dose)  - a1c 9.8 (8.2 in 7/2023)  - lantus 24 units QHS  - admelog 8 units TIDAC (hold if NPO)  - moderate dose admelog ISS TID with meals + QHS  - adjust insulin as needed for hyperglycemia  - f/u lipid panel

## 2024-03-30 NOTE — PROGRESS NOTE ADULT - SUBJECTIVE AND OBJECTIVE BOX
PROGRESS NOTE:   Authored by Rob Atkins MD     Patient is a 73y old  Female who presents with a chief complaint of nausea, diarrhea, malaise (29 Mar 2024 13:47)      SUBJECTIVE / OVERNIGHT EVENTS:  - No acute events overnight    MEDICATIONS  (STANDING):  ALPRAZolam 1 milliGRAM(s) Oral at bedtime  aMIOdarone    Tablet   Oral   aMIOdarone    Tablet 400 milliGRAM(s) Oral every 12 hours  apixaban 5 milliGRAM(s) Oral every 12 hours  aspirin enteric coated 81 milliGRAM(s) Oral daily  cefTRIAXone   IVPB 2000 milliGRAM(s) IV Intermittent every 24 hours  chlorhexidine 2% Cloths 1 Application(s) Topical daily  dextrose 5%. 1000 milliLiter(s) (100 mL/Hr) IV Continuous <Continuous>  dextrose 5%. 1000 milliLiter(s) (50 mL/Hr) IV Continuous <Continuous>  dextrose 50% Injectable 25 Gram(s) IV Push once  dextrose 50% Injectable 12.5 Gram(s) IV Push once  dextrose 50% Injectable 25 Gram(s) IV Push once  FIRST- Mouthwash  BLM 5 milliLiter(s) Swish and Spit daily  glucagon  Injectable 1 milliGRAM(s) IntraMuscular once  insulin glargine Injectable (LANTUS) 24 Unit(s) SubCutaneous at bedtime  insulin lispro (ADMELOG) corrective regimen sliding scale   SubCutaneous three times a day before meals  insulin lispro (ADMELOG) corrective regimen sliding scale   SubCutaneous at bedtime  insulin lispro Injectable (ADMELOG) 8 Unit(s) SubCutaneous three times a day before meals  levothyroxine 175 MICROGram(s) Oral daily  metoprolol succinate ER 25 milliGRAM(s) Oral daily  pantoprazole    Tablet 40 milliGRAM(s) Oral before breakfast  polyethylene glycol 3350 17 Gram(s) Oral two times a day    MEDICATIONS  (PRN):  acetaminophen     Tablet .. 650 milliGRAM(s) Oral every 6 hours PRN Temp greater or equal to 38C (100.4F), Mild Pain (1 - 3)  ALPRAZolam 0.5 milliGRAM(s) Oral daily PRN anxiety  aluminum hydroxide/magnesium hydroxide/simethicone Suspension 30 milliLiter(s) Oral every 4 hours PRN Dyspepsia  artificial  tears Solution 1 Drop(s) Both EYES every 1 hour PRN Dry Eyes  dextrose Oral Gel 15 Gram(s) Oral once PRN Blood Glucose LESS THAN 70 milliGRAM(s)/deciliter  melatonin 3 milliGRAM(s) Oral at bedtime PRN Insomnia  ondansetron Injectable 4 milliGRAM(s) IV Push every 8 hours PRN Nausea and/or Vomiting      CAPILLARY BLOOD GLUCOSE      POCT Blood Glucose.: 273 mg/dL (29 Mar 2024 21:18)  POCT Blood Glucose.: 333 mg/dL (29 Mar 2024 17:24)  POCT Blood Glucose.: 341 mg/dL (29 Mar 2024 12:46)  POCT Blood Glucose.: 293 mg/dL (29 Mar 2024 08:39)    I&O's Summary    29 Mar 2024 07:01  -  30 Mar 2024 07:00  --------------------------------------------------------  IN: 180 mL / OUT: 1900 mL / NET: -1720 mL        PHYSICAL EXAM:  Vital Signs Last 24 Hrs  T(C): 36.6 (30 Mar 2024 04:28), Max: 36.6 (30 Mar 2024 00:12)  T(F): 97.8 (30 Mar 2024 04:28), Max: 97.8 (30 Mar 2024 00:12)  HR: 56 (30 Mar 2024 04:28) (56 - 68)  BP: 133/64 (30 Mar 2024 04:28) (122/65 - 153/77)  BP(mean): 85 (29 Mar 2024 12:30) (85 - 105)  RR: 18 (30 Mar 2024 04:28) (18 - 18)  SpO2: 94% (30 Mar 2024 04:28) (91% - 97%)    Parameters below as of 30 Mar 2024 04:28  Patient On (Oxygen Delivery Method): room air        CONSTITUTIONAL: awake, sitting upright in chair, NAD  EYES: PERRLA and symmetric, EOMI, No conjunctival or scleral injection, non-icteric  ENMT: Oral mucosa with moist membranes  NECK: Supple   RESP: CTA b/l, no WRR, unlabored respirations  CV: RRR, +S1S2, no MRG  GI: Soft, non-distended, non-TTP, no masses.  : Slight R flank tenderness, improved from prior. Escoto in place, yellow urine in bag  MSK: No digital clubbing or cyanosis; normal muscle bulk and tone, no gross deformities  SKIN: No rashes or ulcers noted; no subcutaneous nodules or induration palpable  NEURO: facial movements symmetric, moves all extremities against gravity, no gross focal deficits   PSYCH: Appropriate insight/judgment; A+O x 3, mood and affect appropriate, recent/remote memory intact      LABS:                        10.1   13.21 )-----------( 325      ( 29 Mar 2024 06:57 )             32.8     03-29    136  |  102  |  32<H>  ----------------------------<  307<H>  4.1   |  22  |  1.22    Ca    7.9<L>      29 Mar 2024 06:58  Phos  3.0     03-29  Mg     2.3     03-29    TPro  6.0  /  Alb  2.6<L>  /  TBili  0.3  /  DBili  x   /  AST  9<L>  /  ALT  23  /  AlkPhos  187<H>  03-29          Urinalysis Basic - ( 29 Mar 2024 06:58 )    Color: x / Appearance: x / SG: x / pH: x  Gluc: 307 mg/dL / Ketone: x  / Bili: x / Urobili: x   Blood: x / Protein: x / Nitrite: x   Leuk Esterase: x / RBC: x / WBC x   Sq Epi: x / Non Sq Epi: x / Bacteria: x        Culture - Blood (collected 27 Mar 2024 09:03)  Source: .Blood Blood-Peripheral  Preliminary Report (29 Mar 2024 16:01):    No growth at 48 Hours        RADIOLOGY & ADDITIONAL TESTS: Reviewed PROGRESS NOTE:   Authored by Rob Atkins MD     Patient is a 73y old  Female who presents with a chief complaint of nausea, diarrhea, malaise (29 Mar 2024 13:47)      SUBJECTIVE / OVERNIGHT EVENTS:  - No acute events overnight  - Denies new complaints this morning, still endorses non-productive cough & mouth sores    MEDICATIONS  (STANDING):  ALPRAZolam 1 milliGRAM(s) Oral at bedtime  aMIOdarone    Tablet   Oral   aMIOdarone    Tablet 400 milliGRAM(s) Oral every 12 hours  apixaban 5 milliGRAM(s) Oral every 12 hours  aspirin enteric coated 81 milliGRAM(s) Oral daily  cefTRIAXone   IVPB 2000 milliGRAM(s) IV Intermittent every 24 hours  chlorhexidine 2% Cloths 1 Application(s) Topical daily  dextrose 5%. 1000 milliLiter(s) (100 mL/Hr) IV Continuous <Continuous>  dextrose 5%. 1000 milliLiter(s) (50 mL/Hr) IV Continuous <Continuous>  dextrose 50% Injectable 25 Gram(s) IV Push once  dextrose 50% Injectable 12.5 Gram(s) IV Push once  dextrose 50% Injectable 25 Gram(s) IV Push once  FIRST- Mouthwash  BLM 5 milliLiter(s) Swish and Spit daily  glucagon  Injectable 1 milliGRAM(s) IntraMuscular once  insulin glargine Injectable (LANTUS) 24 Unit(s) SubCutaneous at bedtime  insulin lispro (ADMELOG) corrective regimen sliding scale   SubCutaneous three times a day before meals  insulin lispro (ADMELOG) corrective regimen sliding scale   SubCutaneous at bedtime  insulin lispro Injectable (ADMELOG) 8 Unit(s) SubCutaneous three times a day before meals  levothyroxine 175 MICROGram(s) Oral daily  metoprolol succinate ER 25 milliGRAM(s) Oral daily  pantoprazole    Tablet 40 milliGRAM(s) Oral before breakfast  polyethylene glycol 3350 17 Gram(s) Oral two times a day    MEDICATIONS  (PRN):  acetaminophen     Tablet .. 650 milliGRAM(s) Oral every 6 hours PRN Temp greater or equal to 38C (100.4F), Mild Pain (1 - 3)  ALPRAZolam 0.5 milliGRAM(s) Oral daily PRN anxiety  aluminum hydroxide/magnesium hydroxide/simethicone Suspension 30 milliLiter(s) Oral every 4 hours PRN Dyspepsia  artificial  tears Solution 1 Drop(s) Both EYES every 1 hour PRN Dry Eyes  dextrose Oral Gel 15 Gram(s) Oral once PRN Blood Glucose LESS THAN 70 milliGRAM(s)/deciliter  melatonin 3 milliGRAM(s) Oral at bedtime PRN Insomnia  ondansetron Injectable 4 milliGRAM(s) IV Push every 8 hours PRN Nausea and/or Vomiting      CAPILLARY BLOOD GLUCOSE      POCT Blood Glucose.: 273 mg/dL (29 Mar 2024 21:18)  POCT Blood Glucose.: 333 mg/dL (29 Mar 2024 17:24)  POCT Blood Glucose.: 341 mg/dL (29 Mar 2024 12:46)  POCT Blood Glucose.: 293 mg/dL (29 Mar 2024 08:39)    I&O's Summary    29 Mar 2024 07:01  -  30 Mar 2024 07:00  --------------------------------------------------------  IN: 180 mL / OUT: 1900 mL / NET: -1720 mL        PHYSICAL EXAM:  Vital Signs Last 24 Hrs  T(C): 36.6 (30 Mar 2024 04:28), Max: 36.6 (30 Mar 2024 00:12)  T(F): 97.8 (30 Mar 2024 04:28), Max: 97.8 (30 Mar 2024 00:12)  HR: 56 (30 Mar 2024 04:28) (56 - 68)  BP: 133/64 (30 Mar 2024 04:28) (122/65 - 153/77)  BP(mean): 85 (29 Mar 2024 12:30) (85 - 105)  RR: 18 (30 Mar 2024 04:28) (18 - 18)  SpO2: 94% (30 Mar 2024 04:28) (91% - 97%)    Parameters below as of 30 Mar 2024 04:28  Patient On (Oxygen Delivery Method): room air        CONSTITUTIONAL: awake, sitting upright in chair, NAD  EYES: PERRLA and symmetric, EOMI, No conjunctival or scleral injection, non-icteric  ENMT: Oral mucosa with moist membranes, small ulcerations on tongue noted  NECK: Supple   RESP: CTA b/l, no WRR, unlabored respirations  CV: RRR, +S1S2, no MRG  GI: Soft, non-distended, non-TTP, no masses.  : Slight R flank tenderness, improved from prior. Escoto in place, yellow urine in bag  MSK: No digital clubbing or cyanosis; normal muscle bulk and tone, no gross deformities  SKIN: No rashes or ulcers noted; no subcutaneous nodules or induration palpable  NEURO: facial movements symmetric, moves all extremities against gravity, no gross focal deficits   PSYCH: Appropriate insight/judgment; A+O x 3, mood and affect appropriate, recent/remote memory intact      LABS:                        10.1   13.21 )-----------( 325      ( 29 Mar 2024 06:57 )             32.8     03-29    136  |  102  |  32<H>  ----------------------------<  307<H>  4.1   |  22  |  1.22    Ca    7.9<L>      29 Mar 2024 06:58  Phos  3.0     03-29  Mg     2.3     03-29    TPro  6.0  /  Alb  2.6<L>  /  TBili  0.3  /  DBili  x   /  AST  9<L>  /  ALT  23  /  AlkPhos  187<H>  03-29          Urinalysis Basic - ( 29 Mar 2024 06:58 )    Color: x / Appearance: x / SG: x / pH: x  Gluc: 307 mg/dL / Ketone: x  / Bili: x / Urobili: x   Blood: x / Protein: x / Nitrite: x   Leuk Esterase: x / RBC: x / WBC x   Sq Epi: x / Non Sq Epi: x / Bacteria: x        Culture - Blood (collected 27 Mar 2024 09:03)  Source: .Blood Blood-Peripheral  Preliminary Report (29 Mar 2024 16:01):    No growth at 48 Hours        RADIOLOGY & ADDITIONAL TESTS: Reviewed

## 2024-03-30 NOTE — PROGRESS NOTE ADULT - PROBLEM SELECTOR PLAN 4
SCr on admission 1.54 -> 1.69 -> 1.85 -> 1.74 -> 1.5 -> 1.2  - baseline Cr 0.70 in 7/2023  - per patient, no known history of CKD  - likely 2/2 ATN ISO hemodynamic instability +/- prerenal ISO sepsis  - FeNa 0.1% c/w prerenal DEEPA  - trend Cr, improving  - maintain aguiar (until infection treated completely and afebrile x24h per urology)  - renal US with R renal cyst, no hydronephrosis, possible small focus of air in collecting system c/w prev CT, no abscess

## 2024-03-30 NOTE — PROGRESS NOTE ADULT - PROBLEM SELECTOR PLAN 2
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 to ceftriaxone   - UCx with gram negative rods   - overall improving    Plan:  - urology following, appreciate recs  - per urology, no acute  intervention at this time  - antibiotics as per above  - per urology, maintain aguiar until clinically improved and afebrile x24h- will attempt TOV 3/31

## 2024-03-30 NOTE — PROGRESS NOTE ADULT - PROBLEM SELECTOR PLAN 3
Previously on eliquis 2.5 mg BID, metoprolol succinate  - rate control and AC discontinued 7/2023 after extended holter monitor without evidence of A fib   - Afib RVR up to 160s this admission, likely 2/2 sepsis   - s/p lopressor 5 mg IVP x5, amio 150 mg IV x1, cardizem gtt, digoxin 500 mcg x1 with minimal improvement  - now s/p DCCV 3/28, successful  - started Toprol XL 50mg qd -> DECREASED to 25 mg QD given HR 40s  - amio 400bid x 1 week (3/28-4/4), then 200mg qd thereafter  > outpatient monitoring of TFT/LFT's and yearly opthalmologic evals and PFT's while on amio  - Eliquis 5mg bid, continue  - TTE 3/26 with EF 57%, mild diastolic dysfunction, normal RV function  - appreciate cardiology and EP recs

## 2024-03-30 NOTE — PROGRESS NOTE ADULT - PROBLEM SELECTOR PLAN 1
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, mild hydroureteronephritis to bladder   - CT chest with no evidence of pneumonia, CXR with bibasilar atelectasis  - s/p ~5 L IVF   - BCx 3/24, 3/25 with Klebsiella pneumoniae, susceptible to ceftriaxone/zosyn  - UCx with klebsiella, proteus  - afebrile x24h, leukocytosis improving, overall clinically improving on abx   - BCx 3/27 NGTD    Plan:  - ID following, appreciate recs  - continue ceftriaxone 2g q24h (per susceptibility data, ID recs)  - repeat BCx 3/25 positive, repeat BCx 3/27 NGTD  - urology following for emphysematous pyelitis   - clinically improving, hold off on repeat CT abdomen for now  - Per ID, final plan for 2 weeks of antibiotics from 3/27, can switch to cipro on d/c

## 2024-03-30 NOTE — PROGRESS NOTE ADULT - SUBJECTIVE AND OBJECTIVE BOX
DATE OF SERVICE: 03-30-24 @ 10:33    Patient is a 73y old  Female who presents with a chief complaint of nausea, diarrhea, malaise (30 Mar 2024 07:00)      INTERVAL HISTORY: no complaints     REVIEW OF SYSTEMS:  CONSTITUTIONAL: No weakness  EYES/ENT: No visual changes;  No throat pain   NECK: No pain or stiffness  RESPIRATORY: No cough, wheezing; No shortness of breath  CARDIOVASCULAR: No chest pain or palpitations  GASTROINTESTINAL: No abdominal  pain. No nausea, vomiting, or hematemesis  GENITOURINARY: No dysuria, frequency or hematuria  NEUROLOGICAL: No stroke like symptoms  SKIN: No rashes    	  MEDICATIONS:  aMIOdarone    Tablet 400 milliGRAM(s) Oral every 12 hours  aMIOdarone    Tablet   Oral   metoprolol succinate ER 25 milliGRAM(s) Oral daily        PHYSICAL EXAM:  T(C): 36.8 (03-30-24 @ 08:34), Max: 36.8 (03-30-24 @ 08:34)  HR: 62 (03-30-24 @ 08:34) (56 - 68)  BP: 154/90 (03-30-24 @ 08:34) (122/65 - 154/90)  RR: 18 (03-30-24 @ 08:34) (18 - 18)  SpO2: 96% (03-30-24 @ 08:34) (94% - 97%)  Wt(kg): --  I&O's Summary    29 Mar 2024 07:01  -  30 Mar 2024 07:00  --------------------------------------------------------  IN: 180 mL / OUT: 1900 mL / NET: -1720 mL          Appearance: In no distress	  HEENT:    PERRL, EOMI	  Cardiovascular:  S1 S2, No JVD  Respiratory: Lungs clear to auscultation	  Gastrointestinal:  Soft, Non-tender, + BS	  Vascularature:  No edema of LE  Psychiatric: Appropriate affect   Neuro: no acute focal deficits                               10.7   13.70 )-----------( x        ( 30 Mar 2024 09:46 )             33.1     03-29    136  |  102  |  32<H>  ----------------------------<  307<H>  4.1   |  22  |  1.22    Ca    7.9<L>      29 Mar 2024 06:58  Phos  3.0     03-29  Mg     2.3     03-29    TPro  6.0  /  Alb  2.6<L>  /  TBili  0.3  /  DBili  x   /  AST  9<L>  /  ALT  23  /  AlkPhos  187<H>  03-29        Labs personally reviewed      ASSESSMENT/PLAN: 	    73-year-old female history of TIA, A-fib (previously on eliquis, off since 7/2023 after hysterectomy),  HTN, HLD, DM, anxiety, consulted for AFIB RVR with SOB    1. AFIB RVR   - [id number] score 8   - on tele currently AFIB @ 125 bpm  - on Eliquis 2.5 mg BID ----> should be Eliquis 5mg BID (patient does not meet 2.5mg criteria)   - 3/27 given Dig 500mcg IV once  - TTE with preserved EF  - cont to monitor on tele   - c/w supplemental oxygen   - EP consult appreciated-->s/p successful JOAQUIN/DCCV 3/28now in SR  - Amio load  - c/w Toprol 25mg PO daily      2. Acute diastolic Heart Failure/SOB  - CTA chest no pulm embolism   - ProBnP 4459   - now successfully weaned of supplemental oxygen  - TTE with normal EF, no WMA and grade I DD  - Now appears euvolemic. Plan for DC on home medication HCTZ 25mg PO daily.      3. Hx of CVA   - on Eliquis 5mg BID  - neuro checks q4h     4. HTN   - on amlodipine 5mg PO daily and losartan 100mg Po daily at home  - Hold off on resuming ARB until OP follow up  - c/w Toprol    5. DM  - A1c 9.8  - Endo recs appreciated       Will need close OP follow up for BP check and repeat BMP          AMY Chapin DO [facility]  Cardiovascular Medicine  [address], [address]  Office: [phone]  Available via call/text on [employer] Teams

## 2024-03-30 NOTE — PROGRESS NOTE ADULT - PROBLEM SELECTOR PLAN 7
Home meds: amlodipine 5 mg QD, losartan 100 mg QD  - re-titrate home antihypertensives as able Home meds: amlodipine 5 mg QD, losartan 100 mg QD  - restarted amlodipine 5mg 3/30  - plan to restart losartan 100mg 3/31 if SCr continues to downtrend  - d/c on HCTZ 12.5mg qd per cards

## 2024-03-31 ENCOUNTER — TRANSCRIPTION ENCOUNTER (OUTPATIENT)
Age: 74
End: 2024-03-31

## 2024-03-31 LAB
ALBUMIN SERPL ELPH-MCNC: 2.8 G/DL — LOW (ref 3.3–5)
ALP SERPL-CCNC: 132 U/L — HIGH (ref 40–120)
ALT FLD-CCNC: 16 U/L — SIGNIFICANT CHANGE UP (ref 10–45)
ANION GAP SERPL CALC-SCNC: 15 MMOL/L — SIGNIFICANT CHANGE UP (ref 5–17)
AST SERPL-CCNC: 8 U/L — LOW (ref 10–40)
BASOPHILS # BLD AUTO: 0.05 K/UL — SIGNIFICANT CHANGE UP (ref 0–0.2)
BASOPHILS NFR BLD AUTO: 0.3 % — SIGNIFICANT CHANGE UP (ref 0–2)
BILIRUB SERPL-MCNC: 0.5 MG/DL — SIGNIFICANT CHANGE UP (ref 0.2–1.2)
BUN SERPL-MCNC: 23 MG/DL — SIGNIFICANT CHANGE UP (ref 7–23)
CALCIUM SERPL-MCNC: 7.9 MG/DL — LOW (ref 8.4–10.5)
CHLORIDE SERPL-SCNC: 104 MMOL/L — SIGNIFICANT CHANGE UP (ref 96–108)
CO2 SERPL-SCNC: 21 MMOL/L — LOW (ref 22–31)
CREAT SERPL-MCNC: 1.05 MG/DL — SIGNIFICANT CHANGE UP (ref 0.5–1.3)
EGFR: 55 ML/MIN/1.73M2 — LOW
EOSINOPHIL # BLD AUTO: 0.11 K/UL — SIGNIFICANT CHANGE UP (ref 0–0.5)
EOSINOPHIL NFR BLD AUTO: 0.7 % — SIGNIFICANT CHANGE UP (ref 0–6)
GLUCOSE BLDC GLUCOMTR-MCNC: 102 MG/DL — HIGH (ref 70–99)
GLUCOSE BLDC GLUCOMTR-MCNC: 137 MG/DL — HIGH (ref 70–99)
GLUCOSE BLDC GLUCOMTR-MCNC: 163 MG/DL — HIGH (ref 70–99)
GLUCOSE BLDC GLUCOMTR-MCNC: 79 MG/DL — SIGNIFICANT CHANGE UP (ref 70–99)
GLUCOSE BLDC GLUCOMTR-MCNC: 81 MG/DL — SIGNIFICANT CHANGE UP (ref 70–99)
GLUCOSE SERPL-MCNC: 83 MG/DL — SIGNIFICANT CHANGE UP (ref 70–99)
HCT VFR BLD CALC: 35.1 % — SIGNIFICANT CHANGE UP (ref 34.5–45)
HGB BLD-MCNC: 11.1 G/DL — LOW (ref 11.5–15.5)
IMM GRANULOCYTES NFR BLD AUTO: 3.3 % — HIGH (ref 0–0.9)
LYMPHOCYTES # BLD AUTO: 13.5 % — SIGNIFICANT CHANGE UP (ref 13–44)
LYMPHOCYTES # BLD AUTO: 2.09 K/UL — SIGNIFICANT CHANGE UP (ref 1–3.3)
MAGNESIUM SERPL-MCNC: 2 MG/DL — SIGNIFICANT CHANGE UP (ref 1.6–2.6)
MCHC RBC-ENTMCNC: 27.4 PG — SIGNIFICANT CHANGE UP (ref 27–34)
MCHC RBC-ENTMCNC: 31.6 GM/DL — LOW (ref 32–36)
MCV RBC AUTO: 86.7 FL — SIGNIFICANT CHANGE UP (ref 80–100)
MONOCYTES # BLD AUTO: 1 K/UL — HIGH (ref 0–0.9)
MONOCYTES NFR BLD AUTO: 6.4 % — SIGNIFICANT CHANGE UP (ref 2–14)
NEUTROPHILS # BLD AUTO: 11.76 K/UL — HIGH (ref 1.8–7.4)
NEUTROPHILS NFR BLD AUTO: 75.8 % — SIGNIFICANT CHANGE UP (ref 43–77)
NRBC # BLD: 0 /100 WBCS — SIGNIFICANT CHANGE UP (ref 0–0)
PHOSPHATE SERPL-MCNC: 3 MG/DL — SIGNIFICANT CHANGE UP (ref 2.5–4.5)
PLATELET # BLD AUTO: 460 K/UL — HIGH (ref 150–400)
POTASSIUM SERPL-MCNC: 3.3 MMOL/L — LOW (ref 3.5–5.3)
POTASSIUM SERPL-SCNC: 3.3 MMOL/L — LOW (ref 3.5–5.3)
PROT SERPL-MCNC: 6 G/DL — SIGNIFICANT CHANGE UP (ref 6–8.3)
RBC # BLD: 4.05 M/UL — SIGNIFICANT CHANGE UP (ref 3.8–5.2)
RBC # FLD: 13.3 % — SIGNIFICANT CHANGE UP (ref 10.3–14.5)
SODIUM SERPL-SCNC: 140 MMOL/L — SIGNIFICANT CHANGE UP (ref 135–145)
WBC # BLD: 15.52 K/UL — HIGH (ref 3.8–10.5)
WBC # FLD AUTO: 15.52 K/UL — HIGH (ref 3.8–10.5)

## 2024-03-31 PROCEDURE — 99232 SBSQ HOSP IP/OBS MODERATE 35: CPT | Mod: GC

## 2024-03-31 RX ORDER — DEXTROSE 50 % IN WATER 50 %
25 SYRINGE (ML) INTRAVENOUS ONCE
Refills: 0 | Status: COMPLETED | OUTPATIENT
Start: 2024-03-31 | End: 2024-03-31

## 2024-03-31 RX ORDER — LOSARTAN POTASSIUM 100 MG/1
100 TABLET, FILM COATED ORAL DAILY
Refills: 0 | Status: DISCONTINUED | OUTPATIENT
Start: 2024-03-31 | End: 2024-04-02

## 2024-03-31 RX ORDER — METOPROLOL TARTRATE 50 MG
1 TABLET ORAL
Qty: 0 | Refills: 0 | DISCHARGE
Start: 2024-03-31

## 2024-03-31 RX ORDER — INSULIN GLARGINE 100 [IU]/ML
25 INJECTION, SOLUTION SUBCUTANEOUS AT BEDTIME
Refills: 0 | Status: DISCONTINUED | OUTPATIENT
Start: 2024-03-31 | End: 2024-04-01

## 2024-03-31 RX ORDER — APIXABAN 2.5 MG/1
1 TABLET, FILM COATED ORAL
Qty: 0 | Refills: 0 | DISCHARGE
Start: 2024-03-31

## 2024-03-31 RX ORDER — NYSTATIN CREAM 100000 [USP'U]/G
1 CREAM TOPICAL
Refills: 0 | Status: DISCONTINUED | OUTPATIENT
Start: 2024-03-31 | End: 2024-04-02

## 2024-03-31 RX ORDER — INSULIN LISPRO 100/ML
9 VIAL (ML) SUBCUTANEOUS
Refills: 0 | Status: DISCONTINUED | OUTPATIENT
Start: 2024-03-31 | End: 2024-04-01

## 2024-03-31 RX ORDER — POTASSIUM CHLORIDE 20 MEQ
40 PACKET (EA) ORAL ONCE
Refills: 0 | Status: COMPLETED | OUTPATIENT
Start: 2024-03-31 | End: 2024-03-31

## 2024-03-31 RX ADMIN — Medication 650 MILLIGRAM(S): at 03:05

## 2024-03-31 RX ADMIN — AMIODARONE HYDROCHLORIDE 400 MILLIGRAM(S): 400 TABLET ORAL at 17:22

## 2024-03-31 RX ADMIN — INSULIN GLARGINE 25 UNIT(S): 100 INJECTION, SOLUTION SUBCUTANEOUS at 22:11

## 2024-03-31 RX ADMIN — Medication 25 MILLIGRAM(S): at 06:28

## 2024-03-31 RX ADMIN — POLYETHYLENE GLYCOL 3350 17 GRAM(S): 17 POWDER, FOR SOLUTION ORAL at 06:31

## 2024-03-31 RX ADMIN — APIXABAN 5 MILLIGRAM(S): 2.5 TABLET, FILM COATED ORAL at 06:28

## 2024-03-31 RX ADMIN — PANTOPRAZOLE SODIUM 40 MILLIGRAM(S): 20 TABLET, DELAYED RELEASE ORAL at 09:33

## 2024-03-31 RX ADMIN — AMLODIPINE BESYLATE 5 MILLIGRAM(S): 2.5 TABLET ORAL at 06:29

## 2024-03-31 RX ADMIN — CEFTRIAXONE 100 MILLIGRAM(S): 500 INJECTION, POWDER, FOR SOLUTION INTRAMUSCULAR; INTRAVENOUS at 13:45

## 2024-03-31 RX ADMIN — Medication 175 MICROGRAM(S): at 06:27

## 2024-03-31 RX ADMIN — LOSARTAN POTASSIUM 100 MILLIGRAM(S): 100 TABLET, FILM COATED ORAL at 11:58

## 2024-03-31 RX ADMIN — Medication 40 MILLIEQUIVALENT(S): at 11:54

## 2024-03-31 RX ADMIN — Medication 81 MILLIGRAM(S): at 11:55

## 2024-03-31 RX ADMIN — Medication 11 UNIT(S): at 13:45

## 2024-03-31 RX ADMIN — DIPHENHYDRAMINE HYDROCHLORIDE AND LIDOCAINE HYDROCHLORIDE AND ALUMINUM HYDROXIDE AND MAGNESIUM HYDRO 5 MILLILITER(S): KIT at 11:54

## 2024-03-31 RX ADMIN — Medication 25 MILLILITER(S): at 18:10

## 2024-03-31 RX ADMIN — Medication 11 UNIT(S): at 09:32

## 2024-03-31 RX ADMIN — AMIODARONE HYDROCHLORIDE 400 MILLIGRAM(S): 400 TABLET ORAL at 06:28

## 2024-03-31 RX ADMIN — NYSTATIN CREAM 1 APPLICATION(S): 100000 CREAM TOPICAL at 22:09

## 2024-03-31 RX ADMIN — APIXABAN 5 MILLIGRAM(S): 2.5 TABLET, FILM COATED ORAL at 17:22

## 2024-03-31 RX ADMIN — Medication 1 MILLIGRAM(S): at 22:08

## 2024-03-31 RX ADMIN — Medication 650 MILLIGRAM(S): at 03:38

## 2024-03-31 RX ADMIN — CHLORHEXIDINE GLUCONATE 1 APPLICATION(S): 213 SOLUTION TOPICAL at 11:59

## 2024-03-31 NOTE — DISCHARGE NOTE PROVIDER - CARE PROVIDER_API CALL
Dominguez Herbert  Cardiology  [address] [facility] [address]  [place] [address]  Phone: [phone]  Fax: [phone]  Established Patient  Follow Up Time: 1 week

## 2024-03-31 NOTE — PROGRESS NOTE ADULT - SUBJECTIVE AND OBJECTIVE BOX
DATE OF SERVICE: 03-31-24 @ 11:47    Patient is a 73y old  Female who presents with a chief complaint of nausea, diarrhea, malaise (31 Mar 2024 09:30)      INTERVAL HISTORY: no complaints     REVIEW OF SYSTEMS:  CONSTITUTIONAL: No weakness  EYES/ENT: No visual changes;  No throat pain   NECK: No pain or stiffness  RESPIRATORY: No cough, wheezing; No shortness of breath  CARDIOVASCULAR: No chest pain or palpitations  GASTROINTESTINAL: No abdominal  pain. No nausea, vomiting, or hematemesis  GENITOURINARY: No dysuria, frequency or hematuria  NEUROLOGICAL: No stroke like symptoms  SKIN: No rashes      	  MEDICATIONS:  aMIOdarone    Tablet   Oral   aMIOdarone    Tablet 400 milliGRAM(s) Oral every 12 hours  amLODIPine   Tablet 5 milliGRAM(s) Oral daily  losartan 100 milliGRAM(s) Oral daily  metoprolol succinate ER 25 milliGRAM(s) Oral daily        PHYSICAL EXAM:  T(C): 37.2 (03-31-24 @ 04:05), Max: 37.3 (03-30-24 @ 21:56)  HR: 73 (03-31-24 @ 04:05) (65 - 81)  BP: 149/74 (03-31-24 @ 04:05) (149/74 - 166/84)  RR: 18 (03-31-24 @ 04:05) (18 - 18)  SpO2: 92% (03-31-24 @ 04:05) (92% - 98%)  Wt(kg): --  I&O's Summary    30 Mar 2024 07:01  -  31 Mar 2024 07:00  --------------------------------------------------------  IN: 460 mL / OUT: 1400 mL / NET: -940 mL    31 Mar 2024 07:01  -  31 Mar 2024 11:47  --------------------------------------------------------  IN: 100 mL / OUT: 0 mL / NET: 100 mL          Appearance: In no distress	  HEENT:    PERRL, EOMI	  Cardiovascular:  S1 S2, No JVD  Respiratory: Lungs clear to auscultation	  Gastrointestinal:  Soft, Non-tender, + BS	  Vascularature:  No edema of LE  Psychiatric: Appropriate affect   Neuro: no acute focal deficits                               11.1   15.52 )-----------( 460      ( 31 Mar 2024 10:18 )             35.1     03-31    140  |  104  |  23  ----------------------------<  83  3.3<L>   |  21<L>  |  1.05    Ca    7.9<L>      31 Mar 2024 10:17  Phos  3.0     03-31  Mg     2.0     03-31    TPro  6.0  /  Alb  2.8<L>  /  TBili  0.5  /  DBili  x   /  AST  8<L>  /  ALT  16  /  AlkPhos  132<H>  03-31        Labs personally reviewed      ASSESSMENT/PLAN: 	      73-year-old female history of TIA, A-fib (previously on eliquis, off since 7/2023 after hysterectomy),  HTN, HLD, DM, anxiety, consulted for AFIB RVR with SOB    1. AFIB RVR   - [id number] score 8   - on tele currently AFIB @ 125 bpm  - on Eliquis 2.5 mg BID ----> should be Eliquis 5mg BID (patient does not meet 2.5mg criteria)   - 3/27 given Dig 500mcg IV once  - TTE with preserved EF  - cont to monitor on tele   - c/w supplemental oxygen   - EP consult appreciated-->s/p successful JOAQUIN/DCCV 3/28now in SR  - Amio load  - c/w Toprol 25mg PO daily      2. Acute diastolic Heart Failure/SOB  - CTA chest no pulm embolism   - ProBnP 4459   - now successfully weaned of supplemental oxygen  - TTE with normal EF, no WMA and grade I DD  - Now appears euvolemic. Plan for DC on home medication HCTZ 25mg PO daily.      3. Hx of CVA   - on Eliquis 5mg BID  - neuro checks q4h     4. HTN   - on amlodipine 5mg PO daily and losartan 100mg Po daily at home  - Hold off on resuming ARB until OP follow up  - c/w Toprol    5. DM  - A1c 9.8  - Endo recs appreciated       Will need close OP follow up for BP check and repeat BMP        AMY Chapin DO [facility]  Cardiovascular Medicine  [address] [place] [address], [address]  Office: [phone]  Available via call/text on [employer] Teams

## 2024-03-31 NOTE — DISCHARGE NOTE PROVIDER - NSDCMRMEDTOKEN_GEN_ALL_CORE_FT
ALPRAZolam 2 mg oral tablet: 0.5 tab in the morning as needed and 1 tab at bedtime (note: pharmacy has 1 tab times a day prn)  amLODIPine 5 mg oral tablet: 1 orally once a day  apixaban 5 mg oral tablet: 1 tab(s) orally every 12 hours  aspirin 81 mg oral delayed release tablet: 1 tab(s) orally once a day  levothyroxine 175 mcg (0.175 mg) oral tablet: 1 orally once a day  losartan 100 mg oral tablet: 1 tab(s) orally once a day  metoprolol succinate 25 mg oral tablet, extended release: 1 tab(s) orally once a day  NovoLOG 100 units/mL injectable solution: injectable slicing scale  pantoprazole 40 mg oral delayed release tablet: 1 tab(s) orally once a day  Tresiba 100 units/mL subcutaneous solution: subcutaneous once a day (at bedtime) 20 units   ALPRAZolam 2 mg oral tablet: 0.5 tab in the morning as needed and 1 tab at bedtime (note: pharmacy has 1 tab times a day prn)  amLODIPine 5 mg oral tablet: 1 orally once a day  apixaban 5 mg oral tablet: 1 tab(s) orally every 12 hours  aspirin 81 mg oral delayed release tablet: 1 tab(s) orally once a day  cephalexin 500 mg oral tablet: 1 tab(s) orally 2 times a day  insulin glargine 100 units/mL subcutaneous solution: 24 unit(s) subcutaneous once a day (at bedtime)  insulin lispro 100 units/mL injectable solution: 8 unit(s) injectable once a day before breakfast  insulin lispro 100 units/mL injectable solution: 8 unit(s) injectable once a day before dinner  insulin lispro 100 units/mL injectable solution: 8 unit(s) injectable once a day before lunch  levothyroxine 175 mcg (0.175 mg) oral tablet: 1 orally once a day  losartan 100 mg oral tablet: 1 tab(s) orally once a day  metoprolol succinate 25 mg oral tablet, extended release: 1 tab(s) orally once a day  Pacerone 200 mg oral tablet: 1 tab(s) orally once a day orally  Pacerone 400 mg oral tablet: 1 tab(s) orally 2 times a day  pantoprazole 40 mg oral delayed release tablet: 1 tab(s) orally once a day

## 2024-03-31 NOTE — DISCHARGE NOTE PROVIDER - HOSPITAL COURSE
Discharge Summary     Admit Date: 03-24-24  Discharge Date:    Admission diagnoses:   severe sepsis    Discharge diagnoses:   severe sepsis secondary to pyelonephritis with gram negative bacteremia, Afib with RVR     Hospital Course:   For full details, please see H&P, progress notes, consult notes and ancillary notes. Briefly, BRENNEN VALDEZ is a 73y Female with a history of A-fib (previously on eliquis, off since 7/2023),  HTN, HLD, DM, anxiety, who presented to ED on 3/23 for 2 days of weakness, malaise, nausea/vomiting. Initially presented to Urgent Care and was directed to present to ED for further evaluation. In ED, febrile and in Afib with rates 140s-150s, hypotensive. Receive IV fluid resuscitation, started on empiric antibiotics, admitted to medicine for further workup and management. The patient's hospital course will be summarized in a problem based format.    # severe sepsis 2/2 pyelonephritis, with gram negative bacteremia  CT abdomen was obtained and revealed right pyelonephritis with emphysematous pyelitis, mild hydroureteronephritis to bladder. Blood cultures were positive for Klebsiella pneumoniae, urine cultures were consistent with blood cultures. Patient was initially continued on zosyn, which was subsequently narrowed to ceftriaxone 2g q24h based on susceptibility data. Patient was followed by ID and urology given finding of emphysematous pyelitis. Considered repeat abdominal imaging if not improving clinically to r/o abscess, however patient had gradual improvement on antibiotics and blood cultures cleared on 3/27. Escoto was maintained per urology until 3/31, then TOV was successfully completed after clinically improved from infection perspective. Course was c/b DEEPA, which improved with fluid resuscitation and treatment of infection. Bladder/renal US revealed no hydronephrosis, findings otherwise consistent with CT abdomen. DEEPA likely multifactorial 2/2 prerenal and ATN ISO initial hemodynamic instability, resolved over course of admission. Will plan to complete 14 day course of antibiotics from date of negative blood cultures (3/27-4/9). Can transition to ciprofloxacin upon discharge to complete the 14 day course.     # Atrial fibrillation with rapid ventricular response   In the ED, patient was initially in atrial fibrillation with rates 130s-140s. Received several doses of lopressor 5 mg IV with minimal improvement in HRs. Also received amiodarone 150 mg IV x1 in ED. Admitted to medicine service and monitored on telemetry. Patient was restarted on eliquis 5 mg PO BID. Cardiology and EP were consulted for assistance with management of A fib RVR. TTE was performed, normal findings. Received cardizem IV x2 -> transitioned to PO, then ultimately transitioned to cardizem infusion given persistent tachycardia. Patient continued to be in A fib with rates up to 150s, received digoxin 500 mcg IV loading dose x1 which failed to control HRs. Also received lasix 20 mg IV x2 doses given dyspnea likely related to symptomatic A fib. Patient ultimately underwent JOAQUIN DCCV with EP on 3/28, successful - subsequently in NSR with rates in the 60s-70s. Started on amiodarone 400 mg BID x1 week (3/28-4/4) then 200 mg QD thereafter, also started on toprol XL 50 mg QD which was then decreased to 25 mg QD. Patient remained euvolemic-appearing and did not require further diuretics after cardioversion.     # T2DM  A1c this admission 9.8 (increased from 8.2 in 7/2023). Patient was started on basal/bolus insulin regimen, insulin regimen was increased gradually given persistent hyperglycemia until adequate glycemic control was achieved. Will discharge with the following regimen: ****  Patient should follow up with PCP after discharge for continued titration of diabetes medications.     On day of discharge, patient is clinically stable with no new exam findings or acute symptoms compared to prior. The patient was seen by the attending physician on the date of discharge and deemed stable and acceptable for discharge. The patient's chronic medical conditions were treated accordingly per the patient's home medication regimen. The patient's medication reconciliation (with changes made to chronic medications), follow up appointments, discharge orders, instructions, and significant lab and diagnostic studies are as noted.     Discharge follow up action items:     1. Follow up with PCP within 1 week  2. Follow up with cardiology ***  3. Amiodarone monitoring: TFTs/LFTs, yearly ophtho eval and PFTs     Medications to start:  - eliquis 5 mg BID  - metoprolol succinate 25 mg PO QD  - amiodarone 400 mg BID x1 week (3/28-4/4) then 200 mg QD thereafter    4. On hold medications:    Patient's ordered code status: DNR/DNI, trial NIV     Patient disposition: ***     Discharge Summary     Admit Date: 03-24-24  Discharge Date:    Admission diagnoses:   severe sepsis    Discharge diagnoses:   severe sepsis secondary to pyelonephritis with gram negative bacteremia, Afib with RVR     Hospital Course:   For full details, please see H&P, progress notes, consult notes and ancillary notes. Briefly, BRENNEN VALDEZ is a 73y Female with a history of A-fib (previously on eliquis, off since 7/2023),  HTN, HLD, DM, anxiety, who presented to ED on 3/23 for 2 days of weakness, malaise, nausea/vomiting. Initially presented to Urgent Care and was directed to present to ED for further evaluation. In ED, febrile and in Afib with rates 140s-150s, hypotensive. Receive IV fluid resuscitation, started on empiric antibiotics, admitted to medicine for further workup and management. The patient's hospital course will be summarized in a problem based format.    # severe sepsis 2/2 pyelonephritis, with gram negative bacteremia  CT abdomen was obtained and revealed right pyelonephritis with emphysematous pyelitis, mild hydroureteronephritis to bladder. Blood cultures were positive for Klebsiella pneumoniae, urine cultures were consistent with blood cultures. Patient was initially continued on zosyn, which was subsequently narrowed to ceftriaxone 2g q24h based on susceptibility data. Patient was followed by ID and urology given finding of emphysematous pyelitis. Considered repeat abdominal imaging if not improving clinically to r/o abscess, however patient had gradual improvement on antibiotics and blood cultures cleared on 3/27. Escoto was maintained per urology until 3/31, then TOV was successfully completed after clinically improved from infection perspective. Course was c/b DEEPA, which improved with fluid resuscitation and treatment of infection. Bladder/renal US revealed no hydronephrosis, findings otherwise consistent with CT abdomen. DEEPA likely multifactorial 2/2 prerenal and ATN ISO initial hemodynamic instability, resolved over course of admission. Will plan to complete 14 day course of antibiotics from date of negative blood cultures (3/27-4/9). Can transition to ciprofloxacin upon discharge to complete the 14 day course.     # Atrial fibrillation with rapid ventricular response   In the ED, patient was initially in atrial fibrillation with rates 130s-140s. Received several doses of lopressor 5 mg IV with minimal improvement in HRs. Also received amiodarone 150 mg IV x1 in ED. Admitted to medicine service and monitored on telemetry. Patient was restarted on eliquis 5 mg PO BID. Cardiology and EP were consulted for assistance with management of A fib RVR. TTE was performed, normal findings. Received cardizem IV x2 -> transitioned to PO, then ultimately transitioned to cardizem infusion given persistent tachycardia. Patient continued to be in A fib with rates up to 150s, received digoxin 500 mcg IV loading dose x1 which failed to control HRs. Also received lasix 20 mg IV x2 doses given dyspnea likely related to symptomatic A fib. Patient ultimately underwent JOAQUIN DCCV with EP on 3/28, successful - subsequently in NSR with rates in the 60s-70s. Started on amiodarone 400 mg BID x1 week (3/28-4/4) then 200 mg QD thereafter, also started on toprol XL 50 mg QD which was then decreased to 25 mg QD. Patient remained euvolemic-appearing and did not require further diuretics after cardioversion.     # T2DM  A1c this admission 9.8 (increased from 8.2 in 7/2023). Patient was started on basal/bolus insulin regimen, insulin regimen was increased gradually given persistent hyperglycemia until adequate glycemic control was achieved. Will discharge with the following regimen: 24 U Lantus at bedtime, 8U Lispro TID before meals.  Patient should follow up with PCP after discharge for continued titration of diabetes medications.     On day of discharge, patient is clinically stable with no new exam findings or acute symptoms compared to prior. The patient was seen by the attending physician on the date of discharge and deemed stable and acceptable for discharge. The patient's chronic medical conditions were treated accordingly per the patient's home medication regimen. The patient's medication reconciliation (with changes made to chronic medications), follow up appointments, discharge orders, instructions, and significant lab and diagnostic studies are as noted.     Discharge follow up action items:     1. Follow up with PCP within 1 week  2. Follow up with cardiology ***  3. Amiodarone monitoring: TFTs/LFTs, yearly ophtho eval and PFTs     Medications to start:  - eliquis 5 mg BID  - metoprolol succinate 25 mg PO QD  - amiodarone 400 mg BID x1 week (3/28-4/4) then 200 mg QD thereafter    4. On hold medications:    Patient's ordered code status: DNR/DNI, trial NIV     Patient disposition: ***     Discharge Summary     Admit Date: 03-24-24  Discharge Date:    Admission diagnoses:   severe sepsis    Discharge diagnoses:   severe sepsis secondary to pyelonephritis with gram negative bacteremia, Afib with RVR     Hospital Course:   For full details, please see H&P, progress notes, consult notes and ancillary notes. Briefly, BRENNEN VALDEZ is a 73y Female with a history of A-fib (previously on eliquis, off since 7/2023),  HTN, HLD, DM, anxiety, who presented to ED on 3/23 for 2 days of weakness, malaise, nausea/vomiting. Initially presented to Urgent Care and was directed to present to ED for further evaluation. In ED, febrile and in Afib with rates 140s-150s, hypotensive. Receive IV fluid resuscitation, started on empiric antibiotics, admitted to medicine for further workup and management. The patient's hospital course will be summarized in a problem based format.    # severe sepsis 2/2 pyelonephritis, with gram negative bacteremia  CT abdomen was obtained and revealed right pyelonephritis with emphysematous pyelitis, mild hydroureteronephritis to bladder. Blood cultures were positive for Klebsiella pneumoniae, urine cultures were consistent with blood cultures. Patient was initially continued on zosyn, which was subsequently narrowed to ceftriaxone 2g q24h based on susceptibility data. Patient was followed by ID and urology given finding of emphysematous pyelitis. Considered repeat abdominal imaging if not improving clinically to r/o abscess, however patient had gradual improvement on antibiotics and blood cultures cleared on 3/27. Escoto was maintained per urology until 3/31, then TOV was successfully completed after clinically improved from infection perspective. Course was c/b DEEPA, which improved with fluid resuscitation and treatment of infection. Bladder/renal US revealed no hydronephrosis, findings otherwise consistent with CT abdomen. DEEPA likely multifactorial 2/2 prerenal and ATN ISO initial hemodynamic instability, resolved over course of admission. Will plan to complete 14 day course of antibiotics from date of negative blood cultures (3/27-4/9). ID evaluated pt on discharge and recommended Keflex 500mg q6h to complete the 14 day course.     # Atrial fibrillation with rapid ventricular response   In the ED, patient was initially in atrial fibrillation with rates 130s-140s. Received several doses of lopressor 5 mg IV with minimal improvement in HRs. Also received amiodarone 150 mg IV x1 in ED. Admitted to medicine service and monitored on telemetry. Patient was restarted on eliquis 5 mg PO BID. Cardiology and EP were consulted for assistance with management of A fib RVR. TTE was performed, normal findings. Received cardizem IV x2 -> transitioned to PO, then ultimately transitioned to cardizem infusion given persistent tachycardia. Patient continued to be in A fib with rates up to 150s, received digoxin 500 mcg IV loading dose x1 which failed to control HRs. Also received lasix 20 mg IV x2 doses given dyspnea likely related to symptomatic A fib. Patient ultimately underwent JOAQUIN DCCV with EP on 3/28, successful - subsequently in NSR with rates in the 60s-70s. Started on amiodarone 400 mg BID x1 week (3/28-4/4) then 200 mg QD thereafter, also started on toprol XL 50 mg QD which was then decreased to 25 mg QD. Patient remained euvolemic-appearing and did not require further diuretics after cardioversion.     # T2DM  A1c this admission 9.8 (increased from 8.2 in 7/2023). Patient was started on basal/bolus insulin regimen, insulin regimen was increased gradually given persistent hyperglycemia until adequate glycemic control was achieved. Will discharge with the following regimen: 24 U Lantus at bedtime, 8U Lispro TID before meals.  Patient should follow up with PCP after discharge for continued titration of diabetes medications.     On day of discharge, patient is clinically stable with no new exam findings or acute symptoms compared to prior. The patient was seen by the attending physician on the date of discharge and deemed stable and acceptable for discharge. The patient's chronic medical conditions were treated accordingly per the patient's home medication regimen. The patient's medication reconciliation (with changes made to chronic medications), follow up appointments, discharge orders, instructions, and significant lab and diagnostic studies are as noted.     Discharge follow up action items:     1. Follow up with PCP within 1 week  2. Follow up with cardiology ***  3. Amiodarone monitoring: TFTs/LFTs, yearly ophtho eval and PFTs     Medications to start:  - eliquis 5 mg BID  - metoprolol succinate 25 mg PO QD  - amiodarone 400 mg BID x1 week (3/28-4/4) then 200 mg QD thereafter  - HCTZ 25mg PO daily  - Keflex 500mg q6h 4/3-4/9    Patient's ordered code status: DNR/DNI, trial NIV          Discharge Summary     Admit Date: 03-24-24  Discharge Date: 4/2/24    Admission diagnoses:   severe sepsis    Discharge diagnoses:   severe sepsis secondary to pyelonephritis with gram negative bacteremia, Afib with RVR     Hospital Course:   For full details, please see H&P, progress notes, consult notes and ancillary notes. Briefly, BRENNEN VALDEZ is a 73y Female with a history of A-fib (previously on eliquis, off since 7/2023),  HTN, HLD, DM, anxiety, who presented to ED on 3/23 for 2 days of weakness, malaise, nausea/vomiting. Initially presented to Urgent Care and was directed to present to ED for further evaluation. In ED, febrile and in Afib with rates 140s-150s, hypotensive. Receive IV fluid resuscitation, started on empiric antibiotics, admitted to medicine for further workup and management. The patient's hospital course will be summarized in a problem based format.    # severe sepsis 2/2 pyelonephritis, with gram negative bacteremia  CT abdomen was obtained and revealed right pyelonephritis with emphysematous pyelitis, mild hydroureteronephritis to bladder. Blood cultures were positive for Klebsiella pneumoniae, urine cultures were consistent with blood cultures. Patient was initially continued on zosyn, which was subsequently narrowed to ceftriaxone 2g q24h based on susceptibility data. Patient was followed by ID and urology given finding of emphysematous pyelitis. Considered repeat abdominal imaging if not improving clinically to r/o abscess, however patient had gradual improvement on antibiotics and blood cultures cleared on 3/27. Escoto was maintained per urology until 3/31, then TOV was successfully completed after clinically improved from infection perspective. Course was c/b DEEPA, which improved with fluid resuscitation and treatment of infection. Bladder/renal US revealed no hydronephrosis, findings otherwise consistent with CT abdomen. DEEPA likely multifactorial 2/2 prerenal and ATN ISO initial hemodynamic instability, resolved over course of admission. Will plan to complete 14 day course of antibiotics from date of negative blood cultures (3/27-4/10). ID evaluated pt on discharge and recommended Keflex 500mg q6h to complete the 14 day course.     # Atrial fibrillation with rapid ventricular response   In the ED, patient was initially in atrial fibrillation with rates 130s-140s. Received several doses of lopressor 5 mg IV with minimal improvement in HRs. Also received amiodarone 150 mg IV x1 in ED. Admitted to medicine service and monitored on telemetry. Patient was restarted on eliquis 5 mg PO BID. Cardiology and EP were consulted for assistance with management of A fib RVR. TTE was performed, normal findings. Received cardizem IV x2 -> transitioned to PO, then ultimately transitioned to cardizem infusion given persistent tachycardia. Patient continued to be in A fib with rates up to 150s, received digoxin 500 mcg IV loading dose x1 which failed to control HRs. Also received lasix 20 mg IV x2 doses given dyspnea likely related to symptomatic A fib. Patient ultimately underwent JOAQUIN DCCV with EP on 3/28, successful - subsequently in NSR with rates in the 60s-70s. Started on amiodarone 400 mg BID x1 week (3/28-4/4) then 200 mg QD thereafter, also started on toprol XL 50 mg QD which was then decreased to 25 mg QD. Patient remained euvolemic-appearing and did not require further diuretics after cardioversion.     # T2DM  A1c this admission 9.8 (increased from 8.2 in 7/2023). Patient was started on basal/bolus insulin regimen, insulin regimen was increased gradually given persistent hyperglycemia until adequate glycemic control was achieved. Will discharge with the following regimen: 24 U Lantus at bedtime, 8U Lispro TID before meals.  Patient should follow up with PCP after discharge for continued titration of diabetes medications.     On day of discharge, patient is clinically stable with no new exam findings or acute symptoms compared to prior. The patient was seen by the attending physician on the date of discharge and deemed stable and acceptable for discharge. The patient's chronic medical conditions were treated accordingly per the patient's home medication regimen. The patient's medication reconciliation (with changes made to chronic medications), follow up appointments, discharge orders, instructions, and significant lab and diagnostic studies are as noted.     Discharge follow up action items:     1. Follow up with PCP within 1 week  2. Follow up with cardiology ***  3. Amiodarone monitoring: TFTs/LFTs, yearly ophtho eval and PFTs     Medications to start:  - eliquis 5 mg BID  - metoprolol succinate 25 mg PO QD  - amiodarone 400 mg BID x1 week (3/28-4/4) then 200 mg QD thereafter  - Keflex 500mg q6h 4/3-4/9    Patient's ordered code status: DNR/DNI, trial NIV

## 2024-03-31 NOTE — DISCHARGE NOTE PROVIDER - NSDCCPTREATMENT_GEN_ALL_CORE_FT
PRINCIPAL PROCEDURE  Procedure: CT abdomen & pelvis  Findings and Treatment: IMPRESSION:  CT CHEST:  Limited by respiratory motion. No gross pulmonary embolism. No pneumonia.  CT ABDOMEN AND PELVIS:  Right pyelonephritis with emphysematous pyelitis and mild   hydroureteronephrosis to the bladder.  CT THORACIC AND LUMBAR SPINE:  No acute fracture or traumatic malalignment.  Multilevel degenerative changes above.      SECONDARY PROCEDURE  Procedure: US abdomen RUQ  Findings and Treatment: IMPRESSION:  No acute findings.    Procedure: US kidney and bladder  Findings and Treatment: IMPRESSION:  1. No hydronephrosis bilaterally.  2. Right renal cyst. There is a punctate echogenic focus within the   midpole of the right kidney, possibly reflecting a small focus of   residualair in the collecting system, as seen on the prior CT.,  No renal abscess is identified on this exam. If this remains of clinical   concern, CT or MRI could be performed for further evaluation.    Procedure: XR chest AP  Findings and Treatment: IMPRESSION:  Bibasilar linear atelectasis.    Procedure: Transesophageal echocardiogram  Findings and Treatment: CONCLUSIONS:      1. Left ventricular wall thickness is normal. Left ventricular systolic function is normal. There are no regional wall motion abnormalities seen.   2. Normal left ventricular diastolic function, with normal filling pressure.   3. Normal right ventricular cavity size, with normal wall thickness, and normal systolic function.   4. Normal left and right atrial size.   5. No significant valvular disease.   6. Pulmonary artery systolic pressure could not be estimated.   7. No pericardial effusion seen.   8. No prior echocardiogram is available for comparison.    Procedure: Transthoracic echocardiography (TTE)  Findings and Treatment: Conclusions:  1. Mild mitral annular calcification, otherwise normal  mitral valve. Minimal mitral regurgitation.  2. Aortic valve not well visualized; appears trileaflet  with normal opening. No aortic valve regurgitation seen.  3. Hyperdynamic left ventricle. EF=75-80%.  4. Normal right ventricular size and function.  5. Normal tricuspid valve. Mild-moderate tricuspid  regurgitation.  6. Estimated pulmonary artery systolic pressure equals 39  mm Hg, assuming right atrial pressure equals 8 mm Hg,  consistent with borderline pulmonary pressures.       PRINCIPAL PROCEDURE  Procedure: CT abdomen & pelvis  Findings and Treatment: IMPRESSION:  CT CHEST:  Limited by respiratory motion. No gross pulmonary embolism. No pneumonia.  CT ABDOMEN AND PELVIS:  Right pyelonephritis with emphysematous pyelitis and mild   hydroureteronephrosis to the bladder.  CT THORACIC AND LUMBAR SPINE:  No acute fracture or traumatic malalignment.  Multilevel degenerative changes above.      SECONDARY PROCEDURE  Procedure: US abdomen RUQ  Findings and Treatment: IMPRESSION:  No acute findings.    Procedure: US kidney and bladder  Findings and Treatment: IMPRESSION:  1. No hydronephrosis bilaterally.  2. Right renal cyst. There is a punctate echogenic focus within the   midpole of the right kidney, possibly reflecting a small focus of   residualair in the collecting system, as seen on the prior CT.,  No renal abscess is identified on this exam. If this remains of clinical   concern, CT or MRI could be performed for further evaluation.    Procedure: XR chest AP  Findings and Treatment: IMPRESSION:  Bibasilar linear atelectasis.    Procedure: Transesophageal echocardiogram  Findings and Treatment: CONCLUSIONS:      1. Left ventricular wall thickness is normal. Left ventricular systolic function is normal. There are no regional wall motion abnormalities seen.   2. Normal left ventricular diastolic function, with normal filling pressure.   3. Normal right ventricular cavity size, with normal wall thickness, and normal systolic function.   4. Normal left and right atrial size.   5. No significant valvular disease.   6. Pulmonary artery systolic pressure could not be estimated.   7. No pericardial effusion seen.   8. No prior echocardiogram is available for comparison.    Procedure: Transthoracic echocardiography (TTE)  Findings and Treatment: Conclusions:  1. Mild mitral annular calcification, otherwise normal  mitral valve. Minimal mitral regurgitation.  2. Aortic valve not well visualized; appears trileaflet  with normal opening. No aortic valve regurgitation seen.  3. Hyperdynamic left ventricle. EF=75-80%.  4. Normal right ventricular size and function.  5. Normal tricuspid valve. Mild-moderate tricuspid  regurgitation.  6. Estimated pulmonary artery systolic pressure equals 39  mm Hg, assuming right atrial pressure equals 8 mm Hg,  consistent with borderline pulmonary pressures.      Procedure: CT abdomen & pelvis  Findings and Treatment:   < end of copied text >  *  Right acute pyelonephritis, which appears progressed since the prior   CT of 3/24/2024. Interval development of multifocal wedge-shaped and   ill-defined regions of hypoenhancement withinthe right kidney, which   could be related to worsening acute pyelonephritis, though developing   infarcts can have a similar appearance. Phlegmonous changes/developing   abscess formation is also a consideration.  *  Indeterminate left renal lesion measuring 1.3 cm. Suggest renal   ultrasound with attention to this area versus contrast enhanced abdominal   MRI or CT utilizing renal mass protocol for further evaluation.  *  Contracted gallbladder with nonspecific edematous wall thickening.  *  Curvilinear hyperdensity within the distal ascending colon measuring   approximately 2.0 cm in length of uncertain etiology, and may represent   something ingested.< from: CT Abdomen and Pelvis w/ IV Cont (04.01.24 @ 15:45) >

## 2024-03-31 NOTE — DISCHARGE NOTE PROVIDER - NSDCCPCAREPLAN_GEN_ALL_CORE_FT
PRINCIPAL DISCHARGE DIAGNOSIS  Diagnosis: Severe sepsis with acute organ dysfunction due to gram-negative bacteria  Assessment and Plan of Treatment: When you came to the hospital, you were found to have a bacterial infaction in your kidneys. This likely spread from a urinary tract infection in your bladder. This kidney infection is known as pyelonephritis. This infection then spread from your kidneys into your bloodstream. You were treated with antibiotics, and you gradually improved over time with appropriate treatment of your infection. You were followed by the Infectious Diseases team and the Urology team who helped us select the appropriate treatment regimen. You will need to be on antibiotics for a total of 14 days from 3/27, so your last day of antibiotics will be on 4/9. You were treated with intravenous (IV) antibiotics while you were here in the hospital, and you will be transitioned to an oral antibiotic (ciprofloxacin) when you leave the hospital. Please continue to take your antibiotics as prescribed, and make sure to complete the entire course even if you are feeling better. Stopping your antibiotics too early can lead to worsening infections that will be more difficult to treat.   If you develop worsening abdominal pain, flank pain, burning with urination, increased urinary frequency or urgency, confusion, dizziness, or fevers at home, please come back to the ER.      SECONDARY DISCHARGE DIAGNOSES  Diagnosis: Atrial fibrillation  Assessment and Plan of Treatment: Atrial fibrillation (also called AF or "A-fib") is an abnormal rhythm of the heart. It is relatively common, affecting 2.3 million adults in the [place]. The prevalence increases with age, and most people who develop A-fib are over 65 years of age. This can be worsened by other systemic conditions, such as infection. You had atrial fibrillation here in the hospital, which was likely triggered by your kidney/bloodstream infection. You received several medications to try to control your heart rates, but you continued to have A-fib with abnormally fast heart rates despite these medications. You were seen by our cardiology and electrophysiology teams here, and you eventually underwent a procedure called direct current cardioversion (DCCV), which involves an electrical shock to the heart to restore the normal rhythm. This procedure was successful, and your Atrial fibrillation did not return afterwards. You were started back on eliquis, a medication to help prevent clots in the blood, as atrial fibrillation increases the risk of forming blood clots in the heart that can lead to stroke. You were also started on two new medications, amiodarone and metoprolol succinate (Toprol XL) to help maintain your normal heart rhythm and rates. You will need close monitoring on these medications. Please follow up with your cardiologist 1 week after discharge.   While taking amiodarone, you will need to have close monitoring of your thyroid function, lung function, and vision. Your cardiologist will help to ensure that you have the appropriate monitoring on this medication.     PRINCIPAL DISCHARGE DIAGNOSIS  Diagnosis: Severe sepsis with acute organ dysfunction due to gram-negative bacteria  Assessment and Plan of Treatment: When you came to the hospital, you were found to have a bacterial infaction in your kidneys. This likely spread from a urinary tract infection in your bladder. This kidney infection is known as pyelonephritis. This infection then spread from your kidneys into your bloodstream. You were treated with antibiotics, and you gradually improved over time with appropriate treatment of your infection. You were followed by the Infectious Diseases team and the Urology team who helped us select the appropriate treatment regimen. You will need to be on antibiotics for a total of 14 days from 3/27, so your last day of antibiotics will be on 4/9. You were treated with intravenous (IV) antibiotics while you were here in the hospital, and you will be transitioned to an oral antibiotic (Keflex 500mg every 6 hours) when you leave the hospital. Please continue to take your antibiotics as prescribed, and make sure to complete the entire course even if you are feeling better. Stopping your antibiotics too early can lead to worsening infections that will be more difficult to treat.   If you develop worsening abdominal pain, flank pain, burning with urination, increased urinary frequency or urgency, confusion, dizziness, or fevers at home, please come back to the ER.      SECONDARY DISCHARGE DIAGNOSES  Diagnosis: Atrial fibrillation  Assessment and Plan of Treatment: Atrial fibrillation (also called AF or "A-fib") is an abnormal rhythm of the heart. It is relatively common, affecting 2.3 million adults in the [place]. The prevalence increases with age, and most people who develop A-fib are over 65 years of age. This can be worsened by other systemic conditions, such as infection. You had atrial fibrillation here in the hospital, which was likely triggered by your kidney/bloodstream infection. You received several medications to try to control your heart rates, but you continued to have A-fib with abnormally fast heart rates despite these medications. You were seen by our cardiology and electrophysiology teams here, and you eventually underwent a procedure called direct current cardioversion (DCCV), which involves an electrical shock to the heart to restore the normal rhythm. This procedure was successful, and your Atrial fibrillation did not return afterwards. You were started back on eliquis, a medication to help prevent clots in the blood, as atrial fibrillation increases the risk of forming blood clots in the heart that can lead to stroke. You were also started on three new medications, amiodarone, HCTZ, and metoprolol succinate (Toprol XL) to help maintain your normal heart rhythm and rates. You will need close monitoring on these medications. Please follow up with your cardiologist 1 week after discharge.   While taking amiodarone, you will need to have close monitoring of your thyroid function, lung function, and vision. Your cardiologist will help to ensure that you have the appropriate monitoring on this medication.     PRINCIPAL DISCHARGE DIAGNOSIS  Diagnosis: Severe sepsis with acute organ dysfunction due to gram-negative bacteria  Assessment and Plan of Treatment: When you came to the hospital, you were found to have a bacterial infaction in your kidneys. This likely spread from a urinary tract infection in your bladder. This kidney infection is known as pyelonephritis. This infection then spread from your kidneys into your bloodstream. You were treated with antibiotics, and you gradually improved over time with appropriate treatment of your infection. You were followed by the Infectious Diseases team and the Urology team who helped us select the appropriate treatment regimen. You will need to be on antibiotics for a total of 14 days from 3/27, so your last day of antibiotics will be on 4/9. You were treated with intravenous (IV) antibiotics while you were here in the hospital, and you will be transitioned to an oral antibiotic (Keflex 500mg every 6 hours) when you leave the hospital. You will take this antibiotic starting the day after you leave the hospital and every day through 4/10. Please continue to take your antibiotics as prescribed, and make sure to complete the entire course even if you are feeling better. Stopping your antibiotics too early can lead to worsening infections that will be more difficult to treat.   If you develop worsening abdominal pain, flank pain, burning with urination, increased urinary frequency or urgency, confusion, dizziness, or fevers at home, please come back to the ER.      SECONDARY DISCHARGE DIAGNOSES  Diagnosis: Atrial fibrillation  Assessment and Plan of Treatment: Atrial fibrillation (also called AF or "A-fib") is an abnormal rhythm of the heart. It is relatively common, affecting 2.3 million adults in the [place]. The prevalence increases with age, and most people who develop A-fib are over 65 years of age. This can be worsened by other systemic conditions, such as infection. You had atrial fibrillation here in the hospital, which was likely triggered by your kidney/bloodstream infection. You received several medications to try to control your heart rates, but you continued to have A-fib with abnormally fast heart rates despite these medications. You were seen by our cardiology and electrophysiology teams here, and you eventually underwent a procedure called direct current cardioversion (DCCV), which involves an electrical shock to the heart to restore the normal rhythm. This procedure was successful, and your Atrial fibrillation did not return afterwards. You were started back on eliquis, a medication to help prevent clots in the blood, as atrial fibrillation increases the risk of forming blood clots in the heart that can lead to stroke. You were also started on two new medications, amiodarone  and metoprolol succinate (Toprol XL) to help maintain your normal heart rhythm and rates. You will need close monitoring on these medications. Please follow up with your cardiologist 1 week after discharge.   .  Please take 400mg of amiodarone twice a day (every 12 hours) on 4/3 and 4/4. Starting on 4/5, please take 200mg of amiodarone once a day.  .  While taking amiodarone, you will need to have close monitoring of your thyroid function, lung function, and vision. Your cardiologist will help to ensure that you have the appropriate monitoring on this medication.    Diagnosis: Diabetes mellitus  Assessment and Plan of Treatment: While you were in the hospital, you were noted to have increased blood sugars. Your insulin regimen was adjusted to 24U of your night time insulin which you will take before you go to sleep and then 8U of the insulin you take before meals. Please follow up with your primary care provider within 1 week for further management of your insulin regimen.  .  Please come back to the hospital if you have symptoms of shaking, dizziness, sweating or have consistently low blood glucose levels.    Diagnosis: Anxiety  Assessment and Plan of Treatment: While you were in the hospital, you were given a decreased dose of xanax. You received 0.5mg xanax during the day if you were feeling anxious and you received 1mg xanax at night before you went to bed.  .  Please follow up with your primary care physician for further managment of your anxiety.

## 2024-03-31 NOTE — DISCHARGE NOTE PROVIDER - NSDCFUSCHEDAPPT_GEN_ALL_CORE_FT
Cheryl Champion  [facility]  [facility] [address]  Scheduled Appointment: 05/22/2024

## 2024-03-31 NOTE — PROGRESS NOTE ADULT - PROBLEM SELECTOR PLAN 1
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, mild hydroureteronephritis to bladder   - CT chest with no evidence of pneumonia, CXR with bibasilar atelectasis  - s/p ~5 L IVF   - BCx 3/24, 3/25 with Klebsiella pneumoniae, susceptible to ceftriaxone/zosyn  - UCx with klebsiella, proteus  - afebrile x24h, leukocytosis improving, overall clinically improving on abx   - BCx 3/27 NGTD    Plan:  - ID following, appreciate recs  - continue ceftriaxone 2g q24h (per susceptibility data, ID recs)  - 3/27 BCx NGTD @72h  - urology following for emphysematous pyelitis   - clinically improving, hold off on repeat CT abdomen  - Per ID, final plan for 2 weeks of antibiotics from 3/27, can switch to cipro on d/c

## 2024-03-31 NOTE — PROGRESS NOTE ADULT - PROBLEM SELECTOR PLAN 5
- glucose 300s-400 this admission  - home meds: Tresiba 20 units QHS, novolog sliding scale (unclear how patient determines dose)  - a1c 9.8 (8.2 in 7/2023)  - lantus 27 units QHS  - admelog 11 units TIDAC (hold if NPO)  - moderate dose admelog ISS TID with meals + QHS  - adjust insulin as needed for hyperglycemia  - f/u lipid panel

## 2024-03-31 NOTE — PROGRESS NOTE ADULT - PROBLEM SELECTOR PLAN 4
SCr on admission 1.54 -> 1.69 -> 1.85 -> 1.74 -> 1.5 -> 1.2  - baseline Cr 0.70 in 7/2023  - per patient, no known history of CKD  - likely 2/2 ATN ISO hemodynamic instability +/- prerenal ISO sepsis  - FeNa 0.1% c/w prerenal DEEPA  - trend Cr, improving  - TOV today   - renal US with R renal cyst, no hydronephrosis, possible small focus of air in collecting system c/w prev CT, no abscess

## 2024-03-31 NOTE — PROGRESS NOTE ADULT - PROBLEM SELECTOR PLAN 12
Alk phos 176 -> 235, normal AST/ALT/ TBili  - RUQ US with no abnormalities  - Trend AST/ALT/Alk phos/T bili, improving

## 2024-03-31 NOTE — PROGRESS NOTE ADULT - SUBJECTIVE AND OBJECTIVE BOX
BRENNEN VALDEZ  73y  Female    Patient is a 73y old  Female who presents with a chief complaint of nausea, diarrhea, malaise (30 Mar 2024 10:33)    INTERVAL HPI/OVERNIGHT EVENTS:  - NAEON  - HDS, afebrile overnight. BPs up to 160s/80s  - feeling better this morning, states pain is improving, denies dyspnea, chest pain, palpitations, dizziness  - no new concerns/complaints     T(C): 37.2 (03-31-24 @ 04:05), Max: 37.3 (03-30-24 @ 21:56)  HR: 73 (03-31-24 @ 04:05) (52 - 81)  BP: 149/74 (03-31-24 @ 04:05) (136/74 - 166/84)  RR: 18 (03-31-24 @ 04:05) (18 - 18)  SpO2: 92% (03-31-24 @ 04:05) (91% - 98%)  Wt(kg): --Vital Signs Last 24 Hrs  T(C): 37.2 (31 Mar 2024 04:05), Max: 37.3 (30 Mar 2024 21:56)  T(F): 98.9 (31 Mar 2024 04:05), Max: 99.2 (30 Mar 2024 21:56)  HR: 73 (31 Mar 2024 04:05) (52 - 81)  BP: 149/74 (31 Mar 2024 04:05) (136/74 - 166/84)  BP(mean): --  RR: 18 (31 Mar 2024 04:05) (18 - 18)  SpO2: 92% (31 Mar 2024 04:05) (91% - 98%)    Parameters below as of 31 Mar 2024 04:05  Patient On (Oxygen Delivery Method): room air      PHYSICAL EXAM:  CONSTITUTIONAL: awake, lying comfortably in bed, NAD  EYES: PERRLA and symmetric, EOMI, No conjunctival or scleral injection, non-icteric  ENMT: Oral mucosa with moist membranes, small ulcerations on tongue noted  NECK: Supple   RESP: CTA b/l, no WRR, unlabored respirations  CV: RRR, +S1S2, no MRG  GI: Soft, non-distended, non-TTP, no masses.  : no CVA tenderness. Escoto in place, yellow urine in bag  MSK: No digital clubbing or cyanosis; normal muscle bulk and tone, no gross deformities  SKIN: No rashes or ulcers noted; no subcutaneous nodules or induration palpable  NEURO: facial movements symmetric, moves all extremities against gravity, no gross focal deficits   PSYCH: Appropriate insight/judgment; A+O x 3, mood and affect appropriate, recent/remote memory intact    Consultant(s) Notes Reviewed:  [x ] YES  [ ] NO  Care Discussed with Consultants/Other Providers [ x] YES  [ ] NO    LABS:                        10.7   13.70 )-----------( 457      ( 30 Mar 2024 09:46 )             33.1     03-30    138  |  103  |  33<H>  ----------------------------<  246<H>  3.7   |  22  |  1.06    Ca    8.4      30 Mar 2024 09:46  Phos  2.5     03-30  Mg     2.2     03-30    TPro  6.6  /  Alb  3.1<L>  /  TBili  0.3  /  DBili  x   /  AST  9<L>  /  ALT  19  /  AlkPhos  157<H>  03-30        Urinalysis Basic - ( 30 Mar 2024 09:46 )    Color: x / Appearance: x / SG: x / pH: x  Gluc: 246 mg/dL / Ketone: x  / Bili: x / Urobili: x   Blood: x / Protein: x / Nitrite: x   Leuk Esterase: x / RBC: x / WBC x   Sq Epi: x / Non Sq Epi: x / Bacteria: x      CAPILLARY BLOOD GLUCOSE      POCT Blood Glucose.: 262 mg/dL (30 Mar 2024 21:29)  POCT Blood Glucose.: 189 mg/dL (30 Mar 2024 17:26)  POCT Blood Glucose.: 168 mg/dL (30 Mar 2024 12:33)  POCT Blood Glucose.: 230 mg/dL (30 Mar 2024 09:03)        Urinalysis Basic - ( 30 Mar 2024 09:46 )    Color: x / Appearance: x / SG: x / pH: x  Gluc: 246 mg/dL / Ketone: x  / Bili: x / Urobili: x   Blood: x / Protein: x / Nitrite: x   Leuk Esterase: x / RBC: x / WBC x   Sq Epi: x / Non Sq Epi: x / Bacteria: x        RADIOLOGY & ADDITIONAL TESTS:    Imaging Personally Reviewed:  [ ] YES  [ ] NO    HEALTH ISSUES - PROBLEM Dx:  Severe sepsis with acute organ dysfunction due to gram-negative bacteria    Pyelonephritis    Atrial fibrillation with RVR    DEEPA (acute kidney injury)    Diabetes mellitus    Hyponatremia    Hypertension    Hyperlipidemia    Spinal stenosis    Hypothyroidism    Anxiety    Elevated alkaline phosphatase level    Need for prophylactic measure    Sepsis secondary to UTI    Acute respiratory failure with hypoxia    History of uterine cancer

## 2024-03-31 NOTE — PROGRESS NOTE ADULT - PROBLEM SELECTOR PLAN 7
Home meds: amlodipine 5 mg QD, losartan 100 mg QD  - restarted amlodipine 5mg 3/30  - plan to restart losartan 100mg today 3/31 if SCr continues to downtrend (pending AM labs)   - d/c on HCTZ 12.5mg qd per cards

## 2024-03-31 NOTE — PROGRESS NOTE ADULT - PROBLEM SELECTOR PLAN 2
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 to ceftriaxone   - UCx with gram negative rods   - overall improving    Plan:  - urology following, appreciate recs  - per urology, no acute  intervention at this time  - antibiotics as per above  - TOV today 3/31

## 2024-04-01 ENCOUNTER — TRANSCRIPTION ENCOUNTER (OUTPATIENT)
Age: 74
End: 2024-04-01

## 2024-04-01 LAB
ALBUMIN SERPL ELPH-MCNC: 2.7 G/DL — LOW (ref 3.3–5)
ALP SERPL-CCNC: 121 U/L — HIGH (ref 40–120)
ALT FLD-CCNC: 16 U/L — SIGNIFICANT CHANGE UP (ref 10–45)
ANION GAP SERPL CALC-SCNC: 16 MMOL/L — SIGNIFICANT CHANGE UP (ref 5–17)
AST SERPL-CCNC: 17 U/L — SIGNIFICANT CHANGE UP (ref 10–40)
BASOPHILS # BLD AUTO: 0.04 K/UL — SIGNIFICANT CHANGE UP (ref 0–0.2)
BASOPHILS NFR BLD AUTO: 0.2 % — SIGNIFICANT CHANGE UP (ref 0–2)
BILIRUB SERPL-MCNC: 0.7 MG/DL — SIGNIFICANT CHANGE UP (ref 0.2–1.2)
BUN SERPL-MCNC: 15 MG/DL — SIGNIFICANT CHANGE UP (ref 7–23)
CALCIUM SERPL-MCNC: 8.2 MG/DL — LOW (ref 8.4–10.5)
CHLORIDE SERPL-SCNC: 102 MMOL/L — SIGNIFICANT CHANGE UP (ref 96–108)
CO2 SERPL-SCNC: 19 MMOL/L — LOW (ref 22–31)
CREAT SERPL-MCNC: 1 MG/DL — SIGNIFICANT CHANGE UP (ref 0.5–1.3)
CULTURE RESULTS: SIGNIFICANT CHANGE UP
CULTURE RESULTS: SIGNIFICANT CHANGE UP
EGFR: 59 ML/MIN/1.73M2 — LOW
EOSINOPHIL # BLD AUTO: 0.15 K/UL — SIGNIFICANT CHANGE UP (ref 0–0.5)
EOSINOPHIL NFR BLD AUTO: 0.9 % — SIGNIFICANT CHANGE UP (ref 0–6)
GLUCOSE BLDC GLUCOMTR-MCNC: 103 MG/DL — HIGH (ref 70–99)
GLUCOSE BLDC GLUCOMTR-MCNC: 131 MG/DL — HIGH (ref 70–99)
GLUCOSE BLDC GLUCOMTR-MCNC: 180 MG/DL — HIGH (ref 70–99)
GLUCOSE BLDC GLUCOMTR-MCNC: 81 MG/DL — SIGNIFICANT CHANGE UP (ref 70–99)
GLUCOSE SERPL-MCNC: 70 MG/DL — SIGNIFICANT CHANGE UP (ref 70–99)
HCT VFR BLD CALC: 34.7 % — SIGNIFICANT CHANGE UP (ref 34.5–45)
HGB BLD-MCNC: 11.3 G/DL — LOW (ref 11.5–15.5)
IMM GRANULOCYTES NFR BLD AUTO: 2.1 % — HIGH (ref 0–0.9)
LYMPHOCYTES # BLD AUTO: 1.6 K/UL — SIGNIFICANT CHANGE UP (ref 1–3.3)
LYMPHOCYTES # BLD AUTO: 9.1 % — LOW (ref 13–44)
MAGNESIUM SERPL-MCNC: 1.9 MG/DL — SIGNIFICANT CHANGE UP (ref 1.6–2.6)
MCHC RBC-ENTMCNC: 28 PG — SIGNIFICANT CHANGE UP (ref 27–34)
MCHC RBC-ENTMCNC: 32.6 GM/DL — SIGNIFICANT CHANGE UP (ref 32–36)
MCV RBC AUTO: 85.9 FL — SIGNIFICANT CHANGE UP (ref 80–100)
MONOCYTES # BLD AUTO: 0.76 K/UL — SIGNIFICANT CHANGE UP (ref 0–0.9)
MONOCYTES NFR BLD AUTO: 4.3 % — SIGNIFICANT CHANGE UP (ref 2–14)
NEUTROPHILS # BLD AUTO: 14.71 K/UL — HIGH (ref 1.8–7.4)
NEUTROPHILS NFR BLD AUTO: 83.4 % — HIGH (ref 43–77)
NRBC # BLD: 0 /100 WBCS — SIGNIFICANT CHANGE UP (ref 0–0)
PHOSPHATE SERPL-MCNC: 2.9 MG/DL — SIGNIFICANT CHANGE UP (ref 2.5–4.5)
PLATELET # BLD AUTO: 471 K/UL — HIGH (ref 150–400)
POTASSIUM SERPL-MCNC: 4.1 MMOL/L — SIGNIFICANT CHANGE UP (ref 3.5–5.3)
POTASSIUM SERPL-SCNC: 4.1 MMOL/L — SIGNIFICANT CHANGE UP (ref 3.5–5.3)
PROT SERPL-MCNC: 6.2 G/DL — SIGNIFICANT CHANGE UP (ref 6–8.3)
RBC # BLD: 4.04 M/UL — SIGNIFICANT CHANGE UP (ref 3.8–5.2)
RBC # FLD: 13.4 % — SIGNIFICANT CHANGE UP (ref 10.3–14.5)
SODIUM SERPL-SCNC: 137 MMOL/L — SIGNIFICANT CHANGE UP (ref 135–145)
SPECIMEN SOURCE: SIGNIFICANT CHANGE UP
SPECIMEN SOURCE: SIGNIFICANT CHANGE UP
WBC # BLD: 17.63 K/UL — HIGH (ref 3.8–10.5)
WBC # FLD AUTO: 17.63 K/UL — HIGH (ref 3.8–10.5)

## 2024-04-01 PROCEDURE — 74177 CT ABD & PELVIS W/CONTRAST: CPT | Mod: 26

## 2024-04-01 PROCEDURE — 99232 SBSQ HOSP IP/OBS MODERATE 35: CPT | Mod: GC

## 2024-04-01 RX ORDER — INSULIN LISPRO 100/ML
8 VIAL (ML) SUBCUTANEOUS
Refills: 0 | Status: DISCONTINUED | OUTPATIENT
Start: 2024-04-01 | End: 2024-04-02

## 2024-04-01 RX ORDER — INSULIN GLARGINE 100 [IU]/ML
24 INJECTION, SOLUTION SUBCUTANEOUS AT BEDTIME
Refills: 0 | Status: DISCONTINUED | OUTPATIENT
Start: 2024-04-01 | End: 2024-04-02

## 2024-04-01 RX ADMIN — NYSTATIN CREAM 1 APPLICATION(S): 100000 CREAM TOPICAL at 17:30

## 2024-04-01 RX ADMIN — APIXABAN 5 MILLIGRAM(S): 2.5 TABLET, FILM COATED ORAL at 17:26

## 2024-04-01 RX ADMIN — NYSTATIN CREAM 1 APPLICATION(S): 100000 CREAM TOPICAL at 05:44

## 2024-04-01 RX ADMIN — AMIODARONE HYDROCHLORIDE 400 MILLIGRAM(S): 400 TABLET ORAL at 17:26

## 2024-04-01 RX ADMIN — APIXABAN 5 MILLIGRAM(S): 2.5 TABLET, FILM COATED ORAL at 05:36

## 2024-04-01 RX ADMIN — Medication 25 MILLIGRAM(S): at 05:41

## 2024-04-01 RX ADMIN — CHLORHEXIDINE GLUCONATE 1 APPLICATION(S): 213 SOLUTION TOPICAL at 08:41

## 2024-04-01 RX ADMIN — DIPHENHYDRAMINE HYDROCHLORIDE AND LIDOCAINE HYDROCHLORIDE AND ALUMINUM HYDROXIDE AND MAGNESIUM HYDRO 5 MILLILITER(S): KIT at 10:52

## 2024-04-01 RX ADMIN — Medication 8 UNIT(S): at 13:13

## 2024-04-01 RX ADMIN — CEFTRIAXONE 100 MILLIGRAM(S): 500 INJECTION, POWDER, FOR SOLUTION INTRAMUSCULAR; INTRAVENOUS at 13:13

## 2024-04-01 RX ADMIN — AMIODARONE HYDROCHLORIDE 400 MILLIGRAM(S): 400 TABLET ORAL at 05:36

## 2024-04-01 RX ADMIN — PANTOPRAZOLE SODIUM 40 MILLIGRAM(S): 20 TABLET, DELAYED RELEASE ORAL at 08:47

## 2024-04-01 RX ADMIN — POLYETHYLENE GLYCOL 3350 17 GRAM(S): 17 POWDER, FOR SOLUTION ORAL at 05:36

## 2024-04-01 RX ADMIN — Medication 9 UNIT(S): at 08:46

## 2024-04-01 RX ADMIN — Medication 175 MICROGRAM(S): at 05:35

## 2024-04-01 RX ADMIN — Medication 8 UNIT(S): at 17:57

## 2024-04-01 RX ADMIN — Medication 1 MILLIGRAM(S): at 21:56

## 2024-04-01 RX ADMIN — AMLODIPINE BESYLATE 5 MILLIGRAM(S): 2.5 TABLET ORAL at 05:37

## 2024-04-01 RX ADMIN — INSULIN GLARGINE 24 UNIT(S): 100 INJECTION, SOLUTION SUBCUTANEOUS at 21:56

## 2024-04-01 RX ADMIN — Medication 81 MILLIGRAM(S): at 08:46

## 2024-04-01 RX ADMIN — LOSARTAN POTASSIUM 100 MILLIGRAM(S): 100 TABLET, FILM COATED ORAL at 05:36

## 2024-04-01 NOTE — PROGRESS NOTE ADULT - SUBJECTIVE AND OBJECTIVE BOX
DATE OF SERVICE: 04-01-24 @ 16:16    Patient is a 73y old  Female who presents with a chief complaint of nausea, diarrhea, malaise (01 Apr 2024 07:39)      INTERVAL HISTORY: Feels well, no acute events    REVIEW OF SYSTEMS:  CONSTITUTIONAL: No weakness  EYES/ENT: No visual changes;  No throat pain   NECK: No pain or stiffness  RESPIRATORY: No cough, wheezing; No shortness of breath  CARDIOVASCULAR: No chest pain or palpitations  GASTROINTESTINAL: No abdominal  pain. No nausea, vomiting, or hematemesis  GENITOURINARY: No dysuria, frequency or hematuria  NEUROLOGICAL: No stroke like symptoms  SKIN: No rashes    TELEMETRY Personally reviewed: SR 60-70, PVCs  	  MEDICATIONS:  aMIOdarone    Tablet 400 milliGRAM(s) Oral every 12 hours  aMIOdarone    Tablet   Oral   amLODIPine   Tablet 5 milliGRAM(s) Oral daily  losartan 100 milliGRAM(s) Oral daily  metoprolol succinate ER 25 milliGRAM(s) Oral daily        PHYSICAL EXAM:  T(C): 37.2 (04-01-24 @ 11:28), Max: 37.9 (03-31-24 @ 21:39)  HR: 71 (04-01-24 @ 11:28) (67 - 71)  BP: 150/82 (04-01-24 @ 11:28) (146/73 - 160/79)  RR: 18 (04-01-24 @ 11:28) (18 - 18)  SpO2: 97% (04-01-24 @ 11:28) (93% - 97%)  Wt(kg): --  I&O's Summary    31 Mar 2024 07:01  -  01 Apr 2024 07:00  --------------------------------------------------------  IN: 320 mL / OUT: 2550 mL / NET: -2230 mL    01 Apr 2024 07:01  -  01 Apr 2024 16:16  --------------------------------------------------------  IN: 360 mL / OUT: 0 mL / NET: 360 mL          Appearance: In no distress	  HEENT:    PERRL, EOMI	  Cardiovascular:  S1 S2, No JVD  Respiratory: Lungs clear to auscultation	  Gastrointestinal:  Soft, Non-tender, + BS	  Vascularature:  No edema of LE  Psychiatric: Appropriate affect   Neuro: no acute focal deficits                               11.3   17.63 )-----------( 471      ( 01 Apr 2024 10:12 )             34.7     04-01    137  |  102  |  15  ----------------------------<  70  4.1   |  19<L>  |  1.00    Ca    8.2<L>      01 Apr 2024 10:13  Phos  2.9     04-01  Mg     1.9     04-01    TPro  6.2  /  Alb  2.7<L>  /  TBili  0.7  /  DBili  x   /  AST  17  /  ALT  16  /  AlkPhos  121<H>  04-01        Labs personally reviewed      ASSESSMENT/PLAN: 	  73-year-old female history of TIA, A-fib (previously on eliquis, off since 7/2023 after hysterectomy),  HTN, HLD, DM, anxiety, consulted for AFIB RVR with SOB    1. AFIB RVR   - [id number] score 8   - on tele currently AFIB @ 125 bpm  - on Eliquis 2.5 mg BID ----> should be Eliquis 5mg BID (patient does not meet 2.5mg criteria)   - 3/27 given Dig 500mcg IV once  - TTE with preserved EF  - cont to monitor on tele   - c/w supplemental oxygen   - EP consult appreciated-->s/p successful JOAQUIN/DCCV 3/28now in SR  - Amio load  - c/w Toprol 25mg PO daily      2. Acute diastolic Heart Failure/SOB  - CTA chest no pulm embolism   - ProBnP 4459   - now successfully weaned of supplemental oxygen  - TTE with normal EF, no WMA and grade I DD  - Now appears euvolemic. Plan for DC on home medication HCTZ 25mg PO daily.      3. Hx of CVA   - on Eliquis 5mg BID  - neuro checks q4h     4. HTN   - on amlodipine 5mg PO daily and losartan 100mg Po daily at home  - Hold off on resuming ARB until OP follow up  - c/w Toprol    5. DM  - A1c 9.8  - Endo recs appreciated       Will need close OP follow up for BP check and repeat BMP          REBECA Richards DO [facility]  Cardiovascular Medicine  [address], [address]  Available via call or text on [employer]  Office: [phone]

## 2024-04-01 NOTE — PROGRESS NOTE ADULT - PROBLEM SELECTOR PLAN 7
Home meds: amlodipine 5 mg QD, losartan 100 mg QD  - restarted amlodipine 5mg 3/30  - plan to restart losartan 100mg today 3/31 if SCr continues to downtrend (pending AM labs)   - d/c on HCTZ 12.5mg qd per cards Home meds: amlodipine 5 mg QD, losartan 100 mg QD  - restarted amlodipine 5mg 3/30  - restarted losartan 3/31  - d/c on HCTZ 12.5mg qd per cards

## 2024-04-01 NOTE — DISCHARGE NOTE NURSING/CASE MANAGEMENT/SOCIAL WORK - PATIENT PORTAL LINK FT
You can access the FollowMyHealth Patient Portal offered by [facility] by registering at the following website: [email or web address]. By joining [employer]’s FollowMyHealth portal, you will also be able to view your health information using other applications (apps) compatible with our system.

## 2024-04-01 NOTE — PROGRESS NOTE ADULT - PROBLEM SELECTOR PLAN 1
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, mild hydroureteronephritis to bladder   - CT chest with no evidence of pneumonia, CXR with bibasilar atelectasis  - s/p ~5 L IVF   - BCx 3/24, 3/25 with Klebsiella pneumoniae, susceptible to ceftriaxone/zosyn  - UCx with klebsiella, proteus  - afebrile x24h, leukocytosis improving, overall clinically improving on abx   - BCx 3/27 NGTD    Plan:  - ID following, appreciate recs  - continue ceftriaxone 2g q24h (per susceptibility data, ID recs)  - 3/27 BCx NGTD @72h  - urology following for emphysematous pyelitis   - clinically improving, hold off on repeat CT abdomen  - Per ID, final plan for 2 weeks of antibiotics from 3/27, can switch to cipro on d/c 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, mild hydroureteronephrosis to bladder   - CT chest with no evidence of pneumonia, CXR with bibasilar atelectasis  - s/p ~5 L IVF   - BCx 3/24, 3/25 with Klebsiella pneumoniae, susceptible to ceftriaxone/zosyn  - UCx with klebsiella, proteus  - afebrile x24h, leukocytosis improving, overall clinically improving on abx   - BCx 3/27 NGTD    Plan:  - ID following, appreciate recs  - continue ceftriaxone 2g q24h (per susceptibility data, ID recs)  - 3/27 BCx NGTD @72h  - urology following for emphysematous pyelitis   - clinically improving, hold off on repeat CT abdomen  - Per ID, final plan for 2 weeks of antibiotics from 3/27, can switch to cipro on d/c 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, mild hydroureteronephrosis to bladder   - CT chest with no evidence of pneumonia, CXR with bibasilar atelectasis  - s/p ~5 L IVF   - BCx 3/24, 3/25 with Klebsiella pneumoniae, susceptible to ceftriaxone/zosyn  - UCx with klebsiella, proteus  - BCx 3/27 NGTD    Plan:  - ID following, appreciate recs  - continue ceftriaxone 2g q24h (per susceptibility data, ID recs)  - 3/27 BCx NGTD @72h  - urology following for emphysematous pyelitis   - ordered CT abdomen due to fever 3/31 and increase in leukocytosis  - Per ID, final plan for 2 weeks of antibiotics from 3/27, can switch to cipro on d/c 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, mild hydroureteronephrosis to bladder   - CT chest with no evidence of pneumonia, CXR with bibasilar atelectasis  - s/p ~5 L IVF   - BCx 3/24, 3/25 with Klebsiella pneumoniae, susceptible to ceftriaxone/zosyn  - UCx with klebsiella, proteus  - BCx 3/27 NGTD    Plan:  - ID following, appreciate recs  - continue ceftriaxone 2g q24h (per susceptibility data, ID recs)  - 3/27 BCx NGTD @72h  - urology following for emphysematous pyelitis   - ordered CT abdomen due to fever 3/31 and increase in leukocytosis; demonstrating worsening pyelonephritis  - discussed with ID (4/1) regarding CT findings and fever/leukocytosis -- per ID, radiological findings tend to lag behind clinical and fevers can continue while on antibiotics. Recommended to continue CTX at this time  - Per ID, final plan for 2 weeks of antibiotics from 3/27, can switch to cipro on d/c

## 2024-04-01 NOTE — PROGRESS NOTE ADULT - SUBJECTIVE AND OBJECTIVE BOX
Joyce Horvath, PGY1    Patient is a 73y old  Female who presents with a chief complaint of nausea, diarrhea, malaise (31 Mar 2024 11:47)      SUBJECTIVE / OVERNIGHT EVENTS: NAEO. Pt denies chest pain, SOB, N/V, fever/chills, or changes in bowel movements.    MEDICATIONS  (STANDING):  ALPRAZolam 1 milliGRAM(s) Oral at bedtime  aMIOdarone    Tablet   Oral   aMIOdarone    Tablet 400 milliGRAM(s) Oral every 12 hours  amLODIPine   Tablet 5 milliGRAM(s) Oral daily  apixaban 5 milliGRAM(s) Oral every 12 hours  aspirin enteric coated 81 milliGRAM(s) Oral daily  cefTRIAXone   IVPB 2000 milliGRAM(s) IV Intermittent every 24 hours  chlorhexidine 2% Cloths 1 Application(s) Topical daily  dextrose 5%. 1000 milliLiter(s) (50 mL/Hr) IV Continuous <Continuous>  dextrose 5%. 1000 milliLiter(s) (100 mL/Hr) IV Continuous <Continuous>  dextrose 50% Injectable 25 Gram(s) IV Push once  dextrose 50% Injectable 25 Gram(s) IV Push once  dextrose 50% Injectable 12.5 Gram(s) IV Push once  FIRST- Mouthwash  BLM 5 milliLiter(s) Swish and Spit daily  glucagon  Injectable 1 milliGRAM(s) IntraMuscular once  insulin glargine Injectable (LANTUS) 25 Unit(s) SubCutaneous at bedtime  insulin lispro (ADMELOG) corrective regimen sliding scale   SubCutaneous at bedtime  insulin lispro (ADMELOG) corrective regimen sliding scale   SubCutaneous three times a day before meals  insulin lispro Injectable (ADMELOG) 9 Unit(s) SubCutaneous three times a day before meals  levothyroxine 175 MICROGram(s) Oral daily  losartan 100 milliGRAM(s) Oral daily  metoprolol succinate ER 25 milliGRAM(s) Oral daily  nystatin Powder 1 Application(s) Topical two times a day  pantoprazole    Tablet 40 milliGRAM(s) Oral before breakfast  polyethylene glycol 3350 17 Gram(s) Oral two times a day    MEDICATIONS  (PRN):  acetaminophen     Tablet .. 650 milliGRAM(s) Oral every 6 hours PRN Temp greater or equal to 38C (100.4F), Mild Pain (1 - 3)  ALPRAZolam 0.5 milliGRAM(s) Oral daily PRN anxiety  aluminum hydroxide/magnesium hydroxide/simethicone Suspension 30 milliLiter(s) Oral every 4 hours PRN Dyspepsia  artificial  tears Solution 1 Drop(s) Both EYES every 1 hour PRN Dry Eyes  dextrose Oral Gel 15 Gram(s) Oral once PRN Blood Glucose LESS THAN 70 milliGRAM(s)/deciliter  melatonin 3 milliGRAM(s) Oral at bedtime PRN Insomnia  ondansetron Injectable 4 milliGRAM(s) IV Push every 8 hours PRN Nausea and/or Vomiting      CAPILLARY BLOOD GLUCOSE      POCT Blood Glucose.: 137 mg/dL (31 Mar 2024 21:58)  POCT Blood Glucose.: 163 mg/dL (31 Mar 2024 18:47)  POCT Blood Glucose.: 79 mg/dL (31 Mar 2024 17:28)  POCT Blood Glucose.: 102 mg/dL (31 Mar 2024 12:54)  POCT Blood Glucose.: 81 mg/dL (31 Mar 2024 09:03)    I&O's Summary    31 Mar 2024 07:01  -  01 Apr 2024 07:00  --------------------------------------------------------  IN: 320 mL / OUT: 2550 mL / NET: -2230 mL        Vital Signs Last 24 Hrs  T(C): 37 (01 Apr 2024 04:40), Max: 37.9 (31 Mar 2024 21:39)  T(F): 98.6 (01 Apr 2024 04:40), Max: 100.3 (31 Mar 2024 21:39)  HR: 68 (01 Apr 2024 04:40) (67 - 74)  BP: 146/73 (01 Apr 2024 04:40) (126/84 - 160/79)  BP(mean): --  RR: 18 (01 Apr 2024 04:40) (18 - 18)  SpO2: 94% (01 Apr 2024 04:40) (93% - 96%)    Parameters below as of 01 Apr 2024 04:40  Patient On (Oxygen Delivery Method): room air        PHYSICAL EXAM:  GENERAL: NAD, well-developed, well-nourished  HEAD: Atraumatic, Normocephalic  EYES: Conjunctiva and sclera clear  CHEST/LUNG: Clear to auscultation bilaterally; No wheezes or crackles  HEART: Normal S1/S2; Regular rate and rhythm; No murmurs, rubs, or gallops  ABDOMEN: Soft, Nontender, Nondistended; Bowel sounds present  EXTREMITIES: No clubbing, cyanosis, or edema  PSYCH: A&Ox3      LABS:                        11.1   15.52 )-----------( 460      ( 31 Mar 2024 10:18 )             35.1      03-31    140  |  104  |  23  ----------------------------<  83  3.3<L>   |  21<L>  |  1.05    Ca    7.9<L>      31 Mar 2024 10:17  Phos  3.0     03-31  Mg     2.0     03-31    TPro  6.0  /  Alb  2.8<L>  /  TBili  0.5  /  DBili  x   /  AST  8<L>  /  ALT  16  /  AlkPhos  132<H>  03-31          Urinalysis Basic - ( 31 Mar 2024 10:17 )    Color: x / Appearance: x / SG: x / pH: x  Gluc: 83 mg/dL / Ketone: x  / Bili: x / Urobili: x   Blood: x / Protein: x / Nitrite: x   Leuk Esterase: x / RBC: x / WBC x   Sq Epi: x / Non Sq Epi: x / Bacteria: x        RADIOLOGY & ADDITIONAL TESTS:     Joyce Horvath, PGY1    Patient is a 73y old  Female who presents with a chief complaint of nausea, diarrhea, malaise (31 Mar 2024 11:47)      SUBJECTIVE / OVERNIGHT EVENTS: NAEO. Bladder scans overnight were in the 100s. Pt states she is able to urinate well on her own. Pt states she is able to sleep well but states she is stressed. Pt denies chest pain, SOB, N/V, fever/chills. States she wants to go home.    MEDICATIONS  (STANDING):  ALPRAZolam 1 milliGRAM(s) Oral at bedtime  aMIOdarone    Tablet   Oral   aMIOdarone    Tablet 400 milliGRAM(s) Oral every 12 hours  amLODIPine   Tablet 5 milliGRAM(s) Oral daily  apixaban 5 milliGRAM(s) Oral every 12 hours  aspirin enteric coated 81 milliGRAM(s) Oral daily  cefTRIAXone   IVPB 2000 milliGRAM(s) IV Intermittent every 24 hours  chlorhexidine 2% Cloths 1 Application(s) Topical daily  dextrose 5%. 1000 milliLiter(s) (50 mL/Hr) IV Continuous <Continuous>  dextrose 5%. 1000 milliLiter(s) (100 mL/Hr) IV Continuous <Continuous>  dextrose 50% Injectable 25 Gram(s) IV Push once  dextrose 50% Injectable 25 Gram(s) IV Push once  dextrose 50% Injectable 12.5 Gram(s) IV Push once  FIRST- Mouthwash  BLM 5 milliLiter(s) Swish and Spit daily  glucagon  Injectable 1 milliGRAM(s) IntraMuscular once  insulin glargine Injectable (LANTUS) 25 Unit(s) SubCutaneous at bedtime  insulin lispro (ADMELOG) corrective regimen sliding scale   SubCutaneous at bedtime  insulin lispro (ADMELOG) corrective regimen sliding scale   SubCutaneous three times a day before meals  insulin lispro Injectable (ADMELOG) 9 Unit(s) SubCutaneous three times a day before meals  levothyroxine 175 MICROGram(s) Oral daily  losartan 100 milliGRAM(s) Oral daily  metoprolol succinate ER 25 milliGRAM(s) Oral daily  nystatin Powder 1 Application(s) Topical two times a day  pantoprazole    Tablet 40 milliGRAM(s) Oral before breakfast  polyethylene glycol 3350 17 Gram(s) Oral two times a day    MEDICATIONS  (PRN):  acetaminophen     Tablet .. 650 milliGRAM(s) Oral every 6 hours PRN Temp greater or equal to 38C (100.4F), Mild Pain (1 - 3)  ALPRAZolam 0.5 milliGRAM(s) Oral daily PRN anxiety  aluminum hydroxide/magnesium hydroxide/simethicone Suspension 30 milliLiter(s) Oral every 4 hours PRN Dyspepsia  artificial  tears Solution 1 Drop(s) Both EYES every 1 hour PRN Dry Eyes  dextrose Oral Gel 15 Gram(s) Oral once PRN Blood Glucose LESS THAN 70 milliGRAM(s)/deciliter  melatonin 3 milliGRAM(s) Oral at bedtime PRN Insomnia  ondansetron Injectable 4 milliGRAM(s) IV Push every 8 hours PRN Nausea and/or Vomiting      CAPILLARY BLOOD GLUCOSE      POCT Blood Glucose.: 137 mg/dL (31 Mar 2024 21:58)  POCT Blood Glucose.: 163 mg/dL (31 Mar 2024 18:47)  POCT Blood Glucose.: 79 mg/dL (31 Mar 2024 17:28)  POCT Blood Glucose.: 102 mg/dL (31 Mar 2024 12:54)  POCT Blood Glucose.: 81 mg/dL (31 Mar 2024 09:03)    I&O's Summary    31 Mar 2024 07:01  -  01 Apr 2024 07:00  --------------------------------------------------------  IN: 320 mL / OUT: 2550 mL / NET: -2230 mL        Vital Signs Last 24 Hrs  T(C): 37 (01 Apr 2024 04:40), Max: 37.9 (31 Mar 2024 21:39)  T(F): 98.6 (01 Apr 2024 04:40), Max: 100.3 (31 Mar 2024 21:39)  HR: 68 (01 Apr 2024 04:40) (67 - 74)  BP: 146/73 (01 Apr 2024 04:40) (126/84 - 160/79)  BP(mean): --  RR: 18 (01 Apr 2024 04:40) (18 - 18)  SpO2: 94% (01 Apr 2024 04:40) (93% - 96%)    Parameters below as of 01 Apr 2024 04:40  Patient On (Oxygen Delivery Method): room air        PHYSICAL EXAM:  GENERAL: NAD, well-developed, well-nourished  HEAD: Atraumatic, Normocephalic  EYES: Conjunctiva and sclera clear  CHEST/LUNG: Clear to auscultation bilaterally; No wheezes or crackles  HEART: Normal S1/S2; Regular rate and rhythm; No murmurs, rubs, or gallops  ABDOMEN: Soft, Nontender, Nondistended; Bowel sounds present  EXTREMITIES: No clubbing, cyanosis, or edema  PSYCH: A&Ox3      LABS:                        11.1   15.52 )-----------( 460      ( 31 Mar 2024 10:18 )             35.1      03-31    140  |  104  |  23  ----------------------------<  83  3.3<L>   |  21<L>  |  1.05    Ca    7.9<L>      31 Mar 2024 10:17  Phos  3.0     03-31  Mg     2.0     03-31    TPro  6.0  /  Alb  2.8<L>  /  TBili  0.5  /  DBili  x   /  AST  8<L>  /  ALT  16  /  AlkPhos  132<H>  03-31          Urinalysis Basic - ( 31 Mar 2024 10:17 )    Color: x / Appearance: x / SG: x / pH: x  Gluc: 83 mg/dL / Ketone: x  / Bili: x / Urobili: x   Blood: x / Protein: x / Nitrite: x   Leuk Esterase: x / RBC: x / WBC x   Sq Epi: x / Non Sq Epi: x / Bacteria: x        RADIOLOGY & ADDITIONAL TESTS:     Joyce Horvath, PGY1    Patient is a 73y old  Female who presents with a chief complaint of nausea, diarrhea, malaise (31 Mar 2024 11:47)      SUBJECTIVE / OVERNIGHT EVENTS: Pt w/ temp of 100.3 overnight. Bladder scans overnight were in the 100s. Pt states she is able to urinate well on her own. Pt states she is able to sleep well but states she is stressed. Pt denies chest pain, SOB, N/V, fever/chills. States she wants to go home.    MEDICATIONS  (STANDING):  ALPRAZolam 1 milliGRAM(s) Oral at bedtime  aMIOdarone    Tablet   Oral   aMIOdarone    Tablet 400 milliGRAM(s) Oral every 12 hours  amLODIPine   Tablet 5 milliGRAM(s) Oral daily  apixaban 5 milliGRAM(s) Oral every 12 hours  aspirin enteric coated 81 milliGRAM(s) Oral daily  cefTRIAXone   IVPB 2000 milliGRAM(s) IV Intermittent every 24 hours  chlorhexidine 2% Cloths 1 Application(s) Topical daily  dextrose 5%. 1000 milliLiter(s) (50 mL/Hr) IV Continuous <Continuous>  dextrose 5%. 1000 milliLiter(s) (100 mL/Hr) IV Continuous <Continuous>  dextrose 50% Injectable 25 Gram(s) IV Push once  dextrose 50% Injectable 25 Gram(s) IV Push once  dextrose 50% Injectable 12.5 Gram(s) IV Push once  FIRST- Mouthwash  BLM 5 milliLiter(s) Swish and Spit daily  glucagon  Injectable 1 milliGRAM(s) IntraMuscular once  insulin glargine Injectable (LANTUS) 25 Unit(s) SubCutaneous at bedtime  insulin lispro (ADMELOG) corrective regimen sliding scale   SubCutaneous at bedtime  insulin lispro (ADMELOG) corrective regimen sliding scale   SubCutaneous three times a day before meals  insulin lispro Injectable (ADMELOG) 9 Unit(s) SubCutaneous three times a day before meals  levothyroxine 175 MICROGram(s) Oral daily  losartan 100 milliGRAM(s) Oral daily  metoprolol succinate ER 25 milliGRAM(s) Oral daily  nystatin Powder 1 Application(s) Topical two times a day  pantoprazole    Tablet 40 milliGRAM(s) Oral before breakfast  polyethylene glycol 3350 17 Gram(s) Oral two times a day    MEDICATIONS  (PRN):  acetaminophen     Tablet .. 650 milliGRAM(s) Oral every 6 hours PRN Temp greater or equal to 38C (100.4F), Mild Pain (1 - 3)  ALPRAZolam 0.5 milliGRAM(s) Oral daily PRN anxiety  aluminum hydroxide/magnesium hydroxide/simethicone Suspension 30 milliLiter(s) Oral every 4 hours PRN Dyspepsia  artificial  tears Solution 1 Drop(s) Both EYES every 1 hour PRN Dry Eyes  dextrose Oral Gel 15 Gram(s) Oral once PRN Blood Glucose LESS THAN 70 milliGRAM(s)/deciliter  melatonin 3 milliGRAM(s) Oral at bedtime PRN Insomnia  ondansetron Injectable 4 milliGRAM(s) IV Push every 8 hours PRN Nausea and/or Vomiting      CAPILLARY BLOOD GLUCOSE      POCT Blood Glucose.: 137 mg/dL (31 Mar 2024 21:58)  POCT Blood Glucose.: 163 mg/dL (31 Mar 2024 18:47)  POCT Blood Glucose.: 79 mg/dL (31 Mar 2024 17:28)  POCT Blood Glucose.: 102 mg/dL (31 Mar 2024 12:54)  POCT Blood Glucose.: 81 mg/dL (31 Mar 2024 09:03)    I&O's Summary    31 Mar 2024 07:01  -  01 Apr 2024 07:00  --------------------------------------------------------  IN: 320 mL / OUT: 2550 mL / NET: -2230 mL        Vital Signs Last 24 Hrs  T(C): 37 (01 Apr 2024 04:40), Max: 37.9 (31 Mar 2024 21:39)  T(F): 98.6 (01 Apr 2024 04:40), Max: 100.3 (31 Mar 2024 21:39)  HR: 68 (01 Apr 2024 04:40) (67 - 74)  BP: 146/73 (01 Apr 2024 04:40) (126/84 - 160/79)  BP(mean): --  RR: 18 (01 Apr 2024 04:40) (18 - 18)  SpO2: 94% (01 Apr 2024 04:40) (93% - 96%)    Parameters below as of 01 Apr 2024 04:40  Patient On (Oxygen Delivery Method): room air        PHYSICAL EXAM:  GENERAL: NAD, well-developed, well-nourished  HEAD: Atraumatic, Normocephalic  EYES: Conjunctiva and sclera clear  CHEST/LUNG: Clear to auscultation bilaterally; No wheezes or crackles  HEART: Normal S1/S2; Regular rate and rhythm; No murmurs, rubs, or gallops  ABDOMEN: Soft, Nontender, Nondistended; Bowel sounds present  EXTREMITIES: No clubbing, cyanosis, or edema  PSYCH: A&Ox3      LABS:                        11.1   15.52 )-----------( 460      ( 31 Mar 2024 10:18 )             35.1      03-31    140  |  104  |  23  ----------------------------<  83  3.3<L>   |  21<L>  |  1.05    Ca    7.9<L>      31 Mar 2024 10:17  Phos  3.0     03-31  Mg     2.0     03-31    TPro  6.0  /  Alb  2.8<L>  /  TBili  0.5  /  DBili  x   /  AST  8<L>  /  ALT  16  /  AlkPhos  132<H>  03-31          Urinalysis Basic - ( 31 Mar 2024 10:17 )    Color: x / Appearance: x / SG: x / pH: x  Gluc: 83 mg/dL / Ketone: x  / Bili: x / Urobili: x   Blood: x / Protein: x / Nitrite: x   Leuk Esterase: x / RBC: x / WBC x   Sq Epi: x / Non Sq Epi: x / Bacteria: x        RADIOLOGY & ADDITIONAL TESTS:

## 2024-04-01 NOTE — PROGRESS NOTE ADULT - PROBLEM SELECTOR PLAN 2
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 to ceftriaxone   - UCx with gram negative rods   - overall improving    Plan:  - urology following, appreciate recs  - per urology, no acute  intervention at this time  - antibiotics as per above  - TOV ongoing

## 2024-04-01 NOTE — PROGRESS NOTE ADULT - PROBLEM SELECTOR PLAN 5
- glucose 300s-400 this admission  - home meds: Tresiba 20 units QHS, novolog sliding scale (unclear how patient determines dose)  - a1c 9.8 (8.2 in 7/2023)  - lantus 27 units QHS  - admelog 11 units TIDAC (hold if NPO)  - moderate dose admelog ISS TID with meals + QHS  - adjust insulin as needed for hyperglycemia  - f/u lipid panel - glucose 300s-400 this admission  - home meds: Tresiba 20 units QHS, novolog sliding scale (unclear how patient determines dose)  - a1c 9.8 (8.2 in 7/2023)  - lantus 24 units QHS  - admelog 8 units TIDAC (hold if NPO)  - moderate dose admelog ISS TID with meals + QHS  - adjust insulin as needed for hyperglycemia  - f/u lipid panel

## 2024-04-01 NOTE — PROGRESS NOTE ADULT - PROBLEM SELECTOR PLAN 4
SCr on admission 1.54 -> 1.69 -> 1.85 -> 1.74 -> 1.5 -> 1.2  - baseline Cr 0.70 in 7/2023  - per patient, no known history of CKD  - likely 2/2 ATN ISO hemodynamic instability +/- prerenal ISO sepsis  - FeNa 0.1% c/w prerenal DEEPA  - trend Cr, improving  - TOV today   - renal US with R renal cyst, no hydronephrosis, possible small focus of air in collecting system c/w prev CT, no abscess SCr on admission 1.54 -> 1.69 -> 1.85 -> 1.74 -> 1.5 -> 1.2  - baseline Cr 0.70 in 7/2023  - per patient, no known history of CKD  - likely 2/2 ATN ISO hemodynamic instability +/- prerenal ISO sepsis  - FeNa 0.1% c/w prerenal DEEPA  - trend Cr, improving  - TOV ongoing  - renal US with R renal cyst, no hydronephrosis, possible small focus of air in collecting system c/w prev CT, no abscess

## 2024-04-01 NOTE — PROGRESS NOTE ADULT - PROBLEM SELECTOR PROBLEM 3
Return for new or worsening symptoms.  As we discussed, your x-ray today showed a fracture, or broken bone, of the proximal humerus, the upper bone of the arm.  You have been placed in a sling to help stabilize the fracture.  You may take ibuprofen or Tylenol as needed for mild to moderate pain and prescription medication for more severe pain.  This has been sent to your pharmacy.  Call [facility] tomorrow to make a follow-up appointment.   Atrial fibrillation with RVR

## 2024-04-02 VITALS
TEMPERATURE: 98 F | DIASTOLIC BLOOD PRESSURE: 70 MMHG | OXYGEN SATURATION: 95 % | RESPIRATION RATE: 18 BRPM | HEART RATE: 60 BPM | SYSTOLIC BLOOD PRESSURE: 110 MMHG

## 2024-04-02 LAB
ALBUMIN SERPL ELPH-MCNC: 3 G/DL — LOW (ref 3.3–5)
ALP SERPL-CCNC: 117 U/L — SIGNIFICANT CHANGE UP (ref 40–120)
ALT FLD-CCNC: 13 U/L — SIGNIFICANT CHANGE UP (ref 10–45)
ANION GAP SERPL CALC-SCNC: 15 MMOL/L — SIGNIFICANT CHANGE UP (ref 5–17)
AST SERPL-CCNC: 8 U/L — LOW (ref 10–40)
BILIRUB SERPL-MCNC: 0.5 MG/DL — SIGNIFICANT CHANGE UP (ref 0.2–1.2)
BUN SERPL-MCNC: 13 MG/DL — SIGNIFICANT CHANGE UP (ref 7–23)
CALCIUM SERPL-MCNC: 8.3 MG/DL — LOW (ref 8.4–10.5)
CHLORIDE SERPL-SCNC: 104 MMOL/L — SIGNIFICANT CHANGE UP (ref 96–108)
CO2 SERPL-SCNC: 21 MMOL/L — LOW (ref 22–31)
CREAT SERPL-MCNC: 1.08 MG/DL — SIGNIFICANT CHANGE UP (ref 0.5–1.3)
EGFR: 54 ML/MIN/1.73M2 — LOW
GLUCOSE BLDC GLUCOMTR-MCNC: 108 MG/DL — HIGH (ref 70–99)
GLUCOSE BLDC GLUCOMTR-MCNC: 135 MG/DL — HIGH (ref 70–99)
GLUCOSE BLDC GLUCOMTR-MCNC: 138 MG/DL — HIGH (ref 70–99)
GLUCOSE SERPL-MCNC: 138 MG/DL — HIGH (ref 70–99)
HCT VFR BLD CALC: 34.3 % — LOW (ref 34.5–45)
HGB BLD-MCNC: 11 G/DL — LOW (ref 11.5–15.5)
MCHC RBC-ENTMCNC: 27.8 PG — SIGNIFICANT CHANGE UP (ref 27–34)
MCHC RBC-ENTMCNC: 32.1 GM/DL — SIGNIFICANT CHANGE UP (ref 32–36)
MCV RBC AUTO: 86.8 FL — SIGNIFICANT CHANGE UP (ref 80–100)
NRBC # BLD: 0 /100 WBCS — SIGNIFICANT CHANGE UP (ref 0–0)
PLATELET # BLD AUTO: 444 K/UL — HIGH (ref 150–400)
POTASSIUM SERPL-MCNC: 3.7 MMOL/L — SIGNIFICANT CHANGE UP (ref 3.5–5.3)
POTASSIUM SERPL-SCNC: 3.7 MMOL/L — SIGNIFICANT CHANGE UP (ref 3.5–5.3)
PROT SERPL-MCNC: 6.4 G/DL — SIGNIFICANT CHANGE UP (ref 6–8.3)
RBC # BLD: 3.95 M/UL — SIGNIFICANT CHANGE UP (ref 3.8–5.2)
RBC # FLD: 13.2 % — SIGNIFICANT CHANGE UP (ref 10.3–14.5)
SODIUM SERPL-SCNC: 140 MMOL/L — SIGNIFICANT CHANGE UP (ref 135–145)
WBC # BLD: 16.36 K/UL — HIGH (ref 3.8–10.5)
WBC # FLD AUTO: 16.36 K/UL — HIGH (ref 3.8–10.5)

## 2024-04-02 PROCEDURE — 87077 CULTURE AEROBIC IDENTIFY: CPT

## 2024-04-02 PROCEDURE — 84484 ASSAY OF TROPONIN QUANT: CPT

## 2024-04-02 PROCEDURE — 99291 CRITICAL CARE FIRST HOUR: CPT | Mod: 25

## 2024-04-02 PROCEDURE — 82962 GLUCOSE BLOOD TEST: CPT

## 2024-04-02 PROCEDURE — 76705 ECHO EXAM OF ABDOMEN: CPT

## 2024-04-02 PROCEDURE — 81001 URINALYSIS AUTO W/SCOPE: CPT

## 2024-04-02 PROCEDURE — 96365 THER/PROPH/DIAG IV INF INIT: CPT

## 2024-04-02 PROCEDURE — 82947 ASSAY GLUCOSE BLOOD QUANT: CPT

## 2024-04-02 PROCEDURE — 97530 THERAPEUTIC ACTIVITIES: CPT

## 2024-04-02 PROCEDURE — 76770 US EXAM ABDO BACK WALL COMP: CPT

## 2024-04-02 PROCEDURE — 71045 X-RAY EXAM CHEST 1 VIEW: CPT

## 2024-04-02 PROCEDURE — 83605 ASSAY OF LACTIC ACID: CPT

## 2024-04-02 PROCEDURE — 87186 SC STD MICRODIL/AGAR DIL: CPT

## 2024-04-02 PROCEDURE — 82330 ASSAY OF CALCIUM: CPT

## 2024-04-02 PROCEDURE — 93005 ELECTROCARDIOGRAM TRACING: CPT

## 2024-04-02 PROCEDURE — 82435 ASSAY OF BLOOD CHLORIDE: CPT

## 2024-04-02 PROCEDURE — 82570 ASSAY OF URINE CREATININE: CPT

## 2024-04-02 PROCEDURE — 83036 HEMOGLOBIN GLYCOSYLATED A1C: CPT

## 2024-04-02 PROCEDURE — 87150 DNA/RNA AMPLIFIED PROBE: CPT

## 2024-04-02 PROCEDURE — 82803 BLOOD GASES ANY COMBINATION: CPT

## 2024-04-02 PROCEDURE — 85025 COMPLETE CBC W/AUTO DIFF WBC: CPT

## 2024-04-02 PROCEDURE — 99233 SBSQ HOSP IP/OBS HIGH 50: CPT

## 2024-04-02 PROCEDURE — 83735 ASSAY OF MAGNESIUM: CPT

## 2024-04-02 PROCEDURE — 84443 ASSAY THYROID STIM HORMONE: CPT

## 2024-04-02 PROCEDURE — 84132 ASSAY OF SERUM POTASSIUM: CPT

## 2024-04-02 PROCEDURE — 96375 TX/PRO/DX INJ NEW DRUG ADDON: CPT

## 2024-04-02 PROCEDURE — 71275 CT ANGIOGRAPHY CHEST: CPT | Mod: MC

## 2024-04-02 PROCEDURE — 80048 BASIC METABOLIC PNL TOTAL CA: CPT

## 2024-04-02 PROCEDURE — 85014 HEMATOCRIT: CPT

## 2024-04-02 PROCEDURE — 80053 COMPREHEN METABOLIC PANEL: CPT

## 2024-04-02 PROCEDURE — 87637 SARSCOV2&INF A&B&RSV AMP PRB: CPT

## 2024-04-02 PROCEDURE — 87640 STAPH A DNA AMP PROBE: CPT

## 2024-04-02 PROCEDURE — 93312 ECHO TRANSESOPHAGEAL: CPT

## 2024-04-02 PROCEDURE — 99239 HOSP IP/OBS DSCHRG MGMT >30: CPT

## 2024-04-02 PROCEDURE — 84295 ASSAY OF SERUM SODIUM: CPT

## 2024-04-02 PROCEDURE — 93325 DOPPLER ECHO COLOR FLOW MAPG: CPT

## 2024-04-02 PROCEDURE — 36415 COLL VENOUS BLD VENIPUNCTURE: CPT

## 2024-04-02 PROCEDURE — 85027 COMPLETE CBC AUTOMATED: CPT

## 2024-04-02 PROCEDURE — 93320 DOPPLER ECHO COMPLETE: CPT

## 2024-04-02 PROCEDURE — 87040 BLOOD CULTURE FOR BACTERIA: CPT

## 2024-04-02 PROCEDURE — 97110 THERAPEUTIC EXERCISES: CPT

## 2024-04-02 PROCEDURE — 80061 LIPID PANEL: CPT

## 2024-04-02 PROCEDURE — 82977 ASSAY OF GGT: CPT

## 2024-04-02 PROCEDURE — 92960 CARDIOVERSION ELECTRIC EXT: CPT

## 2024-04-02 PROCEDURE — 93306 TTE W/DOPPLER COMPLETE: CPT

## 2024-04-02 PROCEDURE — 87641 MR-STAPH DNA AMP PROBE: CPT

## 2024-04-02 PROCEDURE — 87086 URINE CULTURE/COLONY COUNT: CPT

## 2024-04-02 PROCEDURE — 97162 PT EVAL MOD COMPLEX 30 MIN: CPT

## 2024-04-02 PROCEDURE — 84300 ASSAY OF URINE SODIUM: CPT

## 2024-04-02 PROCEDURE — 84100 ASSAY OF PHOSPHORUS: CPT

## 2024-04-02 PROCEDURE — 74177 CT ABD & PELVIS W/CONTRAST: CPT | Mod: MC

## 2024-04-02 PROCEDURE — 83880 ASSAY OF NATRIURETIC PEPTIDE: CPT

## 2024-04-02 PROCEDURE — 97116 GAIT TRAINING THERAPY: CPT

## 2024-04-02 PROCEDURE — 85018 HEMOGLOBIN: CPT

## 2024-04-02 RX ORDER — INSULIN LISPRO 100/ML
8 VIAL (ML) SUBCUTANEOUS
Qty: 0 | Refills: 0 | DISCHARGE
Start: 2024-04-02

## 2024-04-02 RX ORDER — AMIODARONE HYDROCHLORIDE 400 MG/1
1 TABLET ORAL
Qty: 4 | Refills: 0
Start: 2024-04-02 | End: 2024-04-03

## 2024-04-02 RX ORDER — APIXABAN 2.5 MG/1
1 TABLET, FILM COATED ORAL
Qty: 30 | Refills: 0
Start: 2024-04-02

## 2024-04-02 RX ORDER — METOPROLOL TARTRATE 50 MG
1 TABLET ORAL
Qty: 30 | Refills: 0
Start: 2024-04-02

## 2024-04-02 RX ORDER — CEPHALEXIN 500 MG
1 CAPSULE ORAL
Qty: 16 | Refills: 0
Start: 2024-04-02 | End: 2024-04-09

## 2024-04-02 RX ORDER — INSULIN GLARGINE 100 [IU]/ML
24 INJECTION, SOLUTION SUBCUTANEOUS
Qty: 0 | Refills: 0 | DISCHARGE
Start: 2024-04-02

## 2024-04-02 RX ORDER — INSULIN ASPART 100 [IU]/ML
0 INJECTION, SOLUTION SUBCUTANEOUS
Refills: 0 | DISCHARGE

## 2024-04-02 RX ORDER — INSULIN DEGLUDEC 100 U/ML
0 INJECTION, SOLUTION SUBCUTANEOUS
Refills: 0 | DISCHARGE

## 2024-04-02 RX ADMIN — AMIODARONE HYDROCHLORIDE 400 MILLIGRAM(S): 400 TABLET ORAL at 17:56

## 2024-04-02 RX ADMIN — NYSTATIN CREAM 1 APPLICATION(S): 100000 CREAM TOPICAL at 06:27

## 2024-04-02 RX ADMIN — AMIODARONE HYDROCHLORIDE 400 MILLIGRAM(S): 400 TABLET ORAL at 06:28

## 2024-04-02 RX ADMIN — Medication 25 MILLIGRAM(S): at 06:28

## 2024-04-02 RX ADMIN — NYSTATIN CREAM 1 APPLICATION(S): 100000 CREAM TOPICAL at 17:55

## 2024-04-02 RX ADMIN — APIXABAN 5 MILLIGRAM(S): 2.5 TABLET, FILM COATED ORAL at 06:28

## 2024-04-02 RX ADMIN — PANTOPRAZOLE SODIUM 40 MILLIGRAM(S): 20 TABLET, DELAYED RELEASE ORAL at 09:35

## 2024-04-02 RX ADMIN — Medication 81 MILLIGRAM(S): at 12:45

## 2024-04-02 RX ADMIN — APIXABAN 5 MILLIGRAM(S): 2.5 TABLET, FILM COATED ORAL at 17:56

## 2024-04-02 RX ADMIN — CHLORHEXIDINE GLUCONATE 1 APPLICATION(S): 213 SOLUTION TOPICAL at 12:45

## 2024-04-02 RX ADMIN — LOSARTAN POTASSIUM 100 MILLIGRAM(S): 100 TABLET, FILM COATED ORAL at 06:28

## 2024-04-02 RX ADMIN — CEFTRIAXONE 100 MILLIGRAM(S): 500 INJECTION, POWDER, FOR SOLUTION INTRAMUSCULAR; INTRAVENOUS at 12:53

## 2024-04-02 RX ADMIN — Medication 8 UNIT(S): at 12:46

## 2024-04-02 RX ADMIN — Medication 8 UNIT(S): at 09:35

## 2024-04-02 RX ADMIN — Medication 175 MICROGRAM(S): at 06:27

## 2024-04-02 RX ADMIN — AMLODIPINE BESYLATE 5 MILLIGRAM(S): 2.5 TABLET ORAL at 06:28

## 2024-04-02 NOTE — CONSULT NOTE ADULT - CONSULT REASON
AF w/ RVR
AFIB RVR, SOB
Bacteremia
emphysematous pyelitis
Worsening pyelonephritis while on abx. Consult for potential drain placement

## 2024-04-02 NOTE — PROGRESS NOTE ADULT - PROBLEM SELECTOR PLAN 2
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 to ceftriaxone   - UCx with gram negative rods   - overall improving    Plan:  - urology following, appreciate recs  - per urology, no acute  intervention at this time  - antibiotics as per above  - TOV ongoing

## 2024-04-02 NOTE — PROGRESS NOTE ADULT - PROBLEM SELECTOR PLAN 8
Previously on atorvastatin, per pt no longer taking this medication & atorvastatin listed as allergy but does not recall reaction  - hold statin for now  - f/u lipid panel
Previously on atorvastatin, per pt no longer taking this medication & atorvastatin listed as allergy but does not recall reaction  - review outpatient records/clarify with daughter regarding indication for stopping statin  - hold statin for now
Previously on atorvastatin, per pt no longer taking this medication & atorvastatin listed as allergy but does not recall reaction  - hold statin for now  - f/u lipid panel
Previously on atorvastatin, per pt no longer taking this medication & atorvastatin listed as allergy but does not recall reaction  - review outpatient records/clarify with daughter regarding indication for stopping statin  - hold statin for now  - f/u lipid panel

## 2024-04-02 NOTE — PROGRESS NOTE ADULT - REASON FOR ADMISSION
nausea, diarrhea, malaise

## 2024-04-02 NOTE — PROGRESS NOTE ADULT - PROBLEM SELECTOR PLAN 11
- home med: xanax 2mg QHS, 1 mg QD PRN for anxiety  - ISTOP in chart    Plan:   - xanax 1 mg QHS  - xanax 0.5 mg QD PRN for anxiety  - if able to tolerate lower dose this admission, will likely advise dose reduction on discharge

## 2024-04-02 NOTE — PROGRESS NOTE ADULT - PROBLEM SELECTOR PLAN 7
Home meds: amlodipine 5 mg QD, losartan 100 mg QD  - restarted amlodipine 5mg 3/30  - restarted losartan 3/31  - d/c on HCTZ 12.5mg qd per cards

## 2024-04-02 NOTE — CONSULT NOTE ADULT - REASON FOR ADMISSION
nausea, diarrhea, malaise

## 2024-04-02 NOTE — CONSULT NOTE ADULT - SUBJECTIVE AND OBJECTIVE BOX
Interventional Radiology    Evaluate for Procedure: Worsening pyelonephritis while on abx. Consult for potential drain placement    HPI: 73-year-old female with history of paroxysmal A-fib (previously on eliquis, d/c'd 7/2023), hypertension, hyperlipidemia, diabetes, spinal stenosis, anxiety, p/w 3 days of n/v, diarrhea, malaise. Admitted with severe sepsis 2/2 pyelonephritis/emphysematous pyelitis, with gram negative bacteremia. Course c/b DEEPA, Afib RVR. IR being consulted for potential drain placement d/t CT abd with questionable area of abscess formation in right kidney.    Allergies: Lipitor (Nausea)  latex (Rash)    Medications (Abx/Cardiac/Anticoagulation/Blood Products)    aMIOdarone    Tablet: 400 milliGRAM(s) Oral (04-02 @ 06:28)  amLODIPine   Tablet: 5 milliGRAM(s) Oral (04-02 @ 06:28)  apixaban: 5 milliGRAM(s) Oral (04-02 @ 06:28)  aspirin enteric coated: 81 milliGRAM(s) Oral (04-01 @ 08:46)  cefTRIAXone   IVPB: 100 mL/Hr IV Intermittent (04-01 @ 13:13)  losartan: 100 milliGRAM(s) Oral (04-02 @ 06:28)  metoprolol succinate ER: 25 milliGRAM(s) Oral (04-02 @ 06:28)    Data:    T(C): 37.7  HR: 65  BP: 140/65  RR: 18  SpO2: 97%    -WBC 17.63 / HgB 11.3 / Hct 34.7 / Plt 471  -Na 137 / Cl 102 / BUN 15 / Glucose 70  -K 4.1 / CO2 19 / Cr 1.00  -ALT 16 / Alk Phos 121 / T.Bili 0.7  -INR 1.03 / PTT 34.1    Radiology: < from: CT Abdomen and Pelvis w/ IV Cont (04.01.24 @ 15:45) >  ACC: [id number] EXAM:  CT ABDOMEN AND PELVIS IC   ORDERED BY: TACHO Maki     PROCEDURE DATE:  04/01/2024          INTERPRETATION:  CLINICAL INFORMATION: Fever and leukocytosis, evaluate   for infection.    COMPARISON: Right upper quadrant ultrasound 3/20/2024. Renal ultrasound   3/26/2024. CT abdomen and pelvis 3/24/2024 and 6/14/2023.    CONTRAST/COMPLICATIONS:  IV Contrast: Omnipaque 350  90 cc administered   10 cc discarded  Oral Contrast: NONE  Complications: None reported at time of study completion    PROCEDURE:  CT of the Abdomen and Pelvis was performed.  Sagittal and coronal reformats were performed.    FINDINGS:  LOWER CHEST: Trace right pleural effusion, new since the prior CT.   Bibasilar subsegmental atelectasis. Order artery calcifications.    LIVER: Within normal limits.  BILE DUCTS: Normal caliber.  GALLBLADDER: Contracted with edematous wall thickening.  SPLEEN: Within normal limits.  PANCREAS: Within normal limits.  ADRENALS: Within normal limits.  KIDNEYS/URETERS: Asymmetric enlargement of the right kidney with right   greater than left perinephric fat infiltration. Striated right nephrogram   with patchy wedge-shaped regions of hypoenhancement, progressed in   comparison to 3/24/2024, and concerning for ongoing acute pyelonephritis.   New multifocal wedge-shaped and ill-defined regions of hypoenhancement   within the right kidney could be related to pyelonephritis, though   infarcts or developing phlegmonous changes are not excluded. For   reference, a larger focus within the posterior mid kidney measures 3.1 x   2.2 cm (series 3 image 81), and an additional example in the lower pole   measures 2.6 x 2.2 cm (series 3 image 83).    No hydronephrosis. Urothelial thickening and hyperenhancement involving   the right renal collecting system. Previously noted gas within the right   renal collecting system has resolved. No evidence of obstructive   urolithiasis.    Cyst in the mid right kidney measuring 1.2 cm. Indeterminate hypodense   left renal lesion measuring 1.3 cm (series 3 image 70), unchanged from   the prior CT, and previously 0.6 cm on 6/14/2023.    BLADDER: Within normal limits.  REPRODUCTIVE ORGANS: Hysterectomy.    BOWEL: No bowel obstruction. Curvilinear hyperdensity within the distal   ascending colon measuring approximately 2.0 cm in length (series 3 image   69), not seen on 3/24/2024. Appendix is normal.  PERITONEUM: No ascites.  VESSELS: Atherosclerotic changes.  RETROPERITONEUM/LYMPH NODES: No lymphadenopathy.  ABDOMINAL WALL: Within normal limits.  BONES: Degenerative changes.    IMPRESSION:  *  Right acute pyelonephritis, which appears progressed since the prior   CT of 3/24/2024. Interval development of multifocal wedge-shaped and   ill-defined regions of hypoenhancement withinthe right kidney, which   could be related to worsening acute pyelonephritis, though developing   infarcts can have a similar appearance. Phlegmonous changes/developing   abscess formation is also a consideration.  *  Indeterminate left renal lesion measuring 1.3 cm. Suggest renal   ultrasound with attention to this area versus contrast enhanced abdominal   MRI or CT utilizing renal mass protocol for further evaluation.  *  Contracted gallbladder with nonspecific edematous wall thickening.  *  Curvilinear hyperdensity within the distal ascending colon measuring   approximately 2.0 cm in length of uncertain etiology, and may represent   something ingested.    < end of copied text >      Assessment/Plan:   73-year-old female with history of paroxysmal A-fib (previously on eliquis, d/c'd 7/2023), hypertension, hyperlipidemia, diabetes, spinal stenosis, anxiety, p/w 3 days of n/v, diarrhea, malaise. Admitted with severe sepsis 2/2 pyelonephritis/emphysematous pyelitis, with gram negative bacteremia. Course c/b DEEPA, Afib RVR. IR being consulted for potential drain placement d/t CT abd with questionable area of abscess formation in right kidney.    NOTE INCOMPLETE TO BE DISCUSSED IN AM HUDDLE  - d/w primary team  - If patient clinically decompensates please contact IR for more urgent intervention      --  Ozzie Quintanilla NP  Interventional Radiology  Available on [employer] TEAMS / [email or web address]    For EMERGENT inquiries/questions:  IR Pager ([facility]): [phone]    For non-emergent consults/questions:   Please place a sunrise order "Consult- Interventional Radiology" with an appropriate callback number    For questions about scheduling during appropriate work hours, call IR :  [facility]: [phone]    For outpatient IR booking:  [facility]: [phone]   Interventional Radiology    Evaluate for Procedure: Worsening pyelonephritis while on abx. Consult for potential drain placement    HPI: 73-year-old female with history of paroxysmal A-fib (previously on eliquis, d/c'd 7/2023), hypertension, hyperlipidemia, diabetes, spinal stenosis, anxiety, p/w 3 days of n/v, diarrhea, malaise. Admitted with severe sepsis 2/2 pyelonephritis/emphysematous pyelitis, with gram negative bacteremia. Course c/b DEEPA, Afib RVR. IR being consulted for potential drain placement d/t CT abd with questionable area of abscess formation in right kidney.    Allergies: Lipitor (Nausea)  latex (Rash)    Medications (Abx/Cardiac/Anticoagulation/Blood Products)    aMIOdarone    Tablet: 400 milliGRAM(s) Oral (04-02 @ 06:28)  amLODIPine   Tablet: 5 milliGRAM(s) Oral (04-02 @ 06:28)  apixaban: 5 milliGRAM(s) Oral (04-02 @ 06:28)  aspirin enteric coated: 81 milliGRAM(s) Oral (04-01 @ 08:46)  cefTRIAXone   IVPB: 100 mL/Hr IV Intermittent (04-01 @ 13:13)  losartan: 100 milliGRAM(s) Oral (04-02 @ 06:28)  metoprolol succinate ER: 25 milliGRAM(s) Oral (04-02 @ 06:28)    Data:    T(C): 37.7  HR: 65  BP: 140/65  RR: 18  SpO2: 97%    -WBC 17.63 / HgB 11.3 / Hct 34.7 / Plt 471  -Na 137 / Cl 102 / BUN 15 / Glucose 70  -K 4.1 / CO2 19 / Cr 1.00  -ALT 16 / Alk Phos 121 / T.Bili 0.7  -INR 1.03 / PTT 34.1    Radiology: < from: CT Abdomen and Pelvis w/ IV Cont (04.01.24 @ 15:45) >  ACC: [id number] EXAM:  CT ABDOMEN AND PELVIS IC   ORDERED BY: TACHO Maki     PROCEDURE DATE:  04/01/2024          INTERPRETATION:  CLINICAL INFORMATION: Fever and leukocytosis, evaluate   for infection.    COMPARISON: Right upper quadrant ultrasound 3/20/2024. Renal ultrasound   3/26/2024. CT abdomen and pelvis 3/24/2024 and 6/14/2023.    CONTRAST/COMPLICATIONS:  IV Contrast: Omnipaque 350  90 cc administered   10 cc discarded  Oral Contrast: NONE  Complications: None reported at time of study completion    PROCEDURE:  CT of the Abdomen and Pelvis was performed.  Sagittal and coronal reformats were performed.    FINDINGS:  LOWER CHEST: Trace right pleural effusion, new since the prior CT.   Bibasilar subsegmental atelectasis. Order artery calcifications.    LIVER: Within normal limits.  BILE DUCTS: Normal caliber.  GALLBLADDER: Contracted with edematous wall thickening.  SPLEEN: Within normal limits.  PANCREAS: Within normal limits.  ADRENALS: Within normal limits.  KIDNEYS/URETERS: Asymmetric enlargement of the right kidney with right   greater than left perinephric fat infiltration. Striated right nephrogram   with patchy wedge-shaped regions of hypoenhancement, progressed in   comparison to 3/24/2024, and concerning for ongoing acute pyelonephritis.   New multifocal wedge-shaped and ill-defined regions of hypoenhancement   within the right kidney could be related to pyelonephritis, though   infarcts or developing phlegmonous changes are not excluded. For   reference, a larger focus within the posterior mid kidney measures 3.1 x   2.2 cm (series 3 image 81), and an additional example in the lower pole   measures 2.6 x 2.2 cm (series 3 image 83).    No hydronephrosis. Urothelial thickening and hyperenhancement involving   the right renal collecting system. Previously noted gas within the right   renal collecting system has resolved. No evidence of obstructive   urolithiasis.    Cyst in the mid right kidney measuring 1.2 cm. Indeterminate hypodense   left renal lesion measuring 1.3 cm (series 3 image 70), unchanged from   the prior CT, and previously 0.6 cm on 6/14/2023.    BLADDER: Within normal limits.  REPRODUCTIVE ORGANS: Hysterectomy.    BOWEL: No bowel obstruction. Curvilinear hyperdensity within the distal   ascending colon measuring approximately 2.0 cm in length (series 3 image   69), not seen on 3/24/2024. Appendix is normal.  PERITONEUM: No ascites.  VESSELS: Atherosclerotic changes.  RETROPERITONEUM/LYMPH NODES: No lymphadenopathy.  ABDOMINAL WALL: Within normal limits.  BONES: Degenerative changes.    IMPRESSION:  *  Right acute pyelonephritis, which appears progressed since the prior   CT of 3/24/2024. Interval development of multifocal wedge-shaped and   ill-defined regions of hypoenhancement within the right kidney, which   could be related to worsening acute pyelonephritis, though developing   infarcts can have a similar appearance. Phlegmonous changes/developing   abscess formation is also a consideration.  *  Indeterminate left renal lesion measuring 1.3 cm. Suggest renal   ultrasound with attention to this area versus contrast enhanced abdominal   MRI or CT utilizing renal mass protocol for further evaluation.  *  Contracted gallbladder with nonspecific edematous wall thickening.  *  Curvilinear hyperdensity within the distal ascending colon measuring   approximately 2.0 cm in length of uncertain etiology, and may represent   something ingested.    < end of copied text >      Assessment/Plan:   73-year-old female with history of paroxysmal A-fib (previously on eliquis, d/c'd 7/2023), hypertension, hyperlipidemia, diabetes, spinal stenosis, anxiety, p/w 3 days of n/v, diarrhea, malaise. Admitted with severe sepsis 2/2 pyelonephritis/emphysematous pyelitis, with gram negative bacteremia. Course c/b DEEPA, Afib RVR. IR being consulted for potential drain placement d/t CT abd with questionable area of abscess formation in right kidney.    - Case & imaging discussed with Dr. Kelly  - There areas in question on CT are not amenable to drainage, there is no sizeable collection, it is multifocal in nature  - No IR intervention at this time  - d/w primary team      --  Ozzie Quintanilla NP  Interventional Radiology  Available on [employer] TEAMS / [email or web address]    For EMERGENT inquiries/questions:  IR Pager ([facility]): [phone]    For non-emergent consults/questions:   Please place a sunrise order "Consult- Interventional Radiology" with an appropriate callback number    For questions about scheduling during appropriate work hours, call IR :  [facility]: [phone]    For outpatient IR booking:  [facility]: [phone]

## 2024-04-02 NOTE — PROGRESS NOTE ADULT - PROBLEM SELECTOR PLAN 4
SCr on admission 1.54 -> 1.69 -> 1.85 -> 1.74 -> 1.5 -> 1.2  - baseline Cr 0.70 in 7/2023  - per patient, no known history of CKD  - likely 2/2 ATN ISO hemodynamic instability +/- prerenal ISO sepsis  - FeNa 0.1% c/w prerenal DEEPA  - trend Cr, improving  - TOV ongoing  - renal US with R renal cyst, no hydronephrosis, possible small focus of air in collecting system c/w prev CT, no abscess

## 2024-04-02 NOTE — PROGRESS NOTE ADULT - PROBLEM SELECTOR PROBLEM 2
Pyelonephritis

## 2024-04-02 NOTE — PROGRESS NOTE ADULT - SUBJECTIVE AND OBJECTIVE BOX
DATE OF SERVICE: 04-02-24 @ 18:19    Patient is a 73y old  Female who presents with a chief complaint of nausea, diarrhea, malaise (02 Apr 2024 15:09)      INTERVAL HISTORY: Feels ok.     REVIEW OF SYSTEMS:  CONSTITUTIONAL: No weakness  EYES/ENT: No visual changes;  No throat pain   NECK: No pain or stiffness  RESPIRATORY: No cough, wheezing; No shortness of breath  CARDIOVASCULAR: No chest pain or palpitations  GASTROINTESTINAL: No abdominal  pain. No nausea, vomiting, or hematemesis  GENITOURINARY: No dysuria, frequency or hematuria  NEUROLOGICAL: No stroke like symptoms  SKIN: No rashes    TELEMETRY Personally reviewed: SB/SR 50-90  	  MEDICATIONS:  aMIOdarone    Tablet 400 milliGRAM(s) Oral every 12 hours  aMIOdarone    Tablet   Oral   amLODIPine   Tablet 5 milliGRAM(s) Oral daily  losartan 100 milliGRAM(s) Oral daily  metoprolol succinate ER 25 milliGRAM(s) Oral daily        PHYSICAL EXAM:  T(C): 36.8 (04-02-24 @ 11:51), Max: 37.7 (04-02-24 @ 05:25)  HR: 60 (04-02-24 @ 11:51) (60 - 65)  BP: 110/70 (04-02-24 @ 11:51) (110/70 - 140/65)  RR: 18 (04-02-24 @ 11:51) (18 - 20)  SpO2: 95% (04-02-24 @ 11:51) (95% - 98%)  Wt(kg): --  I&O's Summary    01 Apr 2024 07:01  -  02 Apr 2024 07:00  --------------------------------------------------------  IN: 770 mL / OUT: 0 mL / NET: 770 mL    02 Apr 2024 07:01  -  02 Apr 2024 18:19  --------------------------------------------------------  IN: 700 mL / OUT: 500 mL / NET: 200 mL          Appearance: In no distress	  HEENT:    PERRL, EOMI	  Cardiovascular:  S1 S2, No JVD  Respiratory: Lungs clear to auscultation	  Gastrointestinal:  Soft, Non-tender, + BS	  Vascularature:  No edema of LE  Psychiatric: Appropriate affect   Neuro: no acute focal deficits                               11.0   16.36 )-----------( 444      ( 02 Apr 2024 06:59 )             34.3     04-02    140  |  104  |  13  ----------------------------<  138<H>  3.7   |  21<L>  |  1.08    Ca    8.3<L>      02 Apr 2024 06:56  Phos  2.9     04-01  Mg     1.9     04-01    TPro  6.4  /  Alb  3.0<L>  /  TBili  0.5  /  DBili  x   /  AST  8<L>  /  ALT  13  /  AlkPhos  117  04-02        Labs personally reviewed      ASSESSMENT/PLAN: 	  73-year-old female history of TIA, A-fib (previously on eliquis, off since 7/2023 after hysterectomy),  HTN, HLD, DM, anxiety, consulted for AFIB RVR with SOB    1. AFIB RVR   - CLAD0BLIA score 8   - on tele currently AFIB @ 125 bpm  - on Eliquis 2.5 mg BID ----> should be Eliquis 5mg BID (patient does not meet 2.5mg criteria)   - 3/27 given Dig 500mcg IV once  - TTE with preserved EF  - cont to monitor on tele   - EP consult appreciated-->s/p successful JOAQUIN/DCCV 3/28 now in SR  - Amio load  - c/w Toprol 25mg PO daily      2. Acute diastolic Heart Failure/SOB  - CTA chest no pulm embolism   - ProBnP 4459   - now successfully weaned of supplemental oxygen  - TTE with normal EF, no WMA and grade I DD  - Now appears euvolemic. Plan for DC on home medication HCTZ 25mg PO daily.      3. Hx of CVA   - on Eliquis 5mg BID  - neuro checks q4h     4. HTN   - on amlodipine 5mg PO daily and losartan 100mg Po daily at home  - c/w Toprol    5. DM  - A1c 9.8  - Endo recs appreciated       Will need close OP follow up for BP check and repeat BMP          GINGER Gracia DO [facility]  Cardiovascular Medicine  [address] [place] [address], [address]  Available through call or text on [employer]  Office: [phone]

## 2024-04-02 NOTE — PROGRESS NOTE ADULT - ATTENDING COMMENTS
72yo F PMH paroxysmal AF (off AC/rate control at home), HTN, HLD, DM2 (a1c 9.8), anxiety on benzos at home admitted 3/24 with severe sepsis due to Klebsiella pneumoniae bacteremia, R pyelonephritis with emphysematous pyelitis course c/b DEEPA and a fib rvr resolved.     1. severe sepsis due to klebsiella bacteremia from R pyelonephritis with emphysematous pyelitis seen on CT; c/w ctx; blood cx 3/25 positive, repeat blood cultures 3/27: no growth to date. Plan for 14 days abx from negative culture, can transition to cipro on d/c per ID. TOV 3/31    2. afib- rates controlled- successful DCCV 3/28. c/w amio and metoprolol.  c/w eliquis.    3. DM2: a1c 9.8- fs controlled; c/w ademolog 11 units pre meals and lantus 27 units at bedtime     4. HTN- c/w amlodipine, will resume ARB     Vianey Murphy D.O.  Division of Hospital Medicine  Available on MS Teams
74yo F PMH paroxysmal AF (off AC/rate control at home), HTN, HLD, DM2 (a1c 9.8), anxiety on benzos at home admitted 3/24 with severe sepsis due to Klebsiella pneumoniae bacteremia, R pyelonephritis with emphysematous pyelitis course c/b DEEPA possibly ATN vs prerenal improving, and difficult to control afib with RVR s/p DCCV today transitioned from dilt gtt to amio load and metoprolol, now improving on ceftriaxone.    1. severe sepsis due to klebsiella bacteremia from R pyelonephritis with emphysematous pyelitis seen on CT: initially on empiric Zosyn 3.375 g q8, BCx sensitivities-sensitive to ceftriaxone, changed 3/26 ; blood cx 3/25 positive, repeat blood cultures 3/27: no growth to date.  Appreciate ID/urology c/s.  Continue aguiar for now per urology. Plan for 14 days abx from negative culture, can transition to cipro on d/c per ID. TOV 3/31    2. afib with RVR: no history of heart failure per chart review.  CTA negative for PE and no heart failure on TTE despite BNP elevated at 4459.  TSH 2.0.  Had successful DCCV 3/28 after not responding to dilt gtt, now being amio loaded per EPS and on metoprolol.  Continue eliquis.    3. DM2: uncontrolled with a1c 9.8.  Suspect uncontrolled due to 50% dosing prior to DCCV.  will increase novolog and lantus based on the last 24 hours    4. DEEPA: baseline 0.7- repeat cr pending    5. HTN- resume amlodipine, will resume ARB if cr stable    iVaney Murphy D.O.  Division of Hospital Medicine  Available on MS Teams
74yo F PMH paroxysmal AF (off AC/rate control at home), HTN, HLD, DM2 (a1c 9.8), anxiety on benzos at home admitted 3/24 with severe sepsis due to Klebsiella pneumoniae bacteremia, R pyelonephritis with emphysematous pyelitis course c/b DEEPA possibly ATN vs prerenal improving, and difficult to control afib with RVR s/p DCCV today transitioned from dilt gtt to amio load and metoprolol, overall improving on ceftriaxone however with worse leukocytosis today and fever overnight, passed TOV, repeat CT showing Right acute pyelonephritis, which appears progressed since the prior   CT of 3/24/2024 with Interval development of multifocal wedge-shaped and ill-defined regions of hypoenhancement withinthe right kidney, which   could be related to worsening acute pyelonephritis, though developing infarcts can have a similar appearance. Phlegmonous changes/developing   abscess formation is also a consideration. *  Indeterminate left renal lesion measuring 1.3 cm, however no intervention per IR, now afebrile and wbc downtrending, ok to d/c on total of 14 days abx through 4/10, with home services.    1. severe sepsis due to klebsiella bacteremia from R pyelonephritis with emphysematous pyelitis seen on CT: initially on empiric Zosyn 3.375 g q8, BCx sensitivities-sensitive to ceftriaxone, changed 3/26 ; blood cx 3/25 positive, repeat blood cultures 3/27: no growth to date.  Appreciate ID/urology c/s. Plan for 14 days abx from negative culture, can transition to cipro on d/c per ID.  Passed TOV with , 121.  Repeat CT as above, appreciate IR consult.  Discussed with Dr Crespo, plan to d/c on keflex 500mg q6 through 4/10 as she can't have cipro with amio.    2. afib with RVR: no history of heart failure per chart review.  CTA negative for PE and no heart failure on TTE despite BNP elevated at 4459.  TSH 2.0.  Had successful DCCV 3/28 after not responding to dilt gtt, now being amio loaded per EPS and on metoprolol.  Continue eliquis, toprol xl 25 mg    3. DM2: uncontrolled with a1c 9.8.  Controlled on 24u lantus, 8 lispro qac.    4. DEEPA: baseline 0.7.  Overall downtrending from admission, now 1.22.  Trend bmp and renally dose meds.  Possible ATN with hypotension in ED vs prerenal, improving.  Continue to monitor and avoid nephrotoxic meds    5. anxiety: continue home benzos.  Avoid withdrawal as would continue to afib rvr.    6. uterine cancer: s/p hysterectomy.  F/u outpatient    7. elevated alk phos: RUQ US no acute findings and alk phos overall downtrending.  Continue to monitor  RUQ US:  Liver: Within normal limits.  Bile ducts: Normal caliber. Common bile duct measures 3 mm.  Gallbladder: Within normal limits. There is a hypoechoic structure   adjacent to the gallbladder and liver, possibly representing loop of   bowel when compared to previous CT abdomen pelvis.  Pancreas: Visualized portions are within normal limits.  Right kidney: 13.2 cm. No hydronephrosis. Small renal cyst.  Ascites: None.  IVC: Visualized portions are within normal limits.    8. Dispo: pt consult: recommends [facility].  Patient refusing, home care arranged.  For d/c home today if remains afebrile and services confirmed.    contact: TEAMS .    discussed with  at bedside
72yo F PMH paroxysmal AF (off AC/rate control at home), HTN, HLD, DM2 (a1c 9.8), anxiety on benzos at home admitted 3/24 with severe sepsis due to Klebsiella pneumoniae bacteremia, R pyelonephritis with emphysematous pyelitis course c/b difficult to control afib with RVR and DEEPA possibly ATN, guarded prognosis on empiric zosyn and iv dilt converted to po dilt, will need interval CT A/P and close urology follow up.     1. severe sepsis due to klebsiella bacteremia from R pyelonephritis with emphysematous pyelitis seen on CT: C/w Zosyn 3.375 g q8 for now, f/u BCx sensitivities; Repeat blood cx Q48h until clear; If not clinically improving, continues to have fevers, worsening leukocytosis would repeat CT A/P w/ IV contrast.  Appreciate ID/urology c/s    2. afib with RVR: no history of heart failure per chart review.  Did not improve with iv toprol or amio in ED, trialed iv dilt and consulted cards.  F/u TSH.  TTE when better rate controlled.  BNP elevated at 4459.  Eliquis.  Check lipid panel.  CTA negative for PE    3. DM2: uncontrolled with a1c 9.8.  Received 30 units in prior 24 hours, with 14 corrective.  Increase to 20u lantus, 5 lispro qac    4. DEEPA: baseline 0.7.  Trend bmp and renally dose meds.  Possible ATN with hypotension in ED    5. anxiety: continue home benzos.  Avoid withdrawal as would continue to afib rvr.    6. uterine cancer: s/p hysterectomy.  F/u outpatient    7. Dispo: pt consult when rate controlled.  Discussed with family at bedside and daughter Alea Rosenbaum on phone [phone]    contact: TEAMS
74yo F PMH paroxysmal AF (off AC/rate control at home), HTN, HLD, DM2 (a1c 9.8), anxiety on benzos at home admitted 3/24 with severe sepsis due to Klebsiella pneumoniae bacteremia, R pyelonephritis with emphysematous pyelitis course c/b DEEPA possibly ATN vs prerenal improving, and difficult to control afib with RVR s/p DCCV today transitioned from dilt gtt to amio load and metoprolol, fever curve improving on ceftriaxone, guarded prognosis.    1. severe sepsis due to klebsiella bacteremia from R pyelonephritis with emphysematous pyelitis seen on CT: initially on empiric Zosyn 3.375 g q8, BCx sensitivities-sensitive to ceftriaxone, change 3/26 ; blood cx 3/25 positive, repeat blood cultures no growth to date.  Given overall improvement today, with fever curve improving, successful DCCV, deepa improving, favor deferring repeat imaging at this time.  If spikes new fever will get stat CT a/p with contrast to r/o abscess requiring drainage. Appreciate ID/urology c/s.  Continue aguiar for now per urology.    2. afib with RVR: no history of heart failure per chart review.  CTA negative for PE and no heart failure on TTE despite BNP elevated at 4459.  TSH 2.0.  Had successful DCCV 3/28 after not responding to dilt gtt, now being amio loaded per EPS and on metoprolol.  Continue eliquis.     3. DM2: uncontrolled with a1c 9.8.  Improved on 24u lantus, 8 lispro qac.  Given half dose last night for npo status in anticipation of DCCV.  Resume 24u tonight if tolerating po.    4. DEEPA: baseline 0.7.  Trend bmp and renally dose meds.  Possible ATN with hypotension in ED vs prerenal, improving.  Continue to monitor and avoid nephrotoxic meds    5. anxiety: continue home benzos.  Avoid withdrawal as would continue to afib rvr.    6. uterine cancer: s/p hysterectomy.  F/u outpatient    7. elevated alk phos: check RUQ sono    8. Dispo: pt consult    contact: TEAMS .     discussed with patient, spouse, and 2 daughters at bedside
74yo F PMH paroxysmal AF (off AC/rate control at home), HTN, HLD, DM2 (a1c 9.8), anxiety on benzos at home admitted 3/24 with severe sepsis due to Klebsiella pneumoniae bacteremia, R pyelonephritis with emphysematous pyelitis course c/b DEEPA possibly ATN vs prerenal improving, and difficult to control afib with RVR s/p DCCV today transitioned from dilt gtt to amio load and metoprolol, now improving on ceftriaxone.    1. severe sepsis due to klebsiella bacteremia from R pyelonephritis with emphysematous pyelitis seen on CT: initially on empiric Zosyn 3.375 g q8, BCx sensitivities-sensitive to ceftriaxone, changed 3/26 ; blood cx 3/25 positive, repeat blood cultures 3/27: no growth to date.  Appreciate ID/urology c/s.  Continue aguiar for now per urology. Plan for 14 days abx from negative culture, can transition to cipro on d/c per ID.  Aim for TOV Josh evening if still improving clinically.    2. afib with RVR: no history of heart failure per chart review.  CTA negative for PE and no heart failure on TTE despite BNP elevated at 4459.  TSH 2.0.  Had successful DCCV 3/28 after not responding to dilt gtt, now being amio loaded per EPS and on metoprolol.  Continue eliquis. Decrease toprol xl to 25 mg for bradycardia    3. DM2: uncontrolled with a1c 9.8.  Suspect uncontrolled due to 50% dosing prior to DCCV.  Had been controlled on 24u lantus, 8 lispro qac.  Continue for today and will further titrate tomorrow as needed.    4. DEEPA: baseline 0.7.  Overall downtrending from admission, now 1.22.  Trend bmp and renally dose meds.  Possible ATN with hypotension in ED vs prerenal, improving.  Continue to monitor and avoid nephrotoxic meds    5. anxiety: continue home benzos.  Avoid withdrawal as would continue to afib rvr.    6. uterine cancer: s/p hysterectomy.  F/u outpatient    7. elevated alk phos: RUQ US no acute findings and alk phos overall downtrending.  Continue to monitor  RUQ US:  Liver: Within normal limits.  Bile ducts: Normal caliber. Common bile duct measures 3 mm.  Gallbladder: Within normal limits. There is a hypoechoic structure   adjacent to the gallbladder and liver, possibly representing loop of   bowel when compared to previous CT abdomen pelvis.  Pancreas: Visualized portions are within normal limits.  Right kidney: 13.2 cm. No hydronephrosis. Small renal cyst.  Ascites: None.  IVC: Visualized portions are within normal limits.    8. Dispo: pt consult: recommends [facility]    9. runny nose: check RVP.  Check O2 on room air and wean off O2 if tolerated    contact: TEAMS .     discussed with patient, spouse, and daughter at bedside
74yo F PMH paroxysmal AF (off AC/rate control at home), HTN, HLD, DM2 (a1c 9.8), anxiety on benzos at home admitted 3/24 with severe sepsis due to Klebsiella pneumoniae bacteremia, R pyelonephritis with emphysematous pyelitis course c/b difficult to control afib with RVR and DEEPA possibly ATN, guarded prognosis on empiric zosyn and diltiazem gtt with ongoing fevers, pending TTE today and transitioning to ceftriaxone.     1. severe sepsis due to klebsiella bacteremia from R pyelonephritis with emphysematous pyelitis seen on CT: initially on empiric Zosyn 3.375 g q8, BCx sensitivities-sensitive to ceftriaxone, change 3/26 ;f/u  Repeat blood cx Q48h until clear; If not clinically improving, continues to have fevers, worsening leukocytosis would repeat CT A/P w/ IV contrast.  Appreciate ID/urology c/s    2. afib with RVR: no history of heart failure per chart review.  Did not improve with iv toprol or amio in ED, trialed iv dilt and consulted cards.  On dilt gtt overnight per cards.  F/u TTE to r/o heart failure which would make cardizem contraindicated.  TSH 2.0. BNP elevated at 4459.  Eliquis.  Check lipid panel.  CTA negative for PE    3. DM2: uncontrolled with a1c 9.8.  Received 47 units in prior 24 hours, with 22 corrective.  Increase to 24u lantus, 8 lispro qac    4. DEEPA: baseline 0.7.  Trend bmp and renally dose meds.  Possible ATN with hypotension in ED vs prerenal    5. anxiety: continue home benzos.  Avoid withdrawal as would continue to afib rvr.    6. uterine cancer: s/p hysterectomy.  F/u outpatient    7. Dispo: pt consult when rate controlled.     contact: TEAMS .
74yo F PMH paroxysmal AF (off AC/rate control at home), HTN, HLD, DM2 (a1c 9.8), anxiety on benzos at home admitted 3/24 with severe sepsis due to Klebsiella pneumoniae bacteremia, R pyelonephritis with emphysematous pyelitis course c/b DEEPA possibly ATN vs prerenal improving, and difficult to control afib with RVR s/p DCCV today transitioned from dilt gtt to amio load and metoprolol, overall improving on ceftriaxone however with worse leukocytosis today and fever overnight, passed TOV, pending repeat CT and blood cultures.    1. severe sepsis due to klebsiella bacteremia from R pyelonephritis with emphysematous pyelitis seen on CT: initially on empiric Zosyn 3.375 g q8, BCx sensitivities-sensitive to ceftriaxone, changed 3/26 ; blood cx 3/25 positive, repeat blood cultures 3/27: no growth to date.  Appreciate ID/urology c/s. Plan for 14 days abx from negative culture, can transition to cipro on d/c per ID.  Passed TOV with , 121.  Noted to have increased wbc today and fever overnight, favor repeating CT abd/pelvis to assess for absess.  F/u ID recs.    2. afib with RVR: no history of heart failure per chart review.  CTA negative for PE and no heart failure on TTE despite BNP elevated at 4459.  TSH 2.0.  Had successful DCCV 3/28 after not responding to dilt gtt, now being amio loaded per EPS and on metoprolol.  Continue eliquis, toprol xl 25 mg    3. DM2: uncontrolled with a1c 9.8.  Noted to have am hypoglycemia-downtitrate to 24u lantus, 8 lispro qac.    4. DEEPA: baseline 0.7.  Overall downtrending from admission, now 1.22.  Trend bmp and renally dose meds.  Possible ATN with hypotension in ED vs prerenal, improving.  Continue to monitor and avoid nephrotoxic meds    5. anxiety: continue home benzos.  Avoid withdrawal as would continue to afib rvr.    6. uterine cancer: s/p hysterectomy.  F/u outpatient    7. elevated alk phos: RUQ US no acute findings and alk phos overall downtrending.  Continue to monitor  RUQ US:  Liver: Within normal limits.  Bile ducts: Normal caliber. Common bile duct measures 3 mm.  Gallbladder: Within normal limits. There is a hypoechoic structure   adjacent to the gallbladder and liver, possibly representing loop of   bowel when compared to previous CT abdomen pelvis.  Pancreas: Visualized portions are within normal limits.  Right kidney: 13.2 cm. No hydronephrosis. Small renal cyst.  Ascites: None.  IVC: Visualized portions are within normal limits.    8. Dispo: pt consult: recommends [facility].  Patient refusing and wants to go asap.  Discussed with her and her  at bedside the importance of completing workup with recent fever.    contact: TEAMS .
72yo F PMH paroxysmal AF (off AC/rate control at home), HTN, HLD, DM2 (a1c 9.8), anxiety on benzos at home admitted 3/24 with severe sepsis due to Klebsiella pneumoniae bacteremia, R pyelonephritis with emphysematous pyelitis course c/b difficult to control afib with RVR and DEEPA possibly ATN, guarded prognosis on ceftriaxone and diltiazem gtt with ongoing fevers however wbc and creatinine now downtrending.    1. severe sepsis due to klebsiella bacteremia from R pyelonephritis with emphysematous pyelitis seen on CT: initially on empiric Zosyn 3.375 g q8, BCx sensitivities-sensitive to ceftriaxone, change 3/26 ;f/u  Repeat blood cx Q48h until clear; If not clinically improving, continues to have fevers, worsening leukocytosis would repeat CT A/P w/ IV contrast.  Appreciate ID/urology c/s    2. afib with RVR: no history of heart failure per chart review.  Did not improve with iv toprol or amio in ED, trialed iv dilt and consulted cards.  On dilt gtt per cards, increased to 12.5/hour. TSH 2.0. BNP elevated at 4459.  CTA negative for PE.  No heart failure on TTE.  On eliquis.     3. DM2: uncontrolled with a1c 9.8.  Improved on 24u lantus, 8 lispro qac    4. DEEPA: baseline 0.7.  Trend bmp and renally dose meds.  Possible ATN with hypotension in ED vs prerenal, improving.  Continue to monitor and avoid nephrotoxic meds    5. anxiety: continue home benzos.  Avoid withdrawal as would continue to afib rvr.    6. uterine cancer: s/p hysterectomy.  F/u outpatient    7. Dispo: pt consult when rate controlled.     contact: TEAMS .

## 2024-04-02 NOTE — PROGRESS NOTE ADULT - SUBJECTIVE AND OBJECTIVE BOX
73yPatient is a 73y old  Female who presents with a chief complaint of nausea, diarrhea, malaise (02 Apr 2024 07:59)      Interval history:  Afebrile, back pain chronic, unchanged from spinal stenosis, no dysuria.        Allergies:   Lipitor (Nausea)  latex (Rash)      Antimicrobials:      REVIEW OF SYSTEMS:  No chest pain   No SOB  No N/V         Vital Signs Last 24 Hrs  T(C): 36.8 (04-02-24 @ 11:51), Max: 37.7 (04-02-24 @ 05:25)  T(F): 98.2 (04-02-24 @ 11:51), Max: 99.9 (04-02-24 @ 05:25)  HR: 60 (04-02-24 @ 11:51) (60 - 65)  BP: 110/70 (04-02-24 @ 11:51) (110/70 - 147/85)  BP(mean): --  RR: 18 (04-02-24 @ 11:51) (18 - 20)  SpO2: 95% (04-02-24 @ 11:51) (95% - 98%)      PHYSICAL EXAM:  Pt in no acute distress, alert, awake.   breathing comfortably.  non distended abdomen  no edema LE   no phlebitis                             11.0   16.36 )-----------( 444      ( 02 Apr 2024 06:59 )             34.3   04-02    140  |  104  |  13  ----------------------------<  138<H>  3.7   |  21<L>  |  1.08    Ca    8.3<L>      02 Apr 2024 06:56  Phos  2.9     04-01  Mg     1.9     04-01    TPro  6.4  /  Alb  3.0<L>  /  TBili  0.5  /  DBili  x   /  AST  8<L>  /  ALT  13  /  AlkPhos  117  04-02      LIVER FUNCTIONS - ( 02 Apr 2024 06:56 )  Alb: 3.0 g/dL / Pro: 6.4 g/dL / ALK PHOS: 117 U/L / ALT: 13 U/L / AST: 8 U/L / GGT: x             Culture - Blood (03.27.24 @ 09:03)   Specimen Source: .Blood Blood-Peripheral  Culture Results:   No growth at 5 days        < from: CT Abdomen and Pelvis w/ IV Cont (04.01.24 @ 15:45) >  IMPRESSION:  *  Right acute pyelonephritis, which appears progressed since the prior   CT of 3/24/2024. Interval development of multifocal wedge-shaped and   ill-defined regions of hypoenhancement withinthe right kidney, which   could be related to worsening acute pyelonephritis, though developing   infarcts can have a similar appearance. Phlegmonous changes/developing   abscess formation is also a consideration.  *  Indeterminate left renal lesion measuring 1.3 cm. Suggest renal   ultrasound with attention to this area versus contrast enhanced abdominal   MRI or CT utilizing renal mass protocol for further evaluation.  *  Contracted gallbladder with nonspecific edematous wall thickening.  *  Curvilinear hyperdensity within the distal ascending colon measuring   approximately 2.0 cm in length of uncertain etiology, and may represent   something ingested.

## 2024-04-02 NOTE — PROGRESS NOTE ADULT - PROBLEM SELECTOR PLAN 1
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, mild hydroureteronephrosis to bladder   - CT chest with no evidence of pneumonia, CXR with bibasilar atelectasis  - s/p ~5 L IVF   - BCx 3/24, 3/25 with Klebsiella pneumoniae, susceptible to ceftriaxone/zosyn  - UCx with klebsiella, proteus  - BCx 3/27 NGTD    Plan:  - ID following, appreciate recs  - continue ceftriaxone 2g q24h (per susceptibility data, ID recs)  - 3/27 BCx NGTD @72h  - urology following for emphysematous pyelitis   - ordered CT abdomen due to fever 3/31 and increase in leukocytosis; demonstrating worsening pyelonephritis  - discussed with ID (4/1) regarding CT findings and fever/leukocytosis -- per ID, radiological findings tend to lag behind clinical and fevers can continue while on antibiotics. Recommended to continue CTX at this time  - Per ID, final plan for 2 weeks of antibiotics from 3/27, can switch to cipro on d/c

## 2024-04-02 NOTE — PROGRESS NOTE ADULT - PROBLEM SELECTOR PROBLEM 5
Diabetes mellitus

## 2024-04-02 NOTE — PROGRESS NOTE ADULT - ASSESSMENT
This is a 74 y/o F w/ PMHx of AF, HTN, HLD, DM, anxiety who is presenting to [facility] 3/24/24 with weakness, N/V/D, chills, fatigue. Initially went to UC, then directed to ED, w/ flank and suprapubic pain.   In the ER, initially afebrile, then had fevers to 103.1, AF w/ RVR to 150s and hypotensive to 80s/60s. Pt was started on broad therapy w/ Vancomycin/Zosyn. Pt was continued on Zosyn.   Labs w/ leukocytosis to 17 -> 18,  DEEPA to 1.54 to 1.85, lactate 2.8, U/A 237 WBCs  CT A/P w/ R pyelo, with emphysematous pyelitis and mild hydroureteronephrosis to the bladder, BCx w/ Klebsiella pneumoniae       #Klebsiella pneumoniae bacteremia  #Emphysematous pyelitis   #Leucocytosis   #AF w/ RVR      Overall, 74 y/o F w/ PMHx of AF, HTN, HLD, DM, anxiety who is presenting with severe sepsis, AF w/ RVR 2/2 Klebsiella pneumoniae bacteremia, R pyelonephritis with emphysematous pyelitis. Pt acutely ill, still with significant leukocytosis, clinically ill appearing, which is worsening by fevers and AF w/ RVR. Would repeat BCx and continue w/ Zosyn for now, will need interval CT A/P and close urology follow up.   Pt with persistent bacteremia, Klebsiella sensitive, now on Ceftriaxone, S/p JOAQUIN/DCCV on 3/28 for AF w/ RVR.   Now pt is afebrile, clinically well appearing, WBC downtrending, BCx from 3/27 NGTD.       Plan:   c/w Ceftriaxone 2 g q24 while inpt.  CT noted but pt clinically improving.   pt s/p IR eval for possible aspiration, no safe window.   trend cbc, leucocytosis downtrending.   can transition to po keflex 500 mg q6h for 14 days from negative blood cx.  pt to follow with Dr Love as outpt      Plan discussed with Medicine Attending.       Faizan Crespo  Please contact through MS Teams   If no response or past 5 pm/weekend call [phone].

## 2024-04-02 NOTE — PROGRESS NOTE ADULT - SUBJECTIVE AND OBJECTIVE BOX
Joyce Horvath, PGY1    Patient is a 73y old  Female who presents with a chief complaint of nausea, diarrhea, malaise (02 Apr 2024 07:06)      SUBJECTIVE / OVERNIGHT EVENTS: NAEO. Pt denies chest pain, SOB, N/V, fever/chills, or changes in bowel movements.    MEDICATIONS  (STANDING):  ALPRAZolam 1 milliGRAM(s) Oral at bedtime  aMIOdarone    Tablet   Oral   aMIOdarone    Tablet 400 milliGRAM(s) Oral every 12 hours  amLODIPine   Tablet 5 milliGRAM(s) Oral daily  apixaban 5 milliGRAM(s) Oral every 12 hours  aspirin enteric coated 81 milliGRAM(s) Oral daily  cefTRIAXone   IVPB 2000 milliGRAM(s) IV Intermittent every 24 hours  chlorhexidine 2% Cloths 1 Application(s) Topical daily  dextrose 5%. 1000 milliLiter(s) (50 mL/Hr) IV Continuous <Continuous>  dextrose 5%. 1000 milliLiter(s) (100 mL/Hr) IV Continuous <Continuous>  dextrose 50% Injectable 12.5 Gram(s) IV Push once  dextrose 50% Injectable 25 Gram(s) IV Push once  dextrose 50% Injectable 25 Gram(s) IV Push once  FIRST- Mouthwash  BLM 5 milliLiter(s) Swish and Spit daily  glucagon  Injectable 1 milliGRAM(s) IntraMuscular once  insulin glargine Injectable (LANTUS) 24 Unit(s) SubCutaneous at bedtime  insulin lispro (ADMELOG) corrective regimen sliding scale   SubCutaneous three times a day before meals  insulin lispro (ADMELOG) corrective regimen sliding scale   SubCutaneous at bedtime  insulin lispro Injectable (ADMELOG) 8 Unit(s) SubCutaneous three times a day before meals  levothyroxine 175 MICROGram(s) Oral daily  losartan 100 milliGRAM(s) Oral daily  metoprolol succinate ER 25 milliGRAM(s) Oral daily  nystatin Powder 1 Application(s) Topical two times a day  pantoprazole    Tablet 40 milliGRAM(s) Oral before breakfast  polyethylene glycol 3350 17 Gram(s) Oral two times a day    MEDICATIONS  (PRN):  acetaminophen     Tablet .. 650 milliGRAM(s) Oral every 6 hours PRN Temp greater or equal to 38C (100.4F), Mild Pain (1 - 3)  ALPRAZolam 0.5 milliGRAM(s) Oral daily PRN anxiety  aluminum hydroxide/magnesium hydroxide/simethicone Suspension 30 milliLiter(s) Oral every 4 hours PRN Dyspepsia  artificial  tears Solution 1 Drop(s) Both EYES every 1 hour PRN Dry Eyes  dextrose Oral Gel 15 Gram(s) Oral once PRN Blood Glucose LESS THAN 70 milliGRAM(s)/deciliter  melatonin 3 milliGRAM(s) Oral at bedtime PRN Insomnia  ondansetron Injectable 4 milliGRAM(s) IV Push every 8 hours PRN Nausea and/or Vomiting      CAPILLARY BLOOD GLUCOSE      POCT Blood Glucose.: 180 mg/dL (01 Apr 2024 21:26)  POCT Blood Glucose.: 131 mg/dL (01 Apr 2024 17:21)  POCT Blood Glucose.: 103 mg/dL (01 Apr 2024 12:43)  POCT Blood Glucose.: 81 mg/dL (01 Apr 2024 08:34)    I&O's Summary    01 Apr 2024 07:01  -  02 Apr 2024 07:00  --------------------------------------------------------  IN: 770 mL / OUT: 0 mL / NET: 770 mL        Vital Signs Last 24 Hrs  T(C): 37.7 (02 Apr 2024 05:25), Max: 37.7 (02 Apr 2024 05:25)  T(F): 99.9 (02 Apr 2024 05:25), Max: 99.9 (02 Apr 2024 05:25)  HR: 65 (02 Apr 2024 05:25) (63 - 71)  BP: 140/65 (02 Apr 2024 05:25) (139/60 - 150/82)  BP(mean): --  RR: 18 (02 Apr 2024 05:25) (18 - 20)  SpO2: 97% (02 Apr 2024 05:25) (96% - 97%)    Parameters below as of 02 Apr 2024 05:25  Patient On (Oxygen Delivery Method): room air        PHYSICAL EXAM:  GENERAL: NAD, well-developed, well-nourished  HEAD: Atraumatic, Normocephalic  EYES: Conjunctiva and sclera clear  CHEST/LUNG: Clear to auscultation bilaterally; No wheezes or crackles  HEART: Normal S1/S2; Regular rate and rhythm; No murmurs, rubs, or gallops  ABDOMEN: Soft, Nontender, Nondistended; Bowel sounds present  EXTREMITIES: No clubbing, cyanosis, or edema  PSYCH: A&Ox3      LABS:                        11.0   16.36 )-----------( 444      ( 02 Apr 2024 06:59 )             34.3      04-02    140  |  104  |  13  ----------------------------<  138<H>  3.7   |  21<L>  |  1.08    Ca    8.3<L>      02 Apr 2024 06:56  Phos  2.9     04-01  Mg     1.9     04-01    TPro  6.4  /  Alb  3.0<L>  /  TBili  0.5  /  DBili  x   /  AST  8<L>  /  ALT  13  /  AlkPhos  117  04-02          Urinalysis Basic - ( 02 Apr 2024 06:56 )    Color: x / Appearance: x / SG: x / pH: x  Gluc: 138 mg/dL / Ketone: x  / Bili: x / Urobili: x   Blood: x / Protein: x / Nitrite: x   Leuk Esterase: x / RBC: x / WBC x   Sq Epi: x / Non Sq Epi: x / Bacteria: x        RADIOLOGY & ADDITIONAL TESTS:

## 2024-04-02 NOTE — PROGRESS NOTE ADULT - PROBLEM SELECTOR PLAN 10
- TSH WNL  - continue home levothyroxine 175 mcg QD

## 2024-04-02 NOTE — PROGRESS NOTE ADULT - PROVIDER SPECIALTY LIST ADULT
Cardiology
Cardiology
Electrophysiology
Internal Medicine
Internal Medicine
Cardiology
Infectious Disease
Internal Medicine
Internal Medicine
Cardiology
Cardiology
Electrophysiology
Infectious Disease
Internal Medicine
Infectious Disease
Internal Medicine

## 2024-04-02 NOTE — PROGRESS NOTE ADULT - PROBLEM SELECTOR PLAN 6
Na 131 on admission, baseline 140s  - corrected for glucose 415 - > Na 136-139  - likely primarily pseudohyponatremia ISO hyperglycemia, + decreased PO intake  - improved after fluid resuscitation and treatment of hyperglycemia, now resolved
Na 131 on admission, baseline 140s  - corrected for glucose 415 - > Na 136-139  - likely primarily pseudohyponatremia ISO hyperglycemia, + decreased PO intake  - now improving after fluid resuscitation and treatment of hyperglycemia
Na 131 on admission, baseline 140s  - corrected for glucose 415 - > Na 136-139  - likely primarily pseudohyponatremia ISO hyperglycemia, + decreased PO intake  - now improving after fluid resuscitation and treatment of hyperglycemia

## 2024-04-02 NOTE — PROGRESS NOTE ADULT - PROBLEM SELECTOR PROBLEM 1
Severe sepsis with acute organ dysfunction due to gram-negative bacteria

## 2024-04-05 RX ORDER — AMIODARONE HYDROCHLORIDE 400 MG/1
1 TABLET ORAL
Qty: 30 | Refills: 0
Start: 2024-04-05 | End: 2024-05-04

## 2024-04-09 ENCOUNTER — RESULT REVIEW (OUTPATIENT)
Age: 74
End: 2024-04-09

## 2024-04-09 ENCOUNTER — APPOINTMENT (OUTPATIENT)
Dept: PHYSICAL MEDICINE AND REHAB | Facility: CLINIC | Age: 74
End: 2024-04-09

## 2024-04-09 ENCOUNTER — APPOINTMENT (OUTPATIENT)
Dept: ULTRASOUND IMAGING | Facility: IMAGING CENTER | Age: 74
End: 2024-04-09
Payer: MEDICARE

## 2024-04-09 ENCOUNTER — OUTPATIENT (OUTPATIENT)
Dept: OUTPATIENT SERVICES | Facility: HOSPITAL | Age: 74
LOS: 1 days | End: 2024-04-09
Payer: MEDICARE

## 2024-04-09 ENCOUNTER — APPOINTMENT (OUTPATIENT)
Dept: INTERNAL MEDICINE | Facility: CLINIC | Age: 74
End: 2024-04-09
Payer: MEDICARE

## 2024-04-09 ENCOUNTER — NON-APPOINTMENT (OUTPATIENT)
Age: 74
End: 2024-04-09

## 2024-04-09 VITALS
SYSTOLIC BLOOD PRESSURE: 160 MMHG | OXYGEN SATURATION: 95 % | BODY MASS INDEX: 46.07 KG/M2 | HEIGHT: 61 IN | HEART RATE: 73 BPM | WEIGHT: 244 LBS | TEMPERATURE: 98.2 F | DIASTOLIC BLOOD PRESSURE: 90 MMHG

## 2024-04-09 VITALS — DIASTOLIC BLOOD PRESSURE: 80 MMHG | SYSTOLIC BLOOD PRESSURE: 130 MMHG

## 2024-04-09 DIAGNOSIS — A41.9 SEPSIS, UNSPECIFIED ORGANISM: ICD-10-CM

## 2024-04-09 DIAGNOSIS — N13.30 UNSPECIFIED HYDRONEPHROSIS: ICD-10-CM

## 2024-04-09 DIAGNOSIS — E89.0 POSTPROCEDURAL HYPOTHYROIDISM: Chronic | ICD-10-CM

## 2024-04-09 DIAGNOSIS — R21 RASH AND OTHER NONSPECIFIC SKIN ERUPTION: ICD-10-CM

## 2024-04-09 DIAGNOSIS — Z90.710 ACQUIRED ABSENCE OF BOTH CERVIX AND UTERUS: Chronic | ICD-10-CM

## 2024-04-09 DIAGNOSIS — A41.59 OTHER GRAM-NEGATIVE SEPSIS: ICD-10-CM

## 2024-04-09 DIAGNOSIS — I86.8 VARICOSE VEINS OF OTHER SPECIFIED SITES: Chronic | ICD-10-CM

## 2024-04-09 PROCEDURE — 76770 US EXAM ABDO BACK WALL COMP: CPT

## 2024-04-09 PROCEDURE — 93000 ELECTROCARDIOGRAM COMPLETE: CPT

## 2024-04-09 PROCEDURE — 99496 TRANSJ CARE MGMT HIGH F2F 7D: CPT

## 2024-04-09 PROCEDURE — 93970 EXTREMITY STUDY: CPT | Mod: 26

## 2024-04-09 PROCEDURE — 76770 US EXAM ABDO BACK WALL COMP: CPT | Mod: 26

## 2024-04-09 PROCEDURE — 93970 EXTREMITY STUDY: CPT

## 2024-04-09 RX ORDER — VIBEGRON 75 MG/1
75 TABLET, FILM COATED ORAL
Qty: 30 | Refills: 11 | Status: COMPLETED | COMMUNITY
Start: 2023-06-15 | End: 2024-04-09

## 2024-04-09 RX ORDER — INSULIN LISPRO 100 [IU]/ML
100 INJECTION, SOLUTION INTRAVENOUS; SUBCUTANEOUS 3 TIMES DAILY
Refills: 0 | Status: ACTIVE | COMMUNITY
Start: 2024-04-09

## 2024-04-09 RX ORDER — ROSUVASTATIN CALCIUM 20 MG/1
20 TABLET, FILM COATED ORAL DAILY
Qty: 135 | Refills: 1 | Status: COMPLETED | COMMUNITY
Start: 2017-02-28 | End: 2024-04-09

## 2024-04-09 RX ORDER — NITROFURANTOIN (MONOHYDRATE/MACROCRYSTALS) 25; 75 MG/1; MG/1
100 CAPSULE ORAL
Qty: 14 | Refills: 0 | Status: COMPLETED | COMMUNITY
Start: 2023-06-19 | End: 2024-04-09

## 2024-04-09 RX ORDER — NITROFURANTOIN MACROCRYSTALS 100 MG/1
100 CAPSULE ORAL
Qty: 10 | Refills: 0 | Status: COMPLETED | COMMUNITY
Start: 2023-08-28 | End: 2024-04-09

## 2024-04-09 RX ORDER — AMIODARONE HYDROCHLORIDE 400 MG/1
400 TABLET ORAL TWICE DAILY
Refills: 0 | Status: DISCONTINUED | COMMUNITY
Start: 2024-04-09 | End: 2024-04-09

## 2024-04-09 RX ORDER — CLOTRIMAZOLE AND BETAMETHASONE DIPROPIONATE 10; .5 MG/G; MG/G
1-0.05 CREAM TOPICAL TWICE DAILY
Qty: 1 | Refills: 3 | Status: ACTIVE | COMMUNITY
Start: 2024-04-09 | End: 1900-01-01

## 2024-04-09 RX ORDER — MIRABEGRON 50 MG/1
50 TABLET, FILM COATED, EXTENDED RELEASE ORAL
Qty: 30 | Refills: 11 | Status: COMPLETED | COMMUNITY
Start: 2023-05-08 | End: 2024-04-09

## 2024-04-09 RX ORDER — SULFAMETHOXAZOLE AND TRIMETHOPRIM 800; 160 MG/1; MG/1
800-160 TABLET ORAL
Qty: 6 | Refills: 0 | Status: COMPLETED | COMMUNITY
Start: 2023-07-26 | End: 2024-04-09

## 2024-04-09 RX ORDER — ONDANSETRON 4 MG/1
4 TABLET, ORALLY DISINTEGRATING ORAL EVERY 6 HOURS
Qty: 8 | Refills: 0 | Status: COMPLETED | COMMUNITY
Start: 2023-08-23 | End: 2024-04-09

## 2024-04-09 NOTE — PHYSICAL EXAM
[No Acute Distress] : no acute distress [Well Nourished] : well nourished [Well Developed] : well developed [Well-Appearing] : well-appearing [Normal Sclera/Conjunctiva] : normal sclera/conjunctiva [Normal Outer Ear/Nose] : the outer ears and nose were normal in appearance [Normal Oropharynx] : the oropharynx was normal [No JVD] : no jugular venous distention [No Lymphadenopathy] : no lymphadenopathy [Supple] : supple [No Respiratory Distress] : no respiratory distress  [No Accessory Muscle Use] : no accessory muscle use [Clear to Auscultation] : lungs were clear to auscultation bilaterally [Normal Rate] : normal rate  [Regular Rhythm] : with a regular rhythm [Normal S1, S2] : normal S1 and S2 [No Murmur] : no murmur heard [No Carotid Bruits] : no carotid bruits [Pedal Pulses Present] : the pedal pulses are present [No Edema] : there was no peripheral edema [No Extremity Clubbing/Cyanosis] : no extremity clubbing/cyanosis [Soft] : abdomen soft [Non Tender] : non-tender [Non-distended] : non-distended [Normal Bowel Sounds] : normal bowel sounds [Normal Anterior Cervical Nodes] : no anterior cervical lymphadenopathy [No CVA Tenderness] : no CVA  tenderness [No Spinal Tenderness] : no spinal tenderness [No Joint Swelling] : no joint swelling [Grossly Normal Strength/Tone] : grossly normal strength/tone [No Rash] : no rash [Coordination Grossly Intact] : coordination grossly intact [No Focal Deficits] : no focal deficits [Normal Gait] : normal gait [Alert and Oriented x3] : oriented to person, place, and time [de-identified] : varicose veins and 1+ pitting edema b/l 1/2 way up [de-identified] : diffuse rash on LE which pt/family say is chronic, has new rash in b/l inguinal areas consistent with fungal jock itch.

## 2024-04-09 NOTE — HISTORY OF PRESENT ILLNESS
[FreeTextEntry1] : BRENNEN VALDEZ is an 73 year old F here for initial evaluation of [] Ms. VALDEZ was sent for consultation by  [] for possible injection.   Pain location: [] Quality: [] Radiation: [] Severity: [] Onset: [] Associated symptoms: [] Numbness: [] Weakness: [] Exacerbated by: [] Improved by: [] Bowel or bladder involvement: []  Denies bowel/bladder dysfunction, saddle anesthesia, fevers, chills, weight loss, night pain, or night sweats at this time.  The pain interferes with function, ADLs and quality of life. Patient had tried Acetaminophen, NSAIDs, prescription pain medications, muscle relaxants without any lasting relief of pain.  Patient had imaging studies to evaluate the pain.  Patient had not had any nerve conduction studies to evaluate the symptoms.  Patient had tried physical therapy, and/or physician directed home exercises, stretching, lifestyle modification for over [] weeks without any significant relief.

## 2024-04-09 NOTE — HEALTH RISK ASSESSMENT
[0] : 2) Feeling down, depressed, or hopeless: Not at all (0) [PHQ-2 Negative - No further assessment needed] : PHQ-2 Negative - No further assessment needed [Former] : Former [[id number]] : 0

## 2024-04-09 NOTE — HISTORY OF PRESENT ILLNESS
[Post-hospitalization from ___ Hospital] : Post-hospitalization from [unfilled] Hospital [Discharge Summary] : discharge summary [Pertinent Labs] : pertinent labs [Radiology Findings] : radiology findings [Discharge Med List] : discharge medication list [Med Reconciliation] : medication reconciliation has been completed [Patient Contacted By: ____] : and contacted by [unfilled] [Admitted on: ___] : The patient was admitted on [unfilled] [Discharged on ___] : discharged on [unfilled] [FreeTextEntry2] : BRENNEN VALDEZ is a 73y Female with a history of A-fib (previously on eliquis, off since 7/2023),  HTN, HLD, DM, anxiety, uterine cancer, here for HFU. Pt presented to ED for weakness and vomiting found to be in Afib with RVR. Diagnosed with severe sepsis 2/2 pyelonephritis, with klebsiella bacteremia. 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 Blood cultures were positive for Klebsiella pneumoniae, urine cultures were consistent with blood cultures. Patient was initially continued on zosyn, which was subsequently narrowed to ceftriaxone 2g q24h based on susceptibility data. Patient was followed by ID and urology given finding of emphysematous pyelitis. PT had gradual improvement on antibiotics and blood cultures cleared on 3/27. Had aguiar, underwent TOV. Course was c/b DEEPA,  Bladder/renal US revealed no hydronephrosis, findings otherwise consistent with CT abdomen.  Per ID to complete 14 day course of Keflex 500mg q6h to from date of negative blood cultures. For Afib TTE was normal, eventually underwent JOAQUIN/cardioversion and was amio loaded, started on toprol 25mg ER and eliquis 5mg bid.  Since discharge,  reports pt has c/o chills at night only but no fever. Also complaining of bilateral LE swelling. Pt is finishing up course of keflex. Pt//daughter has a red rash on her bilateral legs but says she always has a rash and is not new. She does have a new itchy rash in inguinal areas which it itchy. Denies back pain, dysuria, urinary frequency,  urgency, hematuria. Denies chest pain, SOB, OLIVAREZ, dizziness, diaphoresis, palpitations, orthopnea, syncope, n/v, headache.

## 2024-04-10 LAB
ALBUMIN SERPL ELPH-MCNC: 3.5 G/DL
ALP BLD-CCNC: 111 U/L
ALT SERPL-CCNC: 12 U/L
ANION GAP SERPL CALC-SCNC: 12 MMOL/L
APPEARANCE: ABNORMAL
AST SERPL-CCNC: 10 U/L
BACTERIA: ABNORMAL /HPF
BASOPHILS # BLD AUTO: 0.07 K/UL
BASOPHILS NFR BLD AUTO: 0.7 %
BILIRUB SERPL-MCNC: 0.5 MG/DL
BILIRUBIN URINE: NEGATIVE
BLOOD URINE: ABNORMAL
BUN SERPL-MCNC: 10 MG/DL
CALCIUM SERPL-MCNC: 9.3 MG/DL
CAST: 2 /LPF
CHLORIDE SERPL-SCNC: 104 MMOL/L
CHOLEST SERPL-MCNC: 183 MG/DL
CK SERPL-CCNC: 27 U/L
CO2 SERPL-SCNC: 28 MMOL/L
COLOR: YELLOW
CREAT SERPL-MCNC: 1.18 MG/DL
EGFR: 48 ML/MIN/1.73M2
EOSINOPHIL # BLD AUTO: 0.19 K/UL
EOSINOPHIL NFR BLD AUTO: 1.8 %
EPITHELIAL CELLS: 4 /HPF
ESTIMATED AVERAGE GLUCOSE: 223 MG/DL
GLUCOSE QUALITATIVE U: 100 MG/DL
GLUCOSE SERPL-MCNC: 167 MG/DL
HBA1C MFR BLD HPLC: 9.4 %
HCT VFR BLD CALC: 34.4 %
HDLC SERPL-MCNC: 51 MG/DL
HGB BLD-MCNC: 10.3 G/DL
HYALINE CASTS: PRESENT
IMM GRANULOCYTES NFR BLD AUTO: 1.2 %
KETONES URINE: NEGATIVE MG/DL
LDLC SERPL CALC-MCNC: 101 MG/DL
LEUKOCYTE ESTERASE URINE: ABNORMAL
LYMPHOCYTES # BLD AUTO: 1.47 K/UL
LYMPHOCYTES NFR BLD AUTO: 13.7 %
MAN DIFF?: NORMAL
MCHC RBC-ENTMCNC: 27.2 PG
MCHC RBC-ENTMCNC: 29.9 GM/DL
MCV RBC AUTO: 91 FL
MICROSCOPIC-UA: NORMAL
MONOCYTES # BLD AUTO: 0.53 K/UL
MONOCYTES NFR BLD AUTO: 4.9 %
NEUTROPHILS # BLD AUTO: 8.34 K/UL
NEUTROPHILS NFR BLD AUTO: 77.7 %
NITRITE URINE: NEGATIVE
NONHDLC SERPL-MCNC: 132 MG/DL
PH URINE: 6.5
PLATELET # BLD AUTO: 426 K/UL
POTASSIUM SERPL-SCNC: 4.2 MMOL/L
PROT SERPL-MCNC: 6.6 G/DL
PROTEIN URINE: 30 MG/DL
RBC # BLD: 3.78 M/UL
RBC # FLD: 13.7 %
RED BLOOD CELLS URINE: 5 /HPF
REVIEW: NORMAL
SODIUM SERPL-SCNC: 144 MMOL/L
SPECIFIC GRAVITY URINE: 1.01
T4 FREE SERPL-MCNC: 1.1 NG/DL
TRIGL SERPL-MCNC: 178 MG/DL
TSH SERPL-ACNC: 20.7 UIU/ML
UROBILINOGEN URINE: 0.2 MG/DL
WBC # FLD AUTO: 10.73 K/UL
WHITE BLOOD CELLS URINE: 146 /HPF
YEAST-LIKE CELLS: PRESENT

## 2024-04-11 RX ORDER — LEVOTHYROXINE SODIUM 0.17 MG/1
175 TABLET ORAL DAILY
Refills: 0 | Status: DISCONTINUED | COMMUNITY
Start: 2023-05-01 | End: 2024-04-11

## 2024-04-12 ENCOUNTER — APPOINTMENT (OUTPATIENT)
Dept: PHYSICAL MEDICINE AND REHAB | Facility: CLINIC | Age: 74
End: 2024-04-12
Payer: MEDICARE

## 2024-04-12 ENCOUNTER — NON-APPOINTMENT (OUTPATIENT)
Age: 74
End: 2024-04-12

## 2024-04-12 DIAGNOSIS — M53.9 DORSOPATHY, UNSPECIFIED: ICD-10-CM

## 2024-04-12 DIAGNOSIS — M79.18 MYALGIA, OTHER SITE: ICD-10-CM

## 2024-04-12 PROCEDURE — 99204 OFFICE O/P NEW MOD 45 MIN: CPT

## 2024-04-12 RX ORDER — TRAMADOL HYDROCHLORIDE 50 MG/1
50 TABLET, COATED ORAL DAILY
Qty: 30 | Refills: 0 | Status: ACTIVE | COMMUNITY
Start: 2024-04-12 | End: 1900-01-01

## 2024-04-12 RX ORDER — TIZANIDINE 2 MG/1
2 TABLET ORAL
Qty: 30 | Refills: 0 | Status: ACTIVE | COMMUNITY
Start: 2024-04-12 | End: 1900-01-01

## 2024-04-12 RX ORDER — GABAPENTIN 100 MG/1
100 CAPSULE ORAL
Qty: 60 | Refills: 0 | Status: ACTIVE | COMMUNITY
Start: 2024-04-12 | End: 1900-01-01

## 2024-04-12 NOTE — REVIEW OF SYSTEMS
[Fever] : no fever [Chest Pain] : no chest pain [Shortness Of Breath] : no shortness of breath [Headache] : no headaches

## 2024-04-12 NOTE — PHYSICAL EXAM
[Normal] : Oriented to person, place, and time, insight and judgement were intact and the affect was normal [de-identified] : breathing comfortably  [de-identified] : warm, well perfused  [de-identified] : +muscle spasm left lumbar paraspinals, gluteal muscles  [de-identified] : b/l LE edema to calf  [de-identified] : sensation intact to light touch

## 2024-04-12 NOTE — HISTORY OF PRESENT ILLNESS
[FreeTextEntry1] : Patient is a 73 year old woman who presents for evaluation. She is accompanied by her two family members. She complains of lower back pain to left leg, difficulty walking, moving around. Was recently at [facility] for sepsis, pyelonephritis, bacteremia x 11 days, discharged home, has been sleeping on the couch/sitting up. Has LE edema, unable to sleep laying down due to lower back pain, gets up at night multiple times to use bathroom. H/o lower back pain, has had relief with epidural injections in the past. Now taking tylenol 1500 mg in the morning, 1000 mg at night. She is on Eliquis for afib. Now on antibiotics for UTI.

## 2024-04-12 NOTE — ASSESSMENT
[FreeTextEntry1] : 73 year old woman with back pain previous notes reviewed recent hospitalization for sepsis, pyelonephritis now on eliquis for afib she takes xanax at night for anxiety as needed  discussed muscle relaxants, can take tizanidine at night, not with xanax gabapentin start at 100 mg then increase  tylenol as needed for pain, up to 3000 mg  unable to take NSAIDs, or use steroids will also try tramadol to take in the morning, not with xanax or tizanidine  patient asking for injection, difficult with recent infection/eliquis needs to start sleeping with legs elevated to improve edema, discussed limiting liquid intake after 5pm family to call with worsening pain, new weakness, numbness

## 2024-04-15 ENCOUNTER — EMERGENCY (EMERGENCY)
Facility: HOSPITAL | Age: 74
LOS: 1 days | Discharge: HOME | End: 2024-04-15
Attending: EMERGENCY MEDICINE | Admitting: STUDENT IN AN ORGANIZED HEALTH CARE EDUCATION/TRAINING PROGRAM
Payer: MEDICARE

## 2024-04-15 ENCOUNTER — NON-APPOINTMENT (OUTPATIENT)
Age: 74
End: 2024-04-15

## 2024-04-15 VITALS
DIASTOLIC BLOOD PRESSURE: 81 MMHG | WEIGHT: 250 LBS | HEIGHT: 60 IN | OXYGEN SATURATION: 94 % | HEART RATE: 67 BPM | SYSTOLIC BLOOD PRESSURE: 153 MMHG | RESPIRATION RATE: 18 BRPM | TEMPERATURE: 98 F

## 2024-04-15 DIAGNOSIS — E89.0 POSTPROCEDURAL HYPOTHYROIDISM: Chronic | ICD-10-CM

## 2024-04-15 DIAGNOSIS — Z78.9 OTHER SPECIFIED HEALTH STATUS: Chronic | ICD-10-CM

## 2024-04-15 DIAGNOSIS — Z90.710 ACQUIRED ABSENCE OF BOTH CERVIX AND UTERUS: Chronic | ICD-10-CM

## 2024-04-15 DIAGNOSIS — Z98.49 CATARACT EXTRACTION STATUS, UNSPECIFIED EYE: Chronic | ICD-10-CM

## 2024-04-15 DIAGNOSIS — I86.8 VARICOSE VEINS OF OTHER SPECIFIED SITES: Chronic | ICD-10-CM

## 2024-04-15 LAB
ALBUMIN SERPL ELPH-MCNC: 3.6 G/DL — SIGNIFICANT CHANGE UP (ref 3.3–5)
ALP SERPL-CCNC: 86 U/L — SIGNIFICANT CHANGE UP (ref 40–120)
ALT FLD-CCNC: 9 U/L — LOW (ref 10–45)
ANION GAP SERPL CALC-SCNC: 14 MMOL/L — SIGNIFICANT CHANGE UP (ref 5–17)
APTT BLD: 36.5 SEC — HIGH (ref 24.5–35.6)
AST SERPL-CCNC: 8 U/L — LOW (ref 10–40)
BACTERIA BLD CULT: NORMAL
BACTERIA BLD CULT: NORMAL
BACTERIA UR CULT: ABNORMAL
BASE EXCESS BLDV CALC-SCNC: 4.9 MMOL/L — HIGH (ref -2–3)
BASOPHILS # BLD AUTO: 0.07 K/UL — SIGNIFICANT CHANGE UP (ref 0–0.2)
BASOPHILS NFR BLD AUTO: 0.8 % — SIGNIFICANT CHANGE UP (ref 0–2)
BILIRUB SERPL-MCNC: 0.6 MG/DL — SIGNIFICANT CHANGE UP (ref 0.2–1.2)
BUN SERPL-MCNC: 13 MG/DL — SIGNIFICANT CHANGE UP (ref 7–23)
CA-I SERPL-SCNC: 1.18 MMOL/L — SIGNIFICANT CHANGE UP (ref 1.15–1.33)
CALCIUM SERPL-MCNC: 8.5 MG/DL — SIGNIFICANT CHANGE UP (ref 8.4–10.5)
CHLORIDE BLDV-SCNC: 104 MMOL/L — SIGNIFICANT CHANGE UP (ref 96–108)
CHLORIDE SERPL-SCNC: 104 MMOL/L — SIGNIFICANT CHANGE UP (ref 96–108)
CO2 BLDV-SCNC: 33 MMOL/L — HIGH (ref 22–26)
CO2 SERPL-SCNC: 25 MMOL/L — SIGNIFICANT CHANGE UP (ref 22–31)
CREAT SERPL-MCNC: 1.13 MG/DL — SIGNIFICANT CHANGE UP (ref 0.5–1.3)
EGFR: 51 ML/MIN/1.73M2 — LOW
EOSINOPHIL # BLD AUTO: 0.35 K/UL — SIGNIFICANT CHANGE UP (ref 0–0.5)
EOSINOPHIL NFR BLD AUTO: 4.2 % — SIGNIFICANT CHANGE UP (ref 0–6)
GAS PNL BLDV: 140 MMOL/L — SIGNIFICANT CHANGE UP (ref 136–145)
GAS PNL BLDV: SIGNIFICANT CHANGE UP
GAS PNL BLDV: SIGNIFICANT CHANGE UP
GLUCOSE BLDV-MCNC: 255 MG/DL — HIGH (ref 70–99)
GLUCOSE SERPL-MCNC: 253 MG/DL — HIGH (ref 70–99)
HCO3 BLDV-SCNC: 31 MMOL/L — HIGH (ref 22–29)
HCT VFR BLD CALC: 31 % — LOW (ref 34.5–45)
HCT VFR BLDA CALC: 31 % — LOW (ref 34.5–46.5)
HGB BLD CALC-MCNC: 10.3 G/DL — LOW (ref 11.7–16.1)
HGB BLD-MCNC: 9.6 G/DL — LOW (ref 11.5–15.5)
IMM GRANULOCYTES NFR BLD AUTO: 1.1 % — HIGH (ref 0–0.9)
INR BLD: 1.21 RATIO — HIGH (ref 0.85–1.18)
LACTATE BLDV-MCNC: 2.5 MMOL/L — HIGH (ref 0.5–2)
LYMPHOCYTES # BLD AUTO: 1.59 K/UL — SIGNIFICANT CHANGE UP (ref 1–3.3)
LYMPHOCYTES # BLD AUTO: 19.1 % — SIGNIFICANT CHANGE UP (ref 13–44)
MCHC RBC-ENTMCNC: 27 PG — SIGNIFICANT CHANGE UP (ref 27–34)
MCHC RBC-ENTMCNC: 31 GM/DL — LOW (ref 32–36)
MCV RBC AUTO: 87.1 FL — SIGNIFICANT CHANGE UP (ref 80–100)
MONOCYTES # BLD AUTO: 0.64 K/UL — SIGNIFICANT CHANGE UP (ref 0–0.9)
MONOCYTES NFR BLD AUTO: 7.7 % — SIGNIFICANT CHANGE UP (ref 2–14)
NEUTROPHILS # BLD AUTO: 5.6 K/UL — SIGNIFICANT CHANGE UP (ref 1.8–7.4)
NEUTROPHILS NFR BLD AUTO: 67.1 % — SIGNIFICANT CHANGE UP (ref 43–77)
NRBC # BLD: 0 /100 WBCS — SIGNIFICANT CHANGE UP (ref 0–0)
PCO2 BLDV: 54 MMHG — HIGH (ref 39–42)
PH BLDV: 7.37 — SIGNIFICANT CHANGE UP (ref 7.32–7.43)
PLATELET # BLD AUTO: 272 K/UL — SIGNIFICANT CHANGE UP (ref 150–400)
PO2 BLDV: 29 MMHG — SIGNIFICANT CHANGE UP (ref 25–45)
POTASSIUM BLDV-SCNC: 3.2 MMOL/L — LOW (ref 3.5–5.1)
POTASSIUM SERPL-MCNC: 3.3 MMOL/L — LOW (ref 3.5–5.3)
POTASSIUM SERPL-SCNC: 3.3 MMOL/L — LOW (ref 3.5–5.3)
PROT SERPL-MCNC: 7.1 G/DL — SIGNIFICANT CHANGE UP (ref 6–8.3)
PROTHROM AB SERPL-ACNC: 13.2 SEC — HIGH (ref 9.5–13)
RBC # BLD: 3.56 M/UL — LOW (ref 3.8–5.2)
RBC # FLD: 13.9 % — SIGNIFICANT CHANGE UP (ref 10.3–14.5)
SAO2 % BLDV: 46.7 % — LOW (ref 67–88)
SODIUM SERPL-SCNC: 143 MMOL/L — SIGNIFICANT CHANGE UP (ref 135–145)
WBC # BLD: 8.34 K/UL — SIGNIFICANT CHANGE UP (ref 3.8–10.5)
WBC # FLD AUTO: 8.34 K/UL — SIGNIFICANT CHANGE UP (ref 3.8–10.5)

## 2024-04-15 PROCEDURE — 99285 EMERGENCY DEPT VISIT HI MDM: CPT | Mod: GC

## 2024-04-15 PROCEDURE — 71045 X-RAY EXAM CHEST 1 VIEW: CPT | Mod: 26

## 2024-04-15 NOTE — ED PROVIDER NOTE - CHIEF COMPLAINT
The patient is a 73y Female complaining of  The patient is a 73y Female complaining of abnormal labs

## 2024-04-15 NOTE — ED PROVIDER NOTE - ATTENDING CONTRIBUTION TO CARE
Attending MD Le:  I have seen and examined this patient and fully participated in the care of this patient as the teaching attending. I personally made/approved the management plan and take responsibility for the patient management.      Patient is presenting at request of her outpatient physician Dr. Marco A Singleton for possible admission for IV antibiotics.  Chart note states "urine culture shows VRE only sensitive to daptomycin which requires IV administration.  Cannot give p.o. linezolid given interactions and patient on p.o. Augmentin which is not sensitive.    The patient herself states that she feels overall fatigued but is not sure what other symptoms she is having.  She is most concerned about the fact that she has had low back pain that radiates down the left leg from "lumbar scoliosis", this has been an ongoing issue for many years but it has been worse over the last few days.  There is no reported new flank or back pain.  Some mild nausea but no emesis.  Patient denies any specific dysuria or hematuria.    Patient on exam with normal vital signs in no apparent distress.  High BMI, chronically ill-appearing but no apparent acute distress.  The abdomen is nontender.  Bilateral nonpitting edema of feet shin and ankles.  5/5 strength bilateral lower extremities, sensation intact light touch throughout.    Patient is presenting for evaluation of follow-up urine culture from outpatient that revealed VRE.  Patient was previously bacteremic from Klebsiella and not this pathogen.  Uncertain significance of this urine culture from 4/9.  Will obtain screening labs panculture repeat urinalysis and urine culture and attempted discussed with sending physician as to how to proceed here as urine culture from outpatient would be only sensitive to daptomycin or linezolid, would probably need ID input to initiate either of these.              *The above represents an initial assessment/impression. Please refer to progress notes for potential changes in patient clinical course*

## 2024-04-15 NOTE — ED PROVIDER NOTE - PROGRESS NOTE DETAILS
Linden Carmona DO (PGY2)  Patient urinalysis with 73 white blood cells, per chart review and urinalysis culture, patient only sensitive to daptomycin.  Patient will need ID recommendations and orders for daptomycin.  Endorsed to hospitalist.  Recommending repletion of potassium as well as CT abdomen pelvis to rule out pyelo versus abscess.  This was ordered.  Patient to be admitted to medicine

## 2024-04-15 NOTE — ED PROVIDER NOTE - OBJECTIVE STATEMENT
73-year-old female history of A-fib, hypertension, hyperlipidemia, diabetes, anxiety presenting from home for concerns for sepsis on labs.  Patient had urinalysis and urine culture done outpatient which showed VRE only sensitive to daptomycin and linezolid.  Patient was sent in for IV antibiotics for admission.  Patient states she feels fatigued but is not endorsing any urinary symptoms at this time.  States she has had chronic back pain and has had lumbar scoliosis but has been going on for many years.  Denies any new flank or back pain.  Denies any fevers, chills, nausea vomiting diarrhea.  Patient was previously bacteremic with Klebsiella.

## 2024-04-15 NOTE — ED PROVIDER NOTE - PHYSICAL EXAMINATION
Linden Carmona DO (PGY2)   Physical Exam:    Gen: NAD, AOx3  Head: NCAT  HEENT: EOMI, PEERLA  Lung: CTAB, no respiratory distress, no wheezes/rhonchi/rales B/L  CV: RRR, no murmurs, rubs or gallops  Abd: soft, NT, ND, no guarding, no rigidity, no rebound tenderness, no CVA tenderness   MSK: no visible deformities, ROM normal in UE/LE, no back pain  Neuro: No focal sensory or motor deficits. Sensation intact to light touch all extremities.  Skin: Warm, well perfused, no rash, no leg swelling  Psych: normal affect, calm

## 2024-04-16 ENCOUNTER — TRANSCRIPTION ENCOUNTER (OUTPATIENT)
Age: 74
End: 2024-04-16

## 2024-04-16 VITALS
OXYGEN SATURATION: 96 % | SYSTOLIC BLOOD PRESSURE: 155 MMHG | DIASTOLIC BLOOD PRESSURE: 95 MMHG | TEMPERATURE: 98 F | HEART RATE: 67 BPM | RESPIRATION RATE: 18 BRPM

## 2024-04-16 DIAGNOSIS — E78.5 HYPERLIPIDEMIA, UNSPECIFIED: ICD-10-CM

## 2024-04-16 DIAGNOSIS — Z29.9 ENCOUNTER FOR PROPHYLACTIC MEASURES, UNSPECIFIED: ICD-10-CM

## 2024-04-16 DIAGNOSIS — I10 ESSENTIAL (PRIMARY) HYPERTENSION: ICD-10-CM

## 2024-04-16 DIAGNOSIS — A49.1 STREPTOCOCCAL INFECTION, UNSPECIFIED SITE: ICD-10-CM

## 2024-04-16 DIAGNOSIS — E11.9 TYPE 2 DIABETES MELLITUS WITHOUT COMPLICATIONS: ICD-10-CM

## 2024-04-16 DIAGNOSIS — I48.20 CHRONIC ATRIAL FIBRILLATION, UNSPECIFIED: ICD-10-CM

## 2024-04-16 DIAGNOSIS — E03.9 HYPOTHYROIDISM, UNSPECIFIED: ICD-10-CM

## 2024-04-16 LAB
ANION GAP SERPL CALC-SCNC: 12 MMOL/L — SIGNIFICANT CHANGE UP (ref 5–17)
APPEARANCE UR: CLEAR — SIGNIFICANT CHANGE UP
BACTERIA # UR AUTO: ABNORMAL /HPF
BASE EXCESS BLDV CALC-SCNC: 2.7 MMOL/L — SIGNIFICANT CHANGE UP (ref -2–3)
BILIRUB UR-MCNC: NEGATIVE — SIGNIFICANT CHANGE UP
BUN SERPL-MCNC: 12 MG/DL — SIGNIFICANT CHANGE UP (ref 7–23)
CA-I SERPL-SCNC: 1.15 MMOL/L — SIGNIFICANT CHANGE UP (ref 1.15–1.33)
CALCIUM SERPL-MCNC: 8.2 MG/DL — LOW (ref 8.4–10.5)
CAST: 2 /LPF — SIGNIFICANT CHANGE UP (ref 0–4)
CHLORIDE BLDV-SCNC: 106 MMOL/L — SIGNIFICANT CHANGE UP (ref 96–108)
CHLORIDE SERPL-SCNC: 105 MMOL/L — SIGNIFICANT CHANGE UP (ref 96–108)
CO2 BLDV-SCNC: 29 MMOL/L — HIGH (ref 22–26)
CO2 SERPL-SCNC: 24 MMOL/L — SIGNIFICANT CHANGE UP (ref 22–31)
COLOR SPEC: YELLOW — SIGNIFICANT CHANGE UP
CREAT SERPL-MCNC: 0.99 MG/DL — SIGNIFICANT CHANGE UP (ref 0.5–1.3)
CULTURE RESULTS: SIGNIFICANT CHANGE UP
DIFF PNL FLD: ABNORMAL
EGFR: 59 ML/MIN/1.73M2 — LOW
GAS PNL BLDV: 139 MMOL/L — SIGNIFICANT CHANGE UP (ref 136–145)
GAS PNL BLDV: SIGNIFICANT CHANGE UP
GAS PNL BLDV: SIGNIFICANT CHANGE UP
GLUCOSE BLDC GLUCOMTR-MCNC: 170 MG/DL — HIGH (ref 70–99)
GLUCOSE BLDC GLUCOMTR-MCNC: 215 MG/DL — HIGH (ref 70–99)
GLUCOSE BLDV-MCNC: 229 MG/DL — HIGH (ref 70–99)
GLUCOSE SERPL-MCNC: 173 MG/DL — HIGH (ref 70–99)
GLUCOSE UR QL: >=1000 MG/DL
HCO3 BLDV-SCNC: 28 MMOL/L — SIGNIFICANT CHANGE UP (ref 22–29)
HCT VFR BLD CALC: 28.8 % — LOW (ref 34.5–45)
HCT VFR BLDA CALC: 27 % — LOW (ref 34.5–46.5)
HGB BLD CALC-MCNC: 9.1 G/DL — LOW (ref 11.7–16.1)
HGB BLD-MCNC: 9.1 G/DL — LOW (ref 11.5–15.5)
INR BLD: 1.24 RATIO — HIGH (ref 0.85–1.18)
KETONES UR-MCNC: NEGATIVE MG/DL — SIGNIFICANT CHANGE UP
LACTATE BLDV-MCNC: 1.3 MMOL/L — SIGNIFICANT CHANGE UP (ref 0.5–2)
LEUKOCYTE ESTERASE UR-ACNC: ABNORMAL
MCHC RBC-ENTMCNC: 27.5 PG — SIGNIFICANT CHANGE UP (ref 27–34)
MCHC RBC-ENTMCNC: 31.6 GM/DL — LOW (ref 32–36)
MCV RBC AUTO: 87 FL — SIGNIFICANT CHANGE UP (ref 80–100)
NITRITE UR-MCNC: NEGATIVE — SIGNIFICANT CHANGE UP
NRBC # BLD: 0 /100 WBCS — SIGNIFICANT CHANGE UP (ref 0–0)
PCO2 BLDV: 45 MMHG — HIGH (ref 39–42)
PH BLDV: 7.4 — SIGNIFICANT CHANGE UP (ref 7.32–7.43)
PH UR: 6.5 — SIGNIFICANT CHANGE UP (ref 5–8)
PLATELET # BLD AUTO: 241 K/UL — SIGNIFICANT CHANGE UP (ref 150–400)
PO2 BLDV: 53 MMHG — HIGH (ref 25–45)
POTASSIUM BLDV-SCNC: 3.2 MMOL/L — LOW (ref 3.5–5.1)
POTASSIUM SERPL-MCNC: 3.6 MMOL/L — SIGNIFICANT CHANGE UP (ref 3.5–5.3)
POTASSIUM SERPL-SCNC: 3.6 MMOL/L — SIGNIFICANT CHANGE UP (ref 3.5–5.3)
PROT UR-MCNC: 30 MG/DL
PROTHROM AB SERPL-ACNC: 12.9 SEC — SIGNIFICANT CHANGE UP (ref 9.5–13)
RBC # BLD: 3.31 M/UL — LOW (ref 3.8–5.2)
RBC # FLD: 14 % — SIGNIFICANT CHANGE UP (ref 10.3–14.5)
RBC CASTS # UR COMP ASSIST: 5 /HPF — HIGH (ref 0–4)
REVIEW: SIGNIFICANT CHANGE UP
SAO2 % BLDV: 84.9 % — SIGNIFICANT CHANGE UP (ref 67–88)
SODIUM SERPL-SCNC: 141 MMOL/L — SIGNIFICANT CHANGE UP (ref 135–145)
SP GR SPEC: 1.02 — SIGNIFICANT CHANGE UP (ref 1–1.03)
SPECIMEN SOURCE: SIGNIFICANT CHANGE UP
SQUAMOUS # UR AUTO: 4 /HPF — SIGNIFICANT CHANGE UP (ref 0–5)
UROBILINOGEN FLD QL: 0.2 MG/DL — SIGNIFICANT CHANGE UP (ref 0.2–1)
WBC # BLD: 7.88 K/UL — SIGNIFICANT CHANGE UP (ref 3.8–10.5)
WBC # FLD AUTO: 7.88 K/UL — SIGNIFICANT CHANGE UP (ref 3.8–10.5)
WBC UR QL: 73 /HPF — HIGH (ref 0–5)

## 2024-04-16 PROCEDURE — 85027 COMPLETE CBC AUTOMATED: CPT

## 2024-04-16 PROCEDURE — 85018 HEMOGLOBIN: CPT

## 2024-04-16 PROCEDURE — 97162 PT EVAL MOD COMPLEX 30 MIN: CPT

## 2024-04-16 PROCEDURE — 82435 ASSAY OF BLOOD CHLORIDE: CPT

## 2024-04-16 PROCEDURE — 82330 ASSAY OF CALCIUM: CPT

## 2024-04-16 PROCEDURE — 99285 EMERGENCY DEPT VISIT HI MDM: CPT | Mod: 25

## 2024-04-16 PROCEDURE — 36415 COLL VENOUS BLD VENIPUNCTURE: CPT

## 2024-04-16 PROCEDURE — 84132 ASSAY OF SERUM POTASSIUM: CPT

## 2024-04-16 PROCEDURE — 80053 COMPREHEN METABOLIC PANEL: CPT

## 2024-04-16 PROCEDURE — 93005 ELECTROCARDIOGRAM TRACING: CPT

## 2024-04-16 PROCEDURE — 81001 URINALYSIS AUTO W/SCOPE: CPT

## 2024-04-16 PROCEDURE — 82962 GLUCOSE BLOOD TEST: CPT

## 2024-04-16 PROCEDURE — 83605 ASSAY OF LACTIC ACID: CPT

## 2024-04-16 PROCEDURE — 82803 BLOOD GASES ANY COMBINATION: CPT

## 2024-04-16 PROCEDURE — 80048 BASIC METABOLIC PNL TOTAL CA: CPT

## 2024-04-16 PROCEDURE — 87040 BLOOD CULTURE FOR BACTERIA: CPT

## 2024-04-16 PROCEDURE — 85025 COMPLETE CBC W/AUTO DIFF WBC: CPT

## 2024-04-16 PROCEDURE — 85730 THROMBOPLASTIN TIME PARTIAL: CPT

## 2024-04-16 PROCEDURE — 74177 CT ABD & PELVIS W/CONTRAST: CPT | Mod: MC

## 2024-04-16 PROCEDURE — 84295 ASSAY OF SERUM SODIUM: CPT

## 2024-04-16 PROCEDURE — 85610 PROTHROMBIN TIME: CPT

## 2024-04-16 PROCEDURE — 74177 CT ABD & PELVIS W/CONTRAST: CPT | Mod: 26

## 2024-04-16 PROCEDURE — 82947 ASSAY GLUCOSE BLOOD QUANT: CPT

## 2024-04-16 PROCEDURE — 87086 URINE CULTURE/COLONY COUNT: CPT

## 2024-04-16 PROCEDURE — 96374 THER/PROPH/DIAG INJ IV PUSH: CPT | Mod: XU

## 2024-04-16 PROCEDURE — 71045 X-RAY EXAM CHEST 1 VIEW: CPT

## 2024-04-16 PROCEDURE — 85014 HEMATOCRIT: CPT

## 2024-04-16 RX ORDER — SODIUM CHLORIDE 9 MG/ML
1000 INJECTION INTRAMUSCULAR; INTRAVENOUS; SUBCUTANEOUS
Refills: 0 | Status: DISCONTINUED | OUTPATIENT
Start: 2024-04-16 | End: 2024-04-16

## 2024-04-16 RX ORDER — SODIUM CHLORIDE 9 MG/ML
1000 INJECTION INTRAMUSCULAR; INTRAVENOUS; SUBCUTANEOUS ONCE
Refills: 0 | Status: COMPLETED | OUTPATIENT
Start: 2024-04-16 | End: 2024-04-16

## 2024-04-16 RX ORDER — LEVOTHYROXINE SODIUM 125 MCG
175 TABLET ORAL DAILY
Refills: 0 | Status: DISCONTINUED | OUTPATIENT
Start: 2024-04-16 | End: 2024-04-16

## 2024-04-16 RX ORDER — DEXTROSE 50 % IN WATER 50 %
12.5 SYRINGE (ML) INTRAVENOUS ONCE
Refills: 0 | Status: DISCONTINUED | OUTPATIENT
Start: 2024-04-16 | End: 2024-04-16

## 2024-04-16 RX ORDER — PANTOPRAZOLE SODIUM 20 MG/1
40 TABLET, DELAYED RELEASE ORAL
Refills: 0 | Status: DISCONTINUED | OUTPATIENT
Start: 2024-04-16 | End: 2024-04-16

## 2024-04-16 RX ORDER — ASPIRIN/CALCIUM CARB/MAGNESIUM 324 MG
81 TABLET ORAL DAILY
Refills: 0 | Status: DISCONTINUED | OUTPATIENT
Start: 2024-04-16 | End: 2024-04-16

## 2024-04-16 RX ORDER — SODIUM CHLORIDE 9 MG/ML
1000 INJECTION, SOLUTION INTRAVENOUS
Refills: 0 | Status: DISCONTINUED | OUTPATIENT
Start: 2024-04-16 | End: 2024-04-16

## 2024-04-16 RX ORDER — POTASSIUM CHLORIDE 20 MEQ
40 PACKET (EA) ORAL ONCE
Refills: 0 | Status: COMPLETED | OUTPATIENT
Start: 2024-04-16 | End: 2024-04-16

## 2024-04-16 RX ORDER — LOSARTAN POTASSIUM 100 MG/1
100 TABLET, FILM COATED ORAL DAILY
Refills: 0 | Status: DISCONTINUED | OUTPATIENT
Start: 2024-04-16 | End: 2024-04-16

## 2024-04-16 RX ORDER — ALPRAZOLAM 0.25 MG
1 TABLET ORAL
Refills: 0 | Status: DISCONTINUED | OUTPATIENT
Start: 2024-04-16 | End: 2024-04-16

## 2024-04-16 RX ORDER — PANTOPRAZOLE SODIUM 20 MG/1
1 TABLET, DELAYED RELEASE ORAL
Qty: 0 | Refills: 0 | DISCHARGE

## 2024-04-16 RX ORDER — APIXABAN 2.5 MG/1
5 TABLET, FILM COATED ORAL EVERY 12 HOURS
Refills: 0 | Status: DISCONTINUED | OUTPATIENT
Start: 2024-04-16 | End: 2024-04-16

## 2024-04-16 RX ORDER — DEXTROSE 10 % IN WATER 10 %
125 INTRAVENOUS SOLUTION INTRAVENOUS ONCE
Refills: 0 | Status: DISCONTINUED | OUTPATIENT
Start: 2024-04-16 | End: 2024-04-16

## 2024-04-16 RX ORDER — AMLODIPINE BESYLATE 2.5 MG/1
5 TABLET ORAL DAILY
Refills: 0 | Status: DISCONTINUED | OUTPATIENT
Start: 2024-04-16 | End: 2024-04-16

## 2024-04-16 RX ORDER — ALPRAZOLAM 0.25 MG
0 TABLET ORAL
Refills: 0 | DISCHARGE

## 2024-04-16 RX ORDER — AMLODIPINE BESYLATE 2.5 MG/1
1 TABLET ORAL
Refills: 0 | DISCHARGE

## 2024-04-16 RX ORDER — ASPIRIN/CALCIUM CARB/MAGNESIUM 324 MG
1 TABLET ORAL
Refills: 0 | DISCHARGE

## 2024-04-16 RX ORDER — GLUCAGON INJECTION, SOLUTION 0.5 MG/.1ML
1 INJECTION, SOLUTION SUBCUTANEOUS ONCE
Refills: 0 | Status: DISCONTINUED | OUTPATIENT
Start: 2024-04-16 | End: 2024-04-16

## 2024-04-16 RX ORDER — LEVOTHYROXINE SODIUM 125 MCG
1 TABLET ORAL
Refills: 0 | DISCHARGE

## 2024-04-16 RX ORDER — LOSARTAN POTASSIUM 100 MG/1
1 TABLET, FILM COATED ORAL
Refills: 0 | DISCHARGE

## 2024-04-16 RX ORDER — ALPRAZOLAM 0.25 MG
2 TABLET ORAL ONCE
Refills: 0 | Status: DISCONTINUED | OUTPATIENT
Start: 2024-04-16 | End: 2024-04-16

## 2024-04-16 RX ORDER — INSULIN LISPRO 100/ML
VIAL (ML) SUBCUTANEOUS AT BEDTIME
Refills: 0 | Status: DISCONTINUED | OUTPATIENT
Start: 2024-04-16 | End: 2024-04-16

## 2024-04-16 RX ORDER — ONDANSETRON 8 MG/1
4 TABLET, FILM COATED ORAL EVERY 8 HOURS
Refills: 0 | Status: DISCONTINUED | OUTPATIENT
Start: 2024-04-16 | End: 2024-04-16

## 2024-04-16 RX ORDER — ACETAMINOPHEN 500 MG
650 TABLET ORAL EVERY 6 HOURS
Refills: 0 | Status: DISCONTINUED | OUTPATIENT
Start: 2024-04-16 | End: 2024-04-16

## 2024-04-16 RX ORDER — ACETAMINOPHEN 500 MG
1000 TABLET ORAL ONCE
Refills: 0 | Status: COMPLETED | OUTPATIENT
Start: 2024-04-16 | End: 2024-04-16

## 2024-04-16 RX ORDER — INSULIN GLARGINE 100 [IU]/ML
24 INJECTION, SOLUTION SUBCUTANEOUS AT BEDTIME
Refills: 0 | Status: DISCONTINUED | OUTPATIENT
Start: 2024-04-16 | End: 2024-04-16

## 2024-04-16 RX ORDER — DEXTROSE 50 % IN WATER 50 %
15 SYRINGE (ML) INTRAVENOUS ONCE
Refills: 0 | Status: DISCONTINUED | OUTPATIENT
Start: 2024-04-16 | End: 2024-04-16

## 2024-04-16 RX ORDER — INSULIN LISPRO 100/ML
8 VIAL (ML) SUBCUTANEOUS
Refills: 0 | Status: DISCONTINUED | OUTPATIENT
Start: 2024-04-16 | End: 2024-04-16

## 2024-04-16 RX ORDER — LANOLIN ALCOHOL/MO/W.PET/CERES
3 CREAM (GRAM) TOPICAL AT BEDTIME
Refills: 0 | Status: DISCONTINUED | OUTPATIENT
Start: 2024-04-16 | End: 2024-04-16

## 2024-04-16 RX ORDER — INSULIN LISPRO 100/ML
VIAL (ML) SUBCUTANEOUS
Refills: 0 | Status: DISCONTINUED | OUTPATIENT
Start: 2024-04-16 | End: 2024-04-16

## 2024-04-16 RX ORDER — DEXTROSE 50 % IN WATER 50 %
25 SYRINGE (ML) INTRAVENOUS ONCE
Refills: 0 | Status: DISCONTINUED | OUTPATIENT
Start: 2024-04-16 | End: 2024-04-16

## 2024-04-16 RX ORDER — METOPROLOL TARTRATE 50 MG
25 TABLET ORAL DAILY
Refills: 0 | Status: DISCONTINUED | OUTPATIENT
Start: 2024-04-16 | End: 2024-04-16

## 2024-04-16 RX ORDER — AMIODARONE HYDROCHLORIDE 400 MG/1
200 TABLET ORAL DAILY
Refills: 0 | Status: DISCONTINUED | OUTPATIENT
Start: 2024-04-16 | End: 2024-04-16

## 2024-04-16 RX ADMIN — AMIODARONE HYDROCHLORIDE 200 MILLIGRAM(S): 400 TABLET ORAL at 14:27

## 2024-04-16 RX ADMIN — Medication 2: at 12:34

## 2024-04-16 RX ADMIN — SODIUM CHLORIDE 1000 MILLILITER(S): 9 INJECTION INTRAMUSCULAR; INTRAVENOUS; SUBCUTANEOUS at 00:53

## 2024-04-16 RX ADMIN — AMLODIPINE BESYLATE 5 MILLIGRAM(S): 2.5 TABLET ORAL at 14:27

## 2024-04-16 RX ADMIN — PANTOPRAZOLE SODIUM 40 MILLIGRAM(S): 20 TABLET, DELAYED RELEASE ORAL at 07:50

## 2024-04-16 RX ADMIN — Medication 650 MILLIGRAM(S): at 07:50

## 2024-04-16 RX ADMIN — Medication 40 MILLIEQUIVALENT(S): at 02:25

## 2024-04-16 RX ADMIN — Medication 1: at 07:51

## 2024-04-16 RX ADMIN — Medication 2 MILLIGRAM(S): at 02:55

## 2024-04-16 RX ADMIN — Medication 175 MICROGRAM(S): at 14:27

## 2024-04-16 RX ADMIN — Medication 81 MILLIGRAM(S): at 12:34

## 2024-04-16 RX ADMIN — Medication 400 MILLIGRAM(S): at 00:53

## 2024-04-16 RX ADMIN — Medication 25 MILLIGRAM(S): at 14:27

## 2024-04-16 RX ADMIN — LOSARTAN POTASSIUM 100 MILLIGRAM(S): 100 TABLET, FILM COATED ORAL at 14:27

## 2024-04-16 RX ADMIN — Medication 8 UNIT(S): at 12:34

## 2024-04-16 NOTE — ED ADULT NURSE NOTE - OBJECTIVE STATEMENT
72 y/o F , AXOX4, with a PMH of HTN, HLD, afib, diabetes, BIB EMS for increased lethargy and abnormal labs. Pt was advised my PCP to go to the ED for IV antibiotics. Pt was recently treated for a UTI. Pt currently denying urinary symptoms. No fever/chills, dizziness, abd pain, dyspnea or chest pain.  Pt found in gown on stretcher, breathing unlabored on room air, speaking in complete sentences, strong and equal strength in al extremities, sensations intact, abd soft non tender non distended, no edema noted. Safety and comfort measures maintained.

## 2024-04-16 NOTE — CONSULT NOTE ADULT - SUBJECTIVE AND OBJECTIVE BOX
Patient is a 73y old  Female who presents with a chief complaint of VRE+ Ucx (16 Apr 2024 15:46)      HPI:  73y F pmh  A-fib, hypertension, hyperlipidemia, diabetes, anxiety recent hospitalization w/Klebsiella bacteremia s/p course IV  ceftriaxone sent in by pcp for abnorm labs. On O/p labs PCP ( Kanika) pt noted to have UA/Ucx which showed VRE only sensitive to daptomycin and linezolid.  Patient was sent in for IV antibiotics.  Patient states she feels fatigued but is not endorsing any urinary symptoms at this time.  Denies any new flank or back pain.  Denies any fevers, chills, nausea vomiting diarrhea.    ROS: Denies CP, SOB, palpitation, N/V/D, fever, cough, chills, dizziness, abm pain, recent travel, sick contact, change in bowel and urinary habits     A 10-system ROS was performed and is negative except as noted above and/or in the HPI.   (16 Apr 2024 05:53)  Above reviewed:   Pt denies any dysuria, no worsening frequency, no fever or chills, no suprapubic pain. She has spinal stenosis and has back pain always which is unchanged.         PAST MEDICAL & SURGICAL HISTORY:  HTN (hypertension)      Murmur      Morbid obesity      Palpitations      Former smoker      Hypothyroid      CAD (coronary artery disease)  minimal CAD per cath result in 2014      Hypercholesterolemia      Gastritis      Depression      Anxiety      AF (atrial fibrillation)  on Xarelto      Hiatal hernia      Neuropathy      Uterine cyst      Incontinence      Thickened endometrium      History of TIAs      Loss of vision  right eye      Blind right eye      [place] (hard of hearing)      Uterine cancer      Type 2 diabetes mellitus      Constipation      Spinal stenosis      Edema of lower extremity      Varicose veins      Thyroid nodule      OAB (overactive bladder)      Risk factors for obstructive sleep apnea      H/O Helicobacter infection      S/P cataract extraction and insertion of intraocular lens  bilateral > 15 years ago      Varicose vein  with stripping b/l      History of angioplasty of vein  right leg 2016      H/O thyroidectomy      H/O: hysterectomy          REVIEW OF SYSTEMS    General:	No Fevers, no chills     Skin: No rash  	  Ophthalmologic: Denies any discharge, redness.  	  ENMT: No nasal congestion or throat pain.     Respiratory and Thorax: No cough, sputum. Denies shortness of breath.  	  Cardiovascular: No chest pain,     Gastrointestinal: No nausea, abdominal pain or diarrhea.    Genitourinary: No dysuria,     Musculoskeletal: No joint swelling     Neurological: No new extremity weakness.    Psychiatric: No hallucinations	    Extremities: No swelling     Endocrine: No polyuria or polydipsia, no abnormal heat or cold intolerance     Allergic/Immunologic:	No hives    Social history:    FAMILY HISTORY:  Family history of stroke (Mother)  mom    Family history of hypertension (Father)    Family history of breast cancer (Mother)        Allergies    latex (Rash)  Lipitor (Nausea)    Intolerances        Antimicrobials:          Vital Signs Last 24 Hrs  T(C): 36.7 (16 Apr 2024 14:29), Max: 36.7 (16 Apr 2024 14:29)  T(F): 98 (16 Apr 2024 14:29), Max: 98 (16 Apr 2024 14:29)  HR: 67 (16 Apr 2024 14:29) (65 - 73)  BP: 146/80 (16 Apr 2024 14:29) (110/86 - 163/85)  BP(mean): 79 (16 Apr 2024 02:49) (79 - 96)  RR: 18 (16 Apr 2024 14:29) (16 - 18)  SpO2: 95% (16 Apr 2024 14:29) (94% - 96%)    Parameters below as of 16 Apr 2024 14:29  Patient On (Oxygen Delivery Method): room air        PHYSICAL EXAM: Patient in no acute distress.    Constitutional: Comfortable. Awake and alert    Eyes: No discharge or conjunctival injection    ENMT: No thrush. No pharyngeal erythema.    Neck: Supple,     Respiratory:  + air entry bilaterally.    Cardiovascular: S1 S2 wnl,     Gastrointestinal: Soft BS(+) no tenderness, non distended.    Genitourinary: No CVA tenderness     Extremities: No edema.    Vascular: peripheral pulses felt    Neurological: No new gross focal deficits.    Skin: No rash     Musculoskeletal: No joint swelling.    Psychiatric: Affect normal.                              9.1    7.88  )-----------( 241      ( 16 Apr 2024 07:06 )             28.8       04-16    141  |  105  |  12  ----------------------------<  173<H>  3.6   |  24  |  0.99    Ca    8.2<L>      16 Apr 2024 07:06    TPro  7.1  /  Alb  3.6  /  TBili  0.6  /  DBili  x   /  AST  8<L>  /  ALT  9<L>  /  AlkPhos  86  04-15        Urinalysis (04.16.24 @ 00:55)   pH Urine: 6.5  Glucose Qualitative, Urine: >=1000 mg/dL  Blood, Urine: Small  Color: Yellow  Urine Appearance: Clear  Bilirubin: Negative  Ketone - Urine: Negative mg/dL  Specific Gravity: 1.018  Protein, Urine: 30 mg/dL  Urobilinogen: 0.2 mg/dL  Nitrite: Negative  Leukocyte Esterase Concentration: ModerateUrine Microscopic-Add On (NC) (04.16.24 @ 00:55)   Epithelial Cells: 4 /HPF  Review: Reviewed  Cast: 2 /LPF  Bacteria: Occasional /HPF  Red Blood Cell - Urine: 5 /HPF  White Blood Cell - Urine: 73 /HPF      Radiology: Imaging reviewed and visualized personally [ x]    < from: CT Abdomen and Pelvis w/ IV Cont (04.16.24 @ 03:24) >    IMPRESSION:    Ongoing right pyelonephritis, improved since the prior study, including   improvement in superimposed areas of liquefaction concerning for phlegmon   versus abscess.                 Patient is a 73y old  Female who presents with a chief complaint of VRE+ Ucx (16 Apr 2024 15:46)      HPI:  73y F pmh  A-fib, hypertension, hyperlipidemia, diabetes, anxiety recent hospitalization w/Klebsiella bacteremia s/p course IV  ceftriaxone sent in by pcp for abnorm labs. On O/p labs PCP ( Kanika) pt noted to have UA/Ucx which showed VRE only sensitive to daptomycin and linezolid.  Patient was sent in for IV antibiotics.  Patient states she feels fatigued but is not endorsing any urinary symptoms at this time.  Denies any new flank or back pain.  Denies any fevers, chills, nausea vomiting diarrhea.    ROS: Denies CP, SOB, palpitation, N/V/D, fever, cough, chills, dizziness, abm pain, recent travel, sick contact, change in bowel and urinary habits     A 10-system ROS was performed and is negative except as noted above and/or in the HPI.   (16 Apr 2024 05:53)  Above reviewed:   Pt denies any dysuria, no worsening frequency, no fever or chills, no suprapubic pain. She has spinal stenosis and has back pain always which is unchanged.         PAST MEDICAL & SURGICAL HISTORY:  HTN (hypertension)      Murmur      Morbid obesity      Palpitations      Former smoker      Hypothyroid      CAD (coronary artery disease)  minimal CAD per cath result in 2014      Hypercholesterolemia      Gastritis      Depression      Anxiety      AF (atrial fibrillation)  on Xarelto      Hiatal hernia      Neuropathy      Uterine cyst      Incontinence      Thickened endometrium      History of TIAs      Loss of vision  right eye      Blind right eye      [place] (hard of hearing)      Uterine cancer      Type 2 diabetes mellitus      Constipation      Spinal stenosis      Edema of lower extremity      Varicose veins      Thyroid nodule      OAB (overactive bladder)      Risk factors for obstructive sleep apnea      H/O Helicobacter infection      S/P cataract extraction and insertion of intraocular lens  bilateral > 15 years ago      Varicose vein  with stripping b/l      History of angioplasty of vein  right leg 2016      H/O thyroidectomy      H/O: hysterectomy          REVIEW OF SYSTEMS    General: No Fevers,      Skin: No rash  	  Ophthalmologic: Denies any discharge, redness.  	  ENMT: No nasal congestion or throat pain.     Respiratory and Thorax: No cough. Denies shortness of breath.  	  Cardiovascular: No chest pain,     Gastrointestinal: No nausea, abdominal pain or diarrhea.    Genitourinary: per HPI    Musculoskeletal: No joint swelling     Neurological: No new extremity weakness.    Psychiatric: No hallucinations	    Extremities: No swelling     Endocrine: No polyuria or polydipsia     Allergic/Immunologic:	No hives        Social history:  lives with family, former smoker.         FAMILY HISTORY:  Family history of stroke (Mother)  mom    Family history of hypertension (Father)    Family history of breast cancer (Mother)        Allergies  latex (Rash)  Lipitor (Nausea)        Antimicrobials:          Vital Signs Last 24 Hrs  T(C): 36.7 (16 Apr 2024 14:29), Max: 36.7 (16 Apr 2024 14:29)  T(F): 98 (16 Apr 2024 14:29), Max: 98 (16 Apr 2024 14:29)  HR: 67 (16 Apr 2024 14:29) (65 - 73)  BP: 146/80 (16 Apr 2024 14:29) (110/86 - 163/85)  BP(mean): 79 (16 Apr 2024 02:49) (79 - 96)  RR: 18 (16 Apr 2024 14:29) (16 - 18)  SpO2: 95% (16 Apr 2024 14:29) (94% - 96%)    Parameters below as of 16 Apr 2024 14:29  Patient On (Oxygen Delivery Method): room air          PHYSICAL EXAM: Patient in no acute distress.    Constitutional: Comfortable. Awake and alert    Eyes: No discharge or conjunctival injection    ENMT: No thrush. No pharyngeal erythema.    Neck: Supple,     Respiratory:  + air entry bilaterally.    Cardiovascular: S1 S2 wnl,     Gastrointestinal: Soft no tenderness, non distended.    Genitourinary: No aguiar    Extremities: No edema.    Vascular: peripheral pulses felt    Neurological: No new gross focal deficits.    Skin: No rash     Musculoskeletal: No joint swelling.    Psychiatric: Affect normal.                              9.1    7.88  )-----------( 241      ( 16 Apr 2024 07:06 )             28.8       04-16    141  |  105  |  12  ----------------------------<  173<H>  3.6   |  24  |  0.99    Ca    8.2<L>      16 Apr 2024 07:06    TPro  7.1  /  Alb  3.6  /  TBili  0.6  /  DBili  x   /  AST  8<L>  /  ALT  9<L>  /  AlkPhos  86  04-15        Urinalysis (04.16.24 @ 00:55)   pH Urine: 6.5  Glucose Qualitative, Urine: >=1000 mg/dL  Blood, Urine: Small  Color: Yellow  Urine Appearance: Clear  Bilirubin: Negative  Ketone - Urine: Negative mg/dL  Specific Gravity: 1.018  Protein, Urine: 30 mg/dL  Urobilinogen: 0.2 mg/dL  Nitrite: Negative  Leukocyte Esterase Concentration: Moderate  Urine Microscopic-Add On (NC) (04.16.24 @ 00:55)   Epithelial Cells: 4 /HPF  Review: Reviewed  Cast: 2 /LPF  Bacteria: Occasional /HPF  Red Blood Cell - Urine: 5 /HPF  White Blood Cell - Urine: 73 /HPF      Radiology: Imaging reviewed and visualized personally [ x]    < from: CT Abdomen and Pelvis w/ IV Cont (04.16.24 @ 03:24) >    IMPRESSION:    Ongoing right pyelonephritis, improved since the prior study, including   improvement in superimposed areas of liquefaction concerning for phlegmon   versus abscess.

## 2024-04-16 NOTE — DISCHARGE NOTE PROVIDER - NSDCFUSCHEDAPPT_GEN_ALL_CORE_FT
Christopher Boudreaux  [facility]  INTMED [address]  Scheduled Appointment: 04/18/2024    Cheryl Champion  [facility]  GYNONC [address]  Scheduled Appointment: 05/22/2024    Paolo Love  [facility]  INFDISEASE [address]  Scheduled Appointment: 06/13/2024

## 2024-04-16 NOTE — PHYSICAL THERAPY INITIAL EVALUATION ADULT - ADDITIONAL COMMENTS
Pt resides in apt with spouse, 4 steps to enter with handrail, one level within. PTA independent with mobility and ADL's, ambulatory with cane, owns RW for outdoors.

## 2024-04-16 NOTE — H&P ADULT - HISTORY OF PRESENT ILLNESS
73y F pmh  A-fib, hypertension, hyperlipidemia, diabetes, anxiety recent hospitalization w/Klebsiella bacteremia s/p course IV  ceftriaxone sent in by pcp for abnorm labs. On O/p labs PCP ( Kanika) pt noted to have UA/Ucx which showed VRE only sensitive to daptomycin and linezolid.  Patient was sent in for IV antibiotics.  Patient states she feels fatigued but is not endorsing any urinary symptoms at this time.  Denies any new flank or back pain.  Denies any fevers, chills, nausea vomiting diarrhea.      ROS: Denies CP, SOB, palpitation, N/V/D, fever, cough, chills, dizziness, abm pain, recent travel, sick contact, change in bowel and urinary habits     A 10-system ROS was performed and is negative except as noted above and/or in the HPI.

## 2024-04-16 NOTE — ED ADULT NURSE NOTE - NSFALLRISKINTERV_ED_ALL_ED

## 2024-04-16 NOTE — ED ADULT NURSE NOTE - NS ED PATIENT SAFETY CONCERN
Post-Op Assessment Note    CV Status:  Stable  Pain Score: 0    Pain management: adequate     Mental Status:  Alert and awake   Hydration Status:  Euvolemic and stable   PONV Controlled:  None   Airway Patency:  Patent      Post Op Vitals Reviewed: Yes      Staff: CRNA         No complications documented      /70 (09/09/22 0914)    Temp      Pulse 70 (09/09/22 0914)   Resp 19 (09/09/22 0914)    SpO2 96 % (09/09/22 0914)
No

## 2024-04-16 NOTE — DISCHARGE NOTE NURSING/CASE MANAGEMENT/SOCIAL WORK - NSFLUVACAGEDISCH_IMM_ALL_CORE
Pt stated that she is having a lot of acid reflux, and she stated that she is having pain on her right upper quad pain. Pt stated that the pain comes and goes. Pt has a virtual appt with Sierra Newman today. Adult

## 2024-04-16 NOTE — DISCHARGE NOTE PROVIDER - HOSPITAL COURSE
HPI:  73y F pmh  A-fib, hypertension, hyperlipidemia, diabetes, anxiety recent hospitalization w/Klebsiella bacteremia s/p course IV  ceftriaxone sent in by pcp for abnorm labs. On O/p labs PCP ( Kanika) pt noted to have UA/Ucx which showed VRE only sensitive to daptomycin and linezolid.  Patient was sent in for IV antibiotics.  Patient states she feels fatigued but is not endorsing any urinary symptoms at this time.  Denies any new flank or back pain.  Denies any fevers, chills, nausea vomiting diarrhea.      ROS: Denies CP, SOB, palpitation, N/V/D, fever, cough, chills, dizziness, abm pain, recent travel, sick contact, change in bowel and urinary habits     A 10-system ROS was performed and is negative except as noted above and/or in the HPI.   (16 Apr 2024 05:53)    Hospital Course: Patient admitted  VRE (vancomycin-resistant Enterococci) infection. Patient S/p recent hospitalization w/Klebsiella bacteremia s/p course IV  ceftriaxone .  CT A/P:  right pyelonephritis, improved since the prior study.  Given lack of clinical sx, no dysuria, afebrile, no elevated wbc, will hold off on abx for now . Infectious disease consulted and recommended to monitor off antibiotics.    Important Medication Changes and Reason:    Active or Pending Issues Requiring Follow-up:    Advanced Directives:   [ ] Full code  [ ] DNR  [ ] Hospice    Discharge Diagnoses:   VRE (vancomycin-resistant Enterococci) infection

## 2024-04-16 NOTE — PHYSICAL THERAPY INITIAL EVALUATION ADULT - PERTINENT HX OF CURRENT PROBLEM, REHAB EVAL
Pt is 73F admitted 4/16/24 pmhx  A-fib, hypertension, hyperlipidemia, diabetes, anxiety recent hospitalization w/Klebsiella bacteremia s/p course IV  ceftriaxone sent in by pcp for abnorm labs. On O/p labs PCP ( Kanika) pt noted to have UA/Ucx which showed VRE only sensitive to daptomycin and linezolid.  Patient was sent in for IV antibiotics.  Patient states she feels fatigued but is not endorsing any urinary symptoms at this time.     CT ABDOMEN/PELVIS: Ongoing right pyelonephritis, improved since the prior study, including improvement in superimposed areas of liquefaction concerning for phlegmon   versus abscess. XRAY CHEST: Clear lungs.

## 2024-04-16 NOTE — H&P ADULT - NSHPLABSRESULTS_GEN_ALL_CORE
9.6    8.34  )-----------( 272      ( 15 Apr 2024 23:03 )             31.0       04-15    143  |  104  |  13  ----------------------------<  253<H>  3.3<L>   |  25  |  1.13    Ca    8.5      15 Apr 2024 23:03    TPro  7.1  /  Alb  3.6  /  TBili  0.6  /  DBili  x   /  AST  8<L>  /  ALT  9<L>  /  AlkPhos  86  04-15      Urinalysis (04.16.24 @ 00:55)    pH Urine: 6.5    Glucose Qualitative, Urine: >=1000 mg/dL    Blood, Urine: Small    Color: Yellow    Urine Appearance: Clear    Bilirubin: Negative    Ketone - Urine: Negative mg/dL    Specific Gravity: 1.018    Protein, Urine: 30 mg/dL    Urobilinogen: 0.2 mg/dL    Nitrite: Negative    Leukocyte Esterase Concentration: Moderate      - - - - - - - - - - - - - - - - - - - - - - - - - - - - - - - - - - - - - - - - - - - - - - - - - - - -       EKG PERSONALLY REVIEWED:  NSR 65    IMAGES PERSONALLY REVIEWED:     < from: Xray Chest 1 View- PORTABLE-Urgent (04.15.24 @ 23:05) >  IMPRESSION: Clear lungs.      < from: CT Abdomen and Pelvis w/ IV Cont (04.16.24 @ 03:24) >  Ongoing right pyelonephritis, improved since the prior study, including   improvement in superimposed areas of liquefaction concerning for phlegmon   versus abscess.

## 2024-04-16 NOTE — ED ADULT NURSE NOTE - CAS TRG GENERAL NORM CIRC DET
You have no urine infection in the ER today thankfully but please follow up with your primary care doctor tomorrow so they can make sure you're still feeling improved/rash is improved.   Strong peripheral pulses

## 2024-04-16 NOTE — CHART NOTE - NSCHARTNOTEFT_GEN_A_CORE
Patient seen and examined  Feels well without much complaints, is asking to get from bed to chair. PT pending  no pain, no dysuria  noted CT a/p with improving but persistent renal abscess vs phlegmon  VRE on outpt cultures    f/u ID eval  urology to weigh in on progressive phlegmon/abscess Patient seen and examined  Feels well without much complaints, is asking to get from bed to chair. PT pending  no pain, no dysuria  noted CT a/p with improving but persistent renal abscess vs phlegmon  VRE on outpt cultures    f/u ID eval - monitor off abx for time being given clinical stability, await their input  urology to weigh in on progressive phlegmon/abscess Patient seen and examined  Feels well without much complaints, is asking to get from bed to chair. PT pending  no pain, no dysuria  noted CT a/p with improving but persistent renal abscess vs phlegmon  VRE on outpt cultures    f/u ID eval - monitor off abx for time being given clinical stability, await their input  urology to weigh in on progressive phlegmon/abscess    previous MOLST - f/u GOC with patient and family Patient seen and examined  Feels well without much complaints, is asking to get from bed to chair. PT pending  no pain, no dysuria  noted CT a/p with improving but persistent renal abscess vs phlegmon  VRE on outpt cultures    f/u ID eval - monitor off abx for time being given clinical stability, await their input - unclear clinical significance of VRE as pt was originally treated for K PNA which also caused relatively high grade bacteremia and this is new organism. pt also has proteus in prior ucx that did not cause bacteremia, both klebsiella and proteus were sens to ceftriaxone which was prior treatment agent.  urology to weigh in on progressive phlegmon/abscess    previous MOLST - f/u GOC with patient and family Patient seen and examined  Feels well without much complaints, is asking to get from bed to chair. PT pending  no pain, no dysuria  noted CT a/p with improving but persistent renal abscess vs phlegmon  VRE on outpt cultures    f/u ID eval - monitor off abx for time being given clinical stability, await their input - unclear clinical significance of VRE as pt was originally treated for K PNA which also caused relatively high grade bacteremia and this is new organism. pt also has proteus in prior ucx that did not cause bacteremia, both klebsiella and proteus were sens to ceftriaxone which was prior treatment agent.  urology to weigh in on progressive phlegmon/abscess    previous MOLST - f/u GO with patient and family    addendum 3:45 PM - ID Dr Molina Alford does not feel pt requires additional course of antibiotics - all other evidence supports improving/resolving infection. Pt can have outpatient PCP/ID/urology f/u as previously indicated.   Patient stable for discharge - I called patient's  and reviewed hospital course and follow up, he agrees with plan, questions answered    35 minutes spent orchestrating discharge    Based on my assessment, this patient’s condition has improved and no longer warrants inpatient status and will be changed to outpatient status prior to being discharged from the hospital.  This decision is based on the following reasons: evaluated by ID not needing antibiotics or other inpatient treatments or testing

## 2024-04-16 NOTE — H&P ADULT - NSHPPHYSICALEXAM_GEN_ALL_CORE
T(C): 36.4 (04-16-24 @ 04:36), Max: 36.6 (04-16-24 @ 00:08)  HR: 71 (04-16-24 @ 04:36) (65 - 71)  BP: 137/77 (04-16-24 @ 04:36) (110/86 - 153/81)  RR: 16 (04-16-24 @ 04:36) (16 - 18)  SpO2: 95% (04-16-24 @ 04:36) (94% - 96%)    CONSTITUTIONAL: Well groomed, no apparent distress  EYES: PERRLA , EOMI  ENMT: MMM. Normal dentition  RESP: No respiratory distress, no use of accessory muscles; CTA b/l  CV: +S1S2, RRR, no MRG  GI: Soft, NTND, no RGR, no flank pain   MSK: Normal pain free ROM x4 extremities   SKIN: No rashes or ulcers noted  NEURO: CN II-XII grossly intact; normal reflexes  PSYCH: A+O x 3, mood and affect appropriate

## 2024-04-16 NOTE — PHYSICAL THERAPY INITIAL EVALUATION ADULT - GENERAL OBSERVATIONS, REHAB EVAL
received semisupine in stretcher, A&OX4, following commands, pleasant & eager to participate, emotionally labile at times t/o, a/w VRE infection, reports h/o right eye blindnes. BS reviewed.

## 2024-04-16 NOTE — DISCHARGE NOTE NURSING/CASE MANAGEMENT/SOCIAL WORK - PATIENT PORTAL LINK FT
You can access the FollowMyHealth Patient Portal offered by [facility] by registering at the following website: [email or web address]. By joining [employer]’s FollowMyHealth portal, you will also be able to view your health information using other applications (apps) compatible with our system.

## 2024-04-16 NOTE — PHYSICAL THERAPY INITIAL EVALUATION ADULT - LEVEL OF INDEPENDENCE: STAND/SIT, REHAB EVAL
Metformin Antihyperglycemics Refill Protocol Passed  metFORMIN (GLUCOPHAGE) 500 MG tablet  10/14/2022  2:27 PM     Seen by prescribing provider or same department within the last 12 months or has a future appt in 3 months - IF FAILED PLEASE LOOK AT CHART REVIEW FOR LAST VISIT AND PROCEED ACCORDINGLY     Lipid Panel resulted within last 15 months     Hgb A1c less than 8 within last 12 months looking at last value -- IF CRITERIA FAILED REFER TO PROTOCOL DETAILS     eGFR greater than 59 within last 12 months looking at last value OR  If eGFR is between 45-59 then has patient had a creatinine resulted in past 6 months     Medication (including dose and sig) on current meds list      
supervision

## 2024-04-16 NOTE — PHYSICAL THERAPY INITIAL EVALUATION ADULT - GAIT DEVIATIONS NOTED, PT EVAL
decreased zander/increased time in double stance/decreased velocity of limb motion/decreased step length/decreased stride length/decreased weight-shifting ability

## 2024-04-16 NOTE — DISCHARGE NOTE PROVIDER - NSDCCPCAREPLAN_GEN_ALL_CORE_FT
PRINCIPAL DISCHARGE DIAGNOSIS  Diagnosis: VRE (vancomycin-resistant Enterococci)  Assessment and Plan of Treatment: Monitor off Antibiotics      SECONDARY DISCHARGE DIAGNOSES  Diagnosis: Chronic atrial fibrillation  Assessment and Plan of Treatment:     Diagnosis: HTN (hypertension)  Assessment and Plan of Treatment:

## 2024-04-16 NOTE — ED ADULT NURSE NOTE - SUICIDE SCREENING DEPRESSION
YUN ambulatory encounter  URGENT CARE OFFICE VISIT    SUBJECTIVE:  Patrick Otoole is a 65 year old male who presented requesting evaluation for painful lump in the right side of his neck is been present for a few months. It has not been increasing in size were significantly increasing in discomfort. He presents now because it is simply not going away. No pain with swallowing, the swelling does not increase with chewing. Denies fever chills or sweats. Appetite normal. No recent URI symptoms. States he gets occasional pain in the right ear. No drainage from either of his ears or his eyes. Denies any neck stiffness. No chest pain cough difficult breathing. Denies nausea vomiting diarrhea or abdominal pain. Denies recent weight loss. No rash. No other complaints..    Review of systems:    A review of systems was performed and findings relevant to these symptoms are included in the HPI.     PAST HISTORIES:    ALLERGIES:   Allergen Reactions   • Chlorhexidine Gluconate Cloth HIVES and PRURITUS     2% chlorhexidine gluconate clothes used prior to pacemaker generator change. Treated by dermatology.        MEDICATIONS:  Current Outpatient Medications   Medication Sig   • DIGOX 250 MCG tablet TAKE 1 TABLET BY MOUTH DAILY   • cephalexin (KEFLEX) 500 MG capsule Take 1 capsule by mouth 4 times daily.     No current facility-administered medications for this visit.        Past Medical History:   Diagnosis Date   • Atrial fibrillation (CMS/HCC)    • Colon polyps    • IBS (irritable bowel syndrome)    • Rash    • SSS (sick sinus syndrome) (CMS/HCC)    • Systolic murmur 2/8/2016     Social History     Tobacco Use   • Smoking status: Never Smoker   • Smokeless tobacco: Never Used   Substance Use Topics   • Alcohol use: No     Alcohol/week: 0.0 oz     Comment: rarely   • Drug use: No       OBJECTIVE:  Physical Exam:    Visit Vitals  /68   Pulse 59   Temp 97.8 °F (36.6 °C) (Temporal)   Resp 14   Wt 107 kg   SpO2 98%   BMI  35.89 kg/m²        CONSTITUTIONAL: Well-hydrated, well-nourished male who appears to be in no acute distress. Awake, alert and cooperative.  HEENT: TMs are clear bilaterally. External auditory canals are clear bilaterally. External ears without deformities. Hearing is grossly normal. Nose without deformities. There is moist nasal mucosa. Throat is clear with moist mucous membranes. No dental tenderness to percussion. Patient has several absent teeth. Gingiva exam is unremarkable. No tenderness to palpation along the bony aspect of the mandible throughout. He has full active range of motion of the TMJ without palpable crepitance or pain with maneuver. No preauricular or postauricular adenopathy palpable. EOMI, PERRLA, conjunctiva clear. Salivary glands are nontender and without swelling.   NECK: He does have a slightly tender lymph node in the left mid anterior cervical chain with no fluctuance, this is only slightly enlarged. There is no tenderness to palpation along the length of the sternocleidomastoid muscles bilaterally. No posterior chain adenopathy noted. No supraclavicular nodes palpable. There is no nuchal rigidity and he has full active range of motion. No tracheal deviation or tenderness.  LUNGS: Clear to auscultation bilaterally. No wheezes, rales or rhonchi noted.   CARDIOVASCULAR: Regular rate and rhythm with normal S1 and S2. There are no murmurs auscultated.   SKIN: Warm, dry, intact without rash or lesion.  NEUROLOGIC: Alert and oriented x3. Cranial nerves intact  PSYCHIATRIC:  Speech and behavior appropriate. Normal mood and affect.        URGENT CARE COURSE:  Presentation may represent anterior cervical lymphadenitis-he will be placed on 1 week course of Keflex. He was advised to follow-up with PMD if this does not resolve with antibiotic therapy for reevaluation and consideration of CBC and other eval. Patient understands and is comfortable with discharge plan.    Assessment:  1. Cervical  lymphadenitis        PLAN:  Orders Placed This Encounter   • cephalexin (KEFLEX) 500 MG capsule       FOLLOW-UP:  PMD-call for appointment    PATIENT INSTRUCTIONS:  Antibiotics as prescribed. Tylenol for pain. See primary care physician if not improving with treatment. The patient was advised to follow up with primary physician or to recheck with the urgent care clinic sooner if symptoms get worse or if new symptoms appear.    The patient indicated understanding of the diagnosis and agreed with the plan of care.       Negative

## 2024-04-16 NOTE — H&P ADULT - ASSESSMENT
73y F pmh  A-fib, hypertension, hyperlipidemia, diabetes, anxiety recent hospitalization w/Klebsiella bacteremia s/p course IV  ceftriaxone sent in by pcp for Ucx which showed VRE only sensitive to daptomycin and linezolid a/f eval of need for IV abx

## 2024-04-16 NOTE — H&P ADULT - PROBLEM SELECTOR PLAN 1
- S/p recent hospitalization w/Klebsiella bacteremia s/p course IV  ceftriaxone   - Sent in by pcp for Ucx which showed VRE only sensitive to daptomycin and linezolid   - Afebrile, VSS, clinically nontoxic, no dysuria or other clinical S&S of infection   - CT A/P:  right pyelonephritis, improved since the prior study  - Given lack of clincal sx, afebrile, no elevated wbc, will hold off on abx for now    - F/u Bcx, Ucx   - ID consult to be called in AM for further guidance reg abx - S/p recent hospitalization w/Klebsiella bacteremia s/p course IV  ceftriaxone   - Sent in by pcp for Ucx which showed VRE only sensitive to daptomycin and linezolid   - Afebrile, VSS, clinically nontoxic, no dysuria or other clinical S&S of infection   - CT A/P:  right pyelonephritis, improved since the prior study  - Given lack of clinical sx, no dysuria, afebrile, no elevated wbc, will hold off on abx for now    - F/u Bcx, Ucx   - ID consult to be called in AM for further guidance reg abx

## 2024-04-16 NOTE — CONSULT NOTE ADULT - ASSESSMENT
73y F pmh  A-fib, hypertension, hyperlipidemia, diabetes, anxiety recent hospitalization w/Klebsiella bacteremia s/p course IV  ceftriaxone sent in by pcp for abnorm labs. On O/p labs PCP ( Kanika) pt noted to have UA/Ucx which showed VRE only sensitive to daptomycin and linezolid.  Patient was sent in for IV antibiotics.     Pt with No fever or leucocytosis  Pt denies any symptoms  Elderly females can have asymptomatic bacteriuria almost 50% of times.   Her CT is improved, persistent changes likely represent imaging lag given pt clinically well.     Overall abnormal U/A, positive culture finding, abnormal CT.     PLAN:   pt clinically well,   urine cx findings likely represent asymptomatic bacteriuria.   monitor off abx.   blood cx in lab  urine cx in lab, even if positive, at this time no plan to treat even if cx positive, unless change in clinical status.   glycemic control to prevent recurrent infections.       Plan discussed with Medicine Attending.     Will sign off, please call with questions.     Faizan Crespo  Please contact through MS Teams   If no response or past 5 pm/weekend call [phone].  73y F pmh  A-fib, hypertension, hyperlipidemia, diabetes, anxiety recent hospitalization w/Klebsiella bacteremia s/p course IV  ceftriaxone sent in by pcp for abnorm labs. On O/p labs PCP ( Kanika) pt noted to have UA/Ucx which showed VRE only sensitive to daptomycin and linezolid.  Patient was sent in for IV antibiotics.     Pt with No fever or leucocytosis  Pt denies any symptoms  Elderly females can have asymptomatic bacteriuria almost 50% of times.   Her CT is improved, persistent changes likely represent imaging lag given pt clinically well.     Overall abnormal U/A, positive culture finding, abnormal CT.     PLAN:   pt clinically well,   urine cx findings likely represent asymptomatic bacteriuria.   monitor off abx.   blood cx in lab  urine cx in lab, even if positive, at this time no plan to treat, unless change in clinical status.   glycemic control to prevent recurrent infections.   Pt might benefit from urogyn eval as outpt.       Plan discussed with Medicine Attending.     Will sign off, please call with questions.     Faizan Crespo  Please contact through MS Teams   If no response or past 5 pm/weekend call [phone].

## 2024-04-16 NOTE — PATIENT PROFILE ADULT - FUNCTIONAL ASSESSMENT - BASIC MOBILITY 6.
3-calculated by average/Not able to assess (calculate score using [facility] averaging method)

## 2024-04-16 NOTE — H&P ADULT - NSICDXPASTMEDICALHX_GEN_ALL_CORE_FT
PAST MEDICAL HISTORY:  AF (atrial fibrillation) on Xarelto    Anxiety     Blind right eye     CAD (coronary artery disease) minimal CAD per cath result in 2014    Constipation     Depression     Edema of lower extremity     Former smoker     Gastritis     H/O Helicobacter infection     Hiatal hernia     History of TIAs     [place]into (hard of hearing)     HTN (hypertension)     Hypercholesterolemia     Hypothyroid     Incontinence     Loss of vision right eye    Morbid obesity     Murmur     Neuropathy     OAB (overactive bladder)     Palpitations     Risk factors for obstructive sleep apnea     Spinal stenosis     Thickened endometrium     Thyroid nodule     Type 2 diabetes mellitus     Uterine cancer     Uterine cyst     Varicose veins

## 2024-04-16 NOTE — DISCHARGE NOTE PROVIDER - CARE PROVIDER_API CALL
Christopher Boudreaux  Internal Medicine  [address], [address]  [place] [address]  Phone: [phone]  Fax: [phone]  Follow Up Time: 1-3 days

## 2024-04-16 NOTE — DISCHARGE NOTE PROVIDER - NSDCMRMEDTOKEN_GEN_ALL_CORE_FT
ALPRAZolam 2 mg oral tablet: 0.5 tab in the morning as needed and 1 tab at bedtime (note: pharmacy has 1 tab times a day prn)  amLODIPine 5 mg oral tablet: 1 orally once a day  apixaban 5 mg oral tablet: 1 tab(s) orally every 12 hours  aspirin 81 mg oral delayed release tablet: 1 tab(s) orally once a day  insulin glargine 100 units/mL subcutaneous solution: 24 unit(s) subcutaneous once a day (at bedtime)  insulin lispro 100 units/mL injectable solution: 8 unit(s) injectable once a day before breakfast  insulin lispro 100 units/mL injectable solution: 8 unit(s) injectable once a day before dinner  insulin lispro 100 units/mL injectable solution: 8 unit(s) injectable once a day before lunch  levothyroxine 175 mcg (0.175 mg) oral tablet: 1 orally once a day  losartan 100 mg oral tablet: 1 tab(s) orally once a day  metoprolol succinate 25 mg oral tablet, extended release: 1 tab(s) orally once a day  Pacerone 200 mg oral tablet: 1 tab(s) orally once a day orally  pantoprazole 40 mg oral delayed release tablet: 1 tab(s) orally once a day

## 2024-04-17 ENCOUNTER — APPOINTMENT (OUTPATIENT)
Dept: UROLOGY | Facility: CLINIC | Age: 74
End: 2024-04-17

## 2024-04-18 ENCOUNTER — NON-APPOINTMENT (OUTPATIENT)
Age: 74
End: 2024-04-18

## 2024-04-18 ENCOUNTER — LABORATORY RESULT (OUTPATIENT)
Age: 74
End: 2024-04-18

## 2024-04-18 ENCOUNTER — APPOINTMENT (OUTPATIENT)
Dept: INTERNAL MEDICINE | Facility: CLINIC | Age: 74
End: 2024-04-18
Payer: MEDICARE

## 2024-04-18 VITALS
BODY MASS INDEX: 46.07 KG/M2 | HEART RATE: 64 BPM | TEMPERATURE: 97.7 F | OXYGEN SATURATION: 98 % | DIASTOLIC BLOOD PRESSURE: 80 MMHG | WEIGHT: 244 LBS | SYSTOLIC BLOOD PRESSURE: 126 MMHG | HEIGHT: 61 IN

## 2024-04-18 DIAGNOSIS — R68.83 CHILLS (WITHOUT FEVER): ICD-10-CM

## 2024-04-18 DIAGNOSIS — N17.9 ACUTE KIDNEY FAILURE, UNSPECIFIED: ICD-10-CM

## 2024-04-18 DIAGNOSIS — M48.00 SPINAL STENOSIS, SITE UNSPECIFIED: ICD-10-CM

## 2024-04-18 DIAGNOSIS — F32.A DEPRESSION, UNSPECIFIED: ICD-10-CM

## 2024-04-18 DIAGNOSIS — N83.8 OTHER NONINFLAMMATORY DISORDERS OF OVARY, FALLOPIAN TUBE AND BROAD LIGAMENT: ICD-10-CM

## 2024-04-18 DIAGNOSIS — N13.30 UNSPECIFIED HYDRONEPHROSIS: ICD-10-CM

## 2024-04-18 PROCEDURE — G2211 COMPLEX E/M VISIT ADD ON: CPT

## 2024-04-18 PROCEDURE — 99214 OFFICE O/P EST MOD 30 MIN: CPT

## 2024-04-18 RX ORDER — AMOXICILLIN AND CLAVULANATE POTASSIUM 875; 125 MG/1; MG/1
875-125 TABLET, COATED ORAL TWICE DAILY
Qty: 14 | Refills: 0 | Status: DISCONTINUED | COMMUNITY
Start: 2024-04-11 | End: 2024-04-18

## 2024-04-18 RX ORDER — AMIODARONE HYDROCHLORIDE 200 MG/1
200 TABLET ORAL DAILY
Qty: 90 | Refills: 1 | Status: ACTIVE | COMMUNITY
Start: 2024-04-09 | End: 1900-01-01

## 2024-04-19 LAB
ANION GAP SERPL CALC-SCNC: 14 MMOL/L
BASOPHILS # BLD AUTO: 0.08 K/UL
BASOPHILS NFR BLD AUTO: 1 %
BUN SERPL-MCNC: 13 MG/DL
CALCIUM SERPL-MCNC: 9.2 MG/DL
CHLORIDE SERPL-SCNC: 103 MMOL/L
CO2 SERPL-SCNC: 26 MMOL/L
CREAT SERPL-MCNC: 1.02 MG/DL
EGFR: 57 ML/MIN/1.73M2
EOSINOPHIL # BLD AUTO: 0.47 K/UL
EOSINOPHIL NFR BLD AUTO: 5.7 %
FERRITIN SERPL-MCNC: 123 NG/ML
FOLATE SERPL-MCNC: 6.1 NG/ML
GLUCOSE SERPL-MCNC: 148 MG/DL
HCT VFR BLD CALC: 33.1 %
HGB BLD-MCNC: 9.8 G/DL
IMM GRANULOCYTES NFR BLD AUTO: 1.1 %
IRON SATN MFR SERPL: 12 %
IRON SERPL-MCNC: 36 UG/DL
LYMPHOCYTES # BLD AUTO: 2.1 K/UL
LYMPHOCYTES NFR BLD AUTO: 25.5 %
MAN DIFF?: NORMAL
MCHC RBC-ENTMCNC: 27.1 PG
MCHC RBC-ENTMCNC: 29.6 GM/DL
MCV RBC AUTO: 91.7 FL
MONOCYTES # BLD AUTO: 0.52 K/UL
MONOCYTES NFR BLD AUTO: 6.3 %
NEUTROPHILS # BLD AUTO: 4.98 K/UL
NEUTROPHILS NFR BLD AUTO: 60.4 %
NT-PROBNP SERPL-MCNC: 456 PG/ML
PLATELET # BLD AUTO: 288 K/UL
POTASSIUM SERPL-SCNC: 3.9 MMOL/L
RBC # BLD: 3.61 M/UL
RBC # FLD: 14.5 %
SODIUM SERPL-SCNC: 144 MMOL/L
T3RU NFR SERPL: 1 TBI
T4 FREE SERPL-MCNC: 1.5 NG/DL
T4 SERPL-MCNC: 10.8 UG/DL
THYROGLOB AB SERPL-ACNC: <20 IU/ML
THYROPEROXIDASE AB SERPL IA-ACNC: 14.7 IU/ML
TIBC SERPL-MCNC: 301 UG/DL
TSH SERPL-ACNC: 18.5 UIU/ML
UIBC SERPL-MCNC: 265 UG/DL
VIT B12 SERPL-MCNC: 610 PG/ML
WBC # FLD AUTO: 8.24 K/UL

## 2024-04-21 PROBLEM — N13.30 HYDRONEPHROSIS: Status: ACTIVE | Noted: 2024-04-09

## 2024-04-21 PROBLEM — N83.8 OVARIAN MASS: Status: ACTIVE | Noted: 2023-05-01

## 2024-04-21 PROBLEM — N17.9 ACUTE KIDNEY INJURY: Status: ACTIVE | Noted: 2024-04-09

## 2024-04-21 PROBLEM — F32.A DEPRESSION: Status: ACTIVE | Noted: 2023-06-15

## 2024-04-21 PROBLEM — R68.83 CHILLS: Status: ACTIVE | Noted: 2024-04-09

## 2024-04-21 PROBLEM — M48.00 SPINAL STENOSIS: Status: ACTIVE | Noted: 2024-04-05

## 2024-04-21 LAB
CULTURE RESULTS: SIGNIFICANT CHANGE UP
CULTURE RESULTS: SIGNIFICANT CHANGE UP
SPECIMEN SOURCE: SIGNIFICANT CHANGE UP
SPECIMEN SOURCE: SIGNIFICANT CHANGE UP
TSI ACT/NOR SER: <0.1 IU/L

## 2024-04-21 NOTE — HISTORY OF PRESENT ILLNESS
[FreeTextEntry1] : ER f/u [de-identified] :  BRENNEN VALDEZ is a 73y Female with a history of A-fib (previously on eliquis, off since 7/2023), HTN, HLD, DM, anxiety, uterine cancer,, recently hospitalized withsevere sepsis 2/2 pyelonephritis, with klebsiella bacteremia here for ER f/u. I sent pt to hospital on 4/15 as was growing VRE in urine cx and family said pt is in bed, fatigued and not at baseline While in ER, repeat ua, urine cx were drawn which was negative. Her bloodwork otherwise with no acute findings.  Today, Patient feels well. No complaints. Denies chest pain, sob, rizzo, dizziness, orthopnea, diaphoresis, palpitations, orthopnea, syncope, n/v, headache. Denies dysuria, urinary frequency,  urgency, hematuria. Says her leg swelling did not improve with lasix 20mg and lowering amlo. Per family mental status at baseline, doing better.

## 2024-04-21 NOTE — HEALTH RISK ASSESSMENT
[0] : 2) Feeling down, depressed, or hopeless: Not at all (0) [PHQ-2 Negative - No further assessment needed] : PHQ-2 Negative - No further assessment needed [Never] : Never [[id number]] : 0

## 2024-04-21 NOTE — PHYSICAL EXAM
[No Acute Distress] : no acute distress [Well Nourished] : well nourished [Well Developed] : well developed [Well-Appearing] : well-appearing [Normal Sclera/Conjunctiva] : normal sclera/conjunctiva [Normal Outer Ear/Nose] : the outer ears and nose were normal in appearance [Normal Oropharynx] : the oropharynx was normal [No JVD] : no jugular venous distention [No Lymphadenopathy] : no lymphadenopathy [Supple] : supple [No Respiratory Distress] : no respiratory distress  [No Accessory Muscle Use] : no accessory muscle use [Clear to Auscultation] : lungs were clear to auscultation bilaterally [Normal Rate] : normal rate  [Regular Rhythm] : with a regular rhythm [Normal S1, S2] : normal S1 and S2 [No Murmur] : no murmur heard [No Carotid Bruits] : no carotid bruits [Pedal Pulses Present] : the pedal pulses are present [No Extremity Clubbing/Cyanosis] : no extremity clubbing/cyanosis [Soft] : abdomen soft [Non Tender] : non-tender [Non-distended] : non-distended [Normal Bowel Sounds] : normal bowel sounds [Normal Anterior Cervical Nodes] : no anterior cervical lymphadenopathy [No CVA Tenderness] : no CVA  tenderness [No Spinal Tenderness] : no spinal tenderness [No Joint Swelling] : no joint swelling [Grossly Normal Strength/Tone] : grossly normal strength/tone [Coordination Grossly Intact] : coordination grossly intact [No Focal Deficits] : no focal deficits [Normal Gait] : normal gait [Alert and Oriented x3] : oriented to person, place, and time [de-identified] : varicose veins and 1+ pitting edema b/l 1/2 way up [de-identified] : diffuse rash on LE which pt/family say is chronic, has new rash in b/l inguinal areas consistent with fungal jock itch.

## 2024-04-24 ENCOUNTER — APPOINTMENT (OUTPATIENT)
Dept: CARDIOLOGY | Facility: CLINIC | Age: 74
End: 2024-04-24
Payer: MEDICARE

## 2024-04-24 VITALS
WEIGHT: 249 LBS | BODY MASS INDEX: 47.01 KG/M2 | DIASTOLIC BLOOD PRESSURE: 84 MMHG | OXYGEN SATURATION: 95 % | HEIGHT: 61 IN | SYSTOLIC BLOOD PRESSURE: 152 MMHG | TEMPERATURE: 97.6 F | HEART RATE: 72 BPM

## 2024-04-24 DIAGNOSIS — F41.9 ANXIETY DISORDER, UNSPECIFIED: ICD-10-CM

## 2024-04-24 DIAGNOSIS — N12 TUBULO-INTERSTITIAL NEPHRITIS, NOT SPECIFIED AS ACUTE OR CHRONIC: ICD-10-CM

## 2024-04-24 PROCEDURE — 99214 OFFICE O/P EST MOD 30 MIN: CPT

## 2024-04-24 NOTE — HISTORY OF PRESENT ILLNESS
[FreeTextEntry1] : [place] is a 73 year F w/MHx of Endometrial Ca, CAD, Afib, HLD, HTN, T2DM, Anxiety who presents for follow up. Saw Dr. Boudreaux after hospitalization for Klebsiella bacteremia sepsis/pyelonephritis. During hospitalization did have Afib w/ RVR, s/p 3/28/2024 cardioversion. Upon f/u w/ Dr. Boudreaux, did have VRE noted on UCx of which she was advised to go the hospital. Did go to [facility] 4/16/2024, repeat UCx negative. Was seen by ID of which was advised no abx. Pending to schedule urology evaluation w/ Everton Coffman. Will be seeing ID Dr. Love 6/13/2024. Pt. Sx w/ urinary incontinence.

## 2024-04-24 NOTE — PHYSICAL EXAM
[Well Developed] : well developed [Well Nourished] : well nourished [No Acute Distress] : no acute distress [Normal Conjunctiva] : normal conjunctiva [Normal Venous Pressure] : normal venous pressure [No Carotid Bruit] : no carotid bruit [Normal S1, S2] : normal S1, S2 [No Murmur] : no murmur [No Rub] : no rub [No Gallop] : no gallop [Clear Lung Fields] : clear lung fields [Good Air Entry] : good air entry [No Respiratory Distress] : no respiratory distress  [Non Tender] : non-tender [Soft] : abdomen soft [No Masses/organomegaly] : no masses/organomegaly [Normal Bowel Sounds] : normal bowel sounds [Normal Gait] : normal gait [No Edema] : no edema [No Cyanosis] : no cyanosis [No Clubbing] : no clubbing [No Varicosities] : no varicosities [No Rash] : no rash [No Skin Lesions] : no skin lesions [Moves all extremities] : moves all extremities [No Focal Deficits] : no focal deficits [Normal Speech] : normal speech [Alert and Oriented] : alert and oriented [Normal memory] : normal memory [Obese] : obese

## 2024-04-26 ENCOUNTER — RX CHANGE (OUTPATIENT)
Age: 74
End: 2024-04-26

## 2024-04-26 LAB
ALBUMIN SERPL ELPH-MCNC: 4.1 G/DL
ALP BLD-CCNC: 81 U/L
ALT SERPL-CCNC: 8 U/L
ANION GAP SERPL CALC-SCNC: 14 MMOL/L
AST SERPL-CCNC: 13 U/L
BASOPHILS # BLD AUTO: 0.09 K/UL
BASOPHILS NFR BLD AUTO: 1.2 %
BILIRUB SERPL-MCNC: 0.9 MG/DL
BUN SERPL-MCNC: 12 MG/DL
CALCIUM SERPL-MCNC: 9.6 MG/DL
CHLORIDE SERPL-SCNC: 99 MMOL/L
CO2 SERPL-SCNC: 25 MMOL/L
CREAT SERPL-MCNC: 0.92 MG/DL
EGFR: 65 ML/MIN/1.73M2
EOSINOPHIL # BLD AUTO: 0.3 K/UL
EOSINOPHIL NFR BLD AUTO: 3.9 %
GLUCOSE SERPL-MCNC: 159 MG/DL
HCT VFR BLD CALC: 34.7 %
HGB BLD-MCNC: 10.8 G/DL
IMM GRANULOCYTES NFR BLD AUTO: 0.7 %
LYMPHOCYTES # BLD AUTO: 2.22 K/UL
LYMPHOCYTES NFR BLD AUTO: 29 %
MAN DIFF?: NORMAL
MCHC RBC-ENTMCNC: 27.6 PG
MCHC RBC-ENTMCNC: 31.1 GM/DL
MCV RBC AUTO: 88.7 FL
MONOCYTES # BLD AUTO: 0.51 K/UL
MONOCYTES NFR BLD AUTO: 6.7 %
NEUTROPHILS # BLD AUTO: 4.48 K/UL
NEUTROPHILS NFR BLD AUTO: 58.5 %
PLATELET # BLD AUTO: 352 K/UL
POTASSIUM SERPL-SCNC: 3.5 MMOL/L
PROT SERPL-MCNC: 7.7 G/DL
RBC # BLD: 3.91 M/UL
RBC # FLD: 14.9 %
SODIUM SERPL-SCNC: 138 MMOL/L
T4 FREE SERPL-MCNC: 1.8 NG/DL
TSH SERPL-ACNC: 13.1 UIU/ML
WBC # FLD AUTO: 7.65 K/UL

## 2024-05-01 ENCOUNTER — APPOINTMENT (OUTPATIENT)
Dept: UROLOGY | Facility: CLINIC | Age: 74
End: 2024-05-01
Payer: MEDICARE

## 2024-05-01 VITALS
WEIGHT: 249 LBS | OXYGEN SATURATION: 95 % | HEIGHT: 61 IN | TEMPERATURE: 98 F | SYSTOLIC BLOOD PRESSURE: 135 MMHG | BODY MASS INDEX: 47.01 KG/M2 | HEART RATE: 67 BPM | DIASTOLIC BLOOD PRESSURE: 80 MMHG

## 2024-05-01 PROCEDURE — 99204 OFFICE O/P NEW MOD 45 MIN: CPT

## 2024-05-01 PROCEDURE — G2211 COMPLEX E/M VISIT ADD ON: CPT

## 2024-05-01 RX ORDER — METHENAMINE HIPPURATE 1 G/1
1 TABLET ORAL
Qty: 180 | Refills: 3 | Status: ACTIVE | COMMUNITY
Start: 2024-05-01 | End: 1900-01-01

## 2024-05-01 NOTE — PHYSICAL EXAM
[Normal Appearance] : normal appearance [Well Groomed] : well groomed [General Appearance - In No Acute Distress] : no acute distress [Edema] : no peripheral edema [Respiration, Rhythm And Depth] : normal respiratory rhythm and effort [Exaggerated Use Of Accessory Muscles For Inspiration] : no accessory muscle use [Abdomen Soft] : soft [Abdomen Tenderness] : non-tender [Costovertebral Angle Tenderness] : no ~M costovertebral angle tenderness [Urinary Bladder Findings] : the bladder was normal on palpation [No Focal Deficits] : no focal deficits [] : no rash [Oriented To Time, Place, And Person] : oriented to person, place, and time [Affect] : the affect was normal [Mood] : the mood was normal [No Palpable Adenopathy] : no palpable adenopathy [de-identified] : walks with cane

## 2024-05-01 NOTE — HISTORY OF PRESENT ILLNESS
[FreeTextEntry1] : 74 yo female with recurrent UTI and OAB. She recently was admitted for fevers and pyelonephritis treated with IV abx. She was sent back to ED as urine culture from 4/9/24 demonstrated VRE. She was seen by ID who stated patient without fevers or UTI symptoms so would monitor off Abx. She has not had any fevers over the past week.  At baseline, patient with urinary frequency, urgency, urge incontinence. She has seen Uro/gyn who underwent UDS demonstrating DO and UUI. While being worked up for this 1 year ago she was found to have endometrial cancer sp ANN/BSO with bladder biopsy which was negative. States since the surgery she has had worsening frequency and UUI.   Positive Urine cultures as follows: 8/20/21 - E coli 9/30/21 - E Coli 6/15/23 - E Coli 7/19/23 - Proteus 8/23/23 - Klebsiella 3/24/24 - Proteus 4/9/24 - VRE 4/16/24 - negative  She has had prior UAs with RBCs however has not undergone CT Urogram. She did have cystoscopy with bladder biopsy during her ANN/BSO with pathology negative.

## 2024-05-01 NOTE — ASSESSMENT
[FreeTextEntry1] : 74 yo female with recurrent UTI recently admitted for pyelonephritis. She has had 2-3 UTIs over the past 3 years. Her CT scan from recent admission with concern for possible abscess however no fevers while off abx. She also has OAB previously evaluated by Urogyn. Discussed with her treatment options for OAB however, given the severity of her recent UTI it may not be recommended giving medications that may relax the bladder causing increased risk of recurrent UTI. We discussed working on prevention of rUTIs by increasing fluid intake, OTC medications for constipation, improving diabetes control. I discussed with her the use of vaginal estrogen to improve Fuentes, however, given recent diagnosis of endometrial cancer would first start with methenamine Hippurate and OTC cranberry with vitamin C. If Fuentes worsen in severity would discuss with GynOnc regarding risks/benefits.   Given prior urinalysis demonstrating microscopic hematuria, I discussed need for CT Urogram. However, it is unclear if the UAs done during UTI vs immediately after treatment. Will repeat UCx and UA and if Ucx negative and UA with RBC will recommend patient undergo CT Urogram for microscopic hematuria workup as well as to better assess right renal fluid collection to ensure it is resolving.   Plan Reviewed past 3 years of urine culture results and discussed with patient with 2 E Coli in 2021, E coli proteus and klebsiella in 2023 and proteus, VRE in March and April 2024. Repeat ucx 4/16/24 was negative. She has had microscopic hematuria in past on UA however unclear if this was during or immediately after being treated for UTI Reviewed images of patient's CT abd/pelvis and discussed results with patient and her daughter demonstrating no hydronephrosis and pyelonephritis with a small area of right kidney concerning for developing abscess however patient has been off abx without fevers. Discussed need for repeat imaging with CT Urogram to workup possible microscopic hematuria and re-evaluate collection for changes Urinalysis - will call with results Urine culture Methenamine hippurate 1 gram q 12 hrs: prescription sent OTC Cranberry with vitamin C OTC stool softeners daily FU in 1 month    Patient is being seen today for evaluation and management of a chronic and longitudinal ongoing condition and I am of the primary treating physician.

## 2024-05-02 LAB
APPEARANCE: CLEAR
BACTERIA: ABNORMAL /HPF
BILIRUBIN URINE: NEGATIVE
BLOOD URINE: NEGATIVE
CAST: 0 /LPF
COLOR: YELLOW
EPITHELIAL CELLS: 1 /HPF
GLUCOSE QUALITATIVE U: NEGATIVE MG/DL
KETONES URINE: NEGATIVE MG/DL
LEUKOCYTE ESTERASE URINE: ABNORMAL
MICROSCOPIC-UA: NORMAL
NITRITE URINE: POSITIVE
PH URINE: 6
PROTEIN URINE: NEGATIVE MG/DL
RED BLOOD CELLS URINE: 1 /HPF
SPECIFIC GRAVITY URINE: 1.01
UROBILINOGEN URINE: 0.2 MG/DL
WHITE BLOOD CELLS URINE: 24 /HPF

## 2024-05-07 NOTE — PATIENT PROFILE ADULT. - DOES PATIENT HAVE ADVANCE DIRECTIVE
No
Airway patent, TM normal bilaterally, normal appearing mouth, nose, throat, neck supple with full range of motion, no cervical adenopathy.

## 2024-05-08 ENCOUNTER — APPOINTMENT (OUTPATIENT)
Dept: ELECTROPHYSIOLOGY | Facility: CLINIC | Age: 74
End: 2024-05-08
Payer: MEDICARE

## 2024-05-08 ENCOUNTER — NON-APPOINTMENT (OUTPATIENT)
Age: 74
End: 2024-05-08

## 2024-05-08 VITALS
SYSTOLIC BLOOD PRESSURE: 207 MMHG | DIASTOLIC BLOOD PRESSURE: 177 MMHG | WEIGHT: 249 LBS | HEART RATE: 82 BPM | OXYGEN SATURATION: 92 % | HEIGHT: 61 IN | BODY MASS INDEX: 47.01 KG/M2

## 2024-05-08 LAB — BACTERIA UR CULT: ABNORMAL

## 2024-05-08 PROCEDURE — 93000 ELECTROCARDIOGRAM COMPLETE: CPT

## 2024-05-08 PROCEDURE — 99204 OFFICE O/P NEW MOD 45 MIN: CPT

## 2024-05-08 RX ORDER — CEFIXIME 400 MG/1
400 CAPSULE ORAL
Qty: 7 | Refills: 0 | Status: ACTIVE | COMMUNITY
Start: 2024-05-08 | End: 1900-01-01

## 2024-05-08 NOTE — HISTORY OF PRESENT ILLNESS
[FreeTextEntry1] : Referring Physician: Christopher Boudreaux MD; Dominguez Herbert DO  Dear Briseida Boudreaux and Keon:   Mrs. Carias was seen in the [facility] today. For our records, please allow me to summarize the history and my findings.   This pleasant 73-year-old woman has a cardiovascular history significant for CAD, hyperlipidemia, hypertension, diabetes, and paroxysmal atrial fibrillation.  Her past medical history significant for endomyocardial cancer.  She was hospitalized in March 2024 with Klebsiella bacteremia secondary to pyelonephritis.  At that time was noted to be in A-fib with RVR.  She underwent a cardioversion on 3/28/2024.  She was loaded with amiodarone at that time. She has experienced some palpitations since discharge but overall has been maintaining SR. She was told today by urology that she has a recurrent urine infection. She is tolerating Eliquis well.  Mrs. Carias denies any recent history of chest pain, shortness of breath, palpitations, dizziness, or syncope.

## 2024-05-08 NOTE — CARDIOLOGY SUMMARY
[de-identified] : 5/8/2024: Sinus rhythm at 75 bpm with APCs [de-identified] : 3/26/2024:  1. Left ventricular cavity is normal in size. Left ventricular wall thickness is normal. Left ventricular systolic function is normal with an ejection fraction of 57 % by Cantrell's method of disks. There are no regional wall motion abnormalities seen.  2. There is mild (grade 1) left ventricular diastolic dysfunction.  3. Normal right ventricular cavity size, with normal wall thickness, and probably normal systolic function.  4. The left atrium is mildly dilated.  5. The right atrium is normal in size.  6. No pericardial effusion seen. [de-identified] : 3/28/2024: JOAQUIN/DCCV

## 2024-05-08 NOTE — DISCUSSION/SUMMARY
[EKG obtained to assist in diagnosis and management of assessed problem(s)] : EKG obtained to assist in diagnosis and management of assessed problem(s) [FreeTextEntry1] : In summary, this is a 73 year old woman with AD, hyperlipidemia, hypertension, diabetes, and paroxysmal atrial fibrillation. She is s/p DCCV and maintaining SR on Amio. She is on Eliquis and doing well. Given her recurrent infections, I would like her to be infection free prior to consideration and an ablation procedure, which she would benefit from, especially given her symptoms in AF. I discussed this with her, her daughter, and her  today. They will RTC in 3 months.   Mrs. Carias appeared to understand the whole discussion and verbalized that all of her questions were answered to her satisfaction.  Thank you for allowing me to be involved in the care of this pleasant woman. Please feel free to contact me with any questions.

## 2024-05-09 ENCOUNTER — APPOINTMENT (OUTPATIENT)
Dept: INTERNAL MEDICINE | Facility: CLINIC | Age: 74
End: 2024-05-09
Payer: MEDICARE

## 2024-05-09 VITALS
TEMPERATURE: 98.4 F | WEIGHT: 236 LBS | HEART RATE: 70 BPM | BODY MASS INDEX: 44.56 KG/M2 | SYSTOLIC BLOOD PRESSURE: 160 MMHG | DIASTOLIC BLOOD PRESSURE: 82 MMHG | OXYGEN SATURATION: 96 % | HEIGHT: 61 IN

## 2024-05-09 VITALS — SYSTOLIC BLOOD PRESSURE: 160 MMHG | DIASTOLIC BLOOD PRESSURE: 80 MMHG

## 2024-05-09 DIAGNOSIS — N39.0 URINARY TRACT INFECTION, SITE NOT SPECIFIED: ICD-10-CM

## 2024-05-09 PROCEDURE — G2211 COMPLEX E/M VISIT ADD ON: CPT

## 2024-05-09 PROCEDURE — 99214 OFFICE O/P EST MOD 30 MIN: CPT

## 2024-05-09 RX ORDER — AMLODIPINE BESYLATE 5 MG/1
5 TABLET ORAL DAILY
Qty: 90 | Refills: 1 | Status: ACTIVE | COMMUNITY
Start: 2023-06-15 | End: 1900-01-01

## 2024-05-09 RX ORDER — LOSARTAN POTASSIUM 100 MG/1
100 TABLET, FILM COATED ORAL
Qty: 90 | Refills: 1 | Status: ACTIVE | COMMUNITY
Start: 2024-04-09 | End: 1900-01-01

## 2024-05-09 RX ORDER — FUROSEMIDE 40 MG/1
40 TABLET ORAL
Qty: 90 | Refills: 1 | Status: ACTIVE | COMMUNITY
Start: 2024-04-09 | End: 1900-01-01

## 2024-05-09 RX ORDER — APIXABAN 5 MG/1
5 TABLET, FILM COATED ORAL
Qty: 180 | Refills: 1 | Status: ACTIVE | COMMUNITY
Start: 2024-04-09 | End: 1900-01-01

## 2024-05-09 RX ORDER — METOPROLOL SUCCINATE 25 MG/1
25 TABLET, EXTENDED RELEASE ORAL
Qty: 90 | Refills: 1 | Status: ACTIVE | COMMUNITY
Start: 2023-05-01 | End: 1900-01-01

## 2024-05-09 NOTE — HEALTH RISK ASSESSMENT
[0] : 2) Feeling down, depressed, or hopeless: Not at all (0) [PHQ-2 Negative - No further assessment needed] : PHQ-2 Negative - No further assessment needed [Never] : Never [[id number]] : 0

## 2024-05-09 NOTE — HISTORY OF PRESENT ILLNESS
[Family Member] : family member [FreeTextEntry1] : f/u LE edema [de-identified] : BRENNEN VALDEZ is a 73y Female with a history of A-fib (previously on eliquis, off since 7/2023), HTN, HLD, DM, anxiety, uterine cancer, recently hospitalized withs severe sepsis 2/2 pyelonephritis, with klebsiella bacteremia here for f/u LE edema. At her previous visit we increased lasix from 20 mg to 40 mg due to lower extremity edema. This has helped improved her edema. She is being followed by her cardiologist and urologist. She is currently on antibiotics for recurrent UTI given by urologist who is considering CT urogram and seeing ID next week. Denies f/c, abd pain, flank pain. Denies chest pain, sob, rizzo, dizziness, orthopnea, diaphoresis, palpitations, orthopnea, syncope, n/v, headache.

## 2024-05-09 NOTE — PHYSICAL EXAM
[No Acute Distress] : no acute distress [Well Nourished] : well nourished [Well Developed] : well developed [Well-Appearing] : well-appearing [Normal Sclera/Conjunctiva] : normal sclera/conjunctiva [PERRL] : pupils equal round and reactive to light [EOMI] : extraocular movements intact [Normal Outer Ear/Nose] : the outer ears and nose were normal in appearance [Normal Oropharynx] : the oropharynx was normal [No JVD] : no jugular venous distention [No Lymphadenopathy] : no lymphadenopathy [Supple] : supple [Thyroid Normal, No Nodules] : the thyroid was normal and there were no nodules present [No Respiratory Distress] : no respiratory distress  [No Accessory Muscle Use] : no accessory muscle use [Clear to Auscultation] : lungs were clear to auscultation bilaterally [Normal Rate] : normal rate  [Regular Rhythm] : with a regular rhythm [Normal S1, S2] : normal S1 and S2 [No Murmur] : no murmur heard [No Carotid Bruits] : no carotid bruits [No Abdominal Bruit] : a ~M bruit was not heard ~T in the abdomen [No Varicosities] : no varicosities [Pedal Pulses Present] : the pedal pulses are present [No Palpable Aorta] : no palpable aorta [No Extremity Clubbing/Cyanosis] : no extremity clubbing/cyanosis [Soft] : abdomen soft [Non Tender] : non-tender [Non-distended] : non-distended [No Masses] : no abdominal mass palpated [No HSM] : no HSM [Normal Bowel Sounds] : normal bowel sounds [Normal Posterior Cervical Nodes] : no posterior cervical lymphadenopathy [Normal Anterior Cervical Nodes] : no anterior cervical lymphadenopathy [No CVA Tenderness] : no CVA  tenderness [No Spinal Tenderness] : no spinal tenderness [No Joint Swelling] : no joint swelling [Grossly Normal Strength/Tone] : grossly normal strength/tone [No Rash] : no rash [Coordination Grossly Intact] : coordination grossly intact [No Focal Deficits] : no focal deficits [Normal Gait] : normal gait [Normal Affect] : the affect was normal [Alert and Oriented x3] : oriented to person, place, and time [Normal Insight/Judgement] : insight and judgment were intact [de-identified] : trace Bilateral lower extremity edema prison up tibia, improved since previous visit, varciose veins

## 2024-05-10 LAB
ALBUMIN SERPL ELPH-MCNC: 4.2 G/DL
ALP BLD-CCNC: 68 U/L
ALT SERPL-CCNC: 8 U/L
ANION GAP SERPL CALC-SCNC: 13 MMOL/L
AST SERPL-CCNC: 9 U/L
BASOPHILS # BLD AUTO: 0.08 K/UL
BASOPHILS NFR BLD AUTO: 1 %
BILIRUB SERPL-MCNC: 0.8 MG/DL
BUN SERPL-MCNC: 13 MG/DL
CALCIUM SERPL-MCNC: 9.9 MG/DL
CHLORIDE SERPL-SCNC: 105 MMOL/L
CO2 SERPL-SCNC: 26 MMOL/L
CREAT SERPL-MCNC: 1.01 MG/DL
EGFR: 58 ML/MIN/1.73M2
EOSINOPHIL # BLD AUTO: 0.51 K/UL
EOSINOPHIL NFR BLD AUTO: 6.3 %
GLUCOSE SERPL-MCNC: 116 MG/DL
HCT VFR BLD CALC: 33.5 %
HGB BLD-MCNC: 10.4 G/DL
IMM GRANULOCYTES NFR BLD AUTO: 0.6 %
LYMPHOCYTES # BLD AUTO: 2.21 K/UL
LYMPHOCYTES NFR BLD AUTO: 27.3 %
MAN DIFF?: NORMAL
MCHC RBC-ENTMCNC: 27.3 PG
MCHC RBC-ENTMCNC: 31 GM/DL
MCV RBC AUTO: 87.9 FL
MONOCYTES # BLD AUTO: 0.54 K/UL
MONOCYTES NFR BLD AUTO: 6.7 %
NEUTROPHILS # BLD AUTO: 4.7 K/UL
NEUTROPHILS NFR BLD AUTO: 58.1 %
PLATELET # BLD AUTO: 298 K/UL
POTASSIUM SERPL-SCNC: 4.3 MMOL/L
PROT SERPL-MCNC: 7.4 G/DL
RBC # BLD: 3.81 M/UL
RBC # FLD: 14.5 %
SODIUM SERPL-SCNC: 143 MMOL/L
T4 FREE SERPL-MCNC: 2.1 NG/DL
TSH SERPL-ACNC: 3.85 UIU/ML
WBC # FLD AUTO: 8.09 K/UL

## 2024-05-13 ENCOUNTER — APPOINTMENT (OUTPATIENT)
Dept: INFECTIOUS DISEASE | Facility: CLINIC | Age: 74
End: 2024-05-13
Payer: MEDICARE

## 2024-05-13 VITALS
HEIGHT: 61 IN | OXYGEN SATURATION: 97 % | BODY MASS INDEX: 44.56 KG/M2 | HEART RATE: 74 BPM | WEIGHT: 236 LBS | DIASTOLIC BLOOD PRESSURE: 83 MMHG | TEMPERATURE: 97.8 F | SYSTOLIC BLOOD PRESSURE: 154 MMHG

## 2024-05-13 DIAGNOSIS — N39.0 URINARY TRACT INFECTION, SITE NOT SPECIFIED: ICD-10-CM

## 2024-05-13 PROCEDURE — 99214 OFFICE O/P EST MOD 30 MIN: CPT

## 2024-05-13 RX ORDER — LEVOTHYROXINE SODIUM 0.17 MG/1
175 TABLET ORAL
Qty: 50 | Refills: 0 | Status: ACTIVE | COMMUNITY
Start: 2024-05-13 | End: 1900-01-01

## 2024-05-22 ENCOUNTER — APPOINTMENT (OUTPATIENT)
Dept: GYNECOLOGIC ONCOLOGY | Facility: CLINIC | Age: 74
End: 2024-05-22
Payer: MEDICARE

## 2024-05-22 VITALS
RESPIRATION RATE: 16 BRPM | WEIGHT: 236 LBS | SYSTOLIC BLOOD PRESSURE: 130 MMHG | DIASTOLIC BLOOD PRESSURE: 76 MMHG | BODY MASS INDEX: 44.56 KG/M2 | HEART RATE: 65 BPM | OXYGEN SATURATION: 94 % | HEIGHT: 61 IN

## 2024-05-22 PROCEDURE — 99213 OFFICE O/P EST LOW 20 MIN: CPT

## 2024-05-22 PROCEDURE — G2211 COMPLEX E/M VISIT ADD ON: CPT

## 2024-05-22 PROCEDURE — 99459 PELVIC EXAMINATION: CPT

## 2024-06-05 ENCOUNTER — APPOINTMENT (OUTPATIENT)
Dept: UROLOGY | Facility: CLINIC | Age: 74
End: 2024-06-05

## 2024-06-07 PROBLEM — N39.0 RECURRENT UTI: Status: ACTIVE | Noted: 2024-05-01

## 2024-06-07 NOTE — HISTORY OF PRESENT ILLNESS
[FreeTextEntry1] : 73y F pmh  A-fib, hypertension, hyperlipidemia, diabetes, anxiety hospitalization in april with Klebsiella bacteremia s/p course IV ceftriaxone recent ER visit for abnormal U/A and urine cx and was discharged as it was deemed to be asymptomatic bacteriuria.  Pt with No fever or leucocytosis Pt denies any symptoms. The only complain is her back pain which is unchanged. She had a recent visit at Urology and was found to have positive urine cx and prescribed cefixime for the presumed infection.  Pt had no urinary symptoms during the evaluation, has been on the abx. Feels ok. No fever or chills.

## 2024-06-07 NOTE — REVIEW OF SYSTEMS
[Fever] : no fever [Chills] : no chills [Chest Pain] : no chest pain [Normal Appetite] : normal appetite [Shortness Of Breath] : no shortness of breath [Abdominal Pain] : no abdominal pain [Vomiting] : no vomiting [Diarrhea] : no diarrhea [Dysuria] : no dysuria [Skin Lesions] : no skin lesions

## 2024-06-07 NOTE — ASSESSMENT
[FreeTextEntry1] : 73y F pmh  A-fib, hypertension, hyperlipidemia, diabetes, anxiety hospitalization in april with Klebsiella bacteremia s/p course IV ceftriaxone recent ER visit for abnormal U/A and urine cx and was discharged as it was deemed to be asymptomatic bacteriuria.  Pt with No fever or leucocytosis Pt denies any symptoms. The only complain is her back pain which is unchanged. She had a recent visit at Urology and was found to have positive urine cx and prescribed cefixime for the presumed infection.  Pt had no urinary symptoms during the evaluation, has been on the abx. Feels ok. No fever or chills.  discussed with pt spouse and daughter, Elderly females can have asymptomatic bacteriuria almost 50% of times. Unless pt has symptoms or is planned for a urological procedure no need to treat a positive urine cx in this age group. Discussed with them regarding atypical presentation in elderly pt with lethargy or AMS. They understand and verbalize.  No need for any abx at this point from ID perspective.  All questions answered.  Pt to follow up as needed.

## 2024-06-07 NOTE — PHYSICAL EXAM
[General Appearance - Alert] : alert [General Appearance - In No Acute Distress] : in no acute distress [Heart Sounds] : normal S1 and S2 [Abdomen Soft] : soft [Abdomen Tenderness] : non-tender [] : no rash [FreeTextEntry1] : no suprapubic tenderness.  [Oriented To Time, Place, And Person] : oriented to person, place, and time

## 2024-06-08 LAB
T4 FREE SERPL-MCNC: 2.1 NG/DL
TSH SERPL-ACNC: 1.34 UIU/ML

## 2024-06-13 ENCOUNTER — APPOINTMENT (OUTPATIENT)
Dept: INFECTIOUS DISEASE | Facility: CLINIC | Age: 74
End: 2024-06-13

## 2024-06-20 RX ORDER — LEVOTHYROXINE SODIUM 0.2 MG/1
200 TABLET ORAL
Qty: 45 | Refills: 1 | Status: ACTIVE | COMMUNITY
Start: 2024-04-11 | End: 1900-01-01

## 2024-06-24 NOTE — HISTORY OF PRESENT ILLNESS
[FreeTextEntry1] : 73 year old returns for interval oncologic surveillance offering no new complaints including no abdominopelvic pain, vaginal or rectal bleeding, chest pain or shortness of breath. Normal bowel and bladder function.   She recently established care with urology/Dr. Everton Coffman for recurrent UTI & OAB. She recently was admitted for fevers and pyelonephritis treated with IV abx. She was sent back to ED as urine culture from 4/9/24 demonstrated VRE. She was seen by ID who stated patient without fevers or UTI symptoms so would monitor off Abx.  As of her last appointment 5/1, no subsequent fevers noted.  She has seen Uro/gyn who underwent UDS demonstrating DO and UUI, which preceded her EMCA dx.  States since the surgery she has had worsening frequency and UUI.  Was seen by ID who recommended less frequent urine testing in the absence of notable symptoms such as dysuria or large hematuria. recently completed a course of abx about 10 days ago, per pt daughter.   CT A/P (4/26/24): Ongoing right pyelonephritis, improved since the prior study, including improvement in superimposed areas of liquefaction concerning for phlegmon versus abscess.  Health maintenance, per pt: Pap smear- 4/2021- WNL Mammo- 3 years ago Colonoscopy- 3-4 years DEXA- years ago

## 2024-06-24 NOTE — PHYSICAL EXAM
[92038] : A chaperone was present during the pelvic exam. [Absent] : Adnexa(ae): Absent [Normal] : Recto-Vaginal Exam: Normal [FreeTextEntry2] : Coco Cruz MA

## 2024-06-24 NOTE — ASSESSMENT
[FreeTextEntry1] : 73 year old who has a history of endometrial cancer. The patient is doing well based on today's exam, clinically NELI nearly 1 year. Recommend Aquafor/A&D ointments to create a barrier at the urethra to help mitigate recurrent infections.  Recommend ongoing derm evaluation.

## 2024-06-24 NOTE — REASON FOR VISIT
[Other: _____] : [unfilled] [FreeTextEntry1] : [facility] [phone] at [address] [place] [address]  Stage IA Endometrial Cancer - 7/28/23 (s/p RA TLH, BSO, LNs)  Active surveillance

## 2024-06-28 ENCOUNTER — APPOINTMENT (OUTPATIENT)
Dept: ENDOCRINOLOGY | Facility: CLINIC | Age: 74
End: 2024-06-28

## 2024-06-28 ENCOUNTER — APPOINTMENT (OUTPATIENT)
Dept: INTERNAL MEDICINE | Facility: CLINIC | Age: 74
End: 2024-06-28
Payer: MEDICARE

## 2024-06-28 ENCOUNTER — NON-APPOINTMENT (OUTPATIENT)
Age: 74
End: 2024-06-28

## 2024-06-28 VITALS
BODY MASS INDEX: 43.85 KG/M2 | OXYGEN SATURATION: 94 % | SYSTOLIC BLOOD PRESSURE: 123 MMHG | HEIGHT: 61 IN | HEART RATE: 59 BPM | DIASTOLIC BLOOD PRESSURE: 70 MMHG | WEIGHT: 232.25 LBS | RESPIRATION RATE: 17 BRPM | TEMPERATURE: 97.6 F

## 2024-06-28 VITALS — DIASTOLIC BLOOD PRESSURE: 74 MMHG | HEART RATE: 63 BPM | OXYGEN SATURATION: 99 % | SYSTOLIC BLOOD PRESSURE: 130 MMHG

## 2024-06-28 DIAGNOSIS — E11.9 TYPE 2 DIABETES MELLITUS W/OUT COMPLICATIONS: ICD-10-CM

## 2024-06-28 DIAGNOSIS — I48.91 UNSPECIFIED ATRIAL FIBRILLATION: ICD-10-CM

## 2024-06-28 DIAGNOSIS — R79.89 OTHER SPECIFIED ABNORMAL FINDINGS OF BLOOD CHEMISTRY: ICD-10-CM

## 2024-06-28 DIAGNOSIS — N28.1 CYST OF KIDNEY, ACQUIRED: ICD-10-CM

## 2024-06-28 DIAGNOSIS — I25.10 ATHEROSCLEROTIC HEART DISEASE OF NATIVE CORONARY ARTERY W/OUT ANGINA PECTORIS: ICD-10-CM

## 2024-06-28 DIAGNOSIS — E03.9 HYPOTHYROIDISM, UNSPECIFIED: ICD-10-CM

## 2024-06-28 DIAGNOSIS — I10 ESSENTIAL (PRIMARY) HYPERTENSION: ICD-10-CM

## 2024-06-28 DIAGNOSIS — E66.9 OBESITY, UNSPECIFIED: ICD-10-CM

## 2024-06-28 DIAGNOSIS — M79.89 OTHER SPECIFIED SOFT TISSUE DISORDERS: ICD-10-CM

## 2024-06-28 DIAGNOSIS — R32 UNSPECIFIED URINARY INCONTINENCE: ICD-10-CM

## 2024-06-28 DIAGNOSIS — D64.9 ANEMIA, UNSPECIFIED: ICD-10-CM

## 2024-06-28 DIAGNOSIS — C54.1 MALIGNANT NEOPLASM OF ENDOMETRIUM: ICD-10-CM

## 2024-06-28 DIAGNOSIS — E78.5 HYPERLIPIDEMIA, UNSPECIFIED: ICD-10-CM

## 2024-06-28 LAB
GLUCOSE BLDC GLUCOMTR-MCNC: 165
HBA1C MFR BLD HPLC: 7.3

## 2024-06-28 PROCEDURE — 99214 OFFICE O/P EST MOD 30 MIN: CPT

## 2024-06-28 PROCEDURE — G2211 COMPLEX E/M VISIT ADD ON: CPT

## 2024-06-28 PROCEDURE — 99204 OFFICE O/P NEW MOD 45 MIN: CPT

## 2024-06-28 PROCEDURE — 82962 GLUCOSE BLOOD TEST: CPT

## 2024-06-28 PROCEDURE — 83036 HEMOGLOBIN GLYCOSYLATED A1C: CPT | Mod: QW

## 2024-06-29 PROBLEM — E03.9 ACQUIRED HYPOTHYROIDISM: Status: ACTIVE | Noted: 2024-06-28

## 2024-06-29 PROBLEM — M79.89 LEG SWELLING: Status: ACTIVE | Noted: 2024-04-09

## 2024-06-29 PROBLEM — D64.9 ANEMIA, UNSPECIFIED TYPE: Status: ACTIVE | Noted: 2024-04-18

## 2024-07-01 LAB
ALBUMIN SERPL ELPH-MCNC: 4.3 G/DL
ALP BLD-CCNC: 75 U/L
ALT SERPL-CCNC: 9 U/L
ANION GAP SERPL CALC-SCNC: 18 MMOL/L
AST SERPL-CCNC: 13 U/L
BASOPHILS # BLD AUTO: 0.1 K/UL
BASOPHILS NFR BLD AUTO: 1.6 %
BILIRUB SERPL-MCNC: 0.8 MG/DL
BUN SERPL-MCNC: 17 MG/DL
CALCIUM SERPL-MCNC: 9.8 MG/DL
CHLORIDE SERPL-SCNC: 102 MMOL/L
CO2 SERPL-SCNC: 22 MMOL/L
CREAT SERPL-MCNC: 1.11 MG/DL
EGFR: 52 ML/MIN/1.73M2
EOSINOPHIL # BLD AUTO: 0.38 K/UL
EOSINOPHIL NFR BLD AUTO: 6 %
FERRITIN SERPL-MCNC: 48 NG/ML
FOLATE SERPL-MCNC: 15.3 NG/ML
GLUCOSE SERPL-MCNC: 147 MG/DL
HCT VFR BLD CALC: 39.5 %
HGB BLD-MCNC: 12 G/DL
IMM GRANULOCYTES NFR BLD AUTO: 0.3 %
IRON SATN MFR SERPL: 11 %
IRON SERPL-MCNC: 36 UG/DL
LYMPHOCYTES # BLD AUTO: 2 K/UL
LYMPHOCYTES NFR BLD AUTO: 31.5 %
MAN DIFF?: NORMAL
MCHC RBC-ENTMCNC: 26.9 PG
MCHC RBC-ENTMCNC: 30.4 GM/DL
MCV RBC AUTO: 88.6 FL
MONOCYTES # BLD AUTO: 0.32 K/UL
MONOCYTES NFR BLD AUTO: 5 %
NEUTROPHILS # BLD AUTO: 3.52 K/UL
NEUTROPHILS NFR BLD AUTO: 55.6 %
NT-PROBNP SERPL-MCNC: 280 PG/ML
PLATELET # BLD AUTO: 305 K/UL
POTASSIUM SERPL-SCNC: 4.5 MMOL/L
PROT SERPL-MCNC: 7.6 G/DL
RBC # BLD: 4.46 M/UL
RBC # FLD: 13.4 %
SODIUM SERPL-SCNC: 142 MMOL/L
TIBC SERPL-MCNC: 337 UG/DL
UIBC SERPL-MCNC: 301 UG/DL
VIT B12 SERPL-MCNC: 466 PG/ML
WBC # FLD AUTO: 6.34 K/UL

## 2024-07-12 ENCOUNTER — NON-APPOINTMENT (OUTPATIENT)
Age: 74
End: 2024-07-12

## 2024-08-07 ENCOUNTER — APPOINTMENT (OUTPATIENT)
Dept: ELECTROPHYSIOLOGY | Facility: CLINIC | Age: 74
End: 2024-08-07

## 2024-08-07 NOTE — CARDIOLOGY SUMMARY
[de-identified] : 5/8/2024: Sinus rhythm at 75 bpm with APCs [de-identified] : 3/26/2024:  1. Left ventricular cavity is normal in size. Left ventricular wall thickness is normal. Left ventricular systolic function is normal with an ejection fraction of 57 % by Cantrell's method of disks. There are no regional wall motion abnormalities seen.  2. There is mild (grade 1) left ventricular diastolic dysfunction.  3. Normal right ventricular cavity size, with normal wall thickness, and probably normal systolic function.  4. The left atrium is mildly dilated.  5. The right atrium is normal in size.  6. No pericardial effusion seen. [de-identified] : 3/28/2024: JOAQUIN/DCCV

## 2024-08-07 NOTE — DISCUSSION/SUMMARY
[FreeTextEntry1] : In summary, this is a 73 year old woman with AD, hyperlipidemia, hypertension, diabetes, and paroxysmal atrial fibrillation. She is s/p DCCV and maintaining SR on Amio. She is on Eliquis and doing well. Given her recurrent infections, I would like her to be infection free prior to consideration and an ablation procedure, which she would benefit from, especially given her symptoms in AF. I discussed this with her, her daughter, and her  today. They will RTC in 3 months.   Mrs. Carias appeared to understand the whole discussion and verbalized that all of her questions were answered to her satisfaction.  Thank you for allowing me to be involved in the care of this pleasant woman. Please feel free to contact me with any questions.

## 2024-08-07 NOTE — HISTORY OF PRESENT ILLNESS
[FreeTextEntry1] : Referring Physician: Christopher Boudreaux MD; Dominguez Herbert DO  Dear Briseida Boudreaux and Keon:   Mrs. Carias was seen in the [facility] today. For our records, please allow me to summarize the history and my findings.   This pleasant 73-year-old woman has a cardiovascular history significant for CAD, hyperlipidemia, hypertension, diabetes, and paroxysmal atrial fibrillation.  Her past medical history significant for endomyocardial cancer.  She was hospitalized in March 2024 with Klebsiella bacteremia secondary to pyelonephritis.  At that time was noted to be in A-fib with RVR.  She underwent a cardioversion on 3/28/2024.  She was loaded with amiodarone at that time. She has experienced some palpitations since discharge but overall has been maintaining SR. She was told today by urology that she has a recurrent urine infection. She is tolerating Eliquis well.  Mrs. Carias denies any recent history of chest pain, shortness of breath, palpitations, dizziness, or syncope.

## 2024-08-14 ENCOUNTER — APPOINTMENT (OUTPATIENT)
Dept: ENDOCRINOLOGY | Facility: CLINIC | Age: 74
End: 2024-08-14

## 2024-09-04 NOTE — DISCHARGE NOTE NURSING/CASE MANAGEMENT/SOCIAL WORK - NSDCPEFALRISK_GEN_ALL_CORE
For information on Fall & Injury Prevention, visit: [email or web address] OR  [email or web address] OR  https://www.cdc.gov/steadi/patient.html None

## 2024-09-17 ENCOUNTER — APPOINTMENT (OUTPATIENT)
Dept: INTERNAL MEDICINE | Facility: CLINIC | Age: 74
End: 2024-09-17

## 2024-09-18 ENCOUNTER — APPOINTMENT (OUTPATIENT)
Dept: GYNECOLOGIC ONCOLOGY | Facility: CLINIC | Age: 74
End: 2024-09-18

## 2024-12-29 ENCOUNTER — INPATIENT (INPATIENT)
Facility: HOSPITAL | Age: 74
LOS: 2 days | Discharge: NURSING, REHAB OR LONG-TERM CARE | DRG: 603 | End: 2025-01-01
Attending: HOSPITALIST | Admitting: STUDENT IN AN ORGANIZED HEALTH CARE EDUCATION/TRAINING PROGRAM
Payer: MEDICARE

## 2024-12-29 VITALS
OXYGEN SATURATION: 98 % | SYSTOLIC BLOOD PRESSURE: 170 MMHG | DIASTOLIC BLOOD PRESSURE: 80 MMHG | HEIGHT: 62 IN | RESPIRATION RATE: 20 BRPM | WEIGHT: 199.96 LBS | TEMPERATURE: 98 F | HEART RATE: 62 BPM

## 2024-12-29 DIAGNOSIS — Z78.9 OTHER SPECIFIED HEALTH STATUS: Chronic | ICD-10-CM

## 2024-12-29 DIAGNOSIS — E11.9 TYPE 2 DIABETES MELLITUS WITHOUT COMPLICATIONS: ICD-10-CM

## 2024-12-29 DIAGNOSIS — F41.9 ANXIETY DISORDER, UNSPECIFIED: ICD-10-CM

## 2024-12-29 DIAGNOSIS — L03.115 CELLULITIS OF RIGHT LOWER LIMB: ICD-10-CM

## 2024-12-29 DIAGNOSIS — E03.9 HYPOTHYROIDISM, UNSPECIFIED: ICD-10-CM

## 2024-12-29 DIAGNOSIS — Z29.9 ENCOUNTER FOR PROPHYLACTIC MEASURES, UNSPECIFIED: ICD-10-CM

## 2024-12-29 DIAGNOSIS — I48.0 PAROXYSMAL ATRIAL FIBRILLATION: ICD-10-CM

## 2024-12-29 DIAGNOSIS — I10 ESSENTIAL (PRIMARY) HYPERTENSION: ICD-10-CM

## 2024-12-29 DIAGNOSIS — I63.9 CEREBRAL INFARCTION, UNSPECIFIED: ICD-10-CM

## 2024-12-29 DIAGNOSIS — Z90.710 ACQUIRED ABSENCE OF BOTH CERVIX AND UTERUS: Chronic | ICD-10-CM

## 2024-12-29 DIAGNOSIS — I87.2 VENOUS INSUFFICIENCY (CHRONIC) (PERIPHERAL): ICD-10-CM

## 2024-12-29 DIAGNOSIS — E89.0 POSTPROCEDURAL HYPOTHYROIDISM: Chronic | ICD-10-CM

## 2024-12-29 DIAGNOSIS — Z98.49 CATARACT EXTRACTION STATUS, UNSPECIFIED EYE: Chronic | ICD-10-CM

## 2024-12-29 DIAGNOSIS — E78.5 HYPERLIPIDEMIA, UNSPECIFIED: ICD-10-CM

## 2024-12-29 DIAGNOSIS — I86.8 VARICOSE VEINS OF OTHER SPECIFIED SITES: Chronic | ICD-10-CM

## 2024-12-29 LAB
ALBUMIN SERPL ELPH-MCNC: 3.7 G/DL — SIGNIFICANT CHANGE UP (ref 3.3–5)
ALP SERPL-CCNC: 100 U/L — SIGNIFICANT CHANGE UP (ref 40–120)
ALT FLD-CCNC: 14 U/L — SIGNIFICANT CHANGE UP (ref 10–45)
ANION GAP SERPL CALC-SCNC: 16 MMOL/L — SIGNIFICANT CHANGE UP (ref 5–17)
AST SERPL-CCNC: 11 U/L — SIGNIFICANT CHANGE UP (ref 10–40)
BASOPHILS # BLD AUTO: 0.04 K/UL — SIGNIFICANT CHANGE UP (ref 0–0.2)
BASOPHILS NFR BLD AUTO: 0.5 % — SIGNIFICANT CHANGE UP (ref 0–2)
BILIRUB SERPL-MCNC: 0.7 MG/DL — SIGNIFICANT CHANGE UP (ref 0.2–1.2)
BUN SERPL-MCNC: 18 MG/DL — SIGNIFICANT CHANGE UP (ref 7–23)
CALCIUM SERPL-MCNC: 9.1 MG/DL — SIGNIFICANT CHANGE UP (ref 8.4–10.5)
CHLORIDE SERPL-SCNC: 103 MMOL/L — SIGNIFICANT CHANGE UP (ref 96–108)
CK SERPL-CCNC: 79 U/L — SIGNIFICANT CHANGE UP (ref 25–170)
CO2 SERPL-SCNC: 25 MMOL/L — SIGNIFICANT CHANGE UP (ref 22–31)
CREAT SERPL-MCNC: 1.07 MG/DL — SIGNIFICANT CHANGE UP (ref 0.5–1.3)
EGFR: 54 ML/MIN/1.73M2 — LOW
EOSINOPHIL # BLD AUTO: 0.38 K/UL — SIGNIFICANT CHANGE UP (ref 0–0.5)
EOSINOPHIL NFR BLD AUTO: 4.4 % — SIGNIFICANT CHANGE UP (ref 0–6)
GLUCOSE BLDC GLUCOMTR-MCNC: 254 MG/DL — HIGH (ref 70–99)
GLUCOSE SERPL-MCNC: 217 MG/DL — HIGH (ref 70–99)
HCT VFR BLD CALC: 30.2 % — LOW (ref 34.5–45)
HGB BLD-MCNC: 9.7 G/DL — LOW (ref 11.5–15.5)
IMM GRANULOCYTES NFR BLD AUTO: 0.8 % — SIGNIFICANT CHANGE UP (ref 0–0.9)
LYMPHOCYTES # BLD AUTO: 1.83 K/UL — SIGNIFICANT CHANGE UP (ref 1–3.3)
LYMPHOCYTES # BLD AUTO: 21 % — SIGNIFICANT CHANGE UP (ref 13–44)
MCHC RBC-ENTMCNC: 27.2 PG — SIGNIFICANT CHANGE UP (ref 27–34)
MCHC RBC-ENTMCNC: 32.1 G/DL — SIGNIFICANT CHANGE UP (ref 32–36)
MCV RBC AUTO: 84.8 FL — SIGNIFICANT CHANGE UP (ref 80–100)
MONOCYTES # BLD AUTO: 0.51 K/UL — SIGNIFICANT CHANGE UP (ref 0–0.9)
MONOCYTES NFR BLD AUTO: 5.9 % — SIGNIFICANT CHANGE UP (ref 2–14)
NEUTROPHILS # BLD AUTO: 5.87 K/UL — SIGNIFICANT CHANGE UP (ref 1.8–7.4)
NEUTROPHILS NFR BLD AUTO: 67.4 % — SIGNIFICANT CHANGE UP (ref 43–77)
NRBC # BLD: 0 /100 WBCS — SIGNIFICANT CHANGE UP (ref 0–0)
PLATELET # BLD AUTO: 248 K/UL — SIGNIFICANT CHANGE UP (ref 150–400)
POTASSIUM SERPL-MCNC: 3.1 MMOL/L — LOW (ref 3.5–5.3)
POTASSIUM SERPL-SCNC: 3.1 MMOL/L — LOW (ref 3.5–5.3)
PROCALCITONIN SERPL-MCNC: 0.07 NG/ML — SIGNIFICANT CHANGE UP (ref 0.02–0.1)
PROT SERPL-MCNC: 7 G/DL — SIGNIFICANT CHANGE UP (ref 6–8.3)
RBC # BLD: 3.56 M/UL — LOW (ref 3.8–5.2)
RBC # FLD: 13.6 % — SIGNIFICANT CHANGE UP (ref 10.3–14.5)
SODIUM SERPL-SCNC: 144 MMOL/L — SIGNIFICANT CHANGE UP (ref 135–145)
WBC # BLD: 8.7 K/UL — SIGNIFICANT CHANGE UP (ref 3.8–10.5)
WBC # FLD AUTO: 8.7 K/UL — SIGNIFICANT CHANGE UP (ref 3.8–10.5)

## 2024-12-29 PROCEDURE — 99497 ADVNCD CARE PLAN 30 MIN: CPT | Mod: 25

## 2024-12-29 PROCEDURE — 99285 EMERGENCY DEPT VISIT HI MDM: CPT | Mod: GC

## 2024-12-29 PROCEDURE — 73590 X-RAY EXAM OF LOWER LEG: CPT | Mod: 26,50

## 2024-12-29 PROCEDURE — 73552 X-RAY EXAM OF FEMUR 2/>: CPT | Mod: 26,50

## 2024-12-29 PROCEDURE — 99223 1ST HOSP IP/OBS HIGH 75: CPT

## 2024-12-29 PROCEDURE — 93970 EXTREMITY STUDY: CPT | Mod: 26

## 2024-12-29 RX ORDER — INSULIN LISPRO 100/ML
VIAL (ML) SUBCUTANEOUS
Refills: 0 | Status: DISCONTINUED | OUTPATIENT
Start: 2024-12-29 | End: 2025-01-01

## 2024-12-29 RX ORDER — PANTOPRAZOLE 40 MG/1
40 TABLET, DELAYED RELEASE ORAL
Refills: 0 | Status: DISCONTINUED | OUTPATIENT
Start: 2024-12-29 | End: 2025-01-01

## 2024-12-29 RX ORDER — GLUCAGON INJECTION, SOLUTION 0.5 MG/.1ML
1 INJECTION, SOLUTION SUBCUTANEOUS ONCE
Refills: 0 | Status: DISCONTINUED | OUTPATIENT
Start: 2024-12-29 | End: 2025-01-01

## 2024-12-29 RX ORDER — AMIODARONE HYDROCHLORIDE 200 MG/1
200 TABLET ORAL DAILY
Refills: 0 | Status: DISCONTINUED | OUTPATIENT
Start: 2024-12-29 | End: 2025-01-01

## 2024-12-29 RX ORDER — DEXTROSE MONOHYDRATE 25 G/50ML
12.5 INJECTION, SOLUTION INTRAVENOUS ONCE
Refills: 0 | Status: DISCONTINUED | OUTPATIENT
Start: 2024-12-29 | End: 2025-01-01

## 2024-12-29 RX ORDER — ALPRAZOLAM 0.25 MG/1
2 TABLET ORAL AT BEDTIME
Refills: 0 | Status: DISCONTINUED | OUTPATIENT
Start: 2024-12-29 | End: 2025-01-01

## 2024-12-29 RX ORDER — CLOTRIMAZOLE 10 MG/G
1 CREAM TOPICAL EVERY 12 HOURS
Refills: 0 | Status: DISCONTINUED | OUTPATIENT
Start: 2024-12-29 | End: 2025-01-01

## 2024-12-29 RX ORDER — CLOTRIMAZOLE 10 MG/G
1 CREAM TOPICAL ONCE
Refills: 0 | Status: COMPLETED | OUTPATIENT
Start: 2024-12-29 | End: 2024-12-29

## 2024-12-29 RX ORDER — APIXABAN 5 MG/1
5 TABLET, FILM COATED ORAL EVERY 12 HOURS
Refills: 0 | Status: DISCONTINUED | OUTPATIENT
Start: 2024-12-29 | End: 2025-01-01

## 2024-12-29 RX ORDER — POTASSIUM CHLORIDE 600 MG/1
40 TABLET, FILM COATED, EXTENDED RELEASE ORAL ONCE
Refills: 0 | Status: COMPLETED | OUTPATIENT
Start: 2024-12-29 | End: 2024-12-29

## 2024-12-29 RX ORDER — DEXTROSE MONOHYDRATE 25 G/50ML
15 INJECTION, SOLUTION INTRAVENOUS ONCE
Refills: 0 | Status: DISCONTINUED | OUTPATIENT
Start: 2024-12-29 | End: 2025-01-01

## 2024-12-29 RX ORDER — LIDOCAINE 50 MG/G
1 OINTMENT TOPICAL ONCE
Refills: 0 | Status: COMPLETED | OUTPATIENT
Start: 2024-12-29 | End: 2024-12-29

## 2024-12-29 RX ORDER — ASPIRIN 81 MG
81 TABLET, DELAYED RELEASE (ENTERIC COATED) ORAL DAILY
Refills: 0 | Status: DISCONTINUED | OUTPATIENT
Start: 2024-12-29 | End: 2025-01-01

## 2024-12-29 RX ORDER — INSULIN LISPRO 100/ML
5 VIAL (ML) SUBCUTANEOUS
Refills: 0 | Status: DISCONTINUED | OUTPATIENT
Start: 2024-12-29 | End: 2025-01-01

## 2024-12-29 RX ORDER — ACETAMINOPHEN 80 MG/.8ML
1000 SOLUTION/ DROPS ORAL ONCE
Refills: 0 | Status: COMPLETED | OUTPATIENT
Start: 2024-12-29 | End: 2024-12-29

## 2024-12-29 RX ORDER — ACETAMINOPHEN 80 MG/.8ML
650 SOLUTION/ DROPS ORAL EVERY 6 HOURS
Refills: 0 | Status: DISCONTINUED | OUTPATIENT
Start: 2024-12-29 | End: 2025-01-01

## 2024-12-29 RX ORDER — DEXTROSE MONOHYDRATE 25 G/50ML
25 INJECTION, SOLUTION INTRAVENOUS ONCE
Refills: 0 | Status: DISCONTINUED | OUTPATIENT
Start: 2024-12-29 | End: 2025-01-01

## 2024-12-29 RX ORDER — AMPICILLIN SODIUM AND SULBACTAM SODIUM 100; 50 MG/ML; MG/ML
3 INJECTION, POWDER, FOR SOLUTION INTRAVENOUS ONCE
Refills: 0 | Status: COMPLETED | OUTPATIENT
Start: 2024-12-29 | End: 2024-12-29

## 2024-12-29 RX ORDER — SODIUM CHLORIDE 9 MG/ML
1000 INJECTION, SOLUTION INTRAVENOUS
Refills: 0 | Status: DISCONTINUED | OUTPATIENT
Start: 2024-12-29 | End: 2025-01-01

## 2024-12-29 RX ORDER — INSULIN GLARGINE-YFGN 100 [IU]/ML
10 INJECTION, SOLUTION SUBCUTANEOUS AT BEDTIME
Refills: 0 | Status: DISCONTINUED | OUTPATIENT
Start: 2024-12-29 | End: 2025-01-01

## 2024-12-29 RX ORDER — GINKGO BILOBA 40 MG
3 CAPSULE ORAL AT BEDTIME
Refills: 0 | Status: DISCONTINUED | OUTPATIENT
Start: 2024-12-29 | End: 2025-01-01

## 2024-12-29 RX ORDER — FUROSEMIDE 20 MG
40 TABLET ORAL
Refills: 0 | Status: DISCONTINUED | OUTPATIENT
Start: 2024-12-29 | End: 2025-01-01

## 2024-12-29 RX ORDER — LEVOTHYROXINE SODIUM 175 UG/1
200 TABLET ORAL DAILY
Refills: 0 | Status: DISCONTINUED | OUTPATIENT
Start: 2024-12-29 | End: 2025-01-01

## 2024-12-29 RX ORDER — ATORVASTATIN CALCIUM 40 MG/1
40 TABLET, FILM COATED ORAL AT BEDTIME
Refills: 0 | Status: DISCONTINUED | OUTPATIENT
Start: 2024-12-29 | End: 2025-01-01

## 2024-12-29 RX ORDER — CEFAZOLIN SODIUM 1 G
1000 VIAL (EA) INJECTION EVERY 8 HOURS
Refills: 0 | Status: DISCONTINUED | OUTPATIENT
Start: 2024-12-30 | End: 2024-12-30

## 2024-12-29 RX ADMIN — CLOTRIMAZOLE 1 APPLICATION(S): 10 CREAM TOPICAL at 18:13

## 2024-12-29 RX ADMIN — AMPICILLIN SODIUM AND SULBACTAM SODIUM 200 GRAM(S): 100; 50 INJECTION, POWDER, FOR SOLUTION INTRAVENOUS at 20:53

## 2024-12-29 RX ADMIN — ALPRAZOLAM 2 MILLIGRAM(S): 0.25 TABLET ORAL at 22:16

## 2024-12-29 RX ADMIN — LIDOCAINE 1 PATCH: 50 OINTMENT TOPICAL at 20:28

## 2024-12-29 RX ADMIN — ATORVASTATIN CALCIUM 40 MILLIGRAM(S): 40 TABLET, FILM COATED ORAL at 22:16

## 2024-12-29 RX ADMIN — POTASSIUM CHLORIDE 40 MILLIEQUIVALENT(S): 600 TABLET, FILM COATED, EXTENDED RELEASE ORAL at 18:10

## 2024-12-29 RX ADMIN — ACETAMINOPHEN 400 MILLIGRAM(S): 80 SOLUTION/ DROPS ORAL at 18:10

## 2024-12-29 RX ADMIN — INSULIN GLARGINE-YFGN 10 UNIT(S): 100 INJECTION, SOLUTION SUBCUTANEOUS at 22:15

## 2024-12-29 NOTE — CHART NOTE - NSCHARTNOTEFT_GEN_A_CORE
Confidential Drug Utilization Report  Search Terms: Christy Carias, 1950Search Date: 12/29/2024 22:03:43 PM  Searching on behalf of: Myself  The Drug Utilization Report below displays all of the controlled substance prescriptions, if any, that your patient has filled in the last twelve months. The information displayed on this report is compiled from pharmacy submissions to the Department, and accurately reflects the information as submitted by the pharmacies.    This report was requested by: Ahmet Moreira | Reference #: [id number]    Practitioner Count: 2  Pharmacy Count: 2  Current Opioid Prescriptions: 0  Current Benzodiazepine Prescriptions: 0  Current Stimulant Prescriptions: 0      Patient Demographic Information (PDI)       PDI	First Name	Last Name	Birth Date	Gender	Street Address	[facility]	Zip Code  A	Christy Carias	1950	Female	[address] [facility]	[id number]  B	Christy Carias	1950	Female	[address] [facility]	[address]    Prescription Information      PDI Filter:    PDI	My Rx	Current Rx	Drug Type	Rx Written	Rx Dispensed	Drug	Quantity	Days Supply	Prescriber Name	Prescriber HEMAL #	Payment Method	Dispenser  A	N	N	B	10/02/2024	10/03/2024	alprazolam 2 mg tablet	90	30	Danny Gray MD	[id number]	Medicare	[id number]  A	N	N	B	08/20/2024	08/20/2024	alprazolam 2 mg tablet	90	30	Danny Gray MD	[id number]	Medicare	[id number]  A	N	N	B	05/29/2024	05/30/2024	alprazolam 2 mg tablet	90	30	Danny Gray MD	[id number]	Medicare	[id number]  A	N	N	B	03/06/2024	03/08/2024	alprazolam 2 mg tablet	90	30	Danny Gray MD	[id number]	Medicare	[id number]  B	N	N	B	11/30/2024	11/30/2024	alprazolam 0.5 mg tablet	14	14	Rambo Camarena D, MD	[id number]	Insurance	[employer]  B	N	N	B	11/22/2024	11/22/2024	alprazolam 1 mg tablet	7	7	Rambo Camarena D, MD	[id number]	Insurance	[employer]  B	N	N	B	11/21/2024	11/21/2024	alprazolam 2 mg tablet	14	14	Rambo Camarena D, MD	[id number]	Insurance	[employer]

## 2024-12-29 NOTE — H&P ADULT - CONVERSATION DETAILS
Spoke to patient regarding code status. Discussed that the last time she was here, she had a MOLST in her chart with DNR/DNI trial of NIV orders. Explained that this meant that in the event of a cardiac arrest, these orders state to allow a natural death. Asked patient if these were still her wishes. Patient stated that she would never want to be permanently on a ventilator, but is unsure about not doing any chest compressions in the event her heart stopped. Explained that there are scenarios where patients can be DNR/Intubate but not the other way around as cardiac arrest would repeat without appropriate ventilation. Patient states she wants to speak further with her  and children before reaffirming her previous code status. Explained that until then, she would be full code for the moment.

## 2024-12-29 NOTE — ED PROVIDER NOTE - CARE PLAN
1 Principal Discharge DX:	Cellulitis of both lower extremities  Secondary Diagnosis:	Venous stasis dermatitis

## 2024-12-29 NOTE — ED PROVIDER NOTE - ATTENDING CONTRIBUTION TO CARE
Old records reviewed, patient with inpatient stay several months ago for right pyelonephritis, VRE in the urine requiring linezolid, Klebsiella bacteremia Rocephin at that time.  Patient noted to have atrial fibrillation, hypertension, hyperlipidemia, diabetes, and anxiety.    History from patient, patient's daughter, and patient's  at bedside.  Patient with a recent inpatient stay at [facility] for stroke causing right-sided weakness.  Patient has since been attempting rehab at home, has difficulty getting out of the bed, and has had ongoing lower extremity swelling for weeks.  3 days ago family noted increased redness especially of the inner and backside of either leg extending up to the inner thighs with some crusting and plaques.  No fevers reported.  No injury reported.    Physical exam: Patient well-appearing, nontoxic, no respiratory distress, has no JVD, has bilateral mostly nonpitting edema of the lower extremities with venous stasis dermatitis and overlying plaque that fluoresces under UV light green that seems to suggest a fungal component.  Patient has extensive erythema that starts in her lower leg and extends up to the inner thigh.  No crepitus.  DP pulses intact bilaterally.  Extremities are warm.    MDM: 74-year-old female with likely venous stasis dermatitis now infected, likely fungal, cannot rule out concomitant bacterial component, patient deemed very high risk for bacterial infection given her prior history of bacteremia and VRE pyelonephritis.  Patient deemed low risk for DVT given anticoagulant use, however given bedbound state and possibilities of breakthrough clotting on DOAC's, will obtain venous Doppler bilaterally lower extremities, will obtain x-rays of the lower extremities to rule out gas or necrotizing fasciitis although very low on the differential, will obtain labs including CBC, chemistries, blood cultures, procalcitonin level, will start with topical antifungals, IV antibiotics, and will require inpatient stay to delineate whether this is simply a fungal infection with venous stasis dermatitis with need for likely diuretics, mechanical measures to reduce edema, and topical antifungals versus concomitant IV or oral antibiotics.  Patient deemed not a good candidate for outpatient at this time due to as above.

## 2024-12-29 NOTE — H&P ADULT - PROBLEM SELECTOR PLAN 7
- C/w alprazolam 1mg QHS PRN - C/w levothyroxine 200mcg daily - Was discharged from [facility] w/ modification of insulin regimen to Lantus 10u QHS and Lispro 5u TID, continue while admitted  - Monitor sugars and adjust insulin as needed

## 2024-12-29 NOTE — H&P ADULT - PROBLEM SELECTOR PLAN 2
- Had CVA few weeks ago  - Now with residual R-sided weakness, mostly bedbound, getting home PT  - C/w ASA 81mg daily  - Fall precautions  - PT while admitted - Had CVA few weeks ago - MR at [facility] showed acute to early subacute infarction involves the left corona radiata/basal ganglia  - Now with residual R-sided weakness, mostly bedbound, getting home PT  - C/w ASA 81mg daily  - Fall precautions  - PT while admitted - Has worsening non-pitting LE edema w/ erythema, crusting and plaques  - Wound care consult  - C/w PO lasix 40mg BID  - Dopplers negative for LE DVT

## 2024-12-29 NOTE — H&P ADULT - NSHPLABSRESULTS_GEN_ALL_CORE
9.7    8.70  )-----------( 248      ( 29 Dec 2024 16:25 )             30.2     12-29    144  |  103  |  18  ----------------------------<  217[H]  3.1[L]   |  25  |  1.07    Ca    9.1      29 Dec 2024 16:25    TPro  7.0  /  Alb  3.7  /  TBili  0.7  /  DBili  x   /  AST  11  /  ALT  14  /  AlkPhos  100  12-29          LIVER FUNCTIONS - ( 29 Dec 2024 16:25 )  Alb: 3.7 g/dL / Pro: 7.0 g/dL / ALK PHOS: 100 U/L / ALT: 14 U/L / AST: 11 U/L / GGT: x             Urinalysis Basic - ( 29 Dec 2024 16:25 )    Color: x / Appearance: x / SG: x / pH: x  Gluc: 217 mg/dL / Ketone: x  / Bili: x / Urobili: x   Blood: x / Protein: x / Nitrite: x   Leuk Esterase: x / RBC: x / WBC x   Sq Epi: x / Non Sq Epi: x / Bacteria: x

## 2024-12-29 NOTE — H&P ADULT - NSHPREVIEWOFSYSTEMS_GEN_ALL_CORE
Review of Systems:   CONSTITUTIONAL: No fever, weight loss  EYES: No eye pain, visual disturbances, or discharge  RESPIRATORY: No SOB. No cough, wheezing, chills or hemoptysis  CARDIOVASCULAR: No chest pain, palpitations, dizziness, or leg swelling  GASTROINTESTINAL: No abdominal or epigastric pain. No nausea, vomiting, or hematemesis; No diarrhea or constipation. No melena or hematochezia.  GENITOURINARY: No dysuria, frequency, hematuria, or incontinence  NEUROLOGICAL: No headaches, memory loss, loss of strength, numbness, or tremors  SKIN: No itching, burning, rashes, or lesions   MUSCULOSKELETAL: No joint pain or swelling; No muscle, back pain  PSYCHIATRIC: No depression, anxiety, mood swings, or difficulty sleeping  HEME/LYMPH: No easy bruising, or bleeding gums Review of Systems:   CONSTITUTIONAL: No fever, weight loss  EYES: No eye pain, visual disturbances, or discharge  RESPIRATORY: No SOB. No cough, wheezing, chills or hemoptysis  CARDIOVASCULAR: No chest pain, palpitations, dizziness, or leg swelling  GASTROINTESTINAL: No abdominal or epigastric pain. No nausea, vomiting, or hematemesis; No diarrhea or constipation. No melena or hematochezia.  GENITOURINARY: No dysuria, frequency, hematuria, or incontinence  NEUROLOGICAL: No headaches, memory loss, loss of strength, numbness, or tremors  SKIN: Redness and swelling of both legs  MUSCULOSKELETAL: No joint pain or swelling; No muscle, back pain  PSYCHIATRIC: No depression, anxiety, mood swings, or difficulty sleeping  HEME/LYMPH: No easy bruising, or bleeding gums

## 2024-12-29 NOTE — ED ADULT NURSE NOTE - NSFALLRISKINTERV_ED_ALL_ED

## 2024-12-29 NOTE — ED PROVIDER NOTE - PROGRESS NOTE DETAILS
Errol Nina MD, PGY2: Patient reassessed, stable.  Duplex negative bilaterally.  No significant leukocytosis.  X-rays without subQ tracking air.  Patient to be admitted for venous stasis dermatitis plus cellulitis possibly secondary to fungal etiology however patient's overall high risk. Errol Nina MD, PGY2: Patient reassessed, stable.  Duplex negative bilaterally.  No significant leukocytosis.  X-rays without subQ tracking air.  Patient to be admitted for venous stasis dermatitis plus cellulitis possibly secondary to fungal etiology however patient's overall high risk. Lab and imaging results were discussed with the patient. Shared decision making was held between provider and patient with regards to disposition decision. All questions and concerns were addressed. Patient is agreeable to disposition plan.

## 2024-12-29 NOTE — H&P ADULT - NSHPPHYSICALEXAM_GEN_ALL_CORE
abdominal pain Vital Signs Last 24 Hrs  T(C): 36.7 (29 Dec 2024 19:00), Max: 36.7 (29 Dec 2024 14:04)  T(F): 98 (29 Dec 2024 19:00), Max: 98.1 (29 Dec 2024 15:57)  HR: 64 (29 Dec 2024 19:00) (62 - 64)  BP: 148/75 (29 Dec 2024 19:00) (144/75 - 170/80)  BP(mean): --  RR: 18 (29 Dec 2024 19:00) (18 - 20)  SpO2: 95% (29 Dec 2024 19:00) (95% - 98%)    Parameters below as of 29 Dec 2024 19:00  Patient On (Oxygen Delivery Method): room air        CONSTITUTIONAL: Well-groomed, in no apparent distress  EYES: No conjunctival or scleral injection, non-icteric;   NECK: Trachea midline without palpable neck mass  RESPIRATORY: Breathing comfortably; lungs CTA without wheeze/rhonchi/rales  CARDIOVASCULAR: +S1S2, RRR, no M/G/R; no lower extremity edema  GASTROINTESTINAL: No palpable masses or tenderness, +BS throughout, no rebound/guarding  SKIN:   NEUROLOGIC: Sensation intact in LEs b/l to light touch  PSYCHIATRIC: A+O x 3; mood and affect appropriate; appropriate insight and judgment Vital Signs Last 24 Hrs  T(C): 36.7 (29 Dec 2024 19:00), Max: 36.7 (29 Dec 2024 14:04)  T(F): 98 (29 Dec 2024 19:00), Max: 98.1 (29 Dec 2024 15:57)  HR: 64 (29 Dec 2024 19:00) (62 - 64)  BP: 148/75 (29 Dec 2024 19:00) (144/75 - 170/80)  BP(mean): --  RR: 18 (29 Dec 2024 19:00) (18 - 20)  SpO2: 95% (29 Dec 2024 19:00) (95% - 98%)    Parameters below as of 29 Dec 2024 19:00  Patient On (Oxygen Delivery Method): room air        CONSTITUTIONAL: Well-groomed, in no apparent distress  EYES: No conjunctival or scleral injection, non-icteric;   NECK: Trachea midline without palpable neck mass  RESPIRATORY: Breathing comfortably; lungs CTA without wheeze/rhonchi/rales  CARDIOVASCULAR: +S1S2, RRR, no M/G/R; non-pitting bilateral LE edema noted, R>L  GASTROINTESTINAL: No palpable masses or tenderness, +BS throughout, no rebound/guarding  SKIN: Stasis dermatitis on bilateral LE w/ erythema on both legs with some crusting, R>L  NEUROLOGIC: Sensation intact in LEs b/l to light touch, RUE and RLE w/ 0/5 strength  PSYCHIATRIC: A+O x 3; mood and affect appropriate; appropriate insight and judgment

## 2024-12-29 NOTE — ED ADULT NURSE NOTE - NSICDXPASTMEDICALHX_GEN_ALL_CORE_FT
PAST MEDICAL HISTORY:  AF (atrial fibrillation) on Xarelto    Anxiety     Blind right eye     CAD (coronary artery disease) minimal CAD per cath result in 2014    Constipation     Depression     Edema of lower extremity     Former smoker     Gastritis     H/O Helicobacter infection     Hiatal hernia     History of TIAs     [place] (hard of hearing)     HTN (hypertension)     Hypercholesterolemia     Hypothyroid     Incontinence     Loss of vision right eye    Morbid obesity     Murmur     Neuropathy     OAB (overactive bladder)     Palpitations     Risk factors for obstructive sleep apnea     Spinal stenosis     Thickened endometrium     Thyroid nodule     Type 2 diabetes mellitus     Uterine cancer     Uterine cyst     Varicose veins

## 2024-12-29 NOTE — H&P ADULT - PROBLEM SELECTOR PLAN 6
- Was on Lantus 24u QHS + Lispro 8u TID last hospitalization, continue while admitted  - Monitor sugars and adjust insulin as needed - Was discharged from [facility] w/ modification of insulin regimen to Lantus 10u QHS and Lispro 5u TID, continue while admitted  - Monitor sugars and adjust insulin as needed - C/w atorvastatin 40mg QHS - tolerates despite previously listed intolerance

## 2024-12-29 NOTE — H&P ADULT - PROBLEM SELECTOR PLAN 3
- C/w Eliquis 5mg BID  - C/w Metoprolol succinate 25mg daily  - C/w amiodarone 200mg daily - C/w Eliquis 5mg BID  - C/w amiodarone 200mg daily  - Was on metoprolol succinate 25mg daily, was stopped at discharge from her hospitalization last month at [facility] - Had CVA few weeks ago - MR at [facility] showed acute to early subacute infarction involves the left corona radiata/basal ganglia  - Now with residual R-sided weakness, mostly bedbound, getting home PT  - C/w ASA 81mg daily  - Fall precautions  - PT while admitted

## 2024-12-29 NOTE — ED PROVIDER NOTE - NSICDXPASTMEDICALHX_GEN_ALL_CORE_FT
PAST MEDICAL HISTORY:  AF (atrial fibrillation) on Xarelto    Anxiety     Blind right eye     CAD (coronary artery disease) minimal CAD per cath result in 2014    Constipation     Depression     Edema of lower extremity     Former smoker     Gastritis     H/O Helicobacter infection     Hiatal hernia     History of TIAs     [place] (hard of hearing)     HTN (hypertension)     Hypercholesterolemia     Hypothyroid     Incontinence     Loss of vision right eye    Morbid obesity     Murmur     Neuropathy     OAB (overactive bladder)     Palpitations     Risk factors for obstructive sleep apnea     Spinal stenosis     Thickened endometrium     Thyroid nodule     Type 2 diabetes mellitus     Uterine cancer     Uterine cyst     Varicose veins

## 2024-12-29 NOTE — ED ADULT NURSE NOTE - OBJECTIVE STATEMENT
Pt is a 73 y/o female PMH DM, stroke (1 year ago), afib (on Eliquis) presents tot he ED c/o b/l LLE wounds. Pt AxO x4, hard of hearing, BIBEMS from home and accompanied by son and daughter. Per EMS, pt has had b/l LLE wounds for approx 3 days and they have worsened. Pt endorses chronic back pain. Upon assessment, pt b/l LL are erythematous, swollen and draining purulent drainage. Pt unable to ambulate but is wheelchair bound at baseline. Pt denies chest pain, SOB, HA/dizziness, abdominal pain or GI/ symptoms.

## 2024-12-29 NOTE — H&P ADULT - PROBLEM SELECTOR PLAN 5
- Not on meds - C/w atorvastatin 40mg QHS - tolerates lipitor despite previously listed nausea to it - C/w amlodipine 5mg daily   - Losartan and metoprolol stopped per patient due to BPs being unable to tolerate

## 2024-12-29 NOTE — H&P ADULT - NSICDXPASTMEDICALHX_GEN_ALL_CORE_FT
PAST MEDICAL HISTORY:  AF (atrial fibrillation) on Xarelto    Anxiety     Blind right eye     CAD (coronary artery disease) minimal CAD per cath result in 2014    Constipation     Depression     Edema of lower extremity     Former smoker     Gastritis     H/O Helicobacter infection     Hiatal hernia     History of TIAs     [place] (hard of hearing)     HTN (hypertension)     Hypercholesterolemia     Hypothyroid     Incontinence     Loss of vision right eye    Morbid obesity     Murmur     Neuropathy     OAB (overactive bladder)     Palpitations     Risk factors for obstructive sleep apnea     Spinal stenosis     Thickened endometrium     Thyroid nodule     Type 2 diabetes mellitus     Uterine cancer     Uterine cyst     Varicose veins

## 2024-12-29 NOTE — H&P ADULT - PROBLEM SELECTOR PLAN 1
- Has worsening non-pitting LE edema w/ erythema, crusting and plaques  - Unclear at the moment if bacterial superinfection present, Bentley lamp also positive in ED  - S/p 1 dose Unasyn 3g in ED  - Start Ancef 1g TID  - Can continue clotrimazole cream 1% BID to both LE for now  - Wound care consult  - F/u blood cultures. MRSA/MSSA PCR - Has worsening non-pitting LE edema w/ erythema, crusting and plaques  - Unclear at the moment if bacterial superinfection present, Bentley lamp also positive in ED  - S/p 1 dose Unasyn 3g in ED  - Start Ancef 1g TID  - Can continue clotrimazole cream 1% BID to both LE for now  - Wound care consult  - F/u blood cultures. MRSA/MSSA PCR  - C/w PO lasix 40mg BID - RLE with greater swelling and erythema than LLE  - S/p 1 dose Unasyn 3g in ED  - Start Ancef 1g TID  - Can continue clotrimazole cream 1% BID to both LE for now given Woods Lamp + exam documented in ED  - F/u blood cultures. MRSA/MSSA PCR

## 2024-12-29 NOTE — H&P ADULT - PROBLEM SELECTOR PLAN 4
- C/w amlodipine 5mg daily   - C/w losartan 100mg daily - C/w amlodipine 5mg daily   - C/w losartan 500mg daily - C/w Eliquis 5mg BID  - C/w amiodarone 200mg daily  - Was on metoprolol succinate 25mg daily, was stopped at discharge from her hospitalization last month at [place]

## 2024-12-29 NOTE — H&P ADULT - HISTORY OF PRESENT ILLNESS
This is a 75 y/o female w/ PMHx recent CVA w/ R-sided weakness, paroxysmal atrial fibrillation on Eliquis, T2DM, HTN, HLD, stasis dermatitis, and anxiety who presents for worsening LE swelling and redness. She was admitted to [facility] recently after suffering a CVA, has been home for the past few days getting home PT for her residual R hemiparesis. She has been dealing with LE swelling for weeks now, but for the past 3 days her family has noticed that the inner and back areas of her legs have been getting progressively more erythematous with crusting and plaques. 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    In the ED, she was afebrile and hemodynamically stable, saturating well on RA. CBC w/ baseline normocytic anemia w/ Hb 9.7. CMP w/ hypokalemia of 3.1. XR of the bilateral LE showing diffuse bilateral soft tissue swelling but no gas, cortical erosion or periosteal formation. Was also Wood's lamp + per ER examination. Was given Unasyn, Clotrimazole, and KCl 40mEq in the ED.  This is a 73 y/o female w/ PMHx recent CVA w/ R-sided weakness, paroxysmal atrial fibrillation on Eliquis, T2DM, HTN, HLD, hypothyroidism 2/2 thyropidectomy, stasis dermatitis, and anxiety who presents for worsening LE swelling and redness. She was admitted to [facility] recently from 11/18-22 after suffering a CVA, has been home for the past few days getting home PT for her residual R hemiparesis. She has been dealing with LE swelling for weeks now, but for the past 3 days her family has noticed that the inner and back areas of her legs have been getting progressively more erythematous with crusting and plaques. 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    In the ED, she was afebrile and hemodynamically stable, saturating well on RA. CBC w/ baseline normocytic anemia w/ Hb 9.7. CMP w/ hypokalemia of 3.1. XR of the bilateral LE showing diffuse bilateral soft tissue swelling but no gas, cortical erosion or periosteal formation. Was also Wood's lamp + per ER examination. Was given Unasyn, Clotrimazole, and KCl 40mEq in the ED.

## 2024-12-29 NOTE — H&P ADULT - ASSESSMENT
75 y/o female w/ PMHx recent CVA w/ R-sided weakness, paroxysmal atrial fibrillation on Eliquis, T2DM, HTN, HLD, stasis dermatitis, and anxiety who presents for worsening LE swelling and redness with crusting and plaque formation. Bentley lamp + in ED. Admitted for stasis dermatitis with concern for bacterial and/or fungal superinfection. 73 y/o female w/ PMHx recent CVA w/ R-sided weakness, paroxysmal atrial fibrillation on Eliquis, T2DM, HTN, HLD, hypothyroidism 2/2 thyroidectomy, stasis dermatitis, and anxiety who presents for worsening LE swelling and redness with crusting and plaque formation. Mc lamp + in ED. Admitted for stasis dermatitis with concern for bacterial and/or fungal superinfection.

## 2024-12-30 ENCOUNTER — TRANSCRIPTION ENCOUNTER (OUTPATIENT)
Age: 74
End: 2024-12-30

## 2024-12-30 LAB
A1C WITH ESTIMATED AVERAGE GLUCOSE RESULT: 8.7 % — HIGH (ref 4–5.6)
ADD ON TEST-SPECIMEN IN LAB: SIGNIFICANT CHANGE UP
ALBUMIN SERPL ELPH-MCNC: 3.4 G/DL — SIGNIFICANT CHANGE UP (ref 3.3–5)
ALP SERPL-CCNC: 90 U/L — SIGNIFICANT CHANGE UP (ref 40–120)
ALT FLD-CCNC: 11 U/L — SIGNIFICANT CHANGE UP (ref 10–45)
ANION GAP SERPL CALC-SCNC: 11 MMOL/L — SIGNIFICANT CHANGE UP (ref 5–17)
AST SERPL-CCNC: 8 U/L — LOW (ref 10–40)
BILIRUB SERPL-MCNC: 0.7 MG/DL — SIGNIFICANT CHANGE UP (ref 0.2–1.2)
BUN SERPL-MCNC: 16 MG/DL — SIGNIFICANT CHANGE UP (ref 7–23)
CALCIUM SERPL-MCNC: 9 MG/DL — SIGNIFICANT CHANGE UP (ref 8.4–10.5)
CHLORIDE SERPL-SCNC: 104 MMOL/L — SIGNIFICANT CHANGE UP (ref 96–108)
CO2 SERPL-SCNC: 27 MMOL/L — SIGNIFICANT CHANGE UP (ref 22–31)
CREAT SERPL-MCNC: 1.08 MG/DL — SIGNIFICANT CHANGE UP (ref 0.5–1.3)
CRP SERPL-MCNC: 25 MG/L — HIGH (ref 0–4)
EGFR: 53 ML/MIN/1.73M2 — LOW
ERYTHROCYTE [SEDIMENTATION RATE] IN BLOOD: 75 MM/HR — HIGH (ref 0–20)
ESTIMATED AVERAGE GLUCOSE: 203 MG/DL — HIGH (ref 68–114)
GLUCOSE BLDC GLUCOMTR-MCNC: 177 MG/DL — HIGH (ref 70–99)
GLUCOSE BLDC GLUCOMTR-MCNC: 189 MG/DL — HIGH (ref 70–99)
GLUCOSE BLDC GLUCOMTR-MCNC: 190 MG/DL — HIGH (ref 70–99)
GLUCOSE BLDC GLUCOMTR-MCNC: 210 MG/DL — HIGH (ref 70–99)
GLUCOSE BLDC GLUCOMTR-MCNC: 211 MG/DL — HIGH (ref 70–99)
GLUCOSE SERPL-MCNC: 196 MG/DL — HIGH (ref 70–99)
HCT VFR BLD CALC: 33.3 % — LOW (ref 34.5–45)
HGB BLD-MCNC: 10.3 G/DL — LOW (ref 11.5–15.5)
MCHC RBC-ENTMCNC: 27.6 PG — SIGNIFICANT CHANGE UP (ref 27–34)
MCHC RBC-ENTMCNC: 30.9 G/DL — LOW (ref 32–36)
MCV RBC AUTO: 89.3 FL — SIGNIFICANT CHANGE UP (ref 80–100)
NRBC # BLD: 0 /100 WBCS — SIGNIFICANT CHANGE UP (ref 0–0)
PLATELET # BLD AUTO: 221 K/UL — SIGNIFICANT CHANGE UP (ref 150–400)
POTASSIUM SERPL-MCNC: 3.4 MMOL/L — LOW (ref 3.5–5.3)
POTASSIUM SERPL-SCNC: 3.4 MMOL/L — LOW (ref 3.5–5.3)
PROT SERPL-MCNC: 6.4 G/DL — SIGNIFICANT CHANGE UP (ref 6–8.3)
RBC # BLD: 3.73 M/UL — LOW (ref 3.8–5.2)
RBC # FLD: 14 % — SIGNIFICANT CHANGE UP (ref 10.3–14.5)
SODIUM SERPL-SCNC: 142 MMOL/L — SIGNIFICANT CHANGE UP (ref 135–145)
TROPONIN T, HIGH SENSITIVITY RESULT: 30 NG/L — SIGNIFICANT CHANGE UP (ref 0–51)
WBC # BLD: 7.28 K/UL — SIGNIFICANT CHANGE UP (ref 3.8–10.5)
WBC # FLD AUTO: 7.28 K/UL — SIGNIFICANT CHANGE UP (ref 3.8–10.5)

## 2024-12-30 PROCEDURE — 99233 SBSQ HOSP IP/OBS HIGH 50: CPT

## 2024-12-30 PROCEDURE — 99222 1ST HOSP IP/OBS MODERATE 55: CPT

## 2024-12-30 PROCEDURE — 93010 ELECTROCARDIOGRAM REPORT: CPT

## 2024-12-30 RX ORDER — ALBUTEROL SULFATE 90 UG/1
2 INHALANT RESPIRATORY (INHALATION)
Refills: 0 | DISCHARGE

## 2024-12-30 RX ORDER — BUDESONIDE 0.5 MG/2ML
2 INHALANT ORAL
Refills: 0 | DISCHARGE

## 2024-12-30 RX ORDER — VANCOMYCIN HYDROCHLORIDE 5 G/100ML
INJECTION, POWDER, LYOPHILIZED, FOR SOLUTION INTRAVENOUS
Refills: 0 | Status: DISCONTINUED | OUTPATIENT
Start: 2024-12-30 | End: 2025-01-01

## 2024-12-30 RX ORDER — ACETAMINOPHEN 80 MG/.8ML
1000 SOLUTION/ DROPS ORAL ONCE
Refills: 0 | Status: COMPLETED | OUTPATIENT
Start: 2024-12-30 | End: 2024-12-30

## 2024-12-30 RX ORDER — VANCOMYCIN HYDROCHLORIDE 5 G/100ML
1000 INJECTION, POWDER, LYOPHILIZED, FOR SOLUTION INTRAVENOUS ONCE
Refills: 0 | Status: COMPLETED | OUTPATIENT
Start: 2024-12-30 | End: 2024-12-30

## 2024-12-30 RX ORDER — ATORVASTATIN CALCIUM 40 MG/1
1 TABLET, FILM COATED ORAL
Refills: 0 | DISCHARGE

## 2024-12-30 RX ORDER — CEFAZOLIN SODIUM 1 G
2000 VIAL (EA) INJECTION EVERY 8 HOURS
Refills: 0 | Status: DISCONTINUED | OUTPATIENT
Start: 2024-12-30 | End: 2024-12-31

## 2024-12-30 RX ORDER — CADEXOMER IODINE 0.9 %
1 GEL (GRAM) TOPICAL DAILY
Refills: 0 | Status: DISCONTINUED | OUTPATIENT
Start: 2024-12-30 | End: 2025-01-01

## 2024-12-30 RX ORDER — VANCOMYCIN HYDROCHLORIDE 5 G/100ML
1000 INJECTION, POWDER, LYOPHILIZED, FOR SOLUTION INTRAVENOUS EVERY 12 HOURS
Refills: 0 | Status: DISCONTINUED | OUTPATIENT
Start: 2024-12-31 | End: 2025-01-01

## 2024-12-30 RX ORDER — SENNOSIDES 8.6 MG/1
1 TABLET, FILM COATED ORAL
Refills: 0 | DISCHARGE

## 2024-12-30 RX ORDER — TRAMADOL HYDROCHLORIDE 50 MG/1
25 TABLET ORAL ONCE
Refills: 0 | Status: DISCONTINUED | OUTPATIENT
Start: 2024-12-30 | End: 2024-12-30

## 2024-12-30 RX ORDER — CEFAZOLIN SODIUM 1 G
1000 VIAL (EA) INJECTION ONCE
Refills: 0 | Status: COMPLETED | OUTPATIENT
Start: 2024-12-30 | End: 2024-12-30

## 2024-12-30 RX ORDER — LEVOTHYROXINE SODIUM 175 UG/1
1 TABLET ORAL
Refills: 0 | DISCHARGE

## 2024-12-30 RX ORDER — POLYETHYLENE GLYCOL 3350 17 G/DOSE
17 POWDER (GRAM) ORAL
Refills: 0 | DISCHARGE

## 2024-12-30 RX ORDER — GINKGO BILOBA 40 MG
1 CAPSULE ORAL
Refills: 0 | DISCHARGE

## 2024-12-30 RX ORDER — FUROSEMIDE 20 MG
1 TABLET ORAL
Refills: 0 | DISCHARGE

## 2024-12-30 RX ORDER — ACETAMINOPHEN 80 MG/.8ML
2 SOLUTION/ DROPS ORAL
Refills: 0 | DISCHARGE

## 2024-12-30 RX ADMIN — Medication 5 UNIT(S): at 17:51

## 2024-12-30 RX ADMIN — LIDOCAINE 1 PATCH: 50 OINTMENT TOPICAL at 07:30

## 2024-12-30 RX ADMIN — Medication 100 MILLIGRAM(S): at 02:58

## 2024-12-30 RX ADMIN — Medication 100 MILLIGRAM(S): at 17:35

## 2024-12-30 RX ADMIN — Medication 100 MILLIGRAM(S): at 10:49

## 2024-12-30 RX ADMIN — LIDOCAINE 1 PATCH: 50 OINTMENT TOPICAL at 08:00

## 2024-12-30 RX ADMIN — AMIODARONE HYDROCHLORIDE 200 MILLIGRAM(S): 200 TABLET ORAL at 06:02

## 2024-12-30 RX ADMIN — Medication 4: at 12:57

## 2024-12-30 RX ADMIN — Medication 100 MILLIGRAM(S): at 18:32

## 2024-12-30 RX ADMIN — Medication 4: at 17:50

## 2024-12-30 RX ADMIN — LEVOTHYROXINE SODIUM 200 MICROGRAM(S): 175 TABLET ORAL at 06:01

## 2024-12-30 RX ADMIN — Medication 5 MILLIGRAM(S): at 06:01

## 2024-12-30 RX ADMIN — VANCOMYCIN HYDROCHLORIDE 250 MILLIGRAM(S): 5 INJECTION, POWDER, LYOPHILIZED, FOR SOLUTION INTRAVENOUS at 19:28

## 2024-12-30 RX ADMIN — ACETAMINOPHEN 400 MILLIGRAM(S): 80 SOLUTION/ DROPS ORAL at 20:47

## 2024-12-30 RX ADMIN — TRAMADOL HYDROCHLORIDE 25 MILLIGRAM(S): 50 TABLET ORAL at 13:40

## 2024-12-30 RX ADMIN — PANTOPRAZOLE 40 MILLIGRAM(S): 40 TABLET, DELAYED RELEASE ORAL at 06:01

## 2024-12-30 RX ADMIN — CLOTRIMAZOLE 1 APPLICATION(S): 10 CREAM TOPICAL at 17:53

## 2024-12-30 RX ADMIN — APIXABAN 5 MILLIGRAM(S): 5 TABLET, FILM COATED ORAL at 17:52

## 2024-12-30 RX ADMIN — INSULIN GLARGINE-YFGN 10 UNIT(S): 100 INJECTION, SOLUTION SUBCUTANEOUS at 22:30

## 2024-12-30 RX ADMIN — Medication 40 MILLIGRAM(S): at 15:02

## 2024-12-30 RX ADMIN — Medication 5 UNIT(S): at 08:41

## 2024-12-30 RX ADMIN — ACETAMINOPHEN 650 MILLIGRAM(S): 80 SOLUTION/ DROPS ORAL at 19:57

## 2024-12-30 RX ADMIN — ACETAMINOPHEN 1000 MILLIGRAM(S): 80 SOLUTION/ DROPS ORAL at 21:00

## 2024-12-30 RX ADMIN — Medication 1 APPLICATION(S): at 22:24

## 2024-12-30 RX ADMIN — TRAMADOL HYDROCHLORIDE 25 MILLIGRAM(S): 50 TABLET ORAL at 12:56

## 2024-12-30 RX ADMIN — Medication 40 MILLIGRAM(S): at 06:01

## 2024-12-30 RX ADMIN — CLOTRIMAZOLE 1 APPLICATION(S): 10 CREAM TOPICAL at 06:01

## 2024-12-30 RX ADMIN — Medication 5 UNIT(S): at 12:58

## 2024-12-30 RX ADMIN — Medication 2: at 08:41

## 2024-12-30 RX ADMIN — Medication 81 MILLIGRAM(S): at 12:56

## 2024-12-30 RX ADMIN — ACETAMINOPHEN 650 MILLIGRAM(S): 80 SOLUTION/ DROPS ORAL at 19:27

## 2024-12-30 RX ADMIN — ALPRAZOLAM 2 MILLIGRAM(S): 0.25 TABLET ORAL at 22:23

## 2024-12-30 RX ADMIN — APIXABAN 5 MILLIGRAM(S): 5 TABLET, FILM COATED ORAL at 06:01

## 2024-12-30 RX ADMIN — ATORVASTATIN CALCIUM 40 MILLIGRAM(S): 40 TABLET, FILM COATED ORAL at 22:23

## 2024-12-30 NOTE — PHYSICAL THERAPY INITIAL EVALUATION ADULT - ADDITIONAL COMMENTS
Prior to admission patient required assist with functional mobility and ADLs provided by  and daughter. Pt lives in a Ph with her , pt uses RW to transfer to W/C with assist from /daughter and uses W/C as primary mode of mobility.

## 2024-12-30 NOTE — DISCHARGE NOTE PROVIDER - CARE PROVIDERS DIRECT ADDRESSES
,[email or web address]

## 2024-12-30 NOTE — DISCHARGE NOTE PROVIDER - NSDCFUADDINST_GEN_ALL_CORE_FT
Wound Care: Please apply iodosorb, 4x4 gauze, ABD pad and jarvis to R foot wound daily. Please ensure ACE bandages applied to bilateral lower extremities  Weight bearing: Z oh boots to be warn at all times while in bed or resting in chair to offload bilateral extremities.

## 2024-12-30 NOTE — DISCHARGE NOTE PROVIDER - NSDCFUSCHEDAPPT_GEN_ALL_CORE_FT
Cheryl Champion  [facility]  GYNONC [address]  Scheduled Appointment: 02/12/2025    Sudarshan Marin  [facility]  ENDOCRIN [address]  Scheduled Appointment: 03/03/2025     Dominguez Herbert  [facility]  CARDIOLOGY [address]   Scheduled Appointment: 01/08/2025    Scooby Scott  [facility]  WOUNDCARE [address]  Scheduled Appointment: 01/14/2025    Cheryl Champion  [facility]  GYNONC [address]  Scheduled Appointment: 02/12/2025    Sudarshan Marin  [facility]  ENDOCRIN [address]  Scheduled Appointment: 03/03/2025

## 2024-12-30 NOTE — PATIENT PROFILE ADULT - FALL HARM RISK - HARM RISK INTERVENTIONS

## 2024-12-30 NOTE — CONSULT NOTE ADULT - ASSESSMENT
74F presents with R heel DTI  - Patient seen and evaluated  - Afebrile, WBC 7.28, ESR 75, CRP 2.5  - Right foot posterior heel stage 1 deep tissue injury, with overlying intact blister, no purulence, no malodor, no fluctuance, no periwound erythema, no acute signs of infection. Bilateral lower extremity dermatitis with surrounding erythema extending from anterior ankle to inner thighs, serous drainage, no purulence, no malodor. Left foot no open wounds or lesions, no acute signs of infection.  - Bilateral lower extremity xray: no OM, no gas, no fracture or dislocations  - Ordered Z-flows  - Ordered iodosorb  - Recommend continue abx as per ID recs  - ID recs, appreciated  - Recommend dermatology consult  - Strict decubitus precaution with Z-flows to be worn at all times when in bed   - No acute podiatric surgical intervention at this time  - Patient is stable for discharge prom podiatry standpoint pending medical stability  - Wound care and follow up information can be found in discharge provider note   - Discussed with attending   74F presents with R heel DTI  - Patient seen and evaluated  - Afebrile, WBC 7.28, ESR 75, CRP 2.5  - Right foot posterior heel stage 1 deep tissue injury, with overlying intact blister, no purulence, no malodor, no fluctuance, no periwound erythema, no acute signs of infection. Bilateral lower extremity dermatitis with surrounding erythema extending from anterior ankle to inner thighs, serous drainage, no purulence, no malodor. Left foot no open wounds or lesions, no acute signs of infection.  - Bilateral lower extremity xray: no OM, no gas, no fracture or dislocations  - Ordered Z-flows  - Ordered iodosorb  - Recommend continue abx as per ID recs  - ID recs, appreciated  - Recommend dermatology consult  - Strict decubitus precaution with Z-flows to be worn at all times when in bed   - No acute podiatric surgical intervention at this time  - Patient is stable for discharge prom podiatry standpoint pending medical stability  - Wound care and follow up information can be found in discharge provider note   - Please reconsult as needed  - Discussed with attending

## 2024-12-30 NOTE — DISCHARGE NOTE PROVIDER - CARE PROVIDER_API CALL
Dominguez Herbert  Cardiology  [address], [address]  [place] [address]  Phone: [phone]  Fax: [phone]  Follow Up Time: 1 week    Leon Montesinos  Foot and Ankle Surgery  [address], [place] [address]  Phone: [phone]  Fax: [phone]  Follow Up Time: 1 week

## 2024-12-30 NOTE — DISCHARGE NOTE PROVIDER - HOSPITAL COURSE
HPI:  This is a 75 y/o female w/ PMHx recent CVA w/ R-sided weakness, paroxysmal atrial fibrillation on Eliquis, T2DM, HTN, HLD, hypothyroidism 2/2 thyroidectomy, stasis dermatitis, and anxiety who presents for worsening LE swelling and redness. She was admitted to [facility] recently from 11/18-22 after suffering a CVA, has been home for the past few days getting home PT for her residual R hemiparesis. She has been dealing with LE swelling for weeks now, but for the past 3 days her family has noticed that the inner and back areas of her legs have been getting progressively more erythematous with crusting and plaques. 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    Hospital Course: Patient presented to the ED with worsening LE wounds. In the ED, she was afebrile and hemodynamically stable, saturating well on RA. CBC w/ baseline normocytic anemia w/ Hb 9.7. CMP w/ hypokalemia of 3.1. XR of the bilateral LE showing diffuse bilateral soft tissue swelling but no gas, cortical erosion or periosteal formation. Was also Wood's lamp + per ER examination. Was given Unasyn, Clotrimazole, and KCl 40mEq in the ED.  Patient seen and evaluated by Podiatry-> Xray - Negative for OM; no surgical intervention needed. Wound care seen and offered recs for wound dressings. ID evaluated ->- Add vancomycin 1g IV Q12H. Check pre-4th dose trough; Increase cefazolin to 2g IV Q8H; F/u MRSA swab. If positive, d/c cefazolin. If neg, d/c vancomycin; f/u BCx; Cardiac workup. Recommend TTE and proBNP. Echo-> EF 61% No WMA; Bcx-> NGTD. Patient cleared for dc by ID with transition to Oral Doxy 100 mg q12 and Amox 500 mg q8. Pt seen and evaluated by PT-> Recs FRANCES; patient and family wants patient home-> CM noticed and home care put in place. Patient cleared for discharge home with appropriate follow-up. Med rec reviewed with attending.     Important Medication Changes and Reason:  Doxycycline 100 mg q12 through 1/5/2025  Amox 500 mg q8 through 1/5/2025  Active or Pending Issues Requiring Follow-up:  Wound care     Advanced Directives:   [ x] Full code  [ ] DNR  [ ] Hospice    Discharge Diagnoses:  Cellulitis   AF (atrial fibrillation) on Xarelto  Anxiety   Blind right eye   CAD   Depression   Edema of lower extremity   Former smoker   Gastritis   Hiatal hernia   History of TIAs   [place] (hard of hearing)   HTN (hypertension)   Hypercholesterolemia   Hypothyroid   Morbid obesity   Murmur   Neuropathy   OAB (overactive bladder)   Spinal stenosis   Thyroid nodule   Type 2 diabetes mellitus   Uterine cancer   Uterine cyst   Varicose veins.

## 2024-12-30 NOTE — PHYSICAL THERAPY INITIAL EVALUATION ADULT - PERTINENT HX OF CURRENT PROBLEM, REHAB EVAL
73 y/o female w/ PMHx recent CVA w/ R-sided weakness, paroxysmal atrial fibrillation on Eliquis, T2DM, HTN, HLD, hypothyroidism 2/2 thyropidectomy, stasis dermatitis, and anxiety who presents for worsening LE swelling and redness. She was admitted to [facility] recently from 11/18-22 after suffering a CVA, has been home for the past few days getting home PT for her residual R hemiparesis.     Dx 	Stasis dermatitis with concern for bacterial and/or fungal superinfection.-Woods lamp + in ED, s/p Unasyn in ED, c/w Ancef and clotrimazole   	Non-pitting edema, c/w PO Lasix BID, duplex neg for DVT

## 2024-12-30 NOTE — PHARMACOTHERAPY INTERVENTION NOTE - COMMENTS
Medication Reconciliation completed for patient.  These were confirmed with patient's recent discharge from [facility]. Updated medications are reflected in Outpatient Medication Review.     Home Medications:  acetaminophen 325 mg oral tablet: 2 tab(s) orally every 6 hours as needed for  pain  Albuterol (Eqv-ProAir HFA) 90 mcg/inh inhalation aerosol: 2 puff(s) inhaled 2 times a day  ALPRAZolam 2 mg oral tablet: 1 tab(s) orally once a day (at bedtime) as needed for Anxiety/insomnia  amLODIPine 5 mg oral tablet: 1 orally once a day  apixaban 5 mg oral tablet: 1 tab(s) orally every 12 hours  aspirin 81 mg oral delayed release tablet: 1 tab(s) orally once a day  atorvastatin 40 mg oral tablet: 1 tab(s) orally once a day (at bedtime)  budesonide 0.5 mg/2 mL inhalation suspension: 2 milliliter(s) by nebulizer 2 times a day  insulin glargine 100 units/mL subcutaneous solution: 10 unit(s) subcutaneous once a day (at bedtime)  insulin lispro 100 units/mL injectable solution: 5 unit(s) injectable 3 times a day (before meals)  Lasix 40 mg oral tablet: 1 tab(s) orally 2 times a day  levothyroxine 200 mcg (0.2 mg) oral tablet: 1 tab(s) orally once a day  losartan 50 mg oral tablet: 1 tab(s) orally once a day  melatonin 5 mg oral tablet: 1 tab(s) orally once a day (at bedtime)  MiraLax oral powder for reconstitution: 17 gram(s) orally once a day  Pacerone 200 mg oral tablet: 1 tab(s) orally once a day orally  pantoprazole 40 mg oral delayed release tablet: 1 tab(s) orally once a day  senna (sennosides) 8.6 mg oral tablet: 1 tab(s) orally once a day      Madison Marquez, PharmD, BCPS  Clinical Pharmacy Specialist  Available on Teams

## 2024-12-30 NOTE — PHYSICAL THERAPY INITIAL EVALUATION ADULT - MODALITIES TREATMENT COMMENTS
PT woundcare order rec'd, for multilayer wrapping of BLE, legs rec'd undressed. BLE without open wounds, scattered scabbed over wounds, patient/ endorsing areas of previous weeping however currently dry, pulses palpable. BLE cleansed with NS, pat dry, cavilon applied to areas of scabbing, abd pad placed over scabbed areas/areas of previous weeping secured with L[place]ito and BLE wrapped with ACE wrap in figure 8 pattern 50% tension/50%overlay, deffered multilayer at this time as patient never with compression therapy before, will trial ACE wraps and evaluate patients tolerance/response prior to applying multilayer wrapping. PT woundcare to follow up 1-2x per week as needed.

## 2024-12-30 NOTE — CONSULT NOTE ADULT - ASSESSMENT
74-yo F w/ PMH of CVA w/ R hemiparesis, PAF on ELiquis, DM, and stasis dermatitis, presenting for worsening BLE edema and erythema. RLE more swollen and tender to the touch. +erythema to the dorsal aspect of the shin and heel.  VSS and no leukocytosis. Procal neg. No systemic signs. No crepitus. Less likely necrotizing infection.    Given the asymmetric edema a/w tenderness and warmth, concerning for SSTI.  Suspect poor vascular drainage. Suggest cardiac workup. Agreeable to add on SSTI coverage.    #SSTI  #DM  #Stasis ulcer  #Leg pain    Recommendations:  - Add vancomycin 1g IV Q12H. Check pre-4th dose trough  - Increase cefazolin to 2g IV Q8H  - F/u MRSA swab. If positive, d/c cefazolin. If neg, d/c vancomycin.  - F/u BCx  - Cardiac workup. Recommend TTE and proBNP.    Plan discussed with primary team ACP.  Thank you for this consult. I am going off service after today. My colleague will continue to cover.    Tomás Licea MD, PhD  Attending Physician  Division of Infectious Diseases  Department of Medicine    Please contact through [employer].  Office: [phone] (after 5 PM or weekend)

## 2024-12-30 NOTE — DISCHARGE NOTE PROVIDER - NSFOLLOWUPCLINICS_GEN_ALL_ED_FT
[facility]  Dermatology  [address], [address]  [place] [address]  Phone: [phone]  Fax:

## 2024-12-30 NOTE — PATIENT PROFILE ADULT - LOCATION #1
I had a discussion with Aris in the clinic today, unfortunately he did no showed his appointment with us last week. We went over his MRI and clinical findings as well as indications and recommendations for surgical intervention.    Physical exam:  Right Lower Extremity:  Skin grossly intact without erythema, warmth to palpation, fluctuance, or induration.  Dorsalis pedis and posterior tibial pulses +2/4, sensation intact to light touch in the distribution of L2-S1, 5/5 extensor hallucis longus/flexor hallucis longus/tibialis anterior/gastrocsoleus complex. Compartments soft, Francisco's sign absent.  Minimal effusion, Lachman's test positive, no mediolateral joint line pain, Kristy's test negative      Assessment:  Right anterior cruciate ligament tear    Plan:  We will proceed with right knee arthroscopy with bone patellar tendon bone autograft possible allograft anterior cruciate ligament reconstruction. We discussed management of any concomitant pathology at the time of surgery as well.    The documentation recorded by the scribe accurately and completely reflects the service(s) I personally performed and the decisions made by me.        PALAK frye DTI

## 2024-12-30 NOTE — PHYSICAL THERAPY INITIAL EVALUATION ADULT - LEVEL OF INDEPENDENCE: SIT/SUPINE, REHAB EVAL
Expiration Date (Optional): 05/31/2024 minimum assist (75% patients effort)/moderate assist (50% patients effort) Urine Pregnancy Test Result: negative Lot # (Optional): [id number] Performed By: America CORREIA MA Detail Level: None

## 2024-12-30 NOTE — DISCHARGE NOTE PROVIDER - NSDCMRMEDTOKEN_GEN_ALL_CORE_FT
acetaminophen 325 mg oral tablet: 2 tab(s) orally every 6 hours as needed for  pain  Albuterol (Eqv-ProAir HFA) 90 mcg/inh inhalation aerosol: 2 puff(s) inhaled 2 times a day  ALPRAZolam 2 mg oral tablet: 1 tab(s) orally once a day (at bedtime) as needed for Anxiety/insomnia  amLODIPine 5 mg oral tablet: 1 orally once a day  apixaban 5 mg oral tablet: 1 tab(s) orally every 12 hours  aspirin 81 mg oral delayed release tablet: 1 tab(s) orally once a day  atorvastatin 40 mg oral tablet: 1 tab(s) orally once a day (at bedtime)  budesonide 0.5 mg/2 mL inhalation suspension: 2 milliliter(s) by nebulizer 2 times a day  insulin glargine 100 units/mL subcutaneous solution: 10 unit(s) subcutaneous once a day (at bedtime)  insulin lispro 100 units/mL injectable solution: 5 unit(s) injectable 3 times a day (before meals)  Lasix 40 mg oral tablet: 1 tab(s) orally 2 times a day  levothyroxine 200 mcg (0.2 mg) oral tablet: 1 tab(s) orally once a day  losartan 50 mg oral tablet: 1 tab(s) orally once a day  melatonin 5 mg oral tablet: 1 tab(s) orally once a day (at bedtime)  MiraLax oral powder for reconstitution: 17 gram(s) orally once a day  Pacerone 200 mg oral tablet: 1 tab(s) orally once a day orally  pantoprazole 40 mg oral delayed release tablet: 1 tab(s) orally once a day  senna (sennosides) 8.6 mg oral tablet: 1 tab(s) orally once a day   acetaminophen 325 mg oral tablet: 2 tab(s) orally every 6 hours as needed for  pain  Albuterol (Eqv-ProAir HFA) 90 mcg/inh inhalation aerosol: 2 puff(s) inhaled 2 times a day  ALPRAZolam 2 mg oral tablet: 1 tab(s) orally once a day (at bedtime) as needed for Anxiety/insomnia  amLODIPine 5 mg oral tablet: 1 orally once a day  amoxicillin 500 mg oral tablet: 1 tab(s) orally every 8 hours  apixaban 5 mg oral tablet: 1 tab(s) orally every 12 hours  aspirin 81 mg oral delayed release tablet: 1 tab(s) orally once a day  atorvastatin 40 mg oral tablet: 1 tab(s) orally once a day (at bedtime)  budesonide 0.5 mg/2 mL inhalation suspension: 2 milliliter(s) by nebulizer 2 times a day  cadexomer iodine 0.9% topical gel: Apply topically to affected area once a day Right Foot  clobetasol 0.05% topical ointment: Apply topically to affected area 2 times a day LE  clotrimazole 1% topical cream: Apply topically to affected area 2 times a day B/L LE  doxycycline hyclate 100 mg oral tablet: 1 tab(s) orally 2 times a day  insulin glargine 100 units/mL subcutaneous solution: 10 unit(s) subcutaneous once a day (at bedtime)  insulin lispro 100 units/mL injectable solution: 5 unit(s) injectable 3 times a day (before meals)  Lasix 40 mg oral tablet: 1 tab(s) orally 2 times a day  levothyroxine 200 mcg (0.2 mg) oral tablet: 1 tab(s) orally once a day  melatonin 5 mg oral tablet: 1 tab(s) orally once a day (at bedtime)  MiraLax oral powder for reconstitution: 17 gram(s) orally once a day  Multiple Vitamins oral tablet: 1 tab(s) orally once a day  Pacerone 200 mg oral tablet: 1 tab(s) orally once a day orally  pantoprazole 40 mg oral delayed release tablet: 1 tab(s) orally once a day  povidone iodine 10% topical solution: 1 Apply topically to affected area once a day  senna (sennosides) 8.6 mg oral tablet: 1 tab(s) orally once a day

## 2024-12-30 NOTE — DISCHARGE NOTE PROVIDER - NSDCCPCAREPLAN_GEN_ALL_CORE_FT
PRINCIPAL DISCHARGE DIAGNOSIS  Diagnosis: Cellulitis of both lower extremities  Assessment and Plan of Treatment: You presented to the ED with LE cellulitis. You were seen and followed by Podiatry and wound care while in the hosptial. While in the hospital your were treated with IV abx, and being discharge on oral Amoxicillin and Doxycycline through 1/5/2025. Please take all medications as prescribed. Please take full course of abx therapy. Follow-up with your PCP, Podiatry and Woundcare within 1 week of discharge      SECONDARY DISCHARGE DIAGNOSES  Diagnosis: Venous stasis dermatitis  Assessment and Plan of Treatment: You have history of Venous stasis dermatitis. Lower extremity dopplers were done and negative for DVT.  While in the hospital you were seen and evaluated by Wound care. You are being discharged with home care in place for dressing changes. Keep feet elevated while seated and continue to use Z Sushil boots. Follow-up with your PCP, Podiatrist and Wound care as directed

## 2024-12-30 NOTE — PHYSICAL THERAPY INITIAL EVALUATION ADULT - PLANNED THERAPY INTERVENTIONS, PT EVAL
wound management/balance training/bed mobility training/gait training/strengthening/transfer training

## 2024-12-30 NOTE — DISCHARGE NOTE PROVIDER - PROVIDER TOKENS
PROVIDER:[TOKEN:[[id number]],FOLLOWUP:[1 week]],PROVIDER:[TOKEN:[[id number]],FOLLOWUP:[1 week]]

## 2024-12-30 NOTE — DISCHARGE NOTE PROVIDER - NSDCFUADDAPPT_GEN_ALL_CORE_FT
Podiatry Discharge Instructions:  Follow up: Please follow up with Dr. Montesinos within 1 week of discharge from the hospital, please call [phone] for appointment and discuss that you recently were seen in the hospital.  Wound Care: Please apply iodosorb, 4x4 gauze, ABD pad and jarvis to R foot wound daily. Please ensure ACE bandages applied to bilateral lower extremities  Weight bearing: Z oh boots to be warn at all times while in bed or resting in chair to offload bilateral extremities.  Antibiotics: Please continue as instructed. Podiatry Discharge Instructions:  Follow up: Please follow up with Dr. Montesinos within 1 week of discharge from the hospital, please call [phone] for appointment and discuss that you recently were seen in the hospital.  Wound Care: Please apply iodosorb, 4x4 gauze, ABD pad and jarvis to R foot wound daily. Please ensure ACE bandages applied to bilateral lower extremities  Weight bearing: Z oh boots to be warn at all times while in bed or resting in chair to offload bilateral extremities.  Antibiotics: Please continue as instructed.  APPTS ARE READY TO BE MADE: [x] YES    Best Family or Patient Contact (if needed):    Additional Information about above appointments (if needed):    1: Wound care  2: PCP  3: Podiatry    Other comments or requests:    Podiatry Discharge Instructions:  Follow up: Please follow up with Dr. Montesinos within 1 week of discharge from the hospital, please call [phone] for appointment and discuss that you recently were seen in the hospital.  Wound Care: Please apply iodosorb, 4x4 gauze, ABD pad and jarvis to R foot wound daily. Please ensure ACE bandages applied to bilateral lower extremities  Weight bearing: Z oh boots to be warn at all times while in bed or resting in chair to offload bilateral extremities.  Antibiotics: Please continue as instructed.  APPTS ARE READY TO BE MADE: [X] YES    Best Family or Patient Contact (if needed):    Additional Information about above appointments (if needed):    1: Wound care  2: PCP  3: Podiatry  4: Dermatology    Other comments or requests:    Podiatry Discharge Instructions:  Follow up: Please follow up with Dr. Montesinos within 1 week of discharge from the hospital, please call [phone] for appointment and discuss that you recently were seen in the hospital.  Wound Care: Please apply iodosorb, 4x4 gauze, ABD pad and jarvis to R foot wound daily. Please ensure ACE bandages applied to bilateral lower extremities  Weight bearing: Z oh boots to be warn at all times while in bed or resting in chair to offload bilateral extremities.  Antibiotics: Please continue as instructed.  APPTS ARE READY TO BE MADE: [X] YES    Best Family or Patient Contact (if needed):    Additional Information about above appointments (if needed):    1: Wound care  2: PCP  3: Podiatry  4: Dermatology    Other comments or requests:         1/6-Appointment was scheduled by our team on the patient's behalf through the provider's office [phone] - on 1/17/25 10:45 a.m. with Dr. Deysi [facility] [address] [place] [address], [address] [place] [address]    1/6-Prior to outreaching the patient, it was visible that the patient has secured a follow up appointment which was not scheduled by our team on 1/8/25 2:30 p.m. [facility] [address]    1/2/25- Gama# [phone]- PCP Christopher Boudreaux [address] [facility] on 1/06/2025 2:20 p.m.    1/2/25-Patient informed us they already have secured a follow up appointment which was not scheduled by our team, Spoke with patients' daughter Soco, stated has two appointments with Podiatrist one on 1/9/25 and another appointments on 1/14/25

## 2024-12-31 ENCOUNTER — RESULT REVIEW (OUTPATIENT)
Age: 74
End: 2024-12-31

## 2024-12-31 LAB
ALBUMIN SERPL ELPH-MCNC: 3.4 G/DL — SIGNIFICANT CHANGE UP (ref 3.3–5)
ALP SERPL-CCNC: 91 U/L — SIGNIFICANT CHANGE UP (ref 40–120)
ALT FLD-CCNC: 9 U/L — LOW (ref 10–45)
ANION GAP SERPL CALC-SCNC: 14 MMOL/L — SIGNIFICANT CHANGE UP (ref 5–17)
AST SERPL-CCNC: 8 U/L — LOW (ref 10–40)
BILIRUB SERPL-MCNC: 0.7 MG/DL — SIGNIFICANT CHANGE UP (ref 0.2–1.2)
BUN SERPL-MCNC: 17 MG/DL — SIGNIFICANT CHANGE UP (ref 7–23)
CALCIUM SERPL-MCNC: 8.9 MG/DL — SIGNIFICANT CHANGE UP (ref 8.4–10.5)
CHLORIDE SERPL-SCNC: 100 MMOL/L — SIGNIFICANT CHANGE UP (ref 96–108)
CO2 SERPL-SCNC: 28 MMOL/L — SIGNIFICANT CHANGE UP (ref 22–31)
CREAT SERPL-MCNC: 1.13 MG/DL — SIGNIFICANT CHANGE UP (ref 0.5–1.3)
EGFR: 50 ML/MIN/1.73M2 — LOW
GLUCOSE BLDC GLUCOMTR-MCNC: 194 MG/DL — HIGH (ref 70–99)
GLUCOSE BLDC GLUCOMTR-MCNC: 196 MG/DL — HIGH (ref 70–99)
GLUCOSE BLDC GLUCOMTR-MCNC: 242 MG/DL — HIGH (ref 70–99)
GLUCOSE BLDC GLUCOMTR-MCNC: 261 MG/DL — HIGH (ref 70–99)
GLUCOSE SERPL-MCNC: 184 MG/DL — HIGH (ref 70–99)
HCT VFR BLD CALC: 31.2 % — LOW (ref 34.5–45)
HGB BLD-MCNC: 9.7 G/DL — LOW (ref 11.5–15.5)
MCHC RBC-ENTMCNC: 27.6 PG — SIGNIFICANT CHANGE UP (ref 27–34)
MCHC RBC-ENTMCNC: 31.1 G/DL — LOW (ref 32–36)
MCV RBC AUTO: 88.6 FL — SIGNIFICANT CHANGE UP (ref 80–100)
MRSA PCR RESULT.: DETECTED
NRBC # BLD: 0 /100 WBCS — SIGNIFICANT CHANGE UP (ref 0–0)
NT-PROBNP SERPL-SCNC: 329 PG/ML — HIGH (ref 0–300)
PLATELET # BLD AUTO: 229 K/UL — SIGNIFICANT CHANGE UP (ref 150–400)
POTASSIUM SERPL-MCNC: 3.1 MMOL/L — LOW (ref 3.5–5.3)
POTASSIUM SERPL-SCNC: 3.1 MMOL/L — LOW (ref 3.5–5.3)
PROT SERPL-MCNC: 6.4 G/DL — SIGNIFICANT CHANGE UP (ref 6–8.3)
RBC # BLD: 3.52 M/UL — LOW (ref 3.8–5.2)
RBC # FLD: 13.8 % — SIGNIFICANT CHANGE UP (ref 10.3–14.5)
S AUREUS DNA NOSE QL NAA+PROBE: DETECTED
SODIUM SERPL-SCNC: 142 MMOL/L — SIGNIFICANT CHANGE UP (ref 135–145)
WBC # BLD: 7.3 K/UL — SIGNIFICANT CHANGE UP (ref 3.8–10.5)
WBC # FLD AUTO: 7.3 K/UL — SIGNIFICANT CHANGE UP (ref 3.8–10.5)

## 2024-12-31 PROCEDURE — 99232 SBSQ HOSP IP/OBS MODERATE 35: CPT

## 2024-12-31 PROCEDURE — 99233 SBSQ HOSP IP/OBS HIGH 50: CPT

## 2024-12-31 PROCEDURE — 93306 TTE W/DOPPLER COMPLETE: CPT | Mod: 26

## 2024-12-31 PROCEDURE — 99221 1ST HOSP IP/OBS SF/LOW 40: CPT

## 2024-12-31 RX ORDER — POVIDONE IODINE USP, 10% W/W 10 MG/ML
1 SWAB TOPICAL DAILY
Refills: 0 | Status: DISCONTINUED | OUTPATIENT
Start: 2024-12-31 | End: 2025-01-01

## 2024-12-31 RX ORDER — CHLORHEXIDINE GLUCONATE 1.2 MG/ML
1 RINSE ORAL
Refills: 0 | Status: DISCONTINUED | OUTPATIENT
Start: 2024-12-31 | End: 2025-01-01

## 2024-12-31 RX ORDER — INSULIN LISPRO 100/ML
VIAL (ML) SUBCUTANEOUS AT BEDTIME
Refills: 0 | Status: DISCONTINUED | OUTPATIENT
Start: 2024-12-31 | End: 2025-01-01

## 2024-12-31 RX ORDER — CLOBETASOL PROPIONATE 0.5 MG/G
1 AEROSOL, FOAM TOPICAL
Refills: 0 | Status: DISCONTINUED | OUTPATIENT
Start: 2024-12-31 | End: 2025-01-01

## 2024-12-31 RX ORDER — MUPIROCIN 2 %
1 OINTMENT (GRAM) TOPICAL
Refills: 0 | Status: DISCONTINUED | OUTPATIENT
Start: 2024-12-31 | End: 2025-01-01

## 2024-12-31 RX ORDER — AMMONIUM LACTATE 12 %
1 LOTION (GRAM) TOPICAL EVERY 12 HOURS
Refills: 0 | Status: DISCONTINUED | OUTPATIENT
Start: 2024-12-31 | End: 2024-12-31

## 2024-12-31 RX ORDER — POTASSIUM CHLORIDE 600 MG/1
40 TABLET, FILM COATED, EXTENDED RELEASE ORAL ONCE
Refills: 0 | Status: COMPLETED | OUTPATIENT
Start: 2024-12-31 | End: 2024-12-31

## 2024-12-31 RX ORDER — B COMPLEX, C NO.20/FOLIC ACID 1 MG
1 CAPSULE ORAL DAILY
Refills: 0 | Status: DISCONTINUED | OUTPATIENT
Start: 2024-12-31 | End: 2025-01-01

## 2024-12-31 RX ADMIN — LEVOTHYROXINE SODIUM 200 MICROGRAM(S): 175 TABLET ORAL at 05:09

## 2024-12-31 RX ADMIN — ATORVASTATIN CALCIUM 40 MILLIGRAM(S): 40 TABLET, FILM COATED ORAL at 22:13

## 2024-12-31 RX ADMIN — ALPRAZOLAM 2 MILLIGRAM(S): 0.25 TABLET ORAL at 23:56

## 2024-12-31 RX ADMIN — POVIDONE IODINE USP, 10% W/W 1 APPLICATION(S): 10 SWAB TOPICAL at 11:21

## 2024-12-31 RX ADMIN — Medication 81 MILLIGRAM(S): at 11:22

## 2024-12-31 RX ADMIN — POTASSIUM CHLORIDE 40 MILLIEQUIVALENT(S): 600 TABLET, FILM COATED, EXTENDED RELEASE ORAL at 11:20

## 2024-12-31 RX ADMIN — Medication 40 MILLIGRAM(S): at 05:10

## 2024-12-31 RX ADMIN — Medication 5 UNIT(S): at 17:29

## 2024-12-31 RX ADMIN — Medication 1 APPLICATION(S): at 11:22

## 2024-12-31 RX ADMIN — CLOTRIMAZOLE 1 APPLICATION(S): 10 CREAM TOPICAL at 05:11

## 2024-12-31 RX ADMIN — Medication 2: at 08:48

## 2024-12-31 RX ADMIN — APIXABAN 5 MILLIGRAM(S): 5 TABLET, FILM COATED ORAL at 17:12

## 2024-12-31 RX ADMIN — PANTOPRAZOLE 40 MILLIGRAM(S): 40 TABLET, DELAYED RELEASE ORAL at 05:10

## 2024-12-31 RX ADMIN — VANCOMYCIN HYDROCHLORIDE 250 MILLIGRAM(S): 5 INJECTION, POWDER, LYOPHILIZED, FOR SOLUTION INTRAVENOUS at 17:12

## 2024-12-31 RX ADMIN — Medication 2: at 12:50

## 2024-12-31 RX ADMIN — AMIODARONE HYDROCHLORIDE 200 MILLIGRAM(S): 200 TABLET ORAL at 05:10

## 2024-12-31 RX ADMIN — Medication 1 APPLICATION(S): at 17:13

## 2024-12-31 RX ADMIN — VANCOMYCIN HYDROCHLORIDE 250 MILLIGRAM(S): 5 INJECTION, POWDER, LYOPHILIZED, FOR SOLUTION INTRAVENOUS at 05:09

## 2024-12-31 RX ADMIN — APIXABAN 5 MILLIGRAM(S): 5 TABLET, FILM COATED ORAL at 05:10

## 2024-12-31 RX ADMIN — ACETAMINOPHEN 650 MILLIGRAM(S): 80 SOLUTION/ DROPS ORAL at 17:54

## 2024-12-31 RX ADMIN — Medication 40 MILLIGRAM(S): at 13:03

## 2024-12-31 RX ADMIN — ACETAMINOPHEN 650 MILLIGRAM(S): 80 SOLUTION/ DROPS ORAL at 17:28

## 2024-12-31 RX ADMIN — Medication 100 MILLIGRAM(S): at 02:49

## 2024-12-31 RX ADMIN — Medication 1: at 22:13

## 2024-12-31 RX ADMIN — INSULIN GLARGINE-YFGN 10 UNIT(S): 100 INJECTION, SOLUTION SUBCUTANEOUS at 22:14

## 2024-12-31 RX ADMIN — Medication 5 UNIT(S): at 08:48

## 2024-12-31 RX ADMIN — CLOTRIMAZOLE 1 APPLICATION(S): 10 CREAM TOPICAL at 17:14

## 2024-12-31 RX ADMIN — Medication 4: at 17:29

## 2024-12-31 RX ADMIN — Medication 5 MILLIGRAM(S): at 05:10

## 2024-12-31 NOTE — DIETITIAN INITIAL EVALUATION ADULT - PROBLEM SELECTOR PLAN 2
- Has worsening non-pitting LE edema w/ erythema, crusting and plaques  - Wound care consult  - C/w PO lasix 40mg BID  - Dopplers negative for LE DVT

## 2024-12-31 NOTE — DIETITIAN INITIAL EVALUATION ADULT - NSICDXPASTMEDICALHX_GEN_ALL_CORE_FT
PAST MEDICAL HISTORY:  AF (atrial fibrillation) on Xarelto    Anxiety     Blind right eye     CAD (coronary artery disease) minimal CAD per cath result in 2014    Constipation     Depression     Edema of lower extremity     Former smoker     Gastritis     H/O Helicobacter infection     Hiatal hernia     History of TIAs     Nikolai (hard of hearing)     HTN (hypertension)     Hypercholesterolemia     Hypothyroid     Incontinence     Loss of vision right eye    Morbid obesity     Murmur     Neuropathy     OAB (overactive bladder)     Palpitations     Risk factors for obstructive sleep apnea     Spinal stenosis     Thickened endometrium     Thyroid nodule     Type 2 diabetes mellitus     Uterine cancer     Uterine cyst     Varicose veins

## 2024-12-31 NOTE — DIETITIAN INITIAL EVALUATION ADULT - ORAL INTAKE PTA/DIET HISTORY
Patient visited. Per Patient, denies adhering to a therapeutic diet and reports fair appetite prior to admission. Patient reports having 2 meals daily. NFKA or intolerances reported. Patient denies micronutrient supplementation prior to admission.

## 2024-12-31 NOTE — DIETITIAN INITIAL EVALUATION ADULT - ETIOLOGY
related to limited adherence to nutrition-related recommendations   related to increased physiological demand for nutrients

## 2024-12-31 NOTE — DIETITIAN INITIAL EVALUATION ADULT - PROBLEM SELECTOR PLAN 5
- C/w amlodipine 5mg daily   - Losartan and metoprolol stopped per patient due to BPs being unable to tolerate

## 2024-12-31 NOTE — DIETITIAN INITIAL EVALUATION ADULT - PROBLEM SELECTOR PLAN 4
- C/w Eliquis 5mg BID  - C/w amiodarone 200mg daily  - Was on metoprolol succinate 25mg daily, was stopped at discharge from her hospitalization last month at [place]

## 2024-12-31 NOTE — DIETITIAN INITIAL EVALUATION ADULT - NSFNSADHERENCEPTAFT_GEN_A_CORE
Hx type two diabetes noted. Patient reports monitoring her carbohydrate intake on occasion, and checks her fingersticks routinely. Per Patient, her fingersticks typically range between ~124-199mg/dL.

## 2024-12-31 NOTE — DIETITIAN INITIAL EVALUATION ADULT - PROBLEM SELECTOR PLAN 7
- Was discharged from [facility] w/ modification of insulin regimen to Lantus 10u QHS and Lispro 5u TID, continue while admitted  - Monitor sugars and adjust insulin as needed

## 2024-12-31 NOTE — CHART NOTE - NSCHARTNOTEFT_GEN_A_CORE
Dermatology Chart Note  73 y/o female w/ PMHx recent CVA w/ R-sided weakness, paroxysmal atrial fibrillation on Eliquis, T2DM, HTN, HLD, hypothyroidism 2/2 thyropidectomy, stasis dermatitis, and anxiety who presents for worsening LE swelling and redness. She was admitted to [facility] recently from 11/18-22 after suffering a CVA, has been home for the past few days getting home PT for her residual R hemiparesis. She has been dealing with LE swelling for weeks now, but for the past 3 days her family has noticed that the inner and back areas of her legs have been getting progressively more erythematous with crusting and plaques.   Dermatology consulted for scaly rash on legs. Per patient, her legs have been swollen and scaly for >20 years. She reports that they only recently became red but that has improved since hospitalization with the antibiotics.       PHYSICAL EXAM:  skin exam notable for: scaly pink plaques on the bilateral lower legs with pitting edema, not tender  intact DP pulses  patient declined exam of R heel which remained bandaged      ASSESSMENT/PLAN:  # Stasis dermatitis    - start clobetasol ointment BID to affected areas on legs x 2 weeks  - defer on ulcer treatment to podiatry and wound care  - patient may f/u outpatient with dermatology     Discussed with primary team.  Discussed with attending, Dr. Maksim Cooper MD   Resident Physician, PGY-3  [facility] Dermatology  Office: [phone]  Pager: [phone]   **Please page with 10-DIGIT callback # for further related questions.**

## 2024-12-31 NOTE — CONSULT NOTE ADULT - ASSESSMENT
A/P: 75 y/o female w/ PMHx recent CVA w/ R-sided weakness, paroxysmal atrial fibrillation on Eliquis, T2DM, HTN, HLD, hypothyroidism 2/2 thyropidectomy, stasis dermatitis, and anxiety who presents for worsening LE swelling and redness. She was admitted to [facility] recently from 11/18-22 after suffering a CVA, has been home for the past few days getting home PT for her residual R hemiparesis. She has been dealing with LE swelling for weeks now, but for the past 3 days her family has noticed that the inner and back areas of her legs have been getting progressively more erythematous with crusting and plaques. 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    Wound Consult requested to assist w/ management of  Rt heel DTPI  BLE woods lamp (+) examination in ED    Recommendations:   Consider DPM paint with betadine daily prior to DPM  Consider Dermatology    BLE elevation  Consider GEOVANY/PVR  Moisturize intact skin w/ SWEEN cream BID  Nutrition Consult for optimization in pt w/  Increased nutritional needs            encourage high quality protein, sanchez/ prosource, MVI & Vit C to promote wound healing  Continue turning and positioning w/ offloading assistive devices as per protocol  Buttocks/ Sacrum Michael BID and prn soiling prophylactic treatment       Continue w/ attends under pads and Pericare w/  primafit care as per protocol  Waffle Cushion to chair when oob to chair  Continue w/ low air loss pressure redistribution bed surface  Care as per medicine, will follow w/ you/ remain available as requested  Upon discharge f/u as outpatient at [facility] [address] [facility] [phone]  Seen and D/w team & RN  Thank you for this consult,  Leida Brunson, RICHARD-BC, [facility]  [phone]  Nights/ Weekends/ Holidays please call:  General Surgery Consult pager ([phone]) for emergencies  Wound PT for multilayer leg wrapping or VAC issues (x [phone])

## 2024-12-31 NOTE — DIETITIAN INITIAL EVALUATION ADULT - REASON
Patient denies recent unintentional weight loss. No overt signs of muscle mass depletion or subcutaneous fat loss observed at this time.

## 2024-12-31 NOTE — DIETITIAN INITIAL EVALUATION ADULT - PHYSCIAL ASSESSMENT
- Per [facility]: 248.6lbs (5/22/2024), 231.6lbs (6/28/2024), 199.5lbs (12/29/2024). 19.7% weight loss x 7 months indicated.    - Per electronic medical record, current dosing weight 199lbs (12/29). Patient confirmed dosing weight to be accurate, and reports intentional weight loss over the last 7-8 months prior to admission. Reports usual body weight to be 230lbs.

## 2024-12-31 NOTE — DIETITIAN INITIAL EVALUATION ADULT - OTHER INFO
Pertinent History:   Per H&P: Hx HTN, T2DM    Pertinent Nutrition Related Labs:  - POCT  (H), Glucose 184 (H). Hyperglycemia noted during admission. HbA1c 8.7% (12/30), indicating poor glycemic control  - K 3.1 (L). Hypokalemia noted during admission  - GFR 50 (L)  -  (H) 12/31    Pertinent Medications:   - Antibiotics in use  - Insulin (ADMELOG)  - Diuretics in use, weight fluctuations to be expected

## 2024-12-31 NOTE — DIETITIAN INITIAL EVALUATION ADULT - LITERATURE/VIDEOS GIVEN
Patient becoming visibly upset during visit. Unable to provide diet education at this time. RD remains available should diet education become indicated.

## 2024-12-31 NOTE — DIETITIAN INITIAL EVALUATION ADULT - REASON INDICATOR FOR ASSESSMENT
Dietitian consult received for: Pressure injury stage 2 or >   Chart reviewed, events noted   Information obtained from: electronic medical record, Patient

## 2024-12-31 NOTE — DIETITIAN INITIAL EVALUATION ADULT - PROBLEM SELECTOR PLAN 1
- RLE with greater swelling and erythema than LLE  - S/p 1 dose Unasyn 3g in ED  - Start Ancef 1g TID  - Can continue clotrimazole cream 1% BID to both LE for now given Woods Lamp + exam documented in ED  - F/u blood cultures. MRSA/MSSA PCR

## 2024-12-31 NOTE — DIETITIAN INITIAL EVALUATION ADULT - ADD RECOMMEND
1. Continue Consistent Carbohydrate Diet   2. Recommend trial Ensure Max once daily to promote PO intake   3. Recommend daily Multivitamin and Ascorbic Acid to promote wound healing   4. Replete electrolytes prn per medical team discretion   5. Monitor routine weights, nutrition related labs, fingersticks, PO intake and tolerance, oral nutrition supplement compliance, and skin integrity

## 2024-12-31 NOTE — DIETITIAN INITIAL EVALUATION ADULT - PROBLEM SELECTOR PLAN 3
- Had CVA few weeks ago - MR at [facility] showed acute to early subacute infarction involves the left corona radiata/basal ganglia  - Now with residual R-sided weakness, mostly bedbound, getting home PT  - C/w ASA 81mg daily  - Fall precautions  - PT while admitted

## 2024-12-31 NOTE — DIETITIAN INITIAL EVALUATION ADULT - PERTINENT LABORATORY DATA
12-31    142  |  100  |  17  ----------------------------<  184[H]  3.1[L]   |  28  |  1.13    Ca    8.9      31 Dec 2024 07:11    TPro  6.4  /  Alb  3.4  /  TBili  0.7  /  DBili  x   /  AST  8[L]  /  ALT  9[L]  /  AlkPhos  91  12-31  POCT Blood Glucose.: 196 mg/dL (12-31-24 @ 08:36)  A1C with Estimated Average Glucose Result: 8.7 % (12-30-24 @ 08:42)  A1C with Estimated Average Glucose Result: 9.8 % (03-25-24 @ 07:33)

## 2024-12-31 NOTE — DIETITIAN INITIAL EVALUATION ADULT - PERTINENT MEDS FT
MEDICATIONS  (STANDING):  aMIOdarone    Tablet 200 milliGRAM(s) Oral daily  amLODIPine   Tablet 5 milliGRAM(s) Oral daily  apixaban 5 milliGRAM(s) Oral every 12 hours  aspirin enteric coated 81 milliGRAM(s) Oral daily  atorvastatin 40 milliGRAM(s) Oral at bedtime  cadexomer iodine 0.9% Gel 1 Application(s) Topical daily  clotrimazole 1% Cream 1 Application(s) Topical every 12 hours  dextrose 5%. 1000 milliLiter(s) (50 mL/Hr) IV Continuous <Continuous>  dextrose 5%. 1000 milliLiter(s) (100 mL/Hr) IV Continuous <Continuous>  dextrose 50% Injectable 25 Gram(s) IV Push once  dextrose 50% Injectable 12.5 Gram(s) IV Push once  dextrose 50% Injectable 25 Gram(s) IV Push once  furosemide    Tablet 40 milliGRAM(s) Oral two times a day  glucagon  Injectable 1 milliGRAM(s) IntraMuscular once  insulin glargine Injectable (LANTUS) 10 Unit(s) SubCutaneous at bedtime  insulin lispro (ADMELOG) corrective regimen sliding scale   SubCutaneous three times a day before meals  insulin lispro Injectable (ADMELOG) 5 Unit(s) SubCutaneous three times a day before meals  levothyroxine 200 MICROGram(s) Oral daily  pantoprazole    Tablet 40 milliGRAM(s) Oral before breakfast  povidone iodine 10% Solution 1 Application(s) Topical daily  vancomycin  IVPB      vancomycin  IVPB 1000 milliGRAM(s) IV Intermittent every 12 hours    MEDICATIONS  (PRN):  acetaminophen     Tablet .. 650 milliGRAM(s) Oral every 6 hours PRN Temp greater or equal to 38C (100.4F), Mild Pain (1 - 3)  ALPRAZolam 2 milliGRAM(s) Oral at bedtime PRN Anxiety/insomnia  dextrose Oral Gel 15 Gram(s) Oral once PRN Blood Glucose LESS THAN 70 milliGRAM(s)/deciliter  melatonin 3 milliGRAM(s) Oral at bedtime PRN Insomnia

## 2024-12-31 NOTE — DIETITIAN INITIAL EVALUATION ADULT - ENERGY INTAKE
No available documented PO intake per nursing flowsheets. Patient reports decreased appetite during admission./Poor (<50%)

## 2025-01-01 ENCOUNTER — TRANSCRIPTION ENCOUNTER (OUTPATIENT)
Age: 75
End: 2025-01-01

## 2025-01-01 VITALS
OXYGEN SATURATION: 93 % | DIASTOLIC BLOOD PRESSURE: 75 MMHG | SYSTOLIC BLOOD PRESSURE: 132 MMHG | TEMPERATURE: 98 F | HEART RATE: 63 BPM | RESPIRATION RATE: 17 BRPM

## 2025-01-01 LAB
ANION GAP SERPL CALC-SCNC: 14 MMOL/L — SIGNIFICANT CHANGE UP (ref 5–17)
BUN SERPL-MCNC: 20 MG/DL — SIGNIFICANT CHANGE UP (ref 7–23)
CALCIUM SERPL-MCNC: 8.9 MG/DL — SIGNIFICANT CHANGE UP (ref 8.4–10.5)
CHLORIDE SERPL-SCNC: 99 MMOL/L — SIGNIFICANT CHANGE UP (ref 96–108)
CO2 SERPL-SCNC: 27 MMOL/L — SIGNIFICANT CHANGE UP (ref 22–31)
CREAT SERPL-MCNC: 1.23 MG/DL — SIGNIFICANT CHANGE UP (ref 0.5–1.3)
EGFR: 46 ML/MIN/1.73M2 — LOW
GLUCOSE BLDC GLUCOMTR-MCNC: 227 MG/DL — HIGH (ref 70–99)
GLUCOSE BLDC GLUCOMTR-MCNC: 249 MG/DL — HIGH (ref 70–99)
GLUCOSE SERPL-MCNC: 246 MG/DL — HIGH (ref 70–99)
HCT VFR BLD CALC: 31.6 % — LOW (ref 34.5–45)
HGB BLD-MCNC: 9.9 G/DL — LOW (ref 11.5–15.5)
MCHC RBC-ENTMCNC: 27.6 PG — SIGNIFICANT CHANGE UP (ref 27–34)
MCHC RBC-ENTMCNC: 31.3 G/DL — LOW (ref 32–36)
MCV RBC AUTO: 88 FL — SIGNIFICANT CHANGE UP (ref 80–100)
NRBC # BLD: 0 /100 WBCS — SIGNIFICANT CHANGE UP (ref 0–0)
PLATELET # BLD AUTO: 254 K/UL — SIGNIFICANT CHANGE UP (ref 150–400)
POTASSIUM SERPL-MCNC: 3.6 MMOL/L — SIGNIFICANT CHANGE UP (ref 3.5–5.3)
POTASSIUM SERPL-SCNC: 3.6 MMOL/L — SIGNIFICANT CHANGE UP (ref 3.5–5.3)
RBC # BLD: 3.59 M/UL — LOW (ref 3.8–5.2)
RBC # FLD: 13.6 % — SIGNIFICANT CHANGE UP (ref 10.3–14.5)
SODIUM SERPL-SCNC: 140 MMOL/L — SIGNIFICANT CHANGE UP (ref 135–145)
VANCOMYCIN TROUGH SERPL-MCNC: 14.9 UG/ML — SIGNIFICANT CHANGE UP (ref 10–20)
WBC # BLD: 7.39 K/UL — SIGNIFICANT CHANGE UP (ref 3.8–10.5)
WBC # FLD AUTO: 7.39 K/UL — SIGNIFICANT CHANGE UP (ref 3.8–10.5)

## 2025-01-01 PROCEDURE — 99239 HOSP IP/OBS DSCHRG MGMT >30: CPT

## 2025-01-01 PROCEDURE — 97162 PT EVAL MOD COMPLEX 30 MIN: CPT

## 2025-01-01 PROCEDURE — 99222 1ST HOSP IP/OBS MODERATE 55: CPT

## 2025-01-01 PROCEDURE — 80053 COMPREHEN METABOLIC PANEL: CPT

## 2025-01-01 PROCEDURE — 87641 MR-STAPH DNA AMP PROBE: CPT

## 2025-01-01 PROCEDURE — 85027 COMPLETE CBC AUTOMATED: CPT

## 2025-01-01 PROCEDURE — 93005 ELECTROCARDIOGRAM TRACING: CPT

## 2025-01-01 PROCEDURE — 73590 X-RAY EXAM OF LOWER LEG: CPT

## 2025-01-01 PROCEDURE — 83880 ASSAY OF NATRIURETIC PEPTIDE: CPT

## 2025-01-01 PROCEDURE — 93970 EXTREMITY STUDY: CPT

## 2025-01-01 PROCEDURE — 73552 X-RAY EXAM OF FEMUR 2/>: CPT

## 2025-01-01 PROCEDURE — 87040 BLOOD CULTURE FOR BACTERIA: CPT

## 2025-01-01 PROCEDURE — 36415 COLL VENOUS BLD VENIPUNCTURE: CPT

## 2025-01-01 PROCEDURE — 83036 HEMOGLOBIN GLYCOSYLATED A1C: CPT

## 2025-01-01 PROCEDURE — 80202 ASSAY OF VANCOMYCIN: CPT

## 2025-01-01 PROCEDURE — 82550 ASSAY OF CK (CPK): CPT

## 2025-01-01 PROCEDURE — 85025 COMPLETE CBC W/AUTO DIFF WBC: CPT

## 2025-01-01 PROCEDURE — 85652 RBC SED RATE AUTOMATED: CPT

## 2025-01-01 PROCEDURE — 84484 ASSAY OF TROPONIN QUANT: CPT

## 2025-01-01 PROCEDURE — 93306 TTE W/DOPPLER COMPLETE: CPT

## 2025-01-01 PROCEDURE — 87640 STAPH A DNA AMP PROBE: CPT

## 2025-01-01 PROCEDURE — 86140 C-REACTIVE PROTEIN: CPT

## 2025-01-01 PROCEDURE — 84145 PROCALCITONIN (PCT): CPT

## 2025-01-01 PROCEDURE — 82962 GLUCOSE BLOOD TEST: CPT

## 2025-01-01 PROCEDURE — 99285 EMERGENCY DEPT VISIT HI MDM: CPT

## 2025-01-01 PROCEDURE — 80048 BASIC METABOLIC PNL TOTAL CA: CPT

## 2025-01-01 RX ORDER — POVIDONE IODINE USP, 10% W/W 10 MG/ML
1 SWAB TOPICAL
Qty: 0 | Refills: 0 | DISCHARGE
Start: 2025-01-01

## 2025-01-01 RX ORDER — B COMPLEX, C NO.20/FOLIC ACID 1 MG
1 CAPSULE ORAL
Qty: 0 | Refills: 0 | DISCHARGE
Start: 2025-01-01

## 2025-01-01 RX ORDER — AMOXICILLIN 500 MG
1 CAPSULE ORAL
Qty: 15 | Refills: 0
Start: 2025-01-01 | End: 2025-01-05

## 2025-01-01 RX ORDER — CLOTRIMAZOLE 10 MG/G
1 CREAM TOPICAL
Qty: 2 | Refills: 1
Start: 2025-01-01 | End: 2025-01-28

## 2025-01-01 RX ORDER — DOXYCYCLINE MONOHYDRATE 100 MG
1 TABLET ORAL
Qty: 10 | Refills: 0
Start: 2025-01-01 | End: 2025-01-05

## 2025-01-01 RX ORDER — LOSARTAN POTASSIUM 100 MG/1
1 TABLET, FILM COATED ORAL
Refills: 0 | DISCHARGE

## 2025-01-01 RX ORDER — CLOBETASOL PROPIONATE 0.5 MG/G
1 AEROSOL, FOAM TOPICAL
Qty: 2 | Refills: 1
Start: 2025-01-01 | End: 2025-01-28

## 2025-01-01 RX ORDER — CADEXOMER IODINE 0.9 %
1 GEL (GRAM) TOPICAL
Qty: 2 | Refills: 1
Start: 2025-01-01 | End: 2025-01-28

## 2025-01-01 RX ADMIN — Medication 5 UNIT(S): at 12:23

## 2025-01-01 RX ADMIN — Medication 5 UNIT(S): at 08:43

## 2025-01-01 RX ADMIN — Medication 81 MILLIGRAM(S): at 11:22

## 2025-01-01 RX ADMIN — Medication 1 APPLICATION(S): at 06:04

## 2025-01-01 RX ADMIN — Medication 1 TABLET(S): at 11:22

## 2025-01-01 RX ADMIN — AMIODARONE HYDROCHLORIDE 200 MILLIGRAM(S): 200 TABLET ORAL at 06:02

## 2025-01-01 RX ADMIN — PANTOPRAZOLE 40 MILLIGRAM(S): 40 TABLET, DELAYED RELEASE ORAL at 06:03

## 2025-01-01 RX ADMIN — POVIDONE IODINE USP, 10% W/W 1 APPLICATION(S): 10 SWAB TOPICAL at 11:21

## 2025-01-01 RX ADMIN — Medication 4: at 12:23

## 2025-01-01 RX ADMIN — CHLORHEXIDINE GLUCONATE 1 APPLICATION(S): 1.2 RINSE ORAL at 06:03

## 2025-01-01 RX ADMIN — Medication 5 MILLIGRAM(S): at 06:03

## 2025-01-01 RX ADMIN — Medication 4: at 08:42

## 2025-01-01 RX ADMIN — Medication 40 MILLIGRAM(S): at 06:03

## 2025-01-01 RX ADMIN — APIXABAN 5 MILLIGRAM(S): 5 TABLET, FILM COATED ORAL at 06:03

## 2025-01-01 RX ADMIN — CLOTRIMAZOLE 1 APPLICATION(S): 10 CREAM TOPICAL at 06:04

## 2025-01-01 RX ADMIN — CLOBETASOL PROPIONATE 1 APPLICATION(S): 0.5 AEROSOL, FOAM TOPICAL at 06:03

## 2025-01-01 RX ADMIN — VANCOMYCIN HYDROCHLORIDE 250 MILLIGRAM(S): 5 INJECTION, POWDER, LYOPHILIZED, FOR SOLUTION INTRAVENOUS at 06:37

## 2025-01-01 RX ADMIN — Medication 1 APPLICATION(S): at 11:22

## 2025-01-01 RX ADMIN — LEVOTHYROXINE SODIUM 200 MICROGRAM(S): 175 TABLET ORAL at 06:03

## 2025-01-01 NOTE — PROGRESS NOTE ADULT - PROBLEM SELECTOR PLAN 1
- RLE with greater swelling and erythema than LLE  - S/p 1 dose Unasyn 3g in ED  - c/w vanco for now. -F/u MRSA - positive. -d/w ID, can go to oral options for DCP.   - Can continue clotrimazole cream 1% BID to both LE for now given Woods Lamp + exam documented in ED  - F/u blood cultures. MRSA/MSSA PCR positive.   -ID and pods appreciated. derm and wound care appreciated.   -Heel offloaders.   -Tramadol x 1 for pain. pain better today.
- RLE with greater swelling and erythema than LLE  - S/p 1 dose Unasyn 3g in ED  - c/w Ancef now 2g TID per ID and added vanco for now. -F/u MRSA.   - Can continue clotrimazole cream 1% BID to both LE for now given Woods Lamp + exam documented in ED  - F/u blood cultures. MRSA/MSSA PCR  -ID and pods appreciated.   -Heel offloaders.   -Tramadol x 1 for pain.
- RLE with greater swelling and erythema than LLE  - S/p 1 dose Unasyn 3g in ED  - c/w vanco for now. -F/u MRSA - positive. -d/w ID, can go to oral options for DCP. -amoxicillin/doxy through 1/5 per id appreciated.   - Can continue clotrimazole cream 1% BID to both LE for now given Woods Lamp + exam documented in ED  - F/u blood cultures. MRSA/MSSA PCR positive.   -ID and pods appreciated. derm and wound care appreciated. -clobetasol per derm.   -Heel offloaders.   -pain control prn.

## 2025-01-01 NOTE — CONSULT NOTE ADULT - SUBJECTIVE AND OBJECTIVE BOX
Patient is a 74y old  Female who presents with a chief complaint of Stasis dermatitis w/ possible bacterial superinfection (30 Dec 2024 15:37)      HPI:  This is a 75 y/o female w/ PMHx recent CVA w/ R-sided weakness, paroxysmal atrial fibrillation on Eliquis, T2DM, HTN, HLD, hypothyroidism 2/2 thyropidectomy, stasis dermatitis, and anxiety who presents for worsening LE swelling and redness. She was admitted to [facility] recently from 11/18-22 after suffering a CVA, has been home for the past few days getting home PT for her residual R hemiparesis. She has been dealing with LE swelling for weeks now, but for the past 3 days her family has noticed that the inner and back areas of her legs have been getting progressively more erythematous with crusting and plaques. 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    In the ED, she was afebrile and hemodynamically stable, saturating well on RA. CBC w/ baseline normocytic anemia w/ Hb 9.7. CMP w/ hypokalemia of 3.1. XR of the bilateral LE showing diffuse bilateral soft tissue swelling but no gas, cortical erosion or periosteal formation. Was also Wood's lamp + per ER examination. Was given Unasyn, Clotrimazole, and KCl 40mEq in the ED.  (29 Dec 2024 20:20)      PAST MEDICAL & SURGICAL HISTORY:  HTN (hypertension)      Murmur      Morbid obesity      Palpitations      Former smoker      Hypothyroid      CAD (coronary artery disease)  minimal CAD per cath result in 2014      Hypercholesterolemia      Gastritis      Depression      Anxiety      AF (atrial fibrillation)  on Xarelto      Hiatal hernia      Neuropathy      Uterine cyst      Incontinence      Thickened endometrium      History of TIAs      Loss of vision  right eye      Blind right eye      [place] (hard of hearing)      Uterine cancer      Type 2 diabetes mellitus      Constipation      Spinal stenosis      Edema of lower extremity      Varicose veins      Thyroid nodule      OAB (overactive bladder)      Risk factors for obstructive sleep apnea      H/O Helicobacter infection      S/P cataract extraction and insertion of intraocular lens  bilateral > 15 years ago      Varicose vein  with stripping b/l      History of angioplasty of vein  right leg 2016      H/O thyroidectomy      H/O: hysterectomy          MEDICATIONS  (STANDING):  aMIOdarone    Tablet 200 milliGRAM(s) Oral daily  amLODIPine   Tablet 5 milliGRAM(s) Oral daily  apixaban 5 milliGRAM(s) Oral every 12 hours  aspirin enteric coated 81 milliGRAM(s) Oral daily  atorvastatin 40 milliGRAM(s) Oral at bedtime  ceFAZolin   IVPB 1000 milliGRAM(s) IV Intermittent every 8 hours  clotrimazole 1% Cream 1 Application(s) Topical every 12 hours  dextrose 5%. 1000 milliLiter(s) (50 mL/Hr) IV Continuous <Continuous>  dextrose 5%. 1000 milliLiter(s) (100 mL/Hr) IV Continuous <Continuous>  dextrose 50% Injectable 25 Gram(s) IV Push once  dextrose 50% Injectable 12.5 Gram(s) IV Push once  dextrose 50% Injectable 25 Gram(s) IV Push once  furosemide    Tablet 40 milliGRAM(s) Oral two times a day  glucagon  Injectable 1 milliGRAM(s) IntraMuscular once  insulin glargine Injectable (LANTUS) 10 Unit(s) SubCutaneous at bedtime  insulin lispro (ADMELOG) corrective regimen sliding scale   SubCutaneous three times a day before meals  insulin lispro Injectable (ADMELOG) 5 Unit(s) SubCutaneous three times a day before meals  levothyroxine 200 MICROGram(s) Oral daily  pantoprazole    Tablet 40 milliGRAM(s) Oral before breakfast    MEDICATIONS  (PRN):  acetaminophen     Tablet .. 650 milliGRAM(s) Oral every 6 hours PRN Temp greater or equal to 38C (100.4F), Mild Pain (1 - 3)  ALPRAZolam 2 milliGRAM(s) Oral at bedtime PRN Anxiety/insomnia  dextrose Oral Gel 15 Gram(s) Oral once PRN Blood Glucose LESS THAN 70 milliGRAM(s)/deciliter  melatonin 3 milliGRAM(s) Oral at bedtime PRN Insomnia      Allergies    Lipitor (Nausea)  latex (Rash)    Intolerances        VITALS:    Vital Signs Last 24 Hrs  T(C): 37.1 (30 Dec 2024 12:07), Max: 37.1 (30 Dec 2024 12:07)  T(F): 98.7 (30 Dec 2024 12:07), Max: 98.7 (30 Dec 2024 12:07)  HR: 63 (30 Dec 2024 12:07) (61 - 67)  BP: 137/72 (30 Dec 2024 12:07) (137/72 - 148/75)  BP(mean): --  RR: 18 (30 Dec 2024 12:07) (18 - 18)  SpO2: 94% (30 Dec 2024 12:07) (94% - 95%)    Parameters below as of 30 Dec 2024 12:07  Patient On (Oxygen Delivery Method): room air        LABS:                          10.3   7.28  )-----------( 221      ( 30 Dec 2024 07:08 )             33.3       12-30    142  |  104  |  16  ----------------------------<  196[H]  3.4[L]   |  27  |  1.08    Ca    9.0      30 Dec 2024 07:07    TPro  6.4  /  Alb  3.4  /  TBili  0.7  /  DBili  x   /  AST  8[L]  /  ALT  11  /  AlkPhos  90  12-30      CAPILLARY BLOOD GLUCOSE      POCT Blood Glucose.: 210 mg/dL (30 Dec 2024 12:28)  POCT Blood Glucose.: 189 mg/dL (30 Dec 2024 08:23)  POCT Blood Glucose.: 254 mg/dL (29 Dec 2024 21:20)          LOWER EXTREMITY PHYSICAL EXAM:    Vascular: DP/PT 0/4 2/2 swelling, B/L, CFT <3 seconds B/L, Temperature gradient warm to cool, B/L.   Neuro: Epicritic sensation intact to the level of digits, B/L.  Musculoskeletal/Ortho: unremarkable  Skin: Right foot posterior heel stage 1 deep tissue injury, with overlying intact blister, no purulence, no malodor, no fluctuance, no periwound erythema, no acute signs of infection. Bilateral lower extremity dermatitis extending from anterior ankle to inner thighs, serous drainage, no purulence, no malodor. Left foot no open wounds or lesions, no acute signs of infection.    RADIOLOGY & ADDITIONAL STUDIES:    < from: VA Duplex Lower Ext Vein Scan, [place] (12.29.24 @ 16:39) >    ACC: [id number] EXAM:  DUPLEX SCAN EXT VEINS LOWER BI   ORDERED BY:  KATARINA BERUMEN     PROCEDURE DATE:  12/29/2024          INTERPRETATION:  CLINICAL INFORMATION: Leg swelling    COMPARISON: None available.    TECHNIQUE: Duplex sonography of the BILATERAL LOWER extremity veins with   color and spectral Doppler, with and without compression.    FINDINGS:    RIGHT:  Normal compressibility of the RIGHT common femoral, femoral and popliteal   veins.  Doppler examination shows normal spontaneous and phasic flow.  Limited visualization the cast without thrombus    LEFT:  Normal compressibility of the LEFT common femoral, femoral and popliteal   veins.  Doppler examination shows normal spontaneous and phasic flow.  Limited visualization of the cast without thrombus    IMPRESSION:  No evidence of deep venous thrombosis in either lower extremity.            --- End of Report ---            UTE QUESADA MD; Attending Radiologist  This document has been electronically sign    < end of copied text >  < from: Xray Tibia + Fibula 2 Views, Bilateral (12.29.24 @ 17:17) >    ACC: [id number] EXAM:  XR FEMUR 2 VIEWS BI   ORDERED BY:  KATARINA BERUMEN     ACC: [id number] EXAM:  XR TIB FIB AP LAT 2 VIEWS BI   ORDERED BY:  KATARINA BERUMEN     PROCEDURE DATE:  12/29/2024          INTERPRETATION:  CLINICAL INDICATION: Skin wound, evaluatefor   subcutaneous air tracking.  TECHNIQUE: 2 views of the bilateral femurs. 2 views of the bilateral   tibia fibula.    COMPARISON: Radiograph of the left tibia-fibula performed on 9/20/2014.    FINDINGS/  IMPRESSION:  Diffuse bilateral soft tissueswelling. No tracking soft tissue gas. No   cortical erosion or periosteal new bone formation. No acute fracture. No   dislocation. Bilateral, right greater than left, tricompartmental   arthrosis of the knee joints, most pronounced in bilateral medial   compartments.    --- End of Report ---          SHAVON GIL MD; Resident Radiologist  This document has been electronically signed.  CHEYENNE BISWAS MD; Attending Radiologist  This document has been electronically signed. Dec 29 2024  7:31PM    < end of copied text >  
  Wound SURGERY CONSULT NOTE    HPI:  This is a 75 y/o female w/ PMHx recent CVA w/ R-sided weakness, paroxysmal atrial fibrillation on Eliquis, T2DM, HTN, HLD, hypothyroidism 2/2 thyropidectomy, stasis dermatitis, and anxiety who presents for worsening LE swelling and redness. She was admitted to [facility] recently from 11/18-22 after suffering a CVA, has been home for the past few days getting home PT for her residual R hemiparesis. She has been dealing with LE swelling for weeks now, but for the past 3 days her family has noticed that the inner and back areas of her legs have been getting progressively more erythematous with crusting and plaques. 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    In the ED, she was afebrile and hemodynamically stable, saturating well on RA. CBC w/ baseline normocytic anemia w/ Hb 9.7. CMP w/ hypokalemia of 3.1. XR of the bilateral LE showing diffuse bilateral soft tissue swelling but no gas, cortical erosion or periosteal formation. Was also Wood's lamp + per ER examination. Was given Unasyn, Clotrimazole, and KCl 40mEq in the ED.  (29 Dec 2024 20:20)      Wound consult requested by team to assist w/ management of skin injury.   Pt w/o  c/o pain, drainage, odor, color change. Pt c/o  worsening BLE swelling. Offloading and pericare initiated upon admission as pt Increasingly sedentary 2/2 to illness. Pt is Incontinent of urine & stool. (+) primafit. In ED Woods lamp (+). All questions asked and answered to pt's expressed understanding and satisfaction.    Current Diet: Diet, Consistent Carbohydrate/No Snacks (12-29-24 @ 21:50)      PAST MEDICAL & SURGICAL HISTORY:  HTN (hypertension)      Murmur      Morbid obesity      Palpitations      Former smoker      Hypothyroid      CAD (coronary artery disease)  minimal CAD per cath result in 2014      Hypercholesterolemia      Gastritis      Depression      Anxiety      AF (atrial fibrillation)  on Xarelto      Hiatal hernia      Neuropathy      Uterine cyst      Incontinence      Thickened endometrium      History of TIAs      Loss of vision  right eye      Blind right eye      [place] (hard of hearing)      Uterine cancer      Type 2 diabetes mellitus      Constipation      Spinal stenosis      Edema of lower extremity      Varicose veins      Thyroid nodule      OAB (overactive bladder)      Risk factors for obstructive sleep apnea      H/O Helicobacter infection      S/P cataract extraction and insertion of intraocular lens  bilateral > 15 years ago      Varicose vein  with stripping b/l      History of angioplasty of vein  right leg 2016      H/O thyroidectomy      H/O: hysterectomy          REVIEW OF SYSTEMS:   General/ Breast/ Skin/Vasc/ Neuro/ MSK: see HPI  All other systems negative    MEDICATIONS  (STANDING):  aMIOdarone    Tablet 200 milliGRAM(s) Oral daily  amLODIPine   Tablet 5 milliGRAM(s) Oral daily  ammonium lactate 12% Lotion 1 Application(s) Topical every 12 hours  apixaban 5 milliGRAM(s) Oral every 12 hours  aspirin enteric coated 81 milliGRAM(s) Oral daily  atorvastatin 40 milliGRAM(s) Oral at bedtime  cadexomer iodine 0.9% Gel 1 Application(s) Topical daily  ceFAZolin   IVPB 2000 milliGRAM(s) IV Intermittent every 8 hours  clotrimazole 1% Cream 1 Application(s) Topical every 12 hours  dextrose 5%. 1000 milliLiter(s) (50 mL/Hr) IV Continuous <Continuous>  dextrose 5%. 1000 milliLiter(s) (100 mL/Hr) IV Continuous <Continuous>  dextrose 50% Injectable 25 Gram(s) IV Push once  dextrose 50% Injectable 12.5 Gram(s) IV Push once  dextrose 50% Injectable 25 Gram(s) IV Push once  furosemide    Tablet 40 milliGRAM(s) Oral two times a day  glucagon  Injectable 1 milliGRAM(s) IntraMuscular once  insulin glargine Injectable (LANTUS) 10 Unit(s) SubCutaneous at bedtime  insulin lispro (ADMELOG) corrective regimen sliding scale   SubCutaneous three times a day before meals  insulin lispro Injectable (ADMELOG) 5 Unit(s) SubCutaneous three times a day before meals  levothyroxine 200 MICROGram(s) Oral daily  pantoprazole    Tablet 40 milliGRAM(s) Oral before breakfast  povidone iodine 10% Solution 1 Application(s) Topical daily  vancomycin  IVPB      vancomycin  IVPB 1000 milliGRAM(s) IV Intermittent every 12 hours    MEDICATIONS  (PRN):  acetaminophen     Tablet .. 650 milliGRAM(s) Oral every 6 hours PRN Temp greater or equal to 38C (100.4F), Mild Pain (1 - 3)  ALPRAZolam 2 milliGRAM(s) Oral at bedtime PRN Anxiety/insomnia  dextrose Oral Gel 15 Gram(s) Oral once PRN Blood Glucose LESS THAN 70 milliGRAM(s)/deciliter  melatonin 3 milliGRAM(s) Oral at bedtime PRN Insomnia      Allergies    Lipitor (Nausea)  latex (Rash)    Intolerances        SOCIAL HISTORY:      No hx of smoking, ETOH, drugs    FAMILY HISTORY:    Family history of stroke (Mother)  mom    Family history of hypertension (Father)    Family history of breast cancer (Mother)      PHYSICAL EXAM:  Vital Signs Last 24 Hrs  T(C): 36.6 (31 Dec 2024 04:39), Max: 37.9 (30 Dec 2024 21:03)  T(F): 97.9 (31 Dec 2024 04:39), Max: 100.3 (30 Dec 2024 21:03)  HR: 67 (31 Dec 2024 04:39) (63 - 79)  BP: 127/66 (31 Dec 2024 04:39) (127/66 - 138/74)  BP(mean): --  RR: 18 (31 Dec 2024 04:39) (18 - 18)  SpO2: 92% (31 Dec 2024 04:39) (91% - 94%)    Parameters below as of 31 Dec 2024 04:39  Patient On (Oxygen Delivery Method): room air        NAD,  A&Ox3/  MO/   Versa Care P500 bed  HEENT: sclera clear, mucosa moist, throat clear, trachea midline, neck supple  Respiratory: nonlabored w/ equal chest rise  Gastrointestinal: soft NT/ND   : (+) primafit  Neurology: verbal, follows commands  Psych: calm/ appropriate  Musculoskeletal:  no deformities/ contractures  Vascular: BLE equally warm,  no cyanosis, clubbing,  nor acute ischemia           BLE edema equal         BLE DP/PT pulses not palpable       BLE  varicose veins      Rt heel blood filled  blister 3.5cm x 9.0cm x 0.1cm         scant bloody drainage, (+) erythema          there is no increased warmth, tenderness, induration, fluctuance, nor crepitus  Skin:  moist w/ good turgor      LABS/ CULTURES/ RADIOLOGY:                        9.7    7.30  )-----------( 229      ( 31 Dec 2024 07:08 )             31.2       142  |  100  |  17  ----------------------------<  184      [12-31-24 @ 07:11]  3.1   |  28  |  1.13        Ca     8.9     [12-31-24 @ 07:11]    TPro  6.4  /  Alb  3.4  /  TBili  0.7  /  DBili  x   /  AST  8   /  ALT  9   /  AlkPhos  91  [12-31-24 @ 07:11]            A1C with Estimated Average Glucose Result: 8.7 % (12-30-24 @ 08:42)        Culture - Blood (collected 12-29-24 @ 16:08)  Source: .Blood BLOOD  Preliminary Report (12-30-24 @ 23:02):    No growth at 24 hours    Culture - Blood (collected 12-29-24 @ 16:00)  Source: .Blood BLOOD  Preliminary Report (12-30-24 @ 23:02):    No growth at 24 hours                      
HPI:  This is a 73 y/o female w/ PMHx recent CVA w/ R-sided weakness, paroxysmal atrial fibrillation on Eliquis, T2DM, HTN, HLD, hypothyroidism 2/2 thyropidectomy, stasis dermatitis, and anxiety who presents for worsening LE swelling and redness. She was admitted to [facility] recently from 11/18-22 after suffering a CVA, has been home for the past few days getting home PT for her residual R hemiparesis. She has been dealing with LE swelling for weeks now, but for the past 3 days her family has noticed that the inner and back areas of her legs have been getting progressively more erythematous with crusting and plaques. 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    In the ED, she was afebrile and hemodynamically stable, saturating well on RA. CBC w/ baseline normocytic anemia w/ Hb 9.7. CMP w/ hypokalemia of 3.1. XR of the bilateral LE showing diffuse bilateral soft tissue swelling but no gas, cortical erosion or periosteal formation. Was also Wood's lamp + per ER examination. Was given Unasyn, Clotrimazole, and KCl 40mEq in the ED.  (29 Dec 2024 20:20)    Dermatology consulted for scaly rash on legs. Per patient, her legs have been swollen and scaly for >20 years. She reports that they only recently became red but that has improved since hospitalization with the antibiotics.          PAST MEDICAL & SURGICAL HISTORY:  HTN (hypertension)      Murmur      Morbid obesity      Palpitations      Former smoker      Hypothyroid      CAD (coronary artery disease)  minimal CAD per cath result in 2014      Hypercholesterolemia      Gastritis      Depression      Anxiety      AF (atrial fibrillation)  on Xarelto      Hiatal hernia      Neuropathy      Uterine cyst      Incontinence      Thickened endometrium      History of TIAs      Loss of vision  right eye      Blind right eye      [place] (hard of hearing)      Uterine cancer      Type 2 diabetes mellitus      Constipation      Spinal stenosis      Edema of lower extremity      Varicose veins      Thyroid nodule      OAB (overactive bladder)      Risk factors for obstructive sleep apnea      H/O Helicobacter infection      S/P cataract extraction and insertion of intraocular lens  bilateral > 15 years ago      Varicose vein  with stripping b/l      History of angioplasty of vein  right leg 2016      H/O thyroidectomy      H/O: hysterectomy        ROS:  As above    MEDICATIONS  (STANDING):  aMIOdarone    Tablet 200 milliGRAM(s) Oral daily  amLODIPine   Tablet 5 milliGRAM(s) Oral daily  apixaban 5 milliGRAM(s) Oral every 12 hours  aspirin enteric coated 81 milliGRAM(s) Oral daily  atorvastatin 40 milliGRAM(s) Oral at bedtime  cadexomer iodine 0.9% Gel 1 Application(s) Topical daily  chlorhexidine 2% Cloths 1 Application(s) Topical <User Schedule>  clobetasol 0.05% Ointment 1 Application(s) Topical two times a day  clotrimazole 1% Cream 1 Application(s) Topical every 12 hours  dextrose 5%. 1000 milliLiter(s) (50 mL/Hr) IV Continuous <Continuous>  dextrose 5%. 1000 milliLiter(s) (100 mL/Hr) IV Continuous <Continuous>  dextrose 50% Injectable 25 Gram(s) IV Push once  dextrose 50% Injectable 12.5 Gram(s) IV Push once  dextrose 50% Injectable 25 Gram(s) IV Push once  furosemide    Tablet 40 milliGRAM(s) Oral two times a day  glucagon  Injectable 1 milliGRAM(s) IntraMuscular once  insulin glargine Injectable (LANTUS) 10 Unit(s) SubCutaneous at bedtime  insulin lispro (ADMELOG) corrective regimen sliding scale   SubCutaneous three times a day before meals  insulin lispro (ADMELOG) corrective regimen sliding scale   SubCutaneous at bedtime  insulin lispro Injectable (ADMELOG) 5 Unit(s) SubCutaneous three times a day before meals  levothyroxine 200 MICROGram(s) Oral daily  multivitamin 1 Tablet(s) Oral daily  mupirocin 2% Nasal 1 Application(s) Both Nostrils two times a day  pantoprazole    Tablet 40 milliGRAM(s) Oral before breakfast  povidone iodine 10% Solution 1 Application(s) Topical daily  vancomycin  IVPB      vancomycin  IVPB 1000 milliGRAM(s) IV Intermittent every 12 hours    MEDICATIONS  (PRN):  acetaminophen     Tablet .. 650 milliGRAM(s) Oral every 6 hours PRN Temp greater or equal to 38C (100.4F), Mild Pain (1 - 3)  ALPRAZolam 2 milliGRAM(s) Oral at bedtime PRN Anxiety/insomnia  dextrose Oral Gel 15 Gram(s) Oral once PRN Blood Glucose LESS THAN 70 milliGRAM(s)/deciliter  melatonin 3 milliGRAM(s) Oral at bedtime PRN Insomnia      Allergies    Lipitor (Nausea)  latex (Rash)    Intolerances        SOCIAL HISTORY:    FAMILY HISTORY:  Family history of stroke (Mother)  mom    Family history of hypertension (Father)    Family history of breast cancer (Mother)        Vital Signs Last 24 Hrs  T(C): 36.8 (01 Jan 2025 13:15), Max: 36.9 (31 Dec 2024 23:53)  T(F): 98.2 (01 Jan 2025 13:15), Max: 98.4 (31 Dec 2024 23:53)  HR: 63 (01 Jan 2025 13:15) (62 - 65)  BP: 132/75 (01 Jan 2025 13:15) (114/71 - 133/72)  BP(mean): --  RR: 17 (01 Jan 2025 13:15) (17 - 18)  SpO2: 93% (01 Jan 2025 13:15) (93% - 94%)    Parameters below as of 01 Jan 2025 13:15  Patient On (Oxygen Delivery Method): room air      PHYSICAL EXAM:   The patient was alert and in no apparent distress.  There was no visible lymphadenopathy.  Conjunctiva were non injected    skin exam notable for: scaly pink plaques on the bilateral lower legs with pitting edema, not tender   intact DP pulses   patient declined exam of R heel which remained bandaged           LABS:                        9.9    7.39  )-----------( 254      ( 01 Jan 2025 07:15 )             31.6     01-01    140  |  99  |  20  ----------------------------<  246[H]  3.6   |  27  |  1.23    Ca    8.9      01 Jan 2025 07:15    TPro  6.4  /  Alb  3.4  /  TBili  0.7  /  DBili  x   /  AST  8[L]  /  ALT  9[L]  /  AlkPhos  91  12-31      Urinalysis Basic - ( 01 Jan 2025 07:15 )    Color: x / Appearance: x / SG: x / pH: x  Gluc: 246 mg/dL / Ketone: x  / Bili: x / Urobili: x   Blood: x / Protein: x / Nitrite: x   Leuk Esterase: x / RBC: x / WBC x   Sq Epi: x / Non Sq Epi: x / Bacteria: x        RADIOLOGY & ADDITIONAL STUDIES:
Patient is a 74y old  Female who presents with a chief complaint of Stasis dermatitis w/ possible bacterial superinfection (30 Dec 2024 11:57)      HPI:  This is a 73 y/o female w/ PMHx recent CVA w/ R-sided weakness, paroxysmal atrial fibrillation on Eliquis, T2DM, HTN, HLD, hypothyroidism 2/2 thyropidectomy, stasis dermatitis, and anxiety who presents for worsening LE swelling and redness. She was admitted to [facility] recently from 11/18-22 after suffering a CVA, has been home for the past few days getting home PT for her residual R hemiparesis. She has been dealing with LE swelling for weeks now, but for the past 3 days her family has noticed that the inner and back areas of her legs have been getting progressively more erythematous with crusting and plaques. 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    In the ED, she was afebrile and hemodynamically stable, saturating well on RA. CBC w/ baseline normocytic anemia w/ Hb 9.7. CMP w/ hypokalemia of 3.1. XR of the bilateral LE showing diffuse bilateral soft tissue swelling but no gas, cortical erosion or periosteal formation. Was also Wood's lamp + per ER examination. Was given Unasyn, Clotrimazole, and KCl 40mEq in the ED.  (29 Dec 2024 20:20)    Above noted.   VSS and normal WBC during hospitalization. ESR 75, procal 0.07. CRP 25. BLE Duplex w/o DVT.      prior hospital charts reviewed [X]  primary team notes reviewed [X]  other consultant notes reviewed [X]    PAST MEDICAL & SURGICAL HISTORY:  HTN (hypertension)      Murmur      Morbid obesity      Palpitations      Former smoker      Hypothyroid      CAD (coronary artery disease)  minimal CAD per cath result in 2014      Hypercholesterolemia      Gastritis      Depression      Anxiety      AF (atrial fibrillation)  on Xarelto      Hiatal hernia      Neuropathy      Uterine cyst      Incontinence      Thickened endometrium      History of TIAs      Loss of vision  right eye      Blind right eye      [place] (hard of hearing)      Uterine cancer      Type 2 diabetes mellitus      Constipation      Spinal stenosis      Edema of lower extremity      Varicose veins      Thyroid nodule      OAB (overactive bladder)      Risk factors for obstructive sleep apnea      H/O Helicobacter infection      S/P cataract extraction and insertion of intraocular lens  bilateral > 15 years ago      Varicose vein  with stripping b/l      History of angioplasty of vein  right leg 2016      H/O thyroidectomy      H/O: hysterectomy          Allergies  Lipitor (Nausea)  latex (Rash)        ANTIMICROBIALS:  ceFAZolin   IVPB 1000 every 8 hours      OTHER MEDS: MEDICATIONS  (STANDING):  acetaminophen     Tablet .. 650 every 6 hours PRN  ALPRAZolam 2 at bedtime PRN  aMIOdarone    Tablet 200 daily  amLODIPine   Tablet 5 daily  apixaban 5 every 12 hours  aspirin enteric coated 81 daily  atorvastatin 40 at bedtime  dextrose 50% Injectable 25 once  dextrose 50% Injectable 12.5 once  dextrose 50% Injectable 25 once  dextrose Oral Gel 15 once PRN  furosemide    Tablet 40 two times a day  glucagon  Injectable 1 once  insulin glargine Injectable (LANTUS) 10 at bedtime  insulin lispro (ADMELOG) corrective regimen sliding scale  three times a day before meals  insulin lispro Injectable (ADMELOG) 5 three times a day before meals  levothyroxine 200 daily  melatonin 3 at bedtime PRN  pantoprazole    Tablet 40 before breakfast      SOCIAL HISTORY:   No tobacco or illicit drugs use    FAMILY HISTORY:  Family history of stroke (Mother)  mom    Family history of hypertension (Father)    Family history of breast cancer (Mother)        REVIEW OF SYSTEMS  [  ] ROS unobtainable because:    [X] All other systems negative except as noted below:	    Constitutional:  [ ] fever [ ] chills  [ ] weight loss  [ ] weakness  Skin:  [ ] rash [ ] phlebitis	  Eyes: [ ] icterus [ ] pain  [ ] discharge	  ENMT: [ ] sore throat  [ ] thrush [ ] ulcers [ ] exudates  Respiratory: [ ] dyspnea [ ] hemoptysis [ ] cough [ ] sputum	  Cardiovascular:  [ ] chest pain [ ] palpitations [X] edema	  Gastrointestinal:  [ ] nausea [ ] vomiting [ ] diarrhea [ ] constipation [ ] pain	  Genitourinary:  [ ] dysuria [ ] frequency [ ] hematuria [ ] discharge [ ] flank pain  [ ] incontinence  Musculoskeletal:  [ ] myalgias [ ] arthralgias [ ] arthritis  [ ] back pain  Neurological:  [ ] headache [ ] seizures  [ ] confusion/altered mental status  Psychiatric:  [ ] anxiety [ ] depression	  Hematology/Lymphatics:  [ ] lymphadenopathy  Endocrine:  [ ] adrenal [ ] thyroid  Allergic/Immunologic:	 [ ] transplant [ ] seasonal    Vital Signs Last 24 Hrs  T(F): 98.7 (12-30-24 @ 12:07), Max: 98.7 (12-30-24 @ 12:07)    Vital Signs Last 24 Hrs  HR: 63 (12-30-24 @ 12:07) (61 - 67)  BP: 137/72 (12-30-24 @ 12:07) (137/72 - 148/75)  RR: 18 (12-30-24 @ 12:07)  SpO2: 94% (12-30-24 @ 12:07) (94% - 96%)  Wt(kg): --    PHYSICAL EXAM:  Constitutional: non-toxic, no distress  HEAD/EYES: anicteric, no conjunctival injection  ENT:  supple, no thrush  Cardiovascular:   normal S1, S2, no murmur, RRR  Respiratory:  clear BS bilaterally, no wheezes, no rales  GI:  soft, non-tender  :  No Escoto  Musculoskeletal:  BLE edema R>L.  Neurologic: awake and oriented x3; R hemiparesis  Skin:  Erythema, tenderness, and warmth to RLE; R calf erythematous; R heel w/ tender DTI; no crepitus  Heme/Onc: no overt bleeding  Psychiatric:  awake, alert, appropriate mood                                10.3   7.28  )-----------( 221      ( 30 Dec 2024 07:08 )             33.3       12-30    142  |  104  |  16  ----------------------------<  196[H]  3.4[L]   |  27  |  1.08    Ca    9.0      30 Dec 2024 07:07    TPro  6.4  /  Alb  3.4  /  TBili  0.7  /  DBili  x   /  AST  8[L]  /  ALT  11  /  AlkPhos  90  12-30      Urinalysis Basic - ( 30 Dec 2024 07:07 )    Color: x / Appearance: x / SG: x / pH: x  Gluc: 196 mg/dL / Ketone: x  / Bili: x / Urobili: x   Blood: x / Protein: x / Nitrite: x   Leuk Esterase: x / RBC: x / WBC x   Sq Epi: x / Non Sq Epi: x / Bacteria: x        MICROBIOLOGY:          No cultures this admission            RADIOLOGY:  imaging below personally reviewed  < from: Xray Femur 2 Views, Bilat (12.29.24 @ 17:17) >    ACC: [id number] EXAM:  XR FEMUR 2 VIEWS BI   ORDERED BY:  KATARINA BERUMEN     ACC: [id number] EXAM:  XR TIB FIB AP LAT 2 VIEWS BI   ORDERED BY:  KATARINA BERUMEN     PROCEDURE DATE:  12/29/2024          INTERPRETATION:  CLINICAL INDICATION: Skin wound, evaluatefor   subcutaneous air tracking.  TECHNIQUE: 2 views of the bilateral femurs. 2 views of the bilateral   tibia fibula.    COMPARISON: Radiograph of the left tibia-fibula performed on 9/20/2014.    FINDINGS/  IMPRESSION:  Diffuse bilateral soft tissueswelling. No tracking soft tissue gas. No   cortical erosion or periosteal new bone formation. No acute fracture. No   dislocation. Bilateral, right greater than left, tricompartmental   arthrosis of the knee joints, most pronounced in bilateral medial   compartments.    --- End of Report ---          SHAVON GIL MD; Resident Radiologist  This document has been electronically signed.  CHEYENNE BISWAS MD; Attending Radiologist  This document has been electronically signed. Dec 29 2024  7:31PM    < end of copied text >      < from: [facility] Duplex Lower Ext Vein Scan, Bilat (12.29.24 @ 16:39) >  IMPRESSION:  No evidence of deep venous thrombosis in either lower extremity.      < end of copied text >

## 2025-01-01 NOTE — CONSULT NOTE ADULT - REASON FOR ADMISSION
Stasis dermatitis w/ possible bacterial superinfection

## 2025-01-01 NOTE — DISCHARGE NOTE NURSING/CASE MANAGEMENT/SOCIAL WORK - NSDCPEFALRISK_GEN_ALL_CORE
For information on Fall & Injury Prevention, visit: [email or web address] OR  [email or web address] OR  https://www.cdc.gov/steadi/patient.html

## 2025-01-01 NOTE — PROGRESS NOTE ADULT - PROBLEM SELECTOR PLAN 2
- Has worsening non-pitting LE edema w/ erythema, crusting and plaques. -improved on abx and wound care.  - Wound care consult -f/u pods.   - C/w PO lasix 40mg BID  - Dopplers negative for LE DVT  -BNP. Consider echo.
- Has worsening non-pitting LE edema w/ erythema, crusting and plaques  - Wound care consult -f/u pods.   - C/w PO lasix 40mg BID  - Dopplers negative for LE DVT  -BNP. Consider echo.
- Has worsening non-pitting LE edema w/ erythema, crusting and plaques  - Wound care consult -f/u pods.   - C/w PO lasix 40mg BID  - Dopplers negative for LE DVT  -BNP. Consider echo.

## 2025-01-01 NOTE — PROGRESS NOTE ADULT - PROBLEM SELECTOR PLAN 4
- C/w Eliquis 5mg BID  - C/w amiodarone 200mg daily  - Was on metoprolol succinate 25mg daily, was stopped at discharge from her hospitalization last month at [facility]
DDDR
- C/w Eliquis 5mg BID  - C/w amiodarone 200mg daily  - Was on metoprolol succinate 25mg daily, was stopped at discharge from her hospitalization last month at [facility]
- C/w Eliquis 5mg BID  - C/w amiodarone 200mg daily  - Was on metoprolol succinate 25mg daily, was stopped at discharge from her hospitalization last month at [facility]

## 2025-01-01 NOTE — PROGRESS NOTE ADULT - PROBLEM SELECTOR PLAN 3
- Had CVA few weeks ago - MR at [facility] showed acute to early subacute infarction involves the left corona radiata/basal ganglia  - Now with residual R-sided weakness, mostly bedbound, getting home PT  - C/w ASA 81mg daily  - Fall precautions  - PT while admitted
- Had CVA few weeks ago - MR at [facility] showed acute to early subacute infarction involves the left corona radiata/basal ganglia  - Now with residual R-sided weakness, mostly bedbound, getting home PT  - C/w ASA 81mg daily  - Fall precautions  - PT while admitted
- Had CVA few weeks ago - MR at [facility] showed acute to early subacute infarction involves the left corona radiata/basal ganglia  - Now with residual R-sided weakness, mostly bedbound, getting home PT  - C/w ASA 81mg daily  - Fall precautions  - PT while admitted

## 2025-01-01 NOTE — PROGRESS NOTE ADULT - ASSESSMENT
74-yo F w/ PMH of CVA w/ R hemiparesis, PAF on ELiquis, DM, and stasis dermatitis, presenting for worsening BLE edema and erythema. RLE more swollen and tender to the touch. +erythema to the dorsal aspect of the shin and heel.  VSS and no leukocytosis. Procal neg. No systemic signs. No crepitus. Less likely necrotizing infection.    Given the asymmetric edema a/w tenderness and warmth, concerning for SSTI. Currently improving on vancomycin  Suspect poor vascular drainage. Suggest cardiac workup, defer to primary team.    #SSTI  #DM  #Stasis ulcer  #Leg pain    Recommendations:  - Continue with vancomycin 1g IV Q12H. Check pre-4th dose trough  - If keeps improving, would suggest 7-d antibiotic coverage (end 1/5/25). Consider switching to amoxicillin 500 mg PO Q8H + doxycycline 100 mg PO Q12H for PO conversion.  - D/c'ed cefazolin.  - F/u BCx  - Cardiac workup. Consider TTE.    Plan discussed with primary team ACP.  Thank you for this consult. I am going off service after today. My colleague will continue to cover.    Tomás Licea MD, PhD  Attending Physician  Division of Infectious Diseases  Department of Medicine    Please contact through MS Teams message.  Office: [phone] (after 5 PM or weekend)    
Assessment/plan:    Right heel stage II ulceration: Stable, noninfected, present on admission  Diabetes mellitus    Recommend cubitus precautions and use of Z flow boots while in house.  Recommend normal saline cleanse with Iodosorb and DSD to right heel daily.  Will continue to monitor for signs of infection and/or wound care recommendations while in house.
73 y/o female w/ PMHx recent CVA w/ R-sided weakness, paroxysmal atrial fibrillation on Eliquis, T2DM, HTN, HLD, hypothyroidism 2/2 thyroidectomy, stasis dermatitis, and anxiety who presents for worsening LE swelling and redness with crusting and plaque formation. Mc lamp + in ED. Admitted for stasis dermatitis with concern for bacterial and/or fungal superinfection.

## 2025-01-01 NOTE — PROGRESS NOTE ADULT - PROBLEM SELECTOR PLAN 10
DVT PPx: Eliquis    11. D/w patient and ACP [place]. -Home care is set up. dcp to home today with home care/wound care set up. patient wants home PT. -Outpatient pods, wound care, derm, PMD follow up.  -34 minutes spent on the dc process.
DVT PPx: Eliquis    11. D/w patient and  at bedside and ACP Amanda
DVT PPx: Eliquis    11. D/w patient and  at bedside and daughter and grandson and ACP ann marie. needs home care set up, f/u with CM who said was not possible today. dcp once home care/wound care set up. patient wants home PT.

## 2025-01-01 NOTE — PROGRESS NOTE ADULT - REASON FOR ADMISSION
Stasis dermatitis w/ possible bacterial superinfection

## 2025-01-01 NOTE — PROGRESS NOTE ADULT - PROBLEM SELECTOR PROBLEM 2
Acute venous stasis dermatitis of both lower extremities

## 2025-01-01 NOTE — PROGRESS NOTE ADULT - PROBLEM SELECTOR PLAN 9
- C/w alprazolam 2mg QHS PRN  -iSTOP in chart.

## 2025-01-01 NOTE — PROGRESS NOTE ADULT - PROBLEM SELECTOR PLAN 6
- C/w atorvastatin 40mg QHS - tolerates despite previously listed intolerance

## 2025-01-01 NOTE — DISCHARGE NOTE NURSING/CASE MANAGEMENT/SOCIAL WORK - FINANCIAL ASSISTANCE
[facility] provides services at a reduced cost to those who are determined to be eligible through [facility]’s financial assistance program. Information regarding [facility]’s financial assistance program can be found by going to [email or web address] or by calling [phone].

## 2025-01-01 NOTE — CONSULT NOTE ADULT - ASSESSMENT
___________________________    ASSESSMENT/PLAN:     # Stasis dermatitis   - continue with clobetasol 0.05% ointment BID to affected areas on legs x 2 weeks   - defer on ulcer treatment to podiatry and wound care   - patient may f/u outpatient with dermatology     The patient's chart was reviewed in addition to being seen and examined at bedside with the dermatology attending Dr. Schaeffer  Recommendations were communicated with the primary team.  Please page [phone] with a [phone] call-back number for further related questions.     Thank you for allowing us to participate in the care of this patient.  Please alert Dermatology for any new or worsening developments.    Dangelo Wilhelm MD  Resident Physician, PGY-2  [facility] Dermatology  Pager: [phone]  Office: [phone]

## 2025-01-01 NOTE — PROGRESS NOTE ADULT - SUBJECTIVE AND OBJECTIVE BOX
Follow Up:    SSTI    Interval History/ROS:  VSS ON. Patient was seen and examined at bedside. MRSA swab pos, d/c'ed cefazolin. Denies fever. RLE pain/edema/erythema improved.    Allergies  Lipitor (Nausea)  latex (Rash)        ANTIMICROBIALS:  vancomycin  IVPB    vancomycin  IVPB 1000 every 12 hours      OTHER MEDS:  MEDICATIONS  (STANDING):  acetaminophen     Tablet .. 650 every 6 hours PRN  ALPRAZolam 2 at bedtime PRN  aMIOdarone    Tablet 200 daily  amLODIPine   Tablet 5 daily  apixaban 5 every 12 hours  aspirin enteric coated 81 daily  atorvastatin 40 at bedtime  dextrose 50% Injectable 25 once  dextrose 50% Injectable 12.5 once  dextrose 50% Injectable 25 once  dextrose Oral Gel 15 once PRN  furosemide    Tablet 40 two times a day  glucagon  Injectable 1 once  insulin glargine Injectable (LANTUS) 10 at bedtime  insulin lispro (ADMELOG) corrective regimen sliding scale  three times a day before meals  insulin lispro Injectable (ADMELOG) 5 three times a day before meals  levothyroxine 200 daily  melatonin 3 at bedtime PRN  pantoprazole    Tablet 40 before breakfast      Vital Signs Last 24 Hrs  T(C): 36.6 (31 Dec 2024 13:12), Max: 37.9 (30 Dec 2024 21:03)  T(F): 97.9 (31 Dec 2024 13:12), Max: 100.3 (30 Dec 2024 21:03)  HR: 60 (31 Dec 2024 13:12) (60 - 79)  BP: 152/67 (31 Dec 2024 13:12) (127/66 - 152/67)  BP(mean): --  RR: 18 (31 Dec 2024 13:12) (18 - 18)  SpO2: 95% (31 Dec 2024 13:12) (91% - 95%)    Parameters below as of 31 Dec 2024 13:12  Patient On (Oxygen Delivery Method): room air        PHYSICAL EXAM:  Constitutional: non-toxic, no distress  Cardiovascular:   normal S1, S2, no murmur, RRR  Respiratory:  clear BS bilaterally, no wheezes, no rales  GI:  soft, non-tender  :  No Escoto  Musculoskeletal:  BLE edema R>L.  Neurologic: awake and oriented x3; R hemiparesis  Skin:  Erythema, tenderness, and warmth to RLE improved from yesterday's examination; R heel w/ tender DTI; no crepitus  Heme/Onc: no overt bleeding                                9.7    7.30  )-----------( 229      ( 31 Dec 2024 07:08 )             31.2       12-31    142  |  100  |  17  ----------------------------<  184[H]  3.1[L]   |  28  |  1.13    Ca    8.9      31 Dec 2024 07:11    TPro  6.4  /  Alb  3.4  /  TBili  0.7  /  DBili  x   /  AST  8[L]  /  ALT  9[L]  /  AlkPhos  91  12-31      Urinalysis Basic - ( 31 Dec 2024 07:11 )    Color: x / Appearance: x / SG: x / pH: x  Gluc: 184 mg/dL / Ketone: x  / Bili: x / Urobili: x   Blood: x / Protein: x / Nitrite: x   Leuk Esterase: x / RBC: x / WBC x   Sq Epi: x / Non Sq Epi: x / Bacteria: x        MICROBIOLOGY:  v  .Blood BLOOD  12-29-24   No growth at 24 hours  --  --      .Blood BLOOD  12-29-24   No growth at 24 hours  --  --                RADIOLOGY:  Imaging below independently reviewed.  < from: Xray Femur 2 Views, Bilat (12.29.24 @ 17:17) >    ACC: [id number] EXAM:  XR FEMUR 2 VIEWS BI   ORDERED BY:  KATARINA BERUMEN     ACC: [id number] EXAM:  XR TIB FIB AP LAT 2 VIEWS BI   ORDERED BY:  KATARINA BERUMEN     PROCEDURE DATE:  12/29/2024          INTERPRETATION:  CLINICAL INDICATION: Skin wound, evaluatefor   subcutaneous air tracking.  TECHNIQUE: 2 views of the bilateral femurs. 2 views of the bilateral   tibia fibula.    COMPARISON: Radiograph of the left tibia-fibula performed on 9/20/2014.    FINDINGS/  IMPRESSION:  Diffuse bilateral soft tissueswelling. No tracking soft tissue gas. No   cortical erosion or periosteal new bone formation. No acute fracture. No   dislocation. Bilateral, right greater than left, tricompartmental   arthrosis of the knee joints, most pronounced in bilateral medial   compartments.    --- End of Report ---          SHAVON GIL MD; Resident Radiologist  This document has been electronically signed.  CHEYENNE BISWAS MD; Attending Radiologist  This document has been electronically signed. Dec 29 2024  7:31PM    < end of copied text >  
Podiatry pager #: [phone]/ [phone]    Patient is a 74y old  Female who presents with a chief complaint of Stasis dermatitis w/ possible bacterial superinfection (01 Jan 2025 20:19). Podiatry consultation for evaluation of right heel wound      HPI:  This is a 73 y/o female w/ PMHx recent CVA w/ R-sided weakness, paroxysmal atrial fibrillation on Eliquis, T2DM, HTN, HLD, hypothyroidism 2/2 thyropidectomy, stasis dermatitis, and anxiety who presents for worsening LE swelling and redness. She was admitted to [facility] recently from 11/18-22 after suffering a CVA, has been home for the past few days getting home PT for her residual R hemiparesis. She has been dealing with LE swelling for weeks now, but for the past 3 days her family has noticed that the inner and back areas of her legs have been getting progressively more erythematous with crusting and plaques. 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    In the ED, she was afebrile and hemodynamically stable, saturating well on RA. CBC w/ baseline normocytic anemia w/ Hb 9.7. CMP w/ hypokalemia of 3.1. XR of the bilateral LE showing diffuse bilateral soft tissue swelling but no gas, cortical erosion or periosteal formation. Was also Wood's lamp + per ER examination. Was given Unasyn, Clotrimazole, and KCl 40mEq in the ED.  (29 Dec 2024 20:20)      PAST MEDICAL & SURGICAL HISTORY:  HTN (hypertension)      Murmur      Morbid obesity      Palpitations      Former smoker      Hypothyroid      CAD (coronary artery disease)  minimal CAD per cath result in 2014      Hypercholesterolemia      Gastritis      Depression      Anxiety      AF (atrial fibrillation)  on Xarelto      Hiatal hernia      Neuropathy      Uterine cyst      Incontinence      Thickened endometrium      History of TIAs      Loss of vision  right eye      Blind right eye      [place] (hard of hearing)      Uterine cancer      Type 2 diabetes mellitus      Constipation      Spinal stenosis      Edema of lower extremity      Varicose veins      Thyroid nodule      OAB (overactive bladder)      Risk factors for obstructive sleep apnea      H/O Helicobacter infection      S/P cataract extraction and insertion of intraocular lens  bilateral > 15 years ago      Varicose vein  with stripping b/l      History of angioplasty of vein  right leg 2016      H/O thyroidectomy      H/O: hysterectomy          MEDICATIONS  (STANDING):    MEDICATIONS  (PRN):      Allergies    Lipitor (Nausea)  latex (Rash)    Intolerances        VITALS:    Vital Signs Last 24 Hrs  T(C): 36.8 (01 Jan 2025 13:15), Max: 36.8 (01 Jan 2025 13:15)  T(F): 98.2 (01 Jan 2025 13:15), Max: 98.2 (01 Jan 2025 13:15)  HR: 63 (01 Jan 2025 13:15) (63 - 63)  BP: 132/75 (01 Jan 2025 13:15) (132/75 - 132/75)  BP(mean): --  RR: 17 (01 Jan 2025 13:15) (17 - 17)  SpO2: 93% (01 Jan 2025 13:15) (93% - 93%)    Parameters below as of 01 Jan 2025 13:15  Patient On (Oxygen Delivery Method): room air        LABS:                          9.9    7.39  )-----------( 254      ( 01 Jan 2025 07:15 )             31.6       01-01    140  |  99  |  20  ----------------------------<  246[H]  3.6   |  27  |  1.23    Ca    8.9      01 Jan 2025 07:15        CAPILLARY BLOOD GLUCOSE      POCT Blood Glucose.: 249 mg/dL (01 Jan 2025 12:19)  POCT Blood Glucose.: 227 mg/dL (01 Jan 2025 08:08)          LOWER EXTREMITY PHYSICAL EXAM:    Vasular: DP/PT 1_/4, B/L, CFT <_3  seconds B/L, Temperature gradient wnl_, B/L.   Neuro: Epicritic sensation diminished to the level of _toes, B/L.  Skin: Right heel stage II ulceration: 2 cm diameter.  Positive mild serous drainage, no purulence, no fluctuance.  Positive periwound erythema.  No clinical signs of cellulitis.  Positive ruptured bulla.      RADIOLOGY & ADDITIONAL STUDIES:    
Patient is a 74y old  Female who presents with a chief complaint of Stasis dermatitis w/ possible bacterial superinfection (31 Dec 2024 14:00)        SUBJECTIVE / OVERNIGHT EVENTS: leg improving      MEDICATIONS  (STANDING):  aMIOdarone    Tablet 200 milliGRAM(s) Oral daily  amLODIPine   Tablet 5 milliGRAM(s) Oral daily  apixaban 5 milliGRAM(s) Oral every 12 hours  aspirin enteric coated 81 milliGRAM(s) Oral daily  atorvastatin 40 milliGRAM(s) Oral at bedtime  cadexomer iodine 0.9% Gel 1 Application(s) Topical daily  chlorhexidine 2% Cloths 1 Application(s) Topical <User Schedule>  clobetasol 0.05% Ointment 1 Application(s) Topical two times a day  clotrimazole 1% Cream 1 Application(s) Topical every 12 hours  dextrose 5%. 1000 milliLiter(s) (50 mL/Hr) IV Continuous <Continuous>  dextrose 5%. 1000 milliLiter(s) (100 mL/Hr) IV Continuous <Continuous>  dextrose 50% Injectable 25 Gram(s) IV Push once  dextrose 50% Injectable 12.5 Gram(s) IV Push once  dextrose 50% Injectable 25 Gram(s) IV Push once  furosemide    Tablet 40 milliGRAM(s) Oral two times a day  glucagon  Injectable 1 milliGRAM(s) IntraMuscular once  insulin glargine Injectable (LANTUS) 10 Unit(s) SubCutaneous at bedtime  insulin lispro (ADMELOG) corrective regimen sliding scale   SubCutaneous three times a day before meals  insulin lispro Injectable (ADMELOG) 5 Unit(s) SubCutaneous three times a day before meals  levothyroxine 200 MICROGram(s) Oral daily  multivitamin 1 Tablet(s) Oral daily  mupirocin 2% Nasal 1 Application(s) Both Nostrils two times a day  pantoprazole    Tablet 40 milliGRAM(s) Oral before breakfast  povidone iodine 10% Solution 1 Application(s) Topical daily  vancomycin  IVPB      vancomycin  IVPB 1000 milliGRAM(s) IV Intermittent every 12 hours    MEDICATIONS  (PRN):  acetaminophen     Tablet .. 650 milliGRAM(s) Oral every 6 hours PRN Temp greater or equal to 38C (100.4F), Mild Pain (1 - 3)  ALPRAZolam 2 milliGRAM(s) Oral at bedtime PRN Anxiety/insomnia  dextrose Oral Gel 15 Gram(s) Oral once PRN Blood Glucose LESS THAN 70 milliGRAM(s)/deciliter  melatonin 3 milliGRAM(s) Oral at bedtime PRN Insomnia      Vital Signs Last 24 Hrs  T(C): 36.6 (31 Dec 2024 13:12), Max: 37.9 (30 Dec 2024 21:03)  T(F): 97.9 (31 Dec 2024 13:12), Max: 100.3 (30 Dec 2024 21:03)  HR: 60 (31 Dec 2024 13:12) (60 - 67)  BP: 152/67 (31 Dec 2024 13:12) (127/66 - 152/67)  BP(mean): --  RR: 18 (31 Dec 2024 13:12) (18 - 18)  SpO2: 95% (31 Dec 2024 13:12) (91% - 95%)    Parameters below as of 31 Dec 2024 13:12  Patient On (Oxygen Delivery Method): room air      CAPILLARY BLOOD GLUCOSE      POCT Blood Glucose.: 242 mg/dL (31 Dec 2024 17:11)  POCT Blood Glucose.: 194 mg/dL (31 Dec 2024 12:21)  POCT Blood Glucose.: 196 mg/dL (31 Dec 2024 08:36)  POCT Blood Glucose.: 190 mg/dL (30 Dec 2024 22:28)  POCT Blood Glucose.: 177 mg/dL (30 Dec 2024 21:15)    I&O's Summary    30 Dec 2024 07:01  -  31 Dec 2024 07:00  --------------------------------------------------------  IN: 800 mL / OUT: 1300 mL / NET: -500 mL    31 Dec 2024 07:01  -  31 Dec 2024 20:11  --------------------------------------------------------  IN: 240 mL / OUT: 0 mL / NET: 240 mL          PHYSICAL EXAM:   GENERAL: NAD, well-developed  HEAD:  Atraumatic, Normocephalic  EYES: EOMI, PERRLA, conjunctiva and sclera clear  NECK: Supple, No JVD  CHEST/LUNG: Clear to auscultation bilaterally; No wheeze  HEART: S1S2 normal. Regular rate and rhythm; No murmurs, rubs, or gallops  ABDOMEN: Soft, Nontender, Nondistended; Bowel sounds present  EXTREMITIES:  2+ Peripheral Pulses,   PSYCH/Neuro: AAOx3. Non-focal.   SKIN: b/l stasis changes of LEs. rle erythma andwamrth better. bandaged rle/foot. mild edema in les.       LABS:                        9.7    7.30  )-----------( 229      ( 31 Dec 2024 07:08 )             31.2     12-31    142  |  100  |  17  ----------------------------<  184[H]  3.1[L]   |  28  |  1.13    Ca    8.9      31 Dec 2024 07:11    TPro  6.4  /  Alb  3.4  /  TBili  0.7  /  DBili  x   /  AST  8[L]  /  ALT  9[L]  /  AlkPhos  91  12-31          Urinalysis Basic - ( 31 Dec 2024 07:11 )    Color: x / Appearance: x / SG: x / pH: x  Gluc: 184 mg/dL / Ketone: x  / Bili: x / Urobili: x   Blood: x / Protein: x / Nitrite: x   Leuk Esterase: x / RBC: x / WBC x   Sq Epi: x / Non Sq Epi: x / Bacteria: x          RADIOLOGY & ADDITIONAL TESTS:    Imaging Personally Reviewed:  Consultant(s) Notes Reviewed:  id, derm, wound care, pods  Care Discussed with Consultants/Other Providers: ID  
Patient is a 74y old  Female who presents with a chief complaint of Stasis dermatitis w/ possible bacterial superinfection (30 Dec 2024 17:14)        SUBJECTIVE / OVERNIGHT EVENTS: right leg pain reported.       MEDICATIONS  (STANDING):  aMIOdarone    Tablet 200 milliGRAM(s) Oral daily  amLODIPine   Tablet 5 milliGRAM(s) Oral daily  apixaban 5 milliGRAM(s) Oral every 12 hours  aspirin enteric coated 81 milliGRAM(s) Oral daily  atorvastatin 40 milliGRAM(s) Oral at bedtime  cadexomer iodine 0.9% Gel 1 Application(s) Topical daily  ceFAZolin   IVPB 2000 milliGRAM(s) IV Intermittent every 8 hours  ceFAZolin   IVPB 1000 milliGRAM(s) IV Intermittent once  clotrimazole 1% Cream 1 Application(s) Topical every 12 hours  dextrose 5%. 1000 milliLiter(s) (50 mL/Hr) IV Continuous <Continuous>  dextrose 5%. 1000 milliLiter(s) (100 mL/Hr) IV Continuous <Continuous>  dextrose 50% Injectable 25 Gram(s) IV Push once  dextrose 50% Injectable 12.5 Gram(s) IV Push once  dextrose 50% Injectable 25 Gram(s) IV Push once  furosemide    Tablet 40 milliGRAM(s) Oral two times a day  glucagon  Injectable 1 milliGRAM(s) IntraMuscular once  insulin glargine Injectable (LANTUS) 10 Unit(s) SubCutaneous at bedtime  insulin lispro (ADMELOG) corrective regimen sliding scale   SubCutaneous three times a day before meals  insulin lispro Injectable (ADMELOG) 5 Unit(s) SubCutaneous three times a day before meals  levothyroxine 200 MICROGram(s) Oral daily  pantoprazole    Tablet 40 milliGRAM(s) Oral before breakfast  vancomycin  IVPB 1000 milliGRAM(s) IV Intermittent once  vancomycin  IVPB        MEDICATIONS  (PRN):  acetaminophen     Tablet .. 650 milliGRAM(s) Oral every 6 hours PRN Temp greater or equal to 38C (100.4F), Mild Pain (1 - 3)  ALPRAZolam 2 milliGRAM(s) Oral at bedtime PRN Anxiety/insomnia  dextrose Oral Gel 15 Gram(s) Oral once PRN Blood Glucose LESS THAN 70 milliGRAM(s)/deciliter  melatonin 3 milliGRAM(s) Oral at bedtime PRN Insomnia      Vital Signs Last 24 Hrs  T(C): 36.9 (30 Dec 2024 16:53), Max: 37.1 (30 Dec 2024 12:07)  T(F): 98.4 (30 Dec 2024 16:53), Max: 98.7 (30 Dec 2024 12:07)  HR: 79 (30 Dec 2024 16:53) (61 - 79)  BP: 133/63 (30 Dec 2024 16:53) (133/63 - 148/75)  BP(mean): --  RR: 18 (30 Dec 2024 16:53) (18 - 18)  SpO2: 92% (30 Dec 2024 16:53) (92% - 95%)    Parameters below as of 30 Dec 2024 16:53  Patient On (Oxygen Delivery Method): room air      CAPILLARY BLOOD GLUCOSE      POCT Blood Glucose.: 211 mg/dL (30 Dec 2024 17:19)  POCT Blood Glucose.: 210 mg/dL (30 Dec 2024 12:28)  POCT Blood Glucose.: 189 mg/dL (30 Dec 2024 08:23)  POCT Blood Glucose.: 254 mg/dL (29 Dec 2024 21:20)    I&O's Summary    29 Dec 2024 07:01  -  30 Dec 2024 07:00  --------------------------------------------------------  IN: 0 mL / OUT: 700 mL / NET: -700 mL    30 Dec 2024 07:01  -  30 Dec 2024 18:25  --------------------------------------------------------  IN: 0 mL / OUT: 700 mL / NET: -700 mL          PHYSICAL EXAM:   GENERAL: NAD, well-developed  HEAD:  Atraumatic, Normocephalic  EYES:  conjunctiva and sclera clear  NECK: Supple,    CHEST/LUNG: Clear to auscultation bilaterally; No wheeze  HEART: S1S2 normal. Regular rate and rhythm; No murmurs, rubs, or gallops  ABDOMEN: Soft, Nontender, Nondistended; Bowel sounds present  EXTREMITIES: mild LE edema.   PSYCH/Neuro: AAOx3. Non-focal.   SKIN: b/l LE erythema areas. RLE worse. Right ankle area below skin wound and heel blister.       LABS:                        10.3   7.28  )-----------( 221      ( 30 Dec 2024 07:08 )             33.3     12-30    142  |  104  |  16  ----------------------------<  196[H]  3.4[L]   |  27  |  1.08    Ca    9.0      30 Dec 2024 07:07    TPro  6.4  /  Alb  3.4  /  TBili  0.7  /  DBili  x   /  AST  8[L]  /  ALT  11  /  AlkPhos  90  12-30          Urinalysis Basic - ( 30 Dec 2024 07:07 )    Color: x / Appearance: x / SG: x / pH: x  Gluc: 196 mg/dL / Ketone: x  / Bili: x / Urobili: x   Blood: x / Protein: x / Nitrite: x   Leuk Esterase: x / RBC: x / WBC x   Sq Epi: x / Non Sq Epi: x / Bacteria: x      < from: Xray Femur 2 Views, Bilat (12.29.24 @ 17:17) >  FINDINGS/  IMPRESSION:  Diffuse bilateral soft tissueswelling. No tracking soft tissue gas. No   cortical erosion or periosteal new bone formation. No acute fracture. No   dislocation. Bilateral, right greater than left, tricompartmental   arthrosis of the knee joints, most pronounced in bilateral medial   compartments.    --- End of Report ---    < end of copied text >  < from: TTE W or WO Ultrasound Enhancing Agent (03.26.24 @ 11:37) >  CONCLUSIONS:      1. Left ventricular cavity is normal in size. Left ventricular wall thickness is normal. Left ventricular systolic function is normal with an ejection fraction of 57 % bySimpson's method of disks. There are no regional wall motion abnormalities seen.   2. There is mild (grade 1) left ventricular diastolic dysfunction.   3. Normal right ventricular cavity size, with normal wall thickness, and probably normal systolic function.   4. The left atrium is mildly dilated.   5. The right atrium is normal in size.   6. No pericardial effusion seen.    < end of copied text >        RADIOLOGY & ADDITIONAL TESTS:    Imaging Personally Reviewed: xray and le duplex reports.   Consultant(s) Notes Reviewed:  ID and pods  Care Discussed with Consultants/Other Providers:  
Patient is a 74y old  Female who presents with a chief complaint of Stasis dermatitis w/ possible bacterial superinfection (01 Jan 2025 14:43)        SUBJECTIVE / OVERNIGHT EVENTS: says feels better. still pain in right heel.      MEDICATIONS  (STANDING):  aMIOdarone    Tablet 200 milliGRAM(s) Oral daily  amLODIPine   Tablet 5 milliGRAM(s) Oral daily  apixaban 5 milliGRAM(s) Oral every 12 hours  aspirin enteric coated 81 milliGRAM(s) Oral daily  atorvastatin 40 milliGRAM(s) Oral at bedtime  cadexomer iodine 0.9% Gel 1 Application(s) Topical daily  chlorhexidine 2% Cloths 1 Application(s) Topical <User Schedule>  clobetasol 0.05% Ointment 1 Application(s) Topical two times a day  clotrimazole 1% Cream 1 Application(s) Topical every 12 hours  dextrose 5%. 1000 milliLiter(s) (50 mL/Hr) IV Continuous <Continuous>  dextrose 5%. 1000 milliLiter(s) (100 mL/Hr) IV Continuous <Continuous>  dextrose 50% Injectable 25 Gram(s) IV Push once  dextrose 50% Injectable 12.5 Gram(s) IV Push once  dextrose 50% Injectable 25 Gram(s) IV Push once  furosemide    Tablet 40 milliGRAM(s) Oral two times a day  glucagon  Injectable 1 milliGRAM(s) IntraMuscular once  insulin glargine Injectable (LANTUS) 10 Unit(s) SubCutaneous at bedtime  insulin lispro (ADMELOG) corrective regimen sliding scale   SubCutaneous three times a day before meals  insulin lispro (ADMELOG) corrective regimen sliding scale   SubCutaneous at bedtime  insulin lispro Injectable (ADMELOG) 5 Unit(s) SubCutaneous three times a day before meals  levothyroxine 200 MICROGram(s) Oral daily  multivitamin 1 Tablet(s) Oral daily  mupirocin 2% Nasal 1 Application(s) Both Nostrils two times a day  pantoprazole    Tablet 40 milliGRAM(s) Oral before breakfast  povidone iodine 10% Solution 1 Application(s) Topical daily  vancomycin  IVPB      vancomycin  IVPB 1000 milliGRAM(s) IV Intermittent every 12 hours    MEDICATIONS  (PRN):  acetaminophen     Tablet .. 650 milliGRAM(s) Oral every 6 hours PRN Temp greater or equal to 38C (100.4F), Mild Pain (1 - 3)  ALPRAZolam 2 milliGRAM(s) Oral at bedtime PRN Anxiety/insomnia  dextrose Oral Gel 15 Gram(s) Oral once PRN Blood Glucose LESS THAN 70 milliGRAM(s)/deciliter  melatonin 3 milliGRAM(s) Oral at bedtime PRN Insomnia      Vital Signs Last 24 Hrs  T(C): 36.8 (01 Jan 2025 13:15), Max: 36.9 (31 Dec 2024 23:53)  T(F): 98.2 (01 Jan 2025 13:15), Max: 98.4 (31 Dec 2024 23:53)  HR: 63 (01 Jan 2025 13:15) (62 - 65)  BP: 132/75 (01 Jan 2025 13:15) (114/71 - 133/72)  BP(mean): --  RR: 17 (01 Jan 2025 13:15) (17 - 18)  SpO2: 93% (01 Jan 2025 13:15) (93% - 94%)    Parameters below as of 01 Jan 2025 13:15  Patient On (Oxygen Delivery Method): room air      CAPILLARY BLOOD GLUCOSE      POCT Blood Glucose.: 249 mg/dL (01 Jan 2025 12:19)  POCT Blood Glucose.: 227 mg/dL (01 Jan 2025 08:08)  POCT Blood Glucose.: 261 mg/dL (31 Dec 2024 21:28)    I&O's Summary    31 Dec 2024 07:01  -  01 Jan 2025 07:00  --------------------------------------------------------  IN: 490 mL / OUT: 0 mL / NET: 490 mL    01 Jan 2025 07:01  -  01 Jan 2025 19:54  --------------------------------------------------------  IN: 240 mL / OUT: 700 mL / NET: -460 mL          PHYSICAL EXAM:   GENERAL: NAD, well-developed  HEAD:  Atraumatic, Normocephalic  EYES: EOMI, PERRLA, conjunctiva and sclera clear  NECK: Supple,    CHEST/LUNG: Clear to auscultation bilaterally; No wheeze  HEART: S1S2 normal. Regular rate and rhythm; No murmurs, rubs, or gallops  ABDOMEN: Soft, Nontender, Nondistended; Bowel sounds present  EXTREMITIES:  trace le edema  PSYCH/Neuro: AAOx3. Non-focal.   SKIN: right foot bandaged. rle erythema and skin improved.       LABS:                        9.9    7.39  )-----------( 254      ( 01 Jan 2025 07:15 )             31.6     01-01    140  |  99  |  20  ----------------------------<  246[H]  3.6   |  27  |  1.23    Ca    8.9      01 Jan 2025 07:15    TPro  6.4  /  Alb  3.4  /  TBili  0.7  /  DBili  x   /  AST  8[L]  /  ALT  9[L]  /  AlkPhos  91  12-31          Urinalysis Basic - ( 01 Jan 2025 07:15 )    Color: x / Appearance: x / SG: x / pH: x  Gluc: 246 mg/dL / Ketone: x  / Bili: x / Urobili: x   Blood: x / Protein: x / Nitrite: x   Leuk Esterase: x / RBC: x / WBC x   Sq Epi: x / Non Sq Epi: x / Bacteria: x          RADIOLOGY & ADDITIONAL TESTS:    Imaging Personally Reviewed:  Consultant(s) Notes Reviewed:  ID, wound, pods, derm  Care Discussed with Consultants/Other Providers:

## 2025-01-03 LAB
CULTURE RESULTS: SIGNIFICANT CHANGE UP
CULTURE RESULTS: SIGNIFICANT CHANGE UP
SPECIMEN SOURCE: SIGNIFICANT CHANGE UP
SPECIMEN SOURCE: SIGNIFICANT CHANGE UP

## 2025-01-06 ENCOUNTER — APPOINTMENT (OUTPATIENT)
Dept: INTERNAL MEDICINE | Facility: CLINIC | Age: 75
End: 2025-01-06

## 2025-01-09 ENCOUNTER — APPOINTMENT (OUTPATIENT)
Dept: CARDIOLOGY | Facility: CLINIC | Age: 75
End: 2025-01-09

## 2025-01-14 ENCOUNTER — APPOINTMENT (OUTPATIENT)
Dept: WOUND CARE | Facility: HOSPITAL | Age: 75
End: 2025-01-14

## 2025-01-14 ENCOUNTER — NON-APPOINTMENT (OUTPATIENT)
Age: 75
End: 2025-01-14

## 2025-01-17 ENCOUNTER — APPOINTMENT (OUTPATIENT)
Dept: DERMATOLOGY | Facility: CLINIC | Age: 75
End: 2025-01-17

## 2025-02-12 ENCOUNTER — APPOINTMENT (OUTPATIENT)
Dept: GYNECOLOGIC ONCOLOGY | Facility: CLINIC | Age: 75
End: 2025-02-12

## 2025-02-25 ENCOUNTER — APPOINTMENT (OUTPATIENT)
Dept: WOUND CARE | Facility: HOSPITAL | Age: 75
End: 2025-02-25
Payer: MEDICARE

## 2025-02-25 ENCOUNTER — OUTPATIENT (OUTPATIENT)
Dept: OUTPATIENT SERVICES | Facility: HOSPITAL | Age: 75
LOS: 1 days | End: 2025-02-25
Payer: MEDICARE

## 2025-02-25 VITALS — WEIGHT: 240 LBS | HEIGHT: 60 IN | BODY MASS INDEX: 47.12 KG/M2

## 2025-02-25 DIAGNOSIS — I89.0 LYMPHEDEMA, NOT ELSEWHERE CLASSIFIED: ICD-10-CM

## 2025-02-25 DIAGNOSIS — E89.0 POSTPROCEDURAL HYPOTHYROIDISM: Chronic | ICD-10-CM

## 2025-02-25 DIAGNOSIS — Z90.710 ACQUIRED ABSENCE OF BOTH CERVIX AND UTERUS: Chronic | ICD-10-CM

## 2025-02-25 DIAGNOSIS — I86.8 VARICOSE VEINS OF OTHER SPECIFIED SITES: Chronic | ICD-10-CM

## 2025-02-25 DIAGNOSIS — Z78.9 OTHER SPECIFIED HEALTH STATUS: Chronic | ICD-10-CM

## 2025-02-25 DIAGNOSIS — E11.9 TYPE 2 DIABETES MELLITUS W/OUT COMPLICATIONS: ICD-10-CM

## 2025-02-25 DIAGNOSIS — I87.2 VENOUS INSUFFICIENCY (CHRONIC) (PERIPHERAL): ICD-10-CM

## 2025-02-25 DIAGNOSIS — Z98.49 CATARACT EXTRACTION STATUS, UNSPECIFIED EYE: Chronic | ICD-10-CM

## 2025-02-25 PROCEDURE — 93922 UPR/L XTREMITY ART 2 LEVELS: CPT | Mod: 26

## 2025-02-25 PROCEDURE — 99203 OFFICE O/P NEW LOW 30 MIN: CPT

## 2025-02-25 PROCEDURE — 99213 OFFICE O/P EST LOW 20 MIN: CPT

## 2025-02-25 PROCEDURE — 93922 UPR/L XTREMITY ART 2 LEVELS: CPT

## 2025-02-26 ENCOUNTER — NON-APPOINTMENT (OUTPATIENT)
Age: 75
End: 2025-02-26

## 2025-03-03 ENCOUNTER — APPOINTMENT (OUTPATIENT)
Dept: ENDOCRINOLOGY | Facility: CLINIC | Age: 75
End: 2025-03-03

## 2025-03-04 DIAGNOSIS — I77.1 STRICTURE OF ARTERY: ICD-10-CM

## 2025-03-18 NOTE — DISCHARGE NOTE PROVIDER - NSDCCONDITION_GEN_ALL_CORE
March 18, 2025     Patient: Dinorah Ochoa   YOB: 1977   Date of Visit: 3/18/2025       To Whom it May Concern:    Dinorah Ochoa was seen in my clinic on 3/18/2025 at 8:30 am.    Please excuse Dinorah for her absence from work on the date listed above to be able to make her appointment.    Sincerely,         Estefany Stallings CNM    Medical information is confidential and cannot be disclosed without the written consent of the patient or her representative.    
Stable

## 2025-03-24 ENCOUNTER — RX RENEWAL (OUTPATIENT)
Age: 75
End: 2025-03-24

## 2025-04-05 ENCOUNTER — APPOINTMENT (OUTPATIENT)
Dept: ORTHOPEDIC SURGERY | Facility: CLINIC | Age: 75
End: 2025-04-05
Payer: MEDICARE

## 2025-04-05 DIAGNOSIS — M25.571 PAIN IN RIGHT ANKLE AND JOINTS OF RIGHT FOOT: ICD-10-CM

## 2025-04-05 PROCEDURE — 99213 OFFICE O/P EST LOW 20 MIN: CPT

## 2025-04-05 PROCEDURE — 73610 X-RAY EXAM OF ANKLE: CPT | Mod: RT

## 2025-04-05 PROCEDURE — 99203 OFFICE O/P NEW LOW 30 MIN: CPT

## 2025-04-07 ENCOUNTER — INPATIENT (INPATIENT)
Facility: HOSPITAL | Age: 75
LOS: 8 days | Discharge: HOME HEALTH | DRG: 300 | End: 2025-04-16
Attending: HOSPITALIST | Admitting: STUDENT IN AN ORGANIZED HEALTH CARE EDUCATION/TRAINING PROGRAM
Payer: MEDICARE

## 2025-04-07 VITALS
HEART RATE: 83 BPM | RESPIRATION RATE: 17 BRPM | WEIGHT: 229.94 LBS | SYSTOLIC BLOOD PRESSURE: 182 MMHG | HEIGHT: 61 IN | DIASTOLIC BLOOD PRESSURE: 78 MMHG | TEMPERATURE: 98 F | OXYGEN SATURATION: 94 %

## 2025-04-07 DIAGNOSIS — I63.9 CEREBRAL INFARCTION, UNSPECIFIED: ICD-10-CM

## 2025-04-07 DIAGNOSIS — Z78.9 OTHER SPECIFIED HEALTH STATUS: Chronic | ICD-10-CM

## 2025-04-07 DIAGNOSIS — J44.9 CHRONIC OBSTRUCTIVE PULMONARY DISEASE, UNSPECIFIED: ICD-10-CM

## 2025-04-07 DIAGNOSIS — Z90.710 ACQUIRED ABSENCE OF BOTH CERVIX AND UTERUS: Chronic | ICD-10-CM

## 2025-04-07 DIAGNOSIS — E78.5 HYPERLIPIDEMIA, UNSPECIFIED: ICD-10-CM

## 2025-04-07 DIAGNOSIS — Z98.49 CATARACT EXTRACTION STATUS, UNSPECIFIED EYE: Chronic | ICD-10-CM

## 2025-04-07 DIAGNOSIS — E03.9 HYPOTHYROIDISM, UNSPECIFIED: ICD-10-CM

## 2025-04-07 DIAGNOSIS — I48.91 UNSPECIFIED ATRIAL FIBRILLATION: ICD-10-CM

## 2025-04-07 DIAGNOSIS — E89.0 POSTPROCEDURAL HYPOTHYROIDISM: Chronic | ICD-10-CM

## 2025-04-07 DIAGNOSIS — K21.9 GASTRO-ESOPHAGEAL REFLUX DISEASE WITHOUT ESOPHAGITIS: ICD-10-CM

## 2025-04-07 DIAGNOSIS — L03.90 CELLULITIS, UNSPECIFIED: ICD-10-CM

## 2025-04-07 DIAGNOSIS — I86.8 VARICOSE VEINS OF OTHER SPECIFIED SITES: Chronic | ICD-10-CM

## 2025-04-07 DIAGNOSIS — I25.10 ATHEROSCLEROTIC HEART DISEASE OF NATIVE CORONARY ARTERY WITHOUT ANGINA PECTORIS: ICD-10-CM

## 2025-04-07 DIAGNOSIS — K59.00 CONSTIPATION, UNSPECIFIED: ICD-10-CM

## 2025-04-07 DIAGNOSIS — E11.9 TYPE 2 DIABETES MELLITUS WITHOUT COMPLICATIONS: ICD-10-CM

## 2025-04-07 DIAGNOSIS — I87.2 VENOUS INSUFFICIENCY (CHRONIC) (PERIPHERAL): ICD-10-CM

## 2025-04-07 DIAGNOSIS — I10 ESSENTIAL (PRIMARY) HYPERTENSION: ICD-10-CM

## 2025-04-07 LAB
ALBUMIN SERPL ELPH-MCNC: 3.6 G/DL — SIGNIFICANT CHANGE UP (ref 3.3–5)
ALP SERPL-CCNC: 102 U/L — SIGNIFICANT CHANGE UP (ref 40–120)
ALT FLD-CCNC: 10 U/L — SIGNIFICANT CHANGE UP (ref 10–45)
ANION GAP SERPL CALC-SCNC: 17 MMOL/L — SIGNIFICANT CHANGE UP (ref 5–17)
AST SERPL-CCNC: 12 U/L — SIGNIFICANT CHANGE UP (ref 10–40)
BASOPHILS # BLD AUTO: 0.07 K/UL — SIGNIFICANT CHANGE UP (ref 0–0.2)
BASOPHILS NFR BLD AUTO: 0.5 % — SIGNIFICANT CHANGE UP (ref 0–2)
BILIRUB SERPL-MCNC: 0.6 MG/DL — SIGNIFICANT CHANGE UP (ref 0.2–1.2)
BUN SERPL-MCNC: 17 MG/DL — SIGNIFICANT CHANGE UP (ref 7–23)
CALCIUM SERPL-MCNC: 9.1 MG/DL — SIGNIFICANT CHANGE UP (ref 8.4–10.5)
CHLORIDE SERPL-SCNC: 100 MMOL/L — SIGNIFICANT CHANGE UP (ref 96–108)
CO2 SERPL-SCNC: 21 MMOL/L — LOW (ref 22–31)
CREAT SERPL-MCNC: 1.09 MG/DL — SIGNIFICANT CHANGE UP (ref 0.5–1.3)
EGFR: 53 ML/MIN/1.73M2 — LOW
EGFR: 53 ML/MIN/1.73M2 — LOW
EOSINOPHIL # BLD AUTO: 0.33 K/UL — SIGNIFICANT CHANGE UP (ref 0–0.5)
EOSINOPHIL NFR BLD AUTO: 2.5 % — SIGNIFICANT CHANGE UP (ref 0–6)
GAS PNL BLDV: SIGNIFICANT CHANGE UP
GLUCOSE BLDC GLUCOMTR-MCNC: 238 MG/DL — HIGH (ref 70–99)
GLUCOSE SERPL-MCNC: 248 MG/DL — HIGH (ref 70–99)
HCT VFR BLD CALC: 34.9 % — SIGNIFICANT CHANGE UP (ref 34.5–45)
HGB BLD-MCNC: 10.6 G/DL — LOW (ref 11.5–15.5)
IMM GRANULOCYTES NFR BLD AUTO: 0.8 % — SIGNIFICANT CHANGE UP (ref 0–0.9)
LYMPHOCYTES # BLD AUTO: 1.55 K/UL — SIGNIFICANT CHANGE UP (ref 1–3.3)
LYMPHOCYTES # BLD AUTO: 11.9 % — LOW (ref 13–44)
MCHC RBC-ENTMCNC: 25.9 PG — LOW (ref 27–34)
MCHC RBC-ENTMCNC: 30.4 G/DL — LOW (ref 32–36)
MCV RBC AUTO: 85.3 FL — SIGNIFICANT CHANGE UP (ref 80–100)
MONOCYTES # BLD AUTO: 0.74 K/UL — SIGNIFICANT CHANGE UP (ref 0–0.9)
MONOCYTES NFR BLD AUTO: 5.7 % — SIGNIFICANT CHANGE UP (ref 2–14)
NEUTROPHILS # BLD AUTO: 10.21 K/UL — HIGH (ref 1.8–7.4)
NEUTROPHILS NFR BLD AUTO: 78.6 % — HIGH (ref 43–77)
NRBC BLD AUTO-RTO: 0 /100 WBCS — SIGNIFICANT CHANGE UP (ref 0–0)
PLATELET # BLD AUTO: 282 K/UL — SIGNIFICANT CHANGE UP (ref 150–400)
POTASSIUM SERPL-MCNC: 3.8 MMOL/L — SIGNIFICANT CHANGE UP (ref 3.5–5.3)
POTASSIUM SERPL-SCNC: 3.8 MMOL/L — SIGNIFICANT CHANGE UP (ref 3.5–5.3)
PROCALCITONIN SERPL-MCNC: 0.07 NG/ML — SIGNIFICANT CHANGE UP (ref 0.02–0.1)
PROT SERPL-MCNC: 7.1 G/DL — SIGNIFICANT CHANGE UP (ref 6–8.3)
RBC # BLD: 4.09 M/UL — SIGNIFICANT CHANGE UP (ref 3.8–5.2)
RBC # FLD: 13.7 % — SIGNIFICANT CHANGE UP (ref 10.3–14.5)
SODIUM SERPL-SCNC: 138 MMOL/L — SIGNIFICANT CHANGE UP (ref 135–145)
WBC # BLD: 13.01 K/UL — HIGH (ref 3.8–10.5)
WBC # FLD AUTO: 13.01 K/UL — HIGH (ref 3.8–10.5)

## 2025-04-07 PROCEDURE — 93970 EXTREMITY STUDY: CPT | Mod: 26

## 2025-04-07 PROCEDURE — 99285 EMERGENCY DEPT VISIT HI MDM: CPT | Mod: GC

## 2025-04-07 PROCEDURE — 99223 1ST HOSP IP/OBS HIGH 75: CPT

## 2025-04-07 PROCEDURE — 93010 ELECTROCARDIOGRAM REPORT: CPT

## 2025-04-07 RX ORDER — DEXTROSE 50 % IN WATER 50 %
25 SYRINGE (ML) INTRAVENOUS ONCE
Refills: 0 | Status: DISCONTINUED | OUTPATIENT
Start: 2025-04-07 | End: 2025-04-16

## 2025-04-07 RX ORDER — BISACODYL 5 MG
5 TABLET, DELAYED RELEASE (ENTERIC COATED) ORAL DAILY
Refills: 0 | Status: DISCONTINUED | OUTPATIENT
Start: 2025-04-07 | End: 2025-04-13

## 2025-04-07 RX ORDER — SODIUM CHLORIDE 9 G/1000ML
1000 INJECTION, SOLUTION INTRAVENOUS
Refills: 0 | Status: DISCONTINUED | OUTPATIENT
Start: 2025-04-07 | End: 2025-04-16

## 2025-04-07 RX ORDER — CLOTRIMAZOLE 1 G/100G
1 CREAM TOPICAL
Refills: 0 | Status: DISCONTINUED | OUTPATIENT
Start: 2025-04-07 | End: 2025-04-07

## 2025-04-07 RX ORDER — FUROSEMIDE 10 MG/ML
40 INJECTION INTRAMUSCULAR; INTRAVENOUS DAILY
Refills: 0 | Status: DISCONTINUED | OUTPATIENT
Start: 2025-04-07 | End: 2025-04-16

## 2025-04-07 RX ORDER — INSULIN GLARGINE-YFGN 100 [IU]/ML
10 INJECTION, SOLUTION SUBCUTANEOUS AT BEDTIME
Refills: 0 | Status: DISCONTINUED | OUTPATIENT
Start: 2025-04-07 | End: 2025-04-10

## 2025-04-07 RX ORDER — NALOXONE HYDROCHLORIDE 0.4 MG/ML
0.4 INJECTION, SOLUTION INTRAMUSCULAR; INTRAVENOUS; SUBCUTANEOUS ONCE
Refills: 0 | Status: DISCONTINUED | OUTPATIENT
Start: 2025-04-07 | End: 2025-04-16

## 2025-04-07 RX ORDER — ONDANSETRON HCL/PF 4 MG/2 ML
4 VIAL (ML) INJECTION EVERY 8 HOURS
Refills: 0 | Status: DISCONTINUED | OUTPATIENT
Start: 2025-04-07 | End: 2025-04-16

## 2025-04-07 RX ORDER — DEXTROSE 50 % IN WATER 50 %
12.5 SYRINGE (ML) INTRAVENOUS ONCE
Refills: 0 | Status: DISCONTINUED | OUTPATIENT
Start: 2025-04-07 | End: 2025-04-16

## 2025-04-07 RX ORDER — MELATONIN 5 MG
3 TABLET ORAL AT BEDTIME
Refills: 0 | Status: DISCONTINUED | OUTPATIENT
Start: 2025-04-07 | End: 2025-04-07

## 2025-04-07 RX ORDER — OXYCODONE HYDROCHLORIDE 30 MG/1
5 TABLET ORAL ONCE
Refills: 0 | Status: DISCONTINUED | OUTPATIENT
Start: 2025-04-07 | End: 2025-04-07

## 2025-04-07 RX ORDER — AMIODARONE HYDROCHLORIDE 50 MG/ML
200 INJECTION, SOLUTION INTRAVENOUS DAILY
Refills: 0 | Status: DISCONTINUED | OUTPATIENT
Start: 2025-04-07 | End: 2025-04-16

## 2025-04-07 RX ORDER — INSULIN LISPRO 100 U/ML
INJECTION, SOLUTION INTRAVENOUS; SUBCUTANEOUS
Refills: 0 | Status: DISCONTINUED | OUTPATIENT
Start: 2025-04-07 | End: 2025-04-16

## 2025-04-07 RX ORDER — ACETAMINOPHEN 500 MG/5ML
1000 LIQUID (ML) ORAL ONCE
Refills: 0 | Status: COMPLETED | OUTPATIENT
Start: 2025-04-07 | End: 2025-04-07

## 2025-04-07 RX ORDER — LEVOTHYROXINE SODIUM 300 MCG
175 TABLET ORAL DAILY
Refills: 0 | Status: DISCONTINUED | OUTPATIENT
Start: 2025-04-07 | End: 2025-04-10

## 2025-04-07 RX ORDER — POVIDONE-IODINE 7.5 %
1 SOLUTION, NON-ORAL TOPICAL DAILY
Refills: 0 | Status: DISCONTINUED | OUTPATIENT
Start: 2025-04-07 | End: 2025-04-16

## 2025-04-07 RX ORDER — VANCOMYCIN HCL IN 5 % DEXTROSE 1.5G/250ML
1000 PLASTIC BAG, INJECTION (ML) INTRAVENOUS ONCE
Refills: 0 | Status: COMPLETED | OUTPATIENT
Start: 2025-04-07 | End: 2025-04-07

## 2025-04-07 RX ORDER — MAGNESIUM, ALUMINUM HYDROXIDE 200-200 MG
30 TABLET,CHEWABLE ORAL EVERY 4 HOURS
Refills: 0 | Status: DISCONTINUED | OUTPATIENT
Start: 2025-04-07 | End: 2025-04-16

## 2025-04-07 RX ORDER — ALPRAZOLAM 0.5 MG
2 TABLET, EXTENDED RELEASE 24 HR ORAL AT BEDTIME
Refills: 0 | Status: DISCONTINUED | OUTPATIENT
Start: 2025-04-07 | End: 2025-04-14

## 2025-04-07 RX ORDER — CADEXOMER IODINE 0.9 %
1 PADS, MEDICATED (EA) TOPICAL DAILY
Refills: 0 | Status: DISCONTINUED | OUTPATIENT
Start: 2025-04-07 | End: 2025-04-16

## 2025-04-07 RX ORDER — CLOTRIMAZOLE 1 G/100G
1 CREAM TOPICAL
Refills: 0 | Status: DISCONTINUED | OUTPATIENT
Start: 2025-04-07 | End: 2025-04-09

## 2025-04-07 RX ORDER — APIXABAN 2.5 MG/1
5 TABLET, FILM COATED ORAL EVERY 12 HOURS
Refills: 0 | Status: DISCONTINUED | OUTPATIENT
Start: 2025-04-07 | End: 2025-04-16

## 2025-04-07 RX ORDER — BUDESONIDE 90 UG/1
0.5 AEROSOL, POWDER RESPIRATORY (INHALATION) EVERY 12 HOURS
Refills: 0 | Status: DISCONTINUED | OUTPATIENT
Start: 2025-04-07 | End: 2025-04-16

## 2025-04-07 RX ORDER — GLUCAGON 3 MG/1
1 POWDER NASAL ONCE
Refills: 0 | Status: DISCONTINUED | OUTPATIENT
Start: 2025-04-07 | End: 2025-04-16

## 2025-04-07 RX ORDER — OXYCODONE HYDROCHLORIDE 30 MG/1
5 TABLET ORAL EVERY 4 HOURS
Refills: 0 | Status: DISCONTINUED | OUTPATIENT
Start: 2025-04-07 | End: 2025-04-11

## 2025-04-07 RX ORDER — DEXTROSE 50 % IN WATER 50 %
15 SYRINGE (ML) INTRAVENOUS ONCE
Refills: 0 | Status: DISCONTINUED | OUTPATIENT
Start: 2025-04-07 | End: 2025-04-16

## 2025-04-07 RX ORDER — OXYCODONE HYDROCHLORIDE 30 MG/1
10 TABLET ORAL EVERY 4 HOURS
Refills: 0 | Status: DISCONTINUED | OUTPATIENT
Start: 2025-04-07 | End: 2025-04-13

## 2025-04-07 RX ORDER — SENNA 187 MG
2 TABLET ORAL AT BEDTIME
Refills: 0 | Status: DISCONTINUED | OUTPATIENT
Start: 2025-04-07 | End: 2025-04-16

## 2025-04-07 RX ORDER — INSULIN LISPRO 100 U/ML
5 INJECTION, SOLUTION INTRAVENOUS; SUBCUTANEOUS
Refills: 0 | Status: DISCONTINUED | OUTPATIENT
Start: 2025-04-07 | End: 2025-04-09

## 2025-04-07 RX ORDER — POLYETHYLENE GLYCOL 3350 17 G/17G
17 POWDER, FOR SOLUTION ORAL DAILY
Refills: 0 | Status: DISCONTINUED | OUTPATIENT
Start: 2025-04-07 | End: 2025-04-16

## 2025-04-07 RX ORDER — VANCOMYCIN HCL IN 5 % DEXTROSE 1.5G/250ML
1000 PLASTIC BAG, INJECTION (ML) INTRAVENOUS EVERY 12 HOURS
Refills: 0 | Status: DISCONTINUED | OUTPATIENT
Start: 2025-04-07 | End: 2025-04-11

## 2025-04-07 RX ORDER — MELATONIN 5 MG
5 TABLET ORAL AT BEDTIME
Refills: 0 | Status: DISCONTINUED | OUTPATIENT
Start: 2025-04-07 | End: 2025-04-16

## 2025-04-07 RX ORDER — AMLODIPINE BESYLATE 10 MG/1
5 TABLET ORAL DAILY
Refills: 0 | Status: DISCONTINUED | OUTPATIENT
Start: 2025-04-07 | End: 2025-04-16

## 2025-04-07 RX ORDER — ASPIRIN 325 MG
81 TABLET ORAL DAILY
Refills: 0 | Status: DISCONTINUED | OUTPATIENT
Start: 2025-04-07 | End: 2025-04-16

## 2025-04-07 RX ORDER — ALBUTEROL SULFATE 2.5 MG/3ML
2 VIAL, NEBULIZER (ML) INHALATION EVERY 6 HOURS
Refills: 0 | Status: DISCONTINUED | OUTPATIENT
Start: 2025-04-07 | End: 2025-04-16

## 2025-04-07 RX ORDER — ACETAMINOPHEN 500 MG/5ML
650 LIQUID (ML) ORAL EVERY 6 HOURS
Refills: 0 | Status: DISCONTINUED | OUTPATIENT
Start: 2025-04-07 | End: 2025-04-16

## 2025-04-07 RX ORDER — CEFEPIME 2 G/20ML
1000 INJECTION, POWDER, FOR SOLUTION INTRAVENOUS ONCE
Refills: 0 | Status: COMPLETED | OUTPATIENT
Start: 2025-04-07 | End: 2025-04-07

## 2025-04-07 RX ORDER — LIDOCAINE HYDROCHLORIDE 20 MG/ML
1 JELLY TOPICAL DAILY
Refills: 0 | Status: DISCONTINUED | OUTPATIENT
Start: 2025-04-07 | End: 2025-04-16

## 2025-04-07 RX ADMIN — OXYCODONE HYDROCHLORIDE 5 MILLIGRAM(S): 30 TABLET ORAL at 15:58

## 2025-04-07 RX ADMIN — CEFEPIME 100 MILLIGRAM(S): 2 INJECTION, POWDER, FOR SOLUTION INTRAVENOUS at 17:53

## 2025-04-07 RX ADMIN — Medication 250 MILLIGRAM(S): at 18:37

## 2025-04-07 RX ADMIN — Medication 5 MILLIGRAM(S): at 23:13

## 2025-04-07 RX ADMIN — Medication 2 MILLIGRAM(S): at 21:50

## 2025-04-07 RX ADMIN — INSULIN GLARGINE-YFGN 10 UNIT(S): 100 INJECTION, SOLUTION SUBCUTANEOUS at 22:03

## 2025-04-07 RX ADMIN — Medication 400 MILLIGRAM(S): at 13:29

## 2025-04-07 RX ADMIN — Medication 2 TABLET(S): at 21:51

## 2025-04-07 NOTE — ED PROVIDER NOTE - PHYSICAL EXAMINATION
Physical Exam:  Gen: NAD. AOx3, obese   Head: NCAT  HEENT: EOMI, PEERLA, normal conjunctiva, tongue midline, oral mucosa moist  Lung: CTAB, no respiratory distress, no wheezes/rhonchi/rales B/L, speaking in full sentences  CV: RRR, no murmurs, rubs or gallops  Abd: soft, NT, ND, no guarding, no rigidity, no rebound tenderness, no CVA tenderness   MSK: no visible deformities, ROM normal in UE/LE, no back pain  Neuro: No focal sensory or motor deficits  Skin: erythema to anterior aspect of RLE, ulcer to R posterior aspect of heel and posterior aspect of LLE, no drainage. Leg swelling w/o pitting edema.   Psych: normal affect, calm

## 2025-04-07 NOTE — PHYSICAL THERAPY INITIAL EVALUATION ADULT - ADDITIONAL COMMENTS
Prior to admission patient required some assist with functional mbility and ADLs provided by her  has recently required more assist ~last 3 weeks, pt unable to ambulate with RW as she normally would has used W/C for most mobility. Pt lives in a  with ramp access and 1st floor setup inside. Prior to admission patient required some assist with functional mobility and ADLs provided by her  has recently required more assist ~last 3 weeks, pt unable to ambulate with RW as she normally would has used W/C for most mobility. Pt lives in a  with ramp access and 1st floor setup inside.

## 2025-04-07 NOTE — PHYSICAL THERAPY INITIAL EVALUATION ADULT - NSPTDISCHREC_GEN_A_CORE
TBD Pending functional mobility If pt d/c home would require home PT, assist with all mobility, & DME: mechanical pt lift device (joana), pt owns other necessary DME./[facility]

## 2025-04-07 NOTE — ED PROVIDER NOTE - PROGRESS NOTE DETAILS
nAa CORNELIUS, PGY-2;  Review of blood work slightly elevated white blood cell count, procalcitonin negative, beta-hydroxybutyrate 0.1.  Slightly hyperglycemic to 248.  Pending Dopplers lower extremities, patient refused to talk to PT, however will touch base. Ana CORNELIUS, PGY-2;  PT recommending FRANCES, discussed all results with patient including negative DVT study. Will treat RLE erythema w/ abx due to elevated wbc. Attending MD Morel: As above, unable to ambulate, will admit

## 2025-04-07 NOTE — H&P ADULT - HISTORY OF PRESENT ILLNESS
75yo f w pmh obesity, asthma/copd/idalia, htn, hld, dm, cva w residual r sided weakness, afib, cad, gerd, hiatal hernia, constipation, lymphedema/chronic venous stasis/varicosities, hypothyroidism, anx/dep, p/w bl le, r>l, pain, redness, swelling; in er, c/f cellulitis; admit to medicine for further mgmt 73yo f w pmh obesity, asthma/copd/idalia, htn, hld, dm, cva w residual r sided weakness, afib, cad, gerd, hiatal hernia, constipation, lymphedema/chronic venous stasis/varicosities, hypothyroidism, anx/dep, p/w bl le, r>l, pain, redness, swelling; patient has at least some level of discomfort over the affected areas, but over the last few days, pain increased in intensity, so much so that patient is unable to ambulate comfortably. no relief with tylenol. patient and family grew concerned so had patient brought to [facility] er for further evaluation. of note, hospitalized for similar presentation 12/29/24-1/1/25; at that time, found to have stasis dermatitis flare w superimposed cellulitis; found to be mrsa swab + and wood lamp +; id, derm, podiatry, wound care consulted; patient treated with iv abx vancomycin (transitioned to po abx doxycycline and amoxicillin on discharge), steroid ointment, antifungal cream, antiseptic solution and wound dressings. in er, c/f cellulitis; admit to medicine for further mgmt

## 2025-04-07 NOTE — PATIENT PROFILE ADULT - HAS THE PATIENT USED TOBACCO IN THE PAST 30 DAYS?
Please request  BG update for Dr Hale's review.      No update last week.    20w1d  DMT1 on pump    Next visit Dr Hale 2/24/21.     No

## 2025-04-07 NOTE — ED ADULT NURSE NOTE - OBJECTIVE STATEMENT
Patient is a 74y female complaining of bilateral lower leg pain. PMH stroke 3 months ago (right side weakness), CHF, A-fib, DM, HTN. Takes furosemide daily. AOx4 complains of bilateral leg swelling, right ankle pain, and left inner calf pain. Reports pain is a 10 out of 10, typically a 5 but worsened past few days. Took tylenol yesterday, provided no relief. The left calf has a small wound, the surrounding skin is dry, red and flat. Right ankle skin is also dry, flat red rash. Patients  cleans and dresses wounds at home using antiseptic and gauze. Denies  chest pain, headache, SOB, wound drainage/bleeding. Patient is breathing spontaneous, chest rise symmetrical. Abd is rounded. Pt placed on purewick. Uses wheelchair at home. safety and comfort measures provided.  at bedside.

## 2025-04-07 NOTE — PATIENT PROFILE ADULT - OVER THE PAST TWO WEEKS HAVE YOU FELT DOWN, DEPRESSED OR HOPELESS?
Pt would like to go back to cardiac rehab.  Order is in - please make sure that pt needs to speak with wife
no

## 2025-04-07 NOTE — PATIENT PROFILE ADULT - DEAF OR HARD OF HEARING?
Birth Control Pills Pregnancy And Lactation Text: This medication should be avoided if pregnant and for the first 30 days post-partum. yes

## 2025-04-07 NOTE — H&P ADULT - NSICDXPASTMEDICALHX_GEN_ALL_CORE_FT
PAST MEDICAL HISTORY:  AF (atrial fibrillation) on Xarelto    Anxiety     Blind right eye     CAD (coronary artery disease) minimal CAD per cath result in 2014    Constipation     Depression     Edema of lower extremity     Former smoker     Gastritis     H/O Helicobacter infection     Hiatal hernia     History of TIAs     [place] (hard of hearing)     HTN (hypertension)     Hypercholesterolemia     Hypothyroid     Incontinence     Loss of vision right eye    Morbid obesity     Murmur     Neuropathy     OAB (overactive bladder)     Palpitations     Risk factors for obstructive sleep apnea     Spinal stenosis     Thickened endometrium     Thyroid nodule     Type 2 diabetes mellitus     Uterine cancer     Uterine cyst     Varicose veins

## 2025-04-07 NOTE — ED PROVIDER NOTE - SHIFT CHANGE DETAILS
Attending MD Morel: 74F w chronic bl LE edema, obese, walks w walker at baseline, previous admissions for LE edema, R lower leg and heel pain, XR at podiatry seem ok, pending US bl DVT, PT eval, if dc Augmentin/Doxy, wound care appt in 2 days

## 2025-04-07 NOTE — H&P ADULT - NSHPREVIEWOFSYSTEMS_GEN_ALL_CORE
CONSTITUTIONAL: No fever. no weakness  ENMT:  No sinus or throat pain  RESPIRATORY: No cough, wheezing, chills or hemoptysis; No shortness of breath  CARDIOVASCULAR: No chest pain, palpitations, dizziness, or leg swelling  GASTROINTESTINAL: No abdominal or epigastric pain. No nausea, vomiting, or hematemesis; No diarrhea or constipation. No melena or hematochezia.  GENITOURINARY: No dysuria or incontinence  NEUROLOGICAL: No headaches, memory loss, loss of strength, numbness, or tremors  ENDOCRINE: No heat or cold intolerance; No hair loss  MUSCULOSKELETAL/SKIN: bl, r>l, pain, redness, swelling  PSYCHIATRIC: No depression, anxiety, mood swings, or difficulty sleeping  HEME/LYMPH: No easy bruising, or bleeding gums; no enlarged LN

## 2025-04-07 NOTE — H&P ADULT - NSHPPHYSICALEXAM_GEN_ALL_CORE
T(C): 36.8 (04-07-25 @ 23:01), Max: 37.1 (04-07-25 @ 14:58)  HR: 66 (04-07-25 @ 19:27) (66 - 84)  BP: 118/74 (04-07-25 @ 19:27) (118/74 - 182/78)  RR: 17 (04-07-25 @ 19:27) (17 - 19)  SpO2: 93% (04-07-25 @ 19:27) (93% - 95%)  GENERAL: NAD, lying in bed    EYES: EOMI, PERRLA; conjunctiva and sclera clear  ENMT: Moist oral mucosa, no pharyngeal injection or exudate   NECK: Supple, no palpable masses; no JVD  RESPIRATORY: Normal respiratory effort; lungs are clear to auscultation bilaterally  CARDIOVASCULAR: Regular rate and rhythm, normal S1 and S2, no murmur/rub/gallop; + lower extremity edema; Peripheral pulses are 2+ bilaterally  ABDOMEN: Nontender to palpation, normoactive bowel sounds, no rebound/guarding   MUSCULOSKELETAL/SKIN; erythema to anterior aspect of RLE, ulcer to R posterior aspect of heel  PSYCH: A+O to person, place, and time; affect appropriate  NEUROLOGY: CN 2-12 are intact and symmetric; no gross motor or sensory deficits

## 2025-04-07 NOTE — H&P ADULT - PROBLEM SELECTOR PLAN 1
h/o lymphedema/chronic venous stasis/varicosities  a/w r heel wound, nonhealing  recurrent hospitalizations for the same; prev derm, id, podiatry, wound care documentation reviewed  previously, woods lamp + and mrsa screen +  mild leukocytosis w lactic acidosis; otherwise, afebrile and hds  s/p vanc, cefepime in ER  follow up vascular studies, mrsa screen, inflammatory markers   continue broad spec iv abx with vanc for now   analgesics and antipyretics as needed   elevate extremity + ice packs  wound care consult in am  podiatry consult in am

## 2025-04-07 NOTE — ED PROVIDER NOTE - OBJECTIVE STATEMENT
75 y/o female w/ PMHx recent CVA w/ R-sided weakness, paroxysmal atrial fibrillation on Eliquis, T2DM, HTN, HLD, hypothyroidism 2/2 thyroidectomy, stasis dermatitis, and anxiety presenting to the emergency department for bilateral lower extremity swelling, right heel pain.  Patient states at baseline walks with a walker, has been unable to walk secondary to pain.  Of note patient has been applying wound care to right heel and posterior aspect of left leg, states that her right anterior leg is becoming more erythematous and painful.  Review of relevant EMR indicates the patient was admitted in December 2024, at that time had cellulitis of the right leg with associated venous stasis.  Patient was on vancomycin and then switched to doxycycline and amoxicillin.  Patient denies chest pain, shortness of breath, fever, chills, body aches, nausea, vomiting.

## 2025-04-07 NOTE — PATIENT PROFILE ADULT - FUNCTIONAL ASSESSMENT - DAILY ACTIVITY 5.
No complaints No complaints No complaints No complaints No complaints No complaints No complaints No complaints No complaints No complaints No complaints No complaints No complaints 2 = A lot of assistance

## 2025-04-07 NOTE — PHYSICAL THERAPY INITIAL EVALUATION ADULT - PERTINENT HX OF CURRENT PROBLEM, REHAB EVAL
Social work: Noted PT/OT recommendation of SNF. Patient is agreeable, choice given of [address] [place] [address] or [facility]. Referral faxed to central intake for both. TAWNY started. · HPI Objective Statement: 75 y/o female w/ PMHx recent CVA w/ R-sided weakness, paroxysmal atrial fibrillation on Eliquis, T2DM, HTN, HLD, hypothyroidism 2/2 thyroidectomy, stasis dermatitis, and anxiety presenting to the emergency department for bilateral lower extremity swelling, right heel pain.  Patient states at baseline walks with a walker, has been unable to walk secondary to pain.  Of note patient has been applying wound care to right heel and posterior aspect of left leg, states that her right anterior leg is becoming more erythematous and painful.  Review of relevant EMR indicates the patient was admitted in December 2024, at that time had cellulitis of the right leg with associated venous stasis.  Patient was on vancomycin and then switched to doxycycline and amoxicillin.  Patient denies chest pain, shortness of breath, fever, chills, body aches, nausea, vomiting.

## 2025-04-07 NOTE — ED PROVIDER NOTE - NSICDXPASTMEDICALHX_GEN_ALL_CORE_FT
PAST MEDICAL HISTORY:  AF (atrial fibrillation) on Xarelto    Anxiety     Blind right eye     CAD (coronary artery disease) minimal CAD per cath result in 2014    Constipation     Depression     Edema of lower extremity     Former smoker     Gastritis     H/O Helicobacter infection     Hiatal hernia     History of TIAs     [place] (hard of hearing)     HTN (hypertension)     Hypercholesterolemia     Hypothyroid     Incontinence     Loss of vision right eye    Morbid obesity     Murmur     Neuropathy     OAB (overactive bladder)     Palpitations     Risk factors for obstructive sleep apnea     Spinal stenosis     Thickened endometrium     Thyroid nodule     Type 2 diabetes mellitus     Uterine cancer     Uterine cyst     Varicose veins

## 2025-04-07 NOTE — H&P ADULT - PROBLEM SELECTOR PLAN 5
fu a1c  trend fingerstick glu  glargine 10u qhs + lispro 5u tidac w low dose correctional lispro  adjust to goal glu 100-180  carb consistent diet

## 2025-04-07 NOTE — PATIENT PROFILE ADULT - VISION (WITH CORRECTIVE LENSES IF THE PATIENT USUALLY WEARS THEM):
Right eye loss of vision/Partially impaired: cannot see medication labels or newsprint, but can see obstacles in path, and the surrounding layout; can count fingers at arm's length

## 2025-04-07 NOTE — ED PROVIDER NOTE - ATTENDING CONTRIBUTION TO CARE
Patient is a 74-year-old female with a history of type 2 diabetes mellitus, hypertension, hyperlipidemia, hypothyroidism, paroxysmal atrial fibrillation on Eliquis, bilateral lower extremity stasis dermatitis, anxiety here with her  for evaluation of right heel pain and lower leg pain.  Patient has chronic lower extremity edema.  She reports that in the past few days her right heel and right anterior lower leg has become more red and painful.  She is having difficulty bearing weight.  At baseline, patient uses a walker.  Patient's  is at bedside.  He states that they went to podiatry and had x-rays done that did not show any abnormalities.  Symptoms were thought to be secondary to the edema.  He noted that the area of pain became more erythematous in the past few days.  Patient denies any fevers, chills, nausea or vomiting.  No injuries.  Patient did follow-up with wound care about 1 month ago.  She states that they were told that somebody would be sent to the house to take measurements for her for the next treatment.  She reports that nobody came.  She states that she has an appointment in 2 days.  Upon chart review, patient was admitted in December 2024 for cellulitis.  At that time symptoms resolved with antibiotics.  Patient was initially on vancomycin and then sent home on doxycycline and amoxicillin.  Patient reports that she has been taking Tylenol for pain without any relief.      VS noted  Gen. no acute distress, Non toxic   HEENT: EOMI, mmm  Lungs: CTAB/L no C/ W /R   CVS: RRR   Abd; Soft non tender, non distended   Ext:  bilateral lower extremities with chronic edema, right lower extremity with area of focal erythema and tenderness on the right dorsal aspect of the foot and ankle.  Skin: no rash  Neuro AAOx3 non focal clear speech  a/p:   RLE pain–patient has erythema and reports that this is a new symptom over the past 3 to 4 days.  She reports pain with bearing weight.  Very low suspicion for DVT but will get Doppler ultrasound.  Plan for pain control, PT eval.  If pain is able to be controlled, plan to DC with Doxy and Augmentin as patient has outpatient follow-up with wound care.  See progress notes for updates.  - Taurus STEIN

## 2025-04-07 NOTE — ED ADULT NURSE NOTE - NSICDXPASTMEDICALHX_GEN_ALL_CORE_FT
PAST MEDICAL HISTORY:  AF (atrial fibrillation) on Xarelto    Anxiety     Blind right eye     CAD (coronary artery disease) minimal CAD per cath result in 2014    Constipation     Depression     Edema of lower extremity     Former smoker     Gastritis     H/O Helicobacter infection     Hiatal hernia     History of TIAs     [place] (hard of hearing)     HTN (hypertension)     Hypercholesterolemia     Hypothyroid     Incontinence     Loss of vision right eye    Morbid obesity     Murmur     Neuropathy     OAB (overactive bladder)     Palpitations     Risk factors for obstructive sleep apnea     Spinal stenosis     Thickened endometrium     Thyroid nodule     Type 2 diabetes mellitus     Uterine cancer     Uterine cyst     Varicose veins

## 2025-04-07 NOTE — H&P ADULT - ASSESSMENT
75yo f w pmh obesity, asthma/copd/idalia, htn, hld, dm, cva w residual r sided weakness, afib, cad, gerd, hiatal hernia, constipation, lymphedema/chronic venous stasis/varicosities, hypothyroidism, anx/dep, p/w bl le, r>l, pain, redness, swelling; in er, c/f cellulitis; admit to medicine for further mgmt

## 2025-04-07 NOTE — ED PROVIDER NOTE - CLINICAL SUMMARY MEDICAL DECISION MAKING FREE TEXT BOX
73 y/o female w/ PMHx recent CVA w/ R-sided weakness, paroxysmal atrial fibrillation on Eliquis, T2DM, HTN, HLD, hypothyroidism 2/2 thyroidectomy, stasis dermatitis, and anxiety presenting to the emergency department for bilateral lower extremity swelling, right heel pain.  Patient states at baseline walks with a walker, has been unable to walk secondary to pain. On arrival to the emergency department patient is hypertensive 182/78, heart rate 83 bpm, 98 °F, saturating 94% on room air.  Per EMS fingerstick was above 300 prior to arrival.  On physical exam patient is in no acute distress, obese appearing, legs swollen bilaterally without pitting edema, changes consistent with venous stasis dermatitis, however on right lower extremity anterior aspect erythematous, warm, no wound discharge.  Patient has stage II ulcer to the right posterior heel and left lower leg posterior aspect.  Patient has 2+ DP pulses bilateral lower extremities.  Lungs clear to auscultation bilaterally, no cardiac murmurs rubs.  Differential includes was not limited to chronic venous stasis, cellulitis, DVT, peripheral arterial disease.  Plan to order duplexes, VBG, CBC and CMP.  Will order pain control

## 2025-04-08 ENCOUNTER — APPOINTMENT (OUTPATIENT)
Dept: WOUND CARE | Facility: HOSPITAL | Age: 75
End: 2025-04-08

## 2025-04-08 LAB
A1C WITH ESTIMATED AVERAGE GLUCOSE RESULT: 11.2 % — HIGH (ref 4–5.6)
ALBUMIN SERPL ELPH-MCNC: 2.9 G/DL — LOW (ref 3.3–5)
ALP SERPL-CCNC: 78 U/L — SIGNIFICANT CHANGE UP (ref 40–120)
ALT FLD-CCNC: 11 U/L — SIGNIFICANT CHANGE UP (ref 10–45)
ANION GAP SERPL CALC-SCNC: 15 MMOL/L — SIGNIFICANT CHANGE UP (ref 5–17)
APTT BLD: 36 SEC — HIGH (ref 24.5–35.6)
AST SERPL-CCNC: 13 U/L — SIGNIFICANT CHANGE UP (ref 10–40)
BASOPHILS # BLD AUTO: 0.06 K/UL — SIGNIFICANT CHANGE UP (ref 0–0.2)
BASOPHILS NFR BLD AUTO: 0.5 % — SIGNIFICANT CHANGE UP (ref 0–2)
BILIRUB SERPL-MCNC: 0.7 MG/DL — SIGNIFICANT CHANGE UP (ref 0.2–1.2)
BUN SERPL-MCNC: 16 MG/DL — SIGNIFICANT CHANGE UP (ref 7–23)
CALCIUM SERPL-MCNC: 8.4 MG/DL — SIGNIFICANT CHANGE UP (ref 8.4–10.5)
CHLORIDE SERPL-SCNC: 99 MMOL/L — SIGNIFICANT CHANGE UP (ref 96–108)
CHOLEST SERPL-MCNC: 169 MG/DL — SIGNIFICANT CHANGE UP
CO2 SERPL-SCNC: 24 MMOL/L — SIGNIFICANT CHANGE UP (ref 22–31)
CREAT SERPL-MCNC: 1.16 MG/DL — SIGNIFICANT CHANGE UP (ref 0.5–1.3)
CRP SERPL-MCNC: 105 MG/L — HIGH (ref 0–4)
EGFR: 49 ML/MIN/1.73M2 — LOW
EGFR: 49 ML/MIN/1.73M2 — LOW
EOSINOPHIL # BLD AUTO: 0.31 K/UL — SIGNIFICANT CHANGE UP (ref 0–0.5)
EOSINOPHIL NFR BLD AUTO: 2.8 % — SIGNIFICANT CHANGE UP (ref 0–6)
ERYTHROCYTE [SEDIMENTATION RATE] IN BLOOD: 63 MM/HR — HIGH (ref 0–20)
ESTIMATED AVERAGE GLUCOSE: 275 MG/DL — HIGH (ref 68–114)
GLUCOSE BLDC GLUCOMTR-MCNC: 167 MG/DL — HIGH (ref 70–99)
GLUCOSE BLDC GLUCOMTR-MCNC: 179 MG/DL — HIGH (ref 70–99)
GLUCOSE BLDC GLUCOMTR-MCNC: 187 MG/DL — HIGH (ref 70–99)
GLUCOSE BLDC GLUCOMTR-MCNC: 197 MG/DL — HIGH (ref 70–99)
GLUCOSE SERPL-MCNC: 195 MG/DL — HIGH (ref 70–99)
HCT VFR BLD CALC: 28.5 % — LOW (ref 34.5–45)
HDLC SERPL-MCNC: 50 MG/DL — LOW
HGB BLD-MCNC: 8.9 G/DL — LOW (ref 11.5–15.5)
IMM GRANULOCYTES NFR BLD AUTO: 0.5 % — SIGNIFICANT CHANGE UP (ref 0–0.9)
INR BLD: 1.34 RATIO — HIGH (ref 0.85–1.16)
LDLC SERPL-MCNC: 97 MG/DL — SIGNIFICANT CHANGE UP
LIPID PNL WITH DIRECT LDL SERPL: 97 MG/DL — SIGNIFICANT CHANGE UP
LYMPHOCYTES # BLD AUTO: 1.54 K/UL — SIGNIFICANT CHANGE UP (ref 1–3.3)
LYMPHOCYTES # BLD AUTO: 13.8 % — SIGNIFICANT CHANGE UP (ref 13–44)
MAGNESIUM SERPL-MCNC: 1.8 MG/DL — SIGNIFICANT CHANGE UP (ref 1.6–2.6)
MCHC RBC-ENTMCNC: 26.3 PG — LOW (ref 27–34)
MCHC RBC-ENTMCNC: 31.2 G/DL — LOW (ref 32–36)
MCV RBC AUTO: 84.1 FL — SIGNIFICANT CHANGE UP (ref 80–100)
MONOCYTES # BLD AUTO: 0.86 K/UL — SIGNIFICANT CHANGE UP (ref 0–0.9)
MONOCYTES NFR BLD AUTO: 7.7 % — SIGNIFICANT CHANGE UP (ref 2–14)
MRSA PCR RESULT.: DETECTED
NEUTROPHILS # BLD AUTO: 8.32 K/UL — HIGH (ref 1.8–7.4)
NEUTROPHILS NFR BLD AUTO: 74.7 % — SIGNIFICANT CHANGE UP (ref 43–77)
NONHDLC SERPL-MCNC: 118 MG/DL — SIGNIFICANT CHANGE UP
NRBC BLD AUTO-RTO: 0 /100 WBCS — SIGNIFICANT CHANGE UP (ref 0–0)
PHOSPHATE SERPL-MCNC: 3.6 MG/DL — SIGNIFICANT CHANGE UP (ref 2.5–4.5)
PLATELET # BLD AUTO: 296 K/UL — SIGNIFICANT CHANGE UP (ref 150–400)
POTASSIUM SERPL-MCNC: 3.5 MMOL/L — SIGNIFICANT CHANGE UP (ref 3.5–5.3)
POTASSIUM SERPL-SCNC: 3.5 MMOL/L — SIGNIFICANT CHANGE UP (ref 3.5–5.3)
PROT SERPL-MCNC: 5.7 G/DL — LOW (ref 6–8.3)
PROTHROM AB SERPL-ACNC: 15.2 SEC — HIGH (ref 9.9–13.4)
RBC # BLD: 3.39 M/UL — LOW (ref 3.8–5.2)
RBC # FLD: 13.6 % — SIGNIFICANT CHANGE UP (ref 10.3–14.5)
S AUREUS DNA NOSE QL NAA+PROBE: DETECTED
SODIUM SERPL-SCNC: 138 MMOL/L — SIGNIFICANT CHANGE UP (ref 135–145)
TRIGL SERPL-MCNC: 120 MG/DL — SIGNIFICANT CHANGE UP
TSH SERPL-MCNC: 8.34 UIU/ML — HIGH (ref 0.27–4.2)
WBC # BLD: 11.15 K/UL — HIGH (ref 3.8–10.5)
WBC # FLD AUTO: 11.15 K/UL — HIGH (ref 3.8–10.5)

## 2025-04-08 PROCEDURE — 99222 1ST HOSP IP/OBS MODERATE 55: CPT

## 2025-04-08 PROCEDURE — 99232 SBSQ HOSP IP/OBS MODERATE 35: CPT

## 2025-04-08 PROCEDURE — 93923 UPR/LXTR ART STDY 3+ LVLS: CPT | Mod: 26

## 2025-04-08 RX ADMIN — POLYETHYLENE GLYCOL 3350 17 GRAM(S): 17 POWDER, FOR SOLUTION ORAL at 11:52

## 2025-04-08 RX ADMIN — Medication 1 APPLICATION(S): at 05:55

## 2025-04-08 RX ADMIN — OXYCODONE HYDROCHLORIDE 5 MILLIGRAM(S): 30 TABLET ORAL at 04:50

## 2025-04-08 RX ADMIN — INSULIN GLARGINE-YFGN 10 UNIT(S): 100 INJECTION, SOLUTION SUBCUTANEOUS at 21:57

## 2025-04-08 RX ADMIN — BUDESONIDE 0.5 MILLIGRAM(S): 90 AEROSOL, POWDER RESPIRATORY (INHALATION) at 17:21

## 2025-04-08 RX ADMIN — INSULIN LISPRO 1: 100 INJECTION, SOLUTION INTRAVENOUS; SUBCUTANEOUS at 12:38

## 2025-04-08 RX ADMIN — Medication 175 MICROGRAM(S): at 05:53

## 2025-04-08 RX ADMIN — AMLODIPINE BESYLATE 5 MILLIGRAM(S): 10 TABLET ORAL at 05:54

## 2025-04-08 RX ADMIN — BUDESONIDE 0.5 MILLIGRAM(S): 90 AEROSOL, POWDER RESPIRATORY (INHALATION) at 05:54

## 2025-04-08 RX ADMIN — CLOTRIMAZOLE 1 APPLICATION(S): 1 CREAM TOPICAL at 05:54

## 2025-04-08 RX ADMIN — OXYCODONE HYDROCHLORIDE 5 MILLIGRAM(S): 30 TABLET ORAL at 05:50

## 2025-04-08 RX ADMIN — FUROSEMIDE 40 MILLIGRAM(S): 10 INJECTION INTRAMUSCULAR; INTRAVENOUS at 05:54

## 2025-04-08 RX ADMIN — Medication 1 APPLICATION(S): at 17:34

## 2025-04-08 RX ADMIN — Medication 5 MILLIGRAM(S): at 21:56

## 2025-04-08 RX ADMIN — Medication 2 MILLIGRAM(S): at 22:05

## 2025-04-08 RX ADMIN — CLOTRIMAZOLE 1 APPLICATION(S): 1 CREAM TOPICAL at 17:21

## 2025-04-08 RX ADMIN — INSULIN LISPRO 5 UNIT(S): 100 INJECTION, SOLUTION INTRAVENOUS; SUBCUTANEOUS at 17:22

## 2025-04-08 RX ADMIN — INSULIN LISPRO 1: 100 INJECTION, SOLUTION INTRAVENOUS; SUBCUTANEOUS at 17:22

## 2025-04-08 RX ADMIN — INSULIN LISPRO 5 UNIT(S): 100 INJECTION, SOLUTION INTRAVENOUS; SUBCUTANEOUS at 08:48

## 2025-04-08 RX ADMIN — Medication 1 APPLICATION(S): at 11:50

## 2025-04-08 RX ADMIN — Medication 40 MILLIGRAM(S): at 05:54

## 2025-04-08 RX ADMIN — INSULIN LISPRO 5 UNIT(S): 100 INJECTION, SOLUTION INTRAVENOUS; SUBCUTANEOUS at 12:38

## 2025-04-08 RX ADMIN — AMIODARONE HYDROCHLORIDE 200 MILLIGRAM(S): 50 INJECTION, SOLUTION INTRAVENOUS at 05:54

## 2025-04-08 RX ADMIN — Medication 250 MILLIGRAM(S): at 05:02

## 2025-04-08 RX ADMIN — OXYCODONE HYDROCHLORIDE 10 MILLIGRAM(S): 30 TABLET ORAL at 11:49

## 2025-04-08 RX ADMIN — APIXABAN 5 MILLIGRAM(S): 2.5 TABLET, FILM COATED ORAL at 17:21

## 2025-04-08 RX ADMIN — Medication 2 TABLET(S): at 21:56

## 2025-04-08 RX ADMIN — INSULIN LISPRO 1: 100 INJECTION, SOLUTION INTRAVENOUS; SUBCUTANEOUS at 08:48

## 2025-04-08 RX ADMIN — Medication 250 MILLIGRAM(S): at 17:47

## 2025-04-08 RX ADMIN — Medication 81 MILLIGRAM(S): at 11:52

## 2025-04-08 RX ADMIN — APIXABAN 5 MILLIGRAM(S): 2.5 TABLET, FILM COATED ORAL at 05:54

## 2025-04-08 RX ADMIN — Medication 1 APPLICATION(S): at 11:51

## 2025-04-08 NOTE — CONSULT NOTE ADULT - ASSESSMENT
75yo f w pmh obesity, asthma/copd/idalia, htn, hld, dm, cva w residual r sided weakness, afib, cad, gerd, hiatal hernia, constipation, lymphedema/chronic venous stasis/varicosities, hypothyroidism, anx/dep, p/w bl le, r>l, pain, redness, swelling; in er, c/f cellulitis; admit to medicine for further mgmt 73yo f w pmh obesity, asthma/copd/idalia, htn, hld, dm, cva w residual r sided weakness, afib, cad, gerd, hiatal hernia, constipation, lymphedema/chronic venous stasis/varicosities, hypothyroidism, anx/dep, p/w bl le, r>l, pain, redness, swelling; in er, c/f cellulitis; admit to medicine for further mgmt    Wound Consult requested to assist w/ management of cellulitis  BLE PVD stasis dermatitis w/ wounds  Buttocks with incontinence dermatitis      BLE elevation & Compression  BLE GEOVANY/PVR reviewed as above  BLE Duplex no DVT noted  Abx per Medicine/ ID  Moisturize intact skin w/ SWEEN cream BID  Nutrition Consult for optimization          encourage high quality protein, sanchez/ prosource, MVI & Vit C to promote wound healing  Hyperglycemia - ADA diet and FS w/ ISS,   Continue turning and positioning w/ offloading assistive devices as per protocol  Buttocks/ Sacrum Michael BID and prn soiling        Continue w/ attends under pads and Pericare w/ purewick care as per protocol  Waffle Cushion to chair when oob to chair  Continue w/ low air loss pressure redistribution bed surface   Care as per medicine, will follow w/ you  Upon discharge f/u as outpatient at [facility] Center [address] [place] [address] [phone]  Seen w/ attng and D/w team & RN  Thank you for this consult  Radha Martinez PA-C CWS [id number]  Nights/ Weekends/ Holidays please call:  General Surgery Consult pager ([phone]) for emergencies  Wound PT for multilayer leg wrapping or VAC issues ([phone])   I spent 55  minutes face to face w/ this pt of which more than 50% of the time was spent counseling & coordinating care of this pt.  73yo f w pmh obesity, asthma/copd/idalia, htn, hld, dm, cva w residual r sided weakness, afib, cad, gerd, hiatal hernia, constipation, lymphedema/chronic venous stasis/varicosities, hypothyroidism, anx/dep, p/w bl le, r>l, pain, redness, swelling; in er, c/f cellulitis; admit to medicine for further mgmt    Wound Consult requested to assist w/ management of cellulitis  BLE PVD stasis dermatitis w/ wounds  Rt heel pustule  Buttocks with incontinence dermatitis  Groin/ Pannus folds w/ fungal dermatitis      BLE wounds Adaptic dressing QD  Rt heel as per Podiatry  BLE elevation & Compression  BLE GEOVANY/PVR reviewed as above  BLE Duplex no DVT noted  Abx per Medicine/ ID  Moisturize intact skin w/ SWEEN cream BID  Nutrition Consult for optimization          encourage high quality protein, sanchez/ prosource, MVI & Vit C to promote wound healing  Hyperglycemia - ADA diet and FS w/ ISS,   Continue turning and positioning w/ offloading assistive devices as per protocol  Buttocks/ Sacrum Michael BID and prn soiling        Groin / Pannus INTERDry Ag Tuck into folds        Continue w/ attends under pads and Pericare w/ purewick care as per protocol  Waffle Cushion to chair when oob to chair  Continue w/ low air loss pressure redistribution bed surface   Care as per medicine, will follow w/ you  Upon discharge f/u as outpatient at W[facility] Center [address] [place] [address] [phone]  Seen w/ attng and D/w team & RN  Thank you for this consult  Radha Martinez PA-C CWS [id number]  Nights/ Weekends/ Holidays please call:  General Surgery Consult pager ([phone]) for emergencies  Wound PT for multilayer leg wrapping or VAC issues (x [phone])   I spent 55  minutes face to face w/ this pt of which more than 50% of the time was spent counseling & coordinating care of this pt.  75yo f w pmh obesity, asthma/copd/idalia, htn, hld, dm, cva w residual r sided weakness, afib, cad, gerd, hiatal hernia, constipation, lymphedema/chronic venous stasis/varicosities, hypothyroidism, anx/dep, p/w bl le, r>l, pain, redness, swelling; in er, c/f cellulitis; admit to medicine for further mgmt    Wound Consult requested to assist w/ management of cellulitis  BLE PVD stasis dermatitis w/ wounds  lymphedema  Rt heel pustule  Buttocks with incontinence dermatitis  Groin/ Pannus folds w/ fungal dermatitis      BLE wounds Adaptic dressing QD  Rt heel as per Podiatry  BLE elevation & Compression  BLE GEOVANY/PVR reviewed as above  BLE Duplex no DVT noted  Abx per Medicine/ ID  Moisturize intact skin w/ SWEEN cream BID  Nutrition Consult for optimization          encourage high quality protein, sanchez/ prosource, MVI & Vit C to promote wound healing  Hyperglycemia - ADA diet and FS w/ ISS,   Continue turning and positioning w/ offloading assistive devices as per protocol  Buttocks/ Sacrum Michael BID and prn soiling        Groin / Pannus INTERDry Ag Tuck into folds        Continue w/ attends under pads and Pericare w/ purewick care as per protocol  Waffle Cushion to chair when oob to chair  Continue w/ low air loss pressure redistribution bed surface   Care as per medicine, will follow w/ you  Upon discharge f/u as outpatient at Wound Center [address] [place] [address] [phone]  Seen w/ attng and D/w team & RN  Thank you for this consult  Radha Martinez PA-C CWS [id number]  Nights/ Weekends/ Holidays please call:  General Surgery Consult pager ([phone]) for emergencies  Wound PT for multilayer leg wrapping or VAC issues (x [phone])

## 2025-04-08 NOTE — CONSULT NOTE ADULT - SUBJECTIVE AND OBJECTIVE BOX
Wound SURGERY CONSULT NOTE    HPI:  73yo f w pmh obesity, asthma/copd/idalia, htn, hld, dm, cva w residual r sided weakness, afib, cad, gerd, hiatal hernia, constipation, lymphedema/chronic venous stasis/varicosities, hypothyroidism, anx/dep, p/w bl le, r>l, pain, redness, swelling; patient has at least some level of discomfort over the affected areas, but over the last few days, pain increased in intensity, so much so that patient is unable to ambulate comfortably. no relief with tylenol. patient and family grew concerned so had patient brought to [facility] er for further evaluation. of note, hospitalized for similar presentation 12/29/24-1/1/25; at that time, found to have stasis dermatitis flare w superimposed cellulitis; found to be mrsa swab + and wood lamp +; id, derm, podiatry, wound care consulted; patient treated with iv abx vancomycin (transitioned to po abx doxycycline and amoxicillin on discharge), steroid ointment, antifungal cream, antiseptic solution and wound dressings. in er, c/f cellulitis; admit to medicine for further mgmt (07 Apr 2025 19:35)        N/V/D,  BM/ Flatus,   NGT,     palp/ sob/dyspnea/ cp,       F/C/S  Wound consult requested by team to assist w/ management of      wound/ pressure injury.   Pt (unable to)  c/o pain, drainage, odor, color change,  or worsening swelling. Offloading and pericare initiated upon admission as pt Increasingly sedentary 2/2 to illness. Pt is Incontinent of urine & stool. (+)aguiar/ ostomy.   No h/o bites, scratches, falls, trauma.  Pt seen by Wound RN  [facility] [employer]/  Michael,TRIAD/ Aquacell/ medihoney/ Allevyn foam/ dakins/ Adaptic/ DSD recommended used at home/ while awaiting consult.  Appetite good/ decreased.  weight loss.  S&S / RD consult appreciated All questions asked and answered to pt's and family's expressed understanding and satisfaction.    Current Diet: Diet, Consistent Carbohydrate/No Snacks (04-07-25 @ 19:32)      PAST MEDICAL & SURGICAL HISTORY:  HTN (hypertension)      Murmur      Morbid obesity      Palpitations      Former smoker      Hypothyroid      CAD (coronary artery disease)  minimal CAD per cath result in 2014      Hypercholesterolemia      Gastritis      Depression      Anxiety      AF (atrial fibrillation)  on Xarelto      Hiatal hernia      Neuropathy      Uterine cyst      Incontinence      Thickened endometrium      History of TIAs      Loss of vision  right eye      Blind right eye      [place] (hard of hearing)      Uterine cancer      Type 2 diabetes mellitus      Constipation      Spinal stenosis      Edema of lower extremity      Varicose veins      Thyroid nodule      OAB (overactive bladder)      Risk factors for obstructive sleep apnea      H/O Helicobacter infection      S/P cataract extraction and insertion of intraocular lens  bilateral > 15 years ago      Varicose vein  with stripping b/l      History of angioplasty of vein  right leg 2016      H/O thyroidectomy      H/O: hysterectomy          REVIEW OF SYSTEMS: Pt unable to offer  General/ Breast/ Skin/Vasc/ Neuro/ MSK: see HPI  All other systems negative    MEDICATIONS  (STANDING):  aMIOdarone    Tablet 200 milliGRAM(s) Oral daily  amLODIPine   Tablet 5 milliGRAM(s) Oral daily  apixaban 5 milliGRAM(s) Oral every 12 hours  aspirin enteric coated 81 milliGRAM(s) Oral daily  buDESOnide    Inhalation Suspension 0.5 milliGRAM(s) Inhalation every 12 hours  cadexomer iodine 0.9% Gel 1 Application(s) Topical daily  clobetasol 0.05% Ointment 1 Application(s) Topical two times a day  clotrimazole 1% Cream 1 Application(s) Topical two times a day  dextrose 5%. 1000 milliLiter(s) (50 mL/Hr) IV Continuous <Continuous>  dextrose 5%. 1000 milliLiter(s) (100 mL/Hr) IV Continuous <Continuous>  dextrose 50% Injectable 25 Gram(s) IV Push once  dextrose 50% Injectable 12.5 Gram(s) IV Push once  dextrose 50% Injectable 25 Gram(s) IV Push once  furosemide    Tablet 40 milliGRAM(s) Oral daily  glucagon  Injectable 1 milliGRAM(s) IntraMuscular once  insulin glargine Injectable (LANTUS) 10 Unit(s) SubCutaneous at bedtime  insulin lispro (ADMELOG) corrective regimen sliding scale   SubCutaneous three times a day before meals  insulin lispro Injectable (ADMELOG) 5 Unit(s) SubCutaneous three times a day before meals  levothyroxine 175 MICROGram(s) Oral daily  lidocaine   4% Patch 1 Patch Transdermal daily  melatonin 5 milliGRAM(s) Oral at bedtime  naloxone Injectable 0.4 milliGRAM(s) IV Push once  pantoprazole    Tablet 40 milliGRAM(s) Oral before breakfast  polyethylene glycol 3350 17 Gram(s) Oral daily  povidone iodine 10% Solution 1 Application(s) Topical daily  senna 2 Tablet(s) Oral at bedtime  vancomycin  IVPB 1000 milliGRAM(s) IV Intermittent every 12 hours    MEDICATIONS  (PRN):  acetaminophen     Tablet .. 650 milliGRAM(s) Oral every 6 hours PRN Temp greater or equal to 38C (100.4F), Mild Pain (1 - 3)  albuterol    90 MICROgram(s) HFA Inhaler 2 Puff(s) Inhalation every 6 hours PRN Shortness of Breath and/or Wheezing  ALPRAZolam 2 milliGRAM(s) Oral at bedtime PRN Anxiety/insomnia  aluminum hydroxide/magnesium hydroxide/simethicone Suspension 30 milliLiter(s) Oral every 4 hours PRN Dyspepsia  bisacodyl 5 milliGRAM(s) Oral daily PRN Constipation  dextrose Oral Gel 15 Gram(s) Oral once PRN Blood Glucose LESS THAN 70 milliGRAM(s)/deciliter  ondansetron Injectable 4 milliGRAM(s) IV Push every 8 hours PRN Nausea and/or Vomiting  oxyCODONE    IR 5 milliGRAM(s) Oral every 4 hours PRN Moderate Pain (4 - 6)  oxyCODONE    IR 10 milliGRAM(s) Oral every 4 hours PRN Severe Pain (7 - 10)      Allergies    latex (Rash)  Lipitor (Nausea)    Intolerances        SOCIAL HISTORY:  / /single/ ; (+)HHA/ lives in SNF; Former smoker, No current/ Denies smoking, ETOH, drugs    FAMILY HISTORY:  Family history of stroke (Mother)  mom    Family history of hypertension (Father)    Family history of breast cancer (Mother)     no h/o PVD or wound healing or skin/ significant problems    PHYSICAL EXAM:  Vital Signs Last 24 Hrs  T(C): 36.7 (08 Apr 2025 12:49), Max: 37.8 (08 Apr 2025 05:12)  T(F): 98.1 (08 Apr 2025 12:49), Max: 100 (08 Apr 2025 05:12)  HR: 67 (08 Apr 2025 12:49) (66 - 72)  BP: 101/63 (08 Apr 2025 12:49) (101/63 - 133/66)  BP(mean): --  RR: 18 (08 Apr 2025 12:49) (17 - 18)  SpO2: 96% (08 Apr 2025 12:49) (88% - 99%)    Parameters below as of 08 Apr 2025 12:49  Patient On (Oxygen Delivery Method): nasal cannula  O2 Flow (L/min): 2      NAD, Guarded but stable,  A&Ox3/ Alert/ Confused  cachectic/ thin, MO/ Obese, frail,  WD/ WN/ WG,  Disheveled  Total Care Sport/ Versa Care P500 / Envella Progressa bed     HEENT:  NC/AT, PERRL, EOMI, sclera clear, mucosa moist, throat clear, trachea midline, neck supple, trach  Respiratory: nonlabored w/ equal chest rise  Gastrointestinal: soft NT/ND (+)BS  (+)PEG (+)ostomy (+)NGT  : (+)aguiar/ purewick/ condom cath  Neurology:  weakened strength & sensation grossly intact, paraesthesia  nonverbal, no follow commands, paraplegic  Psych: calm/ appropriate/ flat affect/ easily agitated/ restless/ anxious/ difficult to assess  Musculoskeletal:  limited stiff / p/FROM, no deformities/ contractures  Vascular: BLE equally warm/ cool,  no cyanosis, clubbing, edema nor acute ischemia           >LE //BLE edema equal           BLE DP/PT pulses palpable          BLE hemosiderin staining/ varicose veins  Skin:  moist w/ good turgor  thin, dry, pale, frail,  ecchymosis w/o hematoma  blistering  or serosanguinous drainage  No odor, erythema, increased warmth, tenderness, induration, fluctuance, nor crepitus    LABS/ CULTURES/ RADIOLOGY:                        8.9    11.15 )-----------( 296      ( 08 Apr 2025 07:29 )             28.5       138  |  99  |  16  ----------------------------<  195      [04-08-25 @ 07:29]  3.5   |  24  |  1.16        Ca     8.4     [04-08-25 @ 07:29]      Mg     1.8     [04-08-25 @ 07:29]      Phos  3.6     [04-08-25 @ 07:29]    TPro  5.7  /  Alb  2.9  /  TBili  0.7  /  DBili  x   /  AST  13  /  ALT  11  /  AlkPhos  78  [04-08-25 @ 07:29]    PT/INR: PT 15.2 , INR 1.34       [04-08-25 @ 07:27]  PTT: 36.0       [04-08-25 @ 07:27]        A1C with Estimated Average Glucose Result: 8.7 % (12-30-24 @ 08:42)                        A/P:    Wound Consult requested to assist w/ management of    BLE elevation & Compression  Consider GEOVANY/PVR, Duplex, Xray, A/P BLE CT or MRI  Abx per Medicine/ ID  Moisturize intact skin w/ SWEEN cream BID  Nutrition Consult for optimization in pt w/ Severe Protein Calorie Malnutrition,        Inadequate PO intake, & Increased nutritional needs            encourage high quality protein, sanchez/ prosource, MVI & Vit C to promote wound healing  Hyperglycemia - improving w/ ADA diet and Lantus/ NPH & FS w/ ISS, consider Endo Consult, consider HgA1c  Anemia- improving w/ transfusions, Fe studies, protonix  Continue turning and positioning w/ offloading assistive devices as per protocol  Buttocks/ Sacrum Michael/ TRIAD BID /[facility] ADVANCE TIW and prn soiling        Continue w/ attends under pads and Pericare w/ aguiar/ condom cath maintenance / purewick care as per protocol  Waffle Cushion to chair when oob to chair  Continue w/ low air loss pressure redistribution bed surface   Pt will need Group 2 mattress on hospital bed and ROHO cushion for wheel chair upon discharge home  Care as per medicine, will follow w/ you/ remain available as requested  Upon discharge f/u as outpatient at [facility] [address] [place] [address] [phone]  Seen w/ attng & RN and D/w team & RN  Thank you for this consult  Radha Martinez PA-C [id number]  Nights/ Weekends/ Holidays please call:  General Surgery Consult pager [phone]) for emergencies  Wound PT for multilayer leg wrapping or VAC issues (x [phone])      Wound SURGERY CONSULT NOTE    HPI:  73yo f w pmh obesity, asthma/copd/idalia, htn, hld, dm, cva w residual r sided weakness, afib, cad, gerd, hiatal hernia, constipation, lymphedema/chronic venous stasis/varicosities, hypothyroidism, anx/dep, p/w bl le, r>l, pain, redness, swelling; patient has at least some level of discomfort over the affected areas, but over the last few days, pain increased in intensity, so much so that patient is unable to ambulate comfortably. no relief with tylenol. patient and family grew concerned so had patient brought to [facility] er for further evaluation. of note, hospitalized for similar presentation 12/29/24-1/1/25; at that time, found to have stasis dermatitis flare w superimposed cellulitis; found to be mrsa swab + and wood lamp +; id, derm, podiatry, wound care consulted; patient treated with iv abx vancomycin (transitioned to po abx doxycycline and amoxicillin on discharge), steroid ointment, antifungal cream, antiseptic solution and wound dressings. in er, c/f cellulitis; admit to medicine for further mgmt       Wound consult requested by team to assist w/ management of Leg and buttocks wounds. Pt c/o pain to Rt ankle near 'cut' and redness, but no drainage, odor, color change,  or worsening swelling. Offloading and pericare initiated upon admission as pt Increasingly sedentary 2/2 to illness. Pt is Incontinent of urine & stool. No h/o bites, scratches, falls, trauma. Appetite good w/o weight loss. All questions asked and answered to pt's expressed understanding and satisfaction.    Current Diet: Diet, Consistent Carbohydrate/No Snacks (04-07-25 @ 19:32)      PAST MEDICAL & SURGICAL HISTORY:  HTN (hypertension)    Murmur    Morbid obesity    Palpitations    Former smoker    Hypothyroid  Helicobacter infection    CAD (coronary artery disease)  s/p cath     Hypercholesterolemia    Gastritis    Depression    Anxiety    AF (atrial fibrillation)    Hiatal hernia    Neuropathy    Uterine cyst    Incontinence    TIAs    Blind right eye    [place] (hard of hearing)    Thickened endometrium  Uterine cancer  s/p hysterectomy    Type 2 diabetes mellitus    Spinal stenosis    Edema of lower extremity  Varicose veins s/p stripping    Thyroid nodule  s/p thyroidectomy    OAB (overactive bladder)     obstructive sleep apnea    S/P cataract extraction and insertion of intraocular lens, bilateral     s/p RLE angioplasty of vein      REVIEW OF SYSTEMS: General/  Skin/ Vasc/ MSK: see HPI  All other systems negative    MEDICATIONS  (STANDING):  aMIOdarone    Tablet 200 milliGRAM(s) Oral daily  amLODIPine   Tablet 5 milliGRAM(s) Oral daily  apixaban 5 milliGRAM(s) Oral every 12 hours  aspirin enteric coated 81 milliGRAM(s) Oral daily  buDESOnide    Inhalation Suspension 0.5 milliGRAM(s) Inhalation every 12 hours  cadexomer iodine 0.9% Gel 1 Application(s) Topical daily  clobetasol 0.05% Ointment 1 Application(s) Topical two times a day  clotrimazole 1% Cream 1 Application(s) Topical two times a day  dextrose 5%. 1000 milliLiter(s) (50 mL/Hr) IV Continuous <Continuous>  dextrose 5%. 1000 milliLiter(s) (100 mL/Hr) IV Continuous <Continuous>  dextrose 50% Injectable 25 Gram(s) IV Push once  dextrose 50% Injectable 12.5 Gram(s) IV Push once  dextrose 50% Injectable 25 Gram(s) IV Push once  furosemide    Tablet 40 milliGRAM(s) Oral daily  glucagon  Injectable 1 milliGRAM(s) IntraMuscular once  insulin glargine Injectable (LANTUS) 10 Unit(s) SubCutaneous at bedtime  insulin lispro (ADMELOG) corrective regimen sliding scale   SubCutaneous three times a day before meals  insulin lispro Injectable (ADMELOG) 5 Unit(s) SubCutaneous three times a day before meals  levothyroxine 175 MICROGram(s) Oral daily  lidocaine   4% Patch 1 Patch Transdermal daily  melatonin 5 milliGRAM(s) Oral at bedtime  naloxone Injectable 0.4 milliGRAM(s) IV Push once  pantoprazole    Tablet 40 milliGRAM(s) Oral before breakfast  polyethylene glycol 3350 17 Gram(s) Oral daily  povidone iodine 10% Solution 1 Application(s) Topical daily  senna 2 Tablet(s) Oral at bedtime  vancomycin  IVPB 1000 milliGRAM(s) IV Intermittent every 12 hours    MEDICATIONS  (PRN):  acetaminophen  Tablet 650 milliGRAM(s) Oral every 6 hours PRN Temp greater or equal to 38C (100.4F), Mild Pain (1 - 3)  albuterol    90 MICROgram(s) HFA Inhaler 2 Puff(s) Inhalation every 6 hours PRN Shortness of Breath and/or Wheezing  ALPRAZolam 2 milliGRAM(s) Oral at bedtime PRN Anxiety/insomnia  aluminum hydroxide/magnesium hydroxide/simethicone Suspension 30 milliLiter(s) Oral every 4 hours PRN Dyspepsia  bisacodyl 5 milliGRAM(s) Oral daily PRN Constipation  dextrose Oral Gel 15 Gram(s) Oral once PRN Blood Glucose LESS THAN 70 milliGRAM(s)/deciliter  ondansetron Injectable 4 milliGRAM(s) IV Push every 8 hours PRN Nausea and/or Vomiting  oxyCODONE    IR 5 milliGRAM(s) Oral every 4 hours PRN Moderate Pain (4 - 6)  oxyCODONE    IR 10 milliGRAM(s) Oral every 4 hours PRN Severe Pain (7 - 10)      Allergies  latex (Rash)  Lipitor (Nausea)      SOCIAL HISTORY:  ; Former smoker, No current smoking, ETOH, drugs    FAMILY HISTORY:  stroke (Mother), hypertension (Father), breast cancer (Mother)   no h/o PVD or wound healing or skin problems    PHYSICAL EXAM:  Vital Signs Last 24 Hrs  T(C): 36.7 (08 Apr 2025 12:49), Max: 37.8 (08 Apr 2025 05:12)  T(F): 98.1 (08 Apr 2025 12:49), Max: 100 (08 Apr 2025 05:12)  HR: 67 (08 Apr 2025 12:49) (66 - 72)  BP: 101/63 (08 Apr 2025 12:49) (101/63 - 133/66)  BP(mean): --  RR: 18 (08 Apr 2025 12:49) (17 - 18)  SpO2: 96% (08 Apr 2025 12:49) (88% - 99%)    Parameters below as of 08 Apr 2025 12:49  Patient On (Oxygen Delivery Method): nasal cannula  O2 Flow (L/min): 2      NAD,  A&Ox3, MO, WD/ WN/ WG,    Versa Care P500 bed  HEENT:  NC/AT,, EOMI, sclera clear, mucosa moist, throat clear, trachea midline, neck supple  Respiratory: nonlabored w/ equal chest rise  Gastrointestinal: soft NT/ND   : (+ purewick  Neurology:  weakened strength & sensation grossly intact  Psych: calm/ appropriate  Musculoskeletal: FROM, no deformities/ contractures  Vascular: BLE equally warm,  no cyanosis, clubbing, nor acute ischemia        BLE edema equal           BLE DP/PT pulses palpable        BLE hemosiderin staining/ varicose veins   BLE blanchable erythema w/ partial thickness wounds      pink moist base w/ scant serosanguinous drainage     Rt plantar heel pinpoint white lesion  No odor, erythema, increased warmth, tenderness, induration, fluctuance, nor crepitus  Skin:  thin, dry, pale, frail,  ecchymosis w/o hematoma  Bilateral Buttocks into gluteal cleft and thigh w/ blanchable erythema/ hyperpigmented skin     no open skin, blistering, or drainage  No odor, erythema, increased warmth, tenderness, induration, fluctuance, nor crepitus    LABS/ CULTURES/ RADIOLOGY:                        8.9    11.15 )-----------( 296      ( 08 Apr 2025 07:29 )             28.5       138  |  99  |  16  ----------------------------<  195      [04-08-25 @ 07:29]  3.5   |  24  |  1.16        Ca     8.4     [04-08-25 @ 07:29]      Mg     1.8     [04-08-25 @ 07:29]      Phos  3.6     [04-08-25 @ 07:29]    TPro  5.7  /  Alb  2.9  /  TBili  0.7  /  DBili  x   /  AST  13  /  ALT  11  /  AlkPhos  78  [04-08-25 @ 07:29]    PT/INR: PT 15.2 , INR 1.34       [04-08-25 @ 07:27]  PTT: 36.0       [04-08-25 @ 07:27]        A1C with Estimated Average Glucose Result: 8.7 % (12-30-24 @ 08:42)      ACC: [id number] EXAM:  PHYSIOL EXTREM LOW 3+ LEV BI   ORDERED BY: HERBERTH IZAGUIRRE     PROCEDURE DATE:  04/08/2025          INTERPRETATION:  History: Nonhealing ulcer right heel    Risk factors: Smoking, diabetes, hyperlipidemia, hypertension, CVA    The right ankle-brachial index equals 1.02; the left ankle-brachial index   equals 1.19.    The blood pressure measurements at the right ankle are 144 mmHg in the   posterior tibial artery and 140 in the dorsal pedis artery.    The blood pressure measurement at the right great toe is 83 mmHg and the   right toe-brachial index equals 0.59.    The blood pressure measurements at the left ankle are 161 mmHg in the   posterior tibial artery and 168 in the dorsal pedis artery.    The blood pressure measurement at the left great toe is 97 mmHg and the   left toe-brachial index equals 0.69.    The amplitude of the pulse volume recordings are normal bilaterally from   the levels of the calfs through the toes.    Impression:    The right GEOVANY of 1.02 and the left GEOVANY of 1.19 are normal.    The right TBI of 0.59 is mildly abnormal, and the left TBI of 0.69 is   near normal.    Significant lower extremity arterial vascular disease is not identified.      ACC: [id number] EXAM:  DUPLEX SCAN EXT VEINS LOWER BI   ORDERED BY:  DANETTE VERDIN     PROCEDURE DATE:  04/07/2025          INTERPRETATION:  CLINICAL INFORMATION: 74-year-old female with bilateral   lower extremity edema and pain    COMPARISON: Ultrasound 12/29/2024    TECHNIQUE: Duplex sonography of the BILATERAL LOWER extremity veins with   color and spectral Doppler, with and without compression.    FINDINGS:    RIGHT:  Normal compressibility of the RIGHT common femoral, femoral and popliteal   veins.  Doppler examination shows normal spontaneous and phasic flow.  The calf veins were not visualized    LEFT:  Normal compressibility of the LEFT common femoral, femoral and popliteal   veins.  Doppler examination shows normal spontaneous and phasic flow.  The calf veins were not visualized.    IMPRESSION:  No evidence of deep venous thrombosis in either lower extremity.    Calf veins not visualized     Wound SURGERY CONSULT NOTE    HPI:  73yo f w pmh obesity, asthma/copd/idalia, htn, hld, dm, cva w residual r sided weakness, afib, cad, gerd, hiatal hernia, constipation, lymphedema/chronic venous stasis/varicosities, hypothyroidism, anx/dep, p/w bl le, r>l, pain, redness, swelling; patient has at least some level of discomfort over the affected areas, but over the last few days, pain increased in intensity, so much so that patient is unable to ambulate comfortably. no relief with tylenol. patient and family grew concerned so had patient brought to [facility] er for further evaluation. of note, hospitalized for similar presentation 12/29/24-1/1/25; at that time, found to have stasis dermatitis flare w superimposed cellulitis; found to be mrsa swab + and wood lamp +; id, derm, podiatry, wound care consulted; patient treated with iv abx vancomycin (transitioned to po abx doxycycline and amoxicillin on discharge), steroid ointment, antifungal cream, antiseptic solution and wound dressings. in er, c/f cellulitis; admit to medicine for further mgmt       Wound consult requested by team to assist w/ management of Leg and buttocks wounds. Pt c/o pain to Rt ankle near 'cut' and redness, but no drainage, odor, color change,  or worsening swelling. Offloading and pericare initiated upon admission as pt Increasingly sedentary 2/2 to illness. Pt is Incontinent of urine & stool. No h/o bites, scratches, falls, trauma. Appetite good w/o weight loss. All questions asked and answered to pt's expressed understanding and satisfaction.    Current Diet: Diet, Consistent Carbohydrate/No Snacks (04-07-25 @ 19:32)      PAST MEDICAL & SURGICAL HISTORY:  HTN (hypertension)    Murmur    Morbid obesity    Palpitations    Former smoker    Hypothyroid  Helicobacter infection    CAD (coronary artery disease)  s/p cath     Hypercholesterolemia    Gastritis    Depression    Anxiety    AF (atrial fibrillation)    Hiatal hernia    Neuropathy    Uterine cyst    Incontinence    TIAs    Blind right eye    [place] (hard of hearing)    Thickened endometrium  Uterine cancer  s/p hysterectomy    Type 2 diabetes mellitus    Spinal stenosis    Edema of lower extremity  Varicose veins s/p stripping    Thyroid nodule  s/p thyroidectomy    OAB (overactive bladder)     obstructive sleep apnea    S/P cataract extraction and insertion of intraocular lens, bilateral     s/p RLE angioplasty of vein      REVIEW OF SYSTEMS: General/  Skin/ Vasc/ MSK: see HPI  All other systems negative    MEDICATIONS  (STANDING):  aMIOdarone    Tablet 200 milliGRAM(s) Oral daily  amLODIPine   Tablet 5 milliGRAM(s) Oral daily  apixaban 5 milliGRAM(s) Oral every 12 hours  aspirin enteric coated 81 milliGRAM(s) Oral daily  buDESOnide    Inhalation Suspension 0.5 milliGRAM(s) Inhalation every 12 hours  cadexomer iodine 0.9% Gel 1 Application(s) Topical daily  clobetasol 0.05% Ointment 1 Application(s) Topical two times a day  clotrimazole 1% Cream 1 Application(s) Topical two times a day  dextrose 5%. 1000 milliLiter(s) (50 mL/Hr) IV Continuous <Continuous>  dextrose 5%. 1000 milliLiter(s) (100 mL/Hr) IV Continuous <Continuous>  dextrose 50% Injectable 25 Gram(s) IV Push once  dextrose 50% Injectable 12.5 Gram(s) IV Push once  dextrose 50% Injectable 25 Gram(s) IV Push once  furosemide    Tablet 40 milliGRAM(s) Oral daily  glucagon  Injectable 1 milliGRAM(s) IntraMuscular once  insulin glargine Injectable (LANTUS) 10 Unit(s) SubCutaneous at bedtime  insulin lispro (ADMELOG) corrective regimen sliding scale   SubCutaneous three times a day before meals  insulin lispro Injectable (ADMELOG) 5 Unit(s) SubCutaneous three times a day before meals  levothyroxine 175 MICROGram(s) Oral daily  lidocaine   4% Patch 1 Patch Transdermal daily  melatonin 5 milliGRAM(s) Oral at bedtime  naloxone Injectable 0.4 milliGRAM(s) IV Push once  pantoprazole    Tablet 40 milliGRAM(s) Oral before breakfast  polyethylene glycol 3350 17 Gram(s) Oral daily  povidone iodine 10% Solution 1 Application(s) Topical daily  senna 2 Tablet(s) Oral at bedtime  vancomycin  IVPB 1000 milliGRAM(s) IV Intermittent every 12 hours    MEDICATIONS  (PRN):  acetaminophen  Tablet 650 milliGRAM(s) Oral every 6 hours PRN Temp greater or equal to 38C (100.4F), Mild Pain (1 - 3)  albuterol    90 MICROgram(s) HFA Inhaler 2 Puff(s) Inhalation every 6 hours PRN Shortness of Breath and/or Wheezing  ALPRAZolam 2 milliGRAM(s) Oral at bedtime PRN Anxiety/insomnia  aluminum hydroxide/magnesium hydroxide/simethicone Suspension 30 milliLiter(s) Oral every 4 hours PRN Dyspepsia  bisacodyl 5 milliGRAM(s) Oral daily PRN Constipation  dextrose Oral Gel 15 Gram(s) Oral once PRN Blood Glucose LESS THAN 70 milliGRAM(s)/deciliter  ondansetron Injectable 4 milliGRAM(s) IV Push every 8 hours PRN Nausea and/or Vomiting  oxyCODONE    IR 5 milliGRAM(s) Oral every 4 hours PRN Moderate Pain (4 - 6)  oxyCODONE    IR 10 milliGRAM(s) Oral every 4 hours PRN Severe Pain (7 - 10)      Allergies  latex (Rash)  Lipitor (Nausea)      SOCIAL HISTORY:  ; Former smoker, No current smoking, ETOH, drugs    FAMILY HISTORY:  stroke (Mother), hypertension (Father), breast cancer (Mother)   no h/o PVD or wound healing or skin problems    PHYSICAL EXAM:  Vital Signs Last 24 Hrs  T(C): 36.7 (08 Apr 2025 12:49), Max: 37.8 (08 Apr 2025 05:12)  T(F): 98.1 (08 Apr 2025 12:49), Max: 100 (08 Apr 2025 05:12)  HR: 67 (08 Apr 2025 12:49) (66 - 72)  BP: 101/63 (08 Apr 2025 12:49) (101/63 - 133/66)  BP(mean): --  RR: 18 (08 Apr 2025 12:49) (17 - 18)  SpO2: 96% (08 Apr 2025 12:49) (88% - 99%)    Parameters below as of 08 Apr 2025 12:49  Patient On (Oxygen Delivery Method): nasal cannula  O2 Flow (L/min): 2      NAD,  A&Ox3, MO, WD/ WN/ WG,    Versa Care P500 bed  HEENT:  NC/AT,, EOMI, sclera clear, mucosa moist, throat clear, trachea midline, neck supple  Respiratory: nonlabored w/ equal chest rise  Gastrointestinal: soft NT/ND   : (+ purewick  Neurology:  weakened strength & sensation grossly intact  Psych: calm/ appropriate  Musculoskeletal: FROM, no deformities/ contractures  Vascular: BLE equally warm,  no cyanosis, clubbing, nor acute ischemia        BLE edema equal         no  BLE DP/PT pulses palpable        BLE hemosiderin staining/ varicose veins   BLE blanchable erythema w/ partial thickness wounds      pink moist base w/ scant serosanguinous drainage     Rt plantar heel pinpoint white lesion  No odor, erythema, increased warmth, tenderness, induration, fluctuance, nor crepitus  Skin:  thin, dry, pale, frail,  ecchymosis w/o hematoma  Bilateral Buttocks into gluteal cleft and thigh w/ blanchable erythema/ hyperpigmented skin     no open skin, blistering, or drainage  No odor, erythema, increased warmth, tenderness, induration, fluctuance, nor crepitus    LABS/ CULTURES/ RADIOLOGY:                        8.9    11.15 )-----------( 296      ( 08 Apr 2025 07:29 )             28.5       138  |  99  |  16  ----------------------------<  195      [04-08-25 @ 07:29]  3.5   |  24  |  1.16        Ca     8.4     [04-08-25 @ 07:29]      Mg     1.8     [04-08-25 @ 07:29]      Phos  3.6     [04-08-25 @ 07:29]    TPro  5.7  /  Alb  2.9  /  TBili  0.7  /  DBili  x   /  AST  13  /  ALT  11  /  AlkPhos  78  [04-08-25 @ 07:29]    PT/INR: PT 15.2 , INR 1.34       [04-08-25 @ 07:27]  PTT: 36.0       [04-08-25 @ 07:27]        A1C with Estimated Average Glucose Result: 8.7 % (12-30-24 @ 08:42)      ACC: [id number] EXAM:  PHYSIOL EXTREM LOW 3+ LEV BI   ORDERED BY: HERBERTH IZAGUIRRE     PROCEDURE DATE:  04/08/2025          INTERPRETATION:  History: Nonhealing ulcer right heel    Risk factors: Smoking, diabetes, hyperlipidemia, hypertension, CVA    The right ankle-brachial index equals 1.02; the left ankle-brachial index   equals 1.19.    The blood pressure measurements at the right ankle are 144 mmHg in the   posterior tibial artery and 140 in the dorsal pedis artery.    The blood pressure measurement at the right great toe is 83 mmHg and the   right toe-brachial index equals 0.59.    The blood pressure measurements at the left ankle are 161 mmHg in the   posterior tibial artery and 168 in the dorsal pedis artery.    The blood pressure measurement at the left great toe is 97 mmHg and the   left toe-brachial index equals 0.69.    The amplitude of the pulse volume recordings are normal bilaterally from   the levels of the calfs through the toes.    Impression:    The right GEOVANY of 1.02 and the left GEOVANY of 1.19 are normal.    The right TBI of 0.59 is mildly abnormal, and the left TBI of 0.69 is   near normal.    Significant lower extremity arterial vascular disease is not identified.      ACC: [id number] EXAM:  DUPLEX SCAN EXT VEINS LOWER BI   ORDERED BY:  DANETTE VERDIN     PROCEDURE DATE:  04/07/2025          INTERPRETATION:  CLINICAL INFORMATION: 74-year-old female with bilateral   lower extremity edema and pain    COMPARISON: Ultrasound 12/29/2024    TECHNIQUE: Duplex sonography of the BILATERAL LOWER extremity veins with   color and spectral Doppler, with and without compression.    FINDINGS:    RIGHT:  Normal compressibility of the RIGHT common femoral, femoral and popliteal   veins.  Doppler examination shows normal spontaneous and phasic flow.  The calf veins were not visualized    LEFT:  Normal compressibility of the LEFT common femoral, femoral and popliteal   veins.  Doppler examination shows normal spontaneous and phasic flow.  The calf veins were not visualized.    IMPRESSION:  No evidence of deep venous thrombosis in either lower extremity.    Calf veins not visualized     Wound SURGERY CONSULT NOTE    HPI:  75yo f w pmh obesity, asthma/copd/idalia, htn, hld, dm, cva w residual r sided weakness, afib, cad, gerd, hiatal hernia, constipation, lymphedema/chronic venous stasis/varicosities, hypothyroidism, anx/dep, p/w bl le, r>l, pain, redness, swelling; patient has at least some level of discomfort over the affected areas, but over the last few days, pain increased in intensity, so much so that patient is unable to ambulate comfortably. no relief with tylenol. patient and family grew concerned so had patient brought to [facility] er for further evaluation. of note, hospitalized for similar presentation 12/29/24-1/1/25; at that time, found to have stasis dermatitis flare w superimposed cellulitis; found to be mrsa swab + and wood lamp +; id, derm, podiatry, wound care consulted; patient treated with iv abx vancomycin (transitioned to po abx doxycycline and amoxicillin on discharge), steroid ointment, antifungal cream, antiseptic solution and wound dressings. in er, c/f cellulitis; admit to medicine for further mgmt       Wound consult requested by team to assist w/ management of Leg and buttocks wounds. Pt c/o pain to Rt ankle near 'cut' and redness, but no drainage, odor, color change,  or worsening swelling. Offloading and pericare initiated upon admission as pt Increasingly sedentary 2/2 to illness. Pt is Incontinent of urine & stool. No h/o bites, scratches, falls, trauma. Appetite good w/o weight loss. All questions asked and answered to pt's expressed understanding and satisfaction.    Current Diet: Diet, Consistent Carbohydrate/No Snacks (04-07-25 @ 19:32)      PAST MEDICAL & SURGICAL HISTORY:  HTN (hypertension)    Murmur    Morbid obesity    Palpitations    Former smoker    Hypothyroid  Helicobacter infection    CAD (coronary artery disease)  s/p cath     Hypercholesterolemia    Gastritis    Depression    Anxiety    AF (atrial fibrillation)    Hiatal hernia    Neuropathy    Uterine cyst    Incontinence    TIAs    Blind right eye    [place] (hard of hearing)    Thickened endometrium  Uterine cancer  s/p hysterectomy    Type 2 diabetes mellitus    Spinal stenosis    Edema of lower extremity  Varicose veins s/p stripping    Thyroid nodule  s/p thyroidectomy    OAB (overactive bladder)     obstructive sleep apnea    S/P cataract extraction and insertion of intraocular lens, bilateral     s/p RLE angioplasty of vein      REVIEW OF SYSTEMS: General/  Skin/ Vasc/ MSK: see HPI  All other systems negative    MEDICATIONS  (STANDING):  aMIOdarone    Tablet 200 milliGRAM(s) Oral daily  amLODIPine   Tablet 5 milliGRAM(s) Oral daily  apixaban 5 milliGRAM(s) Oral every 12 hours  aspirin enteric coated 81 milliGRAM(s) Oral daily  buDESOnide    Inhalation Suspension 0.5 milliGRAM(s) Inhalation every 12 hours  cadexomer iodine 0.9% Gel 1 Application(s) Topical daily  clobetasol 0.05% Ointment 1 Application(s) Topical two times a day  clotrimazole 1% Cream 1 Application(s) Topical two times a day  dextrose 5%. 1000 milliLiter(s) (50 mL/Hr) IV Continuous <Continuous>  dextrose 5%. 1000 milliLiter(s) (100 mL/Hr) IV Continuous <Continuous>  dextrose 50% Injectable 25 Gram(s) IV Push once  dextrose 50% Injectable 12.5 Gram(s) IV Push once  dextrose 50% Injectable 25 Gram(s) IV Push once  furosemide    Tablet 40 milliGRAM(s) Oral daily  glucagon  Injectable 1 milliGRAM(s) IntraMuscular once  insulin glargine Injectable (LANTUS) 10 Unit(s) SubCutaneous at bedtime  insulin lispro (ADMELOG) corrective regimen sliding scale   SubCutaneous three times a day before meals  insulin lispro Injectable (ADMELOG) 5 Unit(s) SubCutaneous three times a day before meals  levothyroxine 175 MICROGram(s) Oral daily  lidocaine   4% Patch 1 Patch Transdermal daily  melatonin 5 milliGRAM(s) Oral at bedtime  naloxone Injectable 0.4 milliGRAM(s) IV Push once  pantoprazole    Tablet 40 milliGRAM(s) Oral before breakfast  polyethylene glycol 3350 17 Gram(s) Oral daily  povidone iodine 10% Solution 1 Application(s) Topical daily  senna 2 Tablet(s) Oral at bedtime  vancomycin  IVPB 1000 milliGRAM(s) IV Intermittent every 12 hours    MEDICATIONS  (PRN):  acetaminophen  Tablet 650 milliGRAM(s) Oral every 6 hours PRN Temp greater or equal to 38C (100.4F), Mild Pain (1 - 3)  albuterol    90 MICROgram(s) HFA Inhaler 2 Puff(s) Inhalation every 6 hours PRN Shortness of Breath and/or Wheezing  ALPRAZolam 2 milliGRAM(s) Oral at bedtime PRN Anxiety/insomnia  aluminum hydroxide/magnesium hydroxide/simethicone Suspension 30 milliLiter(s) Oral every 4 hours PRN Dyspepsia  bisacodyl 5 milliGRAM(s) Oral daily PRN Constipation  dextrose Oral Gel 15 Gram(s) Oral once PRN Blood Glucose LESS THAN 70 milliGRAM(s)/deciliter  ondansetron Injectable 4 milliGRAM(s) IV Push every 8 hours PRN Nausea and/or Vomiting  oxyCODONE    IR 5 milliGRAM(s) Oral every 4 hours PRN Moderate Pain (4 - 6)  oxyCODONE    IR 10 milliGRAM(s) Oral every 4 hours PRN Severe Pain (7 - 10)      Allergies  latex (Rash)  Lipitor (Nausea)      SOCIAL HISTORY:  ; Former smoker, No current smoking, ETOH, drugs    FAMILY HISTORY:  stroke (Mother), hypertension (Father), breast cancer (Mother)   no h/o PVD or wound healing or skin problems    PHYSICAL EXAM:  Vital Signs Last 24 Hrs  T(C): 36.7 (08 Apr 2025 12:49), Max: 37.8 (08 Apr 2025 05:12)  T(F): 98.1 (08 Apr 2025 12:49), Max: 100 (08 Apr 2025 05:12)  HR: 67 (08 Apr 2025 12:49) (66 - 72)  BP: 101/63 (08 Apr 2025 12:49) (101/63 - 133/66)  BP(mean): --  RR: 18 (08 Apr 2025 12:49) (17 - 18)  SpO2: 96% (08 Apr 2025 12:49) (88% - 99%)    Parameters below as of 08 Apr 2025 12:49  Patient On (Oxygen Delivery Method): nasal cannula  O2 Flow (L/min): 2      NAD,  A&Ox3, MO, WD/ WN/ WG,    [facility]a [facility] P500 bed  HEENT:  NC/AT,, EOMI, sclera clear, mucosa moist, throat clear, trachea midline, neck supple  Respiratory: nonlabored w/ equal chest rise  Gastrointestinal: soft NT/ND   : (+ purewick  Neurology:  weakened strength & sensation grossly intact  Psych: calm/ appropriate  Musculoskeletal: FROM, no deformities/ contractures  Vascular: BLE equally warm,  no cyanosis, clubbing, nor acute ischemia        BLE edema equal        (+) BLE DP pulses palpable        BLE hemosiderin staining/ varicose veins   BLE blanchable erythema w/ partial thickness wounds      pink moist base w/ scant serosanguinous drainage     Rt plantar heel pinpoint white lesion  No odor, erythema, increased warmth, tenderness, induration, fluctuance, nor crepitus  Skin:  thin, dry, pale, frail,  ecchymosis w/o hematoma  Bilateral Buttocks into gluteal cleft and thigh w/ blanchable erythema/ hyperpigmented skin  Bilateral groin / panus w/ erythematous skin changes     no open skin, blistering, or drainage  No odor, erythema, increased warmth, tenderness, induration, fluctuance, nor crepitus    LABS/ CULTURES/ RADIOLOGY:                        8.9    11.15 )-----------( 296      ( 08 Apr 2025 07:29 )             28.5       138  |  99  |  16  ----------------------------<  195      [04-08-25 @ 07:29]  3.5   |  24  |  1.16        Ca     8.4     [04-08-25 @ 07:29]      Mg     1.8     [04-08-25 @ 07:29]      Phos  3.6     [04-08-25 @ 07:29]    TPro  5.7  /  Alb  2.9  /  TBili  0.7  /  DBili  x   /  AST  13  /  ALT  11  /  AlkPhos  78  [04-08-25 @ 07:29]    PT/INR: PT 15.2 , INR 1.34       [04-08-25 @ 07:27]  PTT: 36.0       [04-08-25 @ 07:27]        A1C with Estimated Average Glucose Result: 8.7 % (12-30-24 @ 08:42)      ACC: [id number] EXAM:  PHYSIOL EXTREM LOW 3+ LEV BI   ORDERED BY: HERBERTH IZAGUIRRE     PROCEDURE DATE:  04/08/2025          INTERPRETATION:  History: Nonhealing ulcer right heel    Risk factors: Smoking, diabetes, hyperlipidemia, hypertension, CVA    The right ankle-brachial index equals 1.02; the left ankle-brachial index   equals 1.19.    The blood pressure measurements at the right ankle are 144 mmHg in the   posterior tibial artery and 140 in the dorsal pedis artery.    The blood pressure measurement at the right great toe is 83 mmHg and the   right toe-brachial index equals 0.59.    The blood pressure measurements at the left ankle are 161 mmHg in the   posterior tibial artery and 168 in the dorsal pedis artery.    The blood pressure measurement at the left great toe is 97 mmHg and the   left toe-brachial index equals 0.69.    The amplitude of the pulse volume recordings are normal bilaterally from   the levels of the calfs through the toes.    Impression:    The right GEOVANY of 1.02 and the left GEOVANY of 1.19 are normal.    The right TBI of 0.59 is mildly abnormal, and the left TBI of 0.69 is   near normal.    Significant lower extremity arterial vascular disease is not identified.      ACC: [id number] EXAM:  DUPLEX SCAN EXT VEINS LOWER BI   ORDERED BY:  DANETTE VERDIN     PROCEDURE DATE:  04/07/2025          INTERPRETATION:  CLINICAL INFORMATION: 74-year-old female with bilateral   lower extremity edema and pain    COMPARISON: Ultrasound 12/29/2024    TECHNIQUE: Duplex sonography of the BILATERAL LOWER extremity veins with   color and spectral Doppler, with and without compression.    FINDINGS:    RIGHT:  Normal compressibility of the RIGHT common femoral, femoral and popliteal   veins.  Doppler examination shows normal spontaneous and phasic flow.  The calf veins were not visualized    LEFT:  Normal compressibility of the LEFT common femoral, femoral and popliteal   veins.  Doppler examination shows normal spontaneous and phasic flow.  The calf veins were not visualized.    IMPRESSION:  No evidence of deep venous thrombosis in either lower extremity.    Calf veins not visualized     Wound SURGERY CONSULT NOTE    HPI:  75yo f w pmh obesity, asthma/copd/idalia, htn, hld, dm, cva w residual r sided weakness, afib, cad, gerd, hiatal hernia, constipation, lymphedema/chronic venous stasis/varicosities, hypothyroidism, anx/dep, p/w bl le, r>l, pain, redness, swelling; patient has at least some level of discomfort over the affected areas, but over the last few days, pain increased in intensity, so much so that patient is unable to ambulate comfortably. no relief with tylenol. patient and family grew concerned so had patient brought to [facility] er for further evaluation. of note, hospitalized for similar presentation 12/29/24-1/1/25; at that time, found to have stasis dermatitis flare w superimposed cellulitis; found to be mrsa swab + and wood lamp +; id, derm, podiatry, wound care consulted; patient treated with iv abx vancomycin (transitioned to po abx doxycycline and amoxicillin on discharge), steroid ointment, antifungal cream, antiseptic solution and wound dressings. in er, c/f cellulitis; admit to medicine for further mgmt       Wound consult requested by team to assist w/ management of Leg and buttocks wounds. Pt c/o pain to Rt ankle near 'cut' and redness, but no drainage, odor, color change,  or worsening swelling. Offloading and pericare initiated upon admission as pt Increasingly sedentary 2/2 to illness. Pt is Incontinent of urine & stool. No h/o bites, scratches, falls, trauma. Appetite good w/o weight loss. All questions asked and answered to pt's expressed understanding and satisfaction.    Current Diet: Diet, Consistent Carbohydrate/No Snacks (04-07-25 @ 19:32)      PAST MEDICAL & SURGICAL HISTORY:  HTN (hypertension)    Murmur    Morbid obesity    Palpitations    Former smoker    Hypothyroid  Helicobacter infection    CAD (coronary artery disease)  s/p cath     Hypercholesterolemia    Gastritis    Depression    Anxiety    AF (atrial fibrillation)    Hiatal hernia    Neuropathy    Uterine cyst    Incontinence    TIAs    Blind right eye    [place] (hard of hearing)    Thickened endometrium  Uterine cancer  s/p hysterectomy    Type 2 diabetes mellitus    Spinal stenosis    Edema of lower extremity  Varicose veins s/p stripping    Thyroid nodule  s/p thyroidectomy    OAB (overactive bladder)     obstructive sleep apnea    S/P cataract extraction and insertion of intraocular lens, bilateral     s/p RLE angioplasty of vein      REVIEW OF SYSTEMS: General/  Skin/ Vasc/ MSK: see HPI  All other systems negative    MEDICATIONS  (STANDING):  aMIOdarone    Tablet 200 milliGRAM(s) Oral daily  amLODIPine   Tablet 5 milliGRAM(s) Oral daily  apixaban 5 milliGRAM(s) Oral every 12 hours  aspirin enteric coated 81 milliGRAM(s) Oral daily  buDESOnide    Inhalation Suspension 0.5 milliGRAM(s) Inhalation every 12 hours  cadexomer iodine 0.9% Gel 1 Application(s) Topical daily  clobetasol 0.05% Ointment 1 Application(s) Topical two times a day  clotrimazole 1% Cream 1 Application(s) Topical two times a day  dextrose 5%. 1000 milliLiter(s) (50 mL/Hr) IV Continuous <Continuous>  dextrose 5%. 1000 milliLiter(s) (100 mL/Hr) IV Continuous <Continuous>  dextrose 50% Injectable 25 Gram(s) IV Push once  dextrose 50% Injectable 12.5 Gram(s) IV Push once  dextrose 50% Injectable 25 Gram(s) IV Push once  furosemide    Tablet 40 milliGRAM(s) Oral daily  glucagon  Injectable 1 milliGRAM(s) IntraMuscular once  insulin glargine Injectable (LANTUS) 10 Unit(s) SubCutaneous at bedtime  insulin lispro (ADMELOG) corrective regimen sliding scale   SubCutaneous three times a day before meals  insulin lispro Injectable (ADMELOG) 5 Unit(s) SubCutaneous three times a day before meals  levothyroxine 175 MICROGram(s) Oral daily  lidocaine   4% Patch 1 Patch Transdermal daily  melatonin 5 milliGRAM(s) Oral at bedtime  naloxone Injectable 0.4 milliGRAM(s) IV Push once  pantoprazole    Tablet 40 milliGRAM(s) Oral before breakfast  polyethylene glycol 3350 17 Gram(s) Oral daily  povidone iodine 10% Solution 1 Application(s) Topical daily  senna 2 Tablet(s) Oral at bedtime  vancomycin  IVPB 1000 milliGRAM(s) IV Intermittent every 12 hours    MEDICATIONS  (PRN):  acetaminophen  Tablet 650 milliGRAM(s) Oral every 6 hours PRN Temp greater or equal to 38C (100.4F), Mild Pain (1 - 3)  albuterol    90 MICROgram(s) HFA Inhaler 2 Puff(s) Inhalation every 6 hours PRN Shortness of Breath and/or Wheezing  ALPRAZolam 2 milliGRAM(s) Oral at bedtime PRN Anxiety/insomnia  aluminum hydroxide/magnesium hydroxide/simethicone Suspension 30 milliLiter(s) Oral every 4 hours PRN Dyspepsia  bisacodyl 5 milliGRAM(s) Oral daily PRN Constipation  dextrose Oral Gel 15 Gram(s) Oral once PRN Blood Glucose LESS THAN 70 milliGRAM(s)/deciliter  ondansetron Injectable 4 milliGRAM(s) IV Push every 8 hours PRN Nausea and/or Vomiting  oxyCODONE    IR 5 milliGRAM(s) Oral every 4 hours PRN Moderate Pain (4 - 6)  oxyCODONE    IR 10 milliGRAM(s) Oral every 4 hours PRN Severe Pain (7 - 10)      Allergies  latex (Rash)  Lipitor (Nausea)      SOCIAL HISTORY:  ; Former smoker, No current smoking, ETOH, drugs    FAMILY HISTORY:  stroke (Mother), hypertension (Father), breast cancer (Mother)   no h/o PVD or wound healing or skin problems    PHYSICAL EXAM:  Vital Signs Last 24 Hrs  T(C): 36.7 (08 Apr 2025 12:49), Max: 37.8 (08 Apr 2025 05:12)  T(F): 98.1 (08 Apr 2025 12:49), Max: 100 (08 Apr 2025 05:12)  HR: 67 (08 Apr 2025 12:49) (66 - 72)  BP: 101/63 (08 Apr 2025 12:49) (101/63 - 133/66)  BP(mean): --  RR: 18 (08 Apr 2025 12:49) (17 - 18)  SpO2: 96% (08 Apr 2025 12:49) (88% - 99%)    Parameters below as of 08 Apr 2025 12:49  Patient On (Oxygen Delivery Method): nasal cannula  O2 Flow (L/min): 2      NAD,  A&Ox3, MO, WD/ WN/ WG,    Versa Care P500 bed  HEENT:  NC/AT,, EOMI, sclera clear, mucosa moist, throat clear, trachea midline, neck supple  Respiratory: nonlabored w/ equal chest rise  Gastrointestinal: soft NT/ND   : (+ purewick  Neurology:  weakened strength & sensation grossly intact  Psych: calm/ appropriate  Musculoskeletal: FROM, no deformities/ contractures  Vascular: BLE equally warm,  no cyanosis, clubbing, nor acute ischemia        BLE edema equal        (+) BLE DP pulses palpable        BLE hemosiderin staining/ varicose veins   BLE blanchable erythema w/ partial thickness wounds      pink moist base w/ scant serosanguinous drainage     Rt plantar heel pinpoint white lesion, tender to palpation  No odor, erythema, increased warmth, tenderness, induration, fluctuance, nor crepitus  Skin:  thin, dry, pale, frail,  ecchymosis w/o hematoma  Bilateral Buttocks into gluteal cleft and thigh w/ blanchable erythema/ hyperpigmented skin  Bilateral groin / panus w/ erythematous skin changes     no open skin, blistering, or drainage  No odor, erythema, increased warmth, tenderness, induration, fluctuance, nor crepitus    LABS/ CULTURES/ RADIOLOGY:                        8.9    11.15 )-----------( 296      ( 08 Apr 2025 07:29 )             28.5       138  |  99  |  16  ----------------------------<  195      [04-08-25 @ 07:29]  3.5   |  24  |  1.16        Ca     8.4     [04-08-25 @ 07:29]      Mg     1.8     [04-08-25 @ 07:29]      Phos  3.6     [04-08-25 @ 07:29]    TPro  5.7  /  Alb  2.9  /  TBili  0.7  /  DBili  x   /  AST  13  /  ALT  11  /  AlkPhos  78  [04-08-25 @ 07:29]    PT/INR: PT 15.2 , INR 1.34       [04-08-25 @ 07:27]  PTT: 36.0       [04-08-25 @ 07:27]        A1C with Estimated Average Glucose Result: 8.7 % (12-30-24 @ 08:42)      ACC: [id number] EXAM:  PHYSIOL EXTREM LOW 3+ LEV BI   ORDERED BY: HERBERTH IZAGUIRRE     PROCEDURE DATE:  04/08/2025          INTERPRETATION:  History: Nonhealing ulcer right heel    Risk factors: Smoking, diabetes, hyperlipidemia, hypertension, CVA    The right ankle-brachial index equals 1.02; the left ankle-brachial index   equals 1.19.    The blood pressure measurements at the right ankle are 144 mmHg in the   posterior tibial artery and 140 in the dorsal pedis artery.    The blood pressure measurement at the right great toe is 83 mmHg and the   right toe-brachial index equals 0.59.    The blood pressure measurements at the left ankle are 161 mmHg in the   posterior tibial artery and 168 in the dorsal pedis artery.    The blood pressure measurement at the left great toe is 97 mmHg and the   left toe-brachial index equals 0.69.    The amplitude of the pulse volume recordings are normal bilaterally from   the levels of the calfs through the toes.    Impression:    The right GEOVANY of 1.02 and the left GEOVANY of 1.19 are normal.    The right TBI of 0.59 is mildly abnormal, and the left TBI of 0.69 is   near normal.    Significant lower extremity arterial vascular disease is not identified.      ACC: [id number] EXAM:  DUPLEX SCAN EXT VEINS LOWER BI   ORDERED BY:  DANETTE VERDIN     PROCEDURE DATE:  04/07/2025          INTERPRETATION:  CLINICAL INFORMATION: 74-year-old female with bilateral   lower extremity edema and pain    COMPARISON: Ultrasound 12/29/2024    TECHNIQUE: Duplex sonography of the BILATERAL LOWER extremity veins with   color and spectral Doppler, with and without compression.    FINDINGS:    RIGHT:  Normal compressibility of the RIGHT common femoral, femoral and popliteal   veins.  Doppler examination shows normal spontaneous and phasic flow.  The calf veins were not visualized    LEFT:  Normal compressibility of the LEFT common femoral, femoral and popliteal   veins.  Doppler examination shows normal spontaneous and phasic flow.  The calf veins were not visualized.    IMPRESSION:  No evidence of deep venous thrombosis in either lower extremity.    Calf veins not visualized

## 2025-04-08 NOTE — PROGRESS NOTE ADULT - SUBJECTIVE AND OBJECTIVE BOX
Podiatry pager #: [phone]/ [phone]    Patient is a 74y old  Female who presents with a chief complaint of bl le, r>l, pain, redness, swelling (08 Apr 2025 16:21)Podiatry consultation for evaluation of right heel      HPI:  75yo f w pmh obesity, asthma/copd/idalia, htn, hld, dm, cva w residual r sided weakness, afib, cad, gerd, hiatal hernia, constipation, lymphedema/chronic venous stasis/varicosities, hypothyroidism, anx/dep, p/w bl le, r>l, pain, redness, swelling; patient has at least some level of discomfort over the affected areas, but over the last few days, pain increased in intensity, so much so that patient is unable to ambulate comfortably. no relief with tylenol. patient and family grew concerned so had patient brought to [facility] er for further evaluation. of note, hospitalized for similar presentation 12/29/24-1/1/25; at that time, found to have stasis dermatitis flare w superimposed cellulitis; found to be mrsa swab + and wood lamp +; id, derm, podiatry, wound care consulted; patient treated with iv abx vancomycin (transitioned to po abx doxycycline and amoxicillin on discharge), steroid ointment, antifungal cream, antiseptic solution and wound dressings. in er, c/f cellulitis; admit to medicine for further mgmt (07 Apr 2025 19:35)      PAST MEDICAL & SURGICAL HISTORY:  HTN (hypertension)      Murmur      Morbid obesity      Palpitations      Former smoker      Hypothyroid      CAD (coronary artery disease)  minimal CAD per cath result in 2014      Hypercholesterolemia      Gastritis      Depression      Anxiety      AF (atrial fibrillation)  on Xarelto      Hiatal hernia      Neuropathy      Uterine cyst      Incontinence      Thickened endometrium      History of TIAs      Loss of vision  right eye      Blind right eye      [place] (hard of hearing)      Uterine cancer      Type 2 diabetes mellitus      Constipation      Spinal stenosis      Edema of lower extremity      Varicose veins      Thyroid nodule      OAB (overactive bladder)      Risk factors for obstructive sleep apnea      H/O Helicobacter infection      S/P cataract extraction and insertion of intraocular lens  bilateral > 15 years ago      Varicose vein  with stripping b/l      History of angioplasty of vein  right leg 2016      H/O thyroidectomy      H/O: hysterectomy          MEDICATIONS  (STANDING):  aMIOdarone    Tablet 200 milliGRAM(s) Oral daily  amLODIPine   Tablet 5 milliGRAM(s) Oral daily  apixaban 5 milliGRAM(s) Oral every 12 hours  aspirin enteric coated 81 milliGRAM(s) Oral daily  buDESOnide    Inhalation Suspension 0.5 milliGRAM(s) Inhalation every 12 hours  cadexomer iodine 0.9% Gel 1 Application(s) Topical daily  clobetasol 0.05% Ointment 1 Application(s) Topical two times a day  clotrimazole 1% Cream 1 Application(s) Topical two times a day  dextrose 5%. 1000 milliLiter(s) (50 mL/Hr) IV Continuous <Continuous>  dextrose 5%. 1000 milliLiter(s) (100 mL/Hr) IV Continuous <Continuous>  dextrose 50% Injectable 25 Gram(s) IV Push once  dextrose 50% Injectable 12.5 Gram(s) IV Push once  dextrose 50% Injectable 25 Gram(s) IV Push once  furosemide    Tablet 40 milliGRAM(s) Oral daily  glucagon  Injectable 1 milliGRAM(s) IntraMuscular once  insulin glargine Injectable (LANTUS) 10 Unit(s) SubCutaneous at bedtime  insulin lispro (ADMELOG) corrective regimen sliding scale   SubCutaneous three times a day before meals  insulin lispro Injectable (ADMELOG) 5 Unit(s) SubCutaneous three times a day before meals  levothyroxine 175 MICROGram(s) Oral daily  lidocaine   4% Patch 1 Patch Transdermal daily  melatonin 5 milliGRAM(s) Oral at bedtime  naloxone Injectable 0.4 milliGRAM(s) IV Push once  pantoprazole    Tablet 40 milliGRAM(s) Oral before breakfast  polyethylene glycol 3350 17 Gram(s) Oral daily  povidone iodine 10% Solution 1 Application(s) Topical daily  senna 2 Tablet(s) Oral at bedtime  vancomycin  IVPB 1000 milliGRAM(s) IV Intermittent every 12 hours    MEDICATIONS  (PRN):  acetaminophen     Tablet .. 650 milliGRAM(s) Oral every 6 hours PRN Temp greater or equal to 38C (100.4F), Mild Pain (1 - 3)  albuterol    90 MICROgram(s) HFA Inhaler 2 Puff(s) Inhalation every 6 hours PRN Shortness of Breath and/or Wheezing  ALPRAZolam 2 milliGRAM(s) Oral at bedtime PRN Anxiety/insomnia  aluminum hydroxide/magnesium hydroxide/simethicone Suspension 30 milliLiter(s) Oral every 4 hours PRN Dyspepsia  bisacodyl 5 milliGRAM(s) Oral daily PRN Constipation  dextrose Oral Gel 15 Gram(s) Oral once PRN Blood Glucose LESS THAN 70 milliGRAM(s)/deciliter  ondansetron Injectable 4 milliGRAM(s) IV Push every 8 hours PRN Nausea and/or Vomiting  oxyCODONE    IR 5 milliGRAM(s) Oral every 4 hours PRN Moderate Pain (4 - 6)  oxyCODONE    IR 10 milliGRAM(s) Oral every 4 hours PRN Severe Pain (7 - 10)      Allergies    latex (Rash)  Lipitor (Nausea)    Intolerances        VITALS:    Vital Signs Last 24 Hrs  T(C): 36.7 (08 Apr 2025 12:49), Max: 37.8 (08 Apr 2025 05:12)  T(F): 98.1 (08 Apr 2025 12:49), Max: 100 (08 Apr 2025 05:12)  HR: 67 (08 Apr 2025 12:49) (67 - 72)  BP: 101/63 (08 Apr 2025 12:49) (101/63 - 133/66)  BP(mean): --  RR: 18 (08 Apr 2025 12:49) (17 - 18)  SpO2: 96% (08 Apr 2025 12:49) (88% - 99%)    Parameters below as of 08 Apr 2025 12:49  Patient On (Oxygen Delivery Method): nasal cannula  O2 Flow (L/min): 2      LABS:                          8.9    11.15 )-----------( 296      ( 08 Apr 2025 07:29 )             28.5       04-08    138  |  99  |  16  ----------------------------<  195[H]  3.5   |  24  |  1.16    Ca    8.4      08 Apr 2025 07:29  Phos  3.6     04-08  Mg     1.8     04-08    TPro  5.7[L]  /  Alb  2.9[L]  /  TBili  0.7  /  DBili  x   /  AST  13  /  ALT  11  /  AlkPhos  78  04-08      CAPILLARY BLOOD GLUCOSE      POCT Blood Glucose.: 179 mg/dL (08 Apr 2025 17:10)  POCT Blood Glucose.: 187 mg/dL (08 Apr 2025 12:25)  POCT Blood Glucose.: 197 mg/dL (08 Apr 2025 08:34)  POCT Blood Glucose.: 238 mg/dL (07 Apr 2025 21:43)      PT/INR - ( 08 Apr 2025 07:27 )   PT: 15.2 sec;   INR: 1.34 ratio         PTT - ( 08 Apr 2025 07:27 )  PTT:36.0 sec    LOWER EXTREMITY PHYSICAL EXAM:    Vasular: DP/PT 1_/4, B/L, CFT <_ 3 seconds B/L, Temperature gradient warm right leg_. venous insufficency and lymphedema bilateral  Neuro: Epicritic sensation diminished_ to the level of toes_, B/L.  Skin: Right lateral leg/calf wound secondary to venous insufficiency with superimposed cellulitis being followed by wound care team.  On the right plantar central aspect of the right heel small pinpoint wound with severe tenderness upon palpation.  Minimal fluctuance no purulence no malodor expressed no crepitus noted.      RADIOLOGY & ADDITIONAL STUDIES:    < from: Xray Tibia + Fibula 2 Views, Bilateral (12.29.24 @ 17:17) >  ACC: [id number] EXAM:  XR FEMUR 2 VIEWS BI   ORDERED BY:  KATARINA BERUMEN     ACC: [id number] EXAM:  XR TIB FIB AP LAT 2 VIEWS BI   ORDERED BY:  KATARINA BERUMEN     PROCEDURE DATE:  12/29/2024          INTERPRETATION:  CLINICAL INDICATION: Skin wound, evaluatefor   subcutaneous air tracking.  TECHNIQUE: 2 views of the bilateral femurs. 2 views of the bilateral   tibia fibula.    COMPARISON: Radiograph of the left tibia-fibula performed on 9/20/2014.    FINDINGS/  IMPRESSION:  Diffuse bilateral soft tissueswelling. No tracking soft tissue gas. No   cortical erosion or periosteal new bone formation. No acute fracture. No   dislocation. Bilateral, right greater than left, tricompartmental   arthrosis of the knee joints, most pronounced in bilateral medial   compartments.    --- End of Report ---          SHAVON GIL MD; Resident Radiologist  This document has been electronically signed.  CHEYENNE BISWAS MD; Attending Radiologist  This document has been electronically signed. Dec 29 2024  7:31PM    < end of copied text >  < from: US Duplex Venous Lower Ext Complete, Bilateral (04.09.24 @ 16:30) >    EXAM: [id number] - US DPLX LWR EXT VEINS COMPL BI  - ORDERED BY: OREL   BENSHAR      PROCEDURE DATE:  04/09/2024           INTERPRETATION:  CLINICAL INFORMATION: Bilateral lower extremity swelling.    COMPARISON: Bilateral lower extremity duplex ultrasound 6/21/2017.    TECHNIQUE: Duplex sonography of the BILATERAL LOWER extremity veins with   color and spectral Doppler, with and without compression.    FINDINGS:    Mildly limited exam secondary to patient body habitus and subcutaneous   edema.    RIGHT:  Normal compressibility of the RIGHT common femoral, femoral and popliteal   veins.  Doppler examination shows normal spontaneous and phasic flow.  Limited visualization of the RIGHT peroneal and soleal veins. Otherwise   no RIGHT calf vein thrombosis detected.    LEFT:  Normal compressibility of the LEFT common femoral, femoral and popliteal   veins.  Doppler examination shows normal spontaneous and phasic flow.  Limited visualization of the LEFT peroneal and soleal veins. Otherwise no   LEFT calf vein thrombosis detected.    IMPRESSION:  No evidence of deep venous thrombosis in either lower extremity of the   visualized veins.    Marked subcutaneous edema.        --- End of Report ---                PEDRO ELISE MD; Attending Radiologist   This document has been electronically signed. Apr 9 2024  4:40PM    < end of copied text >

## 2025-04-08 NOTE — PROGRESS NOTE ADULT - PROBLEM SELECTOR PLAN 12
pulmicort  ventolin    DISPO: pending clinical improvement, blood cx, MRSA PCR, wound care pulmicort  ventolin  PT: FRANCES     DISPO: pending clinical improvement, blood cx, MRSA PCR, wound care

## 2025-04-08 NOTE — PROGRESS NOTE ADULT - PROBLEM SELECTOR PLAN 1
h/o lymphedema/chronic venous stasis/varicosities  a/w r heel wound, nonhealing  recurrent hospitalizations for the same; prev derm, id, podiatry, wound care documentation reviewed  previously, woods lamp + and mrsa screen +  mild leukocytosis w lactic acidosis; otherwise, afebrile and hds  s/p vanc, cefepime in ER  GEOVANY/PVR noted; right GEOVANY of 1.02 and the left GEOVANY of 1.19 are normal. The right TBI of 0.59 is mildly abnormal, and the left TBI of 0.69 is near normal.  -f/u blood cx and MRSA PCR    - va duplex neg for DVT   continue broad spec iv abx with vanc for now   analgesics and antipyretics as needed   elevate extremity + ice packs  wound care consult

## 2025-04-09 LAB
ANION GAP SERPL CALC-SCNC: 13 MMOL/L — SIGNIFICANT CHANGE UP (ref 5–17)
BUN SERPL-MCNC: 18 MG/DL — SIGNIFICANT CHANGE UP (ref 7–23)
CALCIUM SERPL-MCNC: 8.4 MG/DL — SIGNIFICANT CHANGE UP (ref 8.4–10.5)
CHLORIDE SERPL-SCNC: 100 MMOL/L — SIGNIFICANT CHANGE UP (ref 96–108)
CO2 SERPL-SCNC: 26 MMOL/L — SIGNIFICANT CHANGE UP (ref 22–31)
CREAT SERPL-MCNC: 1.35 MG/DL — HIGH (ref 0.5–1.3)
EGFR: 41 ML/MIN/1.73M2 — LOW
EGFR: 41 ML/MIN/1.73M2 — LOW
GLUCOSE BLDC GLUCOMTR-MCNC: 132 MG/DL — HIGH (ref 70–99)
GLUCOSE BLDC GLUCOMTR-MCNC: 212 MG/DL — HIGH (ref 70–99)
GLUCOSE BLDC GLUCOMTR-MCNC: 221 MG/DL — HIGH (ref 70–99)
GLUCOSE BLDC GLUCOMTR-MCNC: 266 MG/DL — HIGH (ref 70–99)
GLUCOSE BLDC GLUCOMTR-MCNC: 283 MG/DL — HIGH (ref 70–99)
GLUCOSE SERPL-MCNC: 112 MG/DL — HIGH (ref 70–99)
HCT VFR BLD CALC: 29.5 % — LOW (ref 34.5–45)
HGB BLD-MCNC: 8.9 G/DL — LOW (ref 11.5–15.5)
MAGNESIUM SERPL-MCNC: 1.9 MG/DL — SIGNIFICANT CHANGE UP (ref 1.6–2.6)
MCHC RBC-ENTMCNC: 25.6 PG — LOW (ref 27–34)
MCHC RBC-ENTMCNC: 30.2 G/DL — LOW (ref 32–36)
MCV RBC AUTO: 84.8 FL — SIGNIFICANT CHANGE UP (ref 80–100)
NRBC BLD AUTO-RTO: 0 /100 WBCS — SIGNIFICANT CHANGE UP (ref 0–0)
PHOSPHATE SERPL-MCNC: 3.8 MG/DL — SIGNIFICANT CHANGE UP (ref 2.5–4.5)
PLATELET # BLD AUTO: 273 K/UL — SIGNIFICANT CHANGE UP (ref 150–400)
POTASSIUM SERPL-MCNC: 3.5 MMOL/L — SIGNIFICANT CHANGE UP (ref 3.5–5.3)
POTASSIUM SERPL-SCNC: 3.5 MMOL/L — SIGNIFICANT CHANGE UP (ref 3.5–5.3)
RBC # BLD: 3.48 M/UL — LOW (ref 3.8–5.2)
RBC # FLD: 13.7 % — SIGNIFICANT CHANGE UP (ref 10.3–14.5)
SODIUM SERPL-SCNC: 139 MMOL/L — SIGNIFICANT CHANGE UP (ref 135–145)
T3 SERPL-MCNC: 25 NG/DL — LOW (ref 80–200)
T4 FREE SERPL-MCNC: 1.3 NG/DL — SIGNIFICANT CHANGE UP (ref 0.9–1.8)
TSH SERPL-MCNC: 12.8 UIU/ML — HIGH (ref 0.27–4.2)
VANCOMYCIN TROUGH SERPL-MCNC: 14.9 UG/ML — SIGNIFICANT CHANGE UP (ref 10–20)
VANCOMYCIN TROUGH SERPL-MCNC: 16 UG/ML — SIGNIFICANT CHANGE UP (ref 10–20)
WBC # BLD: 10.85 K/UL — HIGH (ref 3.8–10.5)
WBC # FLD AUTO: 10.85 K/UL — HIGH (ref 3.8–10.5)

## 2025-04-09 PROCEDURE — 99233 SBSQ HOSP IP/OBS HIGH 50: CPT

## 2025-04-09 PROCEDURE — G0545: CPT

## 2025-04-09 PROCEDURE — 73720 MRI LWR EXTREMITY W/O&W/DYE: CPT | Mod: 26,RT

## 2025-04-09 PROCEDURE — 99222 1ST HOSP IP/OBS MODERATE 55: CPT

## 2025-04-09 RX ORDER — MUPIROCIN CALCIUM 20 MG/G
1 CREAM TOPICAL
Refills: 0 | Status: COMPLETED | OUTPATIENT
Start: 2025-04-09 | End: 2025-04-13

## 2025-04-09 RX ORDER — LORAZEPAM 4 MG/ML
0.5 VIAL (ML) INJECTION ONCE
Refills: 0 | Status: DISCONTINUED | OUTPATIENT
Start: 2025-04-09 | End: 2025-04-09

## 2025-04-09 RX ORDER — HYDROMORPHONE/SOD CHLOR,ISO/PF 2 MG/10 ML
0.5 SYRINGE (ML) INJECTION ONCE
Refills: 0 | Status: DISCONTINUED | OUTPATIENT
Start: 2025-04-09 | End: 2025-04-09

## 2025-04-09 RX ORDER — ACETAMINOPHEN 500 MG/5ML
1000 LIQUID (ML) ORAL ONCE
Refills: 0 | Status: COMPLETED | OUTPATIENT
Start: 2025-04-09 | End: 2025-04-09

## 2025-04-09 RX ORDER — INSULIN LISPRO 100 U/ML
6 INJECTION, SOLUTION INTRAVENOUS; SUBCUTANEOUS
Refills: 0 | Status: DISCONTINUED | OUTPATIENT
Start: 2025-04-09 | End: 2025-04-10

## 2025-04-09 RX ADMIN — Medication 5 MILLIGRAM(S): at 23:27

## 2025-04-09 RX ADMIN — LIDOCAINE HYDROCHLORIDE 1 PATCH: 20 JELLY TOPICAL at 13:04

## 2025-04-09 RX ADMIN — Medication 1 APPLICATION(S): at 13:04

## 2025-04-09 RX ADMIN — Medication 1000 MILLIGRAM(S): at 09:35

## 2025-04-09 RX ADMIN — Medication 175 MICROGRAM(S): at 07:02

## 2025-04-09 RX ADMIN — BUDESONIDE 0.5 MILLIGRAM(S): 90 AEROSOL, POWDER RESPIRATORY (INHALATION) at 06:59

## 2025-04-09 RX ADMIN — Medication 0.5 MILLIGRAM(S): at 19:44

## 2025-04-09 RX ADMIN — Medication 2 TABLET(S): at 23:27

## 2025-04-09 RX ADMIN — AMIODARONE HYDROCHLORIDE 200 MILLIGRAM(S): 50 INJECTION, SOLUTION INTRAVENOUS at 06:59

## 2025-04-09 RX ADMIN — Medication 250 MILLIGRAM(S): at 18:35

## 2025-04-09 RX ADMIN — LIDOCAINE HYDROCHLORIDE 1 PATCH: 20 JELLY TOPICAL at 19:50

## 2025-04-09 RX ADMIN — INSULIN GLARGINE-YFGN 10 UNIT(S): 100 INJECTION, SOLUTION SUBCUTANEOUS at 23:27

## 2025-04-09 RX ADMIN — OXYCODONE HYDROCHLORIDE 10 MILLIGRAM(S): 30 TABLET ORAL at 16:43

## 2025-04-09 RX ADMIN — Medication 40 MILLIGRAM(S): at 07:01

## 2025-04-09 RX ADMIN — FUROSEMIDE 40 MILLIGRAM(S): 10 INJECTION INTRAMUSCULAR; INTRAVENOUS at 06:59

## 2025-04-09 RX ADMIN — APIXABAN 5 MILLIGRAM(S): 2.5 TABLET, FILM COATED ORAL at 18:35

## 2025-04-09 RX ADMIN — MUPIROCIN CALCIUM 1 APPLICATION(S): 20 CREAM TOPICAL at 18:36

## 2025-04-09 RX ADMIN — Medication 1 APPLICATION(S): at 16:41

## 2025-04-09 RX ADMIN — Medication 81 MILLIGRAM(S): at 13:05

## 2025-04-09 RX ADMIN — POLYETHYLENE GLYCOL 3350 17 GRAM(S): 17 POWDER, FOR SOLUTION ORAL at 13:05

## 2025-04-09 RX ADMIN — AMLODIPINE BESYLATE 5 MILLIGRAM(S): 10 TABLET ORAL at 06:59

## 2025-04-09 RX ADMIN — BUDESONIDE 0.5 MILLIGRAM(S): 90 AEROSOL, POWDER RESPIRATORY (INHALATION) at 18:35

## 2025-04-09 RX ADMIN — Medication 1 APPLICATION(S): at 13:06

## 2025-04-09 RX ADMIN — Medication 250 MILLIGRAM(S): at 07:50

## 2025-04-09 RX ADMIN — APIXABAN 5 MILLIGRAM(S): 2.5 TABLET, FILM COATED ORAL at 06:59

## 2025-04-09 RX ADMIN — INSULIN LISPRO 2: 100 INJECTION, SOLUTION INTRAVENOUS; SUBCUTANEOUS at 17:46

## 2025-04-09 RX ADMIN — INSULIN LISPRO 3: 100 INJECTION, SOLUTION INTRAVENOUS; SUBCUTANEOUS at 13:21

## 2025-04-09 RX ADMIN — Medication 1 APPLICATION(S): at 18:36

## 2025-04-09 RX ADMIN — INSULIN LISPRO 5 UNIT(S): 100 INJECTION, SOLUTION INTRAVENOUS; SUBCUTANEOUS at 09:19

## 2025-04-09 RX ADMIN — CLOTRIMAZOLE 1 APPLICATION(S): 1 CREAM TOPICAL at 06:59

## 2025-04-09 RX ADMIN — INSULIN LISPRO 5 UNIT(S): 100 INJECTION, SOLUTION INTRAVENOUS; SUBCUTANEOUS at 13:21

## 2025-04-09 RX ADMIN — Medication 2 MILLIGRAM(S): at 23:55

## 2025-04-09 RX ADMIN — Medication 0.5 MILLIGRAM(S): at 20:43

## 2025-04-09 RX ADMIN — Medication 0.5 MILLIGRAM(S): at 18:47

## 2025-04-09 RX ADMIN — Medication 650 MILLIGRAM(S): at 01:07

## 2025-04-09 RX ADMIN — Medication 400 MILLIGRAM(S): at 09:17

## 2025-04-09 RX ADMIN — MUPIROCIN CALCIUM 1 APPLICATION(S): 20 CREAM TOPICAL at 07:01

## 2025-04-09 NOTE — DIETITIAN INITIAL EVALUATION ADULT - NSICDXPASTMEDICALHX_GEN_ALL_CORE_FT
PAST MEDICAL HISTORY:  AF (atrial fibrillation) on Xarelto    Anxiety     Blind right eye     CAD (coronary artery disease) minimal CAD per cath result in 2014    Constipation     Depression     Edema of lower extremity     Former smoker     Gastritis     H/O Helicobacter infection     Hiatal hernia     History of TIAs     [place] (hard of hearing)     HTN (hypertension)     Hypercholesterolemia     Hypothyroid     Incontinence     Loss of vision right eye    Morbid obesity     Murmur     Neuropathy     OAB (overactive bladder)     Palpitations     Risk factors for obstructive sleep apnea     Spinal stenosis     Thickened endometrium     Thyroid nodule     Type 2 diabetes mellitus     Uterine cancer     Uterine cyst     Varicose veins

## 2025-04-09 NOTE — DIETITIAN INITIAL EVALUATION ADULT - PERTINENT MEDS FT
MEDICATIONS  (STANDING):  aMIOdarone    Tablet 200 milliGRAM(s) Oral daily  amLODIPine   Tablet 5 milliGRAM(s) Oral daily  apixaban 5 milliGRAM(s) Oral every 12 hours  aspirin enteric coated 81 milliGRAM(s) Oral daily  buDESOnide    Inhalation Suspension 0.5 milliGRAM(s) Inhalation every 12 hours  cadexomer iodine 0.9% Gel 1 Application(s) Topical daily  clobetasol 0.05% Ointment 1 Application(s) Topical two times a day  clotrimazole 1% Cream 1 Application(s) Topical two times a day  dextrose 5%. 1000 milliLiter(s) (50 mL/Hr) IV Continuous <Continuous>  dextrose 5%. 1000 milliLiter(s) (100 mL/Hr) IV Continuous <Continuous>  dextrose 50% Injectable 25 Gram(s) IV Push once  dextrose 50% Injectable 12.5 Gram(s) IV Push once  dextrose 50% Injectable 25 Gram(s) IV Push once  furosemide    Tablet 40 milliGRAM(s) Oral daily  glucagon  Injectable 1 milliGRAM(s) IntraMuscular once  insulin glargine Injectable (LANTUS) 10 Unit(s) SubCutaneous at bedtime  insulin lispro (ADMELOG) corrective regimen sliding scale   SubCutaneous three times a day before meals  insulin lispro Injectable (ADMELOG) 5 Unit(s) SubCutaneous three times a day before meals  levothyroxine 175 MICROGram(s) Oral daily  lidocaine   4% Patch 1 Patch Transdermal daily  melatonin 5 milliGRAM(s) Oral at bedtime  mupirocin 2% Nasal 1 Application(s) Both Nostrils two times a day  naloxone Injectable 0.4 milliGRAM(s) IV Push once  pantoprazole    Tablet 40 milliGRAM(s) Oral before breakfast  polyethylene glycol 3350 17 Gram(s) Oral daily  povidone iodine 10% Solution 1 Application(s) Topical daily  senna 2 Tablet(s) Oral at bedtime  vancomycin  IVPB 1000 milliGRAM(s) IV Intermittent every 12 hours    MEDICATIONS  (PRN):  acetaminophen     Tablet .. 650 milliGRAM(s) Oral every 6 hours PRN Temp greater or equal to 38C (100.4F), Mild Pain (1 - 3)  albuterol    90 MICROgram(s) HFA Inhaler 2 Puff(s) Inhalation every 6 hours PRN Shortness of Breath and/or Wheezing  ALPRAZolam 2 milliGRAM(s) Oral at bedtime PRN Anxiety/insomnia  aluminum hydroxide/magnesium hydroxide/simethicone Suspension 30 milliLiter(s) Oral every 4 hours PRN Dyspepsia  bisacodyl 5 milliGRAM(s) Oral daily PRN Constipation  dextrose Oral Gel 15 Gram(s) Oral once PRN Blood Glucose LESS THAN 70 milliGRAM(s)/deciliter  ondansetron Injectable 4 milliGRAM(s) IV Push every 8 hours PRN Nausea and/or Vomiting  oxyCODONE    IR 5 milliGRAM(s) Oral every 4 hours PRN Moderate Pain (4 - 6)  oxyCODONE    IR 10 milliGRAM(s) Oral every 4 hours PRN Severe Pain (7 - 10)

## 2025-04-09 NOTE — DIETITIAN INITIAL EVALUATION ADULT - REASON INDICATOR FOR ASSESSMENT
Nutrition Consult for assessment / education / pressure injury stage 2 or >.   Source: Previous RD note, Electronic Medical Record, Team. Pt unavailable during attempted visits.   Chart reviewed, events noted.

## 2025-04-09 NOTE — DIETITIAN INITIAL EVALUATION ADULT - PERTINENT LABORATORY DATA
04-09    139  |  100  |  18  ----------------------------<  112[H]  3.5   |  26  |  1.35[H]    Ca    8.4      09 Apr 2025 06:29  Phos  3.8     04-09  Mg     1.9     04-09    TPro  5.7[L]  /  Alb  2.9[L]  /  TBili  0.7  /  DBili  x   /  AST  13  /  ALT  11  /  AlkPhos  78  04-08  POCT Blood Glucose.: 132 mg/dL (04-09-25 @ 08:24)  A1C with Estimated Average Glucose Result: 11.2 % (04-08-25 @ 07:27)  A1C with Estimated Average Glucose Result: 8.7 % (12-30-24 @ 08:42)

## 2025-04-09 NOTE — DIETITIAN INITIAL EVALUATION ADULT - PROBLEM/PLAN-5
Chief Complaints and History of Present Illnesses   Patient presents with    Dry Eye(s) Both Eyes     10 month follow up both eyes     Chief Complaint(s) and History of Present Illness(es)       Dry Eye(s) Both Eyes              Comments: 10 month follow up both eyes              Comments    Pt states vision is about the same as last visit. Pt has separate glasses for distance and near. Itching and burning in both eyes for the past month, usually worse with the cold weather. Pt using ointment and needing a refill today. No DM.    IVANA Cifuentes December 12, 2024 1:43 PM                        DISPLAY PLAN FREE TEXT

## 2025-04-09 NOTE — CONSULT NOTE ADULT - ASSESSMENT
74-yo F w/ PMH of CVA w/ R hemiparesis, PAF on Eliquis, DM, stasis dermatitis, and recent admission (12/29/24-1/1/25) for SSTI of the RLE w/ improvement on vancomycin switched to Augmentin/doxycycline, now presenting for R>L BLE edema and pain. WBC, ESR, and CRP all elevated. Febrile.      Microbio:  4/8 BCx neg  4/8 MRSA swab pos    Antimicrobials:  Cefepime 4/7  Vancomycin 4/7-    #Sepsis  #SSTI  #Leukocytosis  #Fever  #DEEPA  #Stasis dermatitis    Recommendations:  -  74-yo F w/ PMH of CVA w/ R hemiparesis, PAF on Eliquis, DM, stasis dermatitis, and recent admission (12/29/24-1/1/25) for SSTI of the RLE w/ improvement on vancomycin switched to Augmentin/doxycycline, now presenting for R>L BLE edema and pain. WBC, ESR, and CRP all elevated. Febrile. LRINEC score 2. R heel tender w/ fluctuance. Less likely necrotizing infection.    Microbio:  4/8 BCx neg  4/8 MRSA swab pos    Antimicrobials:  Cefepime 4/7  Vancomycin 4/7-    #Sepsis  #SSTI  #Leukocytosis  #Fever  #DEEPA  #Stasis dermatitis    Recommendations:  - MRI to assess for soft tissue collection and possible bone involvement given high ESR/CRP and recurrence of infection, however appears of low risk of bone involvement given history  - Continue with vancomycin 1g IV Q12H. Check pre-4th dose trough  - Podiatry f/u for possible R heel debridement and culture  - Monitor VS and WBC  - F/u BCx  - If hemodynamic status worsens, consider urgent surgical eval    Topics relating to disease transmission risk assessment and mitigation, complex antimicrobial therapy counseling and treatment, and/or public health investigation, analysis, and testing were addressed.    Thank you for this consult. Inpatient ID team will follow.  Plan discussed with primary team clinician.        Tomás Licea MD, PhD  Attending Physician  Division of Infectious Diseases  Department of Medicine    Please contact through MS Teams message.  Office: [phone] (after 5 PM or weekend)   74-yo F w/ PMH of CVA w/ R hemiparesis, PAF on Eliquis, DM, stasis dermatitis, and recent admission (12/29/24-1/1/25) for SSTI of the RLE w/ improvement on vancomycin switched to Augmentin/doxycycline, now presenting for R>L BLE edema and pain. WBC, ESR, and CRP all elevated. Febrile. LRINEC score 2. R heel tender w/ fluctuance. WBC downtrending on vancomycin. Less likely necrotizing infection.    Microbio:  4/8 BCx neg  4/8 MRSA swab pos    Antimicrobials:  Cefepime 4/7  Vancomycin 4/7-    #Sepsis  #SSTI  #Leukocytosis  #Fever  #DEEPA  #Stasis dermatitis    Recommendations:  - MRI to assess for soft tissue collection and possible bone involvement given high ESR/CRP and recurrence of infection, however appears of low risk of bone involvement given history  - Continue with vancomycin 1g IV Q12H. Check pre-4th dose trough  - Podiatry f/u for possible R heel debridement and culture  - Monitor VS and WBC  - F/u BCx  - If hemodynamic status worsens, consider urgent surgical eval    Topics relating to disease transmission risk assessment and mitigation, complex antimicrobial therapy counseling and treatment, and/or public health investigation, analysis, and testing were addressed.    Thank you for this consult. Inpatient ID team will follow.  Plan discussed with primary team clinician.        Tomás Licea MD, PhD  Attending Physician  Division of Infectious Diseases  Department of Medicine    Please contact through MS Teams message.  Office: [phone] (after 5 PM or weekend)

## 2025-04-09 NOTE — DIETITIAN INITIAL EVALUATION ADULT - ADD RECOMMEND
1) Continue Consistent Carbohydrate diet as tolerated.   	- Defer texture/consistency to SLP/team.   2) Recommend Multivitamin daily pending no medical contraindications.  3) Continue to monitor PO intake, weight, labs, skin, GI status, and diet.

## 2025-04-09 NOTE — CONSULT NOTE ADULT - SUBJECTIVE AND OBJECTIVE BOX
Patient is a 74y old  Female who presents with a chief complaint of bl le, r>l, pain, redness, swelling (09 Apr 2025 10:39)      HPI:  75yo f w pmh obesity, asthma/copd/idalia, htn, hld, dm, cva w residual r sided weakness, afib, cad, gerd, hiatal hernia, constipation, lymphedema/chronic venous stasis/varicosities, hypothyroidism, anx/dep, p/w bl le, r>l, pain, redness, swelling; patient has at least some level of discomfort over the affected areas, but over the last few days, pain increased in intensity, so much so that patient is unable to ambulate comfortably. no relief with tylenol. patient and family grew concerned so had patient brought to [facility] er for further evaluation. of note, hospitalized for similar presentation 12/29/24-1/1/25; at that time, found to have stasis dermatitis flare w superimposed cellulitis; found to be mrsa swab + and wood lamp +; id, derm, podiatry, wound care consulted; patient treated with iv abx vancomycin (transitioned to po abx doxycycline and amoxicillin on discharge), steroid ointment, antifungal cream, antiseptic solution and wound dressings. in er, c/f cellulitis; admit to medicine for further mgmt (07 Apr 2025 19:35)    Above reviewed.  Afebrile on presentation however Tmax 100.8 on 4/9. WBC 13.01 on presentation. Procal 0.07. Lac 2.3. ESR 63 and . SCr 1.09, elevated to 1.35 as of 4/9. MRSA swab pos. BCx neg 4/8. BLE Duplex w/o DVT.       prior hospital charts reviewed [X]  primary team notes reviewed [X]  other consultant notes reviewed [X]    PAST MEDICAL & SURGICAL HISTORY:  HTN (hypertension)      Murmur      Morbid obesity      Palpitations      Former smoker      Hypothyroid      CAD (coronary artery disease)  minimal CAD per cath result in 2014      Hypercholesterolemia      Gastritis      Depression      Anxiety      AF (atrial fibrillation)  on Xarelto      Hiatal hernia      Neuropathy      Uterine cyst      Incontinence      Thickened endometrium      History of TIAs      Loss of vision  right eye      Blind right eye      [place] (hard of hearing)      Uterine cancer      Type 2 diabetes mellitus      Constipation      Spinal stenosis      Edema of lower extremity      Varicose veins      Thyroid nodule      OAB (overactive bladder)      Risk factors for obstructive sleep apnea      H/O Helicobacter infection      S/P cataract extraction and insertion of intraocular lens  bilateral > 15 years ago      Varicose vein  with stripping b/l      History of angioplasty of vein  right leg 2016      H/O thyroidectomy      H/O: hysterectomy          Allergies  latex (Rash)  Lipitor (Nausea)        ANTIMICROBIALS:  vancomycin  IVPB 1000 every 12 hours      OTHER MEDS: MEDICATIONS  (STANDING):  acetaminophen     Tablet .. 650 every 6 hours PRN  albuterol    90 MICROgram(s) HFA Inhaler 2 every 6 hours PRN  ALPRAZolam 2 at bedtime PRN  aluminum hydroxide/magnesium hydroxide/simethicone Suspension 30 every 4 hours PRN  aMIOdarone    Tablet 200 daily  amLODIPine   Tablet 5 daily  apixaban 5 every 12 hours  aspirin enteric coated 81 daily  bisacodyl 5 daily PRN  buDESOnide    Inhalation Suspension 0.5 every 12 hours  dextrose 50% Injectable 25 once  dextrose 50% Injectable 12.5 once  dextrose 50% Injectable 25 once  dextrose Oral Gel 15 once PRN  furosemide    Tablet 40 daily  glucagon  Injectable 1 once  insulin glargine Injectable (LANTUS) 10 at bedtime  insulin lispro (ADMELOG) corrective regimen sliding scale  three times a day before meals  insulin lispro Injectable (ADMELOG) 5 three times a day before meals  levothyroxine 175 daily  melatonin 5 at bedtime  ondansetron Injectable 4 every 8 hours PRN  oxyCODONE    IR 5 every 4 hours PRN  oxyCODONE    IR 10 every 4 hours PRN  pantoprazole    Tablet 40 before breakfast  polyethylene glycol 3350 17 daily  senna 2 at bedtime      SOCIAL HISTORY:     No IVDU or known tobacco history    FAMILY HISTORY:  Family history of stroke (Mother)  mom    Family history of hypertension (Father)    Family history of breast cancer (Mother)        REVIEW OF SYSTEMS  [  ] ROS unobtainable because:    [  ] All other systems negative except as noted below:	    Constitutional:  [ ] fever [ ] chills  [ ] weight loss  [ ] weakness  Skin:  [ ] rash [ ] phlebitis	  Eyes: [ ] icterus [ ] pain  [ ] discharge	  ENMT: [ ] sore throat  [ ] thrush [ ] ulcers [ ] exudates  Respiratory: [ ] dyspnea [ ] hemoptysis [ ] cough [ ] sputum	  Cardiovascular:  [ ] chest pain [ ] palpitations [ ] edema	  Gastrointestinal:  [ ] nausea [ ] vomiting [ ] diarrhea [ ] constipation [ ] pain	  Genitourinary:  [ ] dysuria [ ] frequency [ ] hematuria [ ] discharge [ ] flank pain  [ ] incontinence  Musculoskeletal:  [ ] myalgias [ ] arthralgias [ ] arthritis  [ ] back pain  Neurological:  [ ] headache [ ] seizures  [ ] confusion/altered mental status  Psychiatric:  [ ] anxiety [ ] depression	  Hematology/Lymphatics:  [ ] lymphadenopathy  Endocrine:  [ ] adrenal [ ] thyroid  Allergic/Immunologic:	 [ ] transplant [ ] seasonal    Vital Signs Last 24 Hrs  T(F): 98.6 (04-09-25 @ 05:21), Max: 100.8 (04-09-25 @ 00:46)    Vital Signs Last 24 Hrs  HR: 63 (04-09-25 @ 05:21) (63 - 73)  BP: 119/73 (04-09-25 @ 05:21) (101/63 - 125/74)  RR: 18 (04-09-25 @ 05:21)  SpO2: 91% (04-09-25 @ 05:21) (91% - 96%)  Wt(kg): --    PHYSICAL EXAM:  Constitutional: non-toxic, no distress  HEAD/EYES: anicteric, no conjunctival injection  ENT:  supple, no thrush  Cardiovascular:   normal S1, S2, no murmur, no edema  Respiratory:  clear BS bilaterally, no wheezes, no rales  GI:  soft, non-tender, normal bowel sounds  :  no aguiar, no CVA tenderness  Musculoskeletal:  no synovitis, normal ROM  Neurologic: awake and alert, normal strength, no focal findings  Skin:  no rash, no erythema, no phlebitis  Heme/Onc: no lymphadenopathy   Psychiatric:  awake, alert, appropriate mood                                8.9    10.85 )-----------( 273      ( 09 Apr 2025 06:30 )             29.5       04-09    139  |  100  |  18  ----------------------------<  112[H]  3.5   |  26  |  1.35[H]    Ca    8.4      09 Apr 2025 06:29  Phos  3.8     04-09  Mg     1.9     04-09    TPro  5.7[L]  /  Alb  2.9[L]  /  TBili  0.7  /  DBili  x   /  AST  13  /  ALT  11  /  AlkPhos  78  04-08      Urinalysis Basic - ( 09 Apr 2025 06:29 )    Color: x / Appearance: x / SG: x / pH: x  Gluc: 112 mg/dL / Ketone: x  / Bili: x / Urobili: x   Blood: x / Protein: x / Nitrite: x   Leuk Esterase: x / RBC: x / WBC x   Sq Epi: x / Non Sq Epi: x / Bacteria: x        MICROBIOLOGY:  Vancomycin Level, Trough: 14.9 (04-09 @ 06:29)        Culture - Blood (collected 04-08-25 @ 07:24)  Source: Blood Blood-Peripheral  Preliminary Report (04-09-25 @ 10:02):    No growth at 24 hours    Culture - Blood (collected 04-08-25 @ 07:24)  Source: Blood Blood-Peripheral  Preliminary Report (04-09-25 @ 10:02):    No growth at 24 hours                    RADIOLOGY:  imaging below personally reviewed  < from: [facility] Duplex Lower Ext Vein Scan, Bilat (04.07.25 @ 14:27) >    ACC: [id number] EXAM:  DUPLEX SCAN EXT VEINS LOWER BI   ORDERED BY:  DANETTE VERDIN     PROCEDURE DATE:  04/07/2025          INTERPRETATION:  CLINICAL INFORMATION: 74-year-old female with bilateral   lower extremity edema and pain    COMPARISON: Ultrasound 12/29/2024    TECHNIQUE: Duplex sonography of the BILATERAL LOWER extremity veins with   color and spectral Doppler, with and without compression.    FINDINGS:    RIGHT:  Normal compressibility of the RIGHT common femoral, femoral and popliteal   veins.  Doppler examination shows normal spontaneous and phasic flow.  The calf veins were not visualized    LEFT:  Normal compressibility of the LEFT common femoral, femoral and popliteal   veins.  Doppler examination shows normal spontaneous and phasic flow.  The calf veins were not visualized.    IMPRESSION:  No evidence of deep venous thrombosis in either lower extremity.    Calf veins not visualized        --- End of Report ---            LIZETH CASTRO MD; Attending Radiologist  This document has been electronically signed. Apr 7 2025  4:09PM    < end of copied text >   Patient is a 74y old  Female who presents with a chief complaint of bl le, r>l, pain, redness, swelling (09 Apr 2025 10:39)      HPI:  75yo f w pmh obesity, asthma/copd/idalia, htn, hld, dm, cva w residual r sided weakness, afib, cad, gerd, hiatal hernia, constipation, lymphedema/chronic venous stasis/varicosities, hypothyroidism, anx/dep, p/w bl le, r>l, pain, redness, swelling; patient has at least some level of discomfort over the affected areas, but over the last few days, pain increased in intensity, so much so that patient is unable to ambulate comfortably. no relief with tylenol. patient and family grew concerned so had patient brought to [facility] er for further evaluation. of note, hospitalized for similar presentation 12/29/24-1/1/25; at that time, found to have stasis dermatitis flare w superimposed cellulitis; found to be mrsa swab + and wood lamp +; id, derm, podiatry, wound care consulted; patient treated with iv abx vancomycin (transitioned to po abx doxycycline and amoxicillin on discharge), steroid ointment, antifungal cream, antiseptic solution and wound dressings. in er, c/f cellulitis; admit to medicine for further mgmt (07 Apr 2025 19:35)    Above reviewed.  Afebrile on presentation however Tmax 100.8 on 4/9. WBC 13.01 on presentation. Procal 0.07. Lac 2.3. ESR 63 and . SCr 1.09, elevated to 1.35 as of 4/9. MRSA swab pos. BCx neg 4/8. BLE Duplex w/o DVT.       prior hospital charts reviewed [X]  primary team notes reviewed [X]  other consultant notes reviewed [X]    PAST MEDICAL & SURGICAL HISTORY:  HTN (hypertension)      Murmur      Morbid obesity      Palpitations      Former smoker      Hypothyroid      CAD (coronary artery disease)  minimal CAD per cath result in 2014      Hypercholesterolemia      Gastritis      Depression      Anxiety      AF (atrial fibrillation)  on Xarelto      Hiatal hernia      Neuropathy      Uterine cyst      Incontinence      Thickened endometrium      History of TIAs      Loss of vision  right eye      Blind right eye      [place] (hard of hearing)      Uterine cancer      Type 2 diabetes mellitus      Constipation      Spinal stenosis      Edema of lower extremity      Varicose veins      Thyroid nodule      OAB (overactive bladder)      Risk factors for obstructive sleep apnea      H/O Helicobacter infection      S/P cataract extraction and insertion of intraocular lens  bilateral > 15 years ago      Varicose vein  with stripping b/l      History of angioplasty of vein  right leg 2016      H/O thyroidectomy      H/O: hysterectomy          Allergies  latex (Rash)  Lipitor (Nausea)        ANTIMICROBIALS:  vancomycin  IVPB 1000 every 12 hours      OTHER MEDS: MEDICATIONS  (STANDING):  acetaminophen     Tablet .. 650 every 6 hours PRN  albuterol    90 MICROgram(s) HFA Inhaler 2 every 6 hours PRN  ALPRAZolam 2 at bedtime PRN  aluminum hydroxide/magnesium hydroxide/simethicone Suspension 30 every 4 hours PRN  aMIOdarone    Tablet 200 daily  amLODIPine   Tablet 5 daily  apixaban 5 every 12 hours  aspirin enteric coated 81 daily  bisacodyl 5 daily PRN  buDESOnide    Inhalation Suspension 0.5 every 12 hours  dextrose 50% Injectable 25 once  dextrose 50% Injectable 12.5 once  dextrose 50% Injectable 25 once  dextrose Oral Gel 15 once PRN  furosemide    Tablet 40 daily  glucagon  Injectable 1 once  insulin glargine Injectable (LANTUS) 10 at bedtime  insulin lispro (ADMELOG) corrective regimen sliding scale  three times a day before meals  insulin lispro Injectable (ADMELOG) 5 three times a day before meals  levothyroxine 175 daily  melatonin 5 at bedtime  ondansetron Injectable 4 every 8 hours PRN  oxyCODONE    IR 5 every 4 hours PRN  oxyCODONE    IR 10 every 4 hours PRN  pantoprazole    Tablet 40 before breakfast  polyethylene glycol 3350 17 daily  senna 2 at bedtime      SOCIAL HISTORY:     No IVDU or known tobacco history    FAMILY HISTORY:  Family history of stroke (Mother)  mom    Family history of hypertension (Father)    Family history of breast cancer (Mother)        REVIEW OF SYSTEMS  [  ] ROS unobtainable because:    [X] All other systems negative except as noted below:	    Constitutional:  [X] fever [ ] chills  [ ] weight loss  [ ] weakness  Skin:  [ ] rash [ ] phlebitis	  Eyes: [ ] icterus [ ] pain  [ ] discharge	  ENMT: [ ] sore throat  [ ] thrush [ ] ulcers [ ] exudates  Respiratory: [ ] dyspnea [ ] hemoptysis [ ] cough [ ] sputum	  Cardiovascular:  [ ] chest pain [ ] palpitations [ ] edema	  Gastrointestinal:  [ ] nausea [ ] vomiting [ ] diarrhea [ ] constipation [ ] pain	  Genitourinary:  [ ] dysuria [ ] frequency [ ] hematuria [ ] discharge [ ] flank pain  [ ] incontinence  Musculoskeletal:  [ ] myalgias [ ] arthralgias [ ] arthritis  [ ] back pain [X] foot pain  Neurological:  [ ] headache [ ] seizures  [ ] confusion/altered mental status  Psychiatric:  [ ] anxiety [ ] depression	  Hematology/Lymphatics:  [ ] lymphadenopathy  Endocrine:  [ ] adrenal [ ] thyroid  Allergic/Immunologic:	 [ ] transplant [ ] seasonal    Vital Signs Last 24 Hrs  T(F): 98.6 (04-09-25 @ 05:21), Max: 100.8 (04-09-25 @ 00:46)    Vital Signs Last 24 Hrs  HR: 63 (04-09-25 @ 05:21) (63 - 73)  BP: 119/73 (04-09-25 @ 05:21) (101/63 - 125/74)  RR: 18 (04-09-25 @ 05:21)  SpO2: 91% (04-09-25 @ 05:21) (91% - 96%)  Wt(kg): --    PHYSICAL EXAM:  Constitutional: non-toxic, no distress at rest  HEAD/EYES: anicteric, no conjunctival injection  ENT:  supple, no thrush  Cardiovascular:   normal S1, S2, no murmur, RRR  Respiratory:  clear BS bilaterally, no wheezes  GI:  soft, non-tender, obese  :  no aguiar  Musculoskeletal:  BLE edema  Neurologic: awake and alert, normal strength, no focal findings  Skin:  R heel w/ fluctuance and tenderness, no crepitus; superficial ulceration w/ erythematous skin ulcer over the anterior aspect of RLE w/o discharge; dorsal aspect of the lower leg w/ nonerythematous tender superficial ulcer; no L foot open lesion; dorsal aspect of L ankle w/ multiple tender superficial ulcers  Heme/Onc: no lymphadenopathy   Psychiatric:  awake, alert, appropriate mood                                8.9    10.85 )-----------( 273      ( 09 Apr 2025 06:30 )             29.5       04-09    139  |  100  |  18  ----------------------------<  112[H]  3.5   |  26  |  1.35[H]    Ca    8.4      09 Apr 2025 06:29  Phos  3.8     04-09  Mg     1.9     04-09    TPro  5.7[L]  /  Alb  2.9[L]  /  TBili  0.7  /  DBili  x   /  AST  13  /  ALT  11  /  AlkPhos  78  04-08      Urinalysis Basic - ( 09 Apr 2025 06:29 )    Color: x / Appearance: x / SG: x / pH: x  Gluc: 112 mg/dL / Ketone: x  / Bili: x / Urobili: x   Blood: x / Protein: x / Nitrite: x   Leuk Esterase: x / RBC: x / WBC x   Sq Epi: x / Non Sq Epi: x / Bacteria: x        MICROBIOLOGY:  Vancomycin Level, Trough: 14.9 (04-09 @ 06:29)        Culture - Blood (collected 04-08-25 @ 07:24)  Source: Blood Blood-Peripheral  Preliminary Report (04-09-25 @ 10:02):    No growth at 24 hours    Culture - Blood (collected 04-08-25 @ 07:24)  Source: Blood Blood-Peripheral  Preliminary Report (04-09-25 @ 10:02):    No growth at 24 hours                    RADIOLOGY:  imaging below personally reviewed  < from: [facility] Duplex Lower Ext Vein ScanDavon (04.07.25 @ 14:27) >    ACC: [id number] EXAM:  DUPLEX SCAN EXT VEINS LOWER BI   ORDERED BY:  DANETTE VERDIN     PROCEDURE DATE:  04/07/2025          INTERPRETATION:  CLINICAL INFORMATION: 74-year-old female with bilateral   lower extremity edema and pain    COMPARISON: Ultrasound 12/29/2024    TECHNIQUE: Duplex sonography of the BILATERAL LOWER extremity veins with   color and spectral Doppler, with and without compression.    FINDINGS:    RIGHT:  Normal compressibility of the RIGHT common femoral, femoral and popliteal   veins.  Doppler examination shows normal spontaneous and phasic flow.  The calf veins were not visualized    LEFT:  Normal compressibility of the LEFT common femoral, femoral and popliteal   veins.  Doppler examination shows normal spontaneous and phasic flow.  The calf veins were not visualized.    IMPRESSION:  No evidence of deep venous thrombosis in either lower extremity.    Calf veins not visualized        --- End of Report ---            LIZETH CASTRO MD; Attending Radiologist  This document has been electronically signed. Apr 7 2025  4:09PM    < end of copied text >

## 2025-04-09 NOTE — DIETITIAN INITIAL EVALUATION ADULT - NSPROEDAREADYLEARN_GEN_A_NUR
Education not feasible at this time. RD to follow up per protocol to provide DM diet education as feasible. home

## 2025-04-09 NOTE — PROGRESS NOTE ADULT - PROBLEM SELECTOR PLAN 5
a1c 11.2  trend fingerstick glu  glargine 10u qhs + lispro 5u tidac w low dose correctional lispro  adjust to goal glu 100-180  carb consistent diet

## 2025-04-09 NOTE — DIETITIAN INITIAL EVALUATION ADULT - ORAL INTAKE PTA/DIET HISTORY
Unable to obtain subjective intake hx PTA at this time; RD to reassess as feasible. Per previous RD note (12/31), pt denied adhering to any therapeutic diet, reported monitoring carbohydrase intake on occasion." No known food allergies or intolerances per patient profile. Pt taking Multivitamin at home per outpatient medications list. No difficulty chewing/swallowing reported at this time.

## 2025-04-09 NOTE — DIETITIAN INITIAL EVALUATION ADULT - FUNCTIONAL SCREEN CURRENT LEVEL: SWALLOWING (IF SCORE 2 OR MORE FOR ANY ITEM, CONSULT REHAB SERVICES), MLM)
Vomiting without nausea, intractability of vomiting not specified, unspecified vomiting type 0 = swallows foods/liquids without difficulty

## 2025-04-09 NOTE — DIETITIAN INITIAL EVALUATION ADULT - REASON
Unable to perform Nutrition Focused Physical Assessment in current setting; Pt with no overt signs of muscle wasting or fat loss upon visual assessment.

## 2025-04-09 NOTE — PROGRESS NOTE ADULT - SUBJECTIVE AND OBJECTIVE BOX
[facility] Division of Hospital Medicine  Tere Kang DO  [employer]    Patient is a 74y old  Female who presents with a chief complaint of bl le, r>l, pain, redness, swelling (09 Apr 2025 11:05)        SUBJECTIVE / OVERNIGHT EVENTS: concerned about the right heel pain is worsened      MEDICATIONS  (STANDING):  aMIOdarone    Tablet 200 milliGRAM(s) Oral daily  amLODIPine   Tablet 5 milliGRAM(s) Oral daily  apixaban 5 milliGRAM(s) Oral every 12 hours  aspirin enteric coated 81 milliGRAM(s) Oral daily  buDESOnide    Inhalation Suspension 0.5 milliGRAM(s) Inhalation every 12 hours  cadexomer iodine 0.9% Gel 1 Application(s) Topical daily  clobetasol 0.05% Ointment 1 Application(s) Topical two times a day  clotrimazole 1% Cream 1 Application(s) Topical two times a day  dextrose 5%. 1000 milliLiter(s) (50 mL/Hr) IV Continuous <Continuous>  dextrose 5%. 1000 milliLiter(s) (100 mL/Hr) IV Continuous <Continuous>  dextrose 50% Injectable 25 Gram(s) IV Push once  dextrose 50% Injectable 12.5 Gram(s) IV Push once  dextrose 50% Injectable 25 Gram(s) IV Push once  furosemide    Tablet 40 milliGRAM(s) Oral daily  glucagon  Injectable 1 milliGRAM(s) IntraMuscular once  insulin glargine Injectable (LANTUS) 10 Unit(s) SubCutaneous at bedtime  insulin lispro (ADMELOG) corrective regimen sliding scale   SubCutaneous three times a day before meals  insulin lispro Injectable (ADMELOG) 5 Unit(s) SubCutaneous three times a day before meals  levothyroxine 175 MICROGram(s) Oral daily  lidocaine   4% Patch 1 Patch Transdermal daily  melatonin 5 milliGRAM(s) Oral at bedtime  mupirocin 2% Nasal 1 Application(s) Both Nostrils two times a day  naloxone Injectable 0.4 milliGRAM(s) IV Push once  pantoprazole    Tablet 40 milliGRAM(s) Oral before breakfast  polyethylene glycol 3350 17 Gram(s) Oral daily  povidone iodine 10% Solution 1 Application(s) Topical daily  senna 2 Tablet(s) Oral at bedtime  vancomycin  IVPB 1000 milliGRAM(s) IV Intermittent every 12 hours    MEDICATIONS  (PRN):  acetaminophen     Tablet .. 650 milliGRAM(s) Oral every 6 hours PRN Temp greater or equal to 38C (100.4F), Mild Pain (1 - 3)  albuterol    90 MICROgram(s) HFA Inhaler 2 Puff(s) Inhalation every 6 hours PRN Shortness of Breath and/or Wheezing  ALPRAZolam 2 milliGRAM(s) Oral at bedtime PRN Anxiety/insomnia  aluminum hydroxide/magnesium hydroxide/simethicone Suspension 30 milliLiter(s) Oral every 4 hours PRN Dyspepsia  bisacodyl 5 milliGRAM(s) Oral daily PRN Constipation  dextrose Oral Gel 15 Gram(s) Oral once PRN Blood Glucose LESS THAN 70 milliGRAM(s)/deciliter  ondansetron Injectable 4 milliGRAM(s) IV Push every 8 hours PRN Nausea and/or Vomiting  oxyCODONE    IR 5 milliGRAM(s) Oral every 4 hours PRN Moderate Pain (4 - 6)  oxyCODONE    IR 10 milliGRAM(s) Oral every 4 hours PRN Severe Pain (7 - 10)      Vital Signs Last 24 Hrs  T(C): 36.6 (09 Apr 2025 12:03), Max: 38.2 (09 Apr 2025 00:46)  T(F): 97.9 (09 Apr 2025 12:03), Max: 100.8 (09 Apr 2025 00:46)  HR: 62 (09 Apr 2025 12:03) (62 - 73)  BP: 116/71 (09 Apr 2025 12:03) (116/71 - 125/74)  BP(mean): --  RR: 18 (09 Apr 2025 12:03) (18 - 18)  SpO2: 93% (09 Apr 2025 12:03) (91% - 95%)    Parameters below as of 09 Apr 2025 12:03  Patient On (Oxygen Delivery Method): room air      CAPILLARY BLOOD GLUCOSE      POCT Blood Glucose.: 283 mg/dL (09 Apr 2025 13:20)  POCT Blood Glucose.: 266 mg/dL (09 Apr 2025 12:15)  POCT Blood Glucose.: 132 mg/dL (09 Apr 2025 08:24)  POCT Blood Glucose.: 167 mg/dL (08 Apr 2025 21:23)  POCT Blood Glucose.: 179 mg/dL (08 Apr 2025 17:10)    I&O's Summary    08 Apr 2025 07:01  -  09 Apr 2025 07:00  --------------------------------------------------------  IN: 490 mL / OUT: 500 mL / NET: -10 mL    09 Apr 2025 07:01  -  09 Apr 2025 13:23  --------------------------------------------------------  IN: 0 mL / OUT: 1000 mL / NET: -1000 mL        PHYSICAL EXAM:  GENERAL: NAD, breathing normal  NECK: supple, No JVD  CHEST/LUNG: CTA b/l  HEART: S1 S2 RRR  ABDOMEN: +BS Soft, NT/ND  EXTREMITIES:  2+ DP Pulses, No c/c. no LE edema  NEUROLOGY: AAOx3, no facial droop, moving all extremities     LABS:                        8.9    10.85 )-----------( 273      ( 09 Apr 2025 06:30 )             29.5     04-09    139  |  100  |  18  ----------------------------<  112[H]  3.5   |  26  |  1.35[H]    Ca    8.4      09 Apr 2025 06:29  Phos  3.8     04-09  Mg     1.9     04-09    TPro  5.7[L]  /  Alb  2.9[L]  /  TBili  0.7  /  DBili  x   /  AST  13  /  ALT  11  /  AlkPhos  78  04-08    PT/INR - ( 08 Apr 2025 07:27 )   PT: 15.2 sec;   INR: 1.34 ratio         PTT - ( 08 Apr 2025 07:27 )  PTT:36.0 sec      Urinalysis Basic - ( 09 Apr 2025 06:29 )    Color: x / Appearance: x / SG: x / pH: x  Gluc: 112 mg/dL / Ketone: x  / Bili: x / Urobili: x   Blood: x / Protein: x / Nitrite: x   Leuk Esterase: x / RBC: x / WBC x   Sq Epi: x / Non Sq Epi: x / Bacteria: x        RADIOLOGY & ADDITIONAL TESTS:    Imaging Personally Reviewed:  Consultant(s) Notes Reviewed:    Care Discussed with Consultants/Other Providers:

## 2025-04-09 NOTE — PROGRESS NOTE ADULT - PROBLEM SELECTOR PLAN 12
pulmicort  ventolin  PT: FRANCES     DISPO: pending clinical improvement, blood cx, MRSA PCR, wound care

## 2025-04-09 NOTE — DIETITIAN INITIAL EVALUATION ADULT - OTHER INFO
- Wt hx:         - UBW: reported ~230 pounds per previous RD note         - Dosing wt: 229 pounds (4/7)         - Wt hx per chart: 277.7 pounds (4/8, bed scale); Wt hx per [facility] HIE in pounds: 240 (2/25), 232 (6/28/24).          - RD to continue to monitor weight trends as able.   - Nutritionally Pertinent Meds in-house: Abx, IVF, synthroid, PPI. Diuresing with PO Lasix.    - Endo:  	- T2DM; regimen PTA: insulin Lispro, Glargine (per outpatient medications list).   	- A1c 11.2% (4/8) - indicates poor glycemic control.  	- Lantus, Admelog, SSI ordered in-house.

## 2025-04-10 DIAGNOSIS — E89.0 POSTPROCEDURAL HYPOTHYROIDISM: ICD-10-CM

## 2025-04-10 DIAGNOSIS — E11.65 TYPE 2 DIABETES MELLITUS WITH HYPERGLYCEMIA: ICD-10-CM

## 2025-04-10 LAB
ANION GAP SERPL CALC-SCNC: 14 MMOL/L — SIGNIFICANT CHANGE UP (ref 5–17)
BUN SERPL-MCNC: 18 MG/DL — SIGNIFICANT CHANGE UP (ref 7–23)
CALCIUM SERPL-MCNC: 8.5 MG/DL — SIGNIFICANT CHANGE UP (ref 8.4–10.5)
CHLORIDE SERPL-SCNC: 99 MMOL/L — SIGNIFICANT CHANGE UP (ref 96–108)
CO2 SERPL-SCNC: 27 MMOL/L — SIGNIFICANT CHANGE UP (ref 22–31)
CREAT SERPL-MCNC: 1.31 MG/DL — HIGH (ref 0.5–1.3)
EGFR: 42 ML/MIN/1.73M2 — LOW
EGFR: 42 ML/MIN/1.73M2 — LOW
GLUCOSE BLDC GLUCOMTR-MCNC: 162 MG/DL — HIGH (ref 70–99)
GLUCOSE BLDC GLUCOMTR-MCNC: 172 MG/DL — HIGH (ref 70–99)
GLUCOSE BLDC GLUCOMTR-MCNC: 179 MG/DL — HIGH (ref 70–99)
GLUCOSE BLDC GLUCOMTR-MCNC: 192 MG/DL — HIGH (ref 70–99)
GLUCOSE SERPL-MCNC: 139 MG/DL — HIGH (ref 70–99)
HCT VFR BLD CALC: 28.8 % — LOW (ref 34.5–45)
HGB BLD-MCNC: 8.8 G/DL — LOW (ref 11.5–15.5)
MCHC RBC-ENTMCNC: 26 PG — LOW (ref 27–34)
MCHC RBC-ENTMCNC: 30.6 G/DL — LOW (ref 32–36)
MCV RBC AUTO: 85.2 FL — SIGNIFICANT CHANGE UP (ref 80–100)
NRBC BLD AUTO-RTO: 0 /100 WBCS — SIGNIFICANT CHANGE UP (ref 0–0)
PLATELET # BLD AUTO: 295 K/UL — SIGNIFICANT CHANGE UP (ref 150–400)
POTASSIUM SERPL-MCNC: 3.4 MMOL/L — LOW (ref 3.5–5.3)
POTASSIUM SERPL-SCNC: 3.4 MMOL/L — LOW (ref 3.5–5.3)
RBC # BLD: 3.38 M/UL — LOW (ref 3.8–5.2)
RBC # FLD: 13.6 % — SIGNIFICANT CHANGE UP (ref 10.3–14.5)
SODIUM SERPL-SCNC: 140 MMOL/L — SIGNIFICANT CHANGE UP (ref 135–145)
WBC # BLD: 11.91 K/UL — HIGH (ref 3.8–10.5)
WBC # FLD AUTO: 11.91 K/UL — HIGH (ref 3.8–10.5)

## 2025-04-10 PROCEDURE — 99223 1ST HOSP IP/OBS HIGH 75: CPT

## 2025-04-10 PROCEDURE — G0545: CPT

## 2025-04-10 PROCEDURE — 99232 SBSQ HOSP IP/OBS MODERATE 35: CPT

## 2025-04-10 RX ORDER — LEVOTHYROXINE SODIUM 300 MCG
100 TABLET ORAL
Refills: 0 | Status: DISCONTINUED | OUTPATIENT
Start: 2025-04-10 | End: 2025-04-16

## 2025-04-10 RX ORDER — INSULIN GLARGINE-YFGN 100 [IU]/ML
9 INJECTION, SOLUTION SUBCUTANEOUS AT BEDTIME
Refills: 0 | Status: DISCONTINUED | OUTPATIENT
Start: 2025-04-10 | End: 2025-04-11

## 2025-04-10 RX ORDER — LEVOTHYROXINE SODIUM 300 MCG
200 TABLET ORAL
Refills: 0 | Status: DISCONTINUED | OUTPATIENT
Start: 2025-04-10 | End: 2025-04-16

## 2025-04-10 RX ORDER — INSULIN LISPRO 100 U/ML
8 INJECTION, SOLUTION INTRAVENOUS; SUBCUTANEOUS
Refills: 0 | Status: DISCONTINUED | OUTPATIENT
Start: 2025-04-10 | End: 2025-04-14

## 2025-04-10 RX ADMIN — Medication 175 MICROGRAM(S): at 06:12

## 2025-04-10 RX ADMIN — Medication 1 APPLICATION(S): at 06:11

## 2025-04-10 RX ADMIN — Medication 40 MILLIEQUIVALENT(S): at 15:32

## 2025-04-10 RX ADMIN — APIXABAN 5 MILLIGRAM(S): 2.5 TABLET, FILM COATED ORAL at 17:52

## 2025-04-10 RX ADMIN — LIDOCAINE HYDROCHLORIDE 1 PATCH: 20 JELLY TOPICAL at 19:34

## 2025-04-10 RX ADMIN — INSULIN LISPRO 1: 100 INJECTION, SOLUTION INTRAVENOUS; SUBCUTANEOUS at 09:19

## 2025-04-10 RX ADMIN — Medication 81 MILLIGRAM(S): at 12:46

## 2025-04-10 RX ADMIN — Medication 1 APPLICATION(S): at 12:58

## 2025-04-10 RX ADMIN — AMIODARONE HYDROCHLORIDE 200 MILLIGRAM(S): 50 INJECTION, SOLUTION INTRAVENOUS at 06:12

## 2025-04-10 RX ADMIN — LIDOCAINE HYDROCHLORIDE 1 PATCH: 20 JELLY TOPICAL at 12:57

## 2025-04-10 RX ADMIN — APIXABAN 5 MILLIGRAM(S): 2.5 TABLET, FILM COATED ORAL at 06:12

## 2025-04-10 RX ADMIN — OXYCODONE HYDROCHLORIDE 10 MILLIGRAM(S): 30 TABLET ORAL at 13:00

## 2025-04-10 RX ADMIN — MUPIROCIN CALCIUM 1 APPLICATION(S): 20 CREAM TOPICAL at 17:55

## 2025-04-10 RX ADMIN — Medication 250 MILLIGRAM(S): at 17:47

## 2025-04-10 RX ADMIN — Medication 40 MILLIEQUIVALENT(S): at 17:46

## 2025-04-10 RX ADMIN — Medication 5 MILLIGRAM(S): at 22:13

## 2025-04-10 RX ADMIN — INSULIN LISPRO 8 UNIT(S): 100 INJECTION, SOLUTION INTRAVENOUS; SUBCUTANEOUS at 17:48

## 2025-04-10 RX ADMIN — LIDOCAINE HYDROCHLORIDE 1 PATCH: 20 JELLY TOPICAL at 01:59

## 2025-04-10 RX ADMIN — Medication 2 MILLIGRAM(S): at 23:33

## 2025-04-10 RX ADMIN — BUDESONIDE 0.5 MILLIGRAM(S): 90 AEROSOL, POWDER RESPIRATORY (INHALATION) at 17:46

## 2025-04-10 RX ADMIN — INSULIN LISPRO 6 UNIT(S): 100 INJECTION, SOLUTION INTRAVENOUS; SUBCUTANEOUS at 12:59

## 2025-04-10 RX ADMIN — INSULIN LISPRO 6 UNIT(S): 100 INJECTION, SOLUTION INTRAVENOUS; SUBCUTANEOUS at 09:19

## 2025-04-10 RX ADMIN — OXYCODONE HYDROCHLORIDE 10 MILLIGRAM(S): 30 TABLET ORAL at 06:15

## 2025-04-10 RX ADMIN — INSULIN GLARGINE-YFGN 9 UNIT(S): 100 INJECTION, SOLUTION SUBCUTANEOUS at 22:11

## 2025-04-10 RX ADMIN — Medication 1 APPLICATION(S): at 12:56

## 2025-04-10 RX ADMIN — POLYETHYLENE GLYCOL 3350 17 GRAM(S): 17 POWDER, FOR SOLUTION ORAL at 12:46

## 2025-04-10 RX ADMIN — AMLODIPINE BESYLATE 5 MILLIGRAM(S): 10 TABLET ORAL at 06:12

## 2025-04-10 RX ADMIN — OXYCODONE HYDROCHLORIDE 10 MILLIGRAM(S): 30 TABLET ORAL at 22:12

## 2025-04-10 RX ADMIN — MUPIROCIN CALCIUM 1 APPLICATION(S): 20 CREAM TOPICAL at 06:12

## 2025-04-10 RX ADMIN — Medication 40 MILLIGRAM(S): at 06:13

## 2025-04-10 RX ADMIN — Medication 2 TABLET(S): at 22:12

## 2025-04-10 RX ADMIN — FUROSEMIDE 40 MILLIGRAM(S): 10 INJECTION INTRAMUSCULAR; INTRAVENOUS at 06:12

## 2025-04-10 RX ADMIN — INSULIN LISPRO 1: 100 INJECTION, SOLUTION INTRAVENOUS; SUBCUTANEOUS at 12:59

## 2025-04-10 RX ADMIN — Medication 1 APPLICATION(S): at 17:51

## 2025-04-10 RX ADMIN — INSULIN LISPRO 1: 100 INJECTION, SOLUTION INTRAVENOUS; SUBCUTANEOUS at 17:48

## 2025-04-10 RX ADMIN — OXYCODONE HYDROCHLORIDE 10 MILLIGRAM(S): 30 TABLET ORAL at 23:12

## 2025-04-10 RX ADMIN — BUDESONIDE 0.5 MILLIGRAM(S): 90 AEROSOL, POWDER RESPIRATORY (INHALATION) at 06:11

## 2025-04-10 RX ADMIN — Medication 250 MILLIGRAM(S): at 06:11

## 2025-04-10 NOTE — PROGRESS NOTE ADULT - SUBJECTIVE AND OBJECTIVE BOX
Follow Up:    SSTI      Interval History/ROS:  VSS ON. MRI w/o pus collection. Patient was seen and examined at bedside. Denies fever. Foot pain improved.     Allergies  latex (Rash)  Lipitor (Nausea)        ANTIMICROBIALS:  vancomycin  IVPB 1000 every 12 hours      OTHER MEDS:  MEDICATIONS  (STANDING):  acetaminophen     Tablet .. 650 every 6 hours PRN  albuterol    90 MICROgram(s) HFA Inhaler 2 every 6 hours PRN  ALPRAZolam 2 at bedtime PRN  aluminum hydroxide/magnesium hydroxide/simethicone Suspension 30 every 4 hours PRN  aMIOdarone    Tablet 200 daily  amLODIPine   Tablet 5 daily  apixaban 5 every 12 hours  aspirin enteric coated 81 daily  bisacodyl 5 daily PRN  buDESOnide    Inhalation Suspension 0.5 every 12 hours  dextrose 50% Injectable 25 once  dextrose 50% Injectable 12.5 once  dextrose 50% Injectable 25 once  dextrose Oral Gel 15 once PRN  furosemide    Tablet 40 daily  glucagon  Injectable 1 once  insulin glargine Injectable (LANTUS) 10 at bedtime  insulin lispro (ADMELOG) corrective regimen sliding scale  three times a day before meals  insulin lispro Injectable (ADMELOG) 6 three times a day before meals  levothyroxine 175 daily  melatonin 5 at bedtime  ondansetron Injectable 4 every 8 hours PRN  oxyCODONE    IR 5 every 4 hours PRN  oxyCODONE    IR 10 every 4 hours PRN  pantoprazole    Tablet 40 before breakfast  polyethylene glycol 3350 17 daily  senna 2 at bedtime      Vital Signs Last 24 Hrs  T(C): 36.8 (10 Apr 2025 13:00), Max: 37.2 (10 Apr 2025 04:17)  T(F): 98.3 (10 Apr 2025 13:00), Max: 98.9 (10 Apr 2025 04:17)  HR: 63 (10 Apr 2025 13:00) (63 - 69)  BP: 135/76 (10 Apr 2025 13:00) (111/64 - 140/83)  BP(mean): --  RR: 18 (10 Apr 2025 13:00) (18 - 18)  SpO2: 92% (10 Apr 2025 13:00) (92% - 97%)    Parameters below as of 10 Apr 2025 13:00  Patient On (Oxygen Delivery Method): nasal cannula  O2 Flow (L/min): 2      PHYSICAL EXAM:  Constitutional: non-toxic, no distress at rest  HEAD/EYES: anicteric, no conjunctival injection  Cardiovascular:   normal S1, S2, no murmur, RRR  Respiratory:  clear BS bilaterally, no wheezes  GI:  soft, non-tender, obese  Musculoskeletal:  BLE edema  Neurologic: awake and alert, normal strength, no focal findings  Skin:  R heel w/ fluctuance and tenderness, no crepitus; superficial ulceration w/ erythematous skin ulcer over the anterior aspect of RLE w/o discharge; dorsal aspect of the lower leg w/ nonerythematous tender superficial ulcer; no L foot open lesion; dorsal aspect of L ankle w/ multiple tender superficial ulcers; BLE turgor shrunken, skin visibly wrinkled up.                                8.8    11.91 )-----------( 295      ( 10 Apr 2025 08:03 )             28.8       04-10    140  |  99  |  18  ----------------------------<  139[H]  3.4[L]   |  27  |  1.31[H]    Ca    8.5      10 Apr 2025 08:03  Phos  3.8     04-09  Mg     1.9     04-09        Urinalysis Basic - ( 10 Apr 2025 08:03 )    Color: x / Appearance: x / SG: x / pH: x  Gluc: 139 mg/dL / Ketone: x  / Bili: x / Urobili: x   Blood: x / Protein: x / Nitrite: x   Leuk Esterase: x / RBC: x / WBC x   Sq Epi: x / Non Sq Epi: x / Bacteria: x        MICROBIOLOGY:  Vancomycin Level, Trough: 16.0 ug/mL (04-09-25 @ 18:22)  v  Blood Blood-Peripheral  04-08-25   No growth at 48 Hours  --  --                RADIOLOGY:  Imaging below independently reviewed.  < from: MR Lower Ext Non-joint w/wo IV Cont, Right (04.09.25 @ 22:41) >    ACC: [id number] EXAM:  MR LWR EXT NON JOINT WAWIC RT   ORDERED BY: MARK CONTRERAS     PROCEDURE DATE:  04/09/2025          INTERPRETATION:  Exam Type: MR LOWER EXTREMITY WITHOUT AND WITH IV   CONTRAST RIGHT  Exam Date and Time: 4/9/2025 10:41 PM  Indication: Infection of calf  Comparison: Tib-fib radiograph from 12/29/2024    TECHNIQUE:  Sagittal and axial MRI of the right lower extremity was performed with   and without contrast.    Contrast: 10 cc IV Gadavist IV    FINDINGS:  OSSEOUS STRUCTURES:  No acute fracture. Tricompartmental osteoarthritis of the knee which is   severe at the medial component with joint space loss and subchondral   sclerosis. Small to moderate partially visualized knee joint effusion.    MUSCLES/TENDONS:  Fatty atrophy of the gastrocnemius muscle bellies. Mild to moderate   tendinosis of the distal Achilles tendon.    SOFT TISSUES:  Edema is seen surrounding the lower extremity most noted distally. There   is skin thickening of the anterior calf extending to thedorsum of the   foot. There is postcontrast enhancement within the anterior soft tissues.   No mature or drainable collection is seen at this time.    IMPRESSION:  Edema is seen surrounding the lower extremity most noted distally. Skin   thickening of the anterior calf extending to the dorsum of the foot. Post   contrast enhancement within the anterior soft tissues. No mature or   drainable collection is seen at this time.    --- End of Report ---            VEENA LUND DO; Attending Radiologist  This document has been electronically signed. Apr 10 2025  9:22AM    < end of copied text >

## 2025-04-10 NOTE — CONSULT NOTE ADULT - PROBLEM SELECTOR RECOMMENDATION 2
TSH is suboptimal on 175 mcg daily with adherence.  Based on chart review 200 mcg caused hyperthyroxinemia.  Would recommend 200 mcg daily with 1/2 tab on sundays.  Repeat TFTs in 4-6 weeks as outpatient.

## 2025-04-10 NOTE — PROGRESS NOTE ADULT - PROBLEM SELECTOR PLAN 12
pulmicort  ventolin  PT: [facility] but patient prefers home     DISPO: pending clinical improvement, ID recs; podiatry recs

## 2025-04-10 NOTE — CONSULT NOTE ADULT - ASSESSMENT
75 y/o F w/ uncontrolled Type 2 DM w/ hyperglycemia complicated by CVA, CAD, and neuropathy with hyperglycemia, post-operative hypothyroidism, HTN, HLD admitted for LE cellulitis (high risk patient with severely uncontrolled diabetes with A1c of 11.2% at high risk of CAD and CVA with high medical complexity and high level decision-making).

## 2025-04-10 NOTE — PROGRESS NOTE ADULT - PROBLEM SELECTOR PLAN 5
a1c 11.2  trend fingerstick glu  glargine 10u qhs + lispro 5u tidac w low dose correctional lispro  adjust to goal glu 100-180  carb consistent diet  - endo eval for further recs for home insulin regimen as A1c 11.2 but blood sugars are relatively controlled here on home insulin regimen

## 2025-04-10 NOTE — CONSULT NOTE ADULT - SUBJECTIVE AND OBJECTIVE BOX
HPI:  75 y/o F w/ hx of Type 2 DM x 20 years complicated by CVA, CAD, and neuropathy. Follows with Dr. Marin. Takes Tresiba 24 units qhs and Lispro 8 units with meals. Has been less adherent recently. Had been on these doses at last appointment with Dr. Marin with A1c of 7.3%. A1c now 11.2%  reports she has been more sedentary since the stroke with occasional nonadherence to meds and diet. Check glucose via Michaela 3. Values variable from 100's-300's without pattern mostly related to food intake. Currently with decreased po intake. No reported hypoglycemia or symptoms of hypoglycemia. No nausea, vomiting, polydipsia. No reported family hx of diabetes. Had been on metformin in the past with GI intolerance so taken off. Never tried a GLP-1. Pt. is interested in weight loss. No hx of pancreatitis or pancreatic cancer.   Also hx of MNG s/p total thyroidectomy around 2015 now on levothyroxine Was on 200 mcg daily with Free T4 above goal of 2.1 so lowered to 175mcg daily which she has been taking consistently for the past few months with adherence in the morning on empty stomach, separate from medications, vitamins, supplements.   Also hx of obesity, asthma/copd/idalia, htn, hld, cva w residual r sided weakness, afib, cad, gerd, hiatal hernia, constipation, lymphedema/chronic venous stasis/varicosities, anx/dep, p/w bl le, r>l, pain, redness, swelling; patient has at least some level of discomfort over the affected areas, but over the last few days, pain increased in intensity, so much so that patient is unable to ambulate comfortably. no relief with tylenol. patient and family grew concerned so had patient brought to [facility] er for further evaluation. of note, hospitalized for similar presentation 12/29/24-1/1/25; at that time, found to have stasis dermatitis flare w superimposed cellulitis; found to be mrsa swab + and wood lamp +; id, derm, podiatry, wound care consulted; patient treated with iv abx vancomycin (transitioned to po abx doxycycline and amoxicillin on discharge), steroid ointment, antifungal cream, antiseptic solution and wound dressings. in er, c/f cellulitis.      PAST MEDICAL & SURGICAL HISTORY:  HTN (hypertension)      Murmur      Morbid obesity      Palpitations      Former smoker      Hypothyroid      CAD (coronary artery disease)  minimal CAD per cath result in 2014      Hypercholesterolemia      Gastritis      Depression      Anxiety      AF (atrial fibrillation)  on Xarelto      Hiatal hernia      Neuropathy      Uterine cyst      Incontinence      Thickened endometrium      History of TIAs      Loss of vision  right eye      Blind right eye      [place] (hard of hearing)      Uterine cancer      Type 2 diabetes mellitus      Constipation      Spinal stenosis      Edema of lower extremity      Varicose veins      Thyroid nodule      OAB (overactive bladder)      Risk factors for obstructive sleep apnea      H/O Helicobacter infection      S/P cataract extraction and insertion of intraocular lens  bilateral > 15 years ago      Varicose vein  with stripping b/l      History of angioplasty of vein  right leg 2016      H/O thyroidectomy      H/O: hysterectomy          FAMILY HISTORY:  Family history of stroke (Mother)  mom    Family history of hypertension (Father)    Family history of breast cancer (Mother)        Social History: No tobacco or alcohol use    Outpatient Medications:  · 	Multiple Vitamins oral tablet: Last Dose Taken:  , 1 tab(s) orally once a day  · 	Pacerone 200 mg oral tablet: Last Dose Taken:  , 1 tab(s) orally once a day orally  · 	apixaban 5 mg oral tablet: Last Dose Taken:  , 1 tab(s) orally every 12 hours  · 	insulin lispro 100 units/mL injectable solution: Last Dose Taken:  , 5 unit(s) injectable 3 times a day (before meals)  · 	insulin glargine 100 units/mL subcutaneous solution: Last Dose Taken:  , 10 unit(s) subcutaneous once a day (at bedtime)  · 	amLODIPine 5 mg oral tablet: Last Dose Taken:  , 1 orally once a day  · 	Albuterol (Eqv-ProAir HFA) 90 mcg/inh inhalation aerosol: Last Dose Taken:  , 2 puff(s) inhaled 2 times a day  · 	melatonin 5 mg oral tablet: Last Dose Taken:  , 1 tab(s) orally once a day (at bedtime)  · 	atorvastatin 40 mg oral tablet: Last Dose Taken:  , 1 tab(s) orally once a day (at bedtime)  · 	budesonide 0.5 mg/2 mL inhalation suspension: Last Dose Taken:  , 2 milliliter(s) by nebulizer 2 times a day  · 	acetaminophen 325 mg oral tablet: Last Dose Taken:  , 2 tab(s) orally every 6 hours as needed for  pain  · 	MiraLax oral powder for reconstitution: Last Dose Taken:  , 17 gram(s) orally once a day  · 	ALPRAZolam 2 mg oral tablet: Last Dose Taken:  , 1 tab(s) orally once a day (at bedtime) as needed for Anxiety/insomnia  · 	aspirin 81 mg oral delayed release tablet: Last Dose Taken:  , 1 tab(s) orally once a day  · 	Lasix 40 mg oral tablet: Last Dose Taken:  , 1 tab(s) orally once a day  · 	Synthroid 175 mcg (0.175 mg) oral tablet: Last Dose Taken:  , 1 tab(s) orally once a day  · 	senna (sennosides) 8.6 mg oral tablet: Last Dose Taken:  , 1 tab(s) orally once a day  · 	pantoprazole 40 mg oral delayed release tablet: Last Dose Taken:  , 1 tab(s) orally once a day    MEDICATIONS  (STANDING):  aMIOdarone    Tablet 200 milliGRAM(s) Oral daily  amLODIPine   Tablet 5 milliGRAM(s) Oral daily  apixaban 5 milliGRAM(s) Oral every 12 hours  aspirin enteric coated 81 milliGRAM(s) Oral daily  buDESOnide    Inhalation Suspension 0.5 milliGRAM(s) Inhalation every 12 hours  cadexomer iodine 0.9% Gel 1 Application(s) Topical daily  clobetasol 0.05% Ointment 1 Application(s) Topical two times a day  dextrose 5%. 1000 milliLiter(s) (50 mL/Hr) IV Continuous <Continuous>  dextrose 5%. 1000 milliLiter(s) (100 mL/Hr) IV Continuous <Continuous>  dextrose 50% Injectable 25 Gram(s) IV Push once  dextrose 50% Injectable 12.5 Gram(s) IV Push once  dextrose 50% Injectable 25 Gram(s) IV Push once  furosemide    Tablet 40 milliGRAM(s) Oral daily  glucagon  Injectable 1 milliGRAM(s) IntraMuscular once  insulin glargine Injectable (LANTUS) 9 Unit(s) SubCutaneous at bedtime  insulin lispro (ADMELOG) corrective regimen sliding scale   SubCutaneous three times a day before meals  insulin lispro Injectable (ADMELOG) 8 Unit(s) SubCutaneous three times a day before meals  levothyroxine 175 MICROGram(s) Oral daily  lidocaine   4% Patch 1 Patch Transdermal daily  melatonin 5 milliGRAM(s) Oral at bedtime  mupirocin 2% Nasal 1 Application(s) Both Nostrils two times a day  naloxone Injectable 0.4 milliGRAM(s) IV Push once  pantoprazole    Tablet 40 milliGRAM(s) Oral before breakfast  polyethylene glycol 3350 17 Gram(s) Oral daily  potassium chloride    Tablet ER 40 milliEquivalent(s) Oral every 4 hours  povidone iodine 10% Solution 1 Application(s) Topical daily  senna 2 Tablet(s) Oral at bedtime  vancomycin  IVPB 1000 milliGRAM(s) IV Intermittent every 12 hours    MEDICATIONS  (PRN):  acetaminophen     Tablet .. 650 milliGRAM(s) Oral every 6 hours PRN Temp greater or equal to 38C (100.4F), Mild Pain (1 - 3)  albuterol    90 MICROgram(s) HFA Inhaler 2 Puff(s) Inhalation every 6 hours PRN Shortness of Breath and/or Wheezing  ALPRAZolam 2 milliGRAM(s) Oral at bedtime PRN Anxiety/insomnia  aluminum hydroxide/magnesium hydroxide/simethicone Suspension 30 milliLiter(s) Oral every 4 hours PRN Dyspepsia  bisacodyl 5 milliGRAM(s) Oral daily PRN Constipation  dextrose Oral Gel 15 Gram(s) Oral once PRN Blood Glucose LESS THAN 70 milliGRAM(s)/deciliter  ondansetron Injectable 4 milliGRAM(s) IV Push every 8 hours PRN Nausea and/or Vomiting  oxyCODONE    IR 5 milliGRAM(s) Oral every 4 hours PRN Moderate Pain (4 - 6)  oxyCODONE    IR 10 milliGRAM(s) Oral every 4 hours PRN Severe Pain (7 - 10)      Allergies    latex (Rash)  Lipitor (Nausea)    Intolerances      Review of Systems:  Constitutional: No fever, No change in weight  Eyes: +decreased vision chronically  Neuro: No headache, +neuropathy  HEENT: No throat pain  Cardiovascular: No chest pain  Respiratory: +occasional SOB  GI: No nausea or vomiting  : No polyuria  Skin: no rash  Psych: no depression  Endocrine: No polydipsia, No heat or cold intolerance, rest as noted in HPI  Hem/lymph: +LE swelling    All other review of systems negative      PHYSICAL EXAM:  VITALS: T(C): 36.8 (04-10-25 @ 13:00)  T(F): 98.3 (04-10-25 @ 13:00), Max: 98.9 (04-10-25 @ 04:17)  HR: 63 (04-10-25 @ 13:00) (63 - 69)  BP: 135/76 (04-10-25 @ 13:00) (111/64 - 140/83)  RR:  (18 - 18)  SpO2:  (92% - 97%)  Wt(kg): --  GENERAL: NAD at this time  EYES: No proptosis, EOMI  HEENT:  Atraumatic, Normocephalic,   THYROID: unable to appreciate the gland  RESPIRATORY: Clear to auscultation bilaterally, full excursion, non-labored  CARDIOVASCULAR: Regular rhythm; No murmurs; +b/l LE peripheral edema with dressing  GI: Soft, nontender, non distended, normal bowel sounds  SKIN: Dry, intact, No rashes or lesions  MUSCULOSKELETAL: decreased strength on right compared to left  NEURO: follows commands  PSYCH: Alert and oriented x 3, normal affect, normal mood  CUSHING'S SIGNS: no striae      POCT Blood Glucose.: 172 mg/dL (04-10-25 @ 12:32)  POCT Blood Glucose.: 179 mg/dL (04-10-25 @ 08:26)  POCT Blood Glucose.: 212 mg/dL (04-09-25 @ 23:05)  POCT Blood Glucose.: 221 mg/dL (04-09-25 @ 17:10)  POCT Blood Glucose.: 283 mg/dL (04-09-25 @ 13:20)  POCT Blood Glucose.: 266 mg/dL (04-09-25 @ 12:15)  POCT Blood Glucose.: 132 mg/dL (04-09-25 @ 08:24)  POCT Blood Glucose.: 167 mg/dL (04-08-25 @ 21:23)  POCT Blood Glucose.: 179 mg/dL (04-08-25 @ 17:10)  POCT Blood Glucose.: 187 mg/dL (04-08-25 @ 12:25)  POCT Blood Glucose.: 197 mg/dL (04-08-25 @ 08:34)  POCT Blood Glucose.: 238 mg/dL (04-07-25 @ 21:43)                              8.8    11.91 )-----------( 295      ( 10 Apr 2025 08:03 )             28.8       04-10    140  |  99  |  18  ----------------------------<  139[H]  3.4[L]   |  27  |  1.31[H]    eGFR: 42[L]    Ca    8.5      04-10  Mg     1.9     04-09  Phos  3.8     04-09    TPro  5.7[L]  /  Alb  2.9[L]  /  TBili  0.7  /  DBili  x   /  AST  13  /  ALT  11  /  AlkPhos  78  04-08    Thyroid Function Tests:  04-09 @ 06:29 TSH 12.80 FreeT4 1.3 T3 25 Anti TPO -- Anti Thyroglobulin Ab -- TSI --  04-08 @ 07:31 TSH 8.34 FreeT4 -- T3 -- Anti TPO -- Anti Thyroglobulin Ab -- TSI --          04-08 Chol 169 Direct LDL -- LDL calculated 97 HDL 50[L] Trig 120  Radiology:

## 2025-04-10 NOTE — PROGRESS NOTE ADULT - SUBJECTIVE AND OBJECTIVE BOX
[facility] Division of Hospital Medicine  Joe Dickinson MD  Available via MS Teams    SUBJECTIVE / OVERNIGHT EVENTS: No acute events overnight. Patient still with lower extremity pain, especially in right heel. Denies any chest pain or SOB. No other concerns or complaints at this time.     Review of Systems:   CONSTITUTIONAL: No fever   EYES: No eye pain, visual disturbances, or discharge  ENMT: No difficulty hearing   RESPIRATORY: No SOB. No cough   CARDIOVASCULAR: No chest pain   GASTROINTESTINAL: No abdominal or epigastric pain. No nausea, vomiting, or hematemesis; No diarrhea   GENITOURINARY: No dysuria   NEUROLOGICAL: No headache   SKIN: No itching   MUSCULOSKELETAL: pain in bilateral lower extremities, especially in right heel; No muscle or back pain  PSYCHIATRIC: No depression or anxiety  HEME/LYMPH: No easy bruising or bleeding gums      MEDICATIONS  (STANDING):  aMIOdarone    Tablet 200 milliGRAM(s) Oral daily  amLODIPine   Tablet 5 milliGRAM(s) Oral daily  apixaban 5 milliGRAM(s) Oral every 12 hours  aspirin enteric coated 81 milliGRAM(s) Oral daily  buDESOnide    Inhalation Suspension 0.5 milliGRAM(s) Inhalation every 12 hours  cadexomer iodine 0.9% Gel 1 Application(s) Topical daily  clobetasol 0.05% Ointment 1 Application(s) Topical two times a day  dextrose 5%. 1000 milliLiter(s) (50 mL/Hr) IV Continuous <Continuous>  dextrose 5%. 1000 milliLiter(s) (100 mL/Hr) IV Continuous <Continuous>  dextrose 50% Injectable 25 Gram(s) IV Push once  dextrose 50% Injectable 12.5 Gram(s) IV Push once  dextrose 50% Injectable 25 Gram(s) IV Push once  furosemide    Tablet 40 milliGRAM(s) Oral daily  glucagon  Injectable 1 milliGRAM(s) IntraMuscular once  insulin glargine Injectable (LANTUS) 10 Unit(s) SubCutaneous at bedtime  insulin lispro (ADMELOG) corrective regimen sliding scale   SubCutaneous three times a day before meals  insulin lispro Injectable (ADMELOG) 6 Unit(s) SubCutaneous three times a day before meals  levothyroxine 175 MICROGram(s) Oral daily  lidocaine   4% Patch 1 Patch Transdermal daily  melatonin 5 milliGRAM(s) Oral at bedtime  mupirocin 2% Nasal 1 Application(s) Both Nostrils two times a day  naloxone Injectable 0.4 milliGRAM(s) IV Push once  pantoprazole    Tablet 40 milliGRAM(s) Oral before breakfast  polyethylene glycol 3350 17 Gram(s) Oral daily  povidone iodine 10% Solution 1 Application(s) Topical daily  senna 2 Tablet(s) Oral at bedtime  vancomycin  IVPB 1000 milliGRAM(s) IV Intermittent every 12 hours    MEDICATIONS  (PRN):  acetaminophen     Tablet .. 650 milliGRAM(s) Oral every 6 hours PRN Temp greater or equal to 38C (100.4F), Mild Pain (1 - 3)  albuterol    90 MICROgram(s) HFA Inhaler 2 Puff(s) Inhalation every 6 hours PRN Shortness of Breath and/or Wheezing  ALPRAZolam 2 milliGRAM(s) Oral at bedtime PRN Anxiety/insomnia  aluminum hydroxide/magnesium hydroxide/simethicone Suspension 30 milliLiter(s) Oral every 4 hours PRN Dyspepsia  bisacodyl 5 milliGRAM(s) Oral daily PRN Constipation  dextrose Oral Gel 15 Gram(s) Oral once PRN Blood Glucose LESS THAN 70 milliGRAM(s)/deciliter  ondansetron Injectable 4 milliGRAM(s) IV Push every 8 hours PRN Nausea and/or Vomiting  oxyCODONE    IR 5 milliGRAM(s) Oral every 4 hours PRN Moderate Pain (4 - 6)  oxyCODONE    IR 10 milliGRAM(s) Oral every 4 hours PRN Severe Pain (7 - 10)      I&O's Summary    09 Apr 2025 07:01  -  10 Apr 2025 07:00  --------------------------------------------------------  IN: 730 mL / OUT: 1000 mL / NET: -270 mL      PHYSICAL EXAM:  Vital Signs Last 24 Hrs  T(C): 36.8 (10 Apr 2025 13:00), Max: 37.2 (10 Apr 2025 04:17)  T(F): 98.3 (10 Apr 2025 13:00), Max: 98.9 (10 Apr 2025 04:17)  HR: 63 (10 Apr 2025 13:00) (63 - 69)  BP: 135/76 (10 Apr 2025 13:00) (111/64 - 140/83)  RR: 18 (10 Apr 2025 13:00) (18 - 18)  SpO2: 92% (10 Apr 2025 13:00) (92% - 97%)    Parameters below as of 10 Apr 2025 13:00  Patient On (Oxygen Delivery Method): nasal cannula  O2 Flow (L/min): 2    CONSTITUTIONAL: some distress due to pain, well-developed   EYES: PERRLA; conjunctiva and sclera clear  ENMT: Moist oral mucosa, no pharyngeal injection or exudates   NECK: Supple  RESPIRATORY: Normal respiratory effort; lungs are clear to auscultation bilaterally  CARDIOVASCULAR: Regular rate and rhythm, normal S1 and S2   ABDOMEN: Nontender to palpation, normoactive bowel sounds   MUSCULOSKELETAL: no clubbing or cyanosis of digits; some tenderness to palpation of bilateral lower extremities; extremities with ace wrap around them   PSYCH: A+O to person, place, and time; affect appropriate  NEUROLOGY: no gross sensory deficits   SKIN: No rashes     LABS:                        8.8    11.91 )-----------( 295      ( 10 Apr 2025 08:03 )             28.8     04-10    140  |  99  |  18  ----------------------------<  139[H]  3.4[L]   |  27  |  1.31[H]    Ca    8.5      10 Apr 2025 08:03  Phos  3.8     04-09  Mg     1.9     04-09      Urinalysis Basic - ( 10 Apr 2025 08:03 )    Color: x / Appearance: x / SG: x / pH: x  Gluc: 139 mg/dL / Ketone: x  / Bili: x / Urobili: x   Blood: x / Protein: x / Nitrite: x   Leuk Esterase: x / RBC: x / WBC x   Sq Epi: x / Non Sq Epi: x / Bacteria: x      Culture - Blood (collected 08 Apr 2025 07:24)  Source: Blood Blood-Peripheral  Preliminary Report (10 Apr 2025 10:02):    No growth at 48 Hours    Culture - Blood (collected 08 Apr 2025 07:24)  Source: Blood Blood-Peripheral  Preliminary Report (10 Apr 2025 10:02):    No growth at 48 Hours      RADIOLOGY & ADDITIONAL TESTS:  New Imaging Personally Reviewed Today:  < from: MR Lower Ext Non-joint w/wo IV Cont, Right (04.09.25 @ 22:41) >  IMPRESSION:  Edema is seen surrounding the lower extremity most noted distally. Skin   thickening of the anterior calf extending to the dorsum of the foot. Post   contrast enhancement within the anterior soft tissues. No mature or   drainable collection is seen at this time.    < end of copied text >    New Electrocardiogram Personally Reviewed Today:  Other Results Reviewed Today:   Prior or Outpatient Records Reviewed Today with Summary:    COORDINATION OF CARE:  Consultant Communication and Details of Discussion (where applicable):

## 2025-04-10 NOTE — CONSULT NOTE ADULT - REASON FOR ADMISSION
bl le, r>l, pain, redness, swelling

## 2025-04-10 NOTE — CONSULT NOTE ADULT - PROBLEM SELECTOR RECOMMENDATION 9
Diabetes Education and Nutrition Eval  Given drop overnight on current dose of Lantus would empirically lower slightly to 9 units qhs  Increase Admelog to 8 units with meals   Low correction scale qac + bedtime  Goal glucose 100-180  Outpt. endo follow-up  Outpt. optho, podiatry, nephrology  Plan to d/c on basal bolus +GLP-1 vs. basal + GLP-1 depending on insulin requirements. GLP-1 would be a good additional option to help with weight loss and with hx of CAD.

## 2025-04-10 NOTE — PROGRESS NOTE ADULT - PROBLEM SELECTOR PLAN 1
h/o lymphedema/chronic venous stasis/varicosities  a/w r heel wound, nonhealing  recurrent hospitalizations for the same; prev derm, id, podiatry, wound care documentation reviewed  previously, woods lamp + and mrsa screen +  mild leukocytosis w lactic acidosis; otherwise, afebrile and hds  s/p vanc, cefepime in ER  GEOVANY/PVR noted; right GEOVANY of 1.02 and the left GEOVANY of 1.19 are normal. The right TBI of 0.59 is mildly abnormal, and the left TBI of 0.69 is near normal.  - blood cx neg to date; MRSA PCR positive     - MRI right leg with no collection noted   - va duplex neg for DVT   - continue broad spec iv abx with vanc for now   - analgesics and antipyretics as needed   - elevate extremity + ice packs  - wound care consult; podiatry re-eval h/o lymphedema/chronic venous stasis/varicosities  - cellulitis likely due to venous insufficiency due to DM   - sepsis ruled out   a/w r heel wound, nonhealing  recurrent hospitalizations for the same; prev derm, id, podiatry, wound care documentation reviewed  previously, woods lamp + and mrsa screen +  mild leukocytosis w lactic acidosis; otherwise, afebrile and hds  s/p vanc, cefepime in ER  GEOVANY/PVR noted; right GEOVANY of 1.02 and the left GEOVANY of 1.19 are normal. The right TBI of 0.59 is mildly abnormal, and the left TBI of 0.69 is near normal.  - blood cx neg to date; MRSA PCR positive     - MRI right leg with no collection noted   - va duplex neg for DVT   - continue broad spec iv abx with vanc for now   - analgesics and antipyretics as needed   - elevate extremity + ice packs  - wound care consult; podiatry re-eval

## 2025-04-11 DIAGNOSIS — E66.01 MORBID (SEVERE) OBESITY DUE TO EXCESS CALORIES: ICD-10-CM

## 2025-04-11 LAB
ANION GAP SERPL CALC-SCNC: 12 MMOL/L — SIGNIFICANT CHANGE UP (ref 5–17)
BASOPHILS # BLD AUTO: 0.08 K/UL — SIGNIFICANT CHANGE UP (ref 0–0.2)
BASOPHILS NFR BLD AUTO: 0.7 % — SIGNIFICANT CHANGE UP (ref 0–2)
BUN SERPL-MCNC: 20 MG/DL — SIGNIFICANT CHANGE UP (ref 7–23)
CALCIUM SERPL-MCNC: 8.5 MG/DL — SIGNIFICANT CHANGE UP (ref 8.4–10.5)
CHLORIDE SERPL-SCNC: 100 MMOL/L — SIGNIFICANT CHANGE UP (ref 96–108)
CO2 SERPL-SCNC: 25 MMOL/L — SIGNIFICANT CHANGE UP (ref 22–31)
CREAT SERPL-MCNC: 1.45 MG/DL — HIGH (ref 0.5–1.3)
EGFR: 37 ML/MIN/1.73M2 — LOW
EGFR: 37 ML/MIN/1.73M2 — LOW
EOSINOPHIL # BLD AUTO: 0.41 K/UL — SIGNIFICANT CHANGE UP (ref 0–0.5)
EOSINOPHIL NFR BLD AUTO: 3.5 % — SIGNIFICANT CHANGE UP (ref 0–6)
GLUCOSE BLDC GLUCOMTR-MCNC: 142 MG/DL — HIGH (ref 70–99)
GLUCOSE BLDC GLUCOMTR-MCNC: 149 MG/DL — HIGH (ref 70–99)
GLUCOSE BLDC GLUCOMTR-MCNC: 165 MG/DL — HIGH (ref 70–99)
GLUCOSE BLDC GLUCOMTR-MCNC: 213 MG/DL — HIGH (ref 70–99)
GLUCOSE SERPL-MCNC: 179 MG/DL — HIGH (ref 70–99)
GRAM STN FLD: ABNORMAL
HCT VFR BLD CALC: 27.6 % — LOW (ref 34.5–45)
HGB BLD-MCNC: 8.4 G/DL — LOW (ref 11.5–15.5)
IMM GRANULOCYTES NFR BLD AUTO: 1.1 % — HIGH (ref 0–0.9)
LYMPHOCYTES # BLD AUTO: 1.52 K/UL — SIGNIFICANT CHANGE UP (ref 1–3.3)
LYMPHOCYTES # BLD AUTO: 13.2 % — SIGNIFICANT CHANGE UP (ref 13–44)
MAGNESIUM SERPL-MCNC: 1.9 MG/DL — SIGNIFICANT CHANGE UP (ref 1.6–2.6)
MCHC RBC-ENTMCNC: 25.8 PG — LOW (ref 27–34)
MCHC RBC-ENTMCNC: 30.4 G/DL — LOW (ref 32–36)
MCV RBC AUTO: 84.9 FL — SIGNIFICANT CHANGE UP (ref 80–100)
MONOCYTES # BLD AUTO: 0.91 K/UL — HIGH (ref 0–0.9)
MONOCYTES NFR BLD AUTO: 7.9 % — SIGNIFICANT CHANGE UP (ref 2–14)
NEUTROPHILS # BLD AUTO: 8.5 K/UL — HIGH (ref 1.8–7.4)
NEUTROPHILS NFR BLD AUTO: 73.6 % — SIGNIFICANT CHANGE UP (ref 43–77)
NRBC BLD AUTO-RTO: 0 /100 WBCS — SIGNIFICANT CHANGE UP (ref 0–0)
PHOSPHATE SERPL-MCNC: 3.5 MG/DL — SIGNIFICANT CHANGE UP (ref 2.5–4.5)
PLATELET # BLD AUTO: 290 K/UL — SIGNIFICANT CHANGE UP (ref 150–400)
POTASSIUM SERPL-MCNC: 4.8 MMOL/L — SIGNIFICANT CHANGE UP (ref 3.5–5.3)
POTASSIUM SERPL-SCNC: 4.8 MMOL/L — SIGNIFICANT CHANGE UP (ref 3.5–5.3)
RBC # BLD: 3.25 M/UL — LOW (ref 3.8–5.2)
RBC # FLD: 13.7 % — SIGNIFICANT CHANGE UP (ref 10.3–14.5)
SODIUM SERPL-SCNC: 137 MMOL/L — SIGNIFICANT CHANGE UP (ref 135–145)
SPECIMEN SOURCE: SIGNIFICANT CHANGE UP
VANCOMYCIN TROUGH SERPL-MCNC: 21.1 UG/ML — HIGH (ref 10–20)
WBC # BLD: 11.55 K/UL — HIGH (ref 3.8–10.5)
WBC # FLD AUTO: 11.55 K/UL — HIGH (ref 3.8–10.5)

## 2025-04-11 PROCEDURE — G0545: CPT

## 2025-04-11 PROCEDURE — 99232 SBSQ HOSP IP/OBS MODERATE 35: CPT

## 2025-04-11 PROCEDURE — 71045 X-RAY EXAM CHEST 1 VIEW: CPT | Mod: 26

## 2025-04-11 PROCEDURE — 99233 SBSQ HOSP IP/OBS HIGH 50: CPT

## 2025-04-11 RX ORDER — INSULIN GLARGINE-YFGN 100 [IU]/ML
10 INJECTION, SOLUTION SUBCUTANEOUS AT BEDTIME
Refills: 0 | Status: DISCONTINUED | OUTPATIENT
Start: 2025-04-11 | End: 2025-04-11

## 2025-04-11 RX ORDER — PIPERACILLIN-TAZO-DEXTROSE,ISO 3.375G/5
3.38 IV SOLUTION, PIGGYBACK PREMIX FROZEN(ML) INTRAVENOUS ONCE
Refills: 0 | Status: COMPLETED | OUTPATIENT
Start: 2025-04-11 | End: 2025-04-11

## 2025-04-11 RX ORDER — INSULIN LISPRO 100 U/ML
INJECTION, SOLUTION INTRAVENOUS; SUBCUTANEOUS AT BEDTIME
Refills: 0 | Status: DISCONTINUED | OUTPATIENT
Start: 2025-04-11 | End: 2025-04-16

## 2025-04-11 RX ORDER — INSULIN GLARGINE-YFGN 100 [IU]/ML
11 INJECTION, SOLUTION SUBCUTANEOUS AT BEDTIME
Refills: 0 | Status: DISCONTINUED | OUTPATIENT
Start: 2025-04-11 | End: 2025-04-13

## 2025-04-11 RX ORDER — ACETAMINOPHEN 500 MG/5ML
1000 LIQUID (ML) ORAL ONCE
Refills: 0 | Status: COMPLETED | OUTPATIENT
Start: 2025-04-11 | End: 2025-04-11

## 2025-04-11 RX ORDER — PIPERACILLIN-TAZO-DEXTROSE,ISO 3.375G/5
3.38 IV SOLUTION, PIGGYBACK PREMIX FROZEN(ML) INTRAVENOUS EVERY 8 HOURS
Refills: 0 | Status: DISCONTINUED | OUTPATIENT
Start: 2025-04-11 | End: 2025-04-15

## 2025-04-11 RX ADMIN — Medication 1 APPLICATION(S): at 12:08

## 2025-04-11 RX ADMIN — Medication 400 MILLIGRAM(S): at 10:48

## 2025-04-11 RX ADMIN — MUPIROCIN CALCIUM 1 APPLICATION(S): 20 CREAM TOPICAL at 06:23

## 2025-04-11 RX ADMIN — Medication 40 MILLIGRAM(S): at 06:24

## 2025-04-11 RX ADMIN — OXYCODONE HYDROCHLORIDE 5 MILLIGRAM(S): 30 TABLET ORAL at 20:43

## 2025-04-11 RX ADMIN — BUDESONIDE 0.5 MILLIGRAM(S): 90 AEROSOL, POWDER RESPIRATORY (INHALATION) at 18:05

## 2025-04-11 RX ADMIN — INSULIN LISPRO 8 UNIT(S): 100 INJECTION, SOLUTION INTRAVENOUS; SUBCUTANEOUS at 09:09

## 2025-04-11 RX ADMIN — AMLODIPINE BESYLATE 5 MILLIGRAM(S): 10 TABLET ORAL at 06:24

## 2025-04-11 RX ADMIN — Medication 25 GRAM(S): at 15:54

## 2025-04-11 RX ADMIN — Medication 1 APPLICATION(S): at 06:23

## 2025-04-11 RX ADMIN — FUROSEMIDE 40 MILLIGRAM(S): 10 INJECTION INTRAMUSCULAR; INTRAVENOUS at 06:24

## 2025-04-11 RX ADMIN — LIDOCAINE HYDROCHLORIDE 1 PATCH: 20 JELLY TOPICAL at 12:06

## 2025-04-11 RX ADMIN — Medication 200 MICROGRAM(S): at 06:23

## 2025-04-11 RX ADMIN — Medication 25 GRAM(S): at 22:23

## 2025-04-11 RX ADMIN — INSULIN LISPRO 2: 100 INJECTION, SOLUTION INTRAVENOUS; SUBCUTANEOUS at 09:08

## 2025-04-11 RX ADMIN — OXYCODONE HYDROCHLORIDE 10 MILLIGRAM(S): 30 TABLET ORAL at 09:21

## 2025-04-11 RX ADMIN — APIXABAN 5 MILLIGRAM(S): 2.5 TABLET, FILM COATED ORAL at 06:24

## 2025-04-11 RX ADMIN — Medication 81 MILLIGRAM(S): at 12:06

## 2025-04-11 RX ADMIN — INSULIN GLARGINE-YFGN 11 UNIT(S): 100 INJECTION, SOLUTION SUBCUTANEOUS at 22:23

## 2025-04-11 RX ADMIN — LIDOCAINE HYDROCHLORIDE 1 PATCH: 20 JELLY TOPICAL at 19:06

## 2025-04-11 RX ADMIN — BUDESONIDE 0.5 MILLIGRAM(S): 90 AEROSOL, POWDER RESPIRATORY (INHALATION) at 06:23

## 2025-04-11 RX ADMIN — Medication 200 GRAM(S): at 10:45

## 2025-04-11 RX ADMIN — MUPIROCIN CALCIUM 1 APPLICATION(S): 20 CREAM TOPICAL at 18:07

## 2025-04-11 RX ADMIN — INSULIN LISPRO 8 UNIT(S): 100 INJECTION, SOLUTION INTRAVENOUS; SUBCUTANEOUS at 12:07

## 2025-04-11 RX ADMIN — INSULIN LISPRO 1: 100 INJECTION, SOLUTION INTRAVENOUS; SUBCUTANEOUS at 12:07

## 2025-04-11 RX ADMIN — LIDOCAINE HYDROCHLORIDE 1 PATCH: 20 JELLY TOPICAL at 22:50

## 2025-04-11 RX ADMIN — Medication 250 MILLIGRAM(S): at 06:23

## 2025-04-11 RX ADMIN — LIDOCAINE HYDROCHLORIDE 1 PATCH: 20 JELLY TOPICAL at 00:01

## 2025-04-11 RX ADMIN — Medication 1 APPLICATION(S): at 18:06

## 2025-04-11 RX ADMIN — AMIODARONE HYDROCHLORIDE 200 MILLIGRAM(S): 50 INJECTION, SOLUTION INTRAVENOUS at 06:24

## 2025-04-11 RX ADMIN — APIXABAN 5 MILLIGRAM(S): 2.5 TABLET, FILM COATED ORAL at 18:05

## 2025-04-11 RX ADMIN — POLYETHYLENE GLYCOL 3350 17 GRAM(S): 17 POWDER, FOR SOLUTION ORAL at 12:06

## 2025-04-11 RX ADMIN — Medication 2 MILLIGRAM(S): at 22:23

## 2025-04-11 RX ADMIN — Medication 2 TABLET(S): at 22:23

## 2025-04-11 RX ADMIN — INSULIN LISPRO 8 UNIT(S): 100 INJECTION, SOLUTION INTRAVENOUS; SUBCUTANEOUS at 18:05

## 2025-04-11 RX ADMIN — OXYCODONE HYDROCHLORIDE 5 MILLIGRAM(S): 30 TABLET ORAL at 21:43

## 2025-04-11 NOTE — PROGRESS NOTE ADULT - SUBJECTIVE AND OBJECTIVE BOX
Patient seen today for follow up inpatient Diabetes Mellitus management.    Chief Complaint: Type 2 Diabetes Mellitus     INTERVAL HX:  Patient seen in [facility] 4DS[facility] 453 W1. Patient is alert and oriented, resting in bed. Patient reports feeling good, eating mostly full meals and tolerating POs. FBG slightly elevated this am to 213mg/dL. No hypoglycemia. Noted IV vanco in D5% solution. Blood glucose levels in the last 24hrs have been 162-213mg/dL.     Review of Systems:  General: As above.  Respiratory: Denies any SOB, OLIVAREZ, or cough.  Gastrointestinal: Denies any n/v/d or abdominal pain.   Endocrine: Denies any polyuria, polydipsia, polyphagia, visual changes, or numbness in feet.     Allergies  latex (Rash)  Lipitor (Nausea)      Intolerances  None.       MEDICATIONS  (STANDING):  acetaminophen     Tablet .. 650 milliGRAM(s) Oral every 6 hours PRN  albuterol    90 MICROgram(s) HFA Inhaler 2 Puff(s) Inhalation every 6 hours PRN  ALPRAZolam 2 milliGRAM(s) Oral at bedtime PRN  aluminum hydroxide/magnesium hydroxide/simethicone Suspension 30 milliLiter(s) Oral every 4 hours PRN  aMIOdarone    Tablet 200 milliGRAM(s) Oral daily  amLODIPine   Tablet 5 milliGRAM(s) Oral daily  apixaban 5 milliGRAM(s) Oral every 12 hours  aspirin enteric coated 81 milliGRAM(s) Oral daily  bisacodyl 5 milliGRAM(s) Oral daily PRN  buDESOnide    Inhalation Suspension 0.5 milliGRAM(s) Inhalation every 12 hours  cadexomer iodine 0.9% Gel 1 Application(s) Topical daily  clobetasol 0.05% Ointment 1 Application(s) Topical two times a day  dextrose 5%. 1000 milliLiter(s) IV Continuous <Continuous>  dextrose 5%. 1000 milliLiter(s) IV Continuous <Continuous>  dextrose 50% Injectable 25 Gram(s) IV Push once  dextrose 50% Injectable 12.5 Gram(s) IV Push once  dextrose 50% Injectable 25 Gram(s) IV Push once  dextrose Oral Gel 15 Gram(s) Oral once PRN  furosemide    Tablet 40 milliGRAM(s) Oral daily  glucagon  Injectable 1 milliGRAM(s) IntraMuscular once  insulin glargine Injectable (LANTUS) 10 Unit(s) SubCutaneous at bedtime  insulin lispro (ADMELOG) corrective regimen sliding scale   SubCutaneous three times a day before meals  insulin lispro (ADMELOG) corrective regimen sliding scale   SubCutaneous at bedtime  insulin lispro Injectable (ADMELOG) 8 Unit(s) SubCutaneous three times a day before meals  levothyroxine 200 MICROGram(s) Oral <User Schedule>  levothyroxine 100 MICROGram(s) Oral <User Schedule>  lidocaine   4% Patch 1 Patch Transdermal daily  melatonin 5 milliGRAM(s) Oral at bedtime  mupirocin 2% Nasal 1 Application(s) Both Nostrils two times a day  naloxone Injectable 0.4 milliGRAM(s) IV Push once  ondansetron Injectable 4 milliGRAM(s) IV Push every 8 hours PRN  oxyCODONE    IR 5 milliGRAM(s) Oral every 4 hours PRN  oxyCODONE    IR 10 milliGRAM(s) Oral every 4 hours PRN  pantoprazole    Tablet 40 milliGRAM(s) Oral before breakfast  piperacillin/tazobactam IVPB. 3.375 Gram(s) IV Intermittent once  piperacillin/tazobactam IVPB.- 3.375 Gram(s) IV Intermittent once  piperacillin/tazobactam IVPB.. 3.375 Gram(s) IV Intermittent every 8 hours  polyethylene glycol 3350 17 Gram(s) Oral daily  povidone iodine 10% Solution 1 Application(s) Topical daily  senna 2 Tablet(s) Oral at bedtime      dextrose 50% Injectable 25 Gram(s) IV Push once  dextrose 50% Injectable 12.5 Gram(s) IV Push once  dextrose 50% Injectable 25 Gram(s) IV Push once  dextrose Oral Gel 15 Gram(s) Oral once PRN  glucagon  Injectable 1 milliGRAM(s) IntraMuscular once  insulin glargine Injectable (LANTUS) 10 Unit(s) SubCutaneous at bedtime  insulin lispro (ADMELOG) corrective regimen sliding scale   SubCutaneous three times a day before meals  insulin lispro (ADMELOG) corrective regimen sliding scale   SubCutaneous at bedtime  insulin lispro Injectable (ADMELOG) 8 Unit(s) SubCutaneous three times a day before meals  levothyroxine 200 MICROGram(s) Oral <User Schedule>  levothyroxine 100 MICROGram(s) Oral <User Schedule>      insulin lispro (ADMELOG) corrective regimen sliding scale   SubCutaneous three times a day before meals  insulin lispro (ADMELOG) corrective regimen sliding scale   SubCutaneous at bedtime  insulin lispro Injectable (ADMELOG) 8 Unit(s) SubCutaneous three times a day before meals      PHYSICAL EXAM:  VITALS:   T(C): 37.1 (04-11-25 @ 05:38), Max: 37.9 (04-10-25 @ 20:52)  HR: 68 (04-11-25 @ 05:38) (63 - 71)  BP: 130/74 (04-11-25 @ 05:38) (124/73 - 135/76)  RR: 18 (04-11-25 @ 05:38) (18 - 18)  SpO2: 94% (04-11-25 @ 05:38) (90% - 98%)    GENERAL: In no acute distress  Respiratory: Respirations unlabored  Extremities: Warm and dry, no edema  NEURO: Alert and oriented, appropriate     LABS:  POCT Blood Glucose.: 213 mg/dL (04-11-25 @ 08:27)  POCT Blood Glucose.: 192 mg/dL (04-10-25 @ 22:00)  POCT Blood Glucose.: 162 mg/dL (04-10-25 @ 17:06)  POCT Blood Glucose.: 172 mg/dL (04-10-25 @ 12:32)  POCT Blood Glucose.: 179 mg/dL (04-10-25 @ 08:26)  POCT Blood Glucose.: 212 mg/dL (04-09-25 @ 23:05)  POCT Blood Glucose.: 221 mg/dL (04-09-25 @ 17:10)  POCT Blood Glucose.: 283 mg/dL (04-09-25 @ 13:20)  POCT Blood Glucose.: 266 mg/dL (04-09-25 @ 12:15)  POCT Blood Glucose.: 132 mg/dL (04-09-25 @ 08:24)  POCT Blood Glucose.: 167 mg/dL (04-08-25 @ 21:23)  POCT Blood Glucose.: 179 mg/dL (04-08-25 @ 17:10)  POCT Blood Glucose.: 187 mg/dL (04-08-25 @ 12:25)                          8.4    11.55 )-----------( 290      ( 11 Apr 2025 05:21 )             27.6     04-11    137  |  100  |  20  ----------------------------<  179[H]  4.8   |  25  |  1.45[H]    Ca    8.5      11 Apr 2025 05:21  Phos  3.5     04-11  Mg     1.9     04-11          Urinalysis Basic - ( 11 Apr 2025 05:21 )    Color: x / Appearance: x / SG: x / pH: x  Gluc: 179 mg/dL / Ketone: x  / Bili: x / Urobili: x   Blood: x / Protein: x / Nitrite: x   Leuk Esterase: x / RBC: x / WBC x   Sq Epi: x / Non Sq Epi: x / Bacteria: x        Culture - Abscess with Gram Stain (collected 10 Apr 2025 16:22)  Source: Abscess R foot  Gram Stain (11 Apr 2025 06:22):    Rare polymorphonuclear leukocytes seen per low power field    Rare Gram Negative Rods seen per oil power field    Culture - Blood (collected 09 Apr 2025 18:20)  Source: Blood Blood  Preliminary Report (10 Apr 2025 23:01):    No growth at 24 hours      A1C with Estimated Average Glucose Result: A1C with Estimated Average Glucose Result: 11.2 % (04-08-25 @ 07:27)  A1C with Estimated Average Glucose Result: 8.7 % (12-30-24 @ 08:42)         Patient seen today for follow up inpatient Diabetes Mellitus management.    Chief Complaint: Type 2 Diabetes Mellitus     INTERVAL HX:  Patient seen in [facility] 453 W1. Patient is alert and oriented, resting in bed. Patient reports feeling good, eating mostly full meals and tolerating POs. FBG slightly elevated this am to 213mg/dL. No hypoglycemia. Noted IV vanco in D5% solution. Blood glucose levels in the last 24hrs have been 162-213mg/dL.     Review of Systems:  General: As above.  Respiratory: Denies any SOB, OLIVAREZ, or cough.  Gastrointestinal: Denies any n/v/d or abdominal pain.   Endocrine: Denies any polyuria, polydipsia, polyphagia, visual changes, or numbness in feet.     Allergies  latex (Rash)  Lipitor (Nausea)      Intolerances  None.       MEDICATIONS  (STANDING):  acetaminophen     Tablet .. 650 milliGRAM(s) Oral every 6 hours PRN  albuterol    90 MICROgram(s) HFA Inhaler 2 Puff(s) Inhalation every 6 hours PRN  ALPRAZolam 2 milliGRAM(s) Oral at bedtime PRN  aluminum hydroxide/magnesium hydroxide/simethicone Suspension 30 milliLiter(s) Oral every 4 hours PRN  aMIOdarone    Tablet 200 milliGRAM(s) Oral daily  amLODIPine   Tablet 5 milliGRAM(s) Oral daily  apixaban 5 milliGRAM(s) Oral every 12 hours  aspirin enteric coated 81 milliGRAM(s) Oral daily  bisacodyl 5 milliGRAM(s) Oral daily PRN  buDESOnide    Inhalation Suspension 0.5 milliGRAM(s) Inhalation every 12 hours  cadexomer iodine 0.9% Gel 1 Application(s) Topical daily  clobetasol 0.05% Ointment 1 Application(s) Topical two times a day  dextrose 5%. 1000 milliLiter(s) IV Continuous <Continuous>  dextrose 5%. 1000 milliLiter(s) IV Continuous <Continuous>  dextrose 50% Injectable 25 Gram(s) IV Push once  dextrose 50% Injectable 12.5 Gram(s) IV Push once  dextrose 50% Injectable 25 Gram(s) IV Push once  dextrose Oral Gel 15 Gram(s) Oral once PRN  furosemide    Tablet 40 milliGRAM(s) Oral daily  glucagon  Injectable 1 milliGRAM(s) IntraMuscular once  insulin glargine Injectable (LANTUS) 10 Unit(s) SubCutaneous at bedtime  insulin lispro (ADMELOG) corrective regimen sliding scale   SubCutaneous three times a day before meals  insulin lispro (ADMELOG) corrective regimen sliding scale   SubCutaneous at bedtime  insulin lispro Injectable (ADMELOG) 8 Unit(s) SubCutaneous three times a day before meals  levothyroxine 200 MICROGram(s) Oral <User Schedule>  levothyroxine 100 MICROGram(s) Oral <User Schedule>  lidocaine   4% Patch 1 Patch Transdermal daily  melatonin 5 milliGRAM(s) Oral at bedtime  mupirocin 2% Nasal 1 Application(s) Both Nostrils two times a day  naloxone Injectable 0.4 milliGRAM(s) IV Push once  ondansetron Injectable 4 milliGRAM(s) IV Push every 8 hours PRN  oxyCODONE    IR 5 milliGRAM(s) Oral every 4 hours PRN  oxyCODONE    IR 10 milliGRAM(s) Oral every 4 hours PRN  pantoprazole    Tablet 40 milliGRAM(s) Oral before breakfast  piperacillin/tazobactam IVPB. 3.375 Gram(s) IV Intermittent once  piperacillin/tazobactam IVPB.- 3.375 Gram(s) IV Intermittent once  piperacillin/tazobactam IVPB.. 3.375 Gram(s) IV Intermittent every 8 hours  polyethylene glycol 3350 17 Gram(s) Oral daily  povidone iodine 10% Solution 1 Application(s) Topical daily  senna 2 Tablet(s) Oral at bedtime      dextrose 50% Injectable 25 Gram(s) IV Push once  dextrose 50% Injectable 12.5 Gram(s) IV Push once  dextrose 50% Injectable 25 Gram(s) IV Push once  dextrose Oral Gel 15 Gram(s) Oral once PRN  glucagon  Injectable 1 milliGRAM(s) IntraMuscular once  insulin glargine Injectable (LANTUS) 10 Unit(s) SubCutaneous at bedtime  insulin lispro (ADMELOG) corrective regimen sliding scale   SubCutaneous three times a day before meals  insulin lispro (ADMELOG) corrective regimen sliding scale   SubCutaneous at bedtime  insulin lispro Injectable (ADMELOG) 8 Unit(s) SubCutaneous three times a day before meals  levothyroxine 200 MICROGram(s) Oral <User Schedule>  levothyroxine 100 MICROGram(s) Oral <User Schedule>      insulin lispro (ADMELOG) corrective regimen sliding scale   SubCutaneous three times a day before meals  insulin lispro (ADMELOG) corrective regimen sliding scale   SubCutaneous at bedtime  insulin lispro Injectable (ADMELOG) 8 Unit(s) SubCutaneous three times a day before meals      PHYSICAL EXAM:  VITALS:   T(C): 37.1 (04-11-25 @ 05:38), Max: 37.9 (04-10-25 @ 20:52)  HR: 68 (04-11-25 @ 05:38) (63 - 71)  BP: 130/74 (04-11-25 @ 05:38) (124/73 - 135/76)  RR: 18 (04-11-25 @ 05:38) (18 - 18)  SpO2: 94% (04-11-25 @ 05:38) (90% - 98%)    GENERAL: In no acute distress  Respiratory: Respirations unlabored  Extremities: Warm and dry, +BLE edema  NEURO: Alert and oriented, appropriate     LABS:  POCT Blood Glucose.: 213 mg/dL (04-11-25 @ 08:27)  POCT Blood Glucose.: 192 mg/dL (04-10-25 @ 22:00)  POCT Blood Glucose.: 162 mg/dL (04-10-25 @ 17:06)  POCT Blood Glucose.: 172 mg/dL (04-10-25 @ 12:32)  POCT Blood Glucose.: 179 mg/dL (04-10-25 @ 08:26)  POCT Blood Glucose.: 212 mg/dL (04-09-25 @ 23:05)  POCT Blood Glucose.: 221 mg/dL (04-09-25 @ 17:10)  POCT Blood Glucose.: 283 mg/dL (04-09-25 @ 13:20)  POCT Blood Glucose.: 266 mg/dL (04-09-25 @ 12:15)  POCT Blood Glucose.: 132 mg/dL (04-09-25 @ 08:24)  POCT Blood Glucose.: 167 mg/dL (04-08-25 @ 21:23)  POCT Blood Glucose.: 179 mg/dL (04-08-25 @ 17:10)  POCT Blood Glucose.: 187 mg/dL (04-08-25 @ 12:25)                          8.4    11.55 )-----------( 290      ( 11 Apr 2025 05:21 )             27.6     04-11    137  |  100  |  20  ----------------------------<  179[H]  4.8   |  25  |  1.45[H]    Ca    8.5      11 Apr 2025 05:21  Phos  3.5     04-11  Mg     1.9     04-11          Urinalysis Basic - ( 11 Apr 2025 05:21 )    Color: x / Appearance: x / SG: x / pH: x  Gluc: 179 mg/dL / Ketone: x  / Bili: x / Urobili: x   Blood: x / Protein: x / Nitrite: x   Leuk Esterase: x / RBC: x / WBC x   Sq Epi: x / Non Sq Epi: x / Bacteria: x        Culture - Abscess with Gram Stain (collected 10 Apr 2025 16:22)  Source: Abscess R foot  Gram Stain (11 Apr 2025 06:22):    Rare polymorphonuclear leukocytes seen per low power field    Rare Gram Negative Rods seen per oil power field    Culture - Blood (collected 09 Apr 2025 18:20)  Source: Blood Blood  Preliminary Report (10 Apr 2025 23:01):    No growth at 24 hours      A1C with Estimated Average Glucose Result: A1C with Estimated Average Glucose Result: 11.2 % (04-08-25 @ 07:27)  A1C with Estimated Average Glucose Result: 8.7 % (12-30-24 @ 08:42)

## 2025-04-11 NOTE — PROGRESS NOTE ADULT - PROBLEM SELECTOR PLAN 10
- TSH elevated but FT4 wnl; repeat TFTs as outpatient  - endo recs appreciated; synthroid changed to 200mg daily Mon-Sat and 100mg on Sunday instead

## 2025-04-11 NOTE — PROGRESS NOTE ADULT - SUBJECTIVE AND OBJECTIVE BOX
[facility] Division of Hospital Medicine  Joe Dickinson MD  Available via MS Teams    SUBJECTIVE / OVERNIGHT EVENTS: No acute events overnight. Patient with pain still in bilateral lower extremities. Denies any chest pain or SOB. Patient placed back on NC overnight but patient states she did not feel SOB. No other concerns or complaints at this time.     Review of Systems:   CONSTITUTIONAL: No fever   EYES: No eye pain, visual disturbances, or discharge  ENMT: No difficulty hearing   RESPIRATORY: No SOB. No cough   CARDIOVASCULAR: No chest pain   GASTROINTESTINAL: No abdominal or epigastric pain. No nausea, vomiting, or hematemesis; No diarrhea    GENITOURINARY: No dysuria   NEUROLOGICAL: No headache   SKIN: No itching    MUSCULOSKELETAL: pain in bilateral lower extremities; back pain  PSYCHIATRIC: No depression or anxiety  HEME/LYMPH: No easy bruising or bleeding gums      MEDICATIONS  (STANDING):  aMIOdarone    Tablet 200 milliGRAM(s) Oral daily  amLODIPine   Tablet 5 milliGRAM(s) Oral daily  apixaban 5 milliGRAM(s) Oral every 12 hours  aspirin enteric coated 81 milliGRAM(s) Oral daily  buDESOnide    Inhalation Suspension 0.5 milliGRAM(s) Inhalation every 12 hours  cadexomer iodine 0.9% Gel 1 Application(s) Topical daily  clobetasol 0.05% Ointment 1 Application(s) Topical two times a day  dextrose 5%. 1000 milliLiter(s) (50 mL/Hr) IV Continuous <Continuous>  dextrose 5%. 1000 milliLiter(s) (100 mL/Hr) IV Continuous <Continuous>  dextrose 50% Injectable 25 Gram(s) IV Push once  dextrose 50% Injectable 12.5 Gram(s) IV Push once  dextrose 50% Injectable 25 Gram(s) IV Push once  furosemide    Tablet 40 milliGRAM(s) Oral daily  glucagon  Injectable 1 milliGRAM(s) IntraMuscular once  insulin glargine Injectable (LANTUS) 10 Unit(s) SubCutaneous at bedtime  insulin lispro (ADMELOG) corrective regimen sliding scale   SubCutaneous three times a day before meals  insulin lispro (ADMELOG) corrective regimen sliding scale   SubCutaneous at bedtime  insulin lispro Injectable (ADMELOG) 8 Unit(s) SubCutaneous three times a day before meals  levothyroxine 200 MICROGram(s) Oral <User Schedule>  levothyroxine 100 MICROGram(s) Oral <User Schedule>  lidocaine   4% Patch 1 Patch Transdermal daily  melatonin 5 milliGRAM(s) Oral at bedtime  mupirocin 2% Nasal 1 Application(s) Both Nostrils two times a day  naloxone Injectable 0.4 milliGRAM(s) IV Push once  pantoprazole    Tablet 40 milliGRAM(s) Oral before breakfast  piperacillin/tazobactam IVPB.- 3.375 Gram(s) IV Intermittent once  piperacillin/tazobactam IVPB.. 3.375 Gram(s) IV Intermittent every 8 hours  polyethylene glycol 3350 17 Gram(s) Oral daily  povidone iodine 10% Solution 1 Application(s) Topical daily  senna 2 Tablet(s) Oral at bedtime    MEDICATIONS  (PRN):  acetaminophen     Tablet .. 650 milliGRAM(s) Oral every 6 hours PRN Temp greater or equal to 38C (100.4F), Mild Pain (1 - 3)  albuterol    90 MICROgram(s) HFA Inhaler 2 Puff(s) Inhalation every 6 hours PRN Shortness of Breath and/or Wheezing  ALPRAZolam 2 milliGRAM(s) Oral at bedtime PRN Anxiety/insomnia  aluminum hydroxide/magnesium hydroxide/simethicone Suspension 30 milliLiter(s) Oral every 4 hours PRN Dyspepsia  bisacodyl 5 milliGRAM(s) Oral daily PRN Constipation  dextrose Oral Gel 15 Gram(s) Oral once PRN Blood Glucose LESS THAN 70 milliGRAM(s)/deciliter  ondansetron Injectable 4 milliGRAM(s) IV Push every 8 hours PRN Nausea and/or Vomiting  oxyCODONE    IR 5 milliGRAM(s) Oral every 4 hours PRN Moderate Pain (4 - 6)  oxyCODONE    IR 10 milliGRAM(s) Oral every 4 hours PRN Severe Pain (7 - 10)      I&O's Summary    10 Apr 2025 07:01  -  11 Apr 2025 07:00  --------------------------------------------------------  IN: 610 mL / OUT: 200 mL / NET: 410 mL      PHYSICAL EXAM:  Vital Signs Last 24 Hrs  T(C): 37.1 (11 Apr 2025 05:38), Max: 37.9 (10 Apr 2025 20:52)  T(F): 98.7 (11 Apr 2025 05:38), Max: 100.2 (10 Apr 2025 20:52)  HR: 68 (11 Apr 2025 05:38) (63 - 71)  BP: 130/74 (11 Apr 2025 05:38) (124/73 - 135/76)  RR: 18 (11 Apr 2025 05:38) (18 - 18)  SpO2: 94% (11 Apr 2025 05:38) (90% - 98%)    Parameters below as of 11 Apr 2025 05:38  Patient On (Oxygen Delivery Method): nasal cannula  O2 Flow (L/min): 2    CONSTITUTIONAL: some distress due to pain, well-developed   EYES: PERRLA; conjunctiva and sclera clear  ENMT: Moist oral mucosa, no pharyngeal injection or exudates   NECK: Supple   RESPIRATORY: Normal respiratory effort; lungs are clear to auscultation bilaterally  CARDIOVASCULAR: Regular rate and rhythm, normal S1 and S2   ABDOMEN: Nontender to palpation, normoactive bowel sounds   MUSCULOSKELETAL: no clubbing or cyanosis of digits; some tenderness to palpation of lower extremities; lower extremities with dressing over them   PSYCH: A+O to person, place, and time; affect appropriate  NEUROLOGY: no gross sensory deficits   SKIN: No rashes     LABS:                        8.4    11.55 )-----------( 290      ( 11 Apr 2025 05:21 )             27.6     04-11    137  |  100  |  20  ----------------------------<  179[H]  4.8   |  25  |  1.45[H]    Ca    8.5      11 Apr 2025 05:21  Phos  3.5     04-11  Mg     1.9     04-11    Urinalysis Basic - ( 11 Apr 2025 05:21 )    Color: x / Appearance: x / SG: x / pH: x  Gluc: 179 mg/dL / Ketone: x  / Bili: x / Urobili: x   Blood: x / Protein: x / Nitrite: x   Leuk Esterase: x / RBC: x / WBC x   Sq Epi: x / Non Sq Epi: x / Bacteria: x      Culture - Abscess with Gram Stain (collected 10 Apr 2025 16:22)  Source: Abscess R foot  Gram Stain (11 Apr 2025 06:22):    Rare polymorphonuclear leukocytes seen per low power field    Rare Gram Negative Rods seen per oil power field    Culture - Blood (collected 09 Apr 2025 18:20)  Source: Blood Blood  Preliminary Report (10 Apr 2025 23:01):    No growth at 24 hours    RADIOLOGY & ADDITIONAL TESTS:  New Imaging Personally Reviewed Today:  New Electrocardiogram Personally Reviewed Today:  Other Results Reviewed Today:   Prior or Outpatient Records Reviewed Today with Summary:    COORDINATION OF CARE:  Consultant Communication and Details of Discussion (where applicable):

## 2025-04-11 NOTE — PROGRESS NOTE ADULT - PROBLEM SELECTOR PLAN 5
a1c 11.2  trend fingerstick glu  - endo eval for further recs for home insulin regimen as A1c 11.2 but blood sugars are relatively controlled here on home insulin regimen  - insulin adjusted to lantus 10u and admelog 8u TID along with SSI per endo recs   adjust to goal glu 100-180  carb consistent diet

## 2025-04-11 NOTE — PROGRESS NOTE ADULT - PROBLEM SELECTOR PLAN 5
Detail Level: Detailed - LDL goal <70   - C/w statin therapy or recommend statin therapy if not contraindicated   - Check lipid panel as outpatient on a yearly basis Detail Level: Simple Detail Level: Generalized - LDL goal <70   - C/w statin therapy or recommend statin therapy if not contraindicated (allergy to Lipitor but can try other statins)  - Check lipid panel as outpatient on a yearly basis

## 2025-04-11 NOTE — PROGRESS NOTE ADULT - PROBLEM SELECTOR PLAN 12
pulmicort  ventolin  - noted to desat to 80s on RA overnight; unclear cause; patient with no SOB; currently on 2L NC saturating well; wean O2 as tolerated; CXR obtained for further eval, f/u read; monitor O2 sats     PT: [facility] but patient prefers home     DISPO: pending clinical improvement, ID recs; podiatry recs; wean O2

## 2025-04-11 NOTE — PROGRESS NOTE ADULT - SUBJECTIVE AND OBJECTIVE BOX
Follow Up:    SSTI    Interval History/ROS:  VSS ON. Heel abscess growing GNR. Patient was seen and examined at bedside.  at bedside. No fever. Heel pain improved. No diarrhea.    Allergies  latex (Rash)  Lipitor (Nausea)        ANTIMICROBIALS:  piperacillin/tazobactam IVPB.. 3.375 every 8 hours      OTHER MEDS:  MEDICATIONS  (STANDING):  acetaminophen     Tablet .. 650 every 6 hours PRN  albuterol    90 MICROgram(s) HFA Inhaler 2 every 6 hours PRN  ALPRAZolam 2 at bedtime PRN  aluminum hydroxide/magnesium hydroxide/simethicone Suspension 30 every 4 hours PRN  aMIOdarone    Tablet 200 daily  amLODIPine   Tablet 5 daily  apixaban 5 every 12 hours  aspirin enteric coated 81 daily  bisacodyl 5 daily PRN  buDESOnide    Inhalation Suspension 0.5 every 12 hours  dextrose 50% Injectable 25 once  dextrose 50% Injectable 12.5 once  dextrose 50% Injectable 25 once  dextrose Oral Gel 15 once PRN  furosemide    Tablet 40 daily  glucagon  Injectable 1 once  insulin glargine Injectable (LANTUS) 11 at bedtime  insulin lispro (ADMELOG) corrective regimen sliding scale  three times a day before meals  insulin lispro (ADMELOG) corrective regimen sliding scale  at bedtime  insulin lispro Injectable (ADMELOG) 8 three times a day before meals  levothyroxine 200 <User Schedule>  levothyroxine 100 <User Schedule>  melatonin 5 at bedtime  ondansetron Injectable 4 every 8 hours PRN  oxyCODONE    IR 5 every 4 hours PRN  oxyCODONE    IR 10 every 4 hours PRN  pantoprazole    Tablet 40 before breakfast  polyethylene glycol 3350 17 daily  senna 2 at bedtime      Vital Signs Last 24 Hrs  T(C): 36.8 (11 Apr 2025 12:48), Max: 37.9 (10 Apr 2025 20:52)  T(F): 98.2 (11 Apr 2025 12:48), Max: 100.2 (10 Apr 2025 20:52)  HR: 57 (11 Apr 2025 12:48) (57 - 71)  BP: 96/59 (11 Apr 2025 12:48) (96/59 - 130/74)  BP(mean): --  RR: 18 (11 Apr 2025 12:48) (18 - 18)  SpO2: 96% (11 Apr 2025 12:48) (90% - 98%)    Parameters below as of 11 Apr 2025 12:48  Patient On (Oxygen Delivery Method): nasal cannula      PHYSICAL EXAM:  Constitutional: non-toxic, no distress at rest  HEAD/EYES: anicteric, no conjunctival injection  Cardiovascular:   normal S1, S2, no murmur, RRR  Respiratory:  clear BS bilaterally, no wheezes  GI:  soft, non-tender, obese  Musculoskeletal:  BLE edema  Neurologic: awake and alert, normal strength, no focal findings  Skin:  R heel w/ fluctuance and tenderness, no crepitus; superficial ulceration w/ erythematous skin ulcer over the anterior aspect of RLE w/o discharge; dorsal aspect of the lower leg w/ nonerythematous tender superficial ulcer; no L foot open lesion; dorsal aspect of L ankle w/ multiple tender superficial ulcers; BLE turgor further shrunken, skin visibly wrinkled up.                                  8.4    11.55 )-----------( 290      ( 11 Apr 2025 05:21 )             27.6       04-11    137  |  100  |  20  ----------------------------<  179[H]  4.8   |  25  |  1.45[H]    Ca    8.5      11 Apr 2025 05:21  Phos  3.5     04-11  Mg     1.9     04-11        Urinalysis Basic - ( 11 Apr 2025 05:21 )    Color: x / Appearance: x / SG: x / pH: x  Gluc: 179 mg/dL / Ketone: x  / Bili: x / Urobili: x   Blood: x / Protein: x / Nitrite: x   Leuk Esterase: x / RBC: x / WBC x   Sq Epi: x / Non Sq Epi: x / Bacteria: x        MICROBIOLOGY:  Vancomycin Level, Trough: 21.1 ug/mL (04-11-25 @ 05:20)  v  Abscess R foot  04-10-25 --  --    Rare polymorphonuclear leukocytes seen per low power field  Rare Gram Negative Rods seen per oil power field      Blood Blood  04-09-25   No growth at 24 hours  --  --      Blood Blood-Peripheral  04-08-25   No growth at 72 Hours  --  --                RADIOLOGY:  Imaging below independently reviewed.  < from: Xray Chest 1 View- PORTABLE-Urgent (Xray Chest 1 View- PORTABLE-Urgent .) (04.11.25 @ 12:01) >    ACC: [id number] EXAM:  XR CHEST PORTABLE URGENT 1V   ORDERED BY: MARISOL WILHELM     PROCEDURE DATE:  04/11/2025          INTERPRETATION:  HISTORY: Desaturation    TECHNIQUE: A single AP view of the chest was obtained.    COMPARISON: 4/15/2024    FINDINGS:  The cardiac silhouette is normal in size. The lung volumes are   slightly low. There is mild pulmonary vascular congestion. There are no   focal consolidations or pleural effusions. The hilar and mediastinal   structures appear unremarkable. The osseous structures are intact.    IMPRESSION: Slightly low lung volumes with mild pulmonary vascular   congestion.    --- End of Report ---            RYAN BALTAZAR MD; Attending Radiologist  This document has been electronically signed. Apr 11 2025 12:58PM    < end of copied text >

## 2025-04-11 NOTE — PROGRESS NOTE ADULT - PROBLEM SELECTOR PLAN 1
Inpatient Plan:  - Check BG TID AC and HS while on PO diet   - Adjust Lantus to 11u QHS  - C/w Admelog 8u TID AC (HOLD if NPO)  - C/w low dose Admelog correctional scale TID AC and HS     Discharge Plan:  - Discharge on basal/bolus (doses TBD closer to d/c).   Please start a GLP1 weekly whichever covered by insurance (Ozempic 0.25mg subq once weekly OR Trulicity 0.75mg subq once weekly OR Mounjaro 2.5mg subq once weekly) to be titrated up outpatient as tolerated. Patient has no hx  of pancreatitis or hx/famhx of medullary thyroid CA.   - Patient would benefit from CGM- please place order for CGM (Dexcom G7 or Freestyle Michaela 3 whichever is covered by patient insurance). If patient cannot get chepe on phone, please send for rx [place] x1 and Sensor x3.   - Patient should check BG TID AC and HS at home. Can use CGM to monitor BGs but if BG <100mg/dL or >250mg/dL, patient should confirm with fingerstick BG. Please tell patient to contact Endocrinologist if BG <70mg/dL x1 or >200mg/dL consistently or >400mg/dL x1.  - Endocrine follow up with Dr. Marin.   - Recommend routine outpatient ophthalmology and podiatry follow up. Inpatient Plan:  - Check BG TID AC and HS while on PO diet   - Adjust Lantus to 11u QHS  - C/w Admelog 8u TID AC (HOLD if NPO)  - C/w low dose Admelog correctional scale TID AC and HS     Discharge Plan:  - Discharge on basal/bolus (doses TBD closer to d/c).   Please start a GLP1 weekly whichever covered by insurance (Ozempic 0.25mg subq once weekly OR Trulicity 0.75mg subq once weekly OR Mounjaro 2.5mg subq once weekly) to be titrated up outpatient as tolerated. Patient has no hx  of pancreatitis or hx/famhx of medullary thyroid CA.   - Patient would benefit from CGM- please place order for CGM (Dexcom G7 or Freestyle Michaela 3 whichever is covered by patient insurance). If patient cannot get chepe on phone, please send for rx [place] x1 and Sensor x3.   - Patient should check BG TID AC and HS at home. Can use CGM to monitor BGs but if BG <100mg/dL or >250mg/dL, patient should confirm with fingerstick BG. Please tell patient to contact Endocrinologist if BG <70mg/dL x1 or >200mg/dL consistently or >400mg/dL x1.  - Endocrine follow up with Dr. Marin can establish care at [address] [place] [address] Endocrinology ([phone])- please provide in d/c paperwork for patient to make appt.  - Recommend routine outpatient ophthalmology and podiatry follow up.  - Recommend routine outpatient ophthalmology and podiatry follow up. Inpatient Plan:  - Check BG TID AC and HS while on PO diet   - Adjust Lantus to 11u QHS  - C/w Admelog 8u TID AC (HOLD if NPO)  - C/w low dose Admelog correctional scale TID AC and HS     Discharge Plan:  - Discharge on basal/bolus (doses TBD closer to d/c).   Please start a GLP1 weekly whichever covered by insurance (Ozempic 0.25mg subq once weekly OR Trulicity 0.75mg subq once weekly OR Mounjaro 2.5mg subq once weekly) to be titrated up outpatient as tolerated. Patient has no hx  of pancreatitis or hx/famhx of medullary thyroid CA.   - Patient would benefit from CGM- please place order for CGM (Dexcom G7 or Freestyle Michaela 3 whichever is covered by patient insurance). If patient cannot get chepe on phone, please send for rx [place] x1 and Sensor x3.   - Patient should check BG TID AC and HS at home. Can use CGM to monitor BGs but if BG <100mg/dL or >250mg/dL, patient should confirm with fingerstick BG. Please tell patient to contact Endocrinologist if BG <70mg/dL x1 or >200mg/dL consistently or >400mg/dL x1.  - Endocrine follow up with Dr. Marin can establish care at [address] [place] [address] Endocrinology ([phone])- please provide in d/c paperwork for patient to make appt.  - Recommend routine outpatient ophthalmology and podiatry follow up.

## 2025-04-11 NOTE — PROGRESS NOTE ADULT - PROBLEM SELECTOR PLAN 1
h/o lymphedema/chronic venous stasis/varicosities  - cellulitis likely due to venous insufficiency due to DM   - sepsis ruled out   a/w r heel wound, nonhealing  recurrent hospitalizations for the same; prev derm, id, podiatry, wound care documentation reviewed  previously, woods lamp + and mrsa screen +  mild leukocytosis w lactic acidosis; otherwise, afebrile and hds  s/p vanc, cefepime in ER  GEOVANY/PVR noted; right GEOVANY of 1.02 and the left GEOVANY of 1.19 are normal. The right TBI of 0.59 is mildly abnormal, and the left TBI of 0.69 is near normal.  - blood cx neg to date; MRSA PCR positive     - MRI right leg with no collection noted   - va duplex neg for DVT   - VT level noted to be low; hold vanco and repeat VT level in AM; zosyn added on 4/11 per ID recs    - analgesics and antipyretics as needed   - elevate extremity + ice packs  - wound care consult; podiatry f/u

## 2025-04-12 LAB
ANION GAP SERPL CALC-SCNC: 13 MMOL/L — SIGNIFICANT CHANGE UP (ref 5–17)
BASOPHILS # BLD AUTO: 0.08 K/UL — SIGNIFICANT CHANGE UP (ref 0–0.2)
BASOPHILS NFR BLD AUTO: 0.7 % — SIGNIFICANT CHANGE UP (ref 0–2)
BUN SERPL-MCNC: 20 MG/DL — SIGNIFICANT CHANGE UP (ref 7–23)
CALCIUM SERPL-MCNC: 8.8 MG/DL — SIGNIFICANT CHANGE UP (ref 8.4–10.5)
CHLORIDE SERPL-SCNC: 99 MMOL/L — SIGNIFICANT CHANGE UP (ref 96–108)
CO2 SERPL-SCNC: 25 MMOL/L — SIGNIFICANT CHANGE UP (ref 22–31)
CREAT SERPL-MCNC: 1.48 MG/DL — HIGH (ref 0.5–1.3)
EGFR: 36 ML/MIN/1.73M2 — LOW
EGFR: 36 ML/MIN/1.73M2 — LOW
EOSINOPHIL # BLD AUTO: 0.41 K/UL — SIGNIFICANT CHANGE UP (ref 0–0.5)
EOSINOPHIL NFR BLD AUTO: 3.7 % — SIGNIFICANT CHANGE UP (ref 0–6)
GLUCOSE BLDC GLUCOMTR-MCNC: 127 MG/DL — HIGH (ref 70–99)
GLUCOSE BLDC GLUCOMTR-MCNC: 137 MG/DL — HIGH (ref 70–99)
GLUCOSE BLDC GLUCOMTR-MCNC: 161 MG/DL — HIGH (ref 70–99)
GLUCOSE BLDC GLUCOMTR-MCNC: 196 MG/DL — HIGH (ref 70–99)
GLUCOSE BLDC GLUCOMTR-MCNC: 200 MG/DL — HIGH (ref 70–99)
GLUCOSE SERPL-MCNC: 150 MG/DL — HIGH (ref 70–99)
HCT VFR BLD CALC: 26.9 % — LOW (ref 34.5–45)
HGB BLD-MCNC: 8.2 G/DL — LOW (ref 11.5–15.5)
IMM GRANULOCYTES NFR BLD AUTO: 1.4 % — HIGH (ref 0–0.9)
LYMPHOCYTES # BLD AUTO: 1.37 K/UL — SIGNIFICANT CHANGE UP (ref 1–3.3)
LYMPHOCYTES # BLD AUTO: 12.3 % — LOW (ref 13–44)
MAGNESIUM SERPL-MCNC: 2.1 MG/DL — SIGNIFICANT CHANGE UP (ref 1.6–2.6)
MCHC RBC-ENTMCNC: 26.2 PG — LOW (ref 27–34)
MCHC RBC-ENTMCNC: 30.5 G/DL — LOW (ref 32–36)
MCV RBC AUTO: 85.9 FL — SIGNIFICANT CHANGE UP (ref 80–100)
MONOCYTES # BLD AUTO: 0.76 K/UL — SIGNIFICANT CHANGE UP (ref 0–0.9)
MONOCYTES NFR BLD AUTO: 6.8 % — SIGNIFICANT CHANGE UP (ref 2–14)
NEUTROPHILS # BLD AUTO: 8.37 K/UL — HIGH (ref 1.8–7.4)
NEUTROPHILS NFR BLD AUTO: 75.1 % — SIGNIFICANT CHANGE UP (ref 43–77)
NRBC BLD AUTO-RTO: 0 /100 WBCS — SIGNIFICANT CHANGE UP (ref 0–0)
PHOSPHATE SERPL-MCNC: 3.9 MG/DL — SIGNIFICANT CHANGE UP (ref 2.5–4.5)
PLATELET # BLD AUTO: 338 K/UL — SIGNIFICANT CHANGE UP (ref 150–400)
POTASSIUM SERPL-MCNC: 4.3 MMOL/L — SIGNIFICANT CHANGE UP (ref 3.5–5.3)
POTASSIUM SERPL-SCNC: 4.3 MMOL/L — SIGNIFICANT CHANGE UP (ref 3.5–5.3)
RBC # BLD: 3.13 M/UL — LOW (ref 3.8–5.2)
RBC # FLD: 13.9 % — SIGNIFICANT CHANGE UP (ref 10.3–14.5)
SODIUM SERPL-SCNC: 137 MMOL/L — SIGNIFICANT CHANGE UP (ref 135–145)
VANCOMYCIN TROUGH SERPL-MCNC: 17 UG/ML — SIGNIFICANT CHANGE UP (ref 10–20)
WBC # BLD: 11.15 K/UL — HIGH (ref 3.8–10.5)
WBC # FLD AUTO: 11.15 K/UL — HIGH (ref 3.8–10.5)

## 2025-04-12 PROCEDURE — 99232 SBSQ HOSP IP/OBS MODERATE 35: CPT

## 2025-04-12 RX ORDER — VANCOMYCIN HCL IN 5 % DEXTROSE 1.5G/250ML
750 PLASTIC BAG, INJECTION (ML) INTRAVENOUS EVERY 12 HOURS
Refills: 0 | Status: DISCONTINUED | OUTPATIENT
Start: 2025-04-12 | End: 2025-04-15

## 2025-04-12 RX ADMIN — INSULIN LISPRO 8 UNIT(S): 100 INJECTION, SOLUTION INTRAVENOUS; SUBCUTANEOUS at 13:04

## 2025-04-12 RX ADMIN — Medication 25 GRAM(S): at 22:38

## 2025-04-12 RX ADMIN — Medication 25 GRAM(S): at 13:04

## 2025-04-12 RX ADMIN — POLYETHYLENE GLYCOL 3350 17 GRAM(S): 17 POWDER, FOR SOLUTION ORAL at 11:30

## 2025-04-12 RX ADMIN — Medication 25 GRAM(S): at 05:38

## 2025-04-12 RX ADMIN — OXYCODONE HYDROCHLORIDE 10 MILLIGRAM(S): 30 TABLET ORAL at 01:21

## 2025-04-12 RX ADMIN — AMLODIPINE BESYLATE 5 MILLIGRAM(S): 10 TABLET ORAL at 05:38

## 2025-04-12 RX ADMIN — BUDESONIDE 0.5 MILLIGRAM(S): 90 AEROSOL, POWDER RESPIRATORY (INHALATION) at 05:42

## 2025-04-12 RX ADMIN — Medication 250 MILLIGRAM(S): at 17:33

## 2025-04-12 RX ADMIN — Medication 2 TABLET(S): at 22:40

## 2025-04-12 RX ADMIN — INSULIN GLARGINE-YFGN 11 UNIT(S): 100 INJECTION, SOLUTION SUBCUTANEOUS at 22:38

## 2025-04-12 RX ADMIN — FUROSEMIDE 40 MILLIGRAM(S): 10 INJECTION INTRAMUSCULAR; INTRAVENOUS at 05:38

## 2025-04-12 RX ADMIN — MUPIROCIN CALCIUM 1 APPLICATION(S): 20 CREAM TOPICAL at 05:42

## 2025-04-12 RX ADMIN — Medication 1 APPLICATION(S): at 05:41

## 2025-04-12 RX ADMIN — Medication 1 APPLICATION(S): at 17:33

## 2025-04-12 RX ADMIN — MUPIROCIN CALCIUM 1 APPLICATION(S): 20 CREAM TOPICAL at 17:32

## 2025-04-12 RX ADMIN — Medication 1 APPLICATION(S): at 11:33

## 2025-04-12 RX ADMIN — Medication 200 MICROGRAM(S): at 05:38

## 2025-04-12 RX ADMIN — APIXABAN 5 MILLIGRAM(S): 2.5 TABLET, FILM COATED ORAL at 05:38

## 2025-04-12 RX ADMIN — Medication 40 MILLIGRAM(S): at 05:38

## 2025-04-12 RX ADMIN — INSULIN LISPRO 1: 100 INJECTION, SOLUTION INTRAVENOUS; SUBCUTANEOUS at 08:38

## 2025-04-12 RX ADMIN — Medication 1 APPLICATION(S): at 11:30

## 2025-04-12 RX ADMIN — APIXABAN 5 MILLIGRAM(S): 2.5 TABLET, FILM COATED ORAL at 17:32

## 2025-04-12 RX ADMIN — Medication 5 MILLIGRAM(S): at 22:31

## 2025-04-12 RX ADMIN — OXYCODONE HYDROCHLORIDE 10 MILLIGRAM(S): 30 TABLET ORAL at 00:26

## 2025-04-12 RX ADMIN — INSULIN LISPRO 8 UNIT(S): 100 INJECTION, SOLUTION INTRAVENOUS; SUBCUTANEOUS at 17:33

## 2025-04-12 RX ADMIN — Medication 81 MILLIGRAM(S): at 11:30

## 2025-04-12 RX ADMIN — AMIODARONE HYDROCHLORIDE 200 MILLIGRAM(S): 50 INJECTION, SOLUTION INTRAVENOUS at 05:38

## 2025-04-12 RX ADMIN — BUDESONIDE 0.5 MILLIGRAM(S): 90 AEROSOL, POWDER RESPIRATORY (INHALATION) at 17:33

## 2025-04-12 RX ADMIN — INSULIN LISPRO 8 UNIT(S): 100 INJECTION, SOLUTION INTRAVENOUS; SUBCUTANEOUS at 08:38

## 2025-04-12 NOTE — PROGRESS NOTE ADULT - PROBLEM SELECTOR PLAN 1
h/o lymphedema/chronic venous stasis/varicosities  - cellulitis likely due to venous insufficiency due to DM   - sepsis ruled out   a/w r heel wound, nonhealing  recurrent hospitalizations for the same; prev derm, id, podiatry, wound care documentation reviewed  previously, woods lamp + and mrsa screen +  mild leukocytosis w lactic acidosis; otherwise, afebrile and hds  s/p vanc, cefepime in ER  GEOVANY/PVR noted; right GEOVANY of 1.02 and the left GEOVANY of 1.19 are normal. The right TBI of 0.59 is mildly abnormal, and the left TBI of 0.69 is near normal.  - blood cx neg to date; MRSA PCR positive     - MRI right leg with no collection noted   - va duplex neg for DVT   - VT level noted to be high on 4/11 with improvement today; will resume vanco at lower dose of 750mg q12 and obtain VT level prior to 4th dose; zosyn added on 4/11 per ID recs    - analgesics and antipyretics as needed   - elevate extremity + ice packs  - wound care consult; podiatry f/u

## 2025-04-12 NOTE — PROGRESS NOTE ADULT - SUBJECTIVE AND OBJECTIVE BOX
[facility] [place] [facility]  Joe Dickinson MD  Available via MS Teams    SUBJECTIVE / OVERNIGHT EVENTS: No acute events overnight. Patient still with pain in lower extremities but thinks may be better than yesterday. Wants to get out of bed to chair. No chest pain or SOB. No other concerns or complaints at this time.     Review of Systems:   CONSTITUTIONAL: No fever   EYES: No eye pain, visual disturbances, or discharge  ENMT: No difficulty hearing   RESPIRATORY: No SOB. No cough   CARDIOVASCULAR: No chest pain   GASTROINTESTINAL: No abdominal or epigastric pain. No nausea, vomiting, or hematemesis; No diarrhea    GENITOURINARY: No dysuria   NEUROLOGICAL: No headache   SKIN: No itching   MUSCULOSKELETAL: pain in lower extremities; No muscle or back pain  PSYCHIATRIC: No depression or anxiety   HEME/LYMPH: No easy bruising or bleeding gums      MEDICATIONS  (STANDING):  aMIOdarone    Tablet 200 milliGRAM(s) Oral daily  amLODIPine   Tablet 5 milliGRAM(s) Oral daily  apixaban 5 milliGRAM(s) Oral every 12 hours  aspirin enteric coated 81 milliGRAM(s) Oral daily  buDESOnide    Inhalation Suspension 0.5 milliGRAM(s) Inhalation every 12 hours  cadexomer iodine 0.9% Gel 1 Application(s) Topical daily  clobetasol 0.05% Ointment 1 Application(s) Topical two times a day  dextrose 5%. 1000 milliLiter(s) (50 mL/Hr) IV Continuous <Continuous>  dextrose 5%. 1000 milliLiter(s) (100 mL/Hr) IV Continuous <Continuous>  dextrose 50% Injectable 25 Gram(s) IV Push once  dextrose 50% Injectable 12.5 Gram(s) IV Push once  dextrose 50% Injectable 25 Gram(s) IV Push once  furosemide    Tablet 40 milliGRAM(s) Oral daily  glucagon  Injectable 1 milliGRAM(s) IntraMuscular once  insulin glargine Injectable (LANTUS) 11 Unit(s) SubCutaneous at bedtime  insulin lispro (ADMELOG) corrective regimen sliding scale   SubCutaneous three times a day before meals  insulin lispro (ADMELOG) corrective regimen sliding scale   SubCutaneous at bedtime  insulin lispro Injectable (ADMELOG) 8 Unit(s) SubCutaneous three times a day before meals  levothyroxine 200 MICROGram(s) Oral <User Schedule>  levothyroxine 100 MICROGram(s) Oral <User Schedule>  lidocaine   4% Patch 1 Patch Transdermal daily  melatonin 5 milliGRAM(s) Oral at bedtime  mupirocin 2% Nasal 1 Application(s) Both Nostrils two times a day  naloxone Injectable 0.4 milliGRAM(s) IV Push once  pantoprazole    Tablet 40 milliGRAM(s) Oral before breakfast  piperacillin/tazobactam IVPB.. 3.375 Gram(s) IV Intermittent every 8 hours  polyethylene glycol 3350 17 Gram(s) Oral daily  povidone iodine 10% Solution 1 Application(s) Topical daily  senna 2 Tablet(s) Oral at bedtime  vancomycin  IVPB 750 milliGRAM(s) IV Intermittent every 12 hours    MEDICATIONS  (PRN):  acetaminophen     Tablet .. 650 milliGRAM(s) Oral every 6 hours PRN Temp greater or equal to 38C (100.4F), Mild Pain (1 - 3)  albuterol    90 MICROgram(s) HFA Inhaler 2 Puff(s) Inhalation every 6 hours PRN Shortness of Breath and/or Wheezing  ALPRAZolam 2 milliGRAM(s) Oral at bedtime PRN Anxiety/insomnia  aluminum hydroxide/magnesium hydroxide/simethicone Suspension 30 milliLiter(s) Oral every 4 hours PRN Dyspepsia  bisacodyl 5 milliGRAM(s) Oral daily PRN Constipation  dextrose Oral Gel 15 Gram(s) Oral once PRN Blood Glucose LESS THAN 70 milliGRAM(s)/deciliter  ondansetron Injectable 4 milliGRAM(s) IV Push every 8 hours PRN Nausea and/or Vomiting  oxyCODONE    IR 5 milliGRAM(s) Oral every 4 hours PRN Moderate Pain (4 - 6)  oxyCODONE    IR 10 milliGRAM(s) Oral every 4 hours PRN Severe Pain (7 - 10)      I&O's Summary    11 Apr 2025 07:01  -  12 Apr 2025 07:00  --------------------------------------------------------  IN: 480 mL / OUT: 500 mL / NET: -20 mL      PHYSICAL EXAM:  Vital Signs Last 24 Hrs  T(C): 36.9 (12 Apr 2025 14:17), Max: 37.1 (12 Apr 2025 04:30)  T(F): 98.5 (12 Apr 2025 14:17), Max: 98.7 (12 Apr 2025 04:30)  HR: 72 (12 Apr 2025 14:17) (57 - 72)  BP: 137/77 (12 Apr 2025 14:17) (100/64 - 137/77)  RR: 18 (12 Apr 2025 14:17) (18 - 18)  SpO2: 95% (12 Apr 2025 14:17) (83% - 96%)    Parameters below as of 12 Apr 2025 14:17  Patient On (Oxygen Delivery Method): room air      CONSTITUTIONAL: NAD, well-developed   EYES: PERRLA; conjunctiva and sclera clear  ENMT: Moist oral mucosa, no pharyngeal injection or exudates   NECK: Supple   RESPIRATORY: Normal respiratory effort; lungs are clear to auscultation bilaterally  CARDIOVASCULAR: Regular rate and rhythm, normal S1 and S2   ABDOMEN: Nontender to palpation, normoactive bowel sounds   MUSCULOSKELETAL: no clubbing or cyanosis of digits; some mild swelling in lower extremities but erythema seems to be improving; dressing in place around lower extremities   PSYCH: A+O to person, place, and time; affect appropriate  NEUROLOGY: no gross sensory deficits   SKIN: No rashes     LABS:                        8.2    11.15 )-----------( 338      ( 12 Apr 2025 07:03 )             26.9     04-12    137  |  99  |  20  ----------------------------<  150[H]  4.3   |  25  |  1.48[H]    Ca    8.8      12 Apr 2025 07:04  Phos  3.9     04-12  Mg     2.1     04-12      Urinalysis Basic - ( 12 Apr 2025 07:04 )    Color: x / Appearance: x / SG: x / pH: x  Gluc: 150 mg/dL / Ketone: x  / Bili: x / Urobili: x   Blood: x / Protein: x / Nitrite: x   Leuk Esterase: x / RBC: x / WBC x   Sq Epi: x / Non Sq Epi: x / Bacteria: x      Culture - Abscess with Gram Stain (collected 10 Apr 2025 16:22)  Source: Abscess R foot  Gram Stain (11 Apr 2025 06:22):    Rare polymorphonuclear leukocytes seen per low power field    Rare Gram Negative Rods seen per oil power field    Culture - Blood (collected 09 Apr 2025 18:20)  Source: Blood Blood  Preliminary Report (11 Apr 2025 23:01):    No growth at 48 Hours    RADIOLOGY & ADDITIONAL TESTS:  New Imaging Personally Reviewed Today:  New Electrocardiogram Personally Reviewed Today:  Other Results Reviewed Today:   Prior or Outpatient Records Reviewed Today with Summary:    COORDINATION OF CARE:  Consultant Communication and Details of Discussion (where applicable):

## 2025-04-12 NOTE — PROGRESS NOTE ADULT - PROBLEM SELECTOR PLAN 5
a1c 11.2  trend fingerstick glu  - endo eval for further recs for home insulin regimen as A1c 11.2 but blood sugars are relatively controlled here on home insulin regimen  - insulin adjusted to lantus 11u and admelog 8u TID along with SSI per endo recs   adjust to goal glu 100-180  carb consistent diet

## 2025-04-13 LAB
-  AMOXICILLIN/CLAVULANIC ACID: SIGNIFICANT CHANGE UP
-  AMPICILLIN/SULBACTAM: SIGNIFICANT CHANGE UP
-  AMPICILLIN: SIGNIFICANT CHANGE UP
-  AZTREONAM: SIGNIFICANT CHANGE UP
-  CEFAZOLIN: SIGNIFICANT CHANGE UP
-  CEFEPIME: SIGNIFICANT CHANGE UP
-  CEFOXITIN: SIGNIFICANT CHANGE UP
-  CEFTRIAXONE: SIGNIFICANT CHANGE UP
-  CIPROFLOXACIN: SIGNIFICANT CHANGE UP
-  ERTAPENEM: SIGNIFICANT CHANGE UP
-  GENTAMICIN: SIGNIFICANT CHANGE UP
-  LEVOFLOXACIN: SIGNIFICANT CHANGE UP
-  MEROPENEM: SIGNIFICANT CHANGE UP
-  PIPERACILLIN/TAZOBACTAM: SIGNIFICANT CHANGE UP
-  TOBRAMYCIN: SIGNIFICANT CHANGE UP
-  TRIMETHOPRIM/SULFAMETHOXAZOLE: SIGNIFICANT CHANGE UP
ANION GAP SERPL CALC-SCNC: 12 MMOL/L — SIGNIFICANT CHANGE UP (ref 5–17)
BUN SERPL-MCNC: 21 MG/DL — SIGNIFICANT CHANGE UP (ref 7–23)
CALCIUM SERPL-MCNC: 8.8 MG/DL — SIGNIFICANT CHANGE UP (ref 8.4–10.5)
CHLORIDE SERPL-SCNC: 101 MMOL/L — SIGNIFICANT CHANGE UP (ref 96–108)
CO2 SERPL-SCNC: 26 MMOL/L — SIGNIFICANT CHANGE UP (ref 22–31)
CREAT SERPL-MCNC: 1.63 MG/DL — HIGH (ref 0.5–1.3)
CULTURE RESULTS: SIGNIFICANT CHANGE UP
CULTURE RESULTS: SIGNIFICANT CHANGE UP
EGFR: 32 ML/MIN/1.73M2 — LOW
EGFR: 32 ML/MIN/1.73M2 — LOW
GLUCOSE BLDC GLUCOMTR-MCNC: 181 MG/DL — HIGH (ref 70–99)
GLUCOSE BLDC GLUCOMTR-MCNC: 195 MG/DL — HIGH (ref 70–99)
GLUCOSE BLDC GLUCOMTR-MCNC: 221 MG/DL — HIGH (ref 70–99)
GLUCOSE BLDC GLUCOMTR-MCNC: 227 MG/DL — HIGH (ref 70–99)
GLUCOSE SERPL-MCNC: 212 MG/DL — HIGH (ref 70–99)
HCT VFR BLD CALC: 28.8 % — LOW (ref 34.5–45)
HGB BLD-MCNC: 8.6 G/DL — LOW (ref 11.5–15.5)
MAGNESIUM SERPL-MCNC: 2.1 MG/DL — SIGNIFICANT CHANGE UP (ref 1.6–2.6)
MCHC RBC-ENTMCNC: 25.5 PG — LOW (ref 27–34)
MCHC RBC-ENTMCNC: 29.9 G/DL — LOW (ref 32–36)
MCV RBC AUTO: 85.5 FL — SIGNIFICANT CHANGE UP (ref 80–100)
METHOD TYPE: SIGNIFICANT CHANGE UP
NRBC BLD AUTO-RTO: 0 /100 WBCS — SIGNIFICANT CHANGE UP (ref 0–0)
PHOSPHATE SERPL-MCNC: 4.2 MG/DL — SIGNIFICANT CHANGE UP (ref 2.5–4.5)
PLATELET # BLD AUTO: 341 K/UL — SIGNIFICANT CHANGE UP (ref 150–400)
POTASSIUM SERPL-MCNC: 4.3 MMOL/L — SIGNIFICANT CHANGE UP (ref 3.5–5.3)
POTASSIUM SERPL-SCNC: 4.3 MMOL/L — SIGNIFICANT CHANGE UP (ref 3.5–5.3)
RBC # BLD: 3.37 M/UL — LOW (ref 3.8–5.2)
RBC # FLD: 13.9 % — SIGNIFICANT CHANGE UP (ref 10.3–14.5)
SODIUM SERPL-SCNC: 139 MMOL/L — SIGNIFICANT CHANGE UP (ref 135–145)
SPECIMEN SOURCE: SIGNIFICANT CHANGE UP
SPECIMEN SOURCE: SIGNIFICANT CHANGE UP
WBC # BLD: 9.89 K/UL — SIGNIFICANT CHANGE UP (ref 3.8–10.5)
WBC # FLD AUTO: 9.89 K/UL — SIGNIFICANT CHANGE UP (ref 3.8–10.5)

## 2025-04-13 PROCEDURE — 74018 RADEX ABDOMEN 1 VIEW: CPT | Mod: 26

## 2025-04-13 PROCEDURE — 99232 SBSQ HOSP IP/OBS MODERATE 35: CPT

## 2025-04-13 RX ORDER — LACTULOSE 10 G/15ML
15 SOLUTION ORAL ONCE
Refills: 0 | Status: COMPLETED | OUTPATIENT
Start: 2025-04-13 | End: 2025-04-13

## 2025-04-13 RX ORDER — BISACODYL 5 MG
10 TABLET, DELAYED RELEASE (ENTERIC COATED) ORAL ONCE
Refills: 0 | Status: DISCONTINUED | OUTPATIENT
Start: 2025-04-13 | End: 2025-04-13

## 2025-04-13 RX ORDER — INSULIN GLARGINE-YFGN 100 [IU]/ML
12 INJECTION, SOLUTION SUBCUTANEOUS AT BEDTIME
Refills: 0 | Status: DISCONTINUED | OUTPATIENT
Start: 2025-04-13 | End: 2025-04-16

## 2025-04-13 RX ADMIN — Medication 5 MILLIGRAM(S): at 21:53

## 2025-04-13 RX ADMIN — INSULIN LISPRO 2: 100 INJECTION, SOLUTION INTRAVENOUS; SUBCUTANEOUS at 12:58

## 2025-04-13 RX ADMIN — Medication 100 MICROGRAM(S): at 05:08

## 2025-04-13 RX ADMIN — Medication 2 MILLIGRAM(S): at 21:52

## 2025-04-13 RX ADMIN — LIDOCAINE HYDROCHLORIDE 1 PATCH: 20 JELLY TOPICAL at 22:09

## 2025-04-13 RX ADMIN — Medication 25 GRAM(S): at 07:07

## 2025-04-13 RX ADMIN — AMIODARONE HYDROCHLORIDE 200 MILLIGRAM(S): 50 INJECTION, SOLUTION INTRAVENOUS at 05:06

## 2025-04-13 RX ADMIN — Medication 4 MILLIGRAM(S): at 09:09

## 2025-04-13 RX ADMIN — Medication 1 APPLICATION(S): at 18:03

## 2025-04-13 RX ADMIN — Medication 81 MILLIGRAM(S): at 12:59

## 2025-04-13 RX ADMIN — Medication 250 MILLIGRAM(S): at 05:06

## 2025-04-13 RX ADMIN — LACTULOSE 15 GRAM(S): 10 SOLUTION ORAL at 10:08

## 2025-04-13 RX ADMIN — Medication 25 GRAM(S): at 22:56

## 2025-04-13 RX ADMIN — OXYCODONE HYDROCHLORIDE 10 MILLIGRAM(S): 30 TABLET ORAL at 03:56

## 2025-04-13 RX ADMIN — APIXABAN 5 MILLIGRAM(S): 2.5 TABLET, FILM COATED ORAL at 05:06

## 2025-04-13 RX ADMIN — Medication 1 APPLICATION(S): at 05:07

## 2025-04-13 RX ADMIN — Medication 40 MILLIGRAM(S): at 05:08

## 2025-04-13 RX ADMIN — INSULIN GLARGINE-YFGN 12 UNIT(S): 100 INJECTION, SOLUTION SUBCUTANEOUS at 21:52

## 2025-04-13 RX ADMIN — MUPIROCIN CALCIUM 1 APPLICATION(S): 20 CREAM TOPICAL at 20:05

## 2025-04-13 RX ADMIN — AMLODIPINE BESYLATE 5 MILLIGRAM(S): 10 TABLET ORAL at 05:06

## 2025-04-13 RX ADMIN — LIDOCAINE HYDROCHLORIDE 1 PATCH: 20 JELLY TOPICAL at 18:01

## 2025-04-13 RX ADMIN — APIXABAN 5 MILLIGRAM(S): 2.5 TABLET, FILM COATED ORAL at 18:02

## 2025-04-13 RX ADMIN — FUROSEMIDE 40 MILLIGRAM(S): 10 INJECTION INTRAMUSCULAR; INTRAVENOUS at 05:06

## 2025-04-13 RX ADMIN — Medication 250 MILLIGRAM(S): at 20:05

## 2025-04-13 RX ADMIN — Medication 1 APPLICATION(S): at 18:02

## 2025-04-13 RX ADMIN — POLYETHYLENE GLYCOL 3350 17 GRAM(S): 17 POWDER, FOR SOLUTION ORAL at 12:59

## 2025-04-13 RX ADMIN — Medication 2 TABLET(S): at 21:52

## 2025-04-13 RX ADMIN — BUDESONIDE 0.5 MILLIGRAM(S): 90 AEROSOL, POWDER RESPIRATORY (INHALATION) at 05:06

## 2025-04-13 RX ADMIN — MUPIROCIN CALCIUM 1 APPLICATION(S): 20 CREAM TOPICAL at 05:07

## 2025-04-13 RX ADMIN — BUDESONIDE 0.5 MILLIGRAM(S): 90 AEROSOL, POWDER RESPIRATORY (INHALATION) at 18:01

## 2025-04-13 RX ADMIN — Medication 25 GRAM(S): at 15:11

## 2025-04-13 RX ADMIN — INSULIN LISPRO 1: 100 INJECTION, SOLUTION INTRAVENOUS; SUBCUTANEOUS at 18:03

## 2025-04-13 RX ADMIN — INSULIN LISPRO 2: 100 INJECTION, SOLUTION INTRAVENOUS; SUBCUTANEOUS at 09:14

## 2025-04-13 NOTE — CHART NOTE - NSCHARTNOTEFT_GEN_A_CORE
chart reviewed, patient with fasting hyperglycemia    73 y/o F w/ uncontrolled Type 2 DM w/ hyperglycemia complicated by CVA, CAD, and neuropathy with hyperglycemia, post-operative hypothyroidism, HTN, HLD admitted for LE cellulitis (high risk patient with severely uncontrolled diabetes with A1c of 11.2% at high risk of CAD and CVA with high medical complexity and high level decision-making). Patient is feeling okay, eating about 50% or more of meals and tolerating POs. FBG slightly elevated, will increase Lantus to 11u QHS (takes Tresiba 24u HS at home). No hypoglycemia. Noted better postprandial BG control, will keep mealtime dose as is for now. Discussed with primary team to keep all IV abx in NS solution when possible. Endocrine will closely monitor BG and adjust insulin as needed for BG goal 100-200mg/dL inpatient.     CAPILLARY BLOOD GLUCOSE      POCT Blood Glucose.: 221 mg/dL (13 Apr 2025 08:26)  POCT Blood Glucose.: 196 mg/dL (12 Apr 2025 22:37)  POCT Blood Glucose.: 200 mg/dL (12 Apr 2025 21:21)  POCT Blood Glucose.: 137 mg/dL (12 Apr 2025 17:08)  POCT Blood Glucose.: 127 mg/dL (12 Apr 2025 12:38)    04-13    139  |  101  |  21  ----------------------------<  212[H]  4.3   |  26  |  1.63[H]    Ca    8.8      13 Apr 2025 09:16  Phos  4.2     04-13  Mg     2.1     04-13    MEDICATIONS  (STANDING):  aMIOdarone    Tablet 200 milliGRAM(s) Oral daily  amLODIPine   Tablet 5 milliGRAM(s) Oral daily  apixaban 5 milliGRAM(s) Oral every 12 hours  aspirin enteric coated 81 milliGRAM(s) Oral daily  buDESOnide    Inhalation Suspension 0.5 milliGRAM(s) Inhalation every 12 hours  cadexomer iodine 0.9% Gel 1 Application(s) Topical daily  clobetasol 0.05% Ointment 1 Application(s) Topical two times a day  dextrose 5%. 1000 milliLiter(s) (50 mL/Hr) IV Continuous <Continuous>  dextrose 5%. 1000 milliLiter(s) (100 mL/Hr) IV Continuous <Continuous>  dextrose 50% Injectable 12.5 Gram(s) IV Push once  dextrose 50% Injectable 25 Gram(s) IV Push once  dextrose 50% Injectable 25 Gram(s) IV Push once  furosemide    Tablet 40 milliGRAM(s) Oral daily  glucagon  Injectable 1 milliGRAM(s) IntraMuscular once  insulin glargine Injectable (LANTUS) 12 Unit(s) SubCutaneous at bedtime  insulin lispro (ADMELOG) corrective regimen sliding scale   SubCutaneous three times a day before meals  insulin lispro (ADMELOG) corrective regimen sliding scale   SubCutaneous at bedtime  insulin lispro Injectable (ADMELOG) 8 Unit(s) SubCutaneous three times a day before meals  levothyroxine 200 MICROGram(s) Oral <User Schedule>  levothyroxine 100 MICROGram(s) Oral <User Schedule>  lidocaine   4% Patch 1 Patch Transdermal daily  melatonin 5 milliGRAM(s) Oral at bedtime  mupirocin 2% Nasal 1 Application(s) Both Nostrils two times a day  naloxone Injectable 0.4 milliGRAM(s) IV Push once  pantoprazole    Tablet 40 milliGRAM(s) Oral before breakfast  piperacillin/tazobactam IVPB.. 3.375 Gram(s) IV Intermittent every 8 hours  polyethylene glycol 3350 17 Gram(s) Oral daily  povidone iodine 10% Solution 1 Application(s) Topical daily  senna 2 Tablet(s) Oral at bedtime  vancomycin  IVPB 750 milliGRAM(s) IV Intermittent every 12 hours      PLAN  #Uncontrolled type 2 diabetes mellitus   A1C with Estimated Average Glucose Result: 11.2 % (04-08-25 @ 07:27)  Home regimen: Tresiba 24u QHS, Lispro 8u TID AC   Endocrinologist: Dr. Marin        Nutritional Assessment:  · Nutritional Assessment	Diet, Consistent Carbohydrate/No Snacks (04-07-25 @ 19:32) [Active]     Problem/Plan - 1:  ·  Problem: Uncontrolled type 2 diabetes mellitus with hyperglycemia, with long-term current use of insulin.   ·  Plan: Inpatient Plan:  - Check BG TID AC and HS while on PO diet   - Adjust Lantus to 12u QHS  - C/w Admelog 8u TID AC (HOLD if NPO)  - C/w low dose Admelog correctional scale TID AC and HS     Per prior endocrinology notes:  Discharge Plan:  - Discharge on basal/bolus (doses TBD closer to d/c).   Please start a GLP1 weekly whichever covered by insurance (Ozempic 0.25mg subq once weekly OR Trulicity 0.75mg subq once weekly OR Mounjaro 2.5mg subq once weekly) to be titrated up outpatient as tolerated. Patient has no hx  of pancreatitis or hx/famhx of medullary thyroid CA.   - Patient would benefit from CGM- please place order for CGM (Dexcom G7 or Freestyle Michaela 3 whichever is covered by patient insurance). If patient cannot get chepe on phone, please send for rx [place] x1 and Sensor x3.   - Patient should check BG TID AC and HS at home. Can use CGM to monitor BGs but if BG <100mg/dL or >250mg/dL, patient should confirm with fingerstick BG. Please tell patient to contact Endocrinologist if BG <70mg/dL x1 or >200mg/dL consistently or >400mg/dL x1.  - Endocrine follow up with Dr. Marin can establish care at [address] [place] [address] ([phone])- please provide in d/c paperwork for patient to make appt.  - Recommend routine outpatient ophthalmology and podiatry follow up.    Senait Cunningham  Nurse Practitioner  Division of Endocrinology & Diabetes  Available via  Teams    A different provider will be covering patient tomorrow  If after 6PM or before 9AM, or on weekends/holidays, please call endocrine answering service for assistance ([phone]).  For nonurgent matters email [email or web address] for assistance.

## 2025-04-13 NOTE — PROGRESS NOTE ADULT - SUBJECTIVE AND OBJECTIVE BOX
[facility] Division of Hospital Medicine  Joe Dickinson MD  Available via MS Teams    SUBJECTIVE / OVERNIGHT EVENTS: No acute events overnight. Patient still with leg pain. States not having had a BM in about 6 days. Denies any chest pain or SOB. No other concerns or complaints at this time.  and daughter at bedside.     Review of Systems:   CONSTITUTIONAL: No fever   EYES: No eye pain, visual disturbances, or discharge  ENMT: No difficulty hearing   RESPIRATORY: No SOB. No cough   CARDIOVASCULAR: No chest pain   GASTROINTESTINAL: No abdominal or epigastric pain. No nausea, vomiting, or hematemesis; constipation.   GENITOURINARY: No dysuria   NEUROLOGICAL: No headache   SKIN: No itching    MUSCULOSKELETAL: pain in bilateral lower extremities; No muscle or back pain  PSYCHIATRIC: No depression or anxiety  HEME/LYMPH: No easy bruising or bleeding gums      MEDICATIONS  (STANDING):  aMIOdarone    Tablet 200 milliGRAM(s) Oral daily  amLODIPine   Tablet 5 milliGRAM(s) Oral daily  apixaban 5 milliGRAM(s) Oral every 12 hours  aspirin enteric coated 81 milliGRAM(s) Oral daily  buDESOnide    Inhalation Suspension 0.5 milliGRAM(s) Inhalation every 12 hours  cadexomer iodine 0.9% Gel 1 Application(s) Topical daily  clobetasol 0.05% Ointment 1 Application(s) Topical two times a day  dextrose 5%. 1000 milliLiter(s) (50 mL/Hr) IV Continuous <Continuous>  dextrose 5%. 1000 milliLiter(s) (100 mL/Hr) IV Continuous <Continuous>  dextrose 50% Injectable 25 Gram(s) IV Push once  dextrose 50% Injectable 12.5 Gram(s) IV Push once  dextrose 50% Injectable 25 Gram(s) IV Push once  furosemide    Tablet 40 milliGRAM(s) Oral daily  glucagon  Injectable 1 milliGRAM(s) IntraMuscular once  insulin glargine Injectable (LANTUS) 12 Unit(s) SubCutaneous at bedtime  insulin lispro (ADMELOG) corrective regimen sliding scale   SubCutaneous three times a day before meals  insulin lispro (ADMELOG) corrective regimen sliding scale   SubCutaneous at bedtime  insulin lispro Injectable (ADMELOG) 8 Unit(s) SubCutaneous three times a day before meals  levothyroxine 200 MICROGram(s) Oral <User Schedule>  levothyroxine 100 MICROGram(s) Oral <User Schedule>  lidocaine   4% Patch 1 Patch Transdermal daily  melatonin 5 milliGRAM(s) Oral at bedtime  mupirocin 2% Nasal 1 Application(s) Both Nostrils two times a day  naloxone Injectable 0.4 milliGRAM(s) IV Push once  pantoprazole    Tablet 40 milliGRAM(s) Oral before breakfast  piperacillin/tazobactam IVPB.. 3.375 Gram(s) IV Intermittent every 8 hours  polyethylene glycol 3350 17 Gram(s) Oral daily  povidone iodine 10% Solution 1 Application(s) Topical daily  senna 2 Tablet(s) Oral at bedtime  vancomycin  IVPB 750 milliGRAM(s) IV Intermittent every 12 hours    MEDICATIONS  (PRN):  acetaminophen     Tablet .. 650 milliGRAM(s) Oral every 6 hours PRN Temp greater or equal to 38C (100.4F), Mild Pain (1 - 3)  albuterol    90 MICROgram(s) HFA Inhaler 2 Puff(s) Inhalation every 6 hours PRN Shortness of Breath and/or Wheezing  ALPRAZolam 2 milliGRAM(s) Oral at bedtime PRN Anxiety/insomnia  aluminum hydroxide/magnesium hydroxide/simethicone Suspension 30 milliLiter(s) Oral every 4 hours PRN Dyspepsia  dextrose Oral Gel 15 Gram(s) Oral once PRN Blood Glucose LESS THAN 70 milliGRAM(s)/deciliter  ondansetron Injectable 4 milliGRAM(s) IV Push every 8 hours PRN Nausea and/or Vomiting  oxyCODONE    IR 5 milliGRAM(s) Oral every 4 hours PRN Moderate Pain (4 - 6)  oxyCODONE    IR 10 milliGRAM(s) Oral every 4 hours PRN Severe Pain (7 - 10)      I&O's Summary    12 Apr 2025 07:01  -  13 Apr 2025 07:00  --------------------------------------------------------  IN: 460 mL / OUT: 1100 mL / NET: -640 mL    13 Apr 2025 07:01  -  13 Apr 2025 13:44  --------------------------------------------------------  IN: 50 mL / OUT: 450 mL / NET: -400 mL      PHYSICAL EXAM:  Vital Signs Last 24 Hrs  T(C): 36.8 (13 Apr 2025 13:36), Max: 37.4 (13 Apr 2025 04:42)  T(F): 98.2 (13 Apr 2025 13:36), Max: 99.3 (13 Apr 2025 04:42)  HR: 64 (13 Apr 2025 13:36) (64 - 72)  BP: 151/71 (13 Apr 2025 13:36) (119/61 - 151/71)  RR: 18 (13 Apr 2025 13:36) (17 - 18)  SpO2: 95% (13 Apr 2025 13:36) (92% - 95%)    Parameters below as of 13 Apr 2025 13:36  Patient On (Oxygen Delivery Method): nasal cannula  O2 Flow (L/min): 2    CONSTITUTIONAL: some distress due to leg pain, well-developed  EYES: PERRLA; conjunctiva and sclera clear  ENMT: Moist oral mucosa, no pharyngeal injection or exudates  NECK: Supple   RESPIRATORY: Normal respiratory effort; lungs are clear to auscultation bilaterally  CARDIOVASCULAR: Regular rate and rhythm, normal S1 and S2   ABDOMEN: Nontender to palpation, normoactive bowel sounds   MUSCULOSKELETAL: no clubbing or cyanosis of digits; some tenderness to palpation of bilateral lower extremities; ace wrap in place around both lower extremities   PSYCH: A+O to person, place, and time; affect appropriate  NEUROLOGY: no gross sensory deficits   SKIN: No rashes     LABS:                        8.6    9.89  )-----------( 341      ( 13 Apr 2025 09:16 )             28.8     04-13    139  |  101  |  21  ----------------------------<  212[H]  4.3   |  26  |  1.63[H]    Ca    8.8      13 Apr 2025 09:16  Phos  4.2     04-13  Mg     2.1     04-13      Urinalysis Basic - ( 13 Apr 2025 09:16 )    Color: x / Appearance: x / SG: x / pH: x  Gluc: 212 mg/dL / Ketone: x  / Bili: x / Urobili: x   Blood: x / Protein: x / Nitrite: x   Leuk Esterase: x / RBC: x / WBC x   Sq Epi: x / Non Sq Epi: x / Bacteria: x      Culture - Abscess with Gram Stain (collected 10 Apr 2025 16:22)  Source: Abscess R foot  Gram Stain (11 Apr 2025 06:22):    Rare polymorphonuclear leukocytes seen per low power field    Rare Gram Negative Rods seen per oil power field  Preliminary Report (13 Apr 2025 13:11):    Moderate Serratia marcescens    Rare Staphylococcus aureus Susceptibility to follow.  Organism: Serratia marcescens (13 Apr 2025 13:10)  Organism: Serratia marcescens (13 Apr 2025 13:10)      RADIOLOGY & ADDITIONAL TESTS:  New Imaging Personally Reviewed Today:  New Electrocardiogram Personally Reviewed Today:  Other Results Reviewed Today:   Prior or Outpatient Records Reviewed Today with Summary:    COORDINATION OF CARE:  Consultant Communication and Details of Discussion (where applicable):

## 2025-04-13 NOTE — PROGRESS NOTE ADULT - PROBLEM SELECTOR PLAN 1
h/o lymphedema/chronic venous stasis/varicosities  - cellulitis likely due to venous insufficiency due to DM   - sepsis ruled out   a/w r heel wound, nonhealing  recurrent hospitalizations for the same; prev derm, id, podiatry, wound care documentation reviewed  previously, woods lamp + and mrsa screen +  mild leukocytosis w lactic acidosis; otherwise, afebrile and hds  s/p vanc, cefepime in ER  GEOVANY/PVR noted; right GEOVANY of 1.02 and the left GEOVANY of 1.19 are normal. The right TBI of 0.59 is mildly abnormal, and the left TBI of 0.69 is near normal.  - blood cx neg to date; MRSA PCR positive     - MRI right leg with no collection noted   - va duplex neg for DVT   - VT level noted to be high on 4/11 with improvement yesterday; resumed vanco at lower dose of 750mg q12 on 4/12; obtain VT level prior to 4th dose; zosyn added on 4/11 per ID recs    - analgesics and antipyretics as needed   - elevate extremity + ice packs  - wound care consult; podiatry f/u

## 2025-04-13 NOTE — PROGRESS NOTE ADULT - PROBLEM SELECTOR PLAN 12
pulmicort  ventolin  - noted to desat to 80s on RA overnight 4/10; unclear cause; patient with no SOB; CXR with mild pulm vasc congestion; already on PO lasix 40mg daily; wean O2 as tolerable; monitor O2 sats     PT: FRANCES but patient prefers home     DISPO: pending clinical improvement, ID recs; podiatry recs pulmicort  ventolin  - noted to desat to 80s on RA overnight 4/10; unclear cause; patient with no SOB; CXR with mild pulm vasc congestion; already on PO lasix 40mg daily; wean O2 as tolerable; monitor O2 sats     #Constipation  - states not having BM in about 6 days   - xray abd done showing no obstruction   - bowel regimen; lactulose ordered to be given   - monitor BM    PT: FRANCES but patient prefers home     DISPO: pending clinical improvement, ID recs; podiatry recs

## 2025-04-13 NOTE — PROGRESS NOTE ADULT - PROBLEM SELECTOR PLAN 5
a1c 11.2  trend fingerstick glu  - endo eval for further recs for home insulin regimen as A1c 11.2 but blood sugars are relatively controlled here on home insulin regimen  - insulin adjusted to lantus 12u and admelog 8u TID along with SSI per endo recs   adjust to goal glu 100-180  carb consistent diet

## 2025-04-14 LAB
-  CLINDAMYCIN: SIGNIFICANT CHANGE UP
-  ERYTHROMYCIN: SIGNIFICANT CHANGE UP
-  GENTAMICIN: SIGNIFICANT CHANGE UP
-  OXACILLIN: SIGNIFICANT CHANGE UP
-  PENICILLIN: SIGNIFICANT CHANGE UP
-  RIFAMPIN: SIGNIFICANT CHANGE UP
-  TETRACYCLINE: SIGNIFICANT CHANGE UP
-  TRIMETHOPRIM/SULFAMETHOXAZOLE: SIGNIFICANT CHANGE UP
-  VANCOMYCIN: SIGNIFICANT CHANGE UP
ANION GAP SERPL CALC-SCNC: 12 MMOL/L — SIGNIFICANT CHANGE UP (ref 5–17)
BUN SERPL-MCNC: 23 MG/DL — SIGNIFICANT CHANGE UP (ref 7–23)
CALCIUM SERPL-MCNC: 8.4 MG/DL — SIGNIFICANT CHANGE UP (ref 8.4–10.5)
CHLORIDE SERPL-SCNC: 101 MMOL/L — SIGNIFICANT CHANGE UP (ref 96–108)
CO2 SERPL-SCNC: 26 MMOL/L — SIGNIFICANT CHANGE UP (ref 22–31)
CREAT SERPL-MCNC: 1.48 MG/DL — HIGH (ref 0.5–1.3)
CULTURE RESULTS: SIGNIFICANT CHANGE UP
EGFR: 36 ML/MIN/1.73M2 — LOW
EGFR: 36 ML/MIN/1.73M2 — LOW
GLUCOSE BLDC GLUCOMTR-MCNC: 154 MG/DL — HIGH (ref 70–99)
GLUCOSE BLDC GLUCOMTR-MCNC: 206 MG/DL — HIGH (ref 70–99)
GLUCOSE BLDC GLUCOMTR-MCNC: 228 MG/DL — HIGH (ref 70–99)
GLUCOSE BLDC GLUCOMTR-MCNC: 264 MG/DL — HIGH (ref 70–99)
GLUCOSE SERPL-MCNC: 142 MG/DL — HIGH (ref 70–99)
HCT VFR BLD CALC: 26.1 % — LOW (ref 34.5–45)
HGB BLD-MCNC: 7.8 G/DL — LOW (ref 11.5–15.5)
MAGNESIUM SERPL-MCNC: 2.1 MG/DL — SIGNIFICANT CHANGE UP (ref 1.6–2.6)
MCHC RBC-ENTMCNC: 25.9 PG — LOW (ref 27–34)
MCHC RBC-ENTMCNC: 29.9 G/DL — LOW (ref 32–36)
MCV RBC AUTO: 86.7 FL — SIGNIFICANT CHANGE UP (ref 80–100)
METHOD TYPE: SIGNIFICANT CHANGE UP
NRBC BLD AUTO-RTO: 0 /100 WBCS — SIGNIFICANT CHANGE UP (ref 0–0)
PHOSPHATE SERPL-MCNC: 3.4 MG/DL — SIGNIFICANT CHANGE UP (ref 2.5–4.5)
PLATELET # BLD AUTO: 342 K/UL — SIGNIFICANT CHANGE UP (ref 150–400)
POTASSIUM SERPL-MCNC: 3.7 MMOL/L — SIGNIFICANT CHANGE UP (ref 3.5–5.3)
POTASSIUM SERPL-SCNC: 3.7 MMOL/L — SIGNIFICANT CHANGE UP (ref 3.5–5.3)
RBC # BLD: 3.01 M/UL — LOW (ref 3.8–5.2)
RBC # FLD: 13.8 % — SIGNIFICANT CHANGE UP (ref 10.3–14.5)
SODIUM SERPL-SCNC: 139 MMOL/L — SIGNIFICANT CHANGE UP (ref 135–145)
SPECIMEN SOURCE: SIGNIFICANT CHANGE UP
VANCOMYCIN TROUGH SERPL-MCNC: 16.5 UG/ML — SIGNIFICANT CHANGE UP (ref 10–20)
WBC # BLD: 7.84 K/UL — SIGNIFICANT CHANGE UP (ref 3.8–10.5)
WBC # FLD AUTO: 7.84 K/UL — SIGNIFICANT CHANGE UP (ref 3.8–10.5)

## 2025-04-14 PROCEDURE — 99232 SBSQ HOSP IP/OBS MODERATE 35: CPT

## 2025-04-14 PROCEDURE — G0545: CPT

## 2025-04-14 PROCEDURE — 99233 SBSQ HOSP IP/OBS HIGH 50: CPT

## 2025-04-14 RX ORDER — POLYETHYLENE GLYCOL-3350 AND ELECTROLYTES 236; 6.74; 5.86; 2.97; 22.74 G/274.31G; G/274.31G; G/274.31G; G/274.31G; G/274.31G
4000 POWDER, FOR SOLUTION ORAL ONCE
Refills: 0 | Status: COMPLETED | OUTPATIENT
Start: 2025-04-14 | End: 2025-04-14

## 2025-04-14 RX ORDER — LACTULOSE 10 G/15ML
15 SOLUTION ORAL ONCE
Refills: 0 | Status: COMPLETED | OUTPATIENT
Start: 2025-04-14 | End: 2025-04-14

## 2025-04-14 RX ORDER — INSULIN LISPRO 100 U/ML
10 INJECTION, SOLUTION INTRAVENOUS; SUBCUTANEOUS
Refills: 0 | Status: DISCONTINUED | OUTPATIENT
Start: 2025-04-15 | End: 2025-04-16

## 2025-04-14 RX ADMIN — Medication 40 MILLIGRAM(S): at 05:44

## 2025-04-14 RX ADMIN — AMLODIPINE BESYLATE 5 MILLIGRAM(S): 10 TABLET ORAL at 05:43

## 2025-04-14 RX ADMIN — Medication 5 MILLIGRAM(S): at 21:29

## 2025-04-14 RX ADMIN — POLYETHYLENE GLYCOL 3350 17 GRAM(S): 17 POWDER, FOR SOLUTION ORAL at 08:39

## 2025-04-14 RX ADMIN — BUDESONIDE 0.5 MILLIGRAM(S): 90 AEROSOL, POWDER RESPIRATORY (INHALATION) at 05:44

## 2025-04-14 RX ADMIN — INSULIN LISPRO 8 UNIT(S): 100 INJECTION, SOLUTION INTRAVENOUS; SUBCUTANEOUS at 13:08

## 2025-04-14 RX ADMIN — INSULIN LISPRO 8 UNIT(S): 100 INJECTION, SOLUTION INTRAVENOUS; SUBCUTANEOUS at 08:39

## 2025-04-14 RX ADMIN — INSULIN GLARGINE-YFGN 12 UNIT(S): 100 INJECTION, SOLUTION SUBCUTANEOUS at 22:09

## 2025-04-14 RX ADMIN — Medication 250 MILLIGRAM(S): at 08:38

## 2025-04-14 RX ADMIN — Medication 25 GRAM(S): at 21:29

## 2025-04-14 RX ADMIN — Medication 1 APPLICATION(S): at 05:45

## 2025-04-14 RX ADMIN — Medication 2 MILLIGRAM(S): at 21:34

## 2025-04-14 RX ADMIN — FUROSEMIDE 40 MILLIGRAM(S): 10 INJECTION INTRAMUSCULAR; INTRAVENOUS at 05:44

## 2025-04-14 RX ADMIN — Medication 25 GRAM(S): at 05:44

## 2025-04-14 RX ADMIN — INSULIN LISPRO 2: 100 INJECTION, SOLUTION INTRAVENOUS; SUBCUTANEOUS at 13:07

## 2025-04-14 RX ADMIN — INSULIN LISPRO 8 UNIT(S): 100 INJECTION, SOLUTION INTRAVENOUS; SUBCUTANEOUS at 17:32

## 2025-04-14 RX ADMIN — Medication 250 MILLIGRAM(S): at 20:28

## 2025-04-14 RX ADMIN — Medication 25 GRAM(S): at 13:08

## 2025-04-14 RX ADMIN — Medication 200 MICROGRAM(S): at 05:44

## 2025-04-14 RX ADMIN — Medication 81 MILLIGRAM(S): at 08:39

## 2025-04-14 RX ADMIN — APIXABAN 5 MILLIGRAM(S): 2.5 TABLET, FILM COATED ORAL at 17:32

## 2025-04-14 RX ADMIN — Medication 1 APPLICATION(S): at 17:33

## 2025-04-14 RX ADMIN — Medication 2 TABLET(S): at 21:29

## 2025-04-14 RX ADMIN — INSULIN LISPRO 1: 100 INJECTION, SOLUTION INTRAVENOUS; SUBCUTANEOUS at 08:38

## 2025-04-14 RX ADMIN — POLYETHYLENE GLYCOL-3350 AND ELECTROLYTES 4000 MILLILITER(S): 236; 6.74; 5.86; 2.97; 22.74 POWDER, FOR SOLUTION ORAL at 20:06

## 2025-04-14 RX ADMIN — INSULIN LISPRO 3: 100 INJECTION, SOLUTION INTRAVENOUS; SUBCUTANEOUS at 17:32

## 2025-04-14 RX ADMIN — LACTULOSE 15 GRAM(S): 10 SOLUTION ORAL at 11:22

## 2025-04-14 RX ADMIN — AMIODARONE HYDROCHLORIDE 200 MILLIGRAM(S): 50 INJECTION, SOLUTION INTRAVENOUS at 05:44

## 2025-04-14 RX ADMIN — APIXABAN 5 MILLIGRAM(S): 2.5 TABLET, FILM COATED ORAL at 05:43

## 2025-04-14 NOTE — CHART NOTE - NSCHARTNOTEFT_GEN_A_CORE
Patient will require a semi electric hospital bed, due to CVA.  Patient requires the head of the bed to be elevated more than 30 degrees.  Also the patient requires positioning of the body, in ways not feasible with a standard bed.  Pillows and wedges are note effective.  Patient requires frequent repositioning to alleviate pain.  The member can independently affect the adjustment by operating the control.

## 2025-04-14 NOTE — CHART NOTE - NSCHARTNOTEFT_GEN_A_CORE
On April 10, patient noted to desat to 88% on room air while sleeping.  On 2L NC, patient's O2 sat increased to 96%. On April 8, patient noted to desat to 88% on room air while sleeping.  On 2L NC, patient's O2 sat increased to 96%.  Therefore, she will require home oxygen on discharge. This is a 74 year old female, with history of obstructive lung disease.  On April 8, patient noted to desat to 88% on room air while sleeping.  On 2L NC, patient's O2 sat increased to 96%.  Therefore, she will require home oxygen on discharge. This is a 74 year old female, with history of obstructive lung disease.  On April 15, patient noted to desat to 87% on room air while sleeping.  On 2L NC, patient's O2 sat increased to 93%.  Therefore, she will require home oxygen on discharge. This is a 74 year old female, with history of obstructive lung disease.  On April 15, patient noted to desat to 86% at rest, on room air.  On 2L NC, patient's O2 sat increased to 94%, at rest.  Therefore, she will require home oxygen on discharge.

## 2025-04-14 NOTE — PROGRESS NOTE ADULT - PROBLEM SELECTOR PLAN 12
pulmicort  ventolin  - noted to desat to 80s on RA overnight 4/10; unclear cause; patient with no SOB; CXR with mild pulm vasc congestion; already on PO lasix 40mg daily; wean O2 as tolerable; monitor O2 sats     #Constipation  - states not having BM in about 6 days   - xray abd done showing no obstruction   - bowel regimen; lactulose ordered to be given   - monitor BM    PT: FRANCES but patient prefers home     DISPO: pending clinical improvement, ID recs; podiatry recs

## 2025-04-14 NOTE — PROGRESS NOTE ADULT - SUBJECTIVE AND OBJECTIVE BOX
Patient seen today for follow up inpatient Diabetes Mellitus management.    Chief Complaint: Type 2 Diabetes Mellitus    INTERVAL HX:  Patient seen in [facility] [id number] W1. Patient is alert and oriented, resting in bed. Patient reports feeling good, eating about 50% or more of meals and tolerating POs. FBG stable this am to 152mg/dL, 142mg/dL on blood serum. No hypoglycemia. BG have been stable and mostly at goal 100-180mg/dL while on a Consistent Carbohydrate Diet. Blood glucose levels in the last 24hrs have been 154-195mg/dL.     Review of Systems:  General: As above.  Respiratory: Denies any SOB, OLIVAREZ, or cough.  Gastrointestinal: Denies any n/v/d or abdominal pain.   Endocrine: Denies any polyuria, polydipsia, polyphagia, visual changes, or numbness in feet.     Allergies  latex (Rash)  Lipitor (Nausea)      Intolerances  None.       MEDICATIONS  (STANDING):  acetaminophen     Tablet .. 650 milliGRAM(s) Oral every 6 hours PRN  albuterol    90 MICROgram(s) HFA Inhaler 2 Puff(s) Inhalation every 6 hours PRN  ALPRAZolam 2 milliGRAM(s) Oral at bedtime PRN  aluminum hydroxide/magnesium hydroxide/simethicone Suspension 30 milliLiter(s) Oral every 4 hours PRN  aMIOdarone    Tablet 200 milliGRAM(s) Oral daily  amLODIPine   Tablet 5 milliGRAM(s) Oral daily  apixaban 5 milliGRAM(s) Oral every 12 hours  aspirin enteric coated 81 milliGRAM(s) Oral daily  buDESOnide    Inhalation Suspension 0.5 milliGRAM(s) Inhalation every 12 hours  cadexomer iodine 0.9% Gel 1 Application(s) Topical daily  clobetasol 0.05% Ointment 1 Application(s) Topical two times a day  dextrose 5%. 1000 milliLiter(s) IV Continuous <Continuous>  dextrose 5%. 1000 milliLiter(s) IV Continuous <Continuous>  dextrose 50% Injectable 25 Gram(s) IV Push once  dextrose 50% Injectable 12.5 Gram(s) IV Push once  dextrose 50% Injectable 25 Gram(s) IV Push once  dextrose Oral Gel 15 Gram(s) Oral once PRN  furosemide    Tablet 40 milliGRAM(s) Oral daily  glucagon  Injectable 1 milliGRAM(s) IntraMuscular once  insulin glargine Injectable (LANTUS) 12 Unit(s) SubCutaneous at bedtime  insulin lispro (ADMELOG) corrective regimen sliding scale   SubCutaneous at bedtime  insulin lispro (ADMELOG) corrective regimen sliding scale   SubCutaneous three times a day before meals  insulin lispro Injectable (ADMELOG) 8 Unit(s) SubCutaneous three times a day before meals  lactulose Syrup 15 Gram(s) Oral once  levothyroxine 200 MICROGram(s) Oral <User Schedule>  levothyroxine 100 MICROGram(s) Oral <User Schedule>  lidocaine   4% Patch 1 Patch Transdermal daily  melatonin 5 milliGRAM(s) Oral at bedtime  naloxone Injectable 0.4 milliGRAM(s) IV Push once  ondansetron Injectable 4 milliGRAM(s) IV Push every 8 hours PRN  oxyCODONE    IR 5 milliGRAM(s) Oral every 4 hours PRN  oxyCODONE    IR 10 milliGRAM(s) Oral every 4 hours PRN  pantoprazole    Tablet 40 milliGRAM(s) Oral before breakfast  piperacillin/tazobactam IVPB.. 3.375 Gram(s) IV Intermittent every 8 hours  polyethylene glycol 3350 17 Gram(s) Oral daily  povidone iodine 10% Solution 1 Application(s) Topical daily  senna 2 Tablet(s) Oral at bedtime  vancomycin  IVPB 750 milliGRAM(s) IV Intermittent every 12 hours      dextrose 50% Injectable 25 Gram(s) IV Push once  dextrose 50% Injectable 12.5 Gram(s) IV Push once  dextrose 50% Injectable 25 Gram(s) IV Push once  dextrose Oral Gel 15 Gram(s) Oral once PRN  glucagon  Injectable 1 milliGRAM(s) IntraMuscular once  insulin glargine Injectable (LANTUS) 12 Unit(s) SubCutaneous at bedtime  insulin lispro (ADMELOG) corrective regimen sliding scale   SubCutaneous at bedtime  insulin lispro (ADMELOG) corrective regimen sliding scale   SubCutaneous three times a day before meals  insulin lispro Injectable (ADMELOG) 8 Unit(s) SubCutaneous three times a day before meals  levothyroxine 200 MICROGram(s) Oral <User Schedule>  levothyroxine 100 MICROGram(s) Oral <User Schedule>      insulin lispro (ADMELOG) corrective regimen sliding scale   SubCutaneous at bedtime  insulin lispro (ADMELOG) corrective regimen sliding scale   SubCutaneous three times a day before meals  insulin lispro Injectable (ADMELOG) 8 Unit(s) SubCutaneous three times a day before meals      PHYSICAL EXAM:  VITALS:   T(C): 36.8 (04-14-25 @ 05:00), Max: 37.6 (04-13-25 @ 16:40)  HR: 60 (04-14-25 @ 05:00) (55 - 64)  BP: 124/66 (04-14-25 @ 05:00) (110/62 - 151/71)  RR: 18 (04-14-25 @ 05:00) (18 - 18)  SpO2: 95% (04-14-25 @ 05:00) (93% - 96%)    GENERAL: In no acute distress  Respiratory: Respirations unlabored  Extremities: Warm and dry, +BLE edema  NEURO: Alert and oriented, appropriate     LABS:  POCT Blood Glucose.: 154 mg/dL (04-14-25 @ 08:27)  POCT Blood Glucose.: 181 mg/dL (04-13-25 @ 21:24)  POCT Blood Glucose.: 195 mg/dL (04-13-25 @ 17:17)  POCT Blood Glucose.: 227 mg/dL (04-13-25 @ 12:41)  POCT Blood Glucose.: 221 mg/dL (04-13-25 @ 08:26)  POCT Blood Glucose.: 196 mg/dL (04-12-25 @ 22:37)  POCT Blood Glucose.: 200 mg/dL (04-12-25 @ 21:21)  POCT Blood Glucose.: 137 mg/dL (04-12-25 @ 17:08)  POCT Blood Glucose.: 127 mg/dL (04-12-25 @ 12:38)  POCT Blood Glucose.: 161 mg/dL (04-12-25 @ 08:29)  POCT Blood Glucose.: 149 mg/dL (04-11-25 @ 22:05)  POCT Blood Glucose.: 142 mg/dL (04-11-25 @ 17:30)  POCT Blood Glucose.: 165 mg/dL (04-11-25 @ 11:54)                          7.8    7.84  )-----------( 342      ( 14 Apr 2025 06:56 )             26.1     04-14    139  |  101  |  23  ----------------------------<  142[H]  3.7   |  26  |  1.48[H]    Ca    8.4      14 Apr 2025 06:58  Phos  3.4     04-14  Mg     2.1     04-14        Urinalysis Basic - ( 14 Apr 2025 06:58 )    Color: x / Appearance: x / SG: x / pH: x  Gluc: 142 mg/dL / Ketone: x  / Bili: x / Urobili: x   Blood: x / Protein: x / Nitrite: x   Leuk Esterase: x / RBC: x / WBC x   Sq Epi: x / Non Sq Epi: x / Bacteria: x      A1C with Estimated Average Glucose Result: A1C with Estimated Average Glucose Result: 11.2 % (04-08-25 @ 07:27)  A1C with Estimated Average Glucose Result: 8.7 % (12-30-24 @ 08:42)

## 2025-04-14 NOTE — PROGRESS NOTE ADULT - SUBJECTIVE AND OBJECTIVE BOX
Patient is a 74y old  Female who presents with a chief complaint of bl le, r>l, pain, redness, swelling (14 Apr 2025 10:28)      SUBJECTIVE / OVERNIGHT EVENTS: pt wants to go home, had not had bm in a few days, no cp, sob     MEDICATIONS  (STANDING):  aMIOdarone    Tablet 200 milliGRAM(s) Oral daily  amLODIPine   Tablet 5 milliGRAM(s) Oral daily  apixaban 5 milliGRAM(s) Oral every 12 hours  aspirin enteric coated 81 milliGRAM(s) Oral daily  buDESOnide    Inhalation Suspension 0.5 milliGRAM(s) Inhalation every 12 hours  cadexomer iodine 0.9% Gel 1 Application(s) Topical daily  clobetasol 0.05% Ointment 1 Application(s) Topical two times a day  dextrose 5%. 1000 milliLiter(s) (50 mL/Hr) IV Continuous <Continuous>  dextrose 5%. 1000 milliLiter(s) (100 mL/Hr) IV Continuous <Continuous>  dextrose 50% Injectable 25 Gram(s) IV Push once  dextrose 50% Injectable 12.5 Gram(s) IV Push once  dextrose 50% Injectable 25 Gram(s) IV Push once  furosemide    Tablet 40 milliGRAM(s) Oral daily  glucagon  Injectable 1 milliGRAM(s) IntraMuscular once  insulin glargine Injectable (LANTUS) 12 Unit(s) SubCutaneous at bedtime  insulin lispro (ADMELOG) corrective regimen sliding scale   SubCutaneous at bedtime  insulin lispro (ADMELOG) corrective regimen sliding scale   SubCutaneous three times a day before meals  insulin lispro Injectable (ADMELOG) 8 Unit(s) SubCutaneous three times a day before meals  levothyroxine 200 MICROGram(s) Oral <User Schedule>  levothyroxine 100 MICROGram(s) Oral <User Schedule>  lidocaine   4% Patch 1 Patch Transdermal daily  melatonin 5 milliGRAM(s) Oral at bedtime  naloxone Injectable 0.4 milliGRAM(s) IV Push once  pantoprazole    Tablet 40 milliGRAM(s) Oral before breakfast  piperacillin/tazobactam IVPB.. 3.375 Gram(s) IV Intermittent every 8 hours  polyethylene glycol 3350 17 Gram(s) Oral daily  povidone iodine 10% Solution 1 Application(s) Topical daily  senna 2 Tablet(s) Oral at bedtime  vancomycin  IVPB 750 milliGRAM(s) IV Intermittent every 12 hours    MEDICATIONS  (PRN):  acetaminophen     Tablet .. 650 milliGRAM(s) Oral every 6 hours PRN Temp greater or equal to 38C (100.4F), Mild Pain (1 - 3)  albuterol    90 MICROgram(s) HFA Inhaler 2 Puff(s) Inhalation every 6 hours PRN Shortness of Breath and/or Wheezing  ALPRAZolam 2 milliGRAM(s) Oral at bedtime PRN Anxiety/insomnia  aluminum hydroxide/magnesium hydroxide/simethicone Suspension 30 milliLiter(s) Oral every 4 hours PRN Dyspepsia  dextrose Oral Gel 15 Gram(s) Oral once PRN Blood Glucose LESS THAN 70 milliGRAM(s)/deciliter  ondansetron Injectable 4 milliGRAM(s) IV Push every 8 hours PRN Nausea and/or Vomiting  oxyCODONE    IR 10 milliGRAM(s) Oral every 4 hours PRN Severe Pain (7 - 10)  oxyCODONE    IR 5 milliGRAM(s) Oral every 4 hours PRN Moderate Pain (4 - 6)        CAPILLARY BLOOD GLUCOSE      POCT Blood Glucose.: 206 mg/dL (14 Apr 2025 12:39)  POCT Blood Glucose.: 154 mg/dL (14 Apr 2025 08:27)  POCT Blood Glucose.: 181 mg/dL (13 Apr 2025 21:24)  POCT Blood Glucose.: 195 mg/dL (13 Apr 2025 17:17)    I&O's Summary    13 Apr 2025 07:01  -  14 Apr 2025 07:00  --------------------------------------------------------  IN: 840 mL / OUT: 900 mL / NET: -60 mL    14 Apr 2025 07:01  -  14 Apr 2025 15:16  --------------------------------------------------------  IN: 240 mL / OUT: 450 mL / NET: -210 mL        PHYSICAL EXAM:  GENERAL: NAD, well-developed  HEAD:  Atraumatic, Normocephalic  EYES: conjunctiva and sclera clear  NECK: Supple, No JVD  CHEST/LUNG: Clear to auscultation bilaterally; No wheeze  HEART: Regular rate and rhythm; No murmurs, rubs, or gallops  ABDOMEN: Soft, Nontender, Nondistended; Bowel sounds present  EXTREMITIES:  + ace wrap  PSYCH: AAOx3      LABS:                        7.8    7.84  )-----------( 342      ( 14 Apr 2025 06:56 )             26.1     04-14    139  |  101  |  23  ----------------------------<  142[H]  3.7   |  26  |  1.48[H]    Ca    8.4      14 Apr 2025 06:58  Phos  3.4     04-14  Mg     2.1     04-14            Urinalysis Basic - ( 14 Apr 2025 06:58 )    Color: x / Appearance: x / SG: x / pH: x  Gluc: 142 mg/dL / Ketone: x  / Bili: x / Urobili: x   Blood: x / Protein: x / Nitrite: x   Leuk Esterase: x / RBC: x / WBC x   Sq Epi: x / Non Sq Epi: x / Bacteria: x        RADIOLOGY & ADDITIONAL TESTS:    Imaging Personally Reviewed:    Consultant(s) Notes Reviewed:      Care Discussed with Consultants/Other Providers:

## 2025-04-14 NOTE — PROGRESS NOTE ADULT - PROBLEM SELECTOR PLAN 1
Inpatient Plan:  - Check BG TID AC and HS while on PO diet   - C/wLantus 12u QHS  - C/w Admelog 8u TID AC (HOLD if NPO)  - C/w low dose Admelog correctional scale TID AC and HS     Discharge Plan:  - Discharge on basal/bolus (doses TBD closer to d/c).   Please start a GLP1 weekly whichever covered by insurance (Ozempic 0.25mg subq once weekly OR Trulicity 0.75mg subq once weekly OR Mounjaro 2.5mg subq once weekly) to be titrated up outpatient as tolerated. Patient has no hx  of pancreatitis or hx/famhx of medullary thyroid CA.   - Patient would benefit from CGM- please place order for CGM (Dexcom G7 or Freestyle Michaela 3 whichever is covered by patient insurance). If patient cannot get chepe on phone, please send for rx [place] x1 and Sensor x3.   - Patient should check BG TID AC and HS at home. Can use CGM to monitor BGs but if BG <100mg/dL or >250mg/dL, patient should confirm with fingerstick BG. Please tell patient to contact Endocrinologist if BG <70mg/dL x1 or >200mg/dL consistently or >400mg/dL x1.  - Endocrine follow up with Dr. Marin can establish care at [address] [place] [address] Endocrinology ([phone])- please provide in d/c paperwork for patient to make appt.  - Recommend routine outpatient ophthalmology and podiatry follow up.

## 2025-04-14 NOTE — PROGRESS NOTE ADULT - PROBLEM SELECTOR PLAN 5
- LDL goal <70   - C/w statin therapy or recommend statin therapy if not contraindicated (allergy to Lipitor but can try other statins)  - Check lipid panel as outpatient on a yearly basis

## 2025-04-14 NOTE — PROGRESS NOTE ADULT - PROBLEM SELECTOR PLAN 1
h/o lymphedema/chronic venous stasis/varicosities  - cellulitis likely due to venous insufficiency due to DM   - sepsis ruled out   a/w r heel wound, nonhealing  recurrent hospitalizations for the same; prev derm, id, podiatry, wound care documentation reviewed  previously, woods lamp + and mrsa screen +  mild leukocytosis w lactic acidosis; otherwise, afebrile and hds  s/p vanc, cefepime in ER  GEOVANY/PVR noted; right GEOVANY of 1.02 and the left GEOVANY of 1.19 are normal. The right TBI of 0.59 is mildly abnormal, and the left TBI of 0.69 is near normal.  - blood cx neg to date; MRSA PCR positive     - MRI right leg with no collection noted   - va duplex neg for DVT   - VT level noted to be high on 4/11 with improvement yesterday; resumed vanco at lower dose of 750mg q12 on 4/12; obtain VT level prior to 4th dose; zosyn added on 4/11 per ID recs    - analgesics and antipyretics as needed   - elevate extremity + ice packs  - wound care consult; podiatry f/u  - ID f/u regarding duration of abx

## 2025-04-15 ENCOUNTER — TRANSCRIPTION ENCOUNTER (OUTPATIENT)
Age: 75
End: 2025-04-15

## 2025-04-15 LAB
ANION GAP SERPL CALC-SCNC: 13 MMOL/L — SIGNIFICANT CHANGE UP (ref 5–17)
BUN SERPL-MCNC: 18 MG/DL — SIGNIFICANT CHANGE UP (ref 7–23)
CALCIUM SERPL-MCNC: 8.5 MG/DL — SIGNIFICANT CHANGE UP (ref 8.4–10.5)
CHLORIDE SERPL-SCNC: 101 MMOL/L — SIGNIFICANT CHANGE UP (ref 96–108)
CO2 SERPL-SCNC: 25 MMOL/L — SIGNIFICANT CHANGE UP (ref 22–31)
CREAT SERPL-MCNC: 1.3 MG/DL — SIGNIFICANT CHANGE UP (ref 0.5–1.3)
EGFR: 43 ML/MIN/1.73M2 — LOW
EGFR: 43 ML/MIN/1.73M2 — LOW
GLUCOSE BLDC GLUCOMTR-MCNC: 111 MG/DL — HIGH (ref 70–99)
GLUCOSE BLDC GLUCOMTR-MCNC: 127 MG/DL — HIGH (ref 70–99)
GLUCOSE BLDC GLUCOMTR-MCNC: 154 MG/DL — HIGH (ref 70–99)
GLUCOSE BLDC GLUCOMTR-MCNC: 156 MG/DL — HIGH (ref 70–99)
GLUCOSE SERPL-MCNC: 98 MG/DL — SIGNIFICANT CHANGE UP (ref 70–99)
HCT VFR BLD CALC: 26.8 % — LOW (ref 34.5–45)
HGB BLD-MCNC: 8.3 G/DL — LOW (ref 11.5–15.5)
MAGNESIUM SERPL-MCNC: 2.1 MG/DL — SIGNIFICANT CHANGE UP (ref 1.6–2.6)
MCHC RBC-ENTMCNC: 25.9 PG — LOW (ref 27–34)
MCHC RBC-ENTMCNC: 31 G/DL — LOW (ref 32–36)
MCV RBC AUTO: 83.8 FL — SIGNIFICANT CHANGE UP (ref 80–100)
NRBC BLD AUTO-RTO: 0 /100 WBCS — SIGNIFICANT CHANGE UP (ref 0–0)
PHOSPHATE SERPL-MCNC: 3.5 MG/DL — SIGNIFICANT CHANGE UP (ref 2.5–4.5)
PLATELET # BLD AUTO: 341 K/UL — SIGNIFICANT CHANGE UP (ref 150–400)
POTASSIUM SERPL-MCNC: 3.3 MMOL/L — LOW (ref 3.5–5.3)
POTASSIUM SERPL-SCNC: 3.3 MMOL/L — LOW (ref 3.5–5.3)
RBC # BLD: 3.2 M/UL — LOW (ref 3.8–5.2)
RBC # FLD: 13.5 % — SIGNIFICANT CHANGE UP (ref 10.3–14.5)
SODIUM SERPL-SCNC: 139 MMOL/L — SIGNIFICANT CHANGE UP (ref 135–145)
WBC # BLD: 9.39 K/UL — SIGNIFICANT CHANGE UP (ref 3.8–10.5)
WBC # FLD AUTO: 9.39 K/UL — SIGNIFICANT CHANGE UP (ref 3.8–10.5)

## 2025-04-15 PROCEDURE — 99232 SBSQ HOSP IP/OBS MODERATE 35: CPT

## 2025-04-15 PROCEDURE — 99233 SBSQ HOSP IP/OBS HIGH 50: CPT

## 2025-04-15 PROCEDURE — G0545: CPT

## 2025-04-15 RX ORDER — NYSTATIN 100000 [USP'U]/G
1 CREAM TOPICAL EVERY 12 HOURS
Refills: 0 | Status: DISCONTINUED | OUTPATIENT
Start: 2025-04-15 | End: 2025-04-16

## 2025-04-15 RX ORDER — VANCOMYCIN HCL IN 5 % DEXTROSE 1.5G/250ML
750 PLASTIC BAG, INJECTION (ML) INTRAVENOUS ONCE
Refills: 0 | Status: DISCONTINUED | OUTPATIENT
Start: 2025-04-15 | End: 2025-04-15

## 2025-04-15 RX ORDER — ALPRAZOLAM 0.5 MG
2 TABLET, EXTENDED RELEASE 24 HR ORAL AT BEDTIME
Refills: 0 | Status: DISCONTINUED | OUTPATIENT
Start: 2025-04-15 | End: 2025-04-16

## 2025-04-15 RX ADMIN — Medication 1 APPLICATION(S): at 14:34

## 2025-04-15 RX ADMIN — FUROSEMIDE 40 MILLIGRAM(S): 10 INJECTION INTRAMUSCULAR; INTRAVENOUS at 06:03

## 2025-04-15 RX ADMIN — INSULIN LISPRO 10 UNIT(S): 100 INJECTION, SOLUTION INTRAVENOUS; SUBCUTANEOUS at 12:50

## 2025-04-15 RX ADMIN — INSULIN GLARGINE-YFGN 12 UNIT(S): 100 INJECTION, SOLUTION SUBCUTANEOUS at 21:33

## 2025-04-15 RX ADMIN — Medication 2 MILLIGRAM(S): at 22:16

## 2025-04-15 RX ADMIN — INSULIN LISPRO 10 UNIT(S): 100 INJECTION, SOLUTION INTRAVENOUS; SUBCUTANEOUS at 08:57

## 2025-04-15 RX ADMIN — Medication 200 MICROGRAM(S): at 06:03

## 2025-04-15 RX ADMIN — APIXABAN 5 MILLIGRAM(S): 2.5 TABLET, FILM COATED ORAL at 17:46

## 2025-04-15 RX ADMIN — AMIODARONE HYDROCHLORIDE 200 MILLIGRAM(S): 50 INJECTION, SOLUTION INTRAVENOUS at 06:04

## 2025-04-15 RX ADMIN — Medication 2 TABLET(S): at 21:34

## 2025-04-15 RX ADMIN — INSULIN LISPRO 10 UNIT(S): 100 INJECTION, SOLUTION INTRAVENOUS; SUBCUTANEOUS at 17:44

## 2025-04-15 RX ADMIN — NYSTATIN 1 APPLICATION(S): 100000 CREAM TOPICAL at 17:45

## 2025-04-15 RX ADMIN — BUDESONIDE 0.5 MILLIGRAM(S): 90 AEROSOL, POWDER RESPIRATORY (INHALATION) at 17:46

## 2025-04-15 RX ADMIN — Medication 1 APPLICATION(S): at 17:46

## 2025-04-15 RX ADMIN — APIXABAN 5 MILLIGRAM(S): 2.5 TABLET, FILM COATED ORAL at 06:04

## 2025-04-15 RX ADMIN — LIDOCAINE HYDROCHLORIDE 1 PATCH: 20 JELLY TOPICAL at 18:43

## 2025-04-15 RX ADMIN — Medication 20 MILLIEQUIVALENT(S): at 19:52

## 2025-04-15 RX ADMIN — Medication 650 MILLIGRAM(S): at 00:42

## 2025-04-15 RX ADMIN — Medication 81 MILLIGRAM(S): at 12:51

## 2025-04-15 RX ADMIN — Medication 40 MILLIGRAM(S): at 06:03

## 2025-04-15 RX ADMIN — Medication 5 MILLIGRAM(S): at 21:34

## 2025-04-15 RX ADMIN — Medication 250 MILLIGRAM(S): at 08:57

## 2025-04-15 RX ADMIN — Medication 25 GRAM(S): at 06:04

## 2025-04-15 RX ADMIN — INSULIN LISPRO 1: 100 INJECTION, SOLUTION INTRAVENOUS; SUBCUTANEOUS at 12:50

## 2025-04-15 RX ADMIN — AMLODIPINE BESYLATE 5 MILLIGRAM(S): 10 TABLET ORAL at 06:04

## 2025-04-15 RX ADMIN — Medication 1 APPLICATION(S): at 06:04

## 2025-04-15 RX ADMIN — LIDOCAINE HYDROCHLORIDE 1 PATCH: 20 JELLY TOPICAL at 12:51

## 2025-04-15 RX ADMIN — POLYETHYLENE GLYCOL 3350 17 GRAM(S): 17 POWDER, FOR SOLUTION ORAL at 12:50

## 2025-04-15 NOTE — PROGRESS NOTE ADULT - PROBLEM SELECTOR PROBLEM 12
Obstructive lung disease

## 2025-04-15 NOTE — PROGRESS NOTE ADULT - PROBLEM SELECTOR PLAN 1
h/o lymphedema/chronic venous stasis/varicosities  - cellulitis likely due to venous insufficiency due to DM   - sepsis ruled out   a/w r heel wound, nonhealing  recurrent hospitalizations for the same; prev derm, id, podiatry, wound care documentation reviewed  previously, woods lamp + and mrsa screen +  mild leukocytosis w lactic acidosis; otherwise, afebrile and hds  s/p vanc, cefepime in ER  GEOVANY/PVR noted; right GEOVANY of 1.02 and the left GEOVANY of 1.19 are normal. The right TBI of 0.59 is mildly abnormal, and the left TBI of 0.69 is near normal  - blood cx neg to date; MRSA PCR positive     - MRI right leg with no collection noted   - va duplex neg for DVT   - VT level noted to be high on 4/11 with improvement yesterday; resumed vanco at lower dose of 750mg q12 on 4/12; obtain VT level prior to 4th dose; zosyn added on 4/11 per ID recs    - analgesics and antipyretics as needed   - elevate extremity + ice packs  - wound care consult; podiatry f/u  - ID recs--> complete IV Antibiotics on 4/15. No further abx needed.

## 2025-04-15 NOTE — PROGRESS NOTE ADULT - PROBLEM SELECTOR PLAN 10
- TSH elevated but FT4 wnl; repeat TFTs as outpatient  - endo recs appreciated; synthroid changed to 200mg daily Mon-Sat and 100mg on Sunday instead.

## 2025-04-15 NOTE — DISCHARGE NOTE NURSING/CASE MANAGEMENT/SOCIAL WORK - PATIENT PORTAL LINK FT
You can access the FollowMyHealth Patient Portal offered by [facility] by registering at the following website: [email or web address]. By joining [facility]’s FollowMyHealth portal, you will also be able to view your health information using other applications (apps) compatible with our system.

## 2025-04-15 NOTE — PROGRESS NOTE ADULT - SUBJECTIVE AND OBJECTIVE BOX
Follow Up:    SSTI    Interval History/ROS:  VSS. Patient was seen and examined at bedside. Denies fever. No foot pain.    Allergies  latex (Rash)  Lipitor (Nausea)        ANTIMICROBIALS:  piperacillin/tazobactam IVPB.. 3.375 every 8 hours  vancomycin  IVPB 750 once      OTHER MEDS:  MEDICATIONS  (STANDING):  acetaminophen     Tablet .. 650 every 6 hours PRN  albuterol    90 MICROgram(s) HFA Inhaler 2 every 6 hours PRN  ALPRAZolam 2 at bedtime PRN  aluminum hydroxide/magnesium hydroxide/simethicone Suspension 30 every 4 hours PRN  aMIOdarone    Tablet 200 daily  amLODIPine   Tablet 5 daily  apixaban 5 every 12 hours  aspirin enteric coated 81 daily  buDESOnide    Inhalation Suspension 0.5 every 12 hours  dextrose 50% Injectable 25 once  dextrose 50% Injectable 12.5 once  dextrose 50% Injectable 25 once  dextrose Oral Gel 15 once PRN  furosemide    Tablet 40 daily  glucagon  Injectable 1 once  insulin glargine Injectable (LANTUS) 12 at bedtime  insulin lispro (ADMELOG) corrective regimen sliding scale  at bedtime  insulin lispro (ADMELOG) corrective regimen sliding scale  three times a day before meals  insulin lispro Injectable (ADMELOG) 10 three times a day before meals  levothyroxine 200 <User Schedule>  levothyroxine 100 <User Schedule>  melatonin 5 at bedtime  ondansetron Injectable 4 every 8 hours PRN  pantoprazole    Tablet 40 before breakfast  polyethylene glycol 3350 17 daily  senna 2 at bedtime      Vital Signs Last 24 Hrs  T(C): 36.7 (15 Apr 2025 04:12), Max: 36.7 (15 Apr 2025 04:12)  T(F): 98 (15 Apr 2025 04:12), Max: 98 (15 Apr 2025 04:12)  HR: 62 (15 Apr 2025 04:12) (57 - 64)  BP: 119/65 (15 Apr 2025 04:12) (119/65 - 161/79)  BP(mean): --  RR: 18 (15 Apr 2025 04:12) (18 - 18)  SpO2: 93% (15 Apr 2025 04:12) (86% - 96%)    Parameters below as of 15 Apr 2025 04:12  Patient On (Oxygen Delivery Method): nasal cannula    PHYSICAL EXAM:  Constitutional: non-toxic, no distress at rest  HEAD/EYES: anicteric, no conjunctival injection  Cardiovascular:   normal S1, S2, no murmur, RRR  Respiratory:  clear BS bilaterally, no wheezes  GI:  soft, non-tender, obese  Musculoskeletal:  BLE edema much improved that prior exam  Neurologic: awake and alert, normal strength, no focal findings  Skin:  R heel w/ nonfluctuant, nontender, no active discharge; superficial ulceration w/ erythematous skin ulcer over the anterior aspect of RLE w/o discharge; dorsal aspect of the lower leg w/ nonerythematous tender superficial ulcer; no L foot open lesion; dorsal aspect of L ankle w/ multiple tender superficial ulcers; BLE turgor remarkably shrunken, skin visibly wrinkled up.                            8.3    9.39  )-----------( 341      ( 15 Apr 2025 08:59 )             26.8       04-15    139  |  101  |  18  ----------------------------<  98  3.3[L]   |  25  |  1.30    Ca    8.5      15 Apr 2025 08:59  Phos  3.5     04-15  Mg     2.1     04-15        Urinalysis Basic - ( 15 Apr 2025 08:59 )    Color: x / Appearance: x / SG: x / pH: x  Gluc: 98 mg/dL / Ketone: x  / Bili: x / Urobili: x   Blood: x / Protein: x / Nitrite: x   Leuk Esterase: x / RBC: x / WBC x   Sq Epi: x / Non Sq Epi: x / Bacteria: x        MICROBIOLOGY:  v  Abscess R foot  04-10-25   Moderate Serratia marcescens  Rare Methicillin Resistant Staphylococcus aureus  --  Serratia marcescens  Methicillin resistant Staphylococcus aureus      Blood Blood  04-09-25   No growth at 5 days  --  --      Blood Blood-Peripheral  04-08-25   No growth at 5 days  --  --                RADIOLOGY:  Imaging below independently reviewed.  < from: Xray Abdomen 1 View PORTABLE -Routine (04.13.25 @ 11:42) >    ACC: [id number] EXAM:  XR ABDOMEN PORTABLE ROUTINE 1V   ORDERED BY: DELMI VIEYRA     PROCEDURE DATE:  04/13/2025          INTERPRETATION:  HISTORY: Abdominal pain    TECHNIQUE: 2 portable views of the abdomen    COMPARISON: CT scan dated 4/16/2024    FINDINGS: The bowel gas pattern is nonobstructive. There is mild gaseous   distention of the stomach. No pathologic calcifications are seen. The   osseous structures are intact.    IMPRESSION: Nonobstructive bowel gas pattern. Mild gaseous distention of   the stomach.    --- End of Report ---            RYAN BALTAZAR MD; Attending Radiologist  This document has been electronically signed. Apr 13 2025 11:47AM    < end of copied text >    < from: MR Lower Ext Non-joint w/wo IV Cont, Right (04.09.25 @ 22:41) >    ACC: [id number] EXAM:  MR LWR EXT NON JOINT WAWIC RT   ORDERED BY: MARK CONTRERAS     PROCEDURE DATE:  04/09/2025          INTERPRETATION:  Exam Type: MR LOWER EXTREMITY WITHOUT AND WITH IV   CONTRAST RIGHT  Exam Date and Time: 4/9/2025 10:41 PM  Indication: Infection of calf  Comparison: Tib-fib radiograph from 12/29/2024    TECHNIQUE:  Sagittal and axial MRI of the right lower extremity was performed with   and without contrast.    Contrast: 10 cc IV Gadavist IV    FINDINGS:  OSSEOUS STRUCTURES:  No acute fracture. Tricompartmental osteoarthritis of the knee which is   severe at the medial component with joint space loss and subchondral   sclerosis. Small to moderate partially visualized knee joint effusion.    MUSCLES/TENDONS:  Fatty atrophy of the gastrocnemius muscle bellies. Mild to moderate   tendinosis of the distal Achilles tendon.    SOFT TISSUES:  Edema is seen surrounding the lower extremity most noted distally. There   is skin thickening of the anterior calf extending to thedorsum of the   foot. There is postcontrast enhancement within the anterior soft tissues.   No mature or drainable collection is seen at this time.    IMPRESSION:  Edema is seen surrounding the lower extremity most noted distally. Skin   thickening of the anterior calf extending to the dorsum of the foot. Post   contrast enhancement within the anterior soft tissues. No mature or   drainable collection is seen at this time.    --- End of Report ---            VEENA LUND DO; Attending Radiologist  This document has been electronically signed. Apr 10 2025  9:22AM    < end of copied text >

## 2025-04-15 NOTE — PROGRESS NOTE ADULT - PROBLEM SELECTOR PLAN 5
a1c 11.2  trend fingerstick glu  - endo eval for further recs for home insulin regimen as A1c 11.2 but blood sugars are relatively controlled here on home insulin regimen  - insulin adjusted to lantus 12u and admelog 8u TID along with SSI per endo recs   adjust to goal glu 100-180  carb consistent diet.

## 2025-04-15 NOTE — DISCHARGE NOTE NURSING/CASE MANAGEMENT/SOCIAL WORK - NSDCPEFALRISK_GEN_ALL_CORE
For information on Fall & Injury Prevention, visit: [email or web address] OR  [email or web address] OR  https://www.cdc.gov/steadi/patient.html

## 2025-04-15 NOTE — PROGRESS NOTE ADULT - PROBLEM SELECTOR PLAN 12
pulmicort  ventolin  - noted to desat to 80s on RA overnight 4/10; unclear cause; patient with no SOB; CXR with mild pulm vasc congestion; already on PO lasix 40mg daily; wean O2 as tolerable; monitor O2 sats     #Constipation  - states not having BM in about 6 days   - xray abd done showing no obstruction   - bowel regimen; lactulose ordered to be given   - monitor BM.    PT: [facility] but patient prefers home     DISPO: pending clinical improvement, ID recs; podiatry recs

## 2025-04-15 NOTE — PROGRESS NOTE ADULT - PROBLEM SELECTOR PLAN 9
miralax  senna
aaron guerra.

## 2025-04-15 NOTE — DISCHARGE NOTE NURSING/CASE MANAGEMENT/SOCIAL WORK - FINANCIAL ASSISTANCE
[facility] provides services at a reduced cost to those who are determined to be eligible through [facility]’s financial assistance program. Information regarding [facility]’s financial assistance program can be found by going to [email or web address] or by calling [phone].

## 2025-04-15 NOTE — PROGRESS NOTE ADULT - SUBJECTIVE AND OBJECTIVE BOX
[facility] Division of Hospital Medicine  Remington Huggins DO  Pager (M-F, 8A-5P):  MS Teams PREFERRED  Other Times:  [phone]      SUBJECTIVE / OVERNIGHT EVENTS:  Pt seen at bedside in no acute distress   at bedside--> states ID was here this AM and cleared patient once antibiotics done  Pt expresses wish to go home--> discussed will await DME      MEDICATIONS  (STANDING):  aMIOdarone    Tablet 200 milliGRAM(s) Oral daily  amLODIPine   Tablet 5 milliGRAM(s) Oral daily  apixaban 5 milliGRAM(s) Oral every 12 hours  aspirin enteric coated 81 milliGRAM(s) Oral daily  buDESOnide    Inhalation Suspension 0.5 milliGRAM(s) Inhalation every 12 hours  cadexomer iodine 0.9% Gel 1 Application(s) Topical daily  clobetasol 0.05% Ointment 1 Application(s) Topical two times a day  dextrose 5%. 1000 milliLiter(s) (50 mL/Hr) IV Continuous <Continuous>  dextrose 5%. 1000 milliLiter(s) (100 mL/Hr) IV Continuous <Continuous>  dextrose 50% Injectable 25 Gram(s) IV Push once  dextrose 50% Injectable 12.5 Gram(s) IV Push once  dextrose 50% Injectable 25 Gram(s) IV Push once  furosemide    Tablet 40 milliGRAM(s) Oral daily  glucagon  Injectable 1 milliGRAM(s) IntraMuscular once  insulin glargine Injectable (LANTUS) 12 Unit(s) SubCutaneous at bedtime  insulin lispro (ADMELOG) corrective regimen sliding scale   SubCutaneous at bedtime  insulin lispro (ADMELOG) corrective regimen sliding scale   SubCutaneous three times a day before meals  insulin lispro Injectable (ADMELOG) 10 Unit(s) SubCutaneous three times a day before meals  levothyroxine 200 MICROGram(s) Oral <User Schedule>  levothyroxine 100 MICROGram(s) Oral <User Schedule>  lidocaine   4% Patch 1 Patch Transdermal daily  melatonin 5 milliGRAM(s) Oral at bedtime  naloxone Injectable 0.4 milliGRAM(s) IV Push once  nystatin Powder 1 Application(s) Topical every 12 hours  pantoprazole    Tablet 40 milliGRAM(s) Oral before breakfast  polyethylene glycol 3350 17 Gram(s) Oral daily  povidone iodine 10% Solution 1 Application(s) Topical daily  senna 2 Tablet(s) Oral at bedtime    MEDICATIONS  (PRN):  acetaminophen     Tablet .. 650 milliGRAM(s) Oral every 6 hours PRN Temp greater or equal to 38C (100.4F), Mild Pain (1 - 3)  albuterol    90 MICROgram(s) HFA Inhaler 2 Puff(s) Inhalation every 6 hours PRN Shortness of Breath and/or Wheezing  ALPRAZolam 2 milliGRAM(s) Oral at bedtime PRN Anxiety/insomnia  aluminum hydroxide/magnesium hydroxide/simethicone Suspension 30 milliLiter(s) Oral every 4 hours PRN Dyspepsia  dextrose Oral Gel 15 Gram(s) Oral once PRN Blood Glucose LESS THAN 70 milliGRAM(s)/deciliter  ondansetron Injectable 4 milliGRAM(s) IV Push every 8 hours PRN Nausea and/or Vomiting      I&O's Summary    14 Apr 2025 07:01  -  15 Apr 2025 07:00  --------------------------------------------------------  IN: 480 mL / OUT: 750 mL / NET: -270 mL        PHYSICAL EXAM:  Vital Signs Last 24 Hrs  T(C): 36.6 (15 Apr 2025 14:04), Max: 36.7 (15 Apr 2025 04:12)  T(F): 97.8 (15 Apr 2025 14:04), Max: 98 (15 Apr 2025 04:12)  HR: 55 (15 Apr 2025 14:04) (55 - 64)  BP: 134/70 (15 Apr 2025 14:04) (119/65 - 161/79)  BP(mean): --  RR: 18 (15 Apr 2025 14:04) (18 - 18)  SpO2: 97% (15 Apr 2025 14:04) (86% - 97%)    Parameters below as of 15 Apr 2025 14:04  Patient On (Oxygen Delivery Method): nasal cannula  O2 Flow (L/min): 2    PHYSICAL EXAM:  GENERAL: NAD, well-developed  HEAD:  Atraumatic, Normocephalic  EYES: conjunctiva and sclera clear  NECK: Supple, No JVD  CHEST/LUNG: Clear to auscultation bilaterally; No wheeze  HEART: Regular rate and rhythm; No murmurs, rubs, or gallops  ABDOMEN: Soft, Nontender, Nondistended; Bowel sounds present  EXTREMITIES:  + ace wrap  PSYCH: AAOx3      LABS:                        8.3    9.39  )-----------( 341      ( 15 Apr 2025 08:59 )             26.8     04-15    139  |  101  |  18  ----------------------------<  98  3.3[L]   |  25  |  1.30    Ca    8.5      15 Apr 2025 08:59  Phos  3.5     04-15  Mg     2.1     04-15            Urinalysis Basic - ( 15 Apr 2025 08:59 )    Color: x / Appearance: x / SG: x / pH: x  Gluc: 98 mg/dL / Ketone: x  / Bili: x / Urobili: x   Blood: x / Protein: x / Nitrite: x   Leuk Esterase: x / RBC: x / WBC x   Sq Epi: x / Non Sq Epi: x / Bacteria: x          RADIOLOGY & ADDITIONAL TESTS:  Results Reviewed:   Imaging Personally Reviewed:  Electrocardiogram Personally Reviewed:    COORDINATION OF CARE:  Care Discussed with Consultants/Other Providers [Y/N]:  Prior or Outpatient Records Reviewed [Y/N]:

## 2025-04-16 ENCOUNTER — TRANSCRIPTION ENCOUNTER (OUTPATIENT)
Age: 75
End: 2025-04-16

## 2025-04-16 VITALS
HEART RATE: 63 BPM | TEMPERATURE: 98 F | SYSTOLIC BLOOD PRESSURE: 117 MMHG | DIASTOLIC BLOOD PRESSURE: 71 MMHG | OXYGEN SATURATION: 96 % | RESPIRATION RATE: 18 BRPM

## 2025-04-16 LAB
ANION GAP SERPL CALC-SCNC: 13 MMOL/L — SIGNIFICANT CHANGE UP (ref 5–17)
BUN SERPL-MCNC: 16 MG/DL — SIGNIFICANT CHANGE UP (ref 7–23)
CALCIUM SERPL-MCNC: 8.6 MG/DL — SIGNIFICANT CHANGE UP (ref 8.4–10.5)
CHLORIDE SERPL-SCNC: 103 MMOL/L — SIGNIFICANT CHANGE UP (ref 96–108)
CO2 SERPL-SCNC: 26 MMOL/L — SIGNIFICANT CHANGE UP (ref 22–31)
CREAT SERPL-MCNC: 1.37 MG/DL — HIGH (ref 0.5–1.3)
CULTURE RESULTS: ABNORMAL
EGFR: 40 ML/MIN/1.73M2 — LOW
EGFR: 40 ML/MIN/1.73M2 — LOW
GLUCOSE BLDC GLUCOMTR-MCNC: 147 MG/DL — HIGH (ref 70–99)
GLUCOSE BLDC GLUCOMTR-MCNC: 207 MG/DL — HIGH (ref 70–99)
GLUCOSE SERPL-MCNC: 138 MG/DL — HIGH (ref 70–99)
ORGANISM # SPEC MICROSCOPIC CNT: ABNORMAL
POTASSIUM SERPL-MCNC: 3.4 MMOL/L — LOW (ref 3.5–5.3)
POTASSIUM SERPL-SCNC: 3.4 MMOL/L — LOW (ref 3.5–5.3)
SODIUM SERPL-SCNC: 142 MMOL/L — SIGNIFICANT CHANGE UP (ref 135–145)
SPECIMEN SOURCE: SIGNIFICANT CHANGE UP

## 2025-04-16 PROCEDURE — 36415 COLL VENOUS BLD VENIPUNCTURE: CPT

## 2025-04-16 PROCEDURE — 87641 MR-STAPH DNA AMP PROBE: CPT

## 2025-04-16 PROCEDURE — 84480 ASSAY TRIIODOTHYRONINE (T3): CPT

## 2025-04-16 PROCEDURE — 84439 ASSAY OF FREE THYROXINE: CPT

## 2025-04-16 PROCEDURE — 84443 ASSAY THYROID STIM HORMONE: CPT

## 2025-04-16 PROCEDURE — 87070 CULTURE OTHR SPECIMN AEROBIC: CPT

## 2025-04-16 PROCEDURE — 93970 EXTREMITY STUDY: CPT

## 2025-04-16 PROCEDURE — 80061 LIPID PANEL: CPT

## 2025-04-16 PROCEDURE — 82962 GLUCOSE BLOOD TEST: CPT

## 2025-04-16 PROCEDURE — 87640 STAPH A DNA AMP PROBE: CPT

## 2025-04-16 PROCEDURE — A9585: CPT

## 2025-04-16 PROCEDURE — 85730 THROMBOPLASTIN TIME PARTIAL: CPT

## 2025-04-16 PROCEDURE — 97110 THERAPEUTIC EXERCISES: CPT

## 2025-04-16 PROCEDURE — 83735 ASSAY OF MAGNESIUM: CPT

## 2025-04-16 PROCEDURE — 85652 RBC SED RATE AUTOMATED: CPT

## 2025-04-16 PROCEDURE — 87040 BLOOD CULTURE FOR BACTERIA: CPT

## 2025-04-16 PROCEDURE — 99285 EMERGENCY DEPT VISIT HI MDM: CPT

## 2025-04-16 PROCEDURE — 82330 ASSAY OF CALCIUM: CPT

## 2025-04-16 PROCEDURE — 80053 COMPREHEN METABOLIC PANEL: CPT

## 2025-04-16 PROCEDURE — 99239 HOSP IP/OBS DSCHRG MGMT >30: CPT

## 2025-04-16 PROCEDURE — 85610 PROTHROMBIN TIME: CPT

## 2025-04-16 PROCEDURE — 96374 THER/PROPH/DIAG INJ IV PUSH: CPT

## 2025-04-16 PROCEDURE — 87077 CULTURE AEROBIC IDENTIFY: CPT

## 2025-04-16 PROCEDURE — 97530 THERAPEUTIC ACTIVITIES: CPT

## 2025-04-16 PROCEDURE — 93005 ELECTROCARDIOGRAM TRACING: CPT

## 2025-04-16 PROCEDURE — 86140 C-REACTIVE PROTEIN: CPT

## 2025-04-16 PROCEDURE — G0545: CPT

## 2025-04-16 PROCEDURE — 97161 PT EVAL LOW COMPLEX 20 MIN: CPT

## 2025-04-16 PROCEDURE — 84145 PROCALCITONIN (PCT): CPT

## 2025-04-16 PROCEDURE — 93923 UPR/LXTR ART STDY 3+ LVLS: CPT

## 2025-04-16 PROCEDURE — 74018 RADEX ABDOMEN 1 VIEW: CPT

## 2025-04-16 PROCEDURE — 71045 X-RAY EXAM CHEST 1 VIEW: CPT

## 2025-04-16 PROCEDURE — 82947 ASSAY GLUCOSE BLOOD QUANT: CPT

## 2025-04-16 PROCEDURE — 84295 ASSAY OF SERUM SODIUM: CPT

## 2025-04-16 PROCEDURE — 83605 ASSAY OF LACTIC ACID: CPT

## 2025-04-16 PROCEDURE — 85025 COMPLETE CBC W/AUTO DIFF WBC: CPT

## 2025-04-16 PROCEDURE — 96375 TX/PRO/DX INJ NEW DRUG ADDON: CPT

## 2025-04-16 PROCEDURE — 84132 ASSAY OF SERUM POTASSIUM: CPT

## 2025-04-16 PROCEDURE — 87186 SC STD MICRODIL/AGAR DIL: CPT

## 2025-04-16 PROCEDURE — 99232 SBSQ HOSP IP/OBS MODERATE 35: CPT

## 2025-04-16 PROCEDURE — 82435 ASSAY OF BLOOD CHLORIDE: CPT

## 2025-04-16 PROCEDURE — 80048 BASIC METABOLIC PNL TOTAL CA: CPT

## 2025-04-16 PROCEDURE — 73720 MRI LWR EXTREMITY W/O&W/DYE: CPT | Mod: MC

## 2025-04-16 PROCEDURE — 85014 HEMATOCRIT: CPT

## 2025-04-16 PROCEDURE — 83036 HEMOGLOBIN GLYCOSYLATED A1C: CPT

## 2025-04-16 PROCEDURE — 94640 AIRWAY INHALATION TREATMENT: CPT

## 2025-04-16 PROCEDURE — 82010 KETONE BODYS QUAN: CPT

## 2025-04-16 PROCEDURE — 80202 ASSAY OF VANCOMYCIN: CPT

## 2025-04-16 PROCEDURE — 85027 COMPLETE CBC AUTOMATED: CPT

## 2025-04-16 PROCEDURE — 85018 HEMOGLOBIN: CPT

## 2025-04-16 PROCEDURE — 87205 SMEAR GRAM STAIN: CPT

## 2025-04-16 PROCEDURE — 82803 BLOOD GASES ANY COMBINATION: CPT

## 2025-04-16 PROCEDURE — 84100 ASSAY OF PHOSPHORUS: CPT

## 2025-04-16 RX ORDER — LEVOTHYROXINE SODIUM 300 MCG
1 TABLET ORAL
Qty: 0 | Refills: 0 | DISCHARGE
Start: 2025-04-16

## 2025-04-16 RX ORDER — LEVOTHYROXINE SODIUM 300 MCG
1 TABLET ORAL
Refills: 0 | DISCHARGE

## 2025-04-16 RX ADMIN — NYSTATIN 1 APPLICATION(S): 100000 CREAM TOPICAL at 06:25

## 2025-04-16 RX ADMIN — Medication 1 APPLICATION(S): at 13:23

## 2025-04-16 RX ADMIN — Medication 40 MILLIGRAM(S): at 06:25

## 2025-04-16 RX ADMIN — AMIODARONE HYDROCHLORIDE 200 MILLIGRAM(S): 50 INJECTION, SOLUTION INTRAVENOUS at 06:24

## 2025-04-16 RX ADMIN — APIXABAN 5 MILLIGRAM(S): 2.5 TABLET, FILM COATED ORAL at 06:24

## 2025-04-16 RX ADMIN — INSULIN LISPRO 10 UNIT(S): 100 INJECTION, SOLUTION INTRAVENOUS; SUBCUTANEOUS at 13:21

## 2025-04-16 RX ADMIN — Medication 1 APPLICATION(S): at 06:24

## 2025-04-16 RX ADMIN — INSULIN LISPRO 10 UNIT(S): 100 INJECTION, SOLUTION INTRAVENOUS; SUBCUTANEOUS at 08:47

## 2025-04-16 RX ADMIN — Medication 81 MILLIGRAM(S): at 13:23

## 2025-04-16 RX ADMIN — AMLODIPINE BESYLATE 5 MILLIGRAM(S): 10 TABLET ORAL at 06:24

## 2025-04-16 RX ADMIN — BUDESONIDE 0.5 MILLIGRAM(S): 90 AEROSOL, POWDER RESPIRATORY (INHALATION) at 06:24

## 2025-04-16 RX ADMIN — Medication 200 MICROGRAM(S): at 06:24

## 2025-04-16 RX ADMIN — FUROSEMIDE 40 MILLIGRAM(S): 10 INJECTION INTRAMUSCULAR; INTRAVENOUS at 06:24

## 2025-04-16 RX ADMIN — INSULIN LISPRO 2: 100 INJECTION, SOLUTION INTRAVENOUS; SUBCUTANEOUS at 13:20

## 2025-04-16 NOTE — DISCHARGE NOTE PROVIDER - PROVIDER TOKENS
FREE:[LAST:[[facility]PHONE:[phone]],FAX:[(   )    -],ADDRESS:[[address] [place] [address]],FOLLOWUP:[1 week]] FREE:[LAST:[[facility]],PHONE:[phone]],FAX:[(   )    -],ADDRESS:[[address] [place] [address]],FOLLOWUP:[1 week]],PROVIDER:[TOKEN:[[id number]],FOLLOWUP:[1 week]]

## 2025-04-16 NOTE — PROGRESS NOTE ADULT - PROBLEM SELECTOR PROBLEM 4
HTN (hypertension)
HTN (hypertension)
CAD (coronary artery disease)
HTN (hypertension)
CAD (coronary artery disease)

## 2025-04-16 NOTE — PROGRESS NOTE ADULT - ASSESSMENT
74-yo F w/ PMH of CVA w/ R hemiparesis, PAF on Eliquis, DM, stasis dermatitis, and recent admission (12/29/24-1/1/25) for SSTI of the RLE w/ improvement on vancomycin switched to Augmentin/doxycycline, now presenting for R>L BLE edema and pain. WBC, ESR, and CRP all elevated. Febrile. LRINEC score 2. R heel tender w/ fluctuance. WBC downtrending on vancomycin. Less likely necrotizing infection. MRI w/o drainable collection. Low likelihood of bone involvement.    S/p bedside I&D of the R heel 4/10. Abscess culture growing GNR.  Currently being diuresed. Significant improvement in skin turgor BLE.    Microbio:  4/8 BCx neg  4/8 MRSA swab pos  4/9 BCx neg  4/10 Abscess culture GNR    Antimicrobials:  Cefepime 4/7  Vancomycin 4/7-  Zosyn 4/11-    #SSTI  #Leukocytosis  #Fever  #DEEPA  #Stasis dermatitis    Recommendations:  - Continue with vancomycin 750 mg IV Q12H and Zosyn 3.375 g IV Q8H  - Complete antibiotics after tomorrow's dose  - Podiatry f/u   - Wound care  - Monitor VS and WBC  - F/u BCx  - If hemodynamic status worsens, consider urgent surgical eval  - Diuresis per primary team    Topics relating to disease transmission risk assessment and mitigation, complex antimicrobial therapy counseling and treatment, and/or public health investigation, analysis, and testing were addressed.    Thank you for this consult. Inpatient ID team will follow.  Plan discussed with primary team clinician.        Tomás Licea MD, PhD  Attending Physician  Division of Infectious Diseases  Department of Medicine    Please contact through MS Teams message.  Office: [phone] (after 5 PM or weekend)  
74-yo F w/ PMH of CVA w/ R hemiparesis, PAF on Eliquis, DM, stasis dermatitis, and recent admission (12/29/24-1/1/25) for SSTI of the RLE w/ improvement on vancomycin switched to Augmentin/doxycycline, now presenting for R>L BLE edema and pain. WBC, ESR, and CRP all elevated. Febrile. LRINEC score 2. R heel tender w/ fluctuance. WBC downtrending on vancomycin. Less likely necrotizing infection. MRI w/o drainable collection. Low likelihood of bone involvement.    S/p bedside I&D of the R heel 4/10. Abscess culture growing GNR.  Currently being diuresed. Significant improvement in skin turgor BLE. Heel wound stable, no tenderness or erythema.    Microbio:  4/8 BCx neg  4/8 MRSA swab pos  4/9 BCx neg  4/10 Abscess culture GNR    Antimicrobials:  Cefepime 4/7  Vancomycin 4/7-  Zosyn 4/11-    #SSTI  #Leukocytosis  #Fever  #DEEPA  #Stasis dermatitis    Recommendations:  - Monitor off antibiotics   - Podiatry f/u  - Monitor VS and WBC  - Diuresis per primary team    Topics relating to disease transmission risk assessment and mitigation, complex antimicrobial therapy counseling and treatment, and/or public health investigation, analysis, and testing were addressed.    Thank you for this consult.        Tomás Licea MD, PhD  Attending Physician  Division of Infectious Diseases  Department of Medicine    Please contact through MS Teams message.  Office: [phone] (after 5 PM or weekend)    
Assessment/plan:    Cellulitis right lower extremity/venous insufficiency/lymphedema  Right superficial pinpoint heel wound: Stable, present on admission    Recommend decubitus precautions and use of Z flow boots while in house.  Continue local wound care as per wound care team for right lateral leg wound.  Recommend Betadine paint and Allevyn foam dressing to right heel daily.  Will continue to monitor for signs of infection and/or wound care recommendations.
74-yo F w/ PMH of CVA w/ R hemiparesis, PAF on Eliquis, DM, stasis dermatitis, and recent admission (12/29/24-1/1/25) for SSTI of the RLE w/ improvement on vancomycin switched to Augmentin/doxycycline, now presenting for R>L BLE edema and pain. WBC, ESR, and CRP all elevated. Febrile. LRINEC score 2. R heel tender w/ fluctuance. WBC downtrending on vancomycin. Less likely necrotizing infection. MRI w/o drainable collection. Low likelihood of bone involvement.    Microbio:  4/8 BCx neg  4/8 MRSA swab pos    Antimicrobials:  Cefepime 4/7  Vancomycin 4/7-    #Sepsis  #SSTI  #Leukocytosis  #Fever  #DEEPA  #Stasis dermatitis    Recommendations:  - Continue with vancomycin 1g IV Q12H. Check pre-4th dose trough  - Podiatry f/u   - Wound care  - Monitor VS and WBC  - F/u BCx  - If hemodynamic status worsens, consider urgent surgical eval  - Diuresis per primary team    Topics relating to disease transmission risk assessment and mitigation, complex antimicrobial therapy counseling and treatment, and/or public health investigation, analysis, and testing were addressed.    Thank you for this consult. Inpatient ID team will follow.  Plan discussed with primary team clinician.        Tomás Licea MD, PhD  Attending Physician  Division of Infectious Diseases  Department of Medicine    Please contact through MS Teams message.  Office: [phone] (after 5 PM or weekend)      
74-yo F w/ PMH of CVA w/ R hemiparesis, PAF on Eliquis, DM, stasis dermatitis, and recent admission (12/29/24-1/1/25) for SSTI of the RLE w/ improvement on vancomycin switched to Augmentin/doxycycline, now presenting for R>L BLE edema and pain. WBC, ESR, and CRP all elevated. Febrile. LRINEC score 2. R heel tender w/ fluctuance. WBC downtrending on vancomycin. Less likely necrotizing infection. MRI w/o drainable collection. Low likelihood of bone involvement.    S/p bedside I&D of the R heel 4/10. Abscess culture growing GNR.  Currently being diuresed. Significant improvement in skin turgor BLE.    Microbio:  4/8 BCx neg  4/8 MRSA swab pos  4/9 BCx neg  4/10 Abscess culture GNR    Antimicrobials:  Cefepime 4/7  Vancomycin 4/7-  Zosyn 4/11-    #SSTI  #Leukocytosis  #Fever  #DEEPA  #Stasis dermatitis    Recommendations:  - Hold vancomycin today. Repeat vancomycin level tomorrow AM.  - Start Zosyn 3.375 g IV Q8H. F/u abscess culture result and susceptibility  - Podiatry f/u   - Wound care  - Monitor VS and WBC  - F/u BCx  - If hemodynamic status worsens, consider urgent surgical eval  - Diuresis per primary team    Topics relating to disease transmission risk assessment and mitigation, complex antimicrobial therapy counseling and treatment, and/or public health investigation, analysis, and testing were addressed.    Thank you for this consult. Inpatient ID team will follow.  Plan discussed with primary team clinician.        Tomás Licea MD, PhD  Attending Physician  Division of Infectious Diseases  Department of Medicine    Please contact through MS Teams message.  Office: [phone] (after 5 PM or weekend)      
74-yo F w/ PMH of CVA w/ R hemiparesis, PAF on Eliquis, DM, stasis dermatitis, and recent admission (12/29/24-1/1/25) for SSTI of the RLE w/ improvement on vancomycin switched to Augmentin/doxycycline, now presenting for R>L BLE edema and pain. WBC, ESR, and CRP all elevated. Febrile. LRINEC score 2. R heel tender w/ fluctuance. WBC downtrending on vancomycin. Less likely necrotizing infection. MRI w/o drainable collection. Low likelihood of bone involvement.    S/p bedside I&D of the R heel 4/10. Abscess culture growing GNR.  Currently being diuresed. Significant improvement in skin turgor BLE. Heel wound stable, no tenderness or erythema.    Microbio:  4/8 BCx neg  4/8 MRSA swab pos  4/9 BCx neg  4/10 Abscess culture GNR    Antimicrobials:  Cefepime 4/7  Vancomycin 4/7-  Zosyn 4/11-    #SSTI  #Leukocytosis  #Fever  #DEEPA  #Stasis dermatitis    Recommendations:  - D/c antibiotics and monitor  - Podiatry f/u   - Wound care  - Monitor VS and WBC  - If hemodynamic status worsens, consider urgent surgical eval  - Diuresis per primary team    Topics relating to disease transmission risk assessment and mitigation, complex antimicrobial therapy counseling and treatment, and/or public health investigation, analysis, and testing were addressed.    Thank you for this consult.   Plan discussed with primary team clinician.        Tomás Licea MD, PhD  Attending Physician  Division of Infectious Diseases  Department of Medicine    Please contact through MS Teams message.  Office: [phone] (after 5 PM or weekend)  
75yo f w pmh obesity, asthma/copd/idalia, htn, hld, dm, cva w residual r sided weakness, afib, cad, gerd, hiatal hernia, constipation, lymphedema/chronic venous stasis/varicosities, hypothyroidism, anx/dep, p/w bl le, r>l, pain, redness, swelling; in er, c/f cellulitis; admit to medicine for further mgmt
73 y/o F w/ uncontrolled Type 2 DM w/ hyperglycemia complicated by CVA, CAD, and neuropathy with hyperglycemia, post-operative hypothyroidism, HTN, HLD admitted for LE cellulitis (high risk patient with severely uncontrolled diabetes with A1c of 11.2% at high risk of CAD and CVA with high medical complexity and high level decision-making). Patient is feeling okay, eating about 50% or more of meals and tolerating POs. FBG stable, no hypoglycemia. BGs have been mostly stable and at goal, will keep insulin regimen as is for now. Endocrine will closely monitor BG and adjust insulin as needed for BG goal 100-200mg/dL inpatient.       #Uncontrolled type 2 diabetes mellitus   A1C with Estimated Average Glucose Result: 11.2 % (04-08-25 @ 07:27)  Home regimen: Tresiba 24u QHS, Lispro 8u TID AC   Endocrinologist: Dr. Marin 
73yo f w pmh obesity, asthma/copd/idalia, htn, hld, dm, cva w residual r sided weakness, afib, cad, gerd, hiatal hernia, constipation, lymphedema/chronic venous stasis/varicosities, hypothyroidism, anx/dep, p/w bl le, r>l, pain, redness, swelling; in er, c/f cellulitis; admit to medicine for further mgmt
73yo f w pmh obesity, asthma/copd/idalia, htn, hld, dm, cva w residual r sided weakness, afib, cad, gerd, hiatal hernia, constipation, lymphedema/chronic venous stasis/varicosities, hypothyroidism, anx/dep, p/w bl le, r>l, pain, redness, swelling; in er, c/f cellulitis; admit to medicine for further management. 
73 y/o F w/ uncontrolled Type 2 DM w/ hyperglycemia complicated by CVA, CAD, and neuropathy with hyperglycemia, post-operative hypothyroidism, HTN, HLD admitted for LE cellulitis (high risk patient with severely uncontrolled diabetes with A1c of 11.2% at high risk of CAD and CVA with high medical complexity and high level decision-making). Patient is feeling okay, eating about 50% or more of meals and tolerating POs. FBG stable, no hypoglycemia. BGs have been mostly stable and at goal, will keep insulin regimen as is for now. Patient pending discharge home today -> family/patient wants to f/u with Dr. Galvin outpatient for diabetes care. Endocrine will closely monitor BG and adjust insulin as needed for BG goal 100-200mg/dL inpatient.       #Uncontrolled type 2 diabetes mellitus   A1C with Estimated Average Glucose Result: 11.2 % (04-08-25 @ 07:27)  Home regimen: Tresiba 24u QHS, Lispro 8u TID AC   Endocrinologist: Dr. Marin 
75yo f w pmh obesity, asthma/copd/idalia, htn, hld, dm, cva w residual r sided weakness, afib, cad, gerd, hiatal hernia, constipation, lymphedema/chronic venous stasis/varicosities, hypothyroidism, anx/dep, p/w bl le, r>l, pain, redness, swelling; in er, c/f cellulitis; admit to medicine for further mgmt
73 y/o F w/ uncontrolled Type 2 DM w/ hyperglycemia complicated by CVA, CAD, and neuropathy with hyperglycemia, post-operative hypothyroidism, HTN, HLD admitted for LE cellulitis (high risk patient with severely uncontrolled diabetes with A1c of 11.2% at high risk of CAD and CVA with high medical complexity and high level decision-making). Patient is feeling okay, eating about 50% or more of meals and tolerating POs. FBG slightly elevated, will increase Lantus to 11u QHS (takes Tresiba 24u HS at home). No hypoglycemia. Noted better postprandial BG control, will keep mealtime dose as is for now. Discussed with primary team to keep all IV abx in NS solution when possible. Endocrine will closely monitor BG and adjust insulin as needed for BG goal 100-200mg/dL inpatient.       #Uncontrolled type 2 diabetes mellitus   A1C with Estimated Average Glucose Result: 11.2 % (04-08-25 @ 07:27)  Home regimen: Tresiba 24u QHS, Lispro 8u TID AC   Endocrinologist: Dr. Marin 
75yo f w pmh obesity, asthma/copd/idalia, htn, hld, dm, cva w residual r sided weakness, afib, cad, gerd, hiatal hernia, constipation, lymphedema/chronic venous stasis/varicosities, hypothyroidism, anx/dep, p/w bl le, r>l, pain, redness, swelling; in er, c/f cellulitis; admit to medicine for further mgmt
73yo f w pmh obesity, asthma/copd/idalia, htn, hld, dm, cva w residual r sided weakness, afib, cad, gerd, hiatal hernia, constipation, lymphedema/chronic venous stasis/varicosities, hypothyroidism, anx/dep, p/w bl le, r>l, pain, redness, swelling; in er, c/f cellulitis; admit to medicine for further mgmt
75yo f w pmh obesity, asthma/copd/idalia, htn, hld, dm, cva w residual r sided weakness, afib, cad, gerd, hiatal hernia, constipation, lymphedema/chronic venous stasis/varicosities, hypothyroidism, anx/dep, p/w bl le, r>l, pain, redness, swelling; in er, c/f cellulitis; admit to medicine for further mgmt
73yo f w pmh obesity, asthma/copd/idalia, htn, hld, dm, cva w residual r sided weakness, afib, cad, gerd, hiatal hernia, constipation, lymphedema/chronic venous stasis/varicosities, hypothyroidism, anx/dep, p/w bl le, r>l, pain, redness, swelling; in er, c/f cellulitis; admit to medicine for further mgmt

## 2025-04-16 NOTE — DISCHARGE NOTE PROVIDER - HOSPITAL COURSE
HPI:  73yo f w pmh obesity, asthma/copd/idalia, htn, hld, dm, cva w residual r sided weakness, afib, cad, gerd, hiatal hernia, constipation, lymphedema/chronic venous stasis/varicosities, hypothyroidism, anx/dep, p/w bl le, r>l, pain, redness, swelling; patient has at least some level of discomfort over the affected areas, but over the last few days, pain increased in intensity, so much so that patient is unable to ambulate comfortably. no relief with tylenol. patient and family grew concerned so had patient brought to [facility] er for further evaluation. of note, hospitalized for similar presentation 12/29/24-1/1/25; at that time, found to have stasis dermatitis flare w superimposed cellulitis; found to be mrsa swab + and wood lamp +; id, derm, podiatry, wound care consulted; patient treated with iv abx vancomycin (transitioned to po abx doxycycline and amoxicillin on discharge), steroid ointment, antifungal cream, antiseptic solution and wound dressings. in er, c/f cellulitis; admit to medicine for further mgmt (07 Apr 2025 19:35)    Hospital Course: presented with right greater than left lower extremity cellulitis, likely due to venous insufficiency, complicated by a non-healing right heel wound and recurrent hospitalizations for the same. While afebrile and hemodynamically stable, she presented with mild leukocytosis and lactic acidosis. Empiric vancomycin and cefepime were started in the ER; blood cultures remained negative, but MRSA PCR returned positive. MRI showed no right leg collection, and venous duplex was negative for DVT. Vancomycin was resumed at a lower dose, zosyn was added, and antibiotics were completed on 4/15. ABIs were normal; TBIs were mildly abnormal on the right. Patient's elevated A1c (11.2%) prompted endocrinology consultation and adjustment of her home insulin regimen. Elevated TSH with normal free T4 will be followed up outpatient, and levothyroxine dosing was adjusted. Obstructive lung disease is managed with pulmicort and albuterol. An episode of desaturation to the 80s on room air, without shortness of breath, was noted, prompting chest x-ray (mild pulmonary vascular congestion). Furosemide was continued. Constipation was managed with lactulose after an abdominal x-ray ruled out obstruction. Though physical therapy recommended skilled nursing facility placement, the patient preferred to return home.      Important Medication Changes and Reason:    Active or Pending Issues Requiring Follow-up:  - Elevated TSH with normal free T4 - to be followed up outpatient with PCP/endo for further TFT testing     Advanced Directives:   [ ] Full code  [ ] DNR  [ ] Hospice    Discharge Diagnoses:  Cellulitis  T2DM  Hypothyroid   Obstructive lung disease  Constipation      HPI:  75yo f w pmh obesity, asthma/copd/idalia, htn, hld, dm, cva w residual r sided weakness, afib, cad, gerd, hiatal hernia, constipation, lymphedema/chronic venous stasis/varicosities, hypothyroidism, anx/dep, p/w bl le, r>l, pain, redness, swelling; patient has at least some level of discomfort over the affected areas, but over the last few days, pain increased in intensity, so much so that patient is unable to ambulate comfortably. no relief with tylenol. patient and family grew concerned so had patient brought to [facility] er for further evaluation. of note, hospitalized for similar presentation 12/29/24-1/1/25; at that time, found to have stasis dermatitis flare w superimposed cellulitis; found to be mrsa swab + and wood lamp +; id, derm, podiatry, wound care consulted; patient treated with iv abx vancomycin (transitioned to po abx doxycycline and amoxicillin on discharge), steroid ointment, antifungal cream, antiseptic solution and wound dressings. in er, c/f cellulitis; admit to medicine for further mgmt (07 Apr 2025 19:35)    Hospital Course: presented with right greater than left lower extremity cellulitis, likely due to venous insufficiency, complicated by a non-healing right heel wound and recurrent hospitalizations for the same. While afebrile and hemodynamically stable, she presented with mild leukocytosis and lactic acidosis. Empiric vancomycin and cefepime were started in the ER; blood cultures remained negative, but MRSA PCR returned positive. MRI showed no right leg collection, and venous duplex was negative for DVT. Vancomycin was resumed at a lower dose, zosyn was added, and antibiotics were completed on 4/15. ABIs were normal; TBIs were mildly abnormal on the right. Patient's elevated A1c (11.2%) prompted endocrinology consultation and adjustment of her home insulin regimen. Elevated TSH with normal free T4 will be followed up outpatient, and levothyroxine dosing was adjusted. Obstructive lung disease is managed with pulmicort and albuterol. An episode of desaturation to the 80s on room air, without shortness of breath, was noted, prompting chest x-ray (mild pulmonary vascular congestion). Furosemide was continued. Constipation was managed with lactulose after an abdominal x-ray ruled out obstruction. Though physical therapy recommended skilled nursing facility placement, the patient preferred to return home.      Important Medication Changes and Reason: Refer to medrec    Active or Pending Issues Requiring Follow-up:  - Elevated TSH with normal free T4 - to be followed up outpatient with PCP/endo for further TFT testing   - Upon discharge [facility] [address] [place] [address] [phone]  - Follow-up with primary care physician within 1 week.      Advanced Directives:   [X] Full code  [ ] DNR  [ ] Hospice    Discharge Diagnoses:  Cellulitis  T2DM  Hypothyroid   Obstructive lung disease  Constipation      HPI:  75yo f w pmh obesity, asthma/copd/idalia, htn, hld, dm, cva w residual r sided weakness, afib, cad, gerd, hiatal hernia, constipation, lymphedema/chronic venous stasis/varicosities, hypothyroidism, anx/dep, p/w bl le, r>l, pain, redness, swelling; patient has at least some level of discomfort over the affected areas, but over the last few days, pain increased in intensity, so much so that patient is unable to ambulate comfortably. no relief with tylenol. patient and family grew concerned so had patient brought to [facility] er for further evaluation. of note, hospitalized for similar presentation 12/29/24-1/1/25; at that time, found to have stasis dermatitis flare w superimposed cellulitis; found to be mrsa swab + and wood lamp +; id, derm, podiatry, wound care consulted; patient treated with iv abx vancomycin (transitioned to po abx doxycycline and amoxicillin on discharge), steroid ointment, antifungal cream, antiseptic solution and wound dressings. in er, c/f cellulitis; admit to medicine for further mgmt (07 Apr 2025 19:35)    Hospital Course: presented with right greater than left lower extremity cellulitis, likely due to venous insufficiency, complicated by a non-healing right heel wound and recurrent hospitalizations for the same. While afebrile and hemodynamically stable, she presented with mild leukocytosis and lactic acidosis. Empiric vancomycin and cefepime were started in the ER; blood cultures remained negative, but MRSA PCR returned positive. MRI showed no right leg collection, and venous duplex was negative for DVT. Vancomycin was resumed at a lower dose, zosyn was added, and antibiotics were completed on 4/15. ABIs were normal; TBIs were mildly abnormal on the right. Patient's elevated A1c (11.2%) prompted endocrinology consultation and adjustment of her home insulin regimen. Elevated TSH with normal free T4 will be followed up outpatient, and levothyroxine dosing was adjusted. Obstructive lung disease is managed with pulmicort and albuterol. An episode of desaturation to the 80s on room air, without shortness of breath, was noted, prompting chest x-ray (mild pulmonary vascular congestion). Furosemide was continued. Constipation was managed with lactulose after an abdominal x-ray ruled out obstruction. Though physical therapy recommended skilled nursing facility placement, the patient preferred to return home.      Important Medication Changes and Reason: Refer to medrec    Active or Pending Issues Requiring Follow-up:  - Elevated TSH with normal free T4 - to be followed up outpatient with PCP/endo for further TFT testing   - Upon discharge f/u as outpatient at [facility] [address] [facility] [phone]  - Follow-up with primary care physician within 1 week.    - Endocrine follow up with Dr. Marin can establish care at [address] [place] [address] Endocrinology ([phone])   - Recommend routine outpatient ophthalmology and podiatry follow up.    Advanced Directives:   [X] Full code  [ ] DNR  [ ] Hospice    Discharge Diagnoses:  Cellulitis  T2DM  Hypothyroid   Obstructive lung disease  Constipation

## 2025-04-16 NOTE — PROVIDER CONTACT NOTE (CRITICAL VALUE NOTIFICATION) - TEST AND RESULT REPORTED:
Culture collected on 4/10/25 Right foot Abscess with Gram Stain: Rare polymorphonuclear leukocytes seen per low power field. Rare Gram Negative Rods seen per oil power field.
abscess culture: marescens moderate serratia; rare MRSA
abcess culture, gram stain culture 4/10>4/14 moderate serratia marcesens, rare MRSA

## 2025-04-16 NOTE — PROGRESS NOTE ADULT - PROBLEM SELECTOR PROBLEM 5
HLD (hyperlipidemia)
DM (diabetes mellitus)
HLD (hyperlipidemia)
DM (diabetes mellitus)
DM (diabetes mellitus)
HLD (hyperlipidemia)
DM (diabetes mellitus)

## 2025-04-16 NOTE — DISCHARGE NOTE PROVIDER - CARE PROVIDERS DIRECT ADDRESSES
,DirectAddress_Unknown ,DirectAddress_Unknown,[email or web address]

## 2025-04-16 NOTE — PROGRESS NOTE ADULT - REASON FOR ADMISSION
bl le, r>l, pain, redness, swelling

## 2025-04-16 NOTE — PROGRESS NOTE ADULT - PROBLEM SELECTOR PROBLEM 3
Afib
Obesity, morbid, BMI 40.0-49.9
Afib

## 2025-04-16 NOTE — PROVIDER CONTACT NOTE (CRITICAL VALUE NOTIFICATION) - SITUATION
abscess culture: marescens moderate serratia; rare MRSA
abcess culture, gram stain culture 4/10>4/14 moderate serratia marcesens, rare MRSA
Lab called to report critical value result

## 2025-04-16 NOTE — PROVIDER CONTACT NOTE (CRITICAL VALUE NOTIFICATION) - ASSESSMENT
Culture collected on 4/10/25 Right foot Abscess with Gram Stain: Rare polymorphonuclear leukocytes seen per low power field. Rare Gram Negative Rods seen per oil power field.
vss, abcess culture, gram stain culture 4/10>4/14 moderate serratia marcesens, rare MRSA
abscess culture: marescens moderate serratia; rare MRSA  AO4, VS as documented; no c/o pain or discomfort at this time

## 2025-04-16 NOTE — PROGRESS NOTE ADULT - SUBJECTIVE AND OBJECTIVE BOX
Patient seen today for follow up inpatient Diabetes Mellitus management.    Chief Complaint: Type 2 Diabetes Mellitus     INTERVAL HX:  Patient seen in [facility] 4DSU 453 W1. Patient is alert and oriented, resting in bed with  at bedside. Patient reports feeling good, eating full meals and tolerating POs. FBG stable this am to 147mg/dL. No hypoglycemia. BG have been stable and mostly at goal 100-180mg/dL while on a Consistent Carbohydrate Diet. Blood glucose levels in the last 24hrs have been 127-156mg/dL.     Review of Systems:  General: As above.  Respiratory: Denies any SOB, OLIVAREZ, or cough.  Gastrointestinal: Denies any n/v/d or abdominal pain.   Endocrine: Denies any polyuria, polydipsia, polyphagia, visual changes, or numbness in feet.     Allergies  latex (Rash)  Lipitor (Nausea)      Intolerances  None.       MEDICATIONS  (STANDING):  acetaminophen     Tablet .. 650 milliGRAM(s) Oral every 6 hours PRN  albuterol    90 MICROgram(s) HFA Inhaler 2 Puff(s) Inhalation every 6 hours PRN  ALPRAZolam 2 milliGRAM(s) Oral at bedtime PRN  aluminum hydroxide/magnesium hydroxide/simethicone Suspension 30 milliLiter(s) Oral every 4 hours PRN  aMIOdarone    Tablet 200 milliGRAM(s) Oral daily  amLODIPine   Tablet 5 milliGRAM(s) Oral daily  apixaban 5 milliGRAM(s) Oral every 12 hours  aspirin enteric coated 81 milliGRAM(s) Oral daily  buDESOnide    Inhalation Suspension 0.5 milliGRAM(s) Inhalation every 12 hours  cadexomer iodine 0.9% Gel 1 Application(s) Topical daily  clobetasol 0.05% Ointment 1 Application(s) Topical two times a day  dextrose 5%. 1000 milliLiter(s) IV Continuous <Continuous>  dextrose 5%. 1000 milliLiter(s) IV Continuous <Continuous>  dextrose 50% Injectable 25 Gram(s) IV Push once  dextrose 50% Injectable 12.5 Gram(s) IV Push once  dextrose 50% Injectable 25 Gram(s) IV Push once  dextrose Oral Gel 15 Gram(s) Oral once PRN  furosemide    Tablet 40 milliGRAM(s) Oral daily  glucagon  Injectable 1 milliGRAM(s) IntraMuscular once  insulin glargine Injectable (LANTUS) 12 Unit(s) SubCutaneous at bedtime  insulin lispro (ADMELOG) corrective regimen sliding scale   SubCutaneous three times a day before meals  insulin lispro (ADMELOG) corrective regimen sliding scale   SubCutaneous at bedtime  insulin lispro Injectable (ADMELOG) 10 Unit(s) SubCutaneous three times a day before meals  levothyroxine 200 MICROGram(s) Oral <User Schedule>  levothyroxine 100 MICROGram(s) Oral <User Schedule>  lidocaine   4% Patch 1 Patch Transdermal daily  melatonin 5 milliGRAM(s) Oral at bedtime  naloxone Injectable 0.4 milliGRAM(s) IV Push once  nystatin Powder 1 Application(s) Topical every 12 hours  ondansetron Injectable 4 milliGRAM(s) IV Push every 8 hours PRN  pantoprazole    Tablet 40 milliGRAM(s) Oral before breakfast  polyethylene glycol 3350 17 Gram(s) Oral daily  povidone iodine 10% Solution 1 Application(s) Topical daily  senna 2 Tablet(s) Oral at bedtime      dextrose 50% Injectable 25 Gram(s) IV Push once  dextrose 50% Injectable 12.5 Gram(s) IV Push once  dextrose 50% Injectable 25 Gram(s) IV Push once  dextrose Oral Gel 15 Gram(s) Oral once PRN  glucagon  Injectable 1 milliGRAM(s) IntraMuscular once  insulin glargine Injectable (LANTUS) 12 Unit(s) SubCutaneous at bedtime  insulin lispro (ADMELOG) corrective regimen sliding scale   SubCutaneous three times a day before meals  insulin lispro (ADMELOG) corrective regimen sliding scale   SubCutaneous at bedtime  insulin lispro Injectable (ADMELOG) 10 Unit(s) SubCutaneous three times a day before meals  levothyroxine 200 MICROGram(s) Oral <User Schedule>  levothyroxine 100 MICROGram(s) Oral <User Schedule>      insulin lispro (ADMELOG) corrective regimen sliding scale   SubCutaneous three times a day before meals  insulin lispro (ADMELOG) corrective regimen sliding scale   SubCutaneous at bedtime  insulin lispro Injectable (ADMELOG) 10 Unit(s) SubCutaneous three times a day before meals      PHYSICAL EXAM:  VITALS:   T(C): 36.6 (04-16-25 @ 04:32), Max: 36.8 (04-15-25 @ 20:49)  HR: 57 (04-16-25 @ 04:32) (55 - 64)  BP: 106/75 (04-16-25 @ 04:32) (106/75 - 144/75)  RR: 18 (04-16-25 @ 04:32) (18 - 18)  SpO2: 95% (04-16-25 @ 04:32) (86% - 97%)    GENERAL: In no acute distress  Respiratory: Respirations unlabored, on 3L NC  Extremities: Warm and dry, +BLE edema  NEURO: Alert and oriented, appropriate     LABS:  POCT Blood Glucose.: 147 mg/dL (04-16-25 @ 08:25)  POCT Blood Glucose.: 156 mg/dL (04-15-25 @ 21:10)  POCT Blood Glucose.: 127 mg/dL (04-15-25 @ 17:17)  POCT Blood Glucose.: 154 mg/dL (04-15-25 @ 12:48)  POCT Blood Glucose.: 111 mg/dL (04-15-25 @ 08:29)  POCT Blood Glucose.: 228 mg/dL (04-14-25 @ 21:36)  POCT Blood Glucose.: 264 mg/dL (04-14-25 @ 17:28)  POCT Blood Glucose.: 206 mg/dL (04-14-25 @ 12:39)  POCT Blood Glucose.: 154 mg/dL (04-14-25 @ 08:27)  POCT Blood Glucose.: 181 mg/dL (04-13-25 @ 21:24)  POCT Blood Glucose.: 195 mg/dL (04-13-25 @ 17:17)  POCT Blood Glucose.: 227 mg/dL (04-13-25 @ 12:41)                          8.3    9.39  )-----------( 341      ( 15 Apr 2025 08:59 )             26.8     04-16    142  |  103  |  16  ----------------------------<  138[H]  3.4[L]   |  26  |  1.37[H]    Ca    8.6      16 Apr 2025 09:03  Phos  3.5     04-15  Mg     2.1     04-15        Urinalysis Basic - ( 16 Apr 2025 09:03 )    Color: x / Appearance: x / SG: x / pH: x  Gluc: 138 mg/dL / Ketone: x  / Bili: x / Urobili: x   Blood: x / Protein: x / Nitrite: x   Leuk Esterase: x / RBC: x / WBC x   Sq Epi: x / Non Sq Epi: x / Bacteria: x          A1C with Estimated Average Glucose Result: A1C with Estimated Average Glucose Result: 11.2 % (04-08-25 @ 07:27)  A1C with Estimated Average Glucose Result: 8.7 % (12-30-24 @ 08:42)

## 2025-04-16 NOTE — PROGRESS NOTE ADULT - PROBLEM SELECTOR PLAN 4
asa  statin
asa  statin
- Goal BP <130/80  - Management as per primary team  - check urine microalbumin level as outpatient
asa  statin

## 2025-04-16 NOTE — PROGRESS NOTE ADULT - PROBLEM SELECTOR PLAN 3
BMI 43.5    - Consider starting GLP1 as outpatient, no personal contraindications   - Consider weight management clinic Dr. Terrell Smith at [address] [facility] [address], [place] [address]
eliquis  pacerone
eliquis  pacerone
BMI 43.5    - Consider starting GLP1 as outpatient, no personal contraindications   - Consider weight management clinic Dr. Terrell Smith at [address] [facility] [address], [place] [address]
Eliquis  pacerone
BMI 43.5    - Consider starting GLP1 as outpatient, no personal contraindications   - Consider weight management clinic Dr. Terrell Smith at [address] [facility] [address], [place] [address]
eliquis  pacerone

## 2025-04-16 NOTE — PROGRESS NOTE ADULT - NSPROGADDITIONALINFOA_GEN_ALL_CORE
Discussed plan of care with patient's  at bedside  Discussed with ACP  Discussed with CM
Contact via Microsoft Teams during business hours  To reach covering provider access AMION via sunrise tools  For Urgent matters/after-hours/weekends/holidays please page endocrine fellow on call   For nonurgent matters please email [email or web address]    Please note that this patient may be followed by different provider tomorrow.  Notify endocrine 24 hours prior to discharge for final recommendations
Contact via Microsoft Teams during business hours  To reach covering provider access AMION via sunrise tools  For Urgent matters/after-hours/weekends/holidays please page endocrine fellow on call   For nonurgent matters please email [email or web address]    Please note that this patient may be followed by different provider tomorrow.  Notify endocrine 24 hours prior to discharge for final recommendations
Contact via Microsoft Teams during business hours  To reach covering provider access AMION via sunrise tools  For Urgent matters/after-hours/weekends/holidays please page endocrine fellow on call   For nonurgent matters please email [email or web address]    Please note that this patient may be followed by different provider tomorrow.  Notify endocrine 24 hours prior to discharge for final recommendations
Discussed with ACP, Claudette    Discussed with  and daughter at bedside.
Discussed with ACP, aPtricia
D/w ASHLEE Treviño
Discussed with ACPClaudette
Discussed with ACP, Kamila
Discussed with ACP, Patricia
Discussed with ACP, Patricia

## 2025-04-16 NOTE — PROGRESS NOTE ADULT - TIME BILLING
- Chart review  - Independent review and interpretation of data  - Bedside physical examination, patient assessment, and evaluation.  - Discussion with patient and family  - Medical decision making  - Care coordination
reviewing documentation, reviewing and interpreting labs/imaging, interviewing and examining patient, discussing plan of care with patient, documentation, coordinating care with ACP/ID
- Ordering, reviewing, and interpreting labs, testing, and imaging.  - Independently obtaining a review of systems and performing a physical exam  - Reviewing consultant documentation/recommendations in addition to discussing plan of care with consultants.  - Counselling and educating patient and family regarding interpretation of aforementioned items and plan of care.
Time-based billing (NON-critical care).     The necessity of the time spent during the encounter on this date of service was due to:     - Ordering, reviewing, and interpreting labs, testing, and imaging.  - Independently obtaining a review of systems and performing a physical exam  - Reviewing prior hospitalization and where necessary, outpatient records.  - Counselling and educating patient and family regarding interpretation of aforementioned items and plan of care.\  -I have spent 52 minutes of time on the encounter which excludes teaching and separately reported services.

## 2025-04-16 NOTE — PROVIDER CONTACT NOTE (CRITICAL VALUE NOTIFICATION) - BACKGROUND
Patient admitted with bilateral lower extremity swelling, right heel pain
Pt admitted for LE wounds
Dx: B/L LE cellulitis vs venous stasis; on vanco, duplex neg, GEOVANY/PVR neg for significant arterial vascular disease, podiatry following. DM2 w/ hyperglycemia; endo following  PMH: CVA, A-fib, HTN, HLD, hypothyroidism 2/2 thyroidectomy, stasis dermatitis, Obstructive lung disease, morbid obesity, neuropathy, anxiety

## 2025-04-16 NOTE — PROVIDER CONTACT NOTE (CRITICAL VALUE NOTIFICATION) - NS PROVIDER READ BACK
The patient had reported that she is on a blood thinner which she did not hold, she could not name the medication, Marcelino Luna PA-C named a few medications and patient felt that she was on Eliquis, initially we considered canceling her procedure however on further interrogation and after discussing with patient's  and reviewing her medication bottles it turns out that she has been off anticoagulation since the fall of 2017 and currently takes aspirin and Plavix.  Plavix was started after a peripheral interventional procedure.  We will go ahead and proceed to generator change due to end of battery life.  This was discussed with the patient and her  and they are willing to proceed.     Zan Poole MD, FACC, [facility]      
yes
yes

## 2025-04-16 NOTE — PROGRESS NOTE ADULT - PROVIDER SPECIALTY LIST ADULT
Hospitalist
Infectious Disease
Podiatry
Hospitalist
Infectious Disease
Endocrinology
Endocrinology
Hospitalist
Endocrinology
Hospitalist
Hospitalist

## 2025-04-16 NOTE — PROGRESS NOTE ADULT - PROBLEM SELECTOR PROBLEM 1
Uncontrolled type 2 diabetes mellitus with hyperglycemia, with long-term current use of insulin
Cellulitis

## 2025-04-16 NOTE — PROGRESS NOTE ADULT - SUBJECTIVE AND OBJECTIVE BOX
Follow Up:    SSTI    Interval History/ROS:  VSS ON. Patient was seen and examined at bedside. Denies fever. No foot pain.  at bedside.    Allergies  latex (Rash)  Lipitor (Nausea)        ANTIMICROBIALS:      OTHER MEDS:  MEDICATIONS  (STANDING):  acetaminophen     Tablet .. 650 every 6 hours PRN  albuterol    90 MICROgram(s) HFA Inhaler 2 every 6 hours PRN  ALPRAZolam 2 at bedtime PRN  aluminum hydroxide/magnesium hydroxide/simethicone Suspension 30 every 4 hours PRN  aMIOdarone    Tablet 200 daily  amLODIPine   Tablet 5 daily  apixaban 5 every 12 hours  aspirin enteric coated 81 daily  buDESOnide    Inhalation Suspension 0.5 every 12 hours  dextrose 50% Injectable 25 once  dextrose 50% Injectable 12.5 once  dextrose 50% Injectable 25 once  dextrose Oral Gel 15 once PRN  furosemide    Tablet 40 daily  glucagon  Injectable 1 once  insulin glargine Injectable (LANTUS) 12 at bedtime  insulin lispro (ADMELOG) corrective regimen sliding scale  three times a day before meals  insulin lispro (ADMELOG) corrective regimen sliding scale  at bedtime  insulin lispro Injectable (ADMELOG) 10 three times a day before meals  levothyroxine 200 <User Schedule>  levothyroxine 100 <User Schedule>  melatonin 5 at bedtime  ondansetron Injectable 4 every 8 hours PRN  pantoprazole    Tablet 40 before breakfast  polyethylene glycol 3350 17 daily  senna 2 at bedtime      Vital Signs Last 24 Hrs  T(C): 36.4 (16 Apr 2025 13:28), Max: 36.8 (15 Apr 2025 20:49)  T(F): 97.6 (16 Apr 2025 13:28), Max: 98.2 (15 Apr 2025 20:49)  HR: 63 (16 Apr 2025 13:28) (55 - 64)  BP: 117/71 (16 Apr 2025 13:28) (106/75 - 144/75)  BP(mean): --  RR: 18 (16 Apr 2025 13:28) (18 - 18)  SpO2: 96% (16 Apr 2025 13:28) (95% - 97%)    Parameters below as of 16 Apr 2025 13:28  Patient On (Oxygen Delivery Method): nasal cannula  O2 Flow (L/min): 2    PHYSICAL EXAM:  Constitutional: non-toxic, no distress at rest  HEAD/EYES: anicteric, no conjunctival injection  Cardiovascular:   normal S1, S2, no murmur, RRR  Respiratory:  clear BS bilaterally, no wheezes  GI:  soft, non-tender, obese  Musculoskeletal:  BLE edema much improved that prior exam  Neurologic: awake and alert, normal strength, no focal findings  Skin:  R heel w/ nonfluctuant, nontender, no active discharge; superficial ulceration w/ erythematous skin ulcer over the anterior aspect of RLE w/o discharge; no L foot open lesion; BLE turgor remarkably shrunken, skin visibly wrinkled up.                                  8.3    9.39  )-----------( 341      ( 15 Apr 2025 08:59 )             26.8       04-16    142  |  103  |  16  ----------------------------<  138[H]  3.4[L]   |  26  |  1.37[H]    Ca    8.6      16 Apr 2025 09:03  Phos  3.5     04-15  Mg     2.1     04-15        Urinalysis Basic - ( 16 Apr 2025 09:03 )    Color: x / Appearance: x / SG: x / pH: x  Gluc: 138 mg/dL / Ketone: x  / Bili: x / Urobili: x   Blood: x / Protein: x / Nitrite: x   Leuk Esterase: x / RBC: x / WBC x   Sq Epi: x / Non Sq Epi: x / Bacteria: x        MICROBIOLOGY:  v  Abscess R foot  04-10-25   Moderate Serratia marcescens  Rare Methicillin Resistant Staphylococcus aureus  --  Serratia marcescens  Methicillin resistant Staphylococcus aureus      Blood Blood  04-09-25   No growth at 5 days  --  --      Blood Blood-Peripheral  04-08-25   No growth at 5 days  --  --                RADIOLOGY:  Imaging below independently reviewed.  < from: Xray Abdomen 1 View PORTABLE -Routine (04.13.25 @ 11:42) >    ACC: [id number] EXAM:  XR ABDOMEN PORTABLE ROUTINE 1V   ORDERED BY: DELMI VIEYRA     PROCEDURE DATE:  04/13/2025          INTERPRETATION:  HISTORY: Abdominal pain    TECHNIQUE: 2 portable views of the abdomen    COMPARISON: CT scan dated 4/16/2024    FINDINGS: The bowel gas pattern is nonobstructive. There is mild gaseous   distention of the stomach. No pathologic calcifications are seen. The   osseous structures are intact.    IMPRESSION: Nonobstructive bowel gas pattern. Mild gaseous distention of   the stomach.    --- End of Report ---            RYAN BALTAZAR MD; Attending Radiologist  This document has been electronically signed. Apr 13 2025 11:47AM    < end of copied text >

## 2025-04-16 NOTE — DISCHARGE NOTE PROVIDER - NSDCCAREPROVSEEN_GEN_ALL_CORE_FT
Bhavna, Remington Nelson, Radha Cunningham, Senait Wagner, Gissel Espinoza, Kamila Velázquez, Vale Benites, Coco Salvador, Joann Goldstein, Kirill Morales, Fiorella Huizar, Ericka Chanel, Ernesto Licea, Tomás Alfredo, Feliciano Larry

## 2025-04-16 NOTE — DISCHARGE NOTE PROVIDER - ATTENDING DISCHARGE PHYSICAL EXAMINATION:
PHYSICAL EXAM:  GENERAL: NAD, well-developed  HEAD:  Atraumatic, Normocephalic  EYES: conjunctiva and sclera clear  NECK: Supple, No JVD  CHEST/LUNG: Clear to auscultation bilaterally; No wheeze  HEART: Regular rate and rhythm; No murmurs, rubs, or gallops

## 2025-04-16 NOTE — PROVIDER CONTACT NOTE (CRITICAL VALUE NOTIFICATION) - ACTION/TREATMENT ORDERED:
provider aware  Patient s/p course of antibiotics  Will continue with current POC and update as needed.
NP aware, pt on zosyn and vancomycin

## 2025-04-16 NOTE — DISCHARGE NOTE PROVIDER - CARE PROVIDER_API CALL
[facility],   [address] [facility]  Phone: [phone]  Fax: (   )    -  Follow Up Time: 1 week   [facility],   [address] [place] [address]  Phone: [phone]  Fax: (   )    -  Follow Up Time: 1 week    Sudarshan Marin  Endocrinology/Metab/Diabetes  [facility] [address]  [place] [address]  Phone: [phone]  Fax: [phone]  Follow Up Time: 1 week

## 2025-04-16 NOTE — PROGRESS NOTE ADULT - PROBLEM SELECTOR PROBLEM 2
CVA (cerebral vascular accident)
CVA (cerebral vascular accident)
Hypothyroidism
CVA (cerebral vascular accident)

## 2025-04-16 NOTE — PROGRESS NOTE ADULT - PROBLEM SELECTOR PLAN 2
residual R-sided weakness, mostly bedbound, getting home PT  c/w asa, eliquis, statin
Based on chart review 200 mcg caused hyperthyroxinemia.  TSH is suboptimal on 175 mcg daily with adherence.  Thyroid Function Tests:    Thyroid Stimulating Hormone, Serum: 12.80 uIU/mL (04-09-25 @ 06:29)  Thyroid Stimulating Hormone, Serum: 8.34 uIU/mL (04-08-25 @ 07:31)    Plan:  - Would recommend 200 mcg daily with 1/2 tab on Sundays.  - Repeat TFTs in 4-6 weeks as outpatient.  - PLEASE make sure LT4 is given on an empty stomach at least one hour apart from ALL other meds and food to ensure med absorption. DO NOT GIVE WITH VITAMINS/ANTACIDS. Noted patient is getting LT4 and PPI at different times.
Based on chart review 200 mcg caused hyperthyroxinemia.  TSH is suboptimal on 175 mcg daily with adherence.  Thyroid Function Tests:    Thyroid Stimulating Hormone, Serum: 12.80 uIU/mL (04-09-25 @ 06:29)  Thyroid Stimulating Hormone, Serum: 8.34 uIU/mL (04-08-25 @ 07:31)    Plan:  - Would recommend 200 mcg daily with 1/2 tab on Sundays.  - Repeat TFTs in 4-6 weeks as outpatient.  - PLEASE make sure LT4 is given on an empty stomach at least one hour apart from ALL other meds and food to ensure med absorption. DO NOT GIVE WITH VITAMINS/ANTACIDS. Noted patient is getting LT4 and PPI at different times.
residual R-sided weakness, mostly bedbound, getting home PT  c/w asa, eliquis, statin
residual R-sided weakness, mostly bedbound, getting home PT  c/w asa, eliquis, statin
Based on chart review 200 mcg caused hyperthyroxinemia.  TSH is suboptimal on 175 mcg daily with adherence.  Thyroid Function Tests:    Thyroid Stimulating Hormone, Serum: 12.80 uIU/mL (04-09-25 @ 06:29)  Thyroid Stimulating Hormone, Serum: 8.34 uIU/mL (04-08-25 @ 07:31)    Plan:  - Would recommend 200 mcg daily with 1/2 tab on Sundays.  - Repeat TFTs in 4-6 weeks as outpatient.  - PLEASE make sure LT4 is given on an empty stomach at least one hour apart from ALL other meds and food to ensure med absorption. DO NOT GIVE WITH VITAMINS/ANTACIDS. Noted patient is getting LT4 and PPI at different times.
residual R-sided weakness, mostly bedbound, getting home PT  c/w asa, eliquis, statin
residual R-sided weakness, mostly bedbound, getting home PT  Continue ASA, Eliquis, statin
residual R-sided weakness, mostly bedbound, getting home PT  asa  eliquis  statin
residual R-sided weakness, mostly bedbound, getting home PT  c/w asa, eliquis, statin

## 2025-04-16 NOTE — DISCHARGE NOTE PROVIDER - NSDCHHATTENDCERT_GEN_ALL_CORE
My signature below certifies that the above stated patient is homebound and upon completion of the Face-To-Face encounter, has the need for intermittent skilled nursing, physical therapy and/or speech or occupational therapy services in their home for their current diagnosis as outlined in their initial plan of care. These services will continue to be monitored by myself or another physician. O-L Flap Text: The defect edges were debeveled with a #15 scalpel blade.  Given the location of the defect, shape of the defect and the proximity to free margins an O-L flap was deemed most appropriate.  Using a sterile surgical marker, an appropriate advancement flap was drawn incorporating the defect and placing the expected incisions within the relaxed skin tension lines where possible.    The area thus outlined was incised deep to adipose tissue with a #15 scalpel blade.  The skin margins were undermined to an appropriate distance in all directions utilizing iris scissors.

## 2025-04-16 NOTE — PROGRESS NOTE ADULT - PROBLEM SELECTOR PLAN 1
Inpatient Plan:  - Check BG TID AC and HS while on PO diet   - C/w Lantus 12u QHS  - C/w Admelog 8u TID AC (HOLD if NPO)  - C/w low dose Admelog correctional scale TID AC and HS     Discharge Plan:  - Discharge on Lantus 12u QHS and Admelog 8u TID AC (hold admelog if skipping meal or pre-meal BG <100mg/dL). Please start a GLP1 weekly whichever covered by insurance (Ozempic 0.25mg subq once weekly OR Trulicity 0.75mg subq once weekly OR Mounjaro 2.5mg subq once weekly) to be titrated up outpatient as tolerated. Patient has no hx  of pancreatitis or hx/famhx of medullary thyroid CA.   - Recommend patient f/u with Endocrine outpatient for CGM.   - Patient should check BG TID AC and HS at home. Please tell patient to contact Endocrinologist if BG <70mg/dL x1 or >200mg/dL consistently or >400mg/dL x1.  - Endocrine follow up: would like to establish care at [address] [place] [address] Endocrinology ([phone]) with Dr. Galvin- please provide in d/c paperwork for patient to make appt. Added to appt candy 4/16.  - Recommend routine outpatient ophthalmology and podiatry follow up.

## 2025-04-16 NOTE — DISCHARGE NOTE PROVIDER - NSDCMRMEDTOKEN_GEN_ALL_CORE_FT
acetaminophen 325 mg oral tablet: 2 tab(s) orally every 6 hours as needed for  pain  Albuterol (Eqv-ProAir HFA) 90 mcg/inh inhalation aerosol: 2 puff(s) inhaled 2 times a day  ALPRAZolam 2 mg oral tablet: 1 tab(s) orally once a day (at bedtime) as needed for Anxiety/insomnia  amLODIPine 5 mg oral tablet: 1 orally once a day  apixaban 5 mg oral tablet: 1 tab(s) orally every 12 hours  aspirin 81 mg oral delayed release tablet: 1 tab(s) orally once a day  atorvastatin 40 mg oral tablet: 1 tab(s) orally once a day (at bedtime)  budesonide 0.5 mg/2 mL inhalation suspension: 2 milliliter(s) by nebulizer 2 times a day  insulin glargine 100 units/mL subcutaneous solution: 10 unit(s) subcutaneous once a day (at bedtime)  insulin lispro 100 units/mL injectable solution: 5 unit(s) injectable 3 times a day (before meals)  Lasix 40 mg oral tablet: 1 tab(s) orally once a day  melatonin 5 mg oral tablet: 1 tab(s) orally once a day (at bedtime)  MiraLax oral powder for reconstitution: 17 gram(s) orally once a day  Multiple Vitamins oral tablet: 1 tab(s) orally once a day  Pacerone 200 mg oral tablet: 1 tab(s) orally once a day orally  pantoprazole 40 mg oral delayed release tablet: 1 tab(s) orally once a day  Semi-electric hospital bed: [address] - 154.9cm  Wt - 104.30kg  I63.9  senna (sennosides) 8.6 mg oral tablet: 1 tab(s) orally once a day  Synthroid 175 mcg (0.175 mg) oral tablet: 1 tab(s) orally once a day   acetaminophen 325 mg oral tablet: 2 tab(s) orally every 6 hours as needed for  pain  Albuterol (Eqv-ProAir HFA) 90 mcg/inh inhalation aerosol: 2 puff(s) inhaled 2 times a day as needed for  shortness of breath and/or wheezing  ALPRAZolam 2 mg oral tablet: 1 tab(s) orally once a day (at bedtime) as needed for Anxiety/insomnia  amLODIPine 5 mg oral tablet: 1 orally once a day  apixaban 5 mg oral tablet: 1 tab(s) orally every 12 hours  aspirin 81 mg oral delayed release tablet: 1 tab(s) orally once a day  budesonide 0.5 mg/2 mL inhalation suspension: 2 milliliter(s) by nebulizer 2 times a day  Euthyrox 100 mcg (0.1 mg) oral tablet: 1 tab(s) orally once a day 1 tablet on Sundays  Euthyrox 200 mcg (0.2 mg) oral tablet: 1 tab(s) orally once a day 1 tablet Monday to Saturday  insulin glargine 100 units/mL subcutaneous solution: 10 unit(s) subcutaneous once a day (at bedtime)  insulin lispro 100 units/mL injectable solution: 5 unit(s) injectable 3 times a day (before meals)  Lasix 40 mg oral tablet: 1 tab(s) orally once a day  melatonin 5 mg oral tablet: 1 tab(s) orally once a day (at bedtime)  MiraLax oral powder for reconstitution: 17 gram(s) orally once a day  Multiple Vitamins oral tablet: 1 tab(s) orally once a day  Pacerone 200 mg oral tablet: 1 tab(s) orally once a day orally  pantoprazole 40 mg oral delayed release tablet: 1 tab(s) orally once a day  senna (sennosides) 8.6 mg oral tablet: 1 tab(s) orally once a day

## 2025-04-16 NOTE — PROGRESS NOTE ADULT - NUTRITIONAL ASSESSMENT
Diet, Consistent Carbohydrate/No Snacks (04-07-25 @ 19:32) [Active]

## 2025-04-16 NOTE — PROVIDER CONTACT NOTE (CRITICAL VALUE NOTIFICATION) - RECOMMENDATIONS
NP aware
Continue IV Abx, patient on IV vancomycin and daily wound care as per active wound care orders at this time
notify provider

## 2025-04-16 NOTE — DISCHARGE NOTE PROVIDER - NSDCCPCAREPLAN_GEN_ALL_CORE_FT
PRINCIPAL DISCHARGE DIAGNOSIS  Diagnosis: Cellulitis  Assessment and Plan of Treatment: Medications: You completed a course of antibiotics  Wound Care: Follow the wound care instructions provided by the wound care team. Keep the wound clean and dry, and change dressings as instructed. Contact your physician or wound care specialist if you notice any signs of infection, such as increased pain, redness, swelling, or pus.  Leg Elevation: Elevate your legs above your heart level for at least 30 minutes several times a day. This helps reduce swelling and improve circulation. Continue using ice packs as needed for pain and swelling.  Follow-up: Schedule and keep your follow-up appointments with your primary care physician, podiatrist, and any other specialists as directed.      SECONDARY DISCHARGE DIAGNOSES  Diagnosis: Obstructive lung disease  Assessment and Plan of Treatment: Medications: Continue taking your prescribed pulmicort and albuterol inhalers as directed.  Monitoring: Monitor your oxygen saturation levels at home as instructed. If your oxygen levels drop below the target range, contact your physician.    Diagnosis: DM (diabetes mellitus)  Assessment and Plan of Treatment: Medications: Continue your adjusted insulin regimen as recommended by the endocrinologist. Monitor your blood sugar levels regularly at home.  Diet: Follow a carbohydrate-consistent diet as instructed by your dietitian or diabetes educator.  Foot Care: Pay close attention to your foot hygiene and inspect your feet daily for any sores, cuts, blisters, or changes in skin color. Contact your physician immediately if you notice any problems with your feet. This is especially important given your history of cellulitis and non-healing wounds.    Diagnosis: Hypothyroidism  Assessment and Plan of Treatment: Medications: Take your adjusted dose of levothyroxine as prescribed.  Follow-up: Repeat thyroid function tests will be needed as an outpatient. Follow up with your primary care physician or endocrinologist for those results.    Diagnosis: Constipation  Assessment and Plan of Treatment: Bowel Regimen: Continue the prescribed bowel regimen to promote regular bowel movements. Increase your fluid and fiber intake as tolerated.

## 2025-04-16 NOTE — DISCHARGE NOTE PROVIDER - NSFOLLOWUPCLINICSTOKEN_GEN_ALL_ED_FT
058335:2 weeks|| ||00\01||False; 142751:2 weeks|| ||00\01||False;615087: || ||00\01||False;168714: || ||00\01||False;

## 2025-04-16 NOTE — DISCHARGE NOTE PROVIDER - NSFOLLOWUPCLINICS_GEN_ALL_ED_FT
[facility] General Internal Medicine  General Internal Medicine  [address] [id number]  Phone: [phone]  Fax:   Follow Up Time: 2 weeks     [facility] General Internal Medicine  General Internal Medicine  [address] [place] [address]  Phone: [phone]  Fax:   Follow Up Time: 2 weeks    A Podiatrist  Podiatry  .  [place]   Phone:   Fax:     An Ophthalmologist  Ophthalmology  .  [place]   Phone:   Fax:

## 2025-04-21 NOTE — DISCHARGE NOTE NURSING/CASE MANAGEMENT/SOCIAL WORK - NSDCFUADDAPPT_GEN_ALL_CORE_FT
Podiatry Discharge Instructions:  Follow up: Please follow up with Dr. Montesinos within 1 week of discharge from the hospital, please call [phone] for appointment and discuss that you recently were seen in the hospital.  Wound Care: Please apply iodosorb, 4x4 gauze, ABD pad and jarvis to R foot wound daily. Please ensure ACE bandages applied to bilateral lower extremities  Weight bearing: Z oh boots to be warn at all times while in bed or resting in chair to offload bilateral extremities.  Antibiotics: Please continue as instructed.  APPTS ARE READY TO BE MADE: [x] YES    Best Family or Patient Contact (if needed):    Additional Information about above appointments (if needed):    1: Wound care  2: PCP  3: Podiatry    Other comments or requests:    36.1

## 2025-04-25 ENCOUNTER — RX RENEWAL (OUTPATIENT)
Age: 75
End: 2025-04-25

## 2025-04-25 ENCOUNTER — APPOINTMENT (OUTPATIENT)
Dept: CARE COORDINATION | Facility: HOME HEALTH | Age: 75
End: 2025-04-25

## 2025-04-25 VITALS — HEART RATE: 68 BPM | SYSTOLIC BLOOD PRESSURE: 160 MMHG | DIASTOLIC BLOOD PRESSURE: 70 MMHG | OXYGEN SATURATION: 97 %

## 2025-04-25 DIAGNOSIS — B36.9 SUPERFICIAL MYCOSIS, UNSPECIFIED: ICD-10-CM

## 2025-04-25 DIAGNOSIS — F41.9 ANXIETY DISORDER, UNSPECIFIED: ICD-10-CM

## 2025-04-25 DIAGNOSIS — K59.00 CONSTIPATION, UNSPECIFIED: ICD-10-CM

## 2025-04-25 DIAGNOSIS — I87.2 VENOUS INSUFFICIENCY (CHRONIC) (PERIPHERAL): ICD-10-CM

## 2025-04-25 PROBLEM — L03.119 CELLULITIS OF LOWER EXTREMITY, UNSPECIFIED LATERALITY: Status: ACTIVE | Noted: 2025-04-25

## 2025-04-25 PROCEDURE — 99349 HOME/RES VST EST MOD MDM 40: CPT | Mod: 2W

## 2025-04-30 ENCOUNTER — NON-APPOINTMENT (OUTPATIENT)
Age: 75
End: 2025-04-30

## 2025-04-30 ENCOUNTER — OUTPATIENT (OUTPATIENT)
Dept: OUTPATIENT SERVICES | Facility: HOSPITAL | Age: 75
LOS: 1 days | End: 2025-04-30

## 2025-04-30 ENCOUNTER — APPOINTMENT (OUTPATIENT)
Dept: INTERNAL MEDICINE | Facility: CLINIC | Age: 75
End: 2025-04-30

## 2025-04-30 VITALS
OXYGEN SATURATION: 95 % | BODY MASS INDEX: 47.12 KG/M2 | DIASTOLIC BLOOD PRESSURE: 76 MMHG | HEART RATE: 61 BPM | HEIGHT: 60 IN | WEIGHT: 240 LBS | SYSTOLIC BLOOD PRESSURE: 154 MMHG

## 2025-04-30 DIAGNOSIS — L03.119 CELLULITIS OF UNSPECIFIED PART OF LIMB: ICD-10-CM

## 2025-04-30 DIAGNOSIS — Z90.710 ACQUIRED ABSENCE OF BOTH CERVIX AND UTERUS: Chronic | ICD-10-CM

## 2025-04-30 DIAGNOSIS — R06.2 WHEEZING: ICD-10-CM

## 2025-04-30 DIAGNOSIS — E89.0 POSTPROCEDURAL HYPOTHYROIDISM: Chronic | ICD-10-CM

## 2025-04-30 DIAGNOSIS — I10 ESSENTIAL (PRIMARY) HYPERTENSION: ICD-10-CM

## 2025-04-30 DIAGNOSIS — I48.91 UNSPECIFIED ATRIAL FIBRILLATION: ICD-10-CM

## 2025-04-30 DIAGNOSIS — Z78.9 OTHER SPECIFIED HEALTH STATUS: Chronic | ICD-10-CM

## 2025-04-30 DIAGNOSIS — I86.8 VARICOSE VEINS OF OTHER SPECIFIED SITES: Chronic | ICD-10-CM

## 2025-04-30 DIAGNOSIS — Z98.49 CATARACT EXTRACTION STATUS, UNSPECIFIED EYE: Chronic | ICD-10-CM

## 2025-04-30 DIAGNOSIS — I25.10 ATHEROSCLEROTIC HEART DISEASE OF NATIVE CORONARY ARTERY W/OUT ANGINA PECTORIS: ICD-10-CM

## 2025-04-30 PROCEDURE — ZZZZZ: CPT

## 2025-04-30 RX ORDER — BUDESONIDE 0.5 MG/2ML
0.5 INHALANT ORAL TWICE DAILY
Qty: 1 | Refills: 0 | Status: ACTIVE | COMMUNITY
Start: 2025-04-30

## 2025-04-30 RX ORDER — NYSTATIN 100MM UNIT
POWDER (EA) MISCELLANEOUS
Qty: 1 | Refills: 1 | Status: ACTIVE | COMMUNITY
Start: 2025-04-25 | End: 1900-01-01

## 2025-04-30 RX ORDER — LACTULOSE 10 G/15ML
10 SOLUTION ORAL DAILY
Qty: 1 | Refills: 0 | Status: COMPLETED | COMMUNITY
Start: 2025-04-25 | End: 2025-04-30

## 2025-04-30 RX ORDER — LEVOTHYROXINE SODIUM 0.1 MG/1
100 TABLET ORAL DAILY
Qty: 30 | Refills: 0 | Status: ACTIVE | COMMUNITY
Start: 2025-04-30

## 2025-04-30 RX ORDER — ALBUTEROL SULFATE 90 UG/1
108 (90 BASE) INHALANT RESPIRATORY (INHALATION)
Qty: 1 | Refills: 0 | Status: ACTIVE | COMMUNITY
Start: 2025-04-30

## 2025-05-02 ENCOUNTER — APPOINTMENT (OUTPATIENT)
Dept: ENDOCRINOLOGY | Facility: CLINIC | Age: 75
End: 2025-05-02
Payer: MEDICARE

## 2025-05-02 VITALS
OXYGEN SATURATION: 94 % | BODY MASS INDEX: 47.12 KG/M2 | WEIGHT: 240 LBS | DIASTOLIC BLOOD PRESSURE: 84 MMHG | SYSTOLIC BLOOD PRESSURE: 123 MMHG | HEIGHT: 60 IN | HEART RATE: 62 BPM

## 2025-05-02 DIAGNOSIS — E66.9 OBESITY, UNSPECIFIED: ICD-10-CM

## 2025-05-02 DIAGNOSIS — E03.9 HYPOTHYROIDISM, UNSPECIFIED: ICD-10-CM

## 2025-05-02 DIAGNOSIS — I10 ESSENTIAL (PRIMARY) HYPERTENSION: ICD-10-CM

## 2025-05-02 DIAGNOSIS — E11.9 TYPE 2 DIABETES MELLITUS W/OUT COMPLICATIONS: ICD-10-CM

## 2025-05-02 LAB
GLUCOSE BLDC GLUCOMTR-MCNC: 206
HBA1C MFR BLD HPLC: 9.3

## 2025-05-02 PROCEDURE — 95251 CONT GLUC MNTR ANALYSIS I&R: CPT

## 2025-05-02 PROCEDURE — 82962 GLUCOSE BLOOD TEST: CPT

## 2025-05-02 PROCEDURE — 83036 HEMOGLOBIN GLYCOSYLATED A1C: CPT | Mod: QW

## 2025-05-02 PROCEDURE — G2211 COMPLEX E/M VISIT ADD ON: CPT

## 2025-05-02 PROCEDURE — 36415 COLL VENOUS BLD VENIPUNCTURE: CPT

## 2025-05-02 PROCEDURE — 99214 OFFICE O/P EST MOD 30 MIN: CPT

## 2025-05-05 DIAGNOSIS — R79.89 OTHER SPECIFIED ABNORMAL FINDINGS OF BLOOD CHEMISTRY: ICD-10-CM

## 2025-05-05 DIAGNOSIS — E78.5 HYPERLIPIDEMIA, UNSPECIFIED: ICD-10-CM

## 2025-05-05 LAB
25(OH)D3 SERPL-MCNC: 14.4 NG/ML
ALBUMIN SERPL ELPH-MCNC: 3.8 G/DL
ALP BLD-CCNC: 78 U/L
ALT SERPL-CCNC: 10 U/L
ANION GAP SERPL CALC-SCNC: 18 MMOL/L
AST SERPL-CCNC: 13 U/L
BASOPHILS # BLD AUTO: 0.06 K/UL
BASOPHILS NFR BLD AUTO: 0.7 %
BILIRUB SERPL-MCNC: 0.5 MG/DL
BUN SERPL-MCNC: 19 MG/DL
CALCIUM SERPL-MCNC: 8.9 MG/DL
CHLORIDE SERPL-SCNC: 101 MMOL/L
CHOLEST SERPL-MCNC: 170 MG/DL
CO2 SERPL-SCNC: 24 MMOL/L
CREAT SERPL-MCNC: 1.13 MG/DL
EGFRCR SERPLBLD CKD-EPI 2021: 50 ML/MIN/1.73M2
EOSINOPHIL # BLD AUTO: 0.52 K/UL
EOSINOPHIL NFR BLD AUTO: 5.9 %
ESTIMATED AVERAGE GLUCOSE: 240 MG/DL
FERRITIN SERPL-MCNC: 20 NG/ML
FOLATE SERPL-MCNC: 14.7 NG/ML
FRUCTOSAMINE SERPL-MCNC: 349 UMOL/L
GLUCOSE SERPL-MCNC: 185 MG/DL
GLYCOMARK.: 5.2 UG/ML
HBA1C MFR BLD HPLC: 10 %
HCT VFR BLD CALC: 27 %
HDLC SERPL-MCNC: 48 MG/DL
HGB BLD-MCNC: 8.2 G/DL
IMM GRANULOCYTES NFR BLD AUTO: 0.8 %
IRON SERPL-MCNC: 29 UG/DL
LDLC SERPL-MCNC: 98 MG/DL
LYMPHOCYTES # BLD AUTO: 1.76 K/UL
LYMPHOCYTES NFR BLD AUTO: 20 %
MAGNESIUM SERPL-MCNC: 2 MG/DL
MAN DIFF?: NORMAL
MCHC RBC-ENTMCNC: 25.8 PG
MCHC RBC-ENTMCNC: 30.4 G/DL
MCV RBC AUTO: 84.9 FL
MONOCYTES # BLD AUTO: 0.48 K/UL
MONOCYTES NFR BLD AUTO: 5.4 %
NEUTROPHILS # BLD AUTO: 5.93 K/UL
NEUTROPHILS NFR BLD AUTO: 67.2 %
NONHDLC SERPL-MCNC: 122 MG/DL
PLATELET # BLD AUTO: 312 K/UL
POTASSIUM SERPL-SCNC: 3.8 MMOL/L
PROT SERPL-MCNC: 6.6 G/DL
RBC # BLD: 3.18 M/UL
RBC # FLD: 14.8 %
SODIUM SERPL-SCNC: 142 MMOL/L
T3FREE SERPL-MCNC: 1.19 PG/ML
T4 FREE SERPL-MCNC: 1.2 NG/DL
TRIGL SERPL-MCNC: 137 MG/DL
TSH SERPL-ACNC: 15.6 UIU/ML
VIT B12 SERPL-MCNC: 413 PG/ML
WBC # FLD AUTO: 8.82 K/UL

## 2025-05-05 RX ORDER — TIRZEPATIDE 2.5 MG/.5ML
2.5 INJECTION, SOLUTION SUBCUTANEOUS
Qty: 1 | Refills: 1 | Status: ACTIVE | COMMUNITY
Start: 2025-05-05 | End: 1900-01-01

## 2025-05-19 ENCOUNTER — APPOINTMENT (OUTPATIENT)
Dept: GASTROENTEROLOGY | Facility: CLINIC | Age: 75
End: 2025-05-19

## 2025-06-11 ENCOUNTER — APPOINTMENT (OUTPATIENT)
Dept: INTERNAL MEDICINE | Facility: CLINIC | Age: 75
End: 2025-06-11

## 2025-07-11 NOTE — H&P PST ADULT - NSCAFFEAMTFREQ_GEN_ALL_CORE_SD
How Severe Is Your Skin Lesion?: moderate Has Your Skin Lesion Been Treated?: not been treated Is This A New Presentation, Or A Follow-Up?: Skin Lesion 1-2 cups/cans per day

## 2025-07-24 ENCOUNTER — APPOINTMENT (OUTPATIENT)
Dept: ENDOCRINOLOGY | Facility: CLINIC | Age: 75
End: 2025-07-24

## 2025-07-24 ENCOUNTER — INPATIENT (INPATIENT)
Facility: HOSPITAL | Age: 75
LOS: 41 days | Discharge: NURSING, REHAB OR LONG-TERM CARE | DRG: 948 | End: 2025-09-04
Attending: HOSPITALIST | Admitting: STUDENT IN AN ORGANIZED HEALTH CARE EDUCATION/TRAINING PROGRAM
Payer: MEDICARE

## 2025-07-24 VITALS
DIASTOLIC BLOOD PRESSURE: 39 MMHG | RESPIRATION RATE: 18 BRPM | HEART RATE: 66 BPM | HEIGHT: 61 IN | TEMPERATURE: 98 F | OXYGEN SATURATION: 96 % | SYSTOLIC BLOOD PRESSURE: 120 MMHG | WEIGHT: 250 LBS

## 2025-07-24 DIAGNOSIS — Z98.49 CATARACT EXTRACTION STATUS, UNSPECIFIED EYE: Chronic | ICD-10-CM

## 2025-07-24 DIAGNOSIS — Z90.710 ACQUIRED ABSENCE OF BOTH CERVIX AND UTERUS: Chronic | ICD-10-CM

## 2025-07-24 DIAGNOSIS — I86.8 VARICOSE VEINS OF OTHER SPECIFIED SITES: Chronic | ICD-10-CM

## 2025-07-24 DIAGNOSIS — Z78.9 OTHER SPECIFIED HEALTH STATUS: Chronic | ICD-10-CM

## 2025-07-24 DIAGNOSIS — E89.0 POSTPROCEDURAL HYPOTHYROIDISM: Chronic | ICD-10-CM

## 2025-07-24 LAB
ALBUMIN SERPL ELPH-MCNC: 3.8 G/DL — SIGNIFICANT CHANGE UP (ref 3.3–5)
ALP SERPL-CCNC: 100 U/L — SIGNIFICANT CHANGE UP (ref 40–120)
ALT FLD-CCNC: 12 U/L — SIGNIFICANT CHANGE UP (ref 10–45)
ANION GAP SERPL CALC-SCNC: 16 MMOL/L — SIGNIFICANT CHANGE UP (ref 5–17)
AST SERPL-CCNC: 14 U/L — SIGNIFICANT CHANGE UP (ref 10–40)
BASOPHILS # BLD AUTO: 0.09 K/UL — SIGNIFICANT CHANGE UP (ref 0–0.2)
BASOPHILS NFR BLD AUTO: 0.5 % — SIGNIFICANT CHANGE UP (ref 0–2)
BILIRUB SERPL-MCNC: 0.4 MG/DL — SIGNIFICANT CHANGE UP (ref 0.2–1.2)
BUN SERPL-MCNC: 61 MG/DL — HIGH (ref 7–23)
CALCIUM SERPL-MCNC: 9.3 MG/DL — SIGNIFICANT CHANGE UP (ref 8.4–10.5)
CHLORIDE SERPL-SCNC: 91 MMOL/L — LOW (ref 96–108)
CO2 SERPL-SCNC: 18 MMOL/L — LOW (ref 22–31)
CREAT SERPL-MCNC: 1.9 MG/DL — HIGH (ref 0.5–1.3)
EGFR: 27 ML/MIN/1.73M2 — LOW
EGFR: 27 ML/MIN/1.73M2 — LOW
EOSINOPHIL # BLD AUTO: 0.27 K/UL — SIGNIFICANT CHANGE UP (ref 0–0.5)
EOSINOPHIL NFR BLD AUTO: 1.4 % — SIGNIFICANT CHANGE UP (ref 0–6)
GLUCOSE SERPL-MCNC: 292 MG/DL — HIGH (ref 70–99)
HCT VFR BLD CALC: 35.8 % — SIGNIFICANT CHANGE UP (ref 34.5–45)
HGB BLD-MCNC: 11 G/DL — LOW (ref 11.5–15.5)
IMM GRANULOCYTES # BLD AUTO: 0.34 K/UL — HIGH (ref 0–0.07)
IMM GRANULOCYTES NFR BLD AUTO: 1.8 % — HIGH (ref 0–0.9)
LIDOCAIN IGE QN: 43 U/L — SIGNIFICANT CHANGE UP (ref 7–60)
LYMPHOCYTES # BLD AUTO: 2.08 K/UL — SIGNIFICANT CHANGE UP (ref 1–3.3)
LYMPHOCYTES NFR BLD AUTO: 11.1 % — LOW (ref 13–44)
MAGNESIUM SERPL-MCNC: 2.8 MG/DL — HIGH (ref 1.6–2.6)
MCHC RBC-ENTMCNC: 24.3 PG — LOW (ref 27–34)
MCHC RBC-ENTMCNC: 30.7 G/DL — LOW (ref 32–36)
MCV RBC AUTO: 79.2 FL — LOW (ref 80–100)
MONOCYTES # BLD AUTO: 0.88 K/UL — SIGNIFICANT CHANGE UP (ref 0–0.9)
MONOCYTES NFR BLD AUTO: 4.7 % — SIGNIFICANT CHANGE UP (ref 2–14)
NEUTROPHILS # BLD AUTO: 15.15 K/UL — HIGH (ref 1.8–7.4)
NEUTROPHILS NFR BLD AUTO: 80.5 % — HIGH (ref 43–77)
NRBC # BLD AUTO: 0 K/UL — SIGNIFICANT CHANGE UP (ref 0–0)
NRBC # FLD: 0 K/UL — SIGNIFICANT CHANGE UP (ref 0–0)
NRBC BLD AUTO-RTO: 0 /100 WBCS — SIGNIFICANT CHANGE UP (ref 0–0)
NT-PROBNP SERPL-SCNC: 251 PG/ML — SIGNIFICANT CHANGE UP (ref 0–300)
PLATELET # BLD AUTO: 492 K/UL — HIGH (ref 150–400)
PMV BLD: 10 FL — SIGNIFICANT CHANGE UP (ref 7–13)
POTASSIUM SERPL-MCNC: 6.3 MMOL/L — CRITICAL HIGH (ref 3.5–5.3)
POTASSIUM SERPL-SCNC: 6.3 MMOL/L — CRITICAL HIGH (ref 3.5–5.3)
PROT SERPL-MCNC: 7.9 G/DL — SIGNIFICANT CHANGE UP (ref 6–8.3)
RBC # BLD: 4.52 M/UL — SIGNIFICANT CHANGE UP (ref 3.8–5.2)
RBC # FLD: 16.6 % — HIGH (ref 10.3–14.5)
SODIUM SERPL-SCNC: 125 MMOL/L — LOW (ref 135–145)
WBC # BLD: 18.81 K/UL — HIGH (ref 3.8–10.5)
WBC # FLD AUTO: 18.81 K/UL — HIGH (ref 3.8–10.5)

## 2025-07-24 PROCEDURE — 73630 X-RAY EXAM OF FOOT: CPT | Mod: 26,50

## 2025-07-24 PROCEDURE — 73590 X-RAY EXAM OF LOWER LEG: CPT | Mod: 26,50

## 2025-07-24 PROCEDURE — 99291 CRITICAL CARE FIRST HOUR: CPT | Mod: GC

## 2025-07-24 PROCEDURE — 71045 X-RAY EXAM CHEST 1 VIEW: CPT | Mod: 26

## 2025-07-24 PROCEDURE — 93010 ELECTROCARDIOGRAM REPORT: CPT

## 2025-07-24 RX ORDER — SODIUM ZIRCONIUM CYCLOSILICATE 5 G/5G
10 POWDER, FOR SUSPENSION ORAL ONCE
Refills: 0 | Status: COMPLETED | OUTPATIENT
Start: 2025-07-24 | End: 2025-07-24

## 2025-07-24 RX ORDER — ALPRAZOLAM 0.5 MG
2 TABLET, EXTENDED RELEASE 24 HR ORAL ONCE
Refills: 0 | Status: DISCONTINUED | OUTPATIENT
Start: 2025-07-24 | End: 2025-07-24

## 2025-07-24 RX ORDER — KETOROLAC TROMETHAMINE 30 MG/ML
30 INJECTION, SOLUTION INTRAMUSCULAR; INTRAVENOUS ONCE
Refills: 0 | Status: DISCONTINUED | OUTPATIENT
Start: 2025-07-24 | End: 2025-07-24

## 2025-07-24 RX ORDER — INSULIN LISPRO 100 U/ML
10 INJECTION, SOLUTION INTRAVENOUS; SUBCUTANEOUS ONCE
Refills: 0 | Status: DISCONTINUED | OUTPATIENT
Start: 2025-07-24 | End: 2025-07-24

## 2025-07-24 RX ADMIN — KETOROLAC TROMETHAMINE 30 MILLIGRAM(S): 30 INJECTION, SOLUTION INTRAMUSCULAR; INTRAVENOUS at 22:45

## 2025-07-24 RX ADMIN — Medication 10 UNIT(S): at 23:53

## 2025-07-24 RX ADMIN — SODIUM ZIRCONIUM CYCLOSILICATE 10 GRAM(S): 5 POWDER, FOR SUSPENSION ORAL at 23:53
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
## 2025-07-25 DIAGNOSIS — M79.605 PAIN IN LEFT LEG: ICD-10-CM

## 2025-07-25 DIAGNOSIS — I50.32 CHRONIC DIASTOLIC (CONGESTIVE) HEART FAILURE: ICD-10-CM

## 2025-07-25 DIAGNOSIS — I25.10 ATHEROSCLEROTIC HEART DISEASE OF NATIVE CORONARY ARTERY WITHOUT ANGINA PECTORIS: ICD-10-CM

## 2025-07-25 DIAGNOSIS — Z29.9 ENCOUNTER FOR PROPHYLACTIC MEASURES, UNSPECIFIED: ICD-10-CM

## 2025-07-25 DIAGNOSIS — I10 ESSENTIAL (PRIMARY) HYPERTENSION: ICD-10-CM

## 2025-07-25 DIAGNOSIS — E11.9 TYPE 2 DIABETES MELLITUS WITHOUT COMPLICATIONS: ICD-10-CM

## 2025-07-25 DIAGNOSIS — E03.9 HYPOTHYROIDISM, UNSPECIFIED: ICD-10-CM

## 2025-07-25 DIAGNOSIS — E87.5 HYPERKALEMIA: ICD-10-CM

## 2025-07-25 DIAGNOSIS — N17.9 ACUTE KIDNEY FAILURE, UNSPECIFIED: ICD-10-CM

## 2025-07-25 DIAGNOSIS — E87.1 HYPO-OSMOLALITY AND HYPONATREMIA: ICD-10-CM

## 2025-07-25 DIAGNOSIS — D50.9 IRON DEFICIENCY ANEMIA, UNSPECIFIED: ICD-10-CM

## 2025-07-25 DIAGNOSIS — R60.0 LOCALIZED EDEMA: ICD-10-CM

## 2025-07-25 LAB
ADD ON TEST-SPECIMEN IN LAB: SIGNIFICANT CHANGE UP
ADD ON TEST-SPECIMEN IN LAB: SIGNIFICANT CHANGE UP
ALBUMIN SERPL ELPH-MCNC: 3.2 G/DL — LOW (ref 3.3–5)
ALBUMIN SERPL ELPH-MCNC: 3.2 G/DL — LOW (ref 3.3–5)
ALP SERPL-CCNC: 84 U/L — SIGNIFICANT CHANGE UP (ref 40–120)
ALP SERPL-CCNC: 87 U/L — SIGNIFICANT CHANGE UP (ref 40–120)
ALT FLD-CCNC: 8 U/L — LOW (ref 10–45)
ALT FLD-CCNC: 9 U/L — LOW (ref 10–45)
ANION GAP SERPL CALC-SCNC: 13 MMOL/L — SIGNIFICANT CHANGE UP (ref 5–17)
ANION GAP SERPL CALC-SCNC: 14 MMOL/L — SIGNIFICANT CHANGE UP (ref 5–17)
AST SERPL-CCNC: 7 U/L — LOW (ref 10–40)
AST SERPL-CCNC: 8 U/L — LOW (ref 10–40)
BASOPHILS # BLD AUTO: 0.05 K/UL — SIGNIFICANT CHANGE UP (ref 0–0.2)
BASOPHILS NFR BLD AUTO: 0.4 % — SIGNIFICANT CHANGE UP (ref 0–2)
BILIRUB SERPL-MCNC: 0.3 MG/DL — SIGNIFICANT CHANGE UP (ref 0.2–1.2)
BILIRUB SERPL-MCNC: 0.4 MG/DL — SIGNIFICANT CHANGE UP (ref 0.2–1.2)
BUN SERPL-MCNC: 61 MG/DL — HIGH (ref 7–23)
BUN SERPL-MCNC: 63 MG/DL — HIGH (ref 7–23)
CALCIUM SERPL-MCNC: 8.7 MG/DL — SIGNIFICANT CHANGE UP (ref 8.4–10.5)
CALCIUM SERPL-MCNC: 8.8 MG/DL — SIGNIFICANT CHANGE UP (ref 8.4–10.5)
CHLORIDE SERPL-SCNC: 94 MMOL/L — LOW (ref 96–108)
CHLORIDE SERPL-SCNC: 96 MMOL/L — SIGNIFICANT CHANGE UP (ref 96–108)
CO2 SERPL-SCNC: 18 MMOL/L — LOW (ref 22–31)
CO2 SERPL-SCNC: 20 MMOL/L — LOW (ref 22–31)
CREAT ?TM UR-MCNC: 103 MG/DL — SIGNIFICANT CHANGE UP
CREAT SERPL-MCNC: 1.81 MG/DL — HIGH (ref 0.5–1.3)
CREAT SERPL-MCNC: 2.02 MG/DL — HIGH (ref 0.5–1.3)
CRP SERPL-MCNC: 25 MG/L — HIGH (ref 0–4)
EGFR: 25 ML/MIN/1.73M2 — LOW
EGFR: 25 ML/MIN/1.73M2 — LOW
EGFR: 28 ML/MIN/1.73M2 — LOW
EGFR: 28 ML/MIN/1.73M2 — LOW
EOSINOPHIL # BLD AUTO: 0.27 K/UL — SIGNIFICANT CHANGE UP (ref 0–0.5)
EOSINOPHIL NFR BLD AUTO: 1.9 % — SIGNIFICANT CHANGE UP (ref 0–6)
ERYTHROCYTE [SEDIMENTATION RATE] IN BLOOD: 103 MM/HR — HIGH (ref 0–20)
GLUCOSE BLDC GLUCOMTR-MCNC: 194 MG/DL — HIGH (ref 70–99)
GLUCOSE BLDC GLUCOMTR-MCNC: 252 MG/DL — HIGH (ref 70–99)
GLUCOSE BLDC GLUCOMTR-MCNC: 270 MG/DL — HIGH (ref 70–99)
GLUCOSE BLDC GLUCOMTR-MCNC: 339 MG/DL — HIGH (ref 70–99)
GLUCOSE SERPL-MCNC: 232 MG/DL — HIGH (ref 70–99)
GLUCOSE SERPL-MCNC: 263 MG/DL — HIGH (ref 70–99)
HCT VFR BLD CALC: 30.5 % — LOW (ref 34.5–45)
HGB BLD-MCNC: 9.4 G/DL — LOW (ref 11.5–15.5)
IMM GRANULOCYTES # BLD AUTO: 0.19 K/UL — HIGH (ref 0–0.07)
IMM GRANULOCYTES NFR BLD AUTO: 1.3 % — HIGH (ref 0–0.9)
LYMPHOCYTES # BLD AUTO: 1.03 K/UL — SIGNIFICANT CHANGE UP (ref 1–3.3)
LYMPHOCYTES NFR BLD AUTO: 7.2 % — LOW (ref 13–44)
MAGNESIUM SERPL-MCNC: 2.7 MG/DL — HIGH (ref 1.6–2.6)
MCHC RBC-ENTMCNC: 24.5 PG — LOW (ref 27–34)
MCHC RBC-ENTMCNC: 30.8 G/DL — LOW (ref 32–36)
MCV RBC AUTO: 79.4 FL — LOW (ref 80–100)
MONOCYTES # BLD AUTO: 0.69 K/UL — SIGNIFICANT CHANGE UP (ref 0–0.9)
MONOCYTES NFR BLD AUTO: 4.8 % — SIGNIFICANT CHANGE UP (ref 2–14)
NEUTROPHILS # BLD AUTO: 12.04 K/UL — HIGH (ref 1.8–7.4)
NEUTROPHILS NFR BLD AUTO: 84.4 % — HIGH (ref 43–77)
NRBC # BLD AUTO: 0 K/UL — SIGNIFICANT CHANGE UP (ref 0–0)
NRBC # FLD: 0 K/UL — SIGNIFICANT CHANGE UP (ref 0–0)
NRBC BLD AUTO-RTO: 0 /100 WBCS — SIGNIFICANT CHANGE UP (ref 0–0)
OSMOLALITY UR: 500 MOS/KG — SIGNIFICANT CHANGE UP (ref 300–900)
PHOSPHATE SERPL-MCNC: 4.4 MG/DL — SIGNIFICANT CHANGE UP (ref 2.5–4.5)
PLATELET # BLD AUTO: 362 K/UL — SIGNIFICANT CHANGE UP (ref 150–400)
PMV BLD: 9.7 FL — SIGNIFICANT CHANGE UP (ref 7–13)
POTASSIUM SERPL-MCNC: 5.6 MMOL/L — HIGH (ref 3.5–5.3)
POTASSIUM SERPL-MCNC: 5.8 MMOL/L — HIGH (ref 3.5–5.3)
POTASSIUM SERPL-SCNC: 5.6 MMOL/L — HIGH (ref 3.5–5.3)
POTASSIUM SERPL-SCNC: 5.8 MMOL/L — HIGH (ref 3.5–5.3)
PROCALCITONIN SERPL-MCNC: 0.13 NG/ML — HIGH (ref 0.02–0.1)
PROT SERPL-MCNC: 6.6 G/DL — SIGNIFICANT CHANGE UP (ref 6–8.3)
PROT SERPL-MCNC: 6.6 G/DL — SIGNIFICANT CHANGE UP (ref 6–8.3)
RBC # BLD: 3.84 M/UL — SIGNIFICANT CHANGE UP (ref 3.8–5.2)
RBC # FLD: 16.8 % — HIGH (ref 10.3–14.5)
SODIUM SERPL-SCNC: 127 MMOL/L — LOW (ref 135–145)
SODIUM SERPL-SCNC: 128 MMOL/L — LOW (ref 135–145)
SODIUM UR-SCNC: 6 MMOL/L — SIGNIFICANT CHANGE UP
WBC # BLD: 14.27 K/UL — HIGH (ref 3.8–10.5)
WBC # FLD AUTO: 14.27 K/UL — HIGH (ref 3.8–10.5)

## 2025-07-25 PROCEDURE — 94640 AIRWAY INHALATION TREATMENT: CPT

## 2025-07-25 PROCEDURE — 84100 ASSAY OF PHOSPHORUS: CPT

## 2025-07-25 PROCEDURE — 84540 ASSAY OF URINE/UREA-N: CPT

## 2025-07-25 PROCEDURE — 84145 PROCALCITONIN (PCT): CPT

## 2025-07-25 PROCEDURE — 85025 COMPLETE CBC W/AUTO DIFF WBC: CPT

## 2025-07-25 PROCEDURE — 83880 ASSAY OF NATRIURETIC PEPTIDE: CPT

## 2025-07-25 PROCEDURE — 93923 UPR/LXTR ART STDY 3+ LVLS: CPT

## 2025-07-25 PROCEDURE — 82570 ASSAY OF URINE CREATININE: CPT

## 2025-07-25 PROCEDURE — 97162 PT EVAL MOD COMPLEX 30 MIN: CPT

## 2025-07-25 PROCEDURE — 73590 X-RAY EXAM OF LOWER LEG: CPT

## 2025-07-25 PROCEDURE — 93970 EXTREMITY STUDY: CPT

## 2025-07-25 PROCEDURE — 93970 EXTREMITY STUDY: CPT | Mod: 26

## 2025-07-25 PROCEDURE — 84300 ASSAY OF URINE SODIUM: CPT

## 2025-07-25 PROCEDURE — 93005 ELECTROCARDIOGRAM TRACING: CPT

## 2025-07-25 PROCEDURE — 83690 ASSAY OF LIPASE: CPT

## 2025-07-25 PROCEDURE — 87040 BLOOD CULTURE FOR BACTERIA: CPT

## 2025-07-25 PROCEDURE — 93451 RIGHT HEART CATH: CPT | Mod: 26

## 2025-07-25 PROCEDURE — 73630 X-RAY EXAM OF FOOT: CPT

## 2025-07-25 PROCEDURE — 87070 CULTURE OTHR SPECIMN AEROBIC: CPT

## 2025-07-25 PROCEDURE — 82962 GLUCOSE BLOOD TEST: CPT

## 2025-07-25 PROCEDURE — 99222 1ST HOSP IP/OBS MODERATE 55: CPT

## 2025-07-25 PROCEDURE — 86140 C-REACTIVE PROTEIN: CPT

## 2025-07-25 PROCEDURE — 80053 COMPREHEN METABOLIC PANEL: CPT

## 2025-07-25 PROCEDURE — 71045 X-RAY EXAM CHEST 1 VIEW: CPT

## 2025-07-25 PROCEDURE — 83935 ASSAY OF URINE OSMOLALITY: CPT

## 2025-07-25 PROCEDURE — G0545: CPT

## 2025-07-25 PROCEDURE — 83735 ASSAY OF MAGNESIUM: CPT

## 2025-07-25 PROCEDURE — 99223 1ST HOSP IP/OBS HIGH 75: CPT | Mod: GC

## 2025-07-25 PROCEDURE — 36415 COLL VENOUS BLD VENIPUNCTURE: CPT

## 2025-07-25 PROCEDURE — 85652 RBC SED RATE AUTOMATED: CPT

## 2025-07-25 RX ORDER — ATORVASTATIN CALCIUM 80 MG/1
40 TABLET, FILM COATED ORAL AT BEDTIME
Refills: 0 | Status: DISCONTINUED | OUTPATIENT
Start: 2025-07-25 | End: 2025-09-04

## 2025-07-25 RX ORDER — ACETAMINOPHEN 500 MG/5ML
650 LIQUID (ML) ORAL EVERY 6 HOURS
Refills: 0 | Status: DISCONTINUED | OUTPATIENT
Start: 2025-07-25 | End: 2025-07-30

## 2025-07-25 RX ORDER — DEXTROSE 50 % IN WATER 50 %
15 SYRINGE (ML) INTRAVENOUS ONCE
Refills: 0 | Status: DISCONTINUED | OUTPATIENT
Start: 2025-07-25 | End: 2025-09-04

## 2025-07-25 RX ORDER — SODIUM CHLORIDE 9 G/1000ML
1000 INJECTION, SOLUTION INTRAVENOUS
Refills: 0 | Status: DISCONTINUED | OUTPATIENT
Start: 2025-07-25 | End: 2025-09-04

## 2025-07-25 RX ORDER — DEXTROSE 50 % IN WATER 50 %
12.5 SYRINGE (ML) INTRAVENOUS ONCE
Refills: 0 | Status: DISCONTINUED | OUTPATIENT
Start: 2025-07-25 | End: 2025-09-04

## 2025-07-25 RX ORDER — DEXTROSE 50 % IN WATER 50 %
50 SYRINGE (ML) INTRAVENOUS ONCE
Refills: 0 | Status: DISCONTINUED | OUTPATIENT
Start: 2025-07-25 | End: 2025-07-25

## 2025-07-25 RX ORDER — VANCOMYCIN HCL IN 5 % DEXTROSE 1.5G/250ML
1000 PLASTIC BAG, INJECTION (ML) INTRAVENOUS ONCE
Refills: 0 | Status: COMPLETED | OUTPATIENT
Start: 2025-07-25 | End: 2025-07-25

## 2025-07-25 RX ORDER — PIPERACILLIN-TAZO-DEXTROSE,ISO 3.375G/5
3.38 IV SOLUTION, PIGGYBACK PREMIX FROZEN(ML) INTRAVENOUS ONCE
Refills: 0 | Status: COMPLETED | OUTPATIENT
Start: 2025-07-25 | End: 2025-07-25

## 2025-07-25 RX ORDER — INSULIN LISPRO 100 U/ML
INJECTION, SOLUTION INTRAVENOUS; SUBCUTANEOUS
Refills: 0 | Status: DISCONTINUED | OUTPATIENT
Start: 2025-07-25 | End: 2025-09-04

## 2025-07-25 RX ORDER — DEXTROSE 50 % IN WATER 50 %
25 SYRINGE (ML) INTRAVENOUS ONCE
Refills: 0 | Status: DISCONTINUED | OUTPATIENT
Start: 2025-07-25 | End: 2025-09-04

## 2025-07-25 RX ORDER — ROSUVASTATIN CALCIUM 20 MG/1
20 TABLET, FILM COATED ORAL AT BEDTIME
Refills: 0 | Status: DISCONTINUED | OUTPATIENT
Start: 2025-07-25 | End: 2025-07-25

## 2025-07-25 RX ORDER — ASPIRIN 325 MG
81 TABLET ORAL DAILY
Refills: 0 | Status: DISCONTINUED | OUTPATIENT
Start: 2025-07-25 | End: 2025-09-04

## 2025-07-25 RX ORDER — LEVOTHYROXINE SODIUM 300 MCG
100 TABLET ORAL
Refills: 0 | Status: DISCONTINUED | OUTPATIENT
Start: 2025-07-25 | End: 2025-09-04

## 2025-07-25 RX ORDER — INSULIN LISPRO 100 U/ML
INJECTION, SOLUTION INTRAVENOUS; SUBCUTANEOUS AT BEDTIME
Refills: 0 | Status: DISCONTINUED | OUTPATIENT
Start: 2025-07-25 | End: 2025-09-04

## 2025-07-25 RX ORDER — AMLODIPINE BESYLATE 10 MG/1
5 TABLET ORAL DAILY
Refills: 0 | Status: DISCONTINUED | OUTPATIENT
Start: 2025-07-25 | End: 2025-09-04

## 2025-07-25 RX ORDER — DEXTROSE 50 % IN WATER 50 %
50 SYRINGE (ML) INTRAVENOUS ONCE
Refills: 0 | Status: COMPLETED | OUTPATIENT
Start: 2025-07-25 | End: 2025-07-25

## 2025-07-25 RX ORDER — GLUCAGON 3 MG/1
1 POWDER NASAL ONCE
Refills: 0 | Status: DISCONTINUED | OUTPATIENT
Start: 2025-07-25 | End: 2025-09-04

## 2025-07-25 RX ORDER — INSULIN LISPRO 100 U/ML
4 INJECTION, SOLUTION INTRAVENOUS; SUBCUTANEOUS ONCE
Refills: 0 | Status: COMPLETED | OUTPATIENT
Start: 2025-07-25 | End: 2025-07-25

## 2025-07-25 RX ORDER — MUPIROCIN CALCIUM 20 MG/G
1 CREAM TOPICAL
Refills: 0 | Status: DISCONTINUED | OUTPATIENT
Start: 2025-07-25 | End: 2025-08-16

## 2025-07-25 RX ORDER — APIXABAN 2.5 MG/1
5 TABLET, FILM COATED ORAL
Refills: 0 | Status: COMPLETED | OUTPATIENT
Start: 2025-07-25 | End: 2025-08-23

## 2025-07-25 RX ORDER — POLYETHYLENE GLYCOL 3350 17 G/17G
17 POWDER, FOR SOLUTION ORAL AT BEDTIME
Refills: 0 | Status: DISCONTINUED | OUTPATIENT
Start: 2025-07-25 | End: 2025-08-20

## 2025-07-25 RX ORDER — ALBUTEROL SULFATE 2.5 MG/3ML
2 VIAL, NEBULIZER (ML) INHALATION EVERY 6 HOURS
Refills: 0 | Status: DISCONTINUED | OUTPATIENT
Start: 2025-07-25 | End: 2025-08-15

## 2025-07-25 RX ORDER — PIPERACILLIN-TAZO-DEXTROSE,ISO 3.375G/5
3.38 IV SOLUTION, PIGGYBACK PREMIX FROZEN(ML) INTRAVENOUS ONCE
Refills: 0 | Status: COMPLETED | OUTPATIENT
Start: 2025-07-26 | End: 2025-07-26

## 2025-07-25 RX ORDER — INSULIN GLARGINE-YFGN 100 [IU]/ML
12 INJECTION, SOLUTION SUBCUTANEOUS AT BEDTIME
Refills: 0 | Status: DISCONTINUED | OUTPATIENT
Start: 2025-07-25 | End: 2025-07-25

## 2025-07-25 RX ORDER — ALPRAZOLAM 0.5 MG
2 TABLET, EXTENDED RELEASE 24 HR ORAL AT BEDTIME
Refills: 0 | Status: DISCONTINUED | OUTPATIENT
Start: 2025-07-25 | End: 2025-07-31

## 2025-07-25 RX ORDER — AMIODARONE HYDROCHLORIDE 50 MG/ML
200 INJECTION, SOLUTION INTRAVENOUS DAILY
Refills: 0 | Status: DISCONTINUED | OUTPATIENT
Start: 2025-07-25 | End: 2025-09-04

## 2025-07-25 RX ORDER — SODIUM ZIRCONIUM CYCLOSILICATE 5 G/5G
10 POWDER, FOR SUSPENSION ORAL ONCE
Refills: 0 | Status: COMPLETED | OUTPATIENT
Start: 2025-07-25 | End: 2025-07-25

## 2025-07-25 RX ORDER — VANCOMYCIN HCL IN 5 % DEXTROSE 1.5G/250ML
500 PLASTIC BAG, INJECTION (ML) INTRAVENOUS ONCE
Refills: 0 | Status: COMPLETED | OUTPATIENT
Start: 2025-07-25 | End: 2025-07-25

## 2025-07-25 RX ORDER — PIPERACILLIN-TAZO-DEXTROSE,ISO 3.375G/5
3.38 IV SOLUTION, PIGGYBACK PREMIX FROZEN(ML) INTRAVENOUS EVERY 8 HOURS
Refills: 0 | Status: DISCONTINUED | OUTPATIENT
Start: 2025-07-26 | End: 2025-08-01

## 2025-07-25 RX ORDER — B1/B2/B3/B5/B6/B12/VIT C/FOLIC 500-0.5 MG
1 TABLET ORAL DAILY
Refills: 0 | Status: DISCONTINUED | OUTPATIENT
Start: 2025-07-25 | End: 2025-09-04

## 2025-07-25 RX ORDER — LEVOTHYROXINE SODIUM 300 MCG
200 TABLET ORAL
Refills: 0 | Status: DISCONTINUED | OUTPATIENT
Start: 2025-07-25 | End: 2025-09-04

## 2025-07-25 RX ORDER — SENNA 187 MG
2 TABLET ORAL AT BEDTIME
Refills: 0 | Status: DISCONTINUED | OUTPATIENT
Start: 2025-07-25 | End: 2025-09-04

## 2025-07-25 RX ORDER — BUDESONIDE 0.25 MG/2ML
0.5 SUSPENSION RESPIRATORY (INHALATION)
Refills: 0 | Status: DISCONTINUED | OUTPATIENT
Start: 2025-07-25 | End: 2025-08-03

## 2025-07-25 RX ORDER — INSULIN GLARGINE-YFGN 100 [IU]/ML
10 INJECTION, SOLUTION SUBCUTANEOUS AT BEDTIME
Refills: 0 | Status: DISCONTINUED | OUTPATIENT
Start: 2025-07-26 | End: 2025-07-27

## 2025-07-25 RX ORDER — INSULIN GLARGINE-YFGN 100 [IU]/ML
10 INJECTION, SOLUTION SUBCUTANEOUS AT BEDTIME
Refills: 0 | Status: DISCONTINUED | OUTPATIENT
Start: 2025-07-25 | End: 2025-07-25

## 2025-07-25 RX ORDER — INSULIN LISPRO 100 U/ML
8 INJECTION, SOLUTION INTRAVENOUS; SUBCUTANEOUS
Refills: 0 | Status: DISCONTINUED | OUTPATIENT
Start: 2025-07-25 | End: 2025-07-25

## 2025-07-25 RX ORDER — INSULIN LISPRO 100 U/ML
6 INJECTION, SOLUTION INTRAVENOUS; SUBCUTANEOUS
Refills: 0 | Status: DISCONTINUED | OUTPATIENT
Start: 2025-07-25 | End: 2025-07-27

## 2025-07-25 RX ORDER — SODIUM ZIRCONIUM CYCLOSILICATE 5 G/5G
10 POWDER, FOR SUSPENSION ORAL ONCE
Refills: 0 | Status: DISCONTINUED | OUTPATIENT
Start: 2025-07-25 | End: 2025-07-25

## 2025-07-25 RX ORDER — INSULIN GLARGINE-YFGN 100 [IU]/ML
10 INJECTION, SOLUTION SUBCUTANEOUS ONCE
Refills: 0 | Status: COMPLETED | OUTPATIENT
Start: 2025-07-25 | End: 2025-07-25

## 2025-07-25 RX ORDER — MELATONIN 5 MG
5 TABLET ORAL AT BEDTIME
Refills: 0 | Status: DISCONTINUED | OUTPATIENT
Start: 2025-07-25 | End: 2025-09-04

## 2025-07-25 RX ADMIN — APIXABAN 5 MILLIGRAM(S): 2.5 TABLET, FILM COATED ORAL at 13:28

## 2025-07-25 RX ADMIN — Medication 200 GRAM(S): at 04:15

## 2025-07-25 RX ADMIN — Medication 100 MILLIGRAM(S): at 04:54

## 2025-07-25 RX ADMIN — KETOROLAC TROMETHAMINE 30 MILLIGRAM(S): 30 INJECTION, SOLUTION INTRAMUSCULAR; INTRAVENOUS at 01:31

## 2025-07-25 RX ADMIN — INSULIN LISPRO 3: 100 INJECTION, SOLUTION INTRAVENOUS; SUBCUTANEOUS at 17:45

## 2025-07-25 RX ADMIN — SODIUM ZIRCONIUM CYCLOSILICATE 10 GRAM(S): 5 POWDER, FOR SUSPENSION ORAL at 17:54

## 2025-07-25 RX ADMIN — Medication 81 MILLIGRAM(S): at 13:37

## 2025-07-25 RX ADMIN — INSULIN LISPRO 3: 100 INJECTION, SOLUTION INTRAVENOUS; SUBCUTANEOUS at 13:31

## 2025-07-25 RX ADMIN — AMIODARONE HYDROCHLORIDE 200 MILLIGRAM(S): 50 INJECTION, SOLUTION INTRAVENOUS at 13:33

## 2025-07-25 RX ADMIN — AMLODIPINE BESYLATE 5 MILLIGRAM(S): 10 TABLET ORAL at 13:37

## 2025-07-25 RX ADMIN — Medication 5 UNIT(S): at 07:35

## 2025-07-25 RX ADMIN — INSULIN LISPRO 4 UNIT(S): 100 INJECTION, SOLUTION INTRAVENOUS; SUBCUTANEOUS at 09:10

## 2025-07-25 RX ADMIN — APIXABAN 5 MILLIGRAM(S): 2.5 TABLET, FILM COATED ORAL at 21:05

## 2025-07-25 RX ADMIN — INSULIN LISPRO 6 UNIT(S): 100 INJECTION, SOLUTION INTRAVENOUS; SUBCUTANEOUS at 17:45

## 2025-07-25 RX ADMIN — Medication 5 MILLIGRAM(S): at 21:05

## 2025-07-25 RX ADMIN — ATORVASTATIN CALCIUM 40 MILLIGRAM(S): 80 TABLET, FILM COATED ORAL at 21:05

## 2025-07-25 RX ADMIN — INSULIN LISPRO 6 UNIT(S): 100 INJECTION, SOLUTION INTRAVENOUS; SUBCUTANEOUS at 13:32

## 2025-07-25 RX ADMIN — Medication 2 TABLET(S): at 21:05

## 2025-07-25 RX ADMIN — Medication 75 MILLILITER(S): at 14:38

## 2025-07-25 RX ADMIN — Medication 2 MILLIGRAM(S): at 00:00

## 2025-07-25 RX ADMIN — Medication 2 MILLIGRAM(S): at 22:35

## 2025-07-25 RX ADMIN — Medication 4 MILLIGRAM(S): at 04:14

## 2025-07-25 RX ADMIN — BUDESONIDE 0.5 MILLIGRAM(S): 0.25 SUSPENSION RESPIRATORY (INHALATION) at 17:54

## 2025-07-25 RX ADMIN — INSULIN GLARGINE-YFGN 10 UNIT(S): 100 INJECTION, SOLUTION SUBCUTANEOUS at 14:37

## 2025-07-25 RX ADMIN — Medication 200 GRAM(S): at 17:53

## 2025-07-25 RX ADMIN — Medication 50 MILLILITER(S): at 07:35

## 2025-07-25 RX ADMIN — Medication 250 MILLIGRAM(S): at 13:28

## 2025-07-25 RX ADMIN — Medication 25 GRAM(S): at 21:06

## 2025-07-25 RX ADMIN — Medication 1 TABLET(S): at 13:29

## 2025-07-25 RX ADMIN — POLYETHYLENE GLYCOL 3350 17 GRAM(S): 17 POWDER, FOR SOLUTION ORAL at 21:06

## 2025-07-25 RX ADMIN — Medication 4 MILLIGRAM(S): at 00:56

## 2025-07-26 LAB
A1C WITH ESTIMATED AVERAGE GLUCOSE RESULT: 9.2 % — HIGH (ref 4–5.6)
ALBUMIN SERPL ELPH-MCNC: 3 G/DL — LOW (ref 3.3–5)
ALP SERPL-CCNC: 74 U/L — SIGNIFICANT CHANGE UP (ref 40–120)
ALT FLD-CCNC: 9 U/L — LOW (ref 10–45)
ANION GAP SERPL CALC-SCNC: 16 MMOL/L — SIGNIFICANT CHANGE UP (ref 5–17)
AST SERPL-CCNC: 8 U/L — LOW (ref 10–40)
BASOPHILS # BLD AUTO: 0.05 K/UL — SIGNIFICANT CHANGE UP (ref 0–0.2)
BASOPHILS NFR BLD AUTO: 0.4 % — SIGNIFICANT CHANGE UP (ref 0–2)
BILIRUB SERPL-MCNC: 0.4 MG/DL — SIGNIFICANT CHANGE UP (ref 0.2–1.2)
BLD GP AB SCN SERPL QL: NEGATIVE — SIGNIFICANT CHANGE UP
BUN SERPL-MCNC: 54 MG/DL — HIGH (ref 7–23)
CALCIUM SERPL-MCNC: 8.5 MG/DL — SIGNIFICANT CHANGE UP (ref 8.4–10.5)
CHLORIDE SERPL-SCNC: 97 MMOL/L — SIGNIFICANT CHANGE UP (ref 96–108)
CO2 SERPL-SCNC: 19 MMOL/L — LOW (ref 22–31)
CREAT SERPL-MCNC: 1.76 MG/DL — HIGH (ref 0.5–1.3)
CRP SERPL-MCNC: 66 MG/L — HIGH (ref 0–4)
EGFR: 29 ML/MIN/1.73M2 — LOW
EGFR: 29 ML/MIN/1.73M2 — LOW
EOSINOPHIL # BLD AUTO: 0.28 K/UL — SIGNIFICANT CHANGE UP (ref 0–0.5)
EOSINOPHIL NFR BLD AUTO: 2.4 % — SIGNIFICANT CHANGE UP (ref 0–6)
ESTIMATED AVERAGE GLUCOSE: 217 MG/DL — HIGH (ref 68–114)
GLUCOSE BLDC GLUCOMTR-MCNC: 197 MG/DL — HIGH (ref 70–99)
GLUCOSE BLDC GLUCOMTR-MCNC: 249 MG/DL — HIGH (ref 70–99)
GLUCOSE BLDC GLUCOMTR-MCNC: 287 MG/DL — HIGH (ref 70–99)
GLUCOSE BLDC GLUCOMTR-MCNC: 303 MG/DL — HIGH (ref 70–99)
GLUCOSE SERPL-MCNC: 237 MG/DL — HIGH (ref 70–99)
GRAM STN FLD: ABNORMAL
HCT VFR BLD CALC: 28.8 % — LOW (ref 34.5–45)
HGB BLD-MCNC: 8.7 G/DL — LOW (ref 11.5–15.5)
IMM GRANULOCYTES # BLD AUTO: 0.18 K/UL — HIGH (ref 0–0.07)
IMM GRANULOCYTES NFR BLD AUTO: 1.6 % — HIGH (ref 0–0.9)
LYMPHOCYTES # BLD AUTO: 1.39 K/UL — SIGNIFICANT CHANGE UP (ref 1–3.3)
LYMPHOCYTES NFR BLD AUTO: 12.1 % — LOW (ref 13–44)
MAGNESIUM SERPL-MCNC: 2.7 MG/DL — HIGH (ref 1.6–2.6)
MCHC RBC-ENTMCNC: 24.3 PG — LOW (ref 27–34)
MCHC RBC-ENTMCNC: 30.2 G/DL — LOW (ref 32–36)
MCV RBC AUTO: 80.4 FL — SIGNIFICANT CHANGE UP (ref 80–100)
MONOCYTES # BLD AUTO: 0.69 K/UL — SIGNIFICANT CHANGE UP (ref 0–0.9)
MONOCYTES NFR BLD AUTO: 6 % — SIGNIFICANT CHANGE UP (ref 2–14)
MRSA PCR RESULT.: DETECTED
NEUTROPHILS # BLD AUTO: 8.91 K/UL — HIGH (ref 1.8–7.4)
NEUTROPHILS NFR BLD AUTO: 77.5 % — HIGH (ref 43–77)
NRBC # BLD AUTO: 0 K/UL — SIGNIFICANT CHANGE UP (ref 0–0)
NRBC # FLD: 0 K/UL — SIGNIFICANT CHANGE UP (ref 0–0)
NRBC BLD AUTO-RTO: 0 /100 WBCS — SIGNIFICANT CHANGE UP (ref 0–0)
PHOSPHATE SERPL-MCNC: 3.9 MG/DL — SIGNIFICANT CHANGE UP (ref 2.5–4.5)
PLATELET # BLD AUTO: 356 K/UL — SIGNIFICANT CHANGE UP (ref 150–400)
PMV BLD: 9.9 FL — SIGNIFICANT CHANGE UP (ref 7–13)
POTASSIUM SERPL-MCNC: 5.1 MMOL/L — SIGNIFICANT CHANGE UP (ref 3.5–5.3)
POTASSIUM SERPL-SCNC: 5.1 MMOL/L — SIGNIFICANT CHANGE UP (ref 3.5–5.3)
PROT SERPL-MCNC: 6.3 G/DL — SIGNIFICANT CHANGE UP (ref 6–8.3)
RBC # BLD: 3.58 M/UL — LOW (ref 3.8–5.2)
RBC # FLD: 16.9 % — HIGH (ref 10.3–14.5)
RH IG SCN BLD-IMP: POSITIVE — SIGNIFICANT CHANGE UP
S AUREUS DNA NOSE QL NAA+PROBE: DETECTED
SODIUM SERPL-SCNC: 132 MMOL/L — LOW (ref 135–145)
SPECIMEN SOURCE: SIGNIFICANT CHANGE UP
T4 AB SER-ACNC: 6.1 UG/DL — SIGNIFICANT CHANGE UP (ref 4.6–12)
TSH SERPL-MCNC: 8.54 UIU/ML — HIGH (ref 0.27–4.2)
UUN UR-MCNC: 940 MG/DL — SIGNIFICANT CHANGE UP
VANCOMYCIN FLD-MCNC: 10.3 UG/ML — SIGNIFICANT CHANGE UP
WBC # BLD: 11.5 K/UL — HIGH (ref 3.8–10.5)
WBC # FLD AUTO: 11.5 K/UL — HIGH (ref 3.8–10.5)

## 2025-07-26 PROCEDURE — 73700 CT LOWER EXTREMITY W/O DYE: CPT | Mod: 26,RT

## 2025-07-26 PROCEDURE — 99232 SBSQ HOSP IP/OBS MODERATE 35: CPT

## 2025-07-26 PROCEDURE — 99233 SBSQ HOSP IP/OBS HIGH 50: CPT | Mod: GC

## 2025-07-26 PROCEDURE — G0545: CPT

## 2025-07-26 RX ORDER — VANCOMYCIN HCL IN 5 % DEXTROSE 1.5G/250ML
1000 PLASTIC BAG, INJECTION (ML) INTRAVENOUS EVERY 12 HOURS
Refills: 0 | Status: COMPLETED | OUTPATIENT
Start: 2025-07-26 | End: 2025-07-26

## 2025-07-26 RX ORDER — VANCOMYCIN HCL IN 5 % DEXTROSE 1.5G/250ML
1000 PLASTIC BAG, INJECTION (ML) INTRAVENOUS ONCE
Refills: 0 | Status: DISCONTINUED | OUTPATIENT
Start: 2025-07-26 | End: 2025-07-26

## 2025-07-26 RX ADMIN — INSULIN LISPRO 6 UNIT(S): 100 INJECTION, SOLUTION INTRAVENOUS; SUBCUTANEOUS at 13:03

## 2025-07-26 RX ADMIN — Medication 250 MILLIGRAM(S): at 13:09

## 2025-07-26 RX ADMIN — Medication 650 MILLIGRAM(S): at 15:19

## 2025-07-26 RX ADMIN — Medication 2 MILLIGRAM(S): at 22:40

## 2025-07-26 RX ADMIN — INSULIN LISPRO 4: 100 INJECTION, SOLUTION INTRAVENOUS; SUBCUTANEOUS at 18:29

## 2025-07-26 RX ADMIN — Medication 81 MILLIGRAM(S): at 13:10

## 2025-07-26 RX ADMIN — BUDESONIDE 0.5 MILLIGRAM(S): 0.25 SUSPENSION RESPIRATORY (INHALATION) at 18:40

## 2025-07-26 RX ADMIN — Medication 650 MILLIGRAM(S): at 16:06

## 2025-07-26 RX ADMIN — Medication 25 GRAM(S): at 22:10

## 2025-07-26 RX ADMIN — APIXABAN 5 MILLIGRAM(S): 2.5 TABLET, FILM COATED ORAL at 05:25

## 2025-07-26 RX ADMIN — Medication 200 MICROGRAM(S): at 05:25

## 2025-07-26 RX ADMIN — INSULIN LISPRO 1: 100 INJECTION, SOLUTION INTRAVENOUS; SUBCUTANEOUS at 13:02

## 2025-07-26 RX ADMIN — INSULIN LISPRO 2: 100 INJECTION, SOLUTION INTRAVENOUS; SUBCUTANEOUS at 09:06

## 2025-07-26 RX ADMIN — BUDESONIDE 0.5 MILLIGRAM(S): 0.25 SUSPENSION RESPIRATORY (INHALATION) at 05:25

## 2025-07-26 RX ADMIN — Medication 40 MILLIGRAM(S): at 05:25

## 2025-07-26 RX ADMIN — Medication 25 GRAM(S): at 13:09

## 2025-07-26 RX ADMIN — INSULIN LISPRO 1: 100 INJECTION, SOLUTION INTRAVENOUS; SUBCUTANEOUS at 22:08

## 2025-07-26 RX ADMIN — Medication 2 TABLET(S): at 22:09

## 2025-07-26 RX ADMIN — Medication 25 GRAM(S): at 15:13

## 2025-07-26 RX ADMIN — APIXABAN 5 MILLIGRAM(S): 2.5 TABLET, FILM COATED ORAL at 18:39

## 2025-07-26 RX ADMIN — Medication 25 GRAM(S): at 03:31

## 2025-07-26 RX ADMIN — MUPIROCIN CALCIUM 1 APPLICATION(S): 20 CREAM TOPICAL at 05:25

## 2025-07-26 RX ADMIN — INSULIN LISPRO 6 UNIT(S): 100 INJECTION, SOLUTION INTRAVENOUS; SUBCUTANEOUS at 09:06

## 2025-07-26 RX ADMIN — Medication 5 MILLIGRAM(S): at 22:09

## 2025-07-26 RX ADMIN — Medication 1 TABLET(S): at 13:04

## 2025-07-26 RX ADMIN — AMIODARONE HYDROCHLORIDE 200 MILLIGRAM(S): 50 INJECTION, SOLUTION INTRAVENOUS at 05:25

## 2025-07-26 RX ADMIN — Medication 2 MILLIGRAM(S): at 22:10

## 2025-07-26 RX ADMIN — INSULIN GLARGINE-YFGN 10 UNIT(S): 100 INJECTION, SOLUTION SUBCUTANEOUS at 22:09

## 2025-07-26 RX ADMIN — POLYETHYLENE GLYCOL 3350 17 GRAM(S): 17 POWDER, FOR SOLUTION ORAL at 22:10

## 2025-07-26 RX ADMIN — MUPIROCIN CALCIUM 1 APPLICATION(S): 20 CREAM TOPICAL at 22:10

## 2025-07-26 RX ADMIN — Medication 250 MILLIGRAM(S): at 22:10

## 2025-07-26 RX ADMIN — INSULIN LISPRO 6 UNIT(S): 100 INJECTION, SOLUTION INTRAVENOUS; SUBCUTANEOUS at 18:30

## 2025-07-26 RX ADMIN — ATORVASTATIN CALCIUM 40 MILLIGRAM(S): 80 TABLET, FILM COATED ORAL at 22:09

## 2025-07-27 LAB
-  CLINDAMYCIN: SIGNIFICANT CHANGE UP
-  ERYTHROMYCIN: SIGNIFICANT CHANGE UP
-  GENTAMICIN: SIGNIFICANT CHANGE UP
-  OXACILLIN: SIGNIFICANT CHANGE UP
-  PENICILLIN: SIGNIFICANT CHANGE UP
-  RIFAMPIN: SIGNIFICANT CHANGE UP
-  TETRACYCLINE: SIGNIFICANT CHANGE UP
-  TRIMETHOPRIM/SULFAMETHOXAZOLE: SIGNIFICANT CHANGE UP
-  VANCOMYCIN: SIGNIFICANT CHANGE UP
ALBUMIN SERPL ELPH-MCNC: 2.9 G/DL — LOW (ref 3.3–5)
ALP SERPL-CCNC: 65 U/L — SIGNIFICANT CHANGE UP (ref 40–120)
ALT FLD-CCNC: 8 U/L — LOW (ref 10–45)
ANION GAP SERPL CALC-SCNC: 15 MMOL/L — SIGNIFICANT CHANGE UP (ref 5–17)
AST SERPL-CCNC: 6 U/L — LOW (ref 10–40)
BASOPHILS # BLD AUTO: 0.08 K/UL — SIGNIFICANT CHANGE UP (ref 0–0.2)
BASOPHILS NFR BLD AUTO: 0.7 % — SIGNIFICANT CHANGE UP (ref 0–2)
BILIRUB SERPL-MCNC: 0.3 MG/DL — SIGNIFICANT CHANGE UP (ref 0.2–1.2)
BUN SERPL-MCNC: 45 MG/DL — HIGH (ref 7–23)
CALCIUM SERPL-MCNC: 8.2 MG/DL — LOW (ref 8.4–10.5)
CHLORIDE SERPL-SCNC: 101 MMOL/L — SIGNIFICANT CHANGE UP (ref 96–108)
CO2 SERPL-SCNC: 19 MMOL/L — LOW (ref 22–31)
CREAT SERPL-MCNC: 1.72 MG/DL — HIGH (ref 0.5–1.3)
EGFR: 30 ML/MIN/1.73M2 — LOW
EGFR: 30 ML/MIN/1.73M2 — LOW
EOSINOPHIL # BLD AUTO: 0.29 K/UL — SIGNIFICANT CHANGE UP (ref 0–0.5)
EOSINOPHIL NFR BLD AUTO: 2.6 % — SIGNIFICANT CHANGE UP (ref 0–6)
GLUCOSE BLDC GLUCOMTR-MCNC: 206 MG/DL — HIGH (ref 70–99)
GLUCOSE BLDC GLUCOMTR-MCNC: 224 MG/DL — HIGH (ref 70–99)
GLUCOSE BLDC GLUCOMTR-MCNC: 227 MG/DL — HIGH (ref 70–99)
GLUCOSE BLDC GLUCOMTR-MCNC: 246 MG/DL — HIGH (ref 70–99)
GLUCOSE SERPL-MCNC: 213 MG/DL — HIGH (ref 70–99)
HCT VFR BLD CALC: 27.7 % — LOW (ref 34.5–45)
HGB BLD-MCNC: 8.3 G/DL — LOW (ref 11.5–15.5)
IMM GRANULOCYTES # BLD AUTO: 0.21 K/UL — HIGH (ref 0–0.07)
IMM GRANULOCYTES NFR BLD AUTO: 1.9 % — HIGH (ref 0–0.9)
LYMPHOCYTES # BLD AUTO: 1.46 K/UL — SIGNIFICANT CHANGE UP (ref 1–3.3)
LYMPHOCYTES NFR BLD AUTO: 13.2 % — SIGNIFICANT CHANGE UP (ref 13–44)
MAGNESIUM SERPL-MCNC: 2.5 MG/DL — SIGNIFICANT CHANGE UP (ref 1.6–2.6)
MCHC RBC-ENTMCNC: 24.1 PG — LOW (ref 27–34)
MCHC RBC-ENTMCNC: 30 G/DL — LOW (ref 32–36)
MCV RBC AUTO: 80.5 FL — SIGNIFICANT CHANGE UP (ref 80–100)
METHOD TYPE: SIGNIFICANT CHANGE UP
MONOCYTES # BLD AUTO: 0.76 K/UL — SIGNIFICANT CHANGE UP (ref 0–0.9)
MONOCYTES NFR BLD AUTO: 6.8 % — SIGNIFICANT CHANGE UP (ref 2–14)
NEUTROPHILS # BLD AUTO: 8.3 K/UL — HIGH (ref 1.8–7.4)
NEUTROPHILS NFR BLD AUTO: 74.8 % — SIGNIFICANT CHANGE UP (ref 43–77)
NRBC # BLD AUTO: 0 K/UL — SIGNIFICANT CHANGE UP (ref 0–0)
NRBC # FLD: 0 K/UL — SIGNIFICANT CHANGE UP (ref 0–0)
NRBC BLD AUTO-RTO: 0 /100 WBCS — SIGNIFICANT CHANGE UP (ref 0–0)
PHOSPHATE SERPL-MCNC: 3.9 MG/DL — SIGNIFICANT CHANGE UP (ref 2.5–4.5)
PLATELET # BLD AUTO: 320 K/UL — SIGNIFICANT CHANGE UP (ref 150–400)
PMV BLD: 10 FL — SIGNIFICANT CHANGE UP (ref 7–13)
POTASSIUM SERPL-MCNC: 3.9 MMOL/L — SIGNIFICANT CHANGE UP (ref 3.5–5.3)
POTASSIUM SERPL-SCNC: 3.9 MMOL/L — SIGNIFICANT CHANGE UP (ref 3.5–5.3)
PROT SERPL-MCNC: 6.1 G/DL — SIGNIFICANT CHANGE UP (ref 6–8.3)
RBC # BLD: 3.44 M/UL — LOW (ref 3.8–5.2)
RBC # FLD: 16.9 % — HIGH (ref 10.3–14.5)
SODIUM SERPL-SCNC: 135 MMOL/L — SIGNIFICANT CHANGE UP (ref 135–145)
VANCOMYCIN TROUGH SERPL-MCNC: 14.8 UG/ML — SIGNIFICANT CHANGE UP (ref 10–20)
WBC # BLD: 11.1 K/UL — HIGH (ref 3.8–10.5)
WBC # FLD AUTO: 11.1 K/UL — HIGH (ref 3.8–10.5)

## 2025-07-27 PROCEDURE — 99233 SBSQ HOSP IP/OBS HIGH 50: CPT | Mod: GC

## 2025-07-27 PROCEDURE — 99222 1ST HOSP IP/OBS MODERATE 55: CPT | Mod: GC

## 2025-07-27 RX ORDER — OXYCODONE HYDROCHLORIDE 30 MG/1
2.5 TABLET ORAL EVERY 6 HOURS
Refills: 0 | Status: DISCONTINUED | OUTPATIENT
Start: 2025-07-27 | End: 2025-07-31

## 2025-07-27 RX ORDER — MUPIROCIN CALCIUM 20 MG/G
1 CREAM TOPICAL
Refills: 0 | Status: COMPLETED | OUTPATIENT
Start: 2025-07-27 | End: 2025-08-01

## 2025-07-27 RX ORDER — HYDROMORPHONE/SOD CHLOR,ISO/PF 2 MG/10 ML
0.2 SYRINGE (ML) INJECTION EVERY 4 HOURS
Refills: 0 | Status: DISCONTINUED | OUTPATIENT
Start: 2025-07-27 | End: 2025-07-30

## 2025-07-27 RX ORDER — OXYCODONE HYDROCHLORIDE 30 MG/1
5 TABLET ORAL EVERY 6 HOURS
Refills: 0 | Status: DISCONTINUED | OUTPATIENT
Start: 2025-07-27 | End: 2025-07-30

## 2025-07-27 RX ORDER — INSULIN LISPRO 100 U/ML
8 INJECTION, SOLUTION INTRAVENOUS; SUBCUTANEOUS
Refills: 0 | Status: DISCONTINUED | OUTPATIENT
Start: 2025-07-27 | End: 2025-08-04

## 2025-07-27 RX ORDER — VANCOMYCIN HCL IN 5 % DEXTROSE 1.5G/250ML
1000 PLASTIC BAG, INJECTION (ML) INTRAVENOUS EVERY 24 HOURS
Refills: 0 | Status: DISCONTINUED | OUTPATIENT
Start: 2025-07-27 | End: 2025-08-01

## 2025-07-27 RX ORDER — INSULIN GLARGINE-YFGN 100 [IU]/ML
16 INJECTION, SOLUTION SUBCUTANEOUS AT BEDTIME
Refills: 0 | Status: DISCONTINUED | OUTPATIENT
Start: 2025-07-27 | End: 2025-08-01

## 2025-07-27 RX ORDER — BISACODYL 5 MG
5 TABLET, DELAYED RELEASE (ENTERIC COATED) ORAL AT BEDTIME
Refills: 0 | Status: DISCONTINUED | OUTPATIENT
Start: 2025-07-27 | End: 2025-08-22

## 2025-07-27 RX ADMIN — ATORVASTATIN CALCIUM 40 MILLIGRAM(S): 80 TABLET, FILM COATED ORAL at 22:35

## 2025-07-27 RX ADMIN — Medication 0.2 MILLIGRAM(S): at 14:00

## 2025-07-27 RX ADMIN — Medication 25 GRAM(S): at 13:27

## 2025-07-27 RX ADMIN — OXYCODONE HYDROCHLORIDE 5 MILLIGRAM(S): 30 TABLET ORAL at 22:38

## 2025-07-27 RX ADMIN — Medication 0.2 MILLIGRAM(S): at 13:26

## 2025-07-27 RX ADMIN — BUDESONIDE 0.5 MILLIGRAM(S): 0.25 SUSPENSION RESPIRATORY (INHALATION) at 06:35

## 2025-07-27 RX ADMIN — Medication 100 MICROGRAM(S): at 06:35

## 2025-07-27 RX ADMIN — INSULIN LISPRO 2: 100 INJECTION, SOLUTION INTRAVENOUS; SUBCUTANEOUS at 08:53

## 2025-07-27 RX ADMIN — MUPIROCIN CALCIUM 1 APPLICATION(S): 20 CREAM TOPICAL at 17:29

## 2025-07-27 RX ADMIN — Medication 81 MILLIGRAM(S): at 12:59

## 2025-07-27 RX ADMIN — APIXABAN 5 MILLIGRAM(S): 2.5 TABLET, FILM COATED ORAL at 06:35

## 2025-07-27 RX ADMIN — AMLODIPINE BESYLATE 5 MILLIGRAM(S): 10 TABLET ORAL at 06:34

## 2025-07-27 RX ADMIN — AMIODARONE HYDROCHLORIDE 200 MILLIGRAM(S): 50 INJECTION, SOLUTION INTRAVENOUS at 06:34

## 2025-07-27 RX ADMIN — Medication 2 MILLIGRAM(S): at 08:30

## 2025-07-27 RX ADMIN — MUPIROCIN CALCIUM 1 APPLICATION(S): 20 CREAM TOPICAL at 06:35

## 2025-07-27 RX ADMIN — OXYCODONE HYDROCHLORIDE 5 MILLIGRAM(S): 30 TABLET ORAL at 23:08

## 2025-07-27 RX ADMIN — APIXABAN 5 MILLIGRAM(S): 2.5 TABLET, FILM COATED ORAL at 17:29

## 2025-07-27 RX ADMIN — INSULIN LISPRO 6 UNIT(S): 100 INJECTION, SOLUTION INTRAVENOUS; SUBCUTANEOUS at 12:58

## 2025-07-27 RX ADMIN — Medication 25 GRAM(S): at 22:36

## 2025-07-27 RX ADMIN — MUPIROCIN CALCIUM 1 APPLICATION(S): 20 CREAM TOPICAL at 17:33

## 2025-07-27 RX ADMIN — Medication 1 TABLET(S): at 12:59

## 2025-07-27 RX ADMIN — INSULIN LISPRO 2: 100 INJECTION, SOLUTION INTRAVENOUS; SUBCUTANEOUS at 12:58

## 2025-07-27 RX ADMIN — Medication 2 MILLIGRAM(S): at 07:46

## 2025-07-27 RX ADMIN — Medication 5 MILLIGRAM(S): at 22:35

## 2025-07-27 RX ADMIN — Medication 2 MILLIGRAM(S): at 23:44

## 2025-07-27 RX ADMIN — Medication 40 MILLIGRAM(S): at 06:34

## 2025-07-27 RX ADMIN — Medication 2 MILLIGRAM(S): at 00:12

## 2025-07-27 RX ADMIN — INSULIN GLARGINE-YFGN 16 UNIT(S): 100 INJECTION, SOLUTION SUBCUTANEOUS at 22:35

## 2025-07-27 RX ADMIN — Medication 250 MILLIGRAM(S): at 22:36

## 2025-07-27 RX ADMIN — BUDESONIDE 0.5 MILLIGRAM(S): 0.25 SUSPENSION RESPIRATORY (INHALATION) at 17:29

## 2025-07-27 RX ADMIN — INSULIN LISPRO 2: 100 INJECTION, SOLUTION INTRAVENOUS; SUBCUTANEOUS at 17:30

## 2025-07-27 RX ADMIN — INSULIN LISPRO 6 UNIT(S): 100 INJECTION, SOLUTION INTRAVENOUS; SUBCUTANEOUS at 08:54

## 2025-07-27 RX ADMIN — Medication 25 GRAM(S): at 06:34

## 2025-07-27 RX ADMIN — Medication 650 MILLIGRAM(S): at 03:05

## 2025-07-27 RX ADMIN — INSULIN LISPRO 8 UNIT(S): 100 INJECTION, SOLUTION INTRAVENOUS; SUBCUTANEOUS at 17:30

## 2025-07-28 LAB
ALBUMIN SERPL ELPH-MCNC: 2.8 G/DL — LOW (ref 3.3–5)
ALP SERPL-CCNC: 61 U/L — SIGNIFICANT CHANGE UP (ref 40–120)
ALT FLD-CCNC: 10 U/L — SIGNIFICANT CHANGE UP (ref 10–45)
ANION GAP SERPL CALC-SCNC: 15 MMOL/L — SIGNIFICANT CHANGE UP (ref 5–17)
APPEARANCE UR: ABNORMAL
AST SERPL-CCNC: 8 U/L — LOW (ref 10–40)
BACTERIA # UR AUTO: NEGATIVE /HPF — SIGNIFICANT CHANGE UP
BASOPHILS # BLD AUTO: 0.07 K/UL — SIGNIFICANT CHANGE UP (ref 0–0.2)
BASOPHILS NFR BLD AUTO: 0.6 % — SIGNIFICANT CHANGE UP (ref 0–2)
BILIRUB SERPL-MCNC: 0.2 MG/DL — SIGNIFICANT CHANGE UP (ref 0.2–1.2)
BILIRUB UR-MCNC: NEGATIVE — SIGNIFICANT CHANGE UP
BLD GP AB SCN SERPL QL: NEGATIVE — SIGNIFICANT CHANGE UP
BUN SERPL-MCNC: 36 MG/DL — HIGH (ref 7–23)
CALCIUM SERPL-MCNC: 8 MG/DL — LOW (ref 8.4–10.5)
CAST: 5 /LPF — HIGH (ref 0–4)
CHLORIDE SERPL-SCNC: 103 MMOL/L — SIGNIFICANT CHANGE UP (ref 96–108)
CO2 SERPL-SCNC: 19 MMOL/L — LOW (ref 22–31)
COLOR SPEC: YELLOW — SIGNIFICANT CHANGE UP
CREAT ?TM UR-MCNC: 69 MG/DL — SIGNIFICANT CHANGE UP
CREAT SERPL-MCNC: 1.37 MG/DL — HIGH (ref 0.5–1.3)
DIFF PNL FLD: ABNORMAL
EGFR: 40 ML/MIN/1.73M2 — LOW
EGFR: 40 ML/MIN/1.73M2 — LOW
EOSINOPHIL # BLD AUTO: 0.26 K/UL — SIGNIFICANT CHANGE UP (ref 0–0.5)
EOSINOPHIL NFR BLD AUTO: 2.3 % — SIGNIFICANT CHANGE UP (ref 0–6)
GLUCOSE BLDC GLUCOMTR-MCNC: 197 MG/DL — HIGH (ref 70–99)
GLUCOSE BLDC GLUCOMTR-MCNC: 213 MG/DL — HIGH (ref 70–99)
GLUCOSE BLDC GLUCOMTR-MCNC: 248 MG/DL — HIGH (ref 70–99)
GLUCOSE BLDC GLUCOMTR-MCNC: 299 MG/DL — HIGH (ref 70–99)
GLUCOSE SERPL-MCNC: 242 MG/DL — HIGH (ref 70–99)
GLUCOSE UR QL: 500 MG/DL
HCT VFR BLD CALC: 26 % — LOW (ref 34.5–45)
HGB BLD-MCNC: 7.8 G/DL — LOW (ref 11.5–15.5)
IMM GRANULOCYTES # BLD AUTO: 0.28 K/UL — HIGH (ref 0–0.07)
IMM GRANULOCYTES NFR BLD AUTO: 2.5 % — HIGH (ref 0–0.9)
KETONES UR QL: NEGATIVE MG/DL — SIGNIFICANT CHANGE UP
LEUKOCYTE ESTERASE UR-ACNC: ABNORMAL
LYMPHOCYTES # BLD AUTO: 1.45 K/UL — SIGNIFICANT CHANGE UP (ref 1–3.3)
LYMPHOCYTES NFR BLD AUTO: 12.9 % — LOW (ref 13–44)
MAGNESIUM SERPL-MCNC: 2.4 MG/DL — SIGNIFICANT CHANGE UP (ref 1.6–2.6)
MCHC RBC-ENTMCNC: 24.4 PG — LOW (ref 27–34)
MCHC RBC-ENTMCNC: 30 G/DL — LOW (ref 32–36)
MCV RBC AUTO: 81.3 FL — SIGNIFICANT CHANGE UP (ref 80–100)
MONOCYTES # BLD AUTO: 0.57 K/UL — SIGNIFICANT CHANGE UP (ref 0–0.9)
MONOCYTES NFR BLD AUTO: 5.1 % — SIGNIFICANT CHANGE UP (ref 2–14)
NEUTROPHILS # BLD AUTO: 8.57 K/UL — HIGH (ref 1.8–7.4)
NEUTROPHILS NFR BLD AUTO: 76.6 % — SIGNIFICANT CHANGE UP (ref 43–77)
NITRITE UR-MCNC: NEGATIVE — SIGNIFICANT CHANGE UP
NRBC # BLD AUTO: 0.02 K/UL — HIGH (ref 0–0)
NRBC # FLD: 0.02 K/UL — HIGH (ref 0–0)
NRBC BLD AUTO-RTO: 0 /100 WBCS — SIGNIFICANT CHANGE UP (ref 0–0)
PH UR: 5.5 — SIGNIFICANT CHANGE UP (ref 5–8)
PHOSPHATE SERPL-MCNC: 3 MG/DL — SIGNIFICANT CHANGE UP (ref 2.5–4.5)
PLATELET # BLD AUTO: 322 K/UL — SIGNIFICANT CHANGE UP (ref 150–400)
PMV BLD: 10.2 FL — SIGNIFICANT CHANGE UP (ref 7–13)
POTASSIUM SERPL-MCNC: 3.6 MMOL/L — SIGNIFICANT CHANGE UP (ref 3.5–5.3)
POTASSIUM SERPL-SCNC: 3.6 MMOL/L — SIGNIFICANT CHANGE UP (ref 3.5–5.3)
PROT ?TM UR-MCNC: 13 MG/DL — HIGH (ref 0–12)
PROT SERPL-MCNC: 6.2 G/DL — SIGNIFICANT CHANGE UP (ref 6–8.3)
PROT UR-MCNC: NEGATIVE MG/DL — SIGNIFICANT CHANGE UP
PROT/CREAT UR-RTO: 0.2 RATIO — SIGNIFICANT CHANGE UP (ref 0–0.2)
RBC # BLD: 3.2 M/UL — LOW (ref 3.8–5.2)
RBC # FLD: 17.1 % — HIGH (ref 10.3–14.5)
RBC CASTS # UR COMP ASSIST: 8 /HPF — HIGH (ref 0–4)
RH IG SCN BLD-IMP: POSITIVE — SIGNIFICANT CHANGE UP
SODIUM SERPL-SCNC: 137 MMOL/L — SIGNIFICANT CHANGE UP (ref 135–145)
SP GR SPEC: 1.02 — SIGNIFICANT CHANGE UP (ref 1–1.03)
SQUAMOUS # UR AUTO: 4 /HPF — SIGNIFICANT CHANGE UP (ref 0–5)
UROBILINOGEN FLD QL: 1 MG/DL — SIGNIFICANT CHANGE UP (ref 0.2–1)
WBC # BLD: 11.2 K/UL — HIGH (ref 3.8–10.5)
WBC # FLD AUTO: 11.2 K/UL — HIGH (ref 3.8–10.5)
WBC UR QL: 16 /HPF — HIGH (ref 0–5)

## 2025-07-28 PROCEDURE — G0545: CPT

## 2025-07-28 PROCEDURE — 93005 ELECTROCARDIOGRAM TRACING: CPT

## 2025-07-28 PROCEDURE — 87040 BLOOD CULTURE FOR BACTERIA: CPT

## 2025-07-28 PROCEDURE — 73590 X-RAY EXAM OF LOWER LEG: CPT

## 2025-07-28 PROCEDURE — 99232 SBSQ HOSP IP/OBS MODERATE 35: CPT

## 2025-07-28 PROCEDURE — 97162 PT EVAL MOD COMPLEX 30 MIN: CPT

## 2025-07-28 PROCEDURE — 80202 ASSAY OF VANCOMYCIN: CPT

## 2025-07-28 PROCEDURE — 84145 PROCALCITONIN (PCT): CPT

## 2025-07-28 PROCEDURE — 84300 ASSAY OF URINE SODIUM: CPT

## 2025-07-28 PROCEDURE — 84436 ASSAY OF TOTAL THYROXINE: CPT

## 2025-07-28 PROCEDURE — 84540 ASSAY OF URINE/UREA-N: CPT

## 2025-07-28 PROCEDURE — 82570 ASSAY OF URINE CREATININE: CPT

## 2025-07-28 PROCEDURE — 93923 UPR/LXTR ART STDY 3+ LVLS: CPT

## 2025-07-28 PROCEDURE — 83036 HEMOGLOBIN GLYCOSYLATED A1C: CPT

## 2025-07-28 PROCEDURE — 80053 COMPREHEN METABOLIC PANEL: CPT

## 2025-07-28 PROCEDURE — 83735 ASSAY OF MAGNESIUM: CPT

## 2025-07-28 PROCEDURE — 73630 X-RAY EXAM OF FOOT: CPT

## 2025-07-28 PROCEDURE — 76770 US EXAM ABDO BACK WALL COMP: CPT

## 2025-07-28 PROCEDURE — 84443 ASSAY THYROID STIM HORMONE: CPT

## 2025-07-28 PROCEDURE — 87070 CULTURE OTHR SPECIMN AEROBIC: CPT

## 2025-07-28 PROCEDURE — 36415 COLL VENOUS BLD VENIPUNCTURE: CPT

## 2025-07-28 PROCEDURE — 85652 RBC SED RATE AUTOMATED: CPT

## 2025-07-28 PROCEDURE — 84156 ASSAY OF PROTEIN URINE: CPT

## 2025-07-28 PROCEDURE — 86900 BLOOD TYPING SEROLOGIC ABO: CPT

## 2025-07-28 PROCEDURE — 87641 MR-STAPH DNA AMP PROBE: CPT

## 2025-07-28 PROCEDURE — 86901 BLOOD TYPING SEROLOGIC RH(D): CPT

## 2025-07-28 PROCEDURE — 87640 STAPH A DNA AMP PROBE: CPT

## 2025-07-28 PROCEDURE — 94640 AIRWAY INHALATION TREATMENT: CPT

## 2025-07-28 PROCEDURE — 85025 COMPLETE CBC W/AUTO DIFF WBC: CPT

## 2025-07-28 PROCEDURE — 86140 C-REACTIVE PROTEIN: CPT

## 2025-07-28 PROCEDURE — 71045 X-RAY EXAM CHEST 1 VIEW: CPT

## 2025-07-28 PROCEDURE — 83935 ASSAY OF URINE OSMOLALITY: CPT

## 2025-07-28 PROCEDURE — 76770 US EXAM ABDO BACK WALL COMP: CPT | Mod: 26

## 2025-07-28 PROCEDURE — 99233 SBSQ HOSP IP/OBS HIGH 50: CPT | Mod: GC

## 2025-07-28 PROCEDURE — 73700 CT LOWER EXTREMITY W/O DYE: CPT

## 2025-07-28 PROCEDURE — 84100 ASSAY OF PHOSPHORUS: CPT

## 2025-07-28 PROCEDURE — 81001 URINALYSIS AUTO W/SCOPE: CPT

## 2025-07-28 PROCEDURE — 86850 RBC ANTIBODY SCREEN: CPT

## 2025-07-28 PROCEDURE — 82962 GLUCOSE BLOOD TEST: CPT

## 2025-07-28 PROCEDURE — 83690 ASSAY OF LIPASE: CPT

## 2025-07-28 PROCEDURE — 87186 SC STD MICRODIL/AGAR DIL: CPT

## 2025-07-28 PROCEDURE — 93970 EXTREMITY STUDY: CPT

## 2025-07-28 PROCEDURE — 83880 ASSAY OF NATRIURETIC PEPTIDE: CPT

## 2025-07-28 RX ADMIN — AMIODARONE HYDROCHLORIDE 200 MILLIGRAM(S): 50 INJECTION, SOLUTION INTRAVENOUS at 06:33

## 2025-07-28 RX ADMIN — APIXABAN 5 MILLIGRAM(S): 2.5 TABLET, FILM COATED ORAL at 17:19

## 2025-07-28 RX ADMIN — BUDESONIDE 0.5 MILLIGRAM(S): 0.25 SUSPENSION RESPIRATORY (INHALATION) at 06:34

## 2025-07-28 RX ADMIN — Medication 0.2 MILLIGRAM(S): at 22:26

## 2025-07-28 RX ADMIN — Medication 25 GRAM(S): at 14:34

## 2025-07-28 RX ADMIN — Medication 0.2 MILLIGRAM(S): at 22:56

## 2025-07-28 RX ADMIN — MUPIROCIN CALCIUM 1 APPLICATION(S): 20 CREAM TOPICAL at 06:34

## 2025-07-28 RX ADMIN — Medication 5 MILLIGRAM(S): at 22:23

## 2025-07-28 RX ADMIN — INSULIN LISPRO 2: 100 INJECTION, SOLUTION INTRAVENOUS; SUBCUTANEOUS at 08:47

## 2025-07-28 RX ADMIN — Medication 75 MILLILITER(S): at 00:30

## 2025-07-28 RX ADMIN — Medication 1 TABLET(S): at 12:04

## 2025-07-28 RX ADMIN — MUPIROCIN CALCIUM 1 APPLICATION(S): 20 CREAM TOPICAL at 16:02

## 2025-07-28 RX ADMIN — OXYCODONE HYDROCHLORIDE 5 MILLIGRAM(S): 30 TABLET ORAL at 17:19

## 2025-07-28 RX ADMIN — Medication 250 MILLIGRAM(S): at 22:21

## 2025-07-28 RX ADMIN — APIXABAN 5 MILLIGRAM(S): 2.5 TABLET, FILM COATED ORAL at 06:33

## 2025-07-28 RX ADMIN — Medication 40 MILLIGRAM(S): at 06:33

## 2025-07-28 RX ADMIN — MUPIROCIN CALCIUM 1 APPLICATION(S): 20 CREAM TOPICAL at 17:20

## 2025-07-28 RX ADMIN — INSULIN LISPRO 8 UNIT(S): 100 INJECTION, SOLUTION INTRAVENOUS; SUBCUTANEOUS at 17:31

## 2025-07-28 RX ADMIN — OXYCODONE HYDROCHLORIDE 5 MILLIGRAM(S): 30 TABLET ORAL at 10:10

## 2025-07-28 RX ADMIN — INSULIN LISPRO 2: 100 INJECTION, SOLUTION INTRAVENOUS; SUBCUTANEOUS at 12:51

## 2025-07-28 RX ADMIN — AMLODIPINE BESYLATE 5 MILLIGRAM(S): 10 TABLET ORAL at 06:33

## 2025-07-28 RX ADMIN — Medication 81 MILLIGRAM(S): at 12:03

## 2025-07-28 RX ADMIN — ATORVASTATIN CALCIUM 40 MILLIGRAM(S): 80 TABLET, FILM COATED ORAL at 22:23

## 2025-07-28 RX ADMIN — Medication 200 MICROGRAM(S): at 06:33

## 2025-07-28 RX ADMIN — INSULIN LISPRO 8 UNIT(S): 100 INJECTION, SOLUTION INTRAVENOUS; SUBCUTANEOUS at 12:52

## 2025-07-28 RX ADMIN — OXYCODONE HYDROCHLORIDE 5 MILLIGRAM(S): 30 TABLET ORAL at 18:00

## 2025-07-28 RX ADMIN — INSULIN LISPRO 3: 100 INJECTION, SOLUTION INTRAVENOUS; SUBCUTANEOUS at 17:31

## 2025-07-28 RX ADMIN — POLYETHYLENE GLYCOL 3350 17 GRAM(S): 17 POWDER, FOR SOLUTION ORAL at 22:22

## 2025-07-28 RX ADMIN — BUDESONIDE 0.5 MILLIGRAM(S): 0.25 SUSPENSION RESPIRATORY (INHALATION) at 17:38

## 2025-07-28 RX ADMIN — MUPIROCIN CALCIUM 1 APPLICATION(S): 20 CREAM TOPICAL at 06:33

## 2025-07-28 RX ADMIN — INSULIN LISPRO 8 UNIT(S): 100 INJECTION, SOLUTION INTRAVENOUS; SUBCUTANEOUS at 08:48

## 2025-07-28 RX ADMIN — Medication 2 TABLET(S): at 22:23

## 2025-07-28 RX ADMIN — OXYCODONE HYDROCHLORIDE 5 MILLIGRAM(S): 30 TABLET ORAL at 08:47

## 2025-07-28 RX ADMIN — Medication 25 GRAM(S): at 22:40

## 2025-07-28 RX ADMIN — Medication 25 GRAM(S): at 06:33

## 2025-07-28 RX ADMIN — INSULIN GLARGINE-YFGN 16 UNIT(S): 100 INJECTION, SOLUTION SUBCUTANEOUS at 22:20

## 2025-07-29 LAB
ALBUMIN SERPL ELPH-MCNC: 3 G/DL — LOW (ref 3.3–5)
ALP SERPL-CCNC: 60 U/L — SIGNIFICANT CHANGE UP (ref 40–120)
ALT FLD-CCNC: 10 U/L — SIGNIFICANT CHANGE UP (ref 10–45)
ANION GAP SERPL CALC-SCNC: 13 MMOL/L — SIGNIFICANT CHANGE UP (ref 5–17)
AST SERPL-CCNC: 9 U/L — LOW (ref 10–40)
BASOPHILS # BLD AUTO: 0.04 K/UL — SIGNIFICANT CHANGE UP (ref 0–0.2)
BASOPHILS NFR BLD AUTO: 0.4 % — SIGNIFICANT CHANGE UP (ref 0–2)
BILIRUB SERPL-MCNC: 0.3 MG/DL — SIGNIFICANT CHANGE UP (ref 0.2–1.2)
BUN SERPL-MCNC: 30 MG/DL — HIGH (ref 7–23)
CALCIUM SERPL-MCNC: 8.1 MG/DL — LOW (ref 8.4–10.5)
CHLORIDE SERPL-SCNC: 103 MMOL/L — SIGNIFICANT CHANGE UP (ref 96–108)
CO2 SERPL-SCNC: 21 MMOL/L — LOW (ref 22–31)
CREAT SERPL-MCNC: 1.3 MG/DL — SIGNIFICANT CHANGE UP (ref 0.5–1.3)
EGFR: 42 ML/MIN/1.73M2 — LOW
EGFR: 42 ML/MIN/1.73M2 — LOW
EOSINOPHIL # BLD AUTO: 0.24 K/UL — SIGNIFICANT CHANGE UP (ref 0–0.5)
EOSINOPHIL NFR BLD AUTO: 2.3 % — SIGNIFICANT CHANGE UP (ref 0–6)
GLUCOSE BLDC GLUCOMTR-MCNC: 136 MG/DL — HIGH (ref 70–99)
GLUCOSE BLDC GLUCOMTR-MCNC: 145 MG/DL — HIGH (ref 70–99)
GLUCOSE BLDC GLUCOMTR-MCNC: 155 MG/DL — HIGH (ref 70–99)
GLUCOSE BLDC GLUCOMTR-MCNC: 207 MG/DL — HIGH (ref 70–99)
GLUCOSE SERPL-MCNC: 166 MG/DL — HIGH (ref 70–99)
HCT VFR BLD CALC: 26.1 % — LOW (ref 34.5–45)
HGB BLD-MCNC: 7.9 G/DL — LOW (ref 11.5–15.5)
IMM GRANULOCYTES # BLD AUTO: 0.18 K/UL — HIGH (ref 0–0.07)
IMM GRANULOCYTES NFR BLD AUTO: 1.7 % — HIGH (ref 0–0.9)
LYMPHOCYTES # BLD AUTO: 1.49 K/UL — SIGNIFICANT CHANGE UP (ref 1–3.3)
LYMPHOCYTES NFR BLD AUTO: 14.3 % — SIGNIFICANT CHANGE UP (ref 13–44)
MAGNESIUM SERPL-MCNC: 2.3 MG/DL — SIGNIFICANT CHANGE UP (ref 1.6–2.6)
MCHC RBC-ENTMCNC: 24.8 PG — LOW (ref 27–34)
MCHC RBC-ENTMCNC: 30.3 G/DL — LOW (ref 32–36)
MCV RBC AUTO: 82.1 FL — SIGNIFICANT CHANGE UP (ref 80–100)
MONOCYTES # BLD AUTO: 0.53 K/UL — SIGNIFICANT CHANGE UP (ref 0–0.9)
MONOCYTES NFR BLD AUTO: 5.1 % — SIGNIFICANT CHANGE UP (ref 2–14)
NEUTROPHILS # BLD AUTO: 7.94 K/UL — HIGH (ref 1.8–7.4)
NEUTROPHILS NFR BLD AUTO: 76.2 % — SIGNIFICANT CHANGE UP (ref 43–77)
NRBC # BLD AUTO: 0 K/UL — SIGNIFICANT CHANGE UP (ref 0–0)
NRBC # FLD: 0 K/UL — SIGNIFICANT CHANGE UP (ref 0–0)
NRBC BLD AUTO-RTO: 0 /100 WBCS — SIGNIFICANT CHANGE UP (ref 0–0)
PHOSPHATE SERPL-MCNC: 3.2 MG/DL — SIGNIFICANT CHANGE UP (ref 2.5–4.5)
PLATELET # BLD AUTO: 328 K/UL — SIGNIFICANT CHANGE UP (ref 150–400)
PMV BLD: 9.9 FL — SIGNIFICANT CHANGE UP (ref 7–13)
POTASSIUM SERPL-MCNC: 3.7 MMOL/L — SIGNIFICANT CHANGE UP (ref 3.5–5.3)
POTASSIUM SERPL-SCNC: 3.7 MMOL/L — SIGNIFICANT CHANGE UP (ref 3.5–5.3)
PROT SERPL-MCNC: 6.2 G/DL — SIGNIFICANT CHANGE UP (ref 6–8.3)
RBC # BLD: 3.18 M/UL — LOW (ref 3.8–5.2)
RBC # FLD: 17.3 % — HIGH (ref 10.3–14.5)
SODIUM SERPL-SCNC: 137 MMOL/L — SIGNIFICANT CHANGE UP (ref 135–145)
VANCOMYCIN TROUGH SERPL-MCNC: 17.3 UG/ML — SIGNIFICANT CHANGE UP (ref 10–20)
WBC # BLD: 10.42 K/UL — SIGNIFICANT CHANGE UP (ref 3.8–10.5)
WBC # FLD AUTO: 10.42 K/UL — SIGNIFICANT CHANGE UP (ref 3.8–10.5)

## 2025-07-29 PROCEDURE — G0545: CPT

## 2025-07-29 PROCEDURE — 99233 SBSQ HOSP IP/OBS HIGH 50: CPT | Mod: GC

## 2025-07-29 PROCEDURE — 99232 SBSQ HOSP IP/OBS MODERATE 35: CPT

## 2025-07-29 RX ORDER — GABAPENTIN 400 MG/1
100 CAPSULE ORAL ONCE
Refills: 0 | Status: COMPLETED | OUTPATIENT
Start: 2025-07-29 | End: 2025-07-29

## 2025-07-29 RX ORDER — GABAPENTIN 400 MG/1
100 CAPSULE ORAL THREE TIMES A DAY
Refills: 0 | Status: DISCONTINUED | OUTPATIENT
Start: 2025-07-29 | End: 2025-07-29

## 2025-07-29 RX ORDER — GABAPENTIN 400 MG/1
200 CAPSULE ORAL EVERY 8 HOURS
Refills: 0 | Status: DISCONTINUED | OUTPATIENT
Start: 2025-07-29 | End: 2025-07-30

## 2025-07-29 RX ADMIN — AMLODIPINE BESYLATE 5 MILLIGRAM(S): 10 TABLET ORAL at 06:46

## 2025-07-29 RX ADMIN — Medication 25 GRAM(S): at 13:15

## 2025-07-29 RX ADMIN — GABAPENTIN 200 MILLIGRAM(S): 400 CAPSULE ORAL at 21:11

## 2025-07-29 RX ADMIN — INSULIN GLARGINE-YFGN 16 UNIT(S): 100 INJECTION, SOLUTION SUBCUTANEOUS at 21:32

## 2025-07-29 RX ADMIN — Medication 25 GRAM(S): at 21:11

## 2025-07-29 RX ADMIN — Medication 200 MICROGRAM(S): at 06:46

## 2025-07-29 RX ADMIN — Medication 250 MILLIGRAM(S): at 21:11

## 2025-07-29 RX ADMIN — MUPIROCIN CALCIUM 1 APPLICATION(S): 20 CREAM TOPICAL at 16:20

## 2025-07-29 RX ADMIN — GABAPENTIN 100 MILLIGRAM(S): 400 CAPSULE ORAL at 12:08

## 2025-07-29 RX ADMIN — OXYCODONE HYDROCHLORIDE 5 MILLIGRAM(S): 30 TABLET ORAL at 22:11

## 2025-07-29 RX ADMIN — Medication 40 MILLIGRAM(S): at 06:46

## 2025-07-29 RX ADMIN — Medication 0.2 MILLIGRAM(S): at 08:36

## 2025-07-29 RX ADMIN — INSULIN LISPRO 8 UNIT(S): 100 INJECTION, SOLUTION INTRAVENOUS; SUBCUTANEOUS at 13:15

## 2025-07-29 RX ADMIN — Medication 1 TABLET(S): at 12:09

## 2025-07-29 RX ADMIN — Medication 2 MILLIGRAM(S): at 00:00

## 2025-07-29 RX ADMIN — ATORVASTATIN CALCIUM 40 MILLIGRAM(S): 80 TABLET, FILM COATED ORAL at 21:10

## 2025-07-29 RX ADMIN — GABAPENTIN 100 MILLIGRAM(S): 400 CAPSULE ORAL at 13:15

## 2025-07-29 RX ADMIN — Medication 2 MILLIGRAM(S): at 21:10

## 2025-07-29 RX ADMIN — MUPIROCIN CALCIUM 1 APPLICATION(S): 20 CREAM TOPICAL at 17:42

## 2025-07-29 RX ADMIN — AMIODARONE HYDROCHLORIDE 200 MILLIGRAM(S): 50 INJECTION, SOLUTION INTRAVENOUS at 06:47

## 2025-07-29 RX ADMIN — OXYCODONE HYDROCHLORIDE 5 MILLIGRAM(S): 30 TABLET ORAL at 07:38

## 2025-07-29 RX ADMIN — Medication 2 TABLET(S): at 21:10

## 2025-07-29 RX ADMIN — BUDESONIDE 0.5 MILLIGRAM(S): 0.25 SUSPENSION RESPIRATORY (INHALATION) at 06:46

## 2025-07-29 RX ADMIN — Medication 25 GRAM(S): at 06:46

## 2025-07-29 RX ADMIN — INSULIN LISPRO 8 UNIT(S): 100 INJECTION, SOLUTION INTRAVENOUS; SUBCUTANEOUS at 09:12

## 2025-07-29 RX ADMIN — INSULIN LISPRO 2: 100 INJECTION, SOLUTION INTRAVENOUS; SUBCUTANEOUS at 09:12

## 2025-07-29 RX ADMIN — Medication 81 MILLIGRAM(S): at 12:08

## 2025-07-29 RX ADMIN — OXYCODONE HYDROCHLORIDE 5 MILLIGRAM(S): 30 TABLET ORAL at 07:08

## 2025-07-29 RX ADMIN — Medication 0.2 MILLIGRAM(S): at 09:00

## 2025-07-29 RX ADMIN — APIXABAN 5 MILLIGRAM(S): 2.5 TABLET, FILM COATED ORAL at 06:45

## 2025-07-29 RX ADMIN — Medication 5 MILLIGRAM(S): at 21:10

## 2025-07-29 RX ADMIN — POLYETHYLENE GLYCOL 3350 17 GRAM(S): 17 POWDER, FOR SOLUTION ORAL at 21:10

## 2025-07-29 RX ADMIN — OXYCODONE HYDROCHLORIDE 5 MILLIGRAM(S): 30 TABLET ORAL at 21:00

## 2025-07-29 RX ADMIN — MUPIROCIN CALCIUM 1 APPLICATION(S): 20 CREAM TOPICAL at 06:46

## 2025-07-29 RX ADMIN — APIXABAN 5 MILLIGRAM(S): 2.5 TABLET, FILM COATED ORAL at 17:42

## 2025-07-29 RX ADMIN — INSULIN LISPRO 8 UNIT(S): 100 INJECTION, SOLUTION INTRAVENOUS; SUBCUTANEOUS at 17:41

## 2025-07-29 RX ADMIN — BUDESONIDE 0.5 MILLIGRAM(S): 0.25 SUSPENSION RESPIRATORY (INHALATION) at 17:41

## 2025-07-30 LAB
ALBUMIN SERPL ELPH-MCNC: 3 G/DL — LOW (ref 3.3–5)
ALP SERPL-CCNC: 60 U/L — SIGNIFICANT CHANGE UP (ref 40–120)
ALT FLD-CCNC: 10 U/L — SIGNIFICANT CHANGE UP (ref 10–45)
ANION GAP SERPL CALC-SCNC: 14 MMOL/L — SIGNIFICANT CHANGE UP (ref 5–17)
AST SERPL-CCNC: 10 U/L — SIGNIFICANT CHANGE UP (ref 10–40)
BASOPHILS # BLD AUTO: 0.05 K/UL — SIGNIFICANT CHANGE UP (ref 0–0.2)
BASOPHILS NFR BLD AUTO: 0.5 % — SIGNIFICANT CHANGE UP (ref 0–2)
BILIRUB SERPL-MCNC: 0.3 MG/DL — SIGNIFICANT CHANGE UP (ref 0.2–1.2)
BLD GP AB SCN SERPL QL: NEGATIVE — SIGNIFICANT CHANGE UP
BUN SERPL-MCNC: 28 MG/DL — HIGH (ref 7–23)
CALCIUM SERPL-MCNC: 8.4 MG/DL — SIGNIFICANT CHANGE UP (ref 8.4–10.5)
CHLORIDE SERPL-SCNC: 103 MMOL/L — SIGNIFICANT CHANGE UP (ref 96–108)
CO2 SERPL-SCNC: 21 MMOL/L — LOW (ref 22–31)
CREAT SERPL-MCNC: 1.32 MG/DL — HIGH (ref 0.5–1.3)
CULTURE RESULTS: ABNORMAL
CULTURE RESULTS: SIGNIFICANT CHANGE UP
CULTURE RESULTS: SIGNIFICANT CHANGE UP
EGFR: 42 ML/MIN/1.73M2 — LOW
EGFR: 42 ML/MIN/1.73M2 — LOW
EOSINOPHIL # BLD AUTO: 0.25 K/UL — SIGNIFICANT CHANGE UP (ref 0–0.5)
EOSINOPHIL NFR BLD AUTO: 2.6 % — SIGNIFICANT CHANGE UP (ref 0–6)
GLUCOSE BLDC GLUCOMTR-MCNC: 134 MG/DL — HIGH (ref 70–99)
GLUCOSE BLDC GLUCOMTR-MCNC: 145 MG/DL — HIGH (ref 70–99)
GLUCOSE BLDC GLUCOMTR-MCNC: 167 MG/DL — HIGH (ref 70–99)
GLUCOSE BLDC GLUCOMTR-MCNC: 178 MG/DL — HIGH (ref 70–99)
GLUCOSE SERPL-MCNC: 155 MG/DL — HIGH (ref 70–99)
HCT VFR BLD CALC: 26.8 % — LOW (ref 34.5–45)
HGB BLD-MCNC: 8 G/DL — LOW (ref 11.5–15.5)
IMM GRANULOCYTES # BLD AUTO: 0.14 K/UL — HIGH (ref 0–0.07)
IMM GRANULOCYTES NFR BLD AUTO: 1.5 % — HIGH (ref 0–0.9)
LYMPHOCYTES # BLD AUTO: 1.24 K/UL — SIGNIFICANT CHANGE UP (ref 1–3.3)
LYMPHOCYTES NFR BLD AUTO: 13 % — SIGNIFICANT CHANGE UP (ref 13–44)
MAGNESIUM SERPL-MCNC: 2.3 MG/DL — SIGNIFICANT CHANGE UP (ref 1.6–2.6)
MCHC RBC-ENTMCNC: 24.5 PG — LOW (ref 27–34)
MCHC RBC-ENTMCNC: 29.9 G/DL — LOW (ref 32–36)
MCV RBC AUTO: 82.2 FL — SIGNIFICANT CHANGE UP (ref 80–100)
MONOCYTES # BLD AUTO: 0.6 K/UL — SIGNIFICANT CHANGE UP (ref 0–0.9)
MONOCYTES NFR BLD AUTO: 6.3 % — SIGNIFICANT CHANGE UP (ref 2–14)
NEUTROPHILS # BLD AUTO: 7.28 K/UL — SIGNIFICANT CHANGE UP (ref 1.8–7.4)
NEUTROPHILS NFR BLD AUTO: 76.1 % — SIGNIFICANT CHANGE UP (ref 43–77)
NRBC # BLD AUTO: 0.02 K/UL — HIGH (ref 0–0)
NRBC # FLD: 0.02 K/UL — HIGH (ref 0–0)
NRBC BLD AUTO-RTO: 0 /100 WBCS — SIGNIFICANT CHANGE UP (ref 0–0)
ORGANISM # SPEC MICROSCOPIC CNT: ABNORMAL
ORGANISM # SPEC MICROSCOPIC CNT: ABNORMAL
PHOSPHATE SERPL-MCNC: 3.7 MG/DL — SIGNIFICANT CHANGE UP (ref 2.5–4.5)
PLATELET # BLD AUTO: 320 K/UL — SIGNIFICANT CHANGE UP (ref 150–400)
PMV BLD: 9.8 FL — SIGNIFICANT CHANGE UP (ref 7–13)
POTASSIUM SERPL-MCNC: 3.9 MMOL/L — SIGNIFICANT CHANGE UP (ref 3.5–5.3)
POTASSIUM SERPL-SCNC: 3.9 MMOL/L — SIGNIFICANT CHANGE UP (ref 3.5–5.3)
PROT SERPL-MCNC: 6.3 G/DL — SIGNIFICANT CHANGE UP (ref 6–8.3)
RBC # BLD: 3.26 M/UL — LOW (ref 3.8–5.2)
RBC # FLD: 17.6 % — HIGH (ref 10.3–14.5)
RH IG SCN BLD-IMP: POSITIVE — SIGNIFICANT CHANGE UP
SODIUM SERPL-SCNC: 138 MMOL/L — SIGNIFICANT CHANGE UP (ref 135–145)
SPECIMEN SOURCE: SIGNIFICANT CHANGE UP
WBC # BLD: 9.56 K/UL — SIGNIFICANT CHANGE UP (ref 3.8–10.5)
WBC # FLD AUTO: 9.56 K/UL — SIGNIFICANT CHANGE UP (ref 3.8–10.5)

## 2025-07-30 PROCEDURE — 99232 SBSQ HOSP IP/OBS MODERATE 35: CPT | Mod: GC

## 2025-07-30 PROCEDURE — 99231 SBSQ HOSP IP/OBS SF/LOW 25: CPT

## 2025-07-30 RX ORDER — OXYCODONE HYDROCHLORIDE 30 MG/1
5 TABLET ORAL EVERY 6 HOURS
Refills: 0 | Status: DISCONTINUED | OUTPATIENT
Start: 2025-07-30 | End: 2025-08-06

## 2025-07-30 RX ORDER — GABAPENTIN 400 MG/1
300 CAPSULE ORAL EVERY 8 HOURS
Refills: 0 | Status: DISCONTINUED | OUTPATIENT
Start: 2025-07-30 | End: 2025-09-04

## 2025-07-30 RX ORDER — ACETAMINOPHEN 500 MG/5ML
650 LIQUID (ML) ORAL EVERY 6 HOURS
Refills: 0 | Status: DISCONTINUED | OUTPATIENT
Start: 2025-07-30 | End: 2025-07-31

## 2025-07-30 RX ORDER — OXYCODONE HYDROCHLORIDE 30 MG/1
5 TABLET ORAL EVERY 6 HOURS
Refills: 0 | Status: DISCONTINUED | OUTPATIENT
Start: 2025-07-30 | End: 2025-07-30

## 2025-07-30 RX ADMIN — GABAPENTIN 300 MILLIGRAM(S): 400 CAPSULE ORAL at 22:14

## 2025-07-30 RX ADMIN — AMIODARONE HYDROCHLORIDE 200 MILLIGRAM(S): 50 INJECTION, SOLUTION INTRAVENOUS at 05:31

## 2025-07-30 RX ADMIN — MUPIROCIN CALCIUM 1 APPLICATION(S): 20 CREAM TOPICAL at 05:31

## 2025-07-30 RX ADMIN — INSULIN LISPRO 8 UNIT(S): 100 INJECTION, SOLUTION INTRAVENOUS; SUBCUTANEOUS at 09:18

## 2025-07-30 RX ADMIN — APIXABAN 5 MILLIGRAM(S): 2.5 TABLET, FILM COATED ORAL at 17:22

## 2025-07-30 RX ADMIN — OXYCODONE HYDROCHLORIDE 5 MILLIGRAM(S): 30 TABLET ORAL at 20:40

## 2025-07-30 RX ADMIN — ATORVASTATIN CALCIUM 40 MILLIGRAM(S): 80 TABLET, FILM COATED ORAL at 22:14

## 2025-07-30 RX ADMIN — OXYCODONE HYDROCHLORIDE 5 MILLIGRAM(S): 30 TABLET ORAL at 12:19

## 2025-07-30 RX ADMIN — MUPIROCIN CALCIUM 1 APPLICATION(S): 20 CREAM TOPICAL at 17:19

## 2025-07-30 RX ADMIN — INSULIN LISPRO 8 UNIT(S): 100 INJECTION, SOLUTION INTRAVENOUS; SUBCUTANEOUS at 12:48

## 2025-07-30 RX ADMIN — Medication 5 MILLIGRAM(S): at 22:15

## 2025-07-30 RX ADMIN — AMLODIPINE BESYLATE 5 MILLIGRAM(S): 10 TABLET ORAL at 05:31

## 2025-07-30 RX ADMIN — Medication 25 GRAM(S): at 05:31

## 2025-07-30 RX ADMIN — Medication 40 MILLIGRAM(S): at 05:31

## 2025-07-30 RX ADMIN — OXYCODONE HYDROCHLORIDE 5 MILLIGRAM(S): 30 TABLET ORAL at 13:00

## 2025-07-30 RX ADMIN — Medication 200 MICROGRAM(S): at 05:30

## 2025-07-30 RX ADMIN — BUDESONIDE 0.5 MILLIGRAM(S): 0.25 SUSPENSION RESPIRATORY (INHALATION) at 05:32

## 2025-07-30 RX ADMIN — Medication 25 GRAM(S): at 22:13

## 2025-07-30 RX ADMIN — INSULIN LISPRO 1: 100 INJECTION, SOLUTION INTRAVENOUS; SUBCUTANEOUS at 17:23

## 2025-07-30 RX ADMIN — INSULIN LISPRO 8 UNIT(S): 100 INJECTION, SOLUTION INTRAVENOUS; SUBCUTANEOUS at 17:23

## 2025-07-30 RX ADMIN — Medication 2 TABLET(S): at 22:14

## 2025-07-30 RX ADMIN — POLYETHYLENE GLYCOL 3350 17 GRAM(S): 17 POWDER, FOR SOLUTION ORAL at 22:14

## 2025-07-30 RX ADMIN — INSULIN GLARGINE-YFGN 16 UNIT(S): 100 INJECTION, SOLUTION SUBCUTANEOUS at 22:13

## 2025-07-30 RX ADMIN — INSULIN LISPRO 1: 100 INJECTION, SOLUTION INTRAVENOUS; SUBCUTANEOUS at 09:18

## 2025-07-30 RX ADMIN — GABAPENTIN 300 MILLIGRAM(S): 400 CAPSULE ORAL at 14:12

## 2025-07-30 RX ADMIN — Medication 5 MILLIGRAM(S): at 22:14

## 2025-07-30 RX ADMIN — Medication 250 MILLIGRAM(S): at 22:13

## 2025-07-30 RX ADMIN — Medication 25 GRAM(S): at 14:12

## 2025-07-30 RX ADMIN — OXYCODONE HYDROCHLORIDE 5 MILLIGRAM(S): 30 TABLET ORAL at 19:43

## 2025-07-30 RX ADMIN — APIXABAN 5 MILLIGRAM(S): 2.5 TABLET, FILM COATED ORAL at 05:31

## 2025-07-30 RX ADMIN — Medication 81 MILLIGRAM(S): at 12:19

## 2025-07-30 RX ADMIN — MUPIROCIN CALCIUM 1 APPLICATION(S): 20 CREAM TOPICAL at 05:30

## 2025-07-30 RX ADMIN — BUDESONIDE 0.5 MILLIGRAM(S): 0.25 SUSPENSION RESPIRATORY (INHALATION) at 17:22

## 2025-07-30 RX ADMIN — Medication 2 MILLIGRAM(S): at 22:32

## 2025-07-30 RX ADMIN — Medication 1 TABLET(S): at 12:19

## 2025-07-31 LAB
ALBUMIN SERPL ELPH-MCNC: 2.9 G/DL — LOW (ref 3.3–5)
ALP SERPL-CCNC: 59 U/L — SIGNIFICANT CHANGE UP (ref 40–120)
ALT FLD-CCNC: 9 U/L — LOW (ref 10–45)
ANION GAP SERPL CALC-SCNC: 11 MMOL/L — SIGNIFICANT CHANGE UP (ref 5–17)
ANION GAP SERPL CALC-SCNC: 14 MMOL/L — SIGNIFICANT CHANGE UP (ref 5–17)
AST SERPL-CCNC: 10 U/L — SIGNIFICANT CHANGE UP (ref 10–40)
BASOPHILS # BLD AUTO: 0.04 K/UL — SIGNIFICANT CHANGE UP (ref 0–0.2)
BASOPHILS NFR BLD AUTO: 0.5 % — SIGNIFICANT CHANGE UP (ref 0–2)
BILIRUB SERPL-MCNC: 0.4 MG/DL — SIGNIFICANT CHANGE UP (ref 0.2–1.2)
BUN SERPL-MCNC: 28 MG/DL — HIGH (ref 7–23)
BUN SERPL-MCNC: 30 MG/DL — HIGH (ref 7–23)
CALCIUM SERPL-MCNC: 8.4 MG/DL — SIGNIFICANT CHANGE UP (ref 8.4–10.5)
CALCIUM SERPL-MCNC: 8.4 MG/DL — SIGNIFICANT CHANGE UP (ref 8.4–10.5)
CHLORIDE SERPL-SCNC: 103 MMOL/L — SIGNIFICANT CHANGE UP (ref 96–108)
CHLORIDE SERPL-SCNC: 104 MMOL/L — SIGNIFICANT CHANGE UP (ref 96–108)
CO2 SERPL-SCNC: 21 MMOL/L — LOW (ref 22–31)
CO2 SERPL-SCNC: 23 MMOL/L — SIGNIFICANT CHANGE UP (ref 22–31)
CREAT SERPL-MCNC: 1.19 MG/DL — SIGNIFICANT CHANGE UP (ref 0.5–1.3)
CREAT SERPL-MCNC: 1.42 MG/DL — HIGH (ref 0.5–1.3)
EGFR: 38 ML/MIN/1.73M2 — LOW
EGFR: 38 ML/MIN/1.73M2 — LOW
EGFR: 47 ML/MIN/1.73M2 — LOW
EGFR: 47 ML/MIN/1.73M2 — LOW
EOSINOPHIL # BLD AUTO: 0.24 K/UL — SIGNIFICANT CHANGE UP (ref 0–0.5)
EOSINOPHIL NFR BLD AUTO: 2.7 % — SIGNIFICANT CHANGE UP (ref 0–6)
GLUCOSE BLDC GLUCOMTR-MCNC: 150 MG/DL — HIGH (ref 70–99)
GLUCOSE BLDC GLUCOMTR-MCNC: 169 MG/DL — HIGH (ref 70–99)
GLUCOSE BLDC GLUCOMTR-MCNC: 208 MG/DL — HIGH (ref 70–99)
GLUCOSE BLDC GLUCOMTR-MCNC: 230 MG/DL — HIGH (ref 70–99)
GLUCOSE SERPL-MCNC: 110 MG/DL — HIGH (ref 70–99)
GLUCOSE SERPL-MCNC: 156 MG/DL — HIGH (ref 70–99)
HCT VFR BLD CALC: 25 % — LOW (ref 34.5–45)
HGB BLD-MCNC: 7.4 G/DL — LOW (ref 11.5–15.5)
IMM GRANULOCYTES # BLD AUTO: 0.11 K/UL — HIGH (ref 0–0.07)
IMM GRANULOCYTES NFR BLD AUTO: 1.2 % — HIGH (ref 0–0.9)
LYMPHOCYTES # BLD AUTO: 1.6 K/UL — SIGNIFICANT CHANGE UP (ref 1–3.3)
LYMPHOCYTES NFR BLD AUTO: 18 % — SIGNIFICANT CHANGE UP (ref 13–44)
MAGNESIUM SERPL-MCNC: 2.4 MG/DL — SIGNIFICANT CHANGE UP (ref 1.6–2.6)
MCHC RBC-ENTMCNC: 24.6 PG — LOW (ref 27–34)
MCHC RBC-ENTMCNC: 29.6 G/DL — LOW (ref 32–36)
MCV RBC AUTO: 83.1 FL — SIGNIFICANT CHANGE UP (ref 80–100)
MONOCYTES # BLD AUTO: 0.62 K/UL — SIGNIFICANT CHANGE UP (ref 0–0.9)
MONOCYTES NFR BLD AUTO: 7 % — SIGNIFICANT CHANGE UP (ref 2–14)
NEUTROPHILS # BLD AUTO: 6.26 K/UL — SIGNIFICANT CHANGE UP (ref 1.8–7.4)
NEUTROPHILS NFR BLD AUTO: 70.6 % — SIGNIFICANT CHANGE UP (ref 43–77)
NRBC # BLD AUTO: 0 K/UL — SIGNIFICANT CHANGE UP (ref 0–0)
NRBC # FLD: 0 K/UL — SIGNIFICANT CHANGE UP (ref 0–0)
NRBC BLD AUTO-RTO: 0 /100 WBCS — SIGNIFICANT CHANGE UP (ref 0–0)
PHOSPHATE SERPL-MCNC: 3.6 MG/DL — SIGNIFICANT CHANGE UP (ref 2.5–4.5)
PLATELET # BLD AUTO: 320 K/UL — SIGNIFICANT CHANGE UP (ref 150–400)
PMV BLD: 9.7 FL — SIGNIFICANT CHANGE UP (ref 7–13)
POTASSIUM SERPL-MCNC: 4 MMOL/L — SIGNIFICANT CHANGE UP (ref 3.5–5.3)
POTASSIUM SERPL-MCNC: 4.2 MMOL/L — SIGNIFICANT CHANGE UP (ref 3.5–5.3)
POTASSIUM SERPL-SCNC: 4 MMOL/L — SIGNIFICANT CHANGE UP (ref 3.5–5.3)
POTASSIUM SERPL-SCNC: 4.2 MMOL/L — SIGNIFICANT CHANGE UP (ref 3.5–5.3)
PROT SERPL-MCNC: 6.3 G/DL — SIGNIFICANT CHANGE UP (ref 6–8.3)
RBC # BLD: 3.01 M/UL — LOW (ref 3.8–5.2)
RBC # FLD: 17.9 % — HIGH (ref 10.3–14.5)
SODIUM SERPL-SCNC: 138 MMOL/L — SIGNIFICANT CHANGE UP (ref 135–145)
SODIUM SERPL-SCNC: 138 MMOL/L — SIGNIFICANT CHANGE UP (ref 135–145)
VANCOMYCIN TROUGH SERPL-MCNC: 13.1 UG/ML — SIGNIFICANT CHANGE UP (ref 10–20)
WBC # BLD: 8.87 K/UL — SIGNIFICANT CHANGE UP (ref 3.8–10.5)
WBC # FLD AUTO: 8.87 K/UL — SIGNIFICANT CHANGE UP (ref 3.8–10.5)

## 2025-07-31 PROCEDURE — 80202 ASSAY OF VANCOMYCIN: CPT

## 2025-07-31 PROCEDURE — 73590 X-RAY EXAM OF LOWER LEG: CPT

## 2025-07-31 PROCEDURE — 36415 COLL VENOUS BLD VENIPUNCTURE: CPT

## 2025-07-31 PROCEDURE — 84145 PROCALCITONIN (PCT): CPT

## 2025-07-31 PROCEDURE — 93005 ELECTROCARDIOGRAM TRACING: CPT

## 2025-07-31 PROCEDURE — 83036 HEMOGLOBIN GLYCOSYLATED A1C: CPT

## 2025-07-31 PROCEDURE — 87205 SMEAR GRAM STAIN: CPT

## 2025-07-31 PROCEDURE — 85652 RBC SED RATE AUTOMATED: CPT

## 2025-07-31 PROCEDURE — 87640 STAPH A DNA AMP PROBE: CPT

## 2025-07-31 PROCEDURE — 99232 SBSQ HOSP IP/OBS MODERATE 35: CPT

## 2025-07-31 PROCEDURE — 73630 X-RAY EXAM OF FOOT: CPT

## 2025-07-31 PROCEDURE — 87186 SC STD MICRODIL/AGAR DIL: CPT

## 2025-07-31 PROCEDURE — 84540 ASSAY OF URINE/UREA-N: CPT

## 2025-07-31 PROCEDURE — 76770 US EXAM ABDO BACK WALL COMP: CPT

## 2025-07-31 PROCEDURE — 86140 C-REACTIVE PROTEIN: CPT

## 2025-07-31 PROCEDURE — 83880 ASSAY OF NATRIURETIC PEPTIDE: CPT

## 2025-07-31 PROCEDURE — 97530 THERAPEUTIC ACTIVITIES: CPT

## 2025-07-31 PROCEDURE — 85025 COMPLETE CBC W/AUTO DIFF WBC: CPT

## 2025-07-31 PROCEDURE — 86901 BLOOD TYPING SEROLOGIC RH(D): CPT

## 2025-07-31 PROCEDURE — 80053 COMPREHEN METABOLIC PANEL: CPT

## 2025-07-31 PROCEDURE — 84100 ASSAY OF PHOSPHORUS: CPT

## 2025-07-31 PROCEDURE — 82962 GLUCOSE BLOOD TEST: CPT

## 2025-07-31 PROCEDURE — 97162 PT EVAL MOD COMPLEX 30 MIN: CPT

## 2025-07-31 PROCEDURE — 82570 ASSAY OF URINE CREATININE: CPT

## 2025-07-31 PROCEDURE — 87641 MR-STAPH DNA AMP PROBE: CPT

## 2025-07-31 PROCEDURE — 93970 EXTREMITY STUDY: CPT

## 2025-07-31 PROCEDURE — 97116 GAIT TRAINING THERAPY: CPT

## 2025-07-31 PROCEDURE — 84156 ASSAY OF PROTEIN URINE: CPT

## 2025-07-31 PROCEDURE — 93923 UPR/LXTR ART STDY 3+ LVLS: CPT

## 2025-07-31 PROCEDURE — 84443 ASSAY THYROID STIM HORMONE: CPT

## 2025-07-31 PROCEDURE — 84436 ASSAY OF TOTAL THYROXINE: CPT

## 2025-07-31 PROCEDURE — 83735 ASSAY OF MAGNESIUM: CPT

## 2025-07-31 PROCEDURE — 87040 BLOOD CULTURE FOR BACTERIA: CPT

## 2025-07-31 PROCEDURE — 86850 RBC ANTIBODY SCREEN: CPT

## 2025-07-31 PROCEDURE — 81001 URINALYSIS AUTO W/SCOPE: CPT

## 2025-07-31 PROCEDURE — G0545: CPT

## 2025-07-31 PROCEDURE — 83690 ASSAY OF LIPASE: CPT

## 2025-07-31 PROCEDURE — 87070 CULTURE OTHR SPECIMN AEROBIC: CPT

## 2025-07-31 PROCEDURE — 73700 CT LOWER EXTREMITY W/O DYE: CPT

## 2025-07-31 PROCEDURE — 94640 AIRWAY INHALATION TREATMENT: CPT

## 2025-07-31 PROCEDURE — 99232 SBSQ HOSP IP/OBS MODERATE 35: CPT | Mod: GC

## 2025-07-31 PROCEDURE — 83935 ASSAY OF URINE OSMOLALITY: CPT

## 2025-07-31 PROCEDURE — 86900 BLOOD TYPING SEROLOGIC ABO: CPT

## 2025-07-31 PROCEDURE — 71045 X-RAY EXAM CHEST 1 VIEW: CPT

## 2025-07-31 PROCEDURE — 80048 BASIC METABOLIC PNL TOTAL CA: CPT

## 2025-07-31 PROCEDURE — 84300 ASSAY OF URINE SODIUM: CPT

## 2025-07-31 RX ORDER — BISACODYL 5 MG
10 TABLET, DELAYED RELEASE (ENTERIC COATED) ORAL ONCE
Refills: 0 | Status: DISCONTINUED | OUTPATIENT
Start: 2025-07-31 | End: 2025-07-31

## 2025-07-31 RX ORDER — ACETAMINOPHEN 500 MG/5ML
650 LIQUID (ML) ORAL EVERY 6 HOURS
Refills: 0 | Status: DISCONTINUED | OUTPATIENT
Start: 2025-07-31 | End: 2025-09-04

## 2025-07-31 RX ORDER — HYDROMORPHONE/SOD CHLOR,ISO/PF 2 MG/10 ML
2 SYRINGE (ML) INJECTION ONCE
Refills: 0 | Status: DISCONTINUED | OUTPATIENT
Start: 2025-07-31 | End: 2025-07-31

## 2025-07-31 RX ORDER — SODIUM CHLORIDE 9 G/1000ML
500 INJECTION, SOLUTION INTRAVENOUS ONCE
Refills: 0 | Status: COMPLETED | OUTPATIENT
Start: 2025-07-31 | End: 2025-07-31

## 2025-07-31 RX ADMIN — Medication 650 MILLIGRAM(S): at 04:00

## 2025-07-31 RX ADMIN — MUPIROCIN CALCIUM 1 APPLICATION(S): 20 CREAM TOPICAL at 18:29

## 2025-07-31 RX ADMIN — GABAPENTIN 300 MILLIGRAM(S): 400 CAPSULE ORAL at 13:48

## 2025-07-31 RX ADMIN — Medication 650 MILLIGRAM(S): at 03:08

## 2025-07-31 RX ADMIN — GABAPENTIN 300 MILLIGRAM(S): 400 CAPSULE ORAL at 05:21

## 2025-07-31 RX ADMIN — Medication 25 GRAM(S): at 13:48

## 2025-07-31 RX ADMIN — OXYCODONE HYDROCHLORIDE 5 MILLIGRAM(S): 30 TABLET ORAL at 08:07

## 2025-07-31 RX ADMIN — APIXABAN 5 MILLIGRAM(S): 2.5 TABLET, FILM COATED ORAL at 05:21

## 2025-07-31 RX ADMIN — Medication 250 MILLIGRAM(S): at 21:09

## 2025-07-31 RX ADMIN — Medication 200 MICROGRAM(S): at 05:23

## 2025-07-31 RX ADMIN — AMLODIPINE BESYLATE 5 MILLIGRAM(S): 10 TABLET ORAL at 05:21

## 2025-07-31 RX ADMIN — OXYCODONE HYDROCHLORIDE 5 MILLIGRAM(S): 30 TABLET ORAL at 09:00

## 2025-07-31 RX ADMIN — INSULIN LISPRO 1: 100 INJECTION, SOLUTION INTRAVENOUS; SUBCUTANEOUS at 13:42

## 2025-07-31 RX ADMIN — Medication 5 MILLIGRAM(S): at 21:58

## 2025-07-31 RX ADMIN — INSULIN LISPRO 8 UNIT(S): 100 INJECTION, SOLUTION INTRAVENOUS; SUBCUTANEOUS at 18:28

## 2025-07-31 RX ADMIN — Medication 25 GRAM(S): at 21:57

## 2025-07-31 RX ADMIN — APIXABAN 5 MILLIGRAM(S): 2.5 TABLET, FILM COATED ORAL at 18:29

## 2025-07-31 RX ADMIN — SODIUM CHLORIDE 1000 MILLILITER(S): 9 INJECTION, SOLUTION INTRAVENOUS at 08:17

## 2025-07-31 RX ADMIN — Medication 40 MILLIGRAM(S): at 06:55

## 2025-07-31 RX ADMIN — OXYCODONE HYDROCHLORIDE 5 MILLIGRAM(S): 30 TABLET ORAL at 01:55

## 2025-07-31 RX ADMIN — Medication 1 TABLET(S): at 13:43

## 2025-07-31 RX ADMIN — Medication 2 MILLIGRAM(S): at 22:11

## 2025-07-31 RX ADMIN — BUDESONIDE 0.5 MILLIGRAM(S): 0.25 SUSPENSION RESPIRATORY (INHALATION) at 05:21

## 2025-07-31 RX ADMIN — AMIODARONE HYDROCHLORIDE 200 MILLIGRAM(S): 50 INJECTION, SOLUTION INTRAVENOUS at 05:21

## 2025-07-31 RX ADMIN — INSULIN GLARGINE-YFGN 16 UNIT(S): 100 INJECTION, SOLUTION SUBCUTANEOUS at 21:59

## 2025-07-31 RX ADMIN — INSULIN LISPRO 8 UNIT(S): 100 INJECTION, SOLUTION INTRAVENOUS; SUBCUTANEOUS at 09:48

## 2025-07-31 RX ADMIN — ATORVASTATIN CALCIUM 40 MILLIGRAM(S): 80 TABLET, FILM COATED ORAL at 21:58

## 2025-07-31 RX ADMIN — BUDESONIDE 0.5 MILLIGRAM(S): 0.25 SUSPENSION RESPIRATORY (INHALATION) at 18:34

## 2025-07-31 RX ADMIN — Medication 81 MILLIGRAM(S): at 13:43

## 2025-07-31 RX ADMIN — Medication 2 TABLET(S): at 21:58

## 2025-07-31 RX ADMIN — Medication 2 PUFF(S): at 13:51

## 2025-07-31 RX ADMIN — Medication 25 GRAM(S): at 05:21

## 2025-07-31 RX ADMIN — INSULIN LISPRO 2: 100 INJECTION, SOLUTION INTRAVENOUS; SUBCUTANEOUS at 18:27

## 2025-07-31 RX ADMIN — MUPIROCIN CALCIUM 1 APPLICATION(S): 20 CREAM TOPICAL at 04:11

## 2025-07-31 RX ADMIN — OXYCODONE HYDROCHLORIDE 5 MILLIGRAM(S): 30 TABLET ORAL at 03:00

## 2025-07-31 RX ADMIN — POLYETHYLENE GLYCOL 3350 17 GRAM(S): 17 POWDER, FOR SOLUTION ORAL at 21:58

## 2025-07-31 RX ADMIN — GABAPENTIN 300 MILLIGRAM(S): 400 CAPSULE ORAL at 21:56

## 2025-07-31 RX ADMIN — INSULIN LISPRO 8 UNIT(S): 100 INJECTION, SOLUTION INTRAVENOUS; SUBCUTANEOUS at 13:42

## 2025-08-01 ENCOUNTER — TRANSCRIPTION ENCOUNTER (OUTPATIENT)
Age: 75
End: 2025-08-01

## 2025-08-01 LAB
ALBUMIN SERPL ELPH-MCNC: 3.2 G/DL — LOW (ref 3.3–5)
ALP SERPL-CCNC: 63 U/L — SIGNIFICANT CHANGE UP (ref 40–120)
ALT FLD-CCNC: 9 U/L — LOW (ref 10–45)
ANION GAP SERPL CALC-SCNC: 13 MMOL/L — SIGNIFICANT CHANGE UP (ref 5–17)
AST SERPL-CCNC: 8 U/L — LOW (ref 10–40)
BASOPHILS # BLD AUTO: 0.05 K/UL — SIGNIFICANT CHANGE UP (ref 0–0.2)
BASOPHILS NFR BLD AUTO: 0.5 % — SIGNIFICANT CHANGE UP (ref 0–2)
BILIRUB SERPL-MCNC: 0.4 MG/DL — SIGNIFICANT CHANGE UP (ref 0.2–1.2)
BUN SERPL-MCNC: 24 MG/DL — HIGH (ref 7–23)
CALCIUM SERPL-MCNC: 8.6 MG/DL — SIGNIFICANT CHANGE UP (ref 8.4–10.5)
CHLORIDE SERPL-SCNC: 104 MMOL/L — SIGNIFICANT CHANGE UP (ref 96–108)
CO2 SERPL-SCNC: 24 MMOL/L — SIGNIFICANT CHANGE UP (ref 22–31)
CREAT SERPL-MCNC: 1.3 MG/DL — SIGNIFICANT CHANGE UP (ref 0.5–1.3)
EGFR: 42 ML/MIN/1.73M2 — LOW
EGFR: 42 ML/MIN/1.73M2 — LOW
EOSINOPHIL # BLD AUTO: 0.23 K/UL — SIGNIFICANT CHANGE UP (ref 0–0.5)
EOSINOPHIL NFR BLD AUTO: 2.2 % — SIGNIFICANT CHANGE UP (ref 0–6)
GLUCOSE BLDC GLUCOMTR-MCNC: 184 MG/DL — HIGH (ref 70–99)
GLUCOSE BLDC GLUCOMTR-MCNC: 185 MG/DL — HIGH (ref 70–99)
GLUCOSE BLDC GLUCOMTR-MCNC: 200 MG/DL — HIGH (ref 70–99)
GLUCOSE BLDC GLUCOMTR-MCNC: 208 MG/DL — HIGH (ref 70–99)
GLUCOSE SERPL-MCNC: 178 MG/DL — HIGH (ref 70–99)
HCT VFR BLD CALC: 27.4 % — LOW (ref 34.5–45)
HGB BLD-MCNC: 8.1 G/DL — LOW (ref 11.5–15.5)
IMM GRANULOCYTES # BLD AUTO: 0.16 K/UL — HIGH (ref 0–0.07)
IMM GRANULOCYTES NFR BLD AUTO: 1.5 % — HIGH (ref 0–0.9)
LYMPHOCYTES # BLD AUTO: 1.32 K/UL — SIGNIFICANT CHANGE UP (ref 1–3.3)
LYMPHOCYTES NFR BLD AUTO: 12.4 % — LOW (ref 13–44)
MAGNESIUM SERPL-MCNC: 2.3 MG/DL — SIGNIFICANT CHANGE UP (ref 1.6–2.6)
MCHC RBC-ENTMCNC: 24.8 PG — LOW (ref 27–34)
MCHC RBC-ENTMCNC: 29.6 G/DL — LOW (ref 32–36)
MCV RBC AUTO: 84 FL — SIGNIFICANT CHANGE UP (ref 80–100)
MONOCYTES # BLD AUTO: 0.58 K/UL — SIGNIFICANT CHANGE UP (ref 0–0.9)
MONOCYTES NFR BLD AUTO: 5.5 % — SIGNIFICANT CHANGE UP (ref 2–14)
NEUTROPHILS # BLD AUTO: 8.28 K/UL — HIGH (ref 1.8–7.4)
NEUTROPHILS NFR BLD AUTO: 77.9 % — HIGH (ref 43–77)
NRBC # BLD AUTO: 0.03 K/UL — HIGH (ref 0–0)
NRBC # FLD: 0.03 K/UL — HIGH (ref 0–0)
NRBC BLD AUTO-RTO: 0 /100 WBCS — SIGNIFICANT CHANGE UP (ref 0–0)
PHOSPHATE SERPL-MCNC: 3.5 MG/DL — SIGNIFICANT CHANGE UP (ref 2.5–4.5)
PLATELET # BLD AUTO: 347 K/UL — SIGNIFICANT CHANGE UP (ref 150–400)
PMV BLD: 9.6 FL — SIGNIFICANT CHANGE UP (ref 7–13)
POTASSIUM SERPL-MCNC: 3.8 MMOL/L — SIGNIFICANT CHANGE UP (ref 3.5–5.3)
POTASSIUM SERPL-SCNC: 3.8 MMOL/L — SIGNIFICANT CHANGE UP (ref 3.5–5.3)
PROT SERPL-MCNC: 6.6 G/DL — SIGNIFICANT CHANGE UP (ref 6–8.3)
RBC # BLD: 3.26 M/UL — LOW (ref 3.8–5.2)
RBC # FLD: 18.2 % — HIGH (ref 10.3–14.5)
SODIUM SERPL-SCNC: 141 MMOL/L — SIGNIFICANT CHANGE UP (ref 135–145)
VANCOMYCIN TROUGH SERPL-MCNC: 9.8 UG/ML — LOW (ref 10–20)
WBC # BLD: 10.62 K/UL — HIGH (ref 3.8–10.5)
WBC # FLD AUTO: 10.62 K/UL — HIGH (ref 3.8–10.5)

## 2025-08-01 PROCEDURE — 99232 SBSQ HOSP IP/OBS MODERATE 35: CPT | Mod: GC

## 2025-08-01 PROCEDURE — 99232 SBSQ HOSP IP/OBS MODERATE 35: CPT

## 2025-08-01 PROCEDURE — G0545: CPT

## 2025-08-01 RX ORDER — SODIUM CHLORIDE 9 G/1000ML
250 INJECTION, SOLUTION INTRAVENOUS ONCE
Refills: 0 | Status: COMPLETED | OUTPATIENT
Start: 2025-08-01 | End: 2025-08-01

## 2025-08-01 RX ORDER — VANCOMYCIN HCL IN 5 % DEXTROSE 1.5G/250ML
1500 PLASTIC BAG, INJECTION (ML) INTRAVENOUS EVERY 24 HOURS
Refills: 0 | Status: COMPLETED | OUTPATIENT
Start: 2025-08-02 | End: 2025-08-03

## 2025-08-01 RX ORDER — ALPRAZOLAM 0.5 MG
2 TABLET, EXTENDED RELEASE 24 HR ORAL AT BEDTIME
Refills: 0 | Status: DISCONTINUED | OUTPATIENT
Start: 2025-08-02 | End: 2025-08-09

## 2025-08-01 RX ORDER — INSULIN GLARGINE-YFGN 100 [IU]/ML
18 INJECTION, SOLUTION SUBCUTANEOUS AT BEDTIME
Refills: 0 | Status: DISCONTINUED | OUTPATIENT
Start: 2025-08-01 | End: 2025-08-04

## 2025-08-01 RX ORDER — PIPERACILLIN-TAZO-DEXTROSE,ISO 3.375G/5
3.38 IV SOLUTION, PIGGYBACK PREMIX FROZEN(ML) INTRAVENOUS EVERY 8 HOURS
Refills: 0 | Status: COMPLETED | OUTPATIENT
Start: 2025-08-02 | End: 2025-08-03

## 2025-08-01 RX ADMIN — APIXABAN 5 MILLIGRAM(S): 2.5 TABLET, FILM COATED ORAL at 17:50

## 2025-08-01 RX ADMIN — Medication 25 GRAM(S): at 12:48

## 2025-08-01 RX ADMIN — Medication 1 TABLET(S): at 12:49

## 2025-08-01 RX ADMIN — GABAPENTIN 300 MILLIGRAM(S): 400 CAPSULE ORAL at 05:47

## 2025-08-01 RX ADMIN — MUPIROCIN CALCIUM 1 APPLICATION(S): 20 CREAM TOPICAL at 17:51

## 2025-08-01 RX ADMIN — Medication 250 MILLIGRAM(S): at 22:16

## 2025-08-01 RX ADMIN — BUDESONIDE 0.5 MILLIGRAM(S): 0.25 SUSPENSION RESPIRATORY (INHALATION) at 17:50

## 2025-08-01 RX ADMIN — INSULIN LISPRO 8 UNIT(S): 100 INJECTION, SOLUTION INTRAVENOUS; SUBCUTANEOUS at 09:13

## 2025-08-01 RX ADMIN — OXYCODONE HYDROCHLORIDE 5 MILLIGRAM(S): 30 TABLET ORAL at 21:51

## 2025-08-01 RX ADMIN — INSULIN LISPRO 1: 100 INJECTION, SOLUTION INTRAVENOUS; SUBCUTANEOUS at 12:51

## 2025-08-01 RX ADMIN — Medication 2 TABLET(S): at 21:32

## 2025-08-01 RX ADMIN — Medication 5 MILLIGRAM(S): at 21:32

## 2025-08-01 RX ADMIN — MUPIROCIN CALCIUM 1 APPLICATION(S): 20 CREAM TOPICAL at 05:48

## 2025-08-01 RX ADMIN — INSULIN GLARGINE-YFGN 18 UNIT(S): 100 INJECTION, SOLUTION SUBCUTANEOUS at 21:31

## 2025-08-01 RX ADMIN — SODIUM CHLORIDE 500 MILLILITER(S): 9 INJECTION, SOLUTION INTRAVENOUS at 17:48

## 2025-08-01 RX ADMIN — Medication 25 GRAM(S): at 21:32

## 2025-08-01 RX ADMIN — AMIODARONE HYDROCHLORIDE 200 MILLIGRAM(S): 50 INJECTION, SOLUTION INTRAVENOUS at 05:47

## 2025-08-01 RX ADMIN — GABAPENTIN 300 MILLIGRAM(S): 400 CAPSULE ORAL at 21:31

## 2025-08-01 RX ADMIN — Medication 40 MILLIGRAM(S): at 05:47

## 2025-08-01 RX ADMIN — INSULIN LISPRO 1: 100 INJECTION, SOLUTION INTRAVENOUS; SUBCUTANEOUS at 17:49

## 2025-08-01 RX ADMIN — INSULIN LISPRO 8 UNIT(S): 100 INJECTION, SOLUTION INTRAVENOUS; SUBCUTANEOUS at 12:51

## 2025-08-01 RX ADMIN — GABAPENTIN 300 MILLIGRAM(S): 400 CAPSULE ORAL at 12:47

## 2025-08-01 RX ADMIN — OXYCODONE HYDROCHLORIDE 5 MILLIGRAM(S): 30 TABLET ORAL at 22:51

## 2025-08-01 RX ADMIN — BUDESONIDE 0.5 MILLIGRAM(S): 0.25 SUSPENSION RESPIRATORY (INHALATION) at 05:47

## 2025-08-01 RX ADMIN — AMLODIPINE BESYLATE 5 MILLIGRAM(S): 10 TABLET ORAL at 05:48

## 2025-08-01 RX ADMIN — Medication 25 GRAM(S): at 05:48

## 2025-08-01 RX ADMIN — POLYETHYLENE GLYCOL 3350 17 GRAM(S): 17 POWDER, FOR SOLUTION ORAL at 21:32

## 2025-08-01 RX ADMIN — MUPIROCIN CALCIUM 1 APPLICATION(S): 20 CREAM TOPICAL at 05:49

## 2025-08-01 RX ADMIN — ATORVASTATIN CALCIUM 40 MILLIGRAM(S): 80 TABLET, FILM COATED ORAL at 21:32

## 2025-08-01 RX ADMIN — Medication 200 MICROGRAM(S): at 05:48

## 2025-08-01 RX ADMIN — INSULIN LISPRO 8 UNIT(S): 100 INJECTION, SOLUTION INTRAVENOUS; SUBCUTANEOUS at 17:49

## 2025-08-01 RX ADMIN — Medication 81 MILLIGRAM(S): at 12:48

## 2025-08-01 RX ADMIN — OXYCODONE HYDROCHLORIDE 5 MILLIGRAM(S): 30 TABLET ORAL at 12:47

## 2025-08-01 RX ADMIN — INSULIN LISPRO 2: 100 INJECTION, SOLUTION INTRAVENOUS; SUBCUTANEOUS at 09:13

## 2025-08-01 RX ADMIN — APIXABAN 5 MILLIGRAM(S): 2.5 TABLET, FILM COATED ORAL at 05:47

## 2025-08-01 RX ADMIN — OXYCODONE HYDROCHLORIDE 5 MILLIGRAM(S): 30 TABLET ORAL at 13:30

## 2025-08-02 DIAGNOSIS — R06.02 SHORTNESS OF BREATH: ICD-10-CM

## 2025-08-02 LAB
ALBUMIN SERPL ELPH-MCNC: 3 G/DL — LOW (ref 3.3–5)
ALP SERPL-CCNC: 57 U/L — SIGNIFICANT CHANGE UP (ref 40–120)
ALT FLD-CCNC: 7 U/L — LOW (ref 10–45)
ANION GAP SERPL CALC-SCNC: 13 MMOL/L — SIGNIFICANT CHANGE UP (ref 5–17)
AST SERPL-CCNC: 6 U/L — LOW (ref 10–40)
BASOPHILS # BLD AUTO: 0.05 K/UL — SIGNIFICANT CHANGE UP (ref 0–0.2)
BASOPHILS NFR BLD AUTO: 0.5 % — SIGNIFICANT CHANGE UP (ref 0–2)
BILIRUB SERPL-MCNC: 0.5 MG/DL — SIGNIFICANT CHANGE UP (ref 0.2–1.2)
BUN SERPL-MCNC: 24 MG/DL — HIGH (ref 7–23)
CALCIUM SERPL-MCNC: 8.3 MG/DL — LOW (ref 8.4–10.5)
CHLORIDE SERPL-SCNC: 104 MMOL/L — SIGNIFICANT CHANGE UP (ref 96–108)
CO2 SERPL-SCNC: 24 MMOL/L — SIGNIFICANT CHANGE UP (ref 22–31)
CREAT SERPL-MCNC: 1.27 MG/DL — SIGNIFICANT CHANGE UP (ref 0.5–1.3)
EGFR: 44 ML/MIN/1.73M2 — LOW
EGFR: 44 ML/MIN/1.73M2 — LOW
EOSINOPHIL # BLD AUTO: 0.33 K/UL — SIGNIFICANT CHANGE UP (ref 0–0.5)
EOSINOPHIL NFR BLD AUTO: 3.5 % — SIGNIFICANT CHANGE UP (ref 0–6)
FLUAV AG NPH QL: SIGNIFICANT CHANGE UP
FLUBV AG NPH QL: SIGNIFICANT CHANGE UP
GLUCOSE BLDC GLUCOMTR-MCNC: 157 MG/DL — HIGH (ref 70–99)
GLUCOSE BLDC GLUCOMTR-MCNC: 170 MG/DL — HIGH (ref 70–99)
GLUCOSE BLDC GLUCOMTR-MCNC: 174 MG/DL — HIGH (ref 70–99)
GLUCOSE BLDC GLUCOMTR-MCNC: 194 MG/DL — HIGH (ref 70–99)
GLUCOSE SERPL-MCNC: 165 MG/DL — HIGH (ref 70–99)
HCT VFR BLD CALC: 24.5 % — LOW (ref 34.5–45)
HGB BLD-MCNC: 7.2 G/DL — LOW (ref 11.5–15.5)
IMM GRANULOCYTES # BLD AUTO: 0.11 K/UL — HIGH (ref 0–0.07)
IMM GRANULOCYTES NFR BLD AUTO: 1.2 % — HIGH (ref 0–0.9)
LYMPHOCYTES # BLD AUTO: 1.52 K/UL — SIGNIFICANT CHANGE UP (ref 1–3.3)
LYMPHOCYTES NFR BLD AUTO: 15.9 % — SIGNIFICANT CHANGE UP (ref 13–44)
MAGNESIUM SERPL-MCNC: 2.1 MG/DL — SIGNIFICANT CHANGE UP (ref 1.6–2.6)
MCHC RBC-ENTMCNC: 24.7 PG — LOW (ref 27–34)
MCHC RBC-ENTMCNC: 29.4 G/DL — LOW (ref 32–36)
MCV RBC AUTO: 84.2 FL — SIGNIFICANT CHANGE UP (ref 80–100)
MONOCYTES # BLD AUTO: 0.63 K/UL — SIGNIFICANT CHANGE UP (ref 0–0.9)
MONOCYTES NFR BLD AUTO: 6.6 % — SIGNIFICANT CHANGE UP (ref 2–14)
NEUTROPHILS # BLD AUTO: 6.9 K/UL — SIGNIFICANT CHANGE UP (ref 1.8–7.4)
NEUTROPHILS NFR BLD AUTO: 72.3 % — SIGNIFICANT CHANGE UP (ref 43–77)
NRBC # BLD AUTO: 0 K/UL — SIGNIFICANT CHANGE UP (ref 0–0)
NRBC # FLD: 0 K/UL — SIGNIFICANT CHANGE UP (ref 0–0)
NRBC BLD AUTO-RTO: 0 /100 WBCS — SIGNIFICANT CHANGE UP (ref 0–0)
PHOSPHATE SERPL-MCNC: 3.2 MG/DL — SIGNIFICANT CHANGE UP (ref 2.5–4.5)
PLATELET # BLD AUTO: 308 K/UL — SIGNIFICANT CHANGE UP (ref 150–400)
PMV BLD: 9.1 FL — SIGNIFICANT CHANGE UP (ref 7–13)
POTASSIUM SERPL-MCNC: 3.7 MMOL/L — SIGNIFICANT CHANGE UP (ref 3.5–5.3)
POTASSIUM SERPL-SCNC: 3.7 MMOL/L — SIGNIFICANT CHANGE UP (ref 3.5–5.3)
PROT SERPL-MCNC: 6.3 G/DL — SIGNIFICANT CHANGE UP (ref 6–8.3)
RBC # BLD: 2.91 M/UL — LOW (ref 3.8–5.2)
RBC # FLD: 18.2 % — HIGH (ref 10.3–14.5)
RSV RNA NPH QL NAA+NON-PROBE: SIGNIFICANT CHANGE UP
SARS-COV-2 RNA SPEC QL NAA+PROBE: SIGNIFICANT CHANGE UP
SODIUM SERPL-SCNC: 141 MMOL/L — SIGNIFICANT CHANGE UP (ref 135–145)
SOURCE RESPIRATORY: SIGNIFICANT CHANGE UP
VANCOMYCIN TROUGH SERPL-MCNC: 10.1 UG/ML — SIGNIFICANT CHANGE UP (ref 10–20)
WBC # BLD: 9.54 K/UL — SIGNIFICANT CHANGE UP (ref 3.8–10.5)
WBC # FLD AUTO: 9.54 K/UL — SIGNIFICANT CHANGE UP (ref 3.8–10.5)

## 2025-08-02 PROCEDURE — 99233 SBSQ HOSP IP/OBS HIGH 50: CPT | Mod: GC

## 2025-08-02 PROCEDURE — 71045 X-RAY EXAM CHEST 1 VIEW: CPT | Mod: 26

## 2025-08-02 RX ORDER — NYSTATIN 100000 [USP'U]/G
1 CREAM TOPICAL
Refills: 0 | Status: DISCONTINUED | OUTPATIENT
Start: 2025-08-02 | End: 2025-09-04

## 2025-08-02 RX ORDER — FUROSEMIDE 10 MG/ML
40 INJECTION INTRAMUSCULAR; INTRAVENOUS ONCE
Refills: 0 | Status: COMPLETED | OUTPATIENT
Start: 2025-08-02 | End: 2025-08-02

## 2025-08-02 RX ADMIN — OXYCODONE HYDROCHLORIDE 5 MILLIGRAM(S): 30 TABLET ORAL at 16:18

## 2025-08-02 RX ADMIN — INSULIN LISPRO 1: 100 INJECTION, SOLUTION INTRAVENOUS; SUBCUTANEOUS at 13:18

## 2025-08-02 RX ADMIN — BUDESONIDE 0.5 MILLIGRAM(S): 0.25 SUSPENSION RESPIRATORY (INHALATION) at 05:53

## 2025-08-02 RX ADMIN — INSULIN LISPRO 1: 100 INJECTION, SOLUTION INTRAVENOUS; SUBCUTANEOUS at 09:01

## 2025-08-02 RX ADMIN — Medication 200 MICROGRAM(S): at 05:52

## 2025-08-02 RX ADMIN — OXYCODONE HYDROCHLORIDE 5 MILLIGRAM(S): 30 TABLET ORAL at 07:55

## 2025-08-02 RX ADMIN — APIXABAN 5 MILLIGRAM(S): 2.5 TABLET, FILM COATED ORAL at 05:52

## 2025-08-02 RX ADMIN — INSULIN LISPRO 1: 100 INJECTION, SOLUTION INTRAVENOUS; SUBCUTANEOUS at 17:40

## 2025-08-02 RX ADMIN — Medication 40 MILLIGRAM(S): at 05:52

## 2025-08-02 RX ADMIN — Medication 25 GRAM(S): at 21:32

## 2025-08-02 RX ADMIN — Medication 300 MILLIGRAM(S): at 22:59

## 2025-08-02 RX ADMIN — AMLODIPINE BESYLATE 5 MILLIGRAM(S): 10 TABLET ORAL at 05:52

## 2025-08-02 RX ADMIN — Medication 5 MILLIGRAM(S): at 21:32

## 2025-08-02 RX ADMIN — NYSTATIN 1 APPLICATION(S): 100000 CREAM TOPICAL at 22:59

## 2025-08-02 RX ADMIN — INSULIN LISPRO 8 UNIT(S): 100 INJECTION, SOLUTION INTRAVENOUS; SUBCUTANEOUS at 09:01

## 2025-08-02 RX ADMIN — Medication 25 GRAM(S): at 13:13

## 2025-08-02 RX ADMIN — Medication 25 GRAM(S): at 05:53

## 2025-08-02 RX ADMIN — Medication 5 MILLIGRAM(S): at 21:31

## 2025-08-02 RX ADMIN — APIXABAN 5 MILLIGRAM(S): 2.5 TABLET, FILM COATED ORAL at 16:17

## 2025-08-02 RX ADMIN — BUDESONIDE 0.5 MILLIGRAM(S): 0.25 SUSPENSION RESPIRATORY (INHALATION) at 17:39

## 2025-08-02 RX ADMIN — OXYCODONE HYDROCHLORIDE 5 MILLIGRAM(S): 30 TABLET ORAL at 17:01

## 2025-08-02 RX ADMIN — GABAPENTIN 300 MILLIGRAM(S): 400 CAPSULE ORAL at 21:32

## 2025-08-02 RX ADMIN — MUPIROCIN CALCIUM 1 APPLICATION(S): 20 CREAM TOPICAL at 05:53

## 2025-08-02 RX ADMIN — GABAPENTIN 300 MILLIGRAM(S): 400 CAPSULE ORAL at 05:53

## 2025-08-02 RX ADMIN — MUPIROCIN CALCIUM 1 APPLICATION(S): 20 CREAM TOPICAL at 17:38

## 2025-08-02 RX ADMIN — INSULIN LISPRO 8 UNIT(S): 100 INJECTION, SOLUTION INTRAVENOUS; SUBCUTANEOUS at 13:18

## 2025-08-02 RX ADMIN — GABAPENTIN 300 MILLIGRAM(S): 400 CAPSULE ORAL at 13:13

## 2025-08-02 RX ADMIN — AMIODARONE HYDROCHLORIDE 200 MILLIGRAM(S): 50 INJECTION, SOLUTION INTRAVENOUS at 05:52

## 2025-08-02 RX ADMIN — INSULIN LISPRO 8 UNIT(S): 100 INJECTION, SOLUTION INTRAVENOUS; SUBCUTANEOUS at 17:40

## 2025-08-02 RX ADMIN — Medication 81 MILLIGRAM(S): at 07:55

## 2025-08-02 RX ADMIN — ATORVASTATIN CALCIUM 40 MILLIGRAM(S): 80 TABLET, FILM COATED ORAL at 21:31

## 2025-08-02 RX ADMIN — Medication 2 MILLIGRAM(S): at 21:40

## 2025-08-02 RX ADMIN — OXYCODONE HYDROCHLORIDE 5 MILLIGRAM(S): 30 TABLET ORAL at 09:00

## 2025-08-02 RX ADMIN — Medication 2 TABLET(S): at 21:32

## 2025-08-02 RX ADMIN — Medication 1 TABLET(S): at 07:55

## 2025-08-02 RX ADMIN — POLYETHYLENE GLYCOL 3350 17 GRAM(S): 17 POWDER, FOR SOLUTION ORAL at 21:31

## 2025-08-02 RX ADMIN — FUROSEMIDE 40 MILLIGRAM(S): 10 INJECTION INTRAMUSCULAR; INTRAVENOUS at 16:18

## 2025-08-02 RX ADMIN — INSULIN GLARGINE-YFGN 18 UNIT(S): 100 INJECTION, SOLUTION SUBCUTANEOUS at 21:31

## 2025-08-03 LAB
ALBUMIN SERPL ELPH-MCNC: 2.8 G/DL — LOW (ref 3.3–5)
ALP SERPL-CCNC: 56 U/L — SIGNIFICANT CHANGE UP (ref 40–120)
ALT FLD-CCNC: 7 U/L — LOW (ref 10–45)
ANION GAP SERPL CALC-SCNC: 13 MMOL/L — SIGNIFICANT CHANGE UP (ref 5–17)
AST SERPL-CCNC: 8 U/L — LOW (ref 10–40)
BASOPHILS # BLD AUTO: 0.06 K/UL — SIGNIFICANT CHANGE UP (ref 0–0.2)
BASOPHILS NFR BLD AUTO: 0.7 % — SIGNIFICANT CHANGE UP (ref 0–2)
BILIRUB SERPL-MCNC: 0.4 MG/DL — SIGNIFICANT CHANGE UP (ref 0.2–1.2)
BUN SERPL-MCNC: 27 MG/DL — HIGH (ref 7–23)
CALCIUM SERPL-MCNC: 8.2 MG/DL — LOW (ref 8.4–10.5)
CHLORIDE SERPL-SCNC: 103 MMOL/L — SIGNIFICANT CHANGE UP (ref 96–108)
CO2 SERPL-SCNC: 23 MMOL/L — SIGNIFICANT CHANGE UP (ref 22–31)
CREAT SERPL-MCNC: 1.38 MG/DL — HIGH (ref 0.5–1.3)
EGFR: 39 ML/MIN/1.73M2 — LOW
EGFR: 39 ML/MIN/1.73M2 — LOW
EOSINOPHIL # BLD AUTO: 0.26 K/UL — SIGNIFICANT CHANGE UP (ref 0–0.5)
EOSINOPHIL NFR BLD AUTO: 3.1 % — SIGNIFICANT CHANGE UP (ref 0–6)
GLUCOSE BLDC GLUCOMTR-MCNC: 197 MG/DL — HIGH (ref 70–99)
GLUCOSE BLDC GLUCOMTR-MCNC: 227 MG/DL — HIGH (ref 70–99)
GLUCOSE BLDC GLUCOMTR-MCNC: 228 MG/DL — HIGH (ref 70–99)
GLUCOSE BLDC GLUCOMTR-MCNC: 369 MG/DL — HIGH (ref 70–99)
GLUCOSE BLDC GLUCOMTR-MCNC: >600 MG/DL — CRITICAL HIGH (ref 70–99)
GLUCOSE SERPL-MCNC: 220 MG/DL — HIGH (ref 70–99)
HCT VFR BLD CALC: 25.1 % — LOW (ref 34.5–45)
HGB BLD-MCNC: 7.3 G/DL — LOW (ref 11.5–15.5)
IMM GRANULOCYTES # BLD AUTO: 0.09 K/UL — HIGH (ref 0–0.07)
IMM GRANULOCYTES NFR BLD AUTO: 1.1 % — HIGH (ref 0–0.9)
LYMPHOCYTES # BLD AUTO: 1.46 K/UL — SIGNIFICANT CHANGE UP (ref 1–3.3)
LYMPHOCYTES NFR BLD AUTO: 17.1 % — SIGNIFICANT CHANGE UP (ref 13–44)
MAGNESIUM SERPL-MCNC: 2.1 MG/DL — SIGNIFICANT CHANGE UP (ref 1.6–2.6)
MCHC RBC-ENTMCNC: 24.7 PG — LOW (ref 27–34)
MCHC RBC-ENTMCNC: 29.1 G/DL — LOW (ref 32–36)
MCV RBC AUTO: 85.1 FL — SIGNIFICANT CHANGE UP (ref 80–100)
MONOCYTES # BLD AUTO: 0.63 K/UL — SIGNIFICANT CHANGE UP (ref 0–0.9)
MONOCYTES NFR BLD AUTO: 7.4 % — SIGNIFICANT CHANGE UP (ref 2–14)
NEUTROPHILS # BLD AUTO: 6.02 K/UL — SIGNIFICANT CHANGE UP (ref 1.8–7.4)
NEUTROPHILS NFR BLD AUTO: 70.6 % — SIGNIFICANT CHANGE UP (ref 43–77)
NRBC # BLD AUTO: 0 K/UL — SIGNIFICANT CHANGE UP (ref 0–0)
NRBC # FLD: 0 K/UL — SIGNIFICANT CHANGE UP (ref 0–0)
NRBC BLD AUTO-RTO: 0 /100 WBCS — SIGNIFICANT CHANGE UP (ref 0–0)
PHOSPHATE SERPL-MCNC: 4 MG/DL — SIGNIFICANT CHANGE UP (ref 2.5–4.5)
PLATELET # BLD AUTO: 307 K/UL — SIGNIFICANT CHANGE UP (ref 150–400)
PMV BLD: 9.6 FL — SIGNIFICANT CHANGE UP (ref 7–13)
POTASSIUM SERPL-MCNC: 3.9 MMOL/L — SIGNIFICANT CHANGE UP (ref 3.5–5.3)
POTASSIUM SERPL-SCNC: 3.9 MMOL/L — SIGNIFICANT CHANGE UP (ref 3.5–5.3)
PROT SERPL-MCNC: 6.2 G/DL — SIGNIFICANT CHANGE UP (ref 6–8.3)
RBC # BLD: 2.95 M/UL — LOW (ref 3.8–5.2)
RBC # FLD: 18.6 % — HIGH (ref 10.3–14.5)
SODIUM SERPL-SCNC: 139 MMOL/L — SIGNIFICANT CHANGE UP (ref 135–145)
VANCOMYCIN TROUGH SERPL-MCNC: 13.9 UG/ML — SIGNIFICANT CHANGE UP (ref 10–20)
WBC # BLD: 8.52 K/UL — SIGNIFICANT CHANGE UP (ref 3.8–10.5)
WBC # FLD AUTO: 8.52 K/UL — SIGNIFICANT CHANGE UP (ref 3.8–10.5)

## 2025-08-03 PROCEDURE — 99233 SBSQ HOSP IP/OBS HIGH 50: CPT | Mod: GC

## 2025-08-03 RX ORDER — PREDNISONE 20 MG/1
20 TABLET ORAL DAILY
Refills: 0 | Status: DISCONTINUED | OUTPATIENT
Start: 2025-08-03 | End: 2025-08-04

## 2025-08-03 RX ORDER — IPRATROPIUM BROMIDE AND ALBUTEROL SULFATE .5; 2.5 MG/3ML; MG/3ML
3 SOLUTION RESPIRATORY (INHALATION) EVERY 6 HOURS
Refills: 0 | Status: COMPLETED | OUTPATIENT
Start: 2025-08-03 | End: 2025-08-06

## 2025-08-03 RX ADMIN — INSULIN GLARGINE-YFGN 18 UNIT(S): 100 INJECTION, SOLUTION SUBCUTANEOUS at 22:21

## 2025-08-03 RX ADMIN — APIXABAN 5 MILLIGRAM(S): 2.5 TABLET, FILM COATED ORAL at 17:47

## 2025-08-03 RX ADMIN — Medication 81 MILLIGRAM(S): at 13:23

## 2025-08-03 RX ADMIN — MUPIROCIN CALCIUM 1 APPLICATION(S): 20 CREAM TOPICAL at 05:27

## 2025-08-03 RX ADMIN — AMIODARONE HYDROCHLORIDE 200 MILLIGRAM(S): 50 INJECTION, SOLUTION INTRAVENOUS at 05:26

## 2025-08-03 RX ADMIN — MUPIROCIN CALCIUM 1 APPLICATION(S): 20 CREAM TOPICAL at 17:47

## 2025-08-03 RX ADMIN — Medication 2 MILLIGRAM(S): at 22:37

## 2025-08-03 RX ADMIN — Medication 5 MILLIGRAM(S): at 22:22

## 2025-08-03 RX ADMIN — INSULIN LISPRO 8 UNIT(S): 100 INJECTION, SOLUTION INTRAVENOUS; SUBCUTANEOUS at 13:22

## 2025-08-03 RX ADMIN — Medication 1 TABLET(S): at 13:23

## 2025-08-03 RX ADMIN — IPRATROPIUM BROMIDE AND ALBUTEROL SULFATE 3 MILLILITER(S): .5; 2.5 SOLUTION RESPIRATORY (INHALATION) at 17:50

## 2025-08-03 RX ADMIN — IPRATROPIUM BROMIDE AND ALBUTEROL SULFATE 3 MILLILITER(S): .5; 2.5 SOLUTION RESPIRATORY (INHALATION) at 13:57

## 2025-08-03 RX ADMIN — INSULIN LISPRO 8 UNIT(S): 100 INJECTION, SOLUTION INTRAVENOUS; SUBCUTANEOUS at 08:48

## 2025-08-03 RX ADMIN — Medication 25 GRAM(S): at 05:26

## 2025-08-03 RX ADMIN — INSULIN LISPRO 3: 100 INJECTION, SOLUTION INTRAVENOUS; SUBCUTANEOUS at 22:23

## 2025-08-03 RX ADMIN — Medication 2 TABLET(S): at 22:22

## 2025-08-03 RX ADMIN — APIXABAN 5 MILLIGRAM(S): 2.5 TABLET, FILM COATED ORAL at 05:27

## 2025-08-03 RX ADMIN — Medication 300 MILLIGRAM(S): at 22:25

## 2025-08-03 RX ADMIN — Medication 2 PUFF(S): at 12:43

## 2025-08-03 RX ADMIN — POLYETHYLENE GLYCOL 3350 17 GRAM(S): 17 POWDER, FOR SOLUTION ORAL at 22:23

## 2025-08-03 RX ADMIN — Medication 25 GRAM(S): at 13:23

## 2025-08-03 RX ADMIN — Medication 25 GRAM(S): at 22:26

## 2025-08-03 RX ADMIN — PREDNISONE 20 MILLIGRAM(S): 20 TABLET ORAL at 14:02

## 2025-08-03 RX ADMIN — BUDESONIDE 0.5 MILLIGRAM(S): 0.25 SUSPENSION RESPIRATORY (INHALATION) at 05:27

## 2025-08-03 RX ADMIN — Medication 100 MICROGRAM(S): at 05:27

## 2025-08-03 RX ADMIN — NYSTATIN 1 APPLICATION(S): 100000 CREAM TOPICAL at 11:05

## 2025-08-03 RX ADMIN — ATORVASTATIN CALCIUM 40 MILLIGRAM(S): 80 TABLET, FILM COATED ORAL at 22:22

## 2025-08-03 RX ADMIN — OXYCODONE HYDROCHLORIDE 5 MILLIGRAM(S): 30 TABLET ORAL at 01:00

## 2025-08-03 RX ADMIN — INSULIN LISPRO 2: 100 INJECTION, SOLUTION INTRAVENOUS; SUBCUTANEOUS at 08:48

## 2025-08-03 RX ADMIN — NYSTATIN 1 APPLICATION(S): 100000 CREAM TOPICAL at 22:25

## 2025-08-03 RX ADMIN — Medication 40 MILLIGRAM(S): at 05:27

## 2025-08-03 RX ADMIN — GABAPENTIN 300 MILLIGRAM(S): 400 CAPSULE ORAL at 05:27

## 2025-08-03 RX ADMIN — OXYCODONE HYDROCHLORIDE 5 MILLIGRAM(S): 30 TABLET ORAL at 00:12

## 2025-08-03 RX ADMIN — INSULIN LISPRO 1: 100 INJECTION, SOLUTION INTRAVENOUS; SUBCUTANEOUS at 13:21

## 2025-08-03 RX ADMIN — GABAPENTIN 300 MILLIGRAM(S): 400 CAPSULE ORAL at 22:22

## 2025-08-03 RX ADMIN — AMLODIPINE BESYLATE 5 MILLIGRAM(S): 10 TABLET ORAL at 05:27

## 2025-08-03 RX ADMIN — INSULIN LISPRO 8 UNIT(S): 100 INJECTION, SOLUTION INTRAVENOUS; SUBCUTANEOUS at 17:48

## 2025-08-03 RX ADMIN — Medication 1 DOSE(S): at 17:48

## 2025-08-03 RX ADMIN — INSULIN LISPRO 2: 100 INJECTION, SOLUTION INTRAVENOUS; SUBCUTANEOUS at 17:48

## 2025-08-03 RX ADMIN — GABAPENTIN 300 MILLIGRAM(S): 400 CAPSULE ORAL at 13:45

## 2025-08-04 LAB
ADD ON TEST-SPECIMEN IN LAB: SIGNIFICANT CHANGE UP
ALBUMIN SERPL ELPH-MCNC: 3.1 G/DL — LOW (ref 3.3–5)
ALP SERPL-CCNC: 59 U/L — SIGNIFICANT CHANGE UP (ref 40–120)
ALT FLD-CCNC: 7 U/L — LOW (ref 10–45)
ANION GAP SERPL CALC-SCNC: 12 MMOL/L — SIGNIFICANT CHANGE UP (ref 5–17)
ANISOCYTOSIS BLD QL: SLIGHT — SIGNIFICANT CHANGE UP
AST SERPL-CCNC: 7 U/L — LOW (ref 10–40)
BASO STIPL BLD QL SMEAR: PRESENT
BASOPHILS # BLD AUTO: 0.04 K/UL — SIGNIFICANT CHANGE UP (ref 0–0.2)
BASOPHILS # BLD MANUAL: 0 K/UL — SIGNIFICANT CHANGE UP (ref 0–0.2)
BASOPHILS NFR BLD AUTO: 0.4 % — SIGNIFICANT CHANGE UP (ref 0–2)
BASOPHILS NFR BLD MANUAL: 0 % — SIGNIFICANT CHANGE UP (ref 0–2)
BILIRUB SERPL-MCNC: 0.4 MG/DL — SIGNIFICANT CHANGE UP (ref 0.2–1.2)
BLD GP AB SCN SERPL QL: NEGATIVE — SIGNIFICANT CHANGE UP
BUN SERPL-MCNC: 28 MG/DL — HIGH (ref 7–23)
CALCIUM SERPL-MCNC: 8.5 MG/DL — SIGNIFICANT CHANGE UP (ref 8.4–10.5)
CHLORIDE SERPL-SCNC: 102 MMOL/L — SIGNIFICANT CHANGE UP (ref 96–108)
CO2 SERPL-SCNC: 24 MMOL/L — SIGNIFICANT CHANGE UP (ref 22–31)
CREAT SERPL-MCNC: 1.27 MG/DL — SIGNIFICANT CHANGE UP (ref 0.5–1.3)
EGFR: 44 ML/MIN/1.73M2 — LOW
EGFR: 44 ML/MIN/1.73M2 — LOW
EOSINOPHIL # BLD AUTO: 0 K/UL — SIGNIFICANT CHANGE UP (ref 0–0.5)
EOSINOPHIL # BLD MANUAL: 0 K/UL — SIGNIFICANT CHANGE UP (ref 0–0.5)
EOSINOPHIL NFR BLD AUTO: 0 % — SIGNIFICANT CHANGE UP (ref 0–6)
EOSINOPHIL NFR BLD MANUAL: 0 % — SIGNIFICANT CHANGE UP (ref 0–6)
GLUCOSE BLDC GLUCOMTR-MCNC: 253 MG/DL — HIGH (ref 70–99)
GLUCOSE BLDC GLUCOMTR-MCNC: 326 MG/DL — HIGH (ref 70–99)
GLUCOSE BLDC GLUCOMTR-MCNC: 367 MG/DL — HIGH (ref 70–99)
GLUCOSE BLDC GLUCOMTR-MCNC: 418 MG/DL — HIGH (ref 70–99)
GLUCOSE BLDC GLUCOMTR-MCNC: 433 MG/DL — HIGH (ref 70–99)
GLUCOSE SERPL-MCNC: 244 MG/DL — HIGH (ref 70–99)
HCT VFR BLD CALC: 24.4 % — LOW (ref 34.5–45)
HGB BLD-MCNC: 7 G/DL — CRITICAL LOW (ref 11.5–15.5)
IMM GRANULOCYTES # BLD AUTO: 0.17 K/UL — HIGH (ref 0–0.07)
IMM GRANULOCYTES NFR BLD AUTO: 1.6 % — HIGH (ref 0–0.9)
LYMPHOCYTES # BLD AUTO: 1.03 K/UL — SIGNIFICANT CHANGE UP (ref 1–3.3)
LYMPHOCYTES # BLD MANUAL: 0.57 K/UL — LOW (ref 1–3.3)
LYMPHOCYTES NFR BLD AUTO: 9.4 % — LOW (ref 13–44)
LYMPHOCYTES NFR BLD MANUAL: 5.2 % — LOW (ref 13–44)
MACROCYTES BLD QL: SLIGHT — SIGNIFICANT CHANGE UP
MAGNESIUM SERPL-MCNC: 2.2 MG/DL — SIGNIFICANT CHANGE UP (ref 1.6–2.6)
MANUAL NRBC #: 0.11 K/UL — HIGH (ref 0–0)
MCHC RBC-ENTMCNC: 24.1 PG — LOW (ref 27–34)
MCHC RBC-ENTMCNC: 28.7 G/DL — LOW (ref 32–36)
MCV RBC AUTO: 84.1 FL — SIGNIFICANT CHANGE UP (ref 80–100)
MONOCYTES # BLD AUTO: 0.37 K/UL — SIGNIFICANT CHANGE UP (ref 0–0.9)
MONOCYTES # BLD MANUAL: 0.1 K/UL — SIGNIFICANT CHANGE UP (ref 0–0.9)
MONOCYTES NFR BLD AUTO: 3.4 % — SIGNIFICANT CHANGE UP (ref 2–14)
MONOCYTES NFR BLD MANUAL: 0.9 % — LOW (ref 2–14)
NEUTROPHILS # BLD AUTO: 9.29 K/UL — HIGH (ref 1.8–7.4)
NEUTROPHILS # BLD MANUAL: 10.24 K/UL — HIGH (ref 1.8–7.4)
NEUTROPHILS NFR BLD AUTO: 85.2 % — HIGH (ref 43–77)
NEUTROPHILS NFR BLD MANUAL: 93.9 % — HIGH (ref 43–77)
NRBC # BLD AUTO: 0 K/UL — SIGNIFICANT CHANGE UP (ref 0–0)
NRBC # BLD: 1 /100 WBCS — HIGH (ref 0–0)
NRBC # FLD: 0 K/UL — SIGNIFICANT CHANGE UP (ref 0–0)
NRBC BLD AUTO-RTO: 0 /100 WBCS — SIGNIFICANT CHANGE UP (ref 0–0)
NRBC BLD-RTO: 1 /100 WBCS — HIGH (ref 0–0)
NT-PROBNP SERPL-SCNC: 694 PG/ML — HIGH (ref 0–300)
OVALOCYTES BLD QL SMEAR: SLIGHT — SIGNIFICANT CHANGE UP
PHOSPHATE SERPL-MCNC: 3.8 MG/DL — SIGNIFICANT CHANGE UP (ref 2.5–4.5)
PLAT MORPH BLD: NORMAL — SIGNIFICANT CHANGE UP
PLATELET # BLD AUTO: 319 K/UL — SIGNIFICANT CHANGE UP (ref 150–400)
PMV BLD: 9.6 FL — SIGNIFICANT CHANGE UP (ref 7–13)
POIKILOCYTOSIS BLD QL AUTO: SLIGHT — SIGNIFICANT CHANGE UP
POLYCHROMASIA BLD QL SMEAR: SLIGHT — SIGNIFICANT CHANGE UP
POTASSIUM SERPL-MCNC: 4.5 MMOL/L — SIGNIFICANT CHANGE UP (ref 3.5–5.3)
POTASSIUM SERPL-SCNC: 4.5 MMOL/L — SIGNIFICANT CHANGE UP (ref 3.5–5.3)
PROT SERPL-MCNC: 6.6 G/DL — SIGNIFICANT CHANGE UP (ref 6–8.3)
RBC # BLD: 2.9 M/UL — LOW (ref 3.8–5.2)
RBC # FLD: 17.8 % — HIGH (ref 10.3–14.5)
RBC BLD AUTO: ABNORMAL
RH IG SCN BLD-IMP: POSITIVE — SIGNIFICANT CHANGE UP
SODIUM SERPL-SCNC: 138 MMOL/L — SIGNIFICANT CHANGE UP (ref 135–145)
WBC # BLD: 10.9 K/UL — HIGH (ref 3.8–10.5)
WBC # FLD AUTO: 10.9 K/UL — HIGH (ref 3.8–10.5)

## 2025-08-04 PROCEDURE — 99232 SBSQ HOSP IP/OBS MODERATE 35: CPT

## 2025-08-04 PROCEDURE — 71045 X-RAY EXAM CHEST 1 VIEW: CPT | Mod: 26

## 2025-08-04 PROCEDURE — G0545: CPT

## 2025-08-04 PROCEDURE — 99233 SBSQ HOSP IP/OBS HIGH 50: CPT | Mod: GC

## 2025-08-04 RX ORDER — INSULIN GLARGINE-YFGN 100 [IU]/ML
21 INJECTION, SOLUTION SUBCUTANEOUS AT BEDTIME
Refills: 0 | Status: DISCONTINUED | OUTPATIENT
Start: 2025-08-04 | End: 2025-08-05

## 2025-08-04 RX ORDER — INSULIN LISPRO 100 U/ML
9 INJECTION, SOLUTION INTRAVENOUS; SUBCUTANEOUS
Refills: 0 | Status: DISCONTINUED | OUTPATIENT
Start: 2025-08-04 | End: 2025-08-05

## 2025-08-04 RX ORDER — FUROSEMIDE 10 MG/ML
60 INJECTION INTRAMUSCULAR; INTRAVENOUS ONCE
Refills: 0 | Status: COMPLETED | OUTPATIENT
Start: 2025-08-04 | End: 2025-08-04

## 2025-08-04 RX ADMIN — Medication 200 MICROGRAM(S): at 06:38

## 2025-08-04 RX ADMIN — ATORVASTATIN CALCIUM 40 MILLIGRAM(S): 80 TABLET, FILM COATED ORAL at 21:59

## 2025-08-04 RX ADMIN — INSULIN LISPRO 6: 100 INJECTION, SOLUTION INTRAVENOUS; SUBCUTANEOUS at 17:27

## 2025-08-04 RX ADMIN — Medication 40 MILLIGRAM(S): at 06:38

## 2025-08-04 RX ADMIN — Medication 2 MILLIGRAM(S): at 22:36

## 2025-08-04 RX ADMIN — INSULIN GLARGINE-YFGN 21 UNIT(S): 100 INJECTION, SOLUTION SUBCUTANEOUS at 21:56

## 2025-08-04 RX ADMIN — Medication 5 MILLIGRAM(S): at 21:58

## 2025-08-04 RX ADMIN — FUROSEMIDE 60 MILLIGRAM(S): 10 INJECTION INTRAMUSCULAR; INTRAVENOUS at 10:31

## 2025-08-04 RX ADMIN — NYSTATIN 1 APPLICATION(S): 100000 CREAM TOPICAL at 09:16

## 2025-08-04 RX ADMIN — OXYCODONE HYDROCHLORIDE 5 MILLIGRAM(S): 30 TABLET ORAL at 00:28

## 2025-08-04 RX ADMIN — IPRATROPIUM BROMIDE AND ALBUTEROL SULFATE 3 MILLILITER(S): .5; 2.5 SOLUTION RESPIRATORY (INHALATION) at 06:42

## 2025-08-04 RX ADMIN — Medication 1 DOSE(S): at 06:39

## 2025-08-04 RX ADMIN — INSULIN LISPRO 8 UNIT(S): 100 INJECTION, SOLUTION INTRAVENOUS; SUBCUTANEOUS at 13:07

## 2025-08-04 RX ADMIN — AMLODIPINE BESYLATE 5 MILLIGRAM(S): 10 TABLET ORAL at 06:37

## 2025-08-04 RX ADMIN — INSULIN LISPRO 4: 100 INJECTION, SOLUTION INTRAVENOUS; SUBCUTANEOUS at 13:07

## 2025-08-04 RX ADMIN — IPRATROPIUM BROMIDE AND ALBUTEROL SULFATE 3 MILLILITER(S): .5; 2.5 SOLUTION RESPIRATORY (INHALATION) at 11:59

## 2025-08-04 RX ADMIN — Medication 81 MILLIGRAM(S): at 11:59

## 2025-08-04 RX ADMIN — AMIODARONE HYDROCHLORIDE 200 MILLIGRAM(S): 50 INJECTION, SOLUTION INTRAVENOUS at 06:37

## 2025-08-04 RX ADMIN — GABAPENTIN 300 MILLIGRAM(S): 400 CAPSULE ORAL at 06:42

## 2025-08-04 RX ADMIN — INSULIN LISPRO 3: 100 INJECTION, SOLUTION INTRAVENOUS; SUBCUTANEOUS at 09:12

## 2025-08-04 RX ADMIN — Medication 2 TABLET(S): at 21:58

## 2025-08-04 RX ADMIN — INSULIN LISPRO 3: 100 INJECTION, SOLUTION INTRAVENOUS; SUBCUTANEOUS at 21:57

## 2025-08-04 RX ADMIN — NYSTATIN 1 APPLICATION(S): 100000 CREAM TOPICAL at 21:59

## 2025-08-04 RX ADMIN — PREDNISONE 20 MILLIGRAM(S): 20 TABLET ORAL at 06:39

## 2025-08-04 RX ADMIN — Medication 1 TABLET(S): at 12:00

## 2025-08-04 RX ADMIN — MUPIROCIN CALCIUM 1 APPLICATION(S): 20 CREAM TOPICAL at 17:55

## 2025-08-04 RX ADMIN — Medication 1 DOSE(S): at 17:30

## 2025-08-04 RX ADMIN — POLYETHYLENE GLYCOL 3350 17 GRAM(S): 17 POWDER, FOR SOLUTION ORAL at 21:59

## 2025-08-04 RX ADMIN — INSULIN LISPRO 8 UNIT(S): 100 INJECTION, SOLUTION INTRAVENOUS; SUBCUTANEOUS at 09:13

## 2025-08-04 RX ADMIN — IPRATROPIUM BROMIDE AND ALBUTEROL SULFATE 3 MILLILITER(S): .5; 2.5 SOLUTION RESPIRATORY (INHALATION) at 17:30

## 2025-08-04 RX ADMIN — INSULIN LISPRO 9 UNIT(S): 100 INJECTION, SOLUTION INTRAVENOUS; SUBCUTANEOUS at 17:29

## 2025-08-04 RX ADMIN — APIXABAN 5 MILLIGRAM(S): 2.5 TABLET, FILM COATED ORAL at 17:30

## 2025-08-04 RX ADMIN — GABAPENTIN 300 MILLIGRAM(S): 400 CAPSULE ORAL at 13:07

## 2025-08-04 RX ADMIN — MUPIROCIN CALCIUM 1 APPLICATION(S): 20 CREAM TOPICAL at 06:39

## 2025-08-04 RX ADMIN — APIXABAN 5 MILLIGRAM(S): 2.5 TABLET, FILM COATED ORAL at 06:38

## 2025-08-04 RX ADMIN — GABAPENTIN 300 MILLIGRAM(S): 400 CAPSULE ORAL at 21:56

## 2025-08-04 RX ADMIN — OXYCODONE HYDROCHLORIDE 5 MILLIGRAM(S): 30 TABLET ORAL at 01:15

## 2025-08-05 LAB
ALBUMIN SERPL ELPH-MCNC: 3.3 G/DL — SIGNIFICANT CHANGE UP (ref 3.3–5)
ALP SERPL-CCNC: 59 U/L — SIGNIFICANT CHANGE UP (ref 40–120)
ALT FLD-CCNC: 11 U/L — SIGNIFICANT CHANGE UP (ref 10–45)
ANION GAP SERPL CALC-SCNC: 12 MMOL/L — SIGNIFICANT CHANGE UP (ref 5–17)
AST SERPL-CCNC: 22 U/L — SIGNIFICANT CHANGE UP (ref 10–40)
BASOPHILS # BLD AUTO: 0.08 K/UL — SIGNIFICANT CHANGE UP (ref 0–0.2)
BASOPHILS NFR BLD AUTO: 0.8 % — SIGNIFICANT CHANGE UP (ref 0–2)
BILIRUB SERPL-MCNC: 0.4 MG/DL — SIGNIFICANT CHANGE UP (ref 0.2–1.2)
BUN SERPL-MCNC: 31 MG/DL — HIGH (ref 7–23)
CALCIUM SERPL-MCNC: 8.9 MG/DL — SIGNIFICANT CHANGE UP (ref 8.4–10.5)
CHLORIDE SERPL-SCNC: 102 MMOL/L — SIGNIFICANT CHANGE UP (ref 96–108)
CO2 SERPL-SCNC: 26 MMOL/L — SIGNIFICANT CHANGE UP (ref 22–31)
CREAT SERPL-MCNC: 1.11 MG/DL — SIGNIFICANT CHANGE UP (ref 0.5–1.3)
EGFR: 51 ML/MIN/1.73M2 — LOW
EGFR: 51 ML/MIN/1.73M2 — LOW
EOSINOPHIL # BLD AUTO: 0.22 K/UL — SIGNIFICANT CHANGE UP (ref 0–0.5)
EOSINOPHIL NFR BLD AUTO: 2.1 % — SIGNIFICANT CHANGE UP (ref 0–6)
GLUCOSE BLDC GLUCOMTR-MCNC: 158 MG/DL — HIGH (ref 70–99)
GLUCOSE BLDC GLUCOMTR-MCNC: 167 MG/DL — HIGH (ref 70–99)
GLUCOSE BLDC GLUCOMTR-MCNC: 215 MG/DL — HIGH (ref 70–99)
GLUCOSE BLDC GLUCOMTR-MCNC: 236 MG/DL — HIGH (ref 70–99)
GLUCOSE SERPL-MCNC: 161 MG/DL — HIGH (ref 70–99)
HCT VFR BLD CALC: 26.6 % — LOW (ref 34.5–45)
HGB BLD-MCNC: 7.9 G/DL — LOW (ref 11.5–15.5)
IMM GRANULOCYTES # BLD AUTO: 0.09 K/UL — HIGH (ref 0–0.07)
IMM GRANULOCYTES NFR BLD AUTO: 0.9 % — SIGNIFICANT CHANGE UP (ref 0–0.9)
LYMPHOCYTES # BLD AUTO: 1.75 K/UL — SIGNIFICANT CHANGE UP (ref 1–3.3)
LYMPHOCYTES NFR BLD AUTO: 16.8 % — SIGNIFICANT CHANGE UP (ref 13–44)
MAGNESIUM SERPL-MCNC: 2.4 MG/DL — SIGNIFICANT CHANGE UP (ref 1.6–2.6)
MCHC RBC-ENTMCNC: 25.3 PG — LOW (ref 27–34)
MCHC RBC-ENTMCNC: 29.7 G/DL — LOW (ref 32–36)
MCV RBC AUTO: 85.3 FL — SIGNIFICANT CHANGE UP (ref 80–100)
MONOCYTES # BLD AUTO: 0.59 K/UL — SIGNIFICANT CHANGE UP (ref 0–0.9)
MONOCYTES NFR BLD AUTO: 5.7 % — SIGNIFICANT CHANGE UP (ref 2–14)
NEUTROPHILS # BLD AUTO: 7.68 K/UL — HIGH (ref 1.8–7.4)
NEUTROPHILS NFR BLD AUTO: 73.7 % — SIGNIFICANT CHANGE UP (ref 43–77)
NRBC # BLD AUTO: 0 K/UL — SIGNIFICANT CHANGE UP (ref 0–0)
NRBC # FLD: 0 K/UL — SIGNIFICANT CHANGE UP (ref 0–0)
NRBC BLD AUTO-RTO: 0 /100 WBCS — SIGNIFICANT CHANGE UP (ref 0–0)
PHOSPHATE SERPL-MCNC: 3.3 MG/DL — SIGNIFICANT CHANGE UP (ref 2.5–4.5)
PLATELET # BLD AUTO: 342 K/UL — SIGNIFICANT CHANGE UP (ref 150–400)
PMV BLD: 9.7 FL — SIGNIFICANT CHANGE UP (ref 7–13)
POTASSIUM SERPL-MCNC: 4.3 MMOL/L — SIGNIFICANT CHANGE UP (ref 3.5–5.3)
POTASSIUM SERPL-SCNC: 4.3 MMOL/L — SIGNIFICANT CHANGE UP (ref 3.5–5.3)
PROT SERPL-MCNC: 6.9 G/DL — SIGNIFICANT CHANGE UP (ref 6–8.3)
RBC # BLD: 3.12 M/UL — LOW (ref 3.8–5.2)
RBC # FLD: 18.2 % — HIGH (ref 10.3–14.5)
SODIUM SERPL-SCNC: 140 MMOL/L — SIGNIFICANT CHANGE UP (ref 135–145)
TROPONIN T, HIGH SENSITIVITY RESULT: 35 NG/L — SIGNIFICANT CHANGE UP (ref 0–51)
WBC # BLD: 10.41 K/UL — SIGNIFICANT CHANGE UP (ref 3.8–10.5)
WBC # FLD AUTO: 10.41 K/UL — SIGNIFICANT CHANGE UP (ref 3.8–10.5)

## 2025-08-05 PROCEDURE — 93010 ELECTROCARDIOGRAM REPORT: CPT

## 2025-08-05 PROCEDURE — 99233 SBSQ HOSP IP/OBS HIGH 50: CPT | Mod: GC

## 2025-08-05 RX ORDER — FUROSEMIDE 10 MG/ML
40 INJECTION INTRAMUSCULAR; INTRAVENOUS
Refills: 0 | Status: DISCONTINUED | OUTPATIENT
Start: 2025-08-05 | End: 2025-08-08

## 2025-08-05 RX ORDER — INSULIN GLARGINE-YFGN 100 [IU]/ML
27 INJECTION, SOLUTION SUBCUTANEOUS AT BEDTIME
Refills: 0 | Status: DISCONTINUED | OUTPATIENT
Start: 2025-08-05 | End: 2025-08-21

## 2025-08-05 RX ORDER — INSULIN LISPRO 100 U/ML
12 INJECTION, SOLUTION INTRAVENOUS; SUBCUTANEOUS
Refills: 0 | Status: DISCONTINUED | OUTPATIENT
Start: 2025-08-05 | End: 2025-08-06

## 2025-08-05 RX ADMIN — OXYCODONE HYDROCHLORIDE 5 MILLIGRAM(S): 30 TABLET ORAL at 00:46

## 2025-08-05 RX ADMIN — GABAPENTIN 300 MILLIGRAM(S): 400 CAPSULE ORAL at 06:39

## 2025-08-05 RX ADMIN — GABAPENTIN 300 MILLIGRAM(S): 400 CAPSULE ORAL at 21:51

## 2025-08-05 RX ADMIN — Medication 2 TABLET(S): at 21:51

## 2025-08-05 RX ADMIN — OXYCODONE HYDROCHLORIDE 5 MILLIGRAM(S): 30 TABLET ORAL at 01:30

## 2025-08-05 RX ADMIN — Medication 5 MILLIGRAM(S): at 21:51

## 2025-08-05 RX ADMIN — POLYETHYLENE GLYCOL 3350 17 GRAM(S): 17 POWDER, FOR SOLUTION ORAL at 21:53

## 2025-08-05 RX ADMIN — Medication 5 MILLIGRAM(S): at 21:52

## 2025-08-05 RX ADMIN — Medication 1 DOSE(S): at 17:12

## 2025-08-05 RX ADMIN — GABAPENTIN 300 MILLIGRAM(S): 400 CAPSULE ORAL at 13:19

## 2025-08-05 RX ADMIN — AMIODARONE HYDROCHLORIDE 200 MILLIGRAM(S): 50 INJECTION, SOLUTION INTRAVENOUS at 06:38

## 2025-08-05 RX ADMIN — Medication 200 MICROGRAM(S): at 06:39

## 2025-08-05 RX ADMIN — AMLODIPINE BESYLATE 5 MILLIGRAM(S): 10 TABLET ORAL at 06:39

## 2025-08-05 RX ADMIN — Medication 2 PUFF(S): at 14:37

## 2025-08-05 RX ADMIN — Medication 40 MILLIGRAM(S): at 06:39

## 2025-08-05 RX ADMIN — APIXABAN 5 MILLIGRAM(S): 2.5 TABLET, FILM COATED ORAL at 06:38

## 2025-08-05 RX ADMIN — IPRATROPIUM BROMIDE AND ALBUTEROL SULFATE 3 MILLILITER(S): .5; 2.5 SOLUTION RESPIRATORY (INHALATION) at 23:21

## 2025-08-05 RX ADMIN — MUPIROCIN CALCIUM 1 APPLICATION(S): 20 CREAM TOPICAL at 17:12

## 2025-08-05 RX ADMIN — Medication 81 MILLIGRAM(S): at 11:56

## 2025-08-05 RX ADMIN — Medication 2 MILLIGRAM(S): at 21:51

## 2025-08-05 RX ADMIN — INSULIN LISPRO 12 UNIT(S): 100 INJECTION, SOLUTION INTRAVENOUS; SUBCUTANEOUS at 17:14

## 2025-08-05 RX ADMIN — FUROSEMIDE 40 MILLIGRAM(S): 10 INJECTION INTRAMUSCULAR; INTRAVENOUS at 13:20

## 2025-08-05 RX ADMIN — APIXABAN 5 MILLIGRAM(S): 2.5 TABLET, FILM COATED ORAL at 17:13

## 2025-08-05 RX ADMIN — IPRATROPIUM BROMIDE AND ALBUTEROL SULFATE 3 MILLILITER(S): .5; 2.5 SOLUTION RESPIRATORY (INHALATION) at 17:12

## 2025-08-05 RX ADMIN — Medication 1 TABLET(S): at 11:56

## 2025-08-05 RX ADMIN — IPRATROPIUM BROMIDE AND ALBUTEROL SULFATE 3 MILLILITER(S): .5; 2.5 SOLUTION RESPIRATORY (INHALATION) at 12:01

## 2025-08-05 RX ADMIN — ATORVASTATIN CALCIUM 40 MILLIGRAM(S): 80 TABLET, FILM COATED ORAL at 21:52

## 2025-08-05 RX ADMIN — NYSTATIN 1 APPLICATION(S): 100000 CREAM TOPICAL at 21:53

## 2025-08-05 RX ADMIN — Medication 1 DOSE(S): at 06:40

## 2025-08-05 RX ADMIN — NYSTATIN 1 APPLICATION(S): 100000 CREAM TOPICAL at 11:56

## 2025-08-05 RX ADMIN — INSULIN LISPRO 12 UNIT(S): 100 INJECTION, SOLUTION INTRAVENOUS; SUBCUTANEOUS at 08:14

## 2025-08-05 RX ADMIN — INSULIN LISPRO 12 UNIT(S): 100 INJECTION, SOLUTION INTRAVENOUS; SUBCUTANEOUS at 12:00

## 2025-08-05 RX ADMIN — INSULIN LISPRO 1: 100 INJECTION, SOLUTION INTRAVENOUS; SUBCUTANEOUS at 12:00

## 2025-08-05 RX ADMIN — INSULIN GLARGINE-YFGN 27 UNIT(S): 100 INJECTION, SOLUTION SUBCUTANEOUS at 21:51

## 2025-08-05 RX ADMIN — INSULIN LISPRO 1: 100 INJECTION, SOLUTION INTRAVENOUS; SUBCUTANEOUS at 17:14

## 2025-08-05 RX ADMIN — IPRATROPIUM BROMIDE AND ALBUTEROL SULFATE 3 MILLILITER(S): .5; 2.5 SOLUTION RESPIRATORY (INHALATION) at 06:38

## 2025-08-05 RX ADMIN — INSULIN LISPRO 2: 100 INJECTION, SOLUTION INTRAVENOUS; SUBCUTANEOUS at 08:15

## 2025-08-05 RX ADMIN — IPRATROPIUM BROMIDE AND ALBUTEROL SULFATE 3 MILLILITER(S): .5; 2.5 SOLUTION RESPIRATORY (INHALATION) at 00:46

## 2025-08-05 RX ADMIN — MUPIROCIN CALCIUM 1 APPLICATION(S): 20 CREAM TOPICAL at 06:39

## 2025-08-06 LAB
ALBUMIN SERPL ELPH-MCNC: 3 G/DL — LOW (ref 3.3–5)
ALP SERPL-CCNC: 61 U/L — SIGNIFICANT CHANGE UP (ref 40–120)
ALT FLD-CCNC: 9 U/L — LOW (ref 10–45)
ANION GAP SERPL CALC-SCNC: 11 MMOL/L — SIGNIFICANT CHANGE UP (ref 5–17)
AST SERPL-CCNC: 6 U/L — LOW (ref 10–40)
BASOPHILS # BLD AUTO: 0.08 K/UL — SIGNIFICANT CHANGE UP (ref 0–0.2)
BASOPHILS NFR BLD AUTO: 0.8 % — SIGNIFICANT CHANGE UP (ref 0–2)
BILIRUB SERPL-MCNC: 0.4 MG/DL — SIGNIFICANT CHANGE UP (ref 0.2–1.2)
BUN SERPL-MCNC: 35 MG/DL — HIGH (ref 7–23)
CALCIUM SERPL-MCNC: 8.7 MG/DL — SIGNIFICANT CHANGE UP (ref 8.4–10.5)
CHLORIDE SERPL-SCNC: 101 MMOL/L — SIGNIFICANT CHANGE UP (ref 96–108)
CO2 SERPL-SCNC: 27 MMOL/L — SIGNIFICANT CHANGE UP (ref 22–31)
CREAT SERPL-MCNC: 1.26 MG/DL — SIGNIFICANT CHANGE UP (ref 0.5–1.3)
EGFR: 44 ML/MIN/1.73M2 — LOW
EGFR: 44 ML/MIN/1.73M2 — LOW
EOSINOPHIL # BLD AUTO: 0.27 K/UL — SIGNIFICANT CHANGE UP (ref 0–0.5)
EOSINOPHIL NFR BLD AUTO: 2.9 % — SIGNIFICANT CHANGE UP (ref 0–6)
GLUCOSE BLDC GLUCOMTR-MCNC: 139 MG/DL — HIGH (ref 70–99)
GLUCOSE BLDC GLUCOMTR-MCNC: 178 MG/DL — HIGH (ref 70–99)
GLUCOSE BLDC GLUCOMTR-MCNC: 212 MG/DL — HIGH (ref 70–99)
GLUCOSE BLDC GLUCOMTR-MCNC: 232 MG/DL — HIGH (ref 70–99)
GLUCOSE SERPL-MCNC: 173 MG/DL — HIGH (ref 70–99)
HCT VFR BLD CALC: 24.8 % — LOW (ref 34.5–45)
HGB BLD-MCNC: 7.3 G/DL — LOW (ref 11.5–15.5)
IMM GRANULOCYTES # BLD AUTO: 0.06 K/UL — SIGNIFICANT CHANGE UP (ref 0–0.07)
IMM GRANULOCYTES NFR BLD AUTO: 0.6 % — SIGNIFICANT CHANGE UP (ref 0–0.9)
LYMPHOCYTES # BLD AUTO: 1.46 K/UL — SIGNIFICANT CHANGE UP (ref 1–3.3)
LYMPHOCYTES NFR BLD AUTO: 15.4 % — SIGNIFICANT CHANGE UP (ref 13–44)
MAGNESIUM SERPL-MCNC: 2.1 MG/DL — SIGNIFICANT CHANGE UP (ref 1.6–2.6)
MCHC RBC-ENTMCNC: 25.3 PG — LOW (ref 27–34)
MCHC RBC-ENTMCNC: 29.4 G/DL — LOW (ref 32–36)
MCV RBC AUTO: 85.8 FL — SIGNIFICANT CHANGE UP (ref 80–100)
MONOCYTES # BLD AUTO: 0.64 K/UL — SIGNIFICANT CHANGE UP (ref 0–0.9)
MONOCYTES NFR BLD AUTO: 6.8 % — SIGNIFICANT CHANGE UP (ref 2–14)
NEUTROPHILS # BLD AUTO: 6.95 K/UL — SIGNIFICANT CHANGE UP (ref 1.8–7.4)
NEUTROPHILS NFR BLD AUTO: 73.5 % — SIGNIFICANT CHANGE UP (ref 43–77)
NRBC # BLD AUTO: 0 K/UL — SIGNIFICANT CHANGE UP (ref 0–0)
NRBC # FLD: 0 K/UL — SIGNIFICANT CHANGE UP (ref 0–0)
NRBC BLD AUTO-RTO: 0 /100 WBCS — SIGNIFICANT CHANGE UP (ref 0–0)
PHOSPHATE SERPL-MCNC: 4.5 MG/DL — SIGNIFICANT CHANGE UP (ref 2.5–4.5)
PLATELET # BLD AUTO: 313 K/UL — SIGNIFICANT CHANGE UP (ref 150–400)
PMV BLD: 9.7 FL — SIGNIFICANT CHANGE UP (ref 7–13)
POTASSIUM SERPL-MCNC: 3.7 MMOL/L — SIGNIFICANT CHANGE UP (ref 3.5–5.3)
POTASSIUM SERPL-SCNC: 3.7 MMOL/L — SIGNIFICANT CHANGE UP (ref 3.5–5.3)
PROT SERPL-MCNC: 6.4 G/DL — SIGNIFICANT CHANGE UP (ref 6–8.3)
RBC # BLD: 2.89 M/UL — LOW (ref 3.8–5.2)
RBC # FLD: 18.4 % — HIGH (ref 10.3–14.5)
SODIUM SERPL-SCNC: 139 MMOL/L — SIGNIFICANT CHANGE UP (ref 135–145)
WBC # BLD: 9.46 K/UL — SIGNIFICANT CHANGE UP (ref 3.8–10.5)
WBC # FLD AUTO: 9.46 K/UL — SIGNIFICANT CHANGE UP (ref 3.8–10.5)

## 2025-08-06 PROCEDURE — 99223 1ST HOSP IP/OBS HIGH 75: CPT | Mod: GC

## 2025-08-06 PROCEDURE — 76604 US EXAM CHEST: CPT | Mod: 26,GC

## 2025-08-06 PROCEDURE — 99233 SBSQ HOSP IP/OBS HIGH 50: CPT | Mod: GC

## 2025-08-06 RX ORDER — INSULIN LISPRO 100 U/ML
13 INJECTION, SOLUTION INTRAVENOUS; SUBCUTANEOUS
Refills: 0 | Status: DISCONTINUED | OUTPATIENT
Start: 2025-08-06 | End: 2025-08-27

## 2025-08-06 RX ORDER — DIPHENHYDRAMINE HCL 12.5MG/5ML
25 ELIXIR ORAL ONCE
Refills: 0 | Status: DISCONTINUED | OUTPATIENT
Start: 2025-08-06 | End: 2025-08-06

## 2025-08-06 RX ADMIN — INSULIN LISPRO 2: 100 INJECTION, SOLUTION INTRAVENOUS; SUBCUTANEOUS at 13:04

## 2025-08-06 RX ADMIN — AMIODARONE HYDROCHLORIDE 200 MILLIGRAM(S): 50 INJECTION, SOLUTION INTRAVENOUS at 05:43

## 2025-08-06 RX ADMIN — Medication 5 MILLIGRAM(S): at 21:57

## 2025-08-06 RX ADMIN — INSULIN LISPRO 12 UNIT(S): 100 INJECTION, SOLUTION INTRAVENOUS; SUBCUTANEOUS at 09:30

## 2025-08-06 RX ADMIN — Medication 2 PUFF(S): at 18:00

## 2025-08-06 RX ADMIN — INSULIN LISPRO 12 UNIT(S): 100 INJECTION, SOLUTION INTRAVENOUS; SUBCUTANEOUS at 13:04

## 2025-08-06 RX ADMIN — FUROSEMIDE 40 MILLIGRAM(S): 10 INJECTION INTRAMUSCULAR; INTRAVENOUS at 05:44

## 2025-08-06 RX ADMIN — ATORVASTATIN CALCIUM 40 MILLIGRAM(S): 80 TABLET, FILM COATED ORAL at 21:56

## 2025-08-06 RX ADMIN — IPRATROPIUM BROMIDE AND ALBUTEROL SULFATE 3 MILLILITER(S): .5; 2.5 SOLUTION RESPIRATORY (INHALATION) at 05:42

## 2025-08-06 RX ADMIN — INSULIN GLARGINE-YFGN 27 UNIT(S): 100 INJECTION, SOLUTION SUBCUTANEOUS at 21:37

## 2025-08-06 RX ADMIN — Medication 1 DOSE(S): at 05:43

## 2025-08-06 RX ADMIN — Medication 200 MICROGRAM(S): at 05:44

## 2025-08-06 RX ADMIN — IPRATROPIUM BROMIDE AND ALBUTEROL SULFATE 3 MILLILITER(S): .5; 2.5 SOLUTION RESPIRATORY (INHALATION) at 12:00

## 2025-08-06 RX ADMIN — NYSTATIN 1 APPLICATION(S): 100000 CREAM TOPICAL at 21:58

## 2025-08-06 RX ADMIN — INSULIN LISPRO 13 UNIT(S): 100 INJECTION, SOLUTION INTRAVENOUS; SUBCUTANEOUS at 18:00

## 2025-08-06 RX ADMIN — FUROSEMIDE 40 MILLIGRAM(S): 10 INJECTION INTRAMUSCULAR; INTRAVENOUS at 13:02

## 2025-08-06 RX ADMIN — Medication 5 MILLIGRAM(S): at 21:56

## 2025-08-06 RX ADMIN — GABAPENTIN 300 MILLIGRAM(S): 400 CAPSULE ORAL at 05:43

## 2025-08-06 RX ADMIN — OXYCODONE HYDROCHLORIDE 5 MILLIGRAM(S): 30 TABLET ORAL at 00:30

## 2025-08-06 RX ADMIN — OXYCODONE HYDROCHLORIDE 5 MILLIGRAM(S): 30 TABLET ORAL at 23:43

## 2025-08-06 RX ADMIN — GABAPENTIN 300 MILLIGRAM(S): 400 CAPSULE ORAL at 21:57

## 2025-08-06 RX ADMIN — Medication 1 TABLET(S): at 12:59

## 2025-08-06 RX ADMIN — Medication 2 MILLIGRAM(S): at 21:56

## 2025-08-06 RX ADMIN — APIXABAN 5 MILLIGRAM(S): 2.5 TABLET, FILM COATED ORAL at 17:59

## 2025-08-06 RX ADMIN — Medication 1 DOSE(S): at 18:00

## 2025-08-06 RX ADMIN — OXYCODONE HYDROCHLORIDE 5 MILLIGRAM(S): 30 TABLET ORAL at 01:25

## 2025-08-06 RX ADMIN — GABAPENTIN 300 MILLIGRAM(S): 400 CAPSULE ORAL at 17:59

## 2025-08-06 RX ADMIN — NYSTATIN 1 APPLICATION(S): 100000 CREAM TOPICAL at 13:00

## 2025-08-06 RX ADMIN — MUPIROCIN CALCIUM 1 APPLICATION(S): 20 CREAM TOPICAL at 05:44

## 2025-08-06 RX ADMIN — APIXABAN 5 MILLIGRAM(S): 2.5 TABLET, FILM COATED ORAL at 05:44

## 2025-08-06 RX ADMIN — Medication 81 MILLIGRAM(S): at 12:59

## 2025-08-06 RX ADMIN — INSULIN LISPRO 2: 100 INJECTION, SOLUTION INTRAVENOUS; SUBCUTANEOUS at 18:01

## 2025-08-06 RX ADMIN — INSULIN LISPRO 1: 100 INJECTION, SOLUTION INTRAVENOUS; SUBCUTANEOUS at 09:00

## 2025-08-06 RX ADMIN — MUPIROCIN CALCIUM 1 APPLICATION(S): 20 CREAM TOPICAL at 18:00

## 2025-08-06 RX ADMIN — Medication 40 MILLIGRAM(S): at 05:43

## 2025-08-07 ENCOUNTER — RESULT REVIEW (OUTPATIENT)
Age: 75
End: 2025-08-07

## 2025-08-07 LAB
ADD ON TEST-SPECIMEN IN LAB: SIGNIFICANT CHANGE UP
ADD ON TEST-SPECIMEN IN LAB: SIGNIFICANT CHANGE UP
ALBUMIN SERPL ELPH-MCNC: 2.9 G/DL — LOW (ref 3.3–5)
ALP SERPL-CCNC: 65 U/L — SIGNIFICANT CHANGE UP (ref 40–120)
ALT FLD-CCNC: 9 U/L — LOW (ref 10–45)
ANION GAP SERPL CALC-SCNC: 15 MMOL/L — SIGNIFICANT CHANGE UP (ref 5–17)
AST SERPL-CCNC: 12 U/L — SIGNIFICANT CHANGE UP (ref 10–40)
BASOPHILS # BLD AUTO: 0.08 K/UL — SIGNIFICANT CHANGE UP (ref 0–0.2)
BASOPHILS NFR BLD AUTO: 0.8 % — SIGNIFICANT CHANGE UP (ref 0–2)
BILIRUB SERPL-MCNC: 0.6 MG/DL — SIGNIFICANT CHANGE UP (ref 0.2–1.2)
BUN SERPL-MCNC: 34 MG/DL — HIGH (ref 7–23)
CALCIUM SERPL-MCNC: 8.6 MG/DL — SIGNIFICANT CHANGE UP (ref 8.4–10.5)
CHLORIDE SERPL-SCNC: 100 MMOL/L — SIGNIFICANT CHANGE UP (ref 96–108)
CO2 SERPL-SCNC: 27 MMOL/L — SIGNIFICANT CHANGE UP (ref 22–31)
CREAT SERPL-MCNC: 1.18 MG/DL — SIGNIFICANT CHANGE UP (ref 0.5–1.3)
EGFR: 48 ML/MIN/1.73M2 — LOW
EGFR: 48 ML/MIN/1.73M2 — LOW
EOSINOPHIL # BLD AUTO: 0.36 K/UL — SIGNIFICANT CHANGE UP (ref 0–0.5)
EOSINOPHIL NFR BLD AUTO: 3.7 % — SIGNIFICANT CHANGE UP (ref 0–6)
FLUAV AG NPH QL: SIGNIFICANT CHANGE UP
FLUBV AG NPH QL: SIGNIFICANT CHANGE UP
GAS PNL BLDA: SIGNIFICANT CHANGE UP
GLUCOSE BLDC GLUCOMTR-MCNC: 145 MG/DL — HIGH (ref 70–99)
GLUCOSE BLDC GLUCOMTR-MCNC: 158 MG/DL — HIGH (ref 70–99)
GLUCOSE BLDC GLUCOMTR-MCNC: 179 MG/DL — HIGH (ref 70–99)
GLUCOSE BLDC GLUCOMTR-MCNC: 93 MG/DL — SIGNIFICANT CHANGE UP (ref 70–99)
GLUCOSE SERPL-MCNC: 122 MG/DL — HIGH (ref 70–99)
HCT VFR BLD CALC: 31.4 % — LOW (ref 34.5–45)
HGB BLD-MCNC: 9.3 G/DL — LOW (ref 11.5–15.5)
IMM GRANULOCYTES # BLD AUTO: 0.05 K/UL — SIGNIFICANT CHANGE UP (ref 0–0.07)
IMM GRANULOCYTES NFR BLD AUTO: 0.5 % — SIGNIFICANT CHANGE UP (ref 0–0.9)
LYMPHOCYTES # BLD AUTO: 1.69 K/UL — SIGNIFICANT CHANGE UP (ref 1–3.3)
LYMPHOCYTES NFR BLD AUTO: 17.3 % — SIGNIFICANT CHANGE UP (ref 13–44)
MAGNESIUM SERPL-MCNC: 1.9 MG/DL — SIGNIFICANT CHANGE UP (ref 1.6–2.6)
MCHC RBC-ENTMCNC: 25.4 PG — LOW (ref 27–34)
MCHC RBC-ENTMCNC: 29.6 G/DL — LOW (ref 32–36)
MCV RBC AUTO: 85.8 FL — SIGNIFICANT CHANGE UP (ref 80–100)
MONOCYTES # BLD AUTO: 0.53 K/UL — SIGNIFICANT CHANGE UP (ref 0–0.9)
MONOCYTES NFR BLD AUTO: 5.4 % — SIGNIFICANT CHANGE UP (ref 2–14)
NEUTROPHILS # BLD AUTO: 7.04 K/UL — SIGNIFICANT CHANGE UP (ref 1.8–7.4)
NEUTROPHILS NFR BLD AUTO: 72.3 % — SIGNIFICANT CHANGE UP (ref 43–77)
NRBC # BLD AUTO: 0 K/UL — SIGNIFICANT CHANGE UP (ref 0–0)
NRBC # FLD: 0 K/UL — SIGNIFICANT CHANGE UP (ref 0–0)
NRBC BLD AUTO-RTO: 0 /100 WBCS — SIGNIFICANT CHANGE UP (ref 0–0)
NT-PROBNP SERPL-SCNC: 2003 PG/ML — HIGH (ref 0–300)
PHOSPHATE SERPL-MCNC: 3.6 MG/DL — SIGNIFICANT CHANGE UP (ref 2.5–4.5)
PLATELET # BLD AUTO: 345 K/UL — SIGNIFICANT CHANGE UP (ref 150–400)
PMV BLD: 9.6 FL — SIGNIFICANT CHANGE UP (ref 7–13)
POTASSIUM SERPL-MCNC: 3.5 MMOL/L — SIGNIFICANT CHANGE UP (ref 3.5–5.3)
POTASSIUM SERPL-SCNC: 3.5 MMOL/L — SIGNIFICANT CHANGE UP (ref 3.5–5.3)
PROCALCITONIN SERPL-MCNC: 0.13 NG/ML — HIGH (ref 0.02–0.1)
PROT SERPL-MCNC: 6.5 G/DL — SIGNIFICANT CHANGE UP (ref 6–8.3)
RBC # BLD: 3.66 M/UL — LOW (ref 3.8–5.2)
RBC # FLD: 18.3 % — HIGH (ref 10.3–14.5)
RSV RNA NPH QL NAA+NON-PROBE: SIGNIFICANT CHANGE UP
SARS-COV-2 RNA SPEC QL NAA+PROBE: SIGNIFICANT CHANGE UP
SODIUM SERPL-SCNC: 142 MMOL/L — SIGNIFICANT CHANGE UP (ref 135–145)
SOURCE RESPIRATORY: SIGNIFICANT CHANGE UP
WBC # BLD: 9.75 K/UL — SIGNIFICANT CHANGE UP (ref 3.8–10.5)
WBC # FLD AUTO: 9.75 K/UL — SIGNIFICANT CHANGE UP (ref 3.8–10.5)

## 2025-08-07 PROCEDURE — 93010 ELECTROCARDIOGRAM REPORT: CPT

## 2025-08-07 PROCEDURE — 99232 SBSQ HOSP IP/OBS MODERATE 35: CPT

## 2025-08-07 PROCEDURE — 71275 CT ANGIOGRAPHY CHEST: CPT | Mod: 26

## 2025-08-07 PROCEDURE — 93306 TTE W/DOPPLER COMPLETE: CPT | Mod: 26

## 2025-08-07 PROCEDURE — G0545: CPT

## 2025-08-07 PROCEDURE — 99233 SBSQ HOSP IP/OBS HIGH 50: CPT | Mod: GC

## 2025-08-07 RX ORDER — UREA 40 G
15 VIAL (EA) INTRAVENOUS DAILY
Refills: 0 | Status: DISCONTINUED | OUTPATIENT
Start: 2025-08-07 | End: 2025-08-07

## 2025-08-07 RX ORDER — OXYCODONE HYDROCHLORIDE 30 MG/1
5 TABLET ORAL EVERY 6 HOURS
Refills: 0 | Status: DISCONTINUED | OUTPATIENT
Start: 2025-08-07 | End: 2025-08-10

## 2025-08-07 RX ADMIN — AMLODIPINE BESYLATE 5 MILLIGRAM(S): 10 TABLET ORAL at 05:50

## 2025-08-07 RX ADMIN — MUPIROCIN CALCIUM 1 APPLICATION(S): 20 CREAM TOPICAL at 05:51

## 2025-08-07 RX ADMIN — AMIODARONE HYDROCHLORIDE 200 MILLIGRAM(S): 50 INJECTION, SOLUTION INTRAVENOUS at 05:51

## 2025-08-07 RX ADMIN — INSULIN LISPRO 1: 100 INJECTION, SOLUTION INTRAVENOUS; SUBCUTANEOUS at 18:05

## 2025-08-07 RX ADMIN — POLYETHYLENE GLYCOL 3350 17 GRAM(S): 17 POWDER, FOR SOLUTION ORAL at 21:53

## 2025-08-07 RX ADMIN — INSULIN LISPRO 13 UNIT(S): 100 INJECTION, SOLUTION INTRAVENOUS; SUBCUTANEOUS at 18:05

## 2025-08-07 RX ADMIN — GABAPENTIN 300 MILLIGRAM(S): 400 CAPSULE ORAL at 21:53

## 2025-08-07 RX ADMIN — Medication 200 MICROGRAM(S): at 05:51

## 2025-08-07 RX ADMIN — ATORVASTATIN CALCIUM 40 MILLIGRAM(S): 80 TABLET, FILM COATED ORAL at 21:53

## 2025-08-07 RX ADMIN — Medication 1 DOSE(S): at 18:05

## 2025-08-07 RX ADMIN — OXYCODONE HYDROCHLORIDE 5 MILLIGRAM(S): 30 TABLET ORAL at 00:45

## 2025-08-07 RX ADMIN — Medication 2 TABLET(S): at 21:53

## 2025-08-07 RX ADMIN — Medication 5 MILLIGRAM(S): at 21:53

## 2025-08-07 RX ADMIN — Medication 81 MILLIGRAM(S): at 12:02

## 2025-08-07 RX ADMIN — INSULIN GLARGINE-YFGN 27 UNIT(S): 100 INJECTION, SOLUTION SUBCUTANEOUS at 21:53

## 2025-08-07 RX ADMIN — FUROSEMIDE 40 MILLIGRAM(S): 10 INJECTION INTRAMUSCULAR; INTRAVENOUS at 13:35

## 2025-08-07 RX ADMIN — NYSTATIN 1 APPLICATION(S): 100000 CREAM TOPICAL at 21:57

## 2025-08-07 RX ADMIN — MUPIROCIN CALCIUM 1 APPLICATION(S): 20 CREAM TOPICAL at 18:39

## 2025-08-07 RX ADMIN — NYSTATIN 1 APPLICATION(S): 100000 CREAM TOPICAL at 12:38

## 2025-08-07 RX ADMIN — Medication 1 DOSE(S): at 05:52

## 2025-08-07 RX ADMIN — GABAPENTIN 300 MILLIGRAM(S): 400 CAPSULE ORAL at 05:50

## 2025-08-07 RX ADMIN — INSULIN LISPRO 13 UNIT(S): 100 INJECTION, SOLUTION INTRAVENOUS; SUBCUTANEOUS at 12:37

## 2025-08-07 RX ADMIN — INSULIN LISPRO 13 UNIT(S): 100 INJECTION, SOLUTION INTRAVENOUS; SUBCUTANEOUS at 08:30

## 2025-08-07 RX ADMIN — Medication 40 MILLIGRAM(S): at 05:51

## 2025-08-07 RX ADMIN — GABAPENTIN 300 MILLIGRAM(S): 400 CAPSULE ORAL at 13:35

## 2025-08-07 RX ADMIN — APIXABAN 5 MILLIGRAM(S): 2.5 TABLET, FILM COATED ORAL at 05:51

## 2025-08-07 RX ADMIN — FUROSEMIDE 40 MILLIGRAM(S): 10 INJECTION INTRAMUSCULAR; INTRAVENOUS at 05:51

## 2025-08-07 RX ADMIN — APIXABAN 5 MILLIGRAM(S): 2.5 TABLET, FILM COATED ORAL at 18:06

## 2025-08-07 RX ADMIN — Medication 2 MILLIGRAM(S): at 21:57

## 2025-08-07 RX ADMIN — Medication 1 TABLET(S): at 12:02

## 2025-08-08 LAB
ALBUMIN SERPL ELPH-MCNC: 3.1 G/DL — LOW (ref 3.3–5)
ALP SERPL-CCNC: 66 U/L — SIGNIFICANT CHANGE UP (ref 40–120)
ALT FLD-CCNC: 9 U/L — LOW (ref 10–45)
ANION GAP SERPL CALC-SCNC: 13 MMOL/L — SIGNIFICANT CHANGE UP (ref 5–17)
AST SERPL-CCNC: 8 U/L — LOW (ref 10–40)
BASOPHILS # BLD AUTO: 0.04 K/UL — SIGNIFICANT CHANGE UP (ref 0–0.2)
BASOPHILS NFR BLD AUTO: 0.4 % — SIGNIFICANT CHANGE UP (ref 0–2)
BILIRUB SERPL-MCNC: 0.5 MG/DL — SIGNIFICANT CHANGE UP (ref 0.2–1.2)
BUN SERPL-MCNC: 36 MG/DL — HIGH (ref 7–23)
CALCIUM SERPL-MCNC: 8.6 MG/DL — SIGNIFICANT CHANGE UP (ref 8.4–10.5)
CHLORIDE SERPL-SCNC: 98 MMOL/L — SIGNIFICANT CHANGE UP (ref 96–108)
CO2 SERPL-SCNC: 28 MMOL/L — SIGNIFICANT CHANGE UP (ref 22–31)
CREAT SERPL-MCNC: 1.48 MG/DL — HIGH (ref 0.5–1.3)
EGFR: 36 ML/MIN/1.73M2 — LOW
EGFR: 36 ML/MIN/1.73M2 — LOW
EOSINOPHIL # BLD AUTO: 0.33 K/UL — SIGNIFICANT CHANGE UP (ref 0–0.5)
EOSINOPHIL NFR BLD AUTO: 3.5 % — SIGNIFICANT CHANGE UP (ref 0–6)
GLUCOSE BLDC GLUCOMTR-MCNC: 140 MG/DL — HIGH (ref 70–99)
GLUCOSE BLDC GLUCOMTR-MCNC: 201 MG/DL — HIGH (ref 70–99)
GLUCOSE BLDC GLUCOMTR-MCNC: 217 MG/DL — HIGH (ref 70–99)
GLUCOSE BLDC GLUCOMTR-MCNC: 286 MG/DL — HIGH (ref 70–99)
GLUCOSE SERPL-MCNC: 166 MG/DL — HIGH (ref 70–99)
HCT VFR BLD CALC: 31.3 % — LOW (ref 34.5–45)
HGB BLD-MCNC: 9.3 G/DL — LOW (ref 11.5–15.5)
IMM GRANULOCYTES # BLD AUTO: 0.05 K/UL — SIGNIFICANT CHANGE UP (ref 0–0.07)
IMM GRANULOCYTES NFR BLD AUTO: 0.5 % — SIGNIFICANT CHANGE UP (ref 0–0.9)
LYMPHOCYTES # BLD AUTO: 1.46 K/UL — SIGNIFICANT CHANGE UP (ref 1–3.3)
LYMPHOCYTES NFR BLD AUTO: 15.5 % — SIGNIFICANT CHANGE UP (ref 13–44)
MAGNESIUM SERPL-MCNC: 2 MG/DL — SIGNIFICANT CHANGE UP (ref 1.6–2.6)
MCHC RBC-ENTMCNC: 25.7 PG — LOW (ref 27–34)
MCHC RBC-ENTMCNC: 29.7 G/DL — LOW (ref 32–36)
MCV RBC AUTO: 86.5 FL — SIGNIFICANT CHANGE UP (ref 80–100)
MONOCYTES # BLD AUTO: 0.53 K/UL — SIGNIFICANT CHANGE UP (ref 0–0.9)
MONOCYTES NFR BLD AUTO: 5.6 % — SIGNIFICANT CHANGE UP (ref 2–14)
NEUTROPHILS # BLD AUTO: 7.01 K/UL — SIGNIFICANT CHANGE UP (ref 1.8–7.4)
NEUTROPHILS NFR BLD AUTO: 74.5 % — SIGNIFICANT CHANGE UP (ref 43–77)
NRBC # BLD AUTO: 0 K/UL — SIGNIFICANT CHANGE UP (ref 0–0)
NRBC # FLD: 0 K/UL — SIGNIFICANT CHANGE UP (ref 0–0)
NRBC BLD AUTO-RTO: 0 /100 WBCS — SIGNIFICANT CHANGE UP (ref 0–0)
PHOSPHATE SERPL-MCNC: 4.1 MG/DL — SIGNIFICANT CHANGE UP (ref 2.5–4.5)
PLATELET # BLD AUTO: 306 K/UL — SIGNIFICANT CHANGE UP (ref 150–400)
PMV BLD: 9.3 FL — SIGNIFICANT CHANGE UP (ref 7–13)
POTASSIUM SERPL-MCNC: 3.4 MMOL/L — LOW (ref 3.5–5.3)
POTASSIUM SERPL-SCNC: 3.4 MMOL/L — LOW (ref 3.5–5.3)
PROT SERPL-MCNC: 6.7 G/DL — SIGNIFICANT CHANGE UP (ref 6–8.3)
RAPID RVP RESULT: SIGNIFICANT CHANGE UP
RBC # BLD: 3.62 M/UL — LOW (ref 3.8–5.2)
RBC # FLD: 18.4 % — HIGH (ref 10.3–14.5)
SARS-COV-2 RNA SPEC QL NAA+PROBE: SIGNIFICANT CHANGE UP
SODIUM SERPL-SCNC: 139 MMOL/L — SIGNIFICANT CHANGE UP (ref 135–145)
WBC # BLD: 9.42 K/UL — SIGNIFICANT CHANGE UP (ref 3.8–10.5)
WBC # FLD AUTO: 9.42 K/UL — SIGNIFICANT CHANGE UP (ref 3.8–10.5)

## 2025-08-08 PROCEDURE — 99232 SBSQ HOSP IP/OBS MODERATE 35: CPT | Mod: GC

## 2025-08-08 PROCEDURE — 99233 SBSQ HOSP IP/OBS HIGH 50: CPT

## 2025-08-08 PROCEDURE — G0545: CPT

## 2025-08-08 PROCEDURE — 99232 SBSQ HOSP IP/OBS MODERATE 35: CPT

## 2025-08-08 RX ORDER — BUMETANIDE 1 MG/1
2 TABLET ORAL
Refills: 0 | Status: DISCONTINUED | OUTPATIENT
Start: 2025-08-08 | End: 2025-08-08

## 2025-08-08 RX ORDER — BUMETANIDE 1 MG/1
1 TABLET ORAL
Refills: 0 | Status: DISCONTINUED | OUTPATIENT
Start: 2025-08-08 | End: 2025-08-10

## 2025-08-08 RX ADMIN — INSULIN LISPRO 3: 100 INJECTION, SOLUTION INTRAVENOUS; SUBCUTANEOUS at 13:02

## 2025-08-08 RX ADMIN — Medication 5 MILLIGRAM(S): at 21:47

## 2025-08-08 RX ADMIN — ATORVASTATIN CALCIUM 40 MILLIGRAM(S): 80 TABLET, FILM COATED ORAL at 21:48

## 2025-08-08 RX ADMIN — FUROSEMIDE 40 MILLIGRAM(S): 10 INJECTION INTRAMUSCULAR; INTRAVENOUS at 06:53

## 2025-08-08 RX ADMIN — INSULIN LISPRO 13 UNIT(S): 100 INJECTION, SOLUTION INTRAVENOUS; SUBCUTANEOUS at 13:02

## 2025-08-08 RX ADMIN — BUMETANIDE 1 MILLIGRAM(S): 1 TABLET ORAL at 19:00

## 2025-08-08 RX ADMIN — APIXABAN 5 MILLIGRAM(S): 2.5 TABLET, FILM COATED ORAL at 06:53

## 2025-08-08 RX ADMIN — GABAPENTIN 300 MILLIGRAM(S): 400 CAPSULE ORAL at 06:54

## 2025-08-08 RX ADMIN — INSULIN GLARGINE-YFGN 27 UNIT(S): 100 INJECTION, SOLUTION SUBCUTANEOUS at 21:49

## 2025-08-08 RX ADMIN — OXYCODONE HYDROCHLORIDE 5 MILLIGRAM(S): 30 TABLET ORAL at 00:54

## 2025-08-08 RX ADMIN — OXYCODONE HYDROCHLORIDE 5 MILLIGRAM(S): 30 TABLET ORAL at 23:29

## 2025-08-08 RX ADMIN — APIXABAN 5 MILLIGRAM(S): 2.5 TABLET, FILM COATED ORAL at 19:02

## 2025-08-08 RX ADMIN — POLYETHYLENE GLYCOL 3350 17 GRAM(S): 17 POWDER, FOR SOLUTION ORAL at 23:42

## 2025-08-08 RX ADMIN — INSULIN LISPRO 2: 100 INJECTION, SOLUTION INTRAVENOUS; SUBCUTANEOUS at 09:08

## 2025-08-08 RX ADMIN — AMIODARONE HYDROCHLORIDE 200 MILLIGRAM(S): 50 INJECTION, SOLUTION INTRAVENOUS at 06:53

## 2025-08-08 RX ADMIN — Medication 1 DOSE(S): at 06:54

## 2025-08-08 RX ADMIN — NYSTATIN 1 APPLICATION(S): 100000 CREAM TOPICAL at 22:10

## 2025-08-08 RX ADMIN — Medication 1 TABLET(S): at 13:19

## 2025-08-08 RX ADMIN — MUPIROCIN CALCIUM 1 APPLICATION(S): 20 CREAM TOPICAL at 18:00

## 2025-08-08 RX ADMIN — Medication 81 MILLIGRAM(S): at 13:19

## 2025-08-08 RX ADMIN — Medication 2 TABLET(S): at 23:42

## 2025-08-08 RX ADMIN — Medication 20 MILLIEQUIVALENT(S): at 19:01

## 2025-08-08 RX ADMIN — Medication 200 MICROGRAM(S): at 06:53

## 2025-08-08 RX ADMIN — Medication 1 DOSE(S): at 18:55

## 2025-08-08 RX ADMIN — GABAPENTIN 300 MILLIGRAM(S): 400 CAPSULE ORAL at 21:47

## 2025-08-08 RX ADMIN — INSULIN LISPRO 13 UNIT(S): 100 INJECTION, SOLUTION INTRAVENOUS; SUBCUTANEOUS at 09:07

## 2025-08-08 RX ADMIN — OXYCODONE HYDROCHLORIDE 5 MILLIGRAM(S): 30 TABLET ORAL at 00:24

## 2025-08-08 RX ADMIN — GABAPENTIN 300 MILLIGRAM(S): 400 CAPSULE ORAL at 15:33

## 2025-08-08 RX ADMIN — MUPIROCIN CALCIUM 1 APPLICATION(S): 20 CREAM TOPICAL at 06:53

## 2025-08-08 RX ADMIN — NYSTATIN 1 APPLICATION(S): 100000 CREAM TOPICAL at 09:11

## 2025-08-08 RX ADMIN — Medication 40 MILLIGRAM(S): at 06:53

## 2025-08-08 RX ADMIN — INSULIN LISPRO 13 UNIT(S): 100 INJECTION, SOLUTION INTRAVENOUS; SUBCUTANEOUS at 17:40

## 2025-08-08 RX ADMIN — Medication 2 MILLIGRAM(S): at 21:47

## 2025-08-09 LAB
ALBUMIN SERPL ELPH-MCNC: 3.1 G/DL — LOW (ref 3.3–5)
ALP SERPL-CCNC: 61 U/L — SIGNIFICANT CHANGE UP (ref 40–120)
ALT FLD-CCNC: 9 U/L — LOW (ref 10–45)
ANION GAP SERPL CALC-SCNC: 11 MMOL/L — SIGNIFICANT CHANGE UP (ref 5–17)
AST SERPL-CCNC: 7 U/L — LOW (ref 10–40)
BASOPHILS # BLD AUTO: 0.05 K/UL — SIGNIFICANT CHANGE UP (ref 0–0.2)
BASOPHILS NFR BLD AUTO: 0.6 % — SIGNIFICANT CHANGE UP (ref 0–2)
BILIRUB SERPL-MCNC: 0.5 MG/DL — SIGNIFICANT CHANGE UP (ref 0.2–1.2)
BUN SERPL-MCNC: 33 MG/DL — HIGH (ref 7–23)
CALCIUM SERPL-MCNC: 8.5 MG/DL — SIGNIFICANT CHANGE UP (ref 8.4–10.5)
CHLORIDE SERPL-SCNC: 99 MMOL/L — SIGNIFICANT CHANGE UP (ref 96–108)
CO2 SERPL-SCNC: 28 MMOL/L — SIGNIFICANT CHANGE UP (ref 22–31)
CREAT SERPL-MCNC: 1.34 MG/DL — HIGH (ref 0.5–1.3)
EGFR: 41 ML/MIN/1.73M2 — LOW
EGFR: 41 ML/MIN/1.73M2 — LOW
EOSINOPHIL # BLD AUTO: 0.37 K/UL — SIGNIFICANT CHANGE UP (ref 0–0.5)
EOSINOPHIL NFR BLD AUTO: 4.7 % — SIGNIFICANT CHANGE UP (ref 0–6)
GLUCOSE BLDC GLUCOMTR-MCNC: 149 MG/DL — HIGH (ref 70–99)
GLUCOSE BLDC GLUCOMTR-MCNC: 155 MG/DL — HIGH (ref 70–99)
GLUCOSE BLDC GLUCOMTR-MCNC: 188 MG/DL — HIGH (ref 70–99)
GLUCOSE BLDC GLUCOMTR-MCNC: 194 MG/DL — HIGH (ref 70–99)
GLUCOSE BLDC GLUCOMTR-MCNC: 551 MG/DL — CRITICAL HIGH (ref 70–99)
GLUCOSE SERPL-MCNC: 162 MG/DL — HIGH (ref 70–99)
HCT VFR BLD CALC: 27.7 % — LOW (ref 34.5–45)
HGB BLD-MCNC: 8 G/DL — LOW (ref 11.5–15.5)
IMM GRANULOCYTES # BLD AUTO: 0.03 K/UL — SIGNIFICANT CHANGE UP (ref 0–0.07)
IMM GRANULOCYTES NFR BLD AUTO: 0.4 % — SIGNIFICANT CHANGE UP (ref 0–0.9)
LEGIONELLA AG UR QL: NEGATIVE — SIGNIFICANT CHANGE UP
LYMPHOCYTES # BLD AUTO: 1.23 K/UL — SIGNIFICANT CHANGE UP (ref 1–3.3)
LYMPHOCYTES NFR BLD AUTO: 15.6 % — SIGNIFICANT CHANGE UP (ref 13–44)
MAGNESIUM SERPL-MCNC: 2.1 MG/DL — SIGNIFICANT CHANGE UP (ref 1.6–2.6)
MCHC RBC-ENTMCNC: 25 PG — LOW (ref 27–34)
MCHC RBC-ENTMCNC: 28.9 G/DL — LOW (ref 32–36)
MCV RBC AUTO: 86.6 FL — SIGNIFICANT CHANGE UP (ref 80–100)
MONOCYTES # BLD AUTO: 0.51 K/UL — SIGNIFICANT CHANGE UP (ref 0–0.9)
MONOCYTES NFR BLD AUTO: 6.5 % — SIGNIFICANT CHANGE UP (ref 2–14)
NEUTROPHILS # BLD AUTO: 5.68 K/UL — SIGNIFICANT CHANGE UP (ref 1.8–7.4)
NEUTROPHILS NFR BLD AUTO: 72.2 % — SIGNIFICANT CHANGE UP (ref 43–77)
NRBC # BLD AUTO: 0 K/UL — SIGNIFICANT CHANGE UP (ref 0–0)
NRBC # FLD: 0 K/UL — SIGNIFICANT CHANGE UP (ref 0–0)
NRBC BLD AUTO-RTO: 0 /100 WBCS — SIGNIFICANT CHANGE UP (ref 0–0)
PHOSPHATE SERPL-MCNC: 4 MG/DL — SIGNIFICANT CHANGE UP (ref 2.5–4.5)
PLATELET # BLD AUTO: 277 K/UL — SIGNIFICANT CHANGE UP (ref 150–400)
PMV BLD: 9.9 FL — SIGNIFICANT CHANGE UP (ref 7–13)
POTASSIUM SERPL-MCNC: 3.8 MMOL/L — SIGNIFICANT CHANGE UP (ref 3.5–5.3)
POTASSIUM SERPL-SCNC: 3.8 MMOL/L — SIGNIFICANT CHANGE UP (ref 3.5–5.3)
PROT SERPL-MCNC: 6.1 G/DL — SIGNIFICANT CHANGE UP (ref 6–8.3)
RBC # BLD: 3.2 M/UL — LOW (ref 3.8–5.2)
RBC # FLD: 18.6 % — HIGH (ref 10.3–14.5)
S PNEUM AG UR QL: NEGATIVE — SIGNIFICANT CHANGE UP
SODIUM SERPL-SCNC: 138 MMOL/L — SIGNIFICANT CHANGE UP (ref 135–145)
WBC # BLD: 7.87 K/UL — SIGNIFICANT CHANGE UP (ref 3.8–10.5)
WBC # FLD AUTO: 7.87 K/UL — SIGNIFICANT CHANGE UP (ref 3.8–10.5)

## 2025-08-09 PROCEDURE — 99232 SBSQ HOSP IP/OBS MODERATE 35: CPT | Mod: FS

## 2025-08-09 RX ADMIN — INSULIN LISPRO 13 UNIT(S): 100 INJECTION, SOLUTION INTRAVENOUS; SUBCUTANEOUS at 18:02

## 2025-08-09 RX ADMIN — APIXABAN 5 MILLIGRAM(S): 2.5 TABLET, FILM COATED ORAL at 06:03

## 2025-08-09 RX ADMIN — Medication 40 MILLIGRAM(S): at 06:03

## 2025-08-09 RX ADMIN — MUPIROCIN CALCIUM 1 APPLICATION(S): 20 CREAM TOPICAL at 18:03

## 2025-08-09 RX ADMIN — BUMETANIDE 1 MILLIGRAM(S): 1 TABLET ORAL at 06:03

## 2025-08-09 RX ADMIN — AMLODIPINE BESYLATE 5 MILLIGRAM(S): 10 TABLET ORAL at 06:03

## 2025-08-09 RX ADMIN — INSULIN LISPRO 1: 100 INJECTION, SOLUTION INTRAVENOUS; SUBCUTANEOUS at 09:12

## 2025-08-09 RX ADMIN — GABAPENTIN 300 MILLIGRAM(S): 400 CAPSULE ORAL at 21:54

## 2025-08-09 RX ADMIN — GABAPENTIN 300 MILLIGRAM(S): 400 CAPSULE ORAL at 14:41

## 2025-08-09 RX ADMIN — APIXABAN 5 MILLIGRAM(S): 2.5 TABLET, FILM COATED ORAL at 18:03

## 2025-08-09 RX ADMIN — NYSTATIN 1 APPLICATION(S): 100000 CREAM TOPICAL at 12:35

## 2025-08-09 RX ADMIN — OXYCODONE HYDROCHLORIDE 5 MILLIGRAM(S): 30 TABLET ORAL at 00:00

## 2025-08-09 RX ADMIN — Medication 200 MICROGRAM(S): at 06:04

## 2025-08-09 RX ADMIN — INSULIN LISPRO 13 UNIT(S): 100 INJECTION, SOLUTION INTRAVENOUS; SUBCUTANEOUS at 12:34

## 2025-08-09 RX ADMIN — Medication 650 MILLIGRAM(S): at 18:16

## 2025-08-09 RX ADMIN — GABAPENTIN 300 MILLIGRAM(S): 400 CAPSULE ORAL at 06:03

## 2025-08-09 RX ADMIN — BUMETANIDE 1 MILLIGRAM(S): 1 TABLET ORAL at 14:40

## 2025-08-09 RX ADMIN — Medication 1 DOSE(S): at 06:07

## 2025-08-09 RX ADMIN — POLYETHYLENE GLYCOL 3350 17 GRAM(S): 17 POWDER, FOR SOLUTION ORAL at 21:53

## 2025-08-09 RX ADMIN — INSULIN LISPRO 1: 100 INJECTION, SOLUTION INTRAVENOUS; SUBCUTANEOUS at 12:34

## 2025-08-09 RX ADMIN — INSULIN GLARGINE-YFGN 27 UNIT(S): 100 INJECTION, SOLUTION SUBCUTANEOUS at 21:55

## 2025-08-09 RX ADMIN — Medication 81 MILLIGRAM(S): at 12:36

## 2025-08-09 RX ADMIN — AMIODARONE HYDROCHLORIDE 200 MILLIGRAM(S): 50 INJECTION, SOLUTION INTRAVENOUS at 06:03

## 2025-08-09 RX ADMIN — MUPIROCIN CALCIUM 1 APPLICATION(S): 20 CREAM TOPICAL at 06:07

## 2025-08-09 RX ADMIN — NYSTATIN 1 APPLICATION(S): 100000 CREAM TOPICAL at 21:55

## 2025-08-09 RX ADMIN — OXYCODONE HYDROCHLORIDE 5 MILLIGRAM(S): 30 TABLET ORAL at 23:34

## 2025-08-09 RX ADMIN — Medication 1 TABLET(S): at 12:33

## 2025-08-09 RX ADMIN — Medication 2 MILLIGRAM(S): at 21:54

## 2025-08-09 RX ADMIN — INSULIN LISPRO 13 UNIT(S): 100 INJECTION, SOLUTION INTRAVENOUS; SUBCUTANEOUS at 09:09

## 2025-08-09 RX ADMIN — Medication 5 MILLIGRAM(S): at 21:54

## 2025-08-09 RX ADMIN — ATORVASTATIN CALCIUM 40 MILLIGRAM(S): 80 TABLET, FILM COATED ORAL at 21:54

## 2025-08-09 RX ADMIN — Medication 2 TABLET(S): at 21:54

## 2025-08-09 RX ADMIN — Medication 1 DOSE(S): at 18:03

## 2025-08-09 RX ADMIN — Medication 2 PUFF(S): at 06:06

## 2025-08-10 LAB
ALBUMIN SERPL ELPH-MCNC: 2.9 G/DL — LOW (ref 3.3–5)
ALP SERPL-CCNC: 63 U/L — SIGNIFICANT CHANGE UP (ref 40–120)
ALT FLD-CCNC: 9 U/L — LOW (ref 10–45)
ANION GAP SERPL CALC-SCNC: 11 MMOL/L — SIGNIFICANT CHANGE UP (ref 5–17)
AST SERPL-CCNC: 11 U/L — SIGNIFICANT CHANGE UP (ref 10–40)
BASOPHILS # BLD AUTO: 0.05 K/UL — SIGNIFICANT CHANGE UP (ref 0–0.2)
BASOPHILS NFR BLD AUTO: 0.6 % — SIGNIFICANT CHANGE UP (ref 0–2)
BILIRUB SERPL-MCNC: 0.4 MG/DL — SIGNIFICANT CHANGE UP (ref 0.2–1.2)
BUN SERPL-MCNC: 30 MG/DL — HIGH (ref 7–23)
CALCIUM SERPL-MCNC: 8.5 MG/DL — SIGNIFICANT CHANGE UP (ref 8.4–10.5)
CHLORIDE SERPL-SCNC: 101 MMOL/L — SIGNIFICANT CHANGE UP (ref 96–108)
CO2 SERPL-SCNC: 30 MMOL/L — SIGNIFICANT CHANGE UP (ref 22–31)
CREAT SERPL-MCNC: 1.36 MG/DL — HIGH (ref 0.5–1.3)
EGFR: 40 ML/MIN/1.73M2 — LOW
EGFR: 40 ML/MIN/1.73M2 — LOW
EOSINOPHIL # BLD AUTO: 0.45 K/UL — SIGNIFICANT CHANGE UP (ref 0–0.5)
EOSINOPHIL NFR BLD AUTO: 5.6 % — SIGNIFICANT CHANGE UP (ref 0–6)
GAS PNL BLDA: SIGNIFICANT CHANGE UP
GLUCOSE BLDC GLUCOMTR-MCNC: 159 MG/DL — HIGH (ref 70–99)
GLUCOSE BLDC GLUCOMTR-MCNC: 172 MG/DL — HIGH (ref 70–99)
GLUCOSE BLDC GLUCOMTR-MCNC: 196 MG/DL — HIGH (ref 70–99)
GLUCOSE BLDC GLUCOMTR-MCNC: 208 MG/DL — HIGH (ref 70–99)
GLUCOSE SERPL-MCNC: 135 MG/DL — HIGH (ref 70–99)
HCT VFR BLD CALC: 27.1 % — LOW (ref 34.5–45)
HGB BLD-MCNC: 7.8 G/DL — LOW (ref 11.5–15.5)
IMM GRANULOCYTES # BLD AUTO: 0.04 K/UL — SIGNIFICANT CHANGE UP (ref 0–0.07)
IMM GRANULOCYTES NFR BLD AUTO: 0.5 % — SIGNIFICANT CHANGE UP (ref 0–0.9)
LYMPHOCYTES # BLD AUTO: 1.22 K/UL — SIGNIFICANT CHANGE UP (ref 1–3.3)
LYMPHOCYTES NFR BLD AUTO: 15.3 % — SIGNIFICANT CHANGE UP (ref 13–44)
MAGNESIUM SERPL-MCNC: 2.1 MG/DL — SIGNIFICANT CHANGE UP (ref 1.6–2.6)
MCHC RBC-ENTMCNC: 25.2 PG — LOW (ref 27–34)
MCHC RBC-ENTMCNC: 28.8 G/DL — LOW (ref 32–36)
MCV RBC AUTO: 87.7 FL — SIGNIFICANT CHANGE UP (ref 80–100)
MONOCYTES # BLD AUTO: 0.47 K/UL — SIGNIFICANT CHANGE UP (ref 0–0.9)
MONOCYTES NFR BLD AUTO: 5.9 % — SIGNIFICANT CHANGE UP (ref 2–14)
NEUTROPHILS # BLD AUTO: 5.76 K/UL — SIGNIFICANT CHANGE UP (ref 1.8–7.4)
NEUTROPHILS NFR BLD AUTO: 72.1 % — SIGNIFICANT CHANGE UP (ref 43–77)
NRBC # BLD AUTO: 0 K/UL — SIGNIFICANT CHANGE UP (ref 0–0)
NRBC # FLD: 0 K/UL — SIGNIFICANT CHANGE UP (ref 0–0)
NRBC BLD AUTO-RTO: 0 /100 WBCS — SIGNIFICANT CHANGE UP (ref 0–0)
PHOSPHATE SERPL-MCNC: 3.2 MG/DL — SIGNIFICANT CHANGE UP (ref 2.5–4.5)
PLATELET # BLD AUTO: 286 K/UL — SIGNIFICANT CHANGE UP (ref 150–400)
PMV BLD: 9.8 FL — SIGNIFICANT CHANGE UP (ref 7–13)
POTASSIUM SERPL-MCNC: 3.7 MMOL/L — SIGNIFICANT CHANGE UP (ref 3.5–5.3)
POTASSIUM SERPL-SCNC: 3.7 MMOL/L — SIGNIFICANT CHANGE UP (ref 3.5–5.3)
PROT SERPL-MCNC: 6.3 G/DL — SIGNIFICANT CHANGE UP (ref 6–8.3)
RBC # BLD: 3.09 M/UL — LOW (ref 3.8–5.2)
RBC # FLD: 18.3 % — HIGH (ref 10.3–14.5)
SODIUM SERPL-SCNC: 142 MMOL/L — SIGNIFICANT CHANGE UP (ref 135–145)
WBC # BLD: 7.99 K/UL — SIGNIFICANT CHANGE UP (ref 3.8–10.5)
WBC # FLD AUTO: 7.99 K/UL — SIGNIFICANT CHANGE UP (ref 3.8–10.5)

## 2025-08-10 PROCEDURE — 71045 X-RAY EXAM CHEST 1 VIEW: CPT | Mod: 26

## 2025-08-10 PROCEDURE — 99232 SBSQ HOSP IP/OBS MODERATE 35: CPT | Mod: FS

## 2025-08-10 RX ORDER — ALPRAZOLAM 0.5 MG
2 TABLET, EXTENDED RELEASE 24 HR ORAL AT BEDTIME
Refills: 0 | Status: DISCONTINUED | OUTPATIENT
Start: 2025-08-10 | End: 2025-08-17

## 2025-08-10 RX ORDER — OXYCODONE HYDROCHLORIDE 30 MG/1
2.5 TABLET ORAL EVERY 4 HOURS
Refills: 0 | Status: DISCONTINUED | OUTPATIENT
Start: 2025-08-10 | End: 2025-08-12

## 2025-08-10 RX ORDER — BUMETANIDE 1 MG/1
2 TABLET ORAL
Refills: 0 | Status: DISCONTINUED | OUTPATIENT
Start: 2025-08-10 | End: 2025-08-11

## 2025-08-10 RX ORDER — OXYCODONE HYDROCHLORIDE 30 MG/1
2.5 TABLET ORAL EVERY 4 HOURS
Refills: 0 | Status: DISCONTINUED | OUTPATIENT
Start: 2025-08-10 | End: 2025-08-14

## 2025-08-10 RX ORDER — IPRATROPIUM BROMIDE AND ALBUTEROL SULFATE .5; 2.5 MG/3ML; MG/3ML
3 SOLUTION RESPIRATORY (INHALATION) ONCE
Refills: 0 | Status: COMPLETED | OUTPATIENT
Start: 2025-08-10 | End: 2025-08-10

## 2025-08-10 RX ORDER — BUMETANIDE 1 MG/1
1 TABLET ORAL ONCE
Refills: 0 | Status: COMPLETED | OUTPATIENT
Start: 2025-08-10 | End: 2025-08-10

## 2025-08-10 RX ADMIN — Medication 2 TABLET(S): at 22:05

## 2025-08-10 RX ADMIN — Medication 2 PUFF(S): at 21:49

## 2025-08-10 RX ADMIN — Medication 650 MILLIGRAM(S): at 18:06

## 2025-08-10 RX ADMIN — ATORVASTATIN CALCIUM 40 MILLIGRAM(S): 80 TABLET, FILM COATED ORAL at 22:05

## 2025-08-10 RX ADMIN — INSULIN GLARGINE-YFGN 27 UNIT(S): 100 INJECTION, SOLUTION SUBCUTANEOUS at 22:07

## 2025-08-10 RX ADMIN — OXYCODONE HYDROCHLORIDE 2.5 MILLIGRAM(S): 30 TABLET ORAL at 11:15

## 2025-08-10 RX ADMIN — INSULIN LISPRO 13 UNIT(S): 100 INJECTION, SOLUTION INTRAVENOUS; SUBCUTANEOUS at 08:28

## 2025-08-10 RX ADMIN — APIXABAN 5 MILLIGRAM(S): 2.5 TABLET, FILM COATED ORAL at 06:01

## 2025-08-10 RX ADMIN — APIXABAN 5 MILLIGRAM(S): 2.5 TABLET, FILM COATED ORAL at 17:06

## 2025-08-10 RX ADMIN — BUMETANIDE 1 MILLIGRAM(S): 1 TABLET ORAL at 13:17

## 2025-08-10 RX ADMIN — BUMETANIDE 1 MILLIGRAM(S): 1 TABLET ORAL at 06:00

## 2025-08-10 RX ADMIN — Medication 650 MILLIGRAM(S): at 08:30

## 2025-08-10 RX ADMIN — Medication 650 MILLIGRAM(S): at 17:38

## 2025-08-10 RX ADMIN — GABAPENTIN 300 MILLIGRAM(S): 400 CAPSULE ORAL at 13:17

## 2025-08-10 RX ADMIN — GABAPENTIN 300 MILLIGRAM(S): 400 CAPSULE ORAL at 22:05

## 2025-08-10 RX ADMIN — AMLODIPINE BESYLATE 5 MILLIGRAM(S): 10 TABLET ORAL at 06:01

## 2025-08-10 RX ADMIN — OXYCODONE HYDROCHLORIDE 2.5 MILLIGRAM(S): 30 TABLET ORAL at 10:50

## 2025-08-10 RX ADMIN — Medication 81 MILLIGRAM(S): at 11:32

## 2025-08-10 RX ADMIN — INSULIN LISPRO 1: 100 INJECTION, SOLUTION INTRAVENOUS; SUBCUTANEOUS at 17:37

## 2025-08-10 RX ADMIN — INSULIN LISPRO 13 UNIT(S): 100 INJECTION, SOLUTION INTRAVENOUS; SUBCUTANEOUS at 12:47

## 2025-08-10 RX ADMIN — IPRATROPIUM BROMIDE AND ALBUTEROL SULFATE 3 MILLILITER(S): .5; 2.5 SOLUTION RESPIRATORY (INHALATION) at 15:23

## 2025-08-10 RX ADMIN — Medication 100 MICROGRAM(S): at 06:02

## 2025-08-10 RX ADMIN — INSULIN LISPRO 13 UNIT(S): 100 INJECTION, SOLUTION INTRAVENOUS; SUBCUTANEOUS at 17:39

## 2025-08-10 RX ADMIN — POLYETHYLENE GLYCOL 3350 17 GRAM(S): 17 POWDER, FOR SOLUTION ORAL at 22:05

## 2025-08-10 RX ADMIN — INSULIN LISPRO 1: 100 INJECTION, SOLUTION INTRAVENOUS; SUBCUTANEOUS at 12:47

## 2025-08-10 RX ADMIN — GABAPENTIN 300 MILLIGRAM(S): 400 CAPSULE ORAL at 06:00

## 2025-08-10 RX ADMIN — MUPIROCIN CALCIUM 1 APPLICATION(S): 20 CREAM TOPICAL at 06:01

## 2025-08-10 RX ADMIN — BUMETANIDE 2 MILLIGRAM(S): 1 TABLET ORAL at 17:37

## 2025-08-10 RX ADMIN — AMIODARONE HYDROCHLORIDE 200 MILLIGRAM(S): 50 INJECTION, SOLUTION INTRAVENOUS at 06:02

## 2025-08-10 RX ADMIN — BUMETANIDE 1 MILLIGRAM(S): 1 TABLET ORAL at 23:06

## 2025-08-10 RX ADMIN — Medication 5 MILLIGRAM(S): at 22:05

## 2025-08-10 RX ADMIN — NYSTATIN 1 APPLICATION(S): 100000 CREAM TOPICAL at 22:06

## 2025-08-10 RX ADMIN — INSULIN LISPRO 1: 100 INJECTION, SOLUTION INTRAVENOUS; SUBCUTANEOUS at 08:29

## 2025-08-10 RX ADMIN — MUPIROCIN CALCIUM 1 APPLICATION(S): 20 CREAM TOPICAL at 17:06

## 2025-08-10 RX ADMIN — Medication 2 PUFF(S): at 15:08

## 2025-08-10 RX ADMIN — Medication 40 MILLIGRAM(S): at 06:01

## 2025-08-10 RX ADMIN — NYSTATIN 1 APPLICATION(S): 100000 CREAM TOPICAL at 10:51

## 2025-08-10 RX ADMIN — Medication 650 MILLIGRAM(S): at 08:57

## 2025-08-10 RX ADMIN — Medication 1 DOSE(S): at 17:06

## 2025-08-10 RX ADMIN — Medication 1 TABLET(S): at 11:32

## 2025-08-10 RX ADMIN — Medication 1 DOSE(S): at 06:01

## 2025-08-11 LAB
ALBUMIN SERPL ELPH-MCNC: 2.8 G/DL — LOW (ref 3.3–5)
ALP SERPL-CCNC: 62 U/L — SIGNIFICANT CHANGE UP (ref 40–120)
ALT FLD-CCNC: 12 U/L — SIGNIFICANT CHANGE UP (ref 10–45)
ANION GAP SERPL CALC-SCNC: 11 MMOL/L — SIGNIFICANT CHANGE UP (ref 5–17)
AST SERPL-CCNC: 22 U/L — SIGNIFICANT CHANGE UP (ref 10–40)
BASOPHILS # BLD AUTO: 0.05 K/UL — SIGNIFICANT CHANGE UP (ref 0–0.2)
BASOPHILS NFR BLD AUTO: 0.7 % — SIGNIFICANT CHANGE UP (ref 0–2)
BILIRUB SERPL-MCNC: 0.5 MG/DL — SIGNIFICANT CHANGE UP (ref 0.2–1.2)
BLD GP AB SCN SERPL QL: NEGATIVE — SIGNIFICANT CHANGE UP
BUN SERPL-MCNC: 29 MG/DL — HIGH (ref 7–23)
CALCIUM SERPL-MCNC: 8.2 MG/DL — LOW (ref 8.4–10.5)
CHLORIDE SERPL-SCNC: 99 MMOL/L — SIGNIFICANT CHANGE UP (ref 96–108)
CO2 SERPL-SCNC: 32 MMOL/L — HIGH (ref 22–31)
CREAT SERPL-MCNC: 1.35 MG/DL — HIGH (ref 0.5–1.3)
EGFR: 40 ML/MIN/1.73M2 — LOW
EGFR: 40 ML/MIN/1.73M2 — LOW
EOSINOPHIL # BLD AUTO: 0.36 K/UL — SIGNIFICANT CHANGE UP (ref 0–0.5)
EOSINOPHIL NFR BLD AUTO: 4.8 % — SIGNIFICANT CHANGE UP (ref 0–6)
GLUCOSE BLDC GLUCOMTR-MCNC: 127 MG/DL — HIGH (ref 70–99)
GLUCOSE BLDC GLUCOMTR-MCNC: 178 MG/DL — HIGH (ref 70–99)
GLUCOSE BLDC GLUCOMTR-MCNC: 191 MG/DL — HIGH (ref 70–99)
GLUCOSE BLDC GLUCOMTR-MCNC: 204 MG/DL — HIGH (ref 70–99)
GLUCOSE SERPL-MCNC: 165 MG/DL — HIGH (ref 70–99)
HCT VFR BLD CALC: 28 % — LOW (ref 34.5–45)
HGB BLD-MCNC: 8.1 G/DL — LOW (ref 11.5–15.5)
IMM GRANULOCYTES # BLD AUTO: 0.05 K/UL — SIGNIFICANT CHANGE UP (ref 0–0.07)
IMM GRANULOCYTES NFR BLD AUTO: 0.7 % — SIGNIFICANT CHANGE UP (ref 0–0.9)
LYMPHOCYTES # BLD AUTO: 1.09 K/UL — SIGNIFICANT CHANGE UP (ref 1–3.3)
LYMPHOCYTES NFR BLD AUTO: 14.4 % — SIGNIFICANT CHANGE UP (ref 13–44)
MAGNESIUM SERPL-MCNC: 2 MG/DL — SIGNIFICANT CHANGE UP (ref 1.6–2.6)
MCHC RBC-ENTMCNC: 25.6 PG — LOW (ref 27–34)
MCHC RBC-ENTMCNC: 28.9 G/DL — LOW (ref 32–36)
MCV RBC AUTO: 88.3 FL — SIGNIFICANT CHANGE UP (ref 80–100)
MONOCYTES # BLD AUTO: 0.41 K/UL — SIGNIFICANT CHANGE UP (ref 0–0.9)
MONOCYTES NFR BLD AUTO: 5.4 % — SIGNIFICANT CHANGE UP (ref 2–14)
NEUTROPHILS # BLD AUTO: 5.59 K/UL — SIGNIFICANT CHANGE UP (ref 1.8–7.4)
NEUTROPHILS NFR BLD AUTO: 74 % — SIGNIFICANT CHANGE UP (ref 43–77)
NRBC # BLD AUTO: 0 K/UL — SIGNIFICANT CHANGE UP (ref 0–0)
NRBC # FLD: 0 K/UL — SIGNIFICANT CHANGE UP (ref 0–0)
NRBC BLD AUTO-RTO: 0 /100 WBCS — SIGNIFICANT CHANGE UP (ref 0–0)
PHOSPHATE SERPL-MCNC: 3.6 MG/DL — SIGNIFICANT CHANGE UP (ref 2.5–4.5)
PLATELET # BLD AUTO: 273 K/UL — SIGNIFICANT CHANGE UP (ref 150–400)
PMV BLD: 10.2 FL — SIGNIFICANT CHANGE UP (ref 7–13)
POTASSIUM SERPL-MCNC: 3.8 MMOL/L — SIGNIFICANT CHANGE UP (ref 3.5–5.3)
POTASSIUM SERPL-SCNC: 3.8 MMOL/L — SIGNIFICANT CHANGE UP (ref 3.5–5.3)
PROT SERPL-MCNC: 6.1 G/DL — SIGNIFICANT CHANGE UP (ref 6–8.3)
RBC # BLD: 3.17 M/UL — LOW (ref 3.8–5.2)
RBC # FLD: 18.2 % — HIGH (ref 10.3–14.5)
RH IG SCN BLD-IMP: POSITIVE — SIGNIFICANT CHANGE UP
SODIUM SERPL-SCNC: 142 MMOL/L — SIGNIFICANT CHANGE UP (ref 135–145)
WBC # BLD: 7.55 K/UL — SIGNIFICANT CHANGE UP (ref 3.8–10.5)
WBC # FLD AUTO: 7.55 K/UL — SIGNIFICANT CHANGE UP (ref 3.8–10.5)

## 2025-08-11 PROCEDURE — 84300 ASSAY OF URINE SODIUM: CPT

## 2025-08-11 PROCEDURE — 87186 SC STD MICRODIL/AGAR DIL: CPT

## 2025-08-11 PROCEDURE — 82435 ASSAY OF BLOOD CHLORIDE: CPT

## 2025-08-11 PROCEDURE — 84436 ASSAY OF TOTAL THYROXINE: CPT

## 2025-08-11 PROCEDURE — 83735 ASSAY OF MAGNESIUM: CPT

## 2025-08-11 PROCEDURE — 84156 ASSAY OF PROTEIN URINE: CPT

## 2025-08-11 PROCEDURE — 87205 SMEAR GRAM STAIN: CPT

## 2025-08-11 PROCEDURE — 83690 ASSAY OF LIPASE: CPT

## 2025-08-11 PROCEDURE — 0225U NFCT DS DNA&RNA 21 SARSCOV2: CPT

## 2025-08-11 PROCEDURE — 84484 ASSAY OF TROPONIN QUANT: CPT

## 2025-08-11 PROCEDURE — 83036 HEMOGLOBIN GLYCOSYLATED A1C: CPT

## 2025-08-11 PROCEDURE — 99233 SBSQ HOSP IP/OBS HIGH 50: CPT | Mod: GC

## 2025-08-11 PROCEDURE — 80202 ASSAY OF VANCOMYCIN: CPT

## 2025-08-11 PROCEDURE — 80053 COMPREHEN METABOLIC PANEL: CPT

## 2025-08-11 PROCEDURE — 94640 AIRWAY INHALATION TREATMENT: CPT

## 2025-08-11 PROCEDURE — P9011: CPT

## 2025-08-11 PROCEDURE — 83935 ASSAY OF URINE OSMOLALITY: CPT

## 2025-08-11 PROCEDURE — P9016: CPT

## 2025-08-11 PROCEDURE — 84132 ASSAY OF SERUM POTASSIUM: CPT

## 2025-08-11 PROCEDURE — 71045 X-RAY EXAM CHEST 1 VIEW: CPT

## 2025-08-11 PROCEDURE — 86140 C-REACTIVE PROTEIN: CPT

## 2025-08-11 PROCEDURE — 85014 HEMATOCRIT: CPT

## 2025-08-11 PROCEDURE — 85025 COMPLETE CBC W/AUTO DIFF WBC: CPT

## 2025-08-11 PROCEDURE — 80048 BASIC METABOLIC PNL TOTAL CA: CPT

## 2025-08-11 PROCEDURE — 82962 GLUCOSE BLOOD TEST: CPT

## 2025-08-11 PROCEDURE — 84295 ASSAY OF SERUM SODIUM: CPT

## 2025-08-11 PROCEDURE — 86985 SPLIT BLOOD OR PRODUCTS: CPT

## 2025-08-11 PROCEDURE — 86901 BLOOD TYPING SEROLOGIC RH(D): CPT

## 2025-08-11 PROCEDURE — 36415 COLL VENOUS BLD VENIPUNCTURE: CPT

## 2025-08-11 PROCEDURE — 73630 X-RAY EXAM OF FOOT: CPT

## 2025-08-11 PROCEDURE — 97110 THERAPEUTIC EXERCISES: CPT

## 2025-08-11 PROCEDURE — 73590 X-RAY EXAM OF LOWER LEG: CPT

## 2025-08-11 PROCEDURE — 85018 HEMOGLOBIN: CPT

## 2025-08-11 PROCEDURE — 84443 ASSAY THYROID STIM HORMONE: CPT

## 2025-08-11 PROCEDURE — 85652 RBC SED RATE AUTOMATED: CPT

## 2025-08-11 PROCEDURE — 81001 URINALYSIS AUTO W/SCOPE: CPT

## 2025-08-11 PROCEDURE — 84145 PROCALCITONIN (PCT): CPT

## 2025-08-11 PROCEDURE — 86900 BLOOD TYPING SEROLOGIC ABO: CPT

## 2025-08-11 PROCEDURE — 71275 CT ANGIOGRAPHY CHEST: CPT

## 2025-08-11 PROCEDURE — 99232 SBSQ HOSP IP/OBS MODERATE 35: CPT | Mod: GC

## 2025-08-11 PROCEDURE — 84100 ASSAY OF PHOSPHORUS: CPT

## 2025-08-11 PROCEDURE — 97162 PT EVAL MOD COMPLEX 30 MIN: CPT

## 2025-08-11 PROCEDURE — 86923 COMPATIBILITY TEST ELECTRIC: CPT

## 2025-08-11 PROCEDURE — 97530 THERAPEUTIC ACTIVITIES: CPT

## 2025-08-11 PROCEDURE — 73700 CT LOWER EXTREMITY W/O DYE: CPT

## 2025-08-11 PROCEDURE — 82570 ASSAY OF URINE CREATININE: CPT

## 2025-08-11 PROCEDURE — 93306 TTE W/DOPPLER COMPLETE: CPT

## 2025-08-11 PROCEDURE — 82947 ASSAY GLUCOSE BLOOD QUANT: CPT

## 2025-08-11 PROCEDURE — 87640 STAPH A DNA AMP PROBE: CPT

## 2025-08-11 PROCEDURE — 87899 AGENT NOS ASSAY W/OPTIC: CPT

## 2025-08-11 PROCEDURE — 36430 TRANSFUSION BLD/BLD COMPNT: CPT

## 2025-08-11 PROCEDURE — 83880 ASSAY OF NATRIURETIC PEPTIDE: CPT

## 2025-08-11 PROCEDURE — 83605 ASSAY OF LACTIC ACID: CPT

## 2025-08-11 PROCEDURE — 93970 EXTREMITY STUDY: CPT

## 2025-08-11 PROCEDURE — 84540 ASSAY OF URINE/UREA-N: CPT

## 2025-08-11 PROCEDURE — 86850 RBC ANTIBODY SCREEN: CPT

## 2025-08-11 PROCEDURE — 76770 US EXAM ABDO BACK WALL COMP: CPT

## 2025-08-11 PROCEDURE — 87641 MR-STAPH DNA AMP PROBE: CPT

## 2025-08-11 PROCEDURE — 82803 BLOOD GASES ANY COMBINATION: CPT

## 2025-08-11 PROCEDURE — 87070 CULTURE OTHR SPECIMN AEROBIC: CPT

## 2025-08-11 PROCEDURE — 93005 ELECTROCARDIOGRAM TRACING: CPT

## 2025-08-11 PROCEDURE — 93923 UPR/LXTR ART STDY 3+ LVLS: CPT

## 2025-08-11 PROCEDURE — 82330 ASSAY OF CALCIUM: CPT

## 2025-08-11 PROCEDURE — 87040 BLOOD CULTURE FOR BACTERIA: CPT

## 2025-08-11 PROCEDURE — 97116 GAIT TRAINING THERAPY: CPT

## 2025-08-11 PROCEDURE — 87637 SARSCOV2&INF A&B&RSV AMP PRB: CPT

## 2025-08-11 RX ORDER — BUMETANIDE 1 MG/1
1 TABLET ORAL
Refills: 0 | Status: DISCONTINUED | OUTPATIENT
Start: 2025-08-11 | End: 2025-08-11

## 2025-08-11 RX ORDER — BUMETANIDE 1 MG/1
2 TABLET ORAL
Refills: 0 | Status: DISCONTINUED | OUTPATIENT
Start: 2025-08-11 | End: 2025-08-11

## 2025-08-11 RX ORDER — BUMETANIDE 1 MG/1
2 TABLET ORAL
Refills: 0 | Status: DISCONTINUED | OUTPATIENT
Start: 2025-08-11 | End: 2025-08-12

## 2025-08-11 RX ORDER — WHITE PETROLATUM 1 G/G
1 OINTMENT TOPICAL DAILY
Refills: 0 | Status: DISCONTINUED | OUTPATIENT
Start: 2025-08-11 | End: 2025-09-04

## 2025-08-11 RX ADMIN — INSULIN LISPRO 13 UNIT(S): 100 INJECTION, SOLUTION INTRAVENOUS; SUBCUTANEOUS at 12:59

## 2025-08-11 RX ADMIN — MUPIROCIN CALCIUM 1 APPLICATION(S): 20 CREAM TOPICAL at 05:56

## 2025-08-11 RX ADMIN — INSULIN LISPRO 1: 100 INJECTION, SOLUTION INTRAVENOUS; SUBCUTANEOUS at 09:07

## 2025-08-11 RX ADMIN — GABAPENTIN 300 MILLIGRAM(S): 400 CAPSULE ORAL at 13:01

## 2025-08-11 RX ADMIN — GABAPENTIN 300 MILLIGRAM(S): 400 CAPSULE ORAL at 05:56

## 2025-08-11 RX ADMIN — ATORVASTATIN CALCIUM 40 MILLIGRAM(S): 80 TABLET, FILM COATED ORAL at 21:49

## 2025-08-11 RX ADMIN — GABAPENTIN 300 MILLIGRAM(S): 400 CAPSULE ORAL at 21:48

## 2025-08-11 RX ADMIN — OXYCODONE HYDROCHLORIDE 2.5 MILLIGRAM(S): 30 TABLET ORAL at 21:59

## 2025-08-11 RX ADMIN — INSULIN LISPRO 2: 100 INJECTION, SOLUTION INTRAVENOUS; SUBCUTANEOUS at 17:49

## 2025-08-11 RX ADMIN — Medication 2 MILLIGRAM(S): at 00:00

## 2025-08-11 RX ADMIN — APIXABAN 5 MILLIGRAM(S): 2.5 TABLET, FILM COATED ORAL at 17:48

## 2025-08-11 RX ADMIN — OXYCODONE HYDROCHLORIDE 2.5 MILLIGRAM(S): 30 TABLET ORAL at 01:00

## 2025-08-11 RX ADMIN — Medication 40 MILLIGRAM(S): at 05:55

## 2025-08-11 RX ADMIN — Medication 2 TABLET(S): at 21:48

## 2025-08-11 RX ADMIN — NYSTATIN 1 APPLICATION(S): 100000 CREAM TOPICAL at 09:09

## 2025-08-11 RX ADMIN — INSULIN LISPRO 1: 100 INJECTION, SOLUTION INTRAVENOUS; SUBCUTANEOUS at 12:59

## 2025-08-11 RX ADMIN — OXYCODONE HYDROCHLORIDE 2.5 MILLIGRAM(S): 30 TABLET ORAL at 00:01

## 2025-08-11 RX ADMIN — Medication 5 MILLIGRAM(S): at 21:49

## 2025-08-11 RX ADMIN — Medication 1 DOSE(S): at 05:54

## 2025-08-11 RX ADMIN — AMIODARONE HYDROCHLORIDE 200 MILLIGRAM(S): 50 INJECTION, SOLUTION INTRAVENOUS at 05:55

## 2025-08-11 RX ADMIN — Medication 81 MILLIGRAM(S): at 11:22

## 2025-08-11 RX ADMIN — OXYCODONE HYDROCHLORIDE 2.5 MILLIGRAM(S): 30 TABLET ORAL at 11:22

## 2025-08-11 RX ADMIN — APIXABAN 5 MILLIGRAM(S): 2.5 TABLET, FILM COATED ORAL at 05:55

## 2025-08-11 RX ADMIN — Medication 1 TABLET(S): at 11:22

## 2025-08-11 RX ADMIN — BUMETANIDE 2 MILLIGRAM(S): 1 TABLET ORAL at 05:54

## 2025-08-11 RX ADMIN — MUPIROCIN CALCIUM 1 APPLICATION(S): 20 CREAM TOPICAL at 17:40

## 2025-08-11 RX ADMIN — INSULIN GLARGINE-YFGN 27 UNIT(S): 100 INJECTION, SOLUTION SUBCUTANEOUS at 21:47

## 2025-08-11 RX ADMIN — BUMETANIDE 2 MILLIGRAM(S): 1 TABLET ORAL at 13:01

## 2025-08-11 RX ADMIN — INSULIN LISPRO 13 UNIT(S): 100 INJECTION, SOLUTION INTRAVENOUS; SUBCUTANEOUS at 09:07

## 2025-08-11 RX ADMIN — AMLODIPINE BESYLATE 5 MILLIGRAM(S): 10 TABLET ORAL at 05:56

## 2025-08-11 RX ADMIN — OXYCODONE HYDROCHLORIDE 2.5 MILLIGRAM(S): 30 TABLET ORAL at 12:00

## 2025-08-11 RX ADMIN — INSULIN LISPRO 13 UNIT(S): 100 INJECTION, SOLUTION INTRAVENOUS; SUBCUTANEOUS at 17:49

## 2025-08-11 RX ADMIN — Medication 1 DOSE(S): at 17:47

## 2025-08-11 RX ADMIN — NYSTATIN 1 APPLICATION(S): 100000 CREAM TOPICAL at 21:55

## 2025-08-11 RX ADMIN — Medication 200 MICROGRAM(S): at 05:55

## 2025-08-12 LAB
ALBUMIN SERPL ELPH-MCNC: 2.6 G/DL — LOW (ref 3.3–5)
ALP SERPL-CCNC: 61 U/L — SIGNIFICANT CHANGE UP (ref 40–120)
ALT FLD-CCNC: 9 U/L — LOW (ref 10–45)
ANION GAP SERPL CALC-SCNC: 13 MMOL/L — SIGNIFICANT CHANGE UP (ref 5–17)
AST SERPL-CCNC: 11 U/L — SIGNIFICANT CHANGE UP (ref 10–40)
BASOPHILS # BLD AUTO: 0.06 K/UL — SIGNIFICANT CHANGE UP (ref 0–0.2)
BASOPHILS NFR BLD AUTO: 0.7 % — SIGNIFICANT CHANGE UP (ref 0–2)
BILIRUB SERPL-MCNC: 0.5 MG/DL — SIGNIFICANT CHANGE UP (ref 0.2–1.2)
BUN SERPL-MCNC: 27 MG/DL — HIGH (ref 7–23)
CALCIUM SERPL-MCNC: 8.5 MG/DL — SIGNIFICANT CHANGE UP (ref 8.4–10.5)
CHLORIDE SERPL-SCNC: 97 MMOL/L — SIGNIFICANT CHANGE UP (ref 96–108)
CO2 SERPL-SCNC: 30 MMOL/L — SIGNIFICANT CHANGE UP (ref 22–31)
CREAT SERPL-MCNC: 1.39 MG/DL — HIGH (ref 0.5–1.3)
EGFR: 39 ML/MIN/1.73M2 — LOW
EGFR: 39 ML/MIN/1.73M2 — LOW
EOSINOPHIL # BLD AUTO: 0.42 K/UL — SIGNIFICANT CHANGE UP (ref 0–0.5)
EOSINOPHIL NFR BLD AUTO: 5.1 % — SIGNIFICANT CHANGE UP (ref 0–6)
GLUCOSE BLDC GLUCOMTR-MCNC: 117 MG/DL — HIGH (ref 70–99)
GLUCOSE BLDC GLUCOMTR-MCNC: 130 MG/DL — HIGH (ref 70–99)
GLUCOSE BLDC GLUCOMTR-MCNC: 166 MG/DL — HIGH (ref 70–99)
GLUCOSE BLDC GLUCOMTR-MCNC: 265 MG/DL — HIGH (ref 70–99)
GLUCOSE SERPL-MCNC: 108 MG/DL — HIGH (ref 70–99)
HCT VFR BLD CALC: 25.9 % — LOW (ref 34.5–45)
HGB BLD-MCNC: 7.4 G/DL — LOW (ref 11.5–15.5)
IMM GRANULOCYTES # BLD AUTO: 0.05 K/UL — SIGNIFICANT CHANGE UP (ref 0–0.07)
IMM GRANULOCYTES NFR BLD AUTO: 0.6 % — SIGNIFICANT CHANGE UP (ref 0–0.9)
LYMPHOCYTES # BLD AUTO: 1.51 K/UL — SIGNIFICANT CHANGE UP (ref 1–3.3)
LYMPHOCYTES NFR BLD AUTO: 18.5 % — SIGNIFICANT CHANGE UP (ref 13–44)
MAGNESIUM SERPL-MCNC: 2 MG/DL — SIGNIFICANT CHANGE UP (ref 1.6–2.6)
MCHC RBC-ENTMCNC: 25.5 PG — LOW (ref 27–34)
MCHC RBC-ENTMCNC: 28.6 G/DL — LOW (ref 32–36)
MCV RBC AUTO: 89.3 FL — SIGNIFICANT CHANGE UP (ref 80–100)
MONOCYTES # BLD AUTO: 0.62 K/UL — SIGNIFICANT CHANGE UP (ref 0–0.9)
MONOCYTES NFR BLD AUTO: 7.6 % — SIGNIFICANT CHANGE UP (ref 2–14)
NEUTROPHILS # BLD AUTO: 5.5 K/UL — SIGNIFICANT CHANGE UP (ref 1.8–7.4)
NEUTROPHILS NFR BLD AUTO: 67.5 % — SIGNIFICANT CHANGE UP (ref 43–77)
NRBC # BLD AUTO: 0 K/UL — SIGNIFICANT CHANGE UP (ref 0–0)
NRBC # FLD: 0 K/UL — SIGNIFICANT CHANGE UP (ref 0–0)
NRBC BLD AUTO-RTO: 0 /100 WBCS — SIGNIFICANT CHANGE UP (ref 0–0)
PHOSPHATE SERPL-MCNC: 3.7 MG/DL — SIGNIFICANT CHANGE UP (ref 2.5–4.5)
PLATELET # BLD AUTO: 279 K/UL — SIGNIFICANT CHANGE UP (ref 150–400)
PMV BLD: 9.9 FL — SIGNIFICANT CHANGE UP (ref 7–13)
POTASSIUM SERPL-MCNC: 3.6 MMOL/L — SIGNIFICANT CHANGE UP (ref 3.5–5.3)
POTASSIUM SERPL-SCNC: 3.6 MMOL/L — SIGNIFICANT CHANGE UP (ref 3.5–5.3)
PROT SERPL-MCNC: 6.1 G/DL — SIGNIFICANT CHANGE UP (ref 6–8.3)
RBC # BLD: 2.9 M/UL — LOW (ref 3.8–5.2)
RBC # FLD: 18.1 % — HIGH (ref 10.3–14.5)
SODIUM SERPL-SCNC: 140 MMOL/L — SIGNIFICANT CHANGE UP (ref 135–145)
WBC # BLD: 8.16 K/UL — SIGNIFICANT CHANGE UP (ref 3.8–10.5)
WBC # FLD AUTO: 8.16 K/UL — SIGNIFICANT CHANGE UP (ref 3.8–10.5)

## 2025-08-12 PROCEDURE — 99223 1ST HOSP IP/OBS HIGH 75: CPT

## 2025-08-12 PROCEDURE — 99232 SBSQ HOSP IP/OBS MODERATE 35: CPT | Mod: GC

## 2025-08-12 PROCEDURE — 99233 SBSQ HOSP IP/OBS HIGH 50: CPT | Mod: GC

## 2025-08-12 RX ORDER — BUMETANIDE 1 MG/1
2 TABLET ORAL
Refills: 0 | Status: DISCONTINUED | OUTPATIENT
Start: 2025-08-12 | End: 2025-08-14

## 2025-08-12 RX ORDER — BUMETANIDE 1 MG/1
1 TABLET ORAL
Refills: 0 | Status: DISCONTINUED | OUTPATIENT
Start: 2025-08-12 | End: 2025-08-12

## 2025-08-12 RX ORDER — OXYCODONE HYDROCHLORIDE 30 MG/1
2.5 TABLET ORAL EVERY 4 HOURS
Refills: 0 | Status: DISCONTINUED | OUTPATIENT
Start: 2025-08-12 | End: 2025-08-18

## 2025-08-12 RX ADMIN — Medication 5 MILLIGRAM(S): at 22:01

## 2025-08-12 RX ADMIN — INSULIN LISPRO 1: 100 INJECTION, SOLUTION INTRAVENOUS; SUBCUTANEOUS at 22:00

## 2025-08-12 RX ADMIN — MUPIROCIN CALCIUM 1 APPLICATION(S): 20 CREAM TOPICAL at 05:24

## 2025-08-12 RX ADMIN — ATORVASTATIN CALCIUM 40 MILLIGRAM(S): 80 TABLET, FILM COATED ORAL at 22:01

## 2025-08-12 RX ADMIN — Medication 2 MILLIGRAM(S): at 23:44

## 2025-08-12 RX ADMIN — BUMETANIDE 2 MILLIGRAM(S): 1 TABLET ORAL at 14:08

## 2025-08-12 RX ADMIN — Medication 200 MICROGRAM(S): at 05:24

## 2025-08-12 RX ADMIN — Medication 40 MILLIGRAM(S): at 05:24

## 2025-08-12 RX ADMIN — NYSTATIN 1 APPLICATION(S): 100000 CREAM TOPICAL at 22:04

## 2025-08-12 RX ADMIN — APIXABAN 5 MILLIGRAM(S): 2.5 TABLET, FILM COATED ORAL at 05:29

## 2025-08-12 RX ADMIN — Medication 2 MILLIGRAM(S): at 00:37

## 2025-08-12 RX ADMIN — INSULIN LISPRO 13 UNIT(S): 100 INJECTION, SOLUTION INTRAVENOUS; SUBCUTANEOUS at 09:11

## 2025-08-12 RX ADMIN — GABAPENTIN 300 MILLIGRAM(S): 400 CAPSULE ORAL at 05:24

## 2025-08-12 RX ADMIN — OXYCODONE HYDROCHLORIDE 2.5 MILLIGRAM(S): 30 TABLET ORAL at 10:34

## 2025-08-12 RX ADMIN — APIXABAN 5 MILLIGRAM(S): 2.5 TABLET, FILM COATED ORAL at 17:56

## 2025-08-12 RX ADMIN — Medication 81 MILLIGRAM(S): at 11:38

## 2025-08-12 RX ADMIN — NYSTATIN 1 APPLICATION(S): 100000 CREAM TOPICAL at 09:12

## 2025-08-12 RX ADMIN — INSULIN LISPRO 13 UNIT(S): 100 INJECTION, SOLUTION INTRAVENOUS; SUBCUTANEOUS at 17:55

## 2025-08-12 RX ADMIN — MUPIROCIN CALCIUM 1 APPLICATION(S): 20 CREAM TOPICAL at 17:55

## 2025-08-12 RX ADMIN — Medication 1 TABLET(S): at 11:38

## 2025-08-12 RX ADMIN — INSULIN LISPRO 13 UNIT(S): 100 INJECTION, SOLUTION INTRAVENOUS; SUBCUTANEOUS at 13:17

## 2025-08-12 RX ADMIN — INSULIN LISPRO 1: 100 INJECTION, SOLUTION INTRAVENOUS; SUBCUTANEOUS at 13:17

## 2025-08-12 RX ADMIN — AMIODARONE HYDROCHLORIDE 200 MILLIGRAM(S): 50 INJECTION, SOLUTION INTRAVENOUS at 05:24

## 2025-08-12 RX ADMIN — GABAPENTIN 300 MILLIGRAM(S): 400 CAPSULE ORAL at 14:07

## 2025-08-12 RX ADMIN — INSULIN GLARGINE-YFGN 27 UNIT(S): 100 INJECTION, SOLUTION SUBCUTANEOUS at 22:00

## 2025-08-12 RX ADMIN — OXYCODONE HYDROCHLORIDE 2.5 MILLIGRAM(S): 30 TABLET ORAL at 11:34

## 2025-08-12 RX ADMIN — Medication 1 DOSE(S): at 05:24

## 2025-08-12 RX ADMIN — BUMETANIDE 2 MILLIGRAM(S): 1 TABLET ORAL at 05:23

## 2025-08-12 RX ADMIN — Medication 1 DOSE(S): at 17:55

## 2025-08-12 RX ADMIN — WHITE PETROLATUM 1 APPLICATION(S): 1 OINTMENT TOPICAL at 11:39

## 2025-08-12 RX ADMIN — OXYCODONE HYDROCHLORIDE 2.5 MILLIGRAM(S): 30 TABLET ORAL at 23:03

## 2025-08-12 RX ADMIN — GABAPENTIN 300 MILLIGRAM(S): 400 CAPSULE ORAL at 22:00

## 2025-08-12 RX ADMIN — AMLODIPINE BESYLATE 5 MILLIGRAM(S): 10 TABLET ORAL at 05:23

## 2025-08-13 DIAGNOSIS — I87.2 VENOUS INSUFFICIENCY (CHRONIC) (PERIPHERAL): ICD-10-CM

## 2025-08-13 DIAGNOSIS — R53.2 FUNCTIONAL QUADRIPLEGIA: ICD-10-CM

## 2025-08-13 DIAGNOSIS — Z51.5 ENCOUNTER FOR PALLIATIVE CARE: ICD-10-CM

## 2025-08-13 DIAGNOSIS — Z71.89 OTHER SPECIFIED COUNSELING: ICD-10-CM

## 2025-08-13 LAB
ALBUMIN SERPL ELPH-MCNC: 2.7 G/DL — LOW (ref 3.3–5)
ALP SERPL-CCNC: 58 U/L — SIGNIFICANT CHANGE UP (ref 40–120)
ALT FLD-CCNC: 10 U/L — SIGNIFICANT CHANGE UP (ref 10–45)
ANION GAP SERPL CALC-SCNC: 10 MMOL/L — SIGNIFICANT CHANGE UP (ref 5–17)
AST SERPL-CCNC: 9 U/L — LOW (ref 10–40)
BASE EXCESS BLDA CALC-SCNC: 11.9 MMOL/L — HIGH (ref -2–3)
BILIRUB SERPL-MCNC: 0.5 MG/DL — SIGNIFICANT CHANGE UP (ref 0.2–1.2)
BUN SERPL-MCNC: 28 MG/DL — HIGH (ref 7–23)
CALCIUM SERPL-MCNC: 8.7 MG/DL — SIGNIFICANT CHANGE UP (ref 8.4–10.5)
CHLORIDE SERPL-SCNC: 98 MMOL/L — SIGNIFICANT CHANGE UP (ref 96–108)
CO2 BLDA-SCNC: 41 MMOL/L — HIGH (ref 19–24)
CO2 SERPL-SCNC: 33 MMOL/L — HIGH (ref 22–31)
CREAT SERPL-MCNC: 1.32 MG/DL — HIGH (ref 0.5–1.3)
EGFR: 42 ML/MIN/1.73M2 — LOW
EGFR: 42 ML/MIN/1.73M2 — LOW
GAS PNL BLDV: SIGNIFICANT CHANGE UP
GLUCOSE BLDC GLUCOMTR-MCNC: 117 MG/DL — HIGH (ref 70–99)
GLUCOSE BLDC GLUCOMTR-MCNC: 129 MG/DL — HIGH (ref 70–99)
GLUCOSE BLDC GLUCOMTR-MCNC: 144 MG/DL — HIGH (ref 70–99)
GLUCOSE BLDC GLUCOMTR-MCNC: 189 MG/DL — HIGH (ref 70–99)
GLUCOSE SERPL-MCNC: 104 MG/DL — HIGH (ref 70–99)
HCO3 BLDA-SCNC: 39 MMOL/L — HIGH (ref 21–28)
HOROWITZ INDEX BLDA+IHG-RTO: 32 — SIGNIFICANT CHANGE UP
MAGNESIUM SERPL-MCNC: 2 MG/DL — SIGNIFICANT CHANGE UP (ref 1.6–2.6)
PCO2 BLDA: 60 MMHG — HIGH (ref 32–45)
PH BLDA: 7.42 — SIGNIFICANT CHANGE UP (ref 7.35–7.45)
PHOSPHATE SERPL-MCNC: 3.2 MG/DL — SIGNIFICANT CHANGE UP (ref 2.5–4.5)
PO2 BLDA: 84 MMHG — SIGNIFICANT CHANGE UP (ref 83–108)
POTASSIUM SERPL-MCNC: 3.2 MMOL/L — LOW (ref 3.5–5.3)
POTASSIUM SERPL-SCNC: 3.2 MMOL/L — LOW (ref 3.5–5.3)
PROT SERPL-MCNC: 6 G/DL — SIGNIFICANT CHANGE UP (ref 6–8.3)
SAO2 % BLDA: 97.3 % — SIGNIFICANT CHANGE UP (ref 94–98)
SODIUM SERPL-SCNC: 141 MMOL/L — SIGNIFICANT CHANGE UP (ref 135–145)

## 2025-08-13 PROCEDURE — 99232 SBSQ HOSP IP/OBS MODERATE 35: CPT | Mod: GC

## 2025-08-13 RX ADMIN — NYSTATIN 1 APPLICATION(S): 100000 CREAM TOPICAL at 22:10

## 2025-08-13 RX ADMIN — APIXABAN 5 MILLIGRAM(S): 2.5 TABLET, FILM COATED ORAL at 17:08

## 2025-08-13 RX ADMIN — OXYCODONE HYDROCHLORIDE 2.5 MILLIGRAM(S): 30 TABLET ORAL at 09:49

## 2025-08-13 RX ADMIN — Medication 1 DOSE(S): at 17:08

## 2025-08-13 RX ADMIN — BUMETANIDE 2 MILLIGRAM(S): 1 TABLET ORAL at 13:02

## 2025-08-13 RX ADMIN — GABAPENTIN 300 MILLIGRAM(S): 400 CAPSULE ORAL at 05:03

## 2025-08-13 RX ADMIN — Medication 2 PUFF(S): at 22:09

## 2025-08-13 RX ADMIN — APIXABAN 5 MILLIGRAM(S): 2.5 TABLET, FILM COATED ORAL at 05:03

## 2025-08-13 RX ADMIN — INSULIN LISPRO 13 UNIT(S): 100 INJECTION, SOLUTION INTRAVENOUS; SUBCUTANEOUS at 12:58

## 2025-08-13 RX ADMIN — INSULIN LISPRO 13 UNIT(S): 100 INJECTION, SOLUTION INTRAVENOUS; SUBCUTANEOUS at 08:35

## 2025-08-13 RX ADMIN — Medication 40 MILLIGRAM(S): at 05:03

## 2025-08-13 RX ADMIN — MUPIROCIN CALCIUM 1 APPLICATION(S): 20 CREAM TOPICAL at 17:09

## 2025-08-13 RX ADMIN — NYSTATIN 1 APPLICATION(S): 100000 CREAM TOPICAL at 09:15

## 2025-08-13 RX ADMIN — MUPIROCIN CALCIUM 1 APPLICATION(S): 20 CREAM TOPICAL at 05:04

## 2025-08-13 RX ADMIN — Medication 1 TABLET(S): at 11:20

## 2025-08-13 RX ADMIN — Medication 2 TABLET(S): at 22:09

## 2025-08-13 RX ADMIN — GABAPENTIN 300 MILLIGRAM(S): 400 CAPSULE ORAL at 13:00

## 2025-08-13 RX ADMIN — ATORVASTATIN CALCIUM 40 MILLIGRAM(S): 80 TABLET, FILM COATED ORAL at 22:10

## 2025-08-13 RX ADMIN — Medication 200 MICROGRAM(S): at 05:03

## 2025-08-13 RX ADMIN — AMLODIPINE BESYLATE 5 MILLIGRAM(S): 10 TABLET ORAL at 05:03

## 2025-08-13 RX ADMIN — INSULIN LISPRO 13 UNIT(S): 100 INJECTION, SOLUTION INTRAVENOUS; SUBCUTANEOUS at 17:42

## 2025-08-13 RX ADMIN — INSULIN GLARGINE-YFGN 27 UNIT(S): 100 INJECTION, SOLUTION SUBCUTANEOUS at 22:09

## 2025-08-13 RX ADMIN — Medication 1 DOSE(S): at 05:04

## 2025-08-13 RX ADMIN — OXYCODONE HYDROCHLORIDE 2.5 MILLIGRAM(S): 30 TABLET ORAL at 23:33

## 2025-08-13 RX ADMIN — OXYCODONE HYDROCHLORIDE 2.5 MILLIGRAM(S): 30 TABLET ORAL at 09:17

## 2025-08-13 RX ADMIN — WHITE PETROLATUM 1 APPLICATION(S): 1 OINTMENT TOPICAL at 11:24

## 2025-08-13 RX ADMIN — Medication 5 MILLIGRAM(S): at 22:10

## 2025-08-13 RX ADMIN — Medication 81 MILLIGRAM(S): at 11:20

## 2025-08-13 RX ADMIN — BUMETANIDE 2 MILLIGRAM(S): 1 TABLET ORAL at 05:03

## 2025-08-13 RX ADMIN — Medication 5 MILLIGRAM(S): at 22:09

## 2025-08-13 RX ADMIN — Medication 2 MILLIGRAM(S): at 22:18

## 2025-08-13 RX ADMIN — GABAPENTIN 300 MILLIGRAM(S): 400 CAPSULE ORAL at 22:10

## 2025-08-13 RX ADMIN — AMIODARONE HYDROCHLORIDE 200 MILLIGRAM(S): 50 INJECTION, SOLUTION INTRAVENOUS at 05:03

## 2025-08-13 RX ADMIN — POLYETHYLENE GLYCOL 3350 17 GRAM(S): 17 POWDER, FOR SOLUTION ORAL at 22:10

## 2025-08-14 LAB
ADD ON TEST-SPECIMEN IN LAB: SIGNIFICANT CHANGE UP
ANION GAP SERPL CALC-SCNC: 12 MMOL/L — SIGNIFICANT CHANGE UP (ref 5–17)
BUN SERPL-MCNC: 30 MG/DL — HIGH (ref 7–23)
CALCIUM SERPL-MCNC: 8.8 MG/DL — SIGNIFICANT CHANGE UP (ref 8.4–10.5)
CHLORIDE SERPL-SCNC: 97 MMOL/L — SIGNIFICANT CHANGE UP (ref 96–108)
CO2 SERPL-SCNC: 33 MMOL/L — HIGH (ref 22–31)
CREAT SERPL-MCNC: 1.49 MG/DL — HIGH (ref 0.5–1.3)
EGFR: 36 ML/MIN/1.73M2 — LOW
EGFR: 36 ML/MIN/1.73M2 — LOW
GAS PNL BLDV: SIGNIFICANT CHANGE UP
GLUCOSE BLDC GLUCOMTR-MCNC: 200 MG/DL — HIGH (ref 70–99)
GLUCOSE BLDC GLUCOMTR-MCNC: 210 MG/DL — HIGH (ref 70–99)
GLUCOSE BLDC GLUCOMTR-MCNC: 219 MG/DL — HIGH (ref 70–99)
GLUCOSE BLDC GLUCOMTR-MCNC: 245 MG/DL — HIGH (ref 70–99)
GLUCOSE SERPL-MCNC: 205 MG/DL — HIGH (ref 70–99)
NT-PROBNP SERPL-SCNC: 628 PG/ML — HIGH (ref 0–300)
POTASSIUM SERPL-MCNC: 3.1 MMOL/L — LOW (ref 3.5–5.3)
POTASSIUM SERPL-SCNC: 3.1 MMOL/L — LOW (ref 3.5–5.3)
SODIUM SERPL-SCNC: 142 MMOL/L — SIGNIFICANT CHANGE UP (ref 135–145)

## 2025-08-14 PROCEDURE — 99233 SBSQ HOSP IP/OBS HIGH 50: CPT

## 2025-08-14 PROCEDURE — 71045 X-RAY EXAM CHEST 1 VIEW: CPT | Mod: 26

## 2025-08-14 PROCEDURE — 99497 ADVNCD CARE PLAN 30 MIN: CPT

## 2025-08-14 PROCEDURE — 99233 SBSQ HOSP IP/OBS HIGH 50: CPT | Mod: GC

## 2025-08-14 RX ORDER — BUMETANIDE 1 MG/1
2 TABLET ORAL
Refills: 0 | Status: DISCONTINUED | OUTPATIENT
Start: 2025-08-14 | End: 2025-08-14

## 2025-08-14 RX ORDER — BUMETANIDE 1 MG/1
2 TABLET ORAL
Refills: 0 | Status: DISCONTINUED | OUTPATIENT
Start: 2025-08-15 | End: 2025-08-15

## 2025-08-14 RX ADMIN — OXYCODONE HYDROCHLORIDE 2.5 MILLIGRAM(S): 30 TABLET ORAL at 00:33

## 2025-08-14 RX ADMIN — Medication 200 MICROGRAM(S): at 05:39

## 2025-08-14 RX ADMIN — Medication 1 DOSE(S): at 05:38

## 2025-08-14 RX ADMIN — Medication 81 MILLIGRAM(S): at 12:34

## 2025-08-14 RX ADMIN — Medication 20 MILLIEQUIVALENT(S): at 12:18

## 2025-08-14 RX ADMIN — APIXABAN 5 MILLIGRAM(S): 2.5 TABLET, FILM COATED ORAL at 17:30

## 2025-08-14 RX ADMIN — INSULIN LISPRO 13 UNIT(S): 100 INJECTION, SOLUTION INTRAVENOUS; SUBCUTANEOUS at 17:30

## 2025-08-14 RX ADMIN — INSULIN LISPRO 13 UNIT(S): 100 INJECTION, SOLUTION INTRAVENOUS; SUBCUTANEOUS at 12:33

## 2025-08-14 RX ADMIN — WHITE PETROLATUM 1 APPLICATION(S): 1 OINTMENT TOPICAL at 12:34

## 2025-08-14 RX ADMIN — Medication 1 DOSE(S): at 17:30

## 2025-08-14 RX ADMIN — Medication 1 TABLET(S): at 12:35

## 2025-08-14 RX ADMIN — GABAPENTIN 300 MILLIGRAM(S): 400 CAPSULE ORAL at 14:29

## 2025-08-14 RX ADMIN — GABAPENTIN 300 MILLIGRAM(S): 400 CAPSULE ORAL at 05:38

## 2025-08-14 RX ADMIN — Medication 2 TABLET(S): at 21:47

## 2025-08-14 RX ADMIN — MUPIROCIN CALCIUM 1 APPLICATION(S): 20 CREAM TOPICAL at 05:38

## 2025-08-14 RX ADMIN — INSULIN LISPRO 13 UNIT(S): 100 INJECTION, SOLUTION INTRAVENOUS; SUBCUTANEOUS at 09:14

## 2025-08-14 RX ADMIN — OXYCODONE HYDROCHLORIDE 2.5 MILLIGRAM(S): 30 TABLET ORAL at 05:39

## 2025-08-14 RX ADMIN — NYSTATIN 1 APPLICATION(S): 100000 CREAM TOPICAL at 22:44

## 2025-08-14 RX ADMIN — BUMETANIDE 2 MILLIGRAM(S): 1 TABLET ORAL at 05:39

## 2025-08-14 RX ADMIN — APIXABAN 5 MILLIGRAM(S): 2.5 TABLET, FILM COATED ORAL at 05:39

## 2025-08-14 RX ADMIN — INSULIN GLARGINE-YFGN 27 UNIT(S): 100 INJECTION, SOLUTION SUBCUTANEOUS at 21:45

## 2025-08-14 RX ADMIN — INSULIN LISPRO 2: 100 INJECTION, SOLUTION INTRAVENOUS; SUBCUTANEOUS at 09:28

## 2025-08-14 RX ADMIN — ATORVASTATIN CALCIUM 40 MILLIGRAM(S): 80 TABLET, FILM COATED ORAL at 21:46

## 2025-08-14 RX ADMIN — INSULIN LISPRO 1: 100 INJECTION, SOLUTION INTRAVENOUS; SUBCUTANEOUS at 17:30

## 2025-08-14 RX ADMIN — OXYCODONE HYDROCHLORIDE 2.5 MILLIGRAM(S): 30 TABLET ORAL at 06:33

## 2025-08-14 RX ADMIN — AMIODARONE HYDROCHLORIDE 200 MILLIGRAM(S): 50 INJECTION, SOLUTION INTRAVENOUS at 05:39

## 2025-08-14 RX ADMIN — Medication 2 MILLIGRAM(S): at 21:46

## 2025-08-14 RX ADMIN — Medication 5 MILLIGRAM(S): at 21:46

## 2025-08-14 RX ADMIN — OXYCODONE HYDROCHLORIDE 2.5 MILLIGRAM(S): 30 TABLET ORAL at 10:29

## 2025-08-14 RX ADMIN — OXYCODONE HYDROCHLORIDE 2.5 MILLIGRAM(S): 30 TABLET ORAL at 09:28

## 2025-08-14 RX ADMIN — MUPIROCIN CALCIUM 1 APPLICATION(S): 20 CREAM TOPICAL at 17:31

## 2025-08-14 RX ADMIN — NYSTATIN 1 APPLICATION(S): 100000 CREAM TOPICAL at 12:34

## 2025-08-14 RX ADMIN — OXYCODONE HYDROCHLORIDE 2.5 MILLIGRAM(S): 30 TABLET ORAL at 22:32

## 2025-08-14 RX ADMIN — INSULIN LISPRO 2: 100 INJECTION, SOLUTION INTRAVENOUS; SUBCUTANEOUS at 12:34

## 2025-08-14 RX ADMIN — Medication 40 MILLIGRAM(S): at 05:39

## 2025-08-14 RX ADMIN — GABAPENTIN 300 MILLIGRAM(S): 400 CAPSULE ORAL at 21:46

## 2025-08-15 LAB
ANION GAP SERPL CALC-SCNC: 13 MMOL/L — SIGNIFICANT CHANGE UP (ref 5–17)
BUN SERPL-MCNC: 35 MG/DL — HIGH (ref 7–23)
CALCIUM SERPL-MCNC: 8.6 MG/DL — SIGNIFICANT CHANGE UP (ref 8.4–10.5)
CHLORIDE SERPL-SCNC: 96 MMOL/L — SIGNIFICANT CHANGE UP (ref 96–108)
CO2 SERPL-SCNC: 33 MMOL/L — HIGH (ref 22–31)
CREAT SERPL-MCNC: 1.47 MG/DL — HIGH (ref 0.5–1.3)
EGFR: 37 ML/MIN/1.73M2 — LOW
EGFR: 37 ML/MIN/1.73M2 — LOW
GLUCOSE BLDC GLUCOMTR-MCNC: 154 MG/DL — HIGH (ref 70–99)
GLUCOSE BLDC GLUCOMTR-MCNC: 204 MG/DL — HIGH (ref 70–99)
GLUCOSE BLDC GLUCOMTR-MCNC: 215 MG/DL — HIGH (ref 70–99)
GLUCOSE BLDC GLUCOMTR-MCNC: 255 MG/DL — HIGH (ref 70–99)
GLUCOSE BLDC GLUCOMTR-MCNC: 27 MG/DL — CRITICAL LOW (ref 70–99)
GLUCOSE SERPL-MCNC: 159 MG/DL — HIGH (ref 70–99)
MAGNESIUM SERPL-MCNC: 1.9 MG/DL — SIGNIFICANT CHANGE UP (ref 1.6–2.6)
PHOSPHATE SERPL-MCNC: 3.9 MG/DL — SIGNIFICANT CHANGE UP (ref 2.5–4.5)
POTASSIUM SERPL-MCNC: 3.6 MMOL/L — SIGNIFICANT CHANGE UP (ref 3.5–5.3)
POTASSIUM SERPL-SCNC: 3.6 MMOL/L — SIGNIFICANT CHANGE UP (ref 3.5–5.3)
SODIUM SERPL-SCNC: 142 MMOL/L — SIGNIFICANT CHANGE UP (ref 135–145)

## 2025-08-15 PROCEDURE — 99497 ADVNCD CARE PLAN 30 MIN: CPT | Mod: 25

## 2025-08-15 PROCEDURE — 99233 SBSQ HOSP IP/OBS HIGH 50: CPT | Mod: GC

## 2025-08-15 PROCEDURE — 71045 X-RAY EXAM CHEST 1 VIEW: CPT | Mod: 26

## 2025-08-15 RX ORDER — BUMETANIDE 1 MG/1
2 TABLET ORAL
Refills: 0 | Status: DISCONTINUED | OUTPATIENT
Start: 2025-08-15 | End: 2025-08-18

## 2025-08-15 RX ORDER — IPRATROPIUM BROMIDE AND ALBUTEROL SULFATE .5; 2.5 MG/3ML; MG/3ML
3 SOLUTION RESPIRATORY (INHALATION) EVERY 8 HOURS
Refills: 0 | Status: DISCONTINUED | OUTPATIENT
Start: 2025-08-15 | End: 2025-08-31

## 2025-08-15 RX ORDER — ONDANSETRON HCL/PF 4 MG/2 ML
4 VIAL (ML) INJECTION ONCE
Refills: 0 | Status: COMPLETED | OUTPATIENT
Start: 2025-08-15 | End: 2025-08-15

## 2025-08-15 RX ORDER — BUMETANIDE 1 MG/1
1 TABLET ORAL ONCE
Refills: 0 | Status: COMPLETED | OUTPATIENT
Start: 2025-08-15 | End: 2025-08-15

## 2025-08-15 RX ADMIN — AMLODIPINE BESYLATE 5 MILLIGRAM(S): 10 TABLET ORAL at 05:29

## 2025-08-15 RX ADMIN — Medication 2 MILLIGRAM(S): at 22:05

## 2025-08-15 RX ADMIN — Medication 2 TABLET(S): at 21:22

## 2025-08-15 RX ADMIN — GABAPENTIN 300 MILLIGRAM(S): 400 CAPSULE ORAL at 21:28

## 2025-08-15 RX ADMIN — AMIODARONE HYDROCHLORIDE 200 MILLIGRAM(S): 50 INJECTION, SOLUTION INTRAVENOUS at 05:28

## 2025-08-15 RX ADMIN — BUMETANIDE 2 MILLIGRAM(S): 1 TABLET ORAL at 05:28

## 2025-08-15 RX ADMIN — Medication 4 MILLIGRAM(S): at 13:56

## 2025-08-15 RX ADMIN — INSULIN LISPRO 3: 100 INJECTION, SOLUTION INTRAVENOUS; SUBCUTANEOUS at 18:15

## 2025-08-15 RX ADMIN — INSULIN GLARGINE-YFGN 27 UNIT(S): 100 INJECTION, SOLUTION SUBCUTANEOUS at 21:36

## 2025-08-15 RX ADMIN — Medication 40 MILLIGRAM(S): at 05:28

## 2025-08-15 RX ADMIN — WHITE PETROLATUM 1 APPLICATION(S): 1 OINTMENT TOPICAL at 13:58

## 2025-08-15 RX ADMIN — Medication 1 DOSE(S): at 05:29

## 2025-08-15 RX ADMIN — INSULIN LISPRO 2: 100 INJECTION, SOLUTION INTRAVENOUS; SUBCUTANEOUS at 09:05

## 2025-08-15 RX ADMIN — INSULIN LISPRO 13 UNIT(S): 100 INJECTION, SOLUTION INTRAVENOUS; SUBCUTANEOUS at 18:15

## 2025-08-15 RX ADMIN — APIXABAN 5 MILLIGRAM(S): 2.5 TABLET, FILM COATED ORAL at 16:16

## 2025-08-15 RX ADMIN — NYSTATIN 1 APPLICATION(S): 100000 CREAM TOPICAL at 21:28

## 2025-08-15 RX ADMIN — Medication 1 DOSE(S): at 16:13

## 2025-08-15 RX ADMIN — BUMETANIDE 1 MILLIGRAM(S): 1 TABLET ORAL at 01:51

## 2025-08-15 RX ADMIN — OXYCODONE HYDROCHLORIDE 2.5 MILLIGRAM(S): 30 TABLET ORAL at 22:53

## 2025-08-15 RX ADMIN — GABAPENTIN 300 MILLIGRAM(S): 400 CAPSULE ORAL at 05:28

## 2025-08-15 RX ADMIN — POLYETHYLENE GLYCOL 3350 17 GRAM(S): 17 POWDER, FOR SOLUTION ORAL at 21:28

## 2025-08-15 RX ADMIN — ATORVASTATIN CALCIUM 40 MILLIGRAM(S): 80 TABLET, FILM COATED ORAL at 21:22

## 2025-08-15 RX ADMIN — Medication 5 MILLIGRAM(S): at 21:22

## 2025-08-15 RX ADMIN — OXYCODONE HYDROCHLORIDE 2.5 MILLIGRAM(S): 30 TABLET ORAL at 00:00

## 2025-08-15 RX ADMIN — INSULIN LISPRO 13 UNIT(S): 100 INJECTION, SOLUTION INTRAVENOUS; SUBCUTANEOUS at 09:05

## 2025-08-15 RX ADMIN — MUPIROCIN CALCIUM 1 APPLICATION(S): 20 CREAM TOPICAL at 06:18

## 2025-08-15 RX ADMIN — IPRATROPIUM BROMIDE AND ALBUTEROL SULFATE 3 MILLILITER(S): .5; 2.5 SOLUTION RESPIRATORY (INHALATION) at 21:21

## 2025-08-15 RX ADMIN — NYSTATIN 1 APPLICATION(S): 100000 CREAM TOPICAL at 09:39

## 2025-08-15 RX ADMIN — BUMETANIDE 2 MILLIGRAM(S): 1 TABLET ORAL at 16:12

## 2025-08-15 RX ADMIN — MUPIROCIN CALCIUM 1 APPLICATION(S): 20 CREAM TOPICAL at 16:41

## 2025-08-15 RX ADMIN — Medication 200 MICROGRAM(S): at 05:29

## 2025-08-15 RX ADMIN — APIXABAN 5 MILLIGRAM(S): 2.5 TABLET, FILM COATED ORAL at 05:28

## 2025-08-16 LAB
BASOPHILS # BLD AUTO: 0.05 K/UL — SIGNIFICANT CHANGE UP (ref 0–0.2)
BASOPHILS NFR BLD AUTO: 0.5 % — SIGNIFICANT CHANGE UP (ref 0–2)
BLD GP AB SCN SERPL QL: NEGATIVE — SIGNIFICANT CHANGE UP
EOSINOPHIL # BLD AUTO: 0.48 K/UL — SIGNIFICANT CHANGE UP (ref 0–0.5)
EOSINOPHIL NFR BLD AUTO: 4.5 % — SIGNIFICANT CHANGE UP (ref 0–6)
GLUCOSE BLDC GLUCOMTR-MCNC: 172 MG/DL — HIGH (ref 70–99)
GLUCOSE BLDC GLUCOMTR-MCNC: 179 MG/DL — HIGH (ref 70–99)
GLUCOSE BLDC GLUCOMTR-MCNC: 184 MG/DL — HIGH (ref 70–99)
GLUCOSE BLDC GLUCOMTR-MCNC: 203 MG/DL — HIGH (ref 70–99)
HCT VFR BLD CALC: 23.8 % — LOW (ref 34.5–45)
HGB BLD-MCNC: 6.9 G/DL — CRITICAL LOW (ref 11.5–15.5)
IMM GRANULOCYTES # BLD AUTO: 0.13 K/UL — HIGH (ref 0–0.07)
IMM GRANULOCYTES NFR BLD AUTO: 1.2 % — HIGH (ref 0–0.9)
LYMPHOCYTES # BLD AUTO: 1.39 K/UL — SIGNIFICANT CHANGE UP (ref 1–3.3)
LYMPHOCYTES NFR BLD AUTO: 13 % — SIGNIFICANT CHANGE UP (ref 13–44)
MCHC RBC-ENTMCNC: 25.8 PG — LOW (ref 27–34)
MCHC RBC-ENTMCNC: 29 G/DL — LOW (ref 32–36)
MCV RBC AUTO: 89.1 FL — SIGNIFICANT CHANGE UP (ref 80–100)
MONOCYTES # BLD AUTO: 0.77 K/UL — SIGNIFICANT CHANGE UP (ref 0–0.9)
MONOCYTES NFR BLD AUTO: 7.2 % — SIGNIFICANT CHANGE UP (ref 2–14)
NEUTROPHILS # BLD AUTO: 7.87 K/UL — HIGH (ref 1.8–7.4)
NEUTROPHILS NFR BLD AUTO: 73.6 % — SIGNIFICANT CHANGE UP (ref 43–77)
NRBC # BLD AUTO: 0 K/UL — SIGNIFICANT CHANGE UP (ref 0–0)
NRBC # FLD: 0 K/UL — SIGNIFICANT CHANGE UP (ref 0–0)
NRBC BLD AUTO-RTO: 0 /100 WBCS — SIGNIFICANT CHANGE UP (ref 0–0)
PLATELET # BLD AUTO: 289 K/UL — SIGNIFICANT CHANGE UP (ref 150–400)
PMV BLD: 9.6 FL — SIGNIFICANT CHANGE UP (ref 7–13)
RBC # BLD: 2.67 M/UL — LOW (ref 3.8–5.2)
RBC # FLD: 18.1 % — HIGH (ref 10.3–14.5)
RH IG SCN BLD-IMP: POSITIVE — SIGNIFICANT CHANGE UP
WBC # BLD: 10.69 K/UL — HIGH (ref 3.8–10.5)
WBC # FLD AUTO: 10.69 K/UL — HIGH (ref 3.8–10.5)

## 2025-08-16 PROCEDURE — 99233 SBSQ HOSP IP/OBS HIGH 50: CPT | Mod: FS

## 2025-08-16 RX ORDER — MUPIROCIN CALCIUM 20 MG/G
1 CREAM TOPICAL
Refills: 0 | Status: DISCONTINUED | OUTPATIENT
Start: 2025-08-16 | End: 2025-09-04

## 2025-08-16 RX ADMIN — Medication 650 MILLIGRAM(S): at 14:23

## 2025-08-16 RX ADMIN — WHITE PETROLATUM 1 APPLICATION(S): 1 OINTMENT TOPICAL at 11:16

## 2025-08-16 RX ADMIN — BUMETANIDE 2 MILLIGRAM(S): 1 TABLET ORAL at 06:00

## 2025-08-16 RX ADMIN — IPRATROPIUM BROMIDE AND ALBUTEROL SULFATE 3 MILLILITER(S): .5; 2.5 SOLUTION RESPIRATORY (INHALATION) at 21:52

## 2025-08-16 RX ADMIN — INSULIN LISPRO 13 UNIT(S): 100 INJECTION, SOLUTION INTRAVENOUS; SUBCUTANEOUS at 17:32

## 2025-08-16 RX ADMIN — APIXABAN 5 MILLIGRAM(S): 2.5 TABLET, FILM COATED ORAL at 17:31

## 2025-08-16 RX ADMIN — IPRATROPIUM BROMIDE AND ALBUTEROL SULFATE 3 MILLILITER(S): .5; 2.5 SOLUTION RESPIRATORY (INHALATION) at 13:09

## 2025-08-16 RX ADMIN — INSULIN LISPRO 13 UNIT(S): 100 INJECTION, SOLUTION INTRAVENOUS; SUBCUTANEOUS at 13:10

## 2025-08-16 RX ADMIN — Medication 40 MILLIGRAM(S): at 06:00

## 2025-08-16 RX ADMIN — Medication 1 DOSE(S): at 05:59

## 2025-08-16 RX ADMIN — GABAPENTIN 300 MILLIGRAM(S): 400 CAPSULE ORAL at 21:51

## 2025-08-16 RX ADMIN — Medication 81 MILLIGRAM(S): at 11:16

## 2025-08-16 RX ADMIN — Medication 200 MICROGRAM(S): at 05:59

## 2025-08-16 RX ADMIN — Medication 2 MILLIGRAM(S): at 22:27

## 2025-08-16 RX ADMIN — NYSTATIN 1 APPLICATION(S): 100000 CREAM TOPICAL at 11:18

## 2025-08-16 RX ADMIN — MUPIROCIN CALCIUM 1 APPLICATION(S): 20 CREAM TOPICAL at 06:01

## 2025-08-16 RX ADMIN — INSULIN LISPRO 2: 100 INJECTION, SOLUTION INTRAVENOUS; SUBCUTANEOUS at 13:09

## 2025-08-16 RX ADMIN — AMLODIPINE BESYLATE 5 MILLIGRAM(S): 10 TABLET ORAL at 06:00

## 2025-08-16 RX ADMIN — POLYETHYLENE GLYCOL 3350 17 GRAM(S): 17 POWDER, FOR SOLUTION ORAL at 21:52

## 2025-08-16 RX ADMIN — IPRATROPIUM BROMIDE AND ALBUTEROL SULFATE 3 MILLILITER(S): .5; 2.5 SOLUTION RESPIRATORY (INHALATION) at 05:59

## 2025-08-16 RX ADMIN — GABAPENTIN 300 MILLIGRAM(S): 400 CAPSULE ORAL at 13:09

## 2025-08-16 RX ADMIN — Medication 5 MILLIGRAM(S): at 21:51

## 2025-08-16 RX ADMIN — INSULIN LISPRO 1: 100 INJECTION, SOLUTION INTRAVENOUS; SUBCUTANEOUS at 17:31

## 2025-08-16 RX ADMIN — Medication 650 MILLIGRAM(S): at 13:53

## 2025-08-16 RX ADMIN — ATORVASTATIN CALCIUM 40 MILLIGRAM(S): 80 TABLET, FILM COATED ORAL at 21:51

## 2025-08-16 RX ADMIN — APIXABAN 5 MILLIGRAM(S): 2.5 TABLET, FILM COATED ORAL at 06:00

## 2025-08-16 RX ADMIN — Medication 1 DOSE(S): at 17:30

## 2025-08-16 RX ADMIN — BUMETANIDE 2 MILLIGRAM(S): 1 TABLET ORAL at 14:11

## 2025-08-16 RX ADMIN — Medication 1 TABLET(S): at 11:16

## 2025-08-16 RX ADMIN — AMIODARONE HYDROCHLORIDE 200 MILLIGRAM(S): 50 INJECTION, SOLUTION INTRAVENOUS at 05:59

## 2025-08-16 RX ADMIN — INSULIN GLARGINE-YFGN 27 UNIT(S): 100 INJECTION, SOLUTION SUBCUTANEOUS at 21:51

## 2025-08-16 RX ADMIN — NYSTATIN 1 APPLICATION(S): 100000 CREAM TOPICAL at 22:12

## 2025-08-16 RX ADMIN — OXYCODONE HYDROCHLORIDE 2.5 MILLIGRAM(S): 30 TABLET ORAL at 23:26

## 2025-08-16 RX ADMIN — INSULIN LISPRO 13 UNIT(S): 100 INJECTION, SOLUTION INTRAVENOUS; SUBCUTANEOUS at 09:00

## 2025-08-16 RX ADMIN — GABAPENTIN 300 MILLIGRAM(S): 400 CAPSULE ORAL at 05:59

## 2025-08-16 RX ADMIN — Medication 2 TABLET(S): at 21:51

## 2025-08-16 RX ADMIN — INSULIN LISPRO 1: 100 INJECTION, SOLUTION INTRAVENOUS; SUBCUTANEOUS at 09:00

## 2025-08-17 LAB
ALBUMIN SERPL ELPH-MCNC: 3 G/DL — LOW (ref 3.3–5)
ALP SERPL-CCNC: 67 U/L — SIGNIFICANT CHANGE UP (ref 40–120)
ALT FLD-CCNC: 11 U/L — SIGNIFICANT CHANGE UP (ref 10–45)
ANION GAP SERPL CALC-SCNC: 12 MMOL/L — SIGNIFICANT CHANGE UP (ref 5–17)
APPEARANCE UR: ABNORMAL
AST SERPL-CCNC: 12 U/L — SIGNIFICANT CHANGE UP (ref 10–40)
BACTERIA # UR AUTO: ABNORMAL /HPF
BASOPHILS # BLD AUTO: 0.08 K/UL — SIGNIFICANT CHANGE UP (ref 0–0.2)
BASOPHILS NFR BLD AUTO: 0.8 % — SIGNIFICANT CHANGE UP (ref 0–2)
BILIRUB SERPL-MCNC: 0.6 MG/DL — SIGNIFICANT CHANGE UP (ref 0.2–1.2)
BILIRUB UR-MCNC: NEGATIVE — SIGNIFICANT CHANGE UP
BUN SERPL-MCNC: 38 MG/DL — HIGH (ref 7–23)
CALCIUM SERPL-MCNC: 8.8 MG/DL — SIGNIFICANT CHANGE UP (ref 8.4–10.5)
CAST: 7 /LPF — HIGH (ref 0–4)
CHLORIDE SERPL-SCNC: 95 MMOL/L — LOW (ref 96–108)
CO2 SERPL-SCNC: 34 MMOL/L — HIGH (ref 22–31)
COLOR SPEC: ABNORMAL
CREAT SERPL-MCNC: 1.34 MG/DL — HIGH (ref 0.5–1.3)
DIFF PNL FLD: ABNORMAL
EGFR: 41 ML/MIN/1.73M2 — LOW
EGFR: 41 ML/MIN/1.73M2 — LOW
EOSINOPHIL # BLD AUTO: 0.44 K/UL — SIGNIFICANT CHANGE UP (ref 0–0.5)
EOSINOPHIL NFR BLD AUTO: 4.2 % — SIGNIFICANT CHANGE UP (ref 0–6)
GLUCOSE BLDC GLUCOMTR-MCNC: 194 MG/DL — HIGH (ref 70–99)
GLUCOSE BLDC GLUCOMTR-MCNC: 196 MG/DL — HIGH (ref 70–99)
GLUCOSE BLDC GLUCOMTR-MCNC: 212 MG/DL — HIGH (ref 70–99)
GLUCOSE BLDC GLUCOMTR-MCNC: 227 MG/DL — HIGH (ref 70–99)
GLUCOSE BLDC GLUCOMTR-MCNC: 300 MG/DL — HIGH (ref 70–99)
GLUCOSE SERPL-MCNC: 165 MG/DL — HIGH (ref 70–99)
GLUCOSE UR QL: NEGATIVE MG/DL — SIGNIFICANT CHANGE UP
HCT VFR BLD CALC: 26.7 % — LOW (ref 34.5–45)
HGB BLD-MCNC: 7.8 G/DL — LOW (ref 11.5–15.5)
IMM GRANULOCYTES # BLD AUTO: 0.09 K/UL — HIGH (ref 0–0.07)
IMM GRANULOCYTES NFR BLD AUTO: 0.9 % — SIGNIFICANT CHANGE UP (ref 0–0.9)
KETONES UR QL: NEGATIVE MG/DL — SIGNIFICANT CHANGE UP
LEUKOCYTE ESTERASE UR-ACNC: ABNORMAL
LYMPHOCYTES # BLD AUTO: 1.57 K/UL — SIGNIFICANT CHANGE UP (ref 1–3.3)
LYMPHOCYTES NFR BLD AUTO: 15.1 % — SIGNIFICANT CHANGE UP (ref 13–44)
MAGNESIUM SERPL-MCNC: 2 MG/DL — SIGNIFICANT CHANGE UP (ref 1.6–2.6)
MCHC RBC-ENTMCNC: 26.3 PG — LOW (ref 27–34)
MCHC RBC-ENTMCNC: 29.2 G/DL — LOW (ref 32–36)
MCV RBC AUTO: 89.9 FL — SIGNIFICANT CHANGE UP (ref 80–100)
MONOCYTES # BLD AUTO: 0.62 K/UL — SIGNIFICANT CHANGE UP (ref 0–0.9)
MONOCYTES NFR BLD AUTO: 6 % — SIGNIFICANT CHANGE UP (ref 2–14)
NEUTROPHILS # BLD AUTO: 7.59 K/UL — HIGH (ref 1.8–7.4)
NEUTROPHILS NFR BLD AUTO: 73 % — SIGNIFICANT CHANGE UP (ref 43–77)
NITRITE UR-MCNC: NEGATIVE — SIGNIFICANT CHANGE UP
NRBC # BLD AUTO: 0 K/UL — SIGNIFICANT CHANGE UP (ref 0–0)
NRBC # FLD: 0 K/UL — SIGNIFICANT CHANGE UP (ref 0–0)
NRBC BLD AUTO-RTO: 0 /100 WBCS — SIGNIFICANT CHANGE UP (ref 0–0)
PH UR: >=9 (ref 5–8)
PHOSPHATE SERPL-MCNC: 3.9 MG/DL — SIGNIFICANT CHANGE UP (ref 2.5–4.5)
PLATELET # BLD AUTO: 322 K/UL — SIGNIFICANT CHANGE UP (ref 150–400)
PMV BLD: 10.3 FL — SIGNIFICANT CHANGE UP (ref 7–13)
POTASSIUM SERPL-MCNC: 3.5 MMOL/L — SIGNIFICANT CHANGE UP (ref 3.5–5.3)
POTASSIUM SERPL-SCNC: 3.5 MMOL/L — SIGNIFICANT CHANGE UP (ref 3.5–5.3)
PROT SERPL-MCNC: 6.6 G/DL — SIGNIFICANT CHANGE UP (ref 6–8.3)
PROT UR-MCNC: 30 MG/DL
RBC # BLD: 2.97 M/UL — LOW (ref 3.8–5.2)
RBC # FLD: 17.7 % — HIGH (ref 10.3–14.5)
RBC CASTS # UR COMP ASSIST: 277 /HPF — HIGH (ref 0–4)
REVIEW: SIGNIFICANT CHANGE UP
SODIUM SERPL-SCNC: 141 MMOL/L — SIGNIFICANT CHANGE UP (ref 135–145)
SP GR SPEC: 1.01 — SIGNIFICANT CHANGE UP (ref 1–1.03)
SQUAMOUS # UR AUTO: 5 /HPF — SIGNIFICANT CHANGE UP (ref 0–5)
TRI-PHOS CRY UR QL COMP ASSIST: PRESENT
UROBILINOGEN FLD QL: 0.2 MG/DL — SIGNIFICANT CHANGE UP (ref 0.2–1)
WBC # BLD: 10.39 K/UL — SIGNIFICANT CHANGE UP (ref 3.8–10.5)
WBC # FLD AUTO: 10.39 K/UL — SIGNIFICANT CHANGE UP (ref 3.8–10.5)
WBC UR QL: 89 /HPF — HIGH (ref 0–5)

## 2025-08-17 PROCEDURE — 99233 SBSQ HOSP IP/OBS HIGH 50: CPT | Mod: GC

## 2025-08-17 RX ORDER — ALPRAZOLAM 0.5 MG
2 TABLET, EXTENDED RELEASE 24 HR ORAL AT BEDTIME
Refills: 0 | Status: DISCONTINUED | OUTPATIENT
Start: 2025-08-17 | End: 2025-08-24

## 2025-08-17 RX ORDER — CEFTRIAXONE 500 MG/1
1000 INJECTION, POWDER, FOR SOLUTION INTRAMUSCULAR; INTRAVENOUS EVERY 24 HOURS
Refills: 0 | Status: COMPLETED | OUTPATIENT
Start: 2025-08-17 | End: 2025-08-19

## 2025-08-17 RX ADMIN — INSULIN LISPRO 13 UNIT(S): 100 INJECTION, SOLUTION INTRAVENOUS; SUBCUTANEOUS at 18:33

## 2025-08-17 RX ADMIN — APIXABAN 5 MILLIGRAM(S): 2.5 TABLET, FILM COATED ORAL at 18:19

## 2025-08-17 RX ADMIN — Medication 40 MILLIGRAM(S): at 05:43

## 2025-08-17 RX ADMIN — CEFTRIAXONE 100 MILLIGRAM(S): 500 INJECTION, POWDER, FOR SOLUTION INTRAMUSCULAR; INTRAVENOUS at 18:45

## 2025-08-17 RX ADMIN — INSULIN LISPRO 13 UNIT(S): 100 INJECTION, SOLUTION INTRAVENOUS; SUBCUTANEOUS at 09:09

## 2025-08-17 RX ADMIN — POLYETHYLENE GLYCOL 3350 17 GRAM(S): 17 POWDER, FOR SOLUTION ORAL at 21:50

## 2025-08-17 RX ADMIN — APIXABAN 5 MILLIGRAM(S): 2.5 TABLET, FILM COATED ORAL at 05:43

## 2025-08-17 RX ADMIN — IPRATROPIUM BROMIDE AND ALBUTEROL SULFATE 3 MILLILITER(S): .5; 2.5 SOLUTION RESPIRATORY (INHALATION) at 14:25

## 2025-08-17 RX ADMIN — NYSTATIN 1 APPLICATION(S): 100000 CREAM TOPICAL at 14:18

## 2025-08-17 RX ADMIN — OXYCODONE HYDROCHLORIDE 2.5 MILLIGRAM(S): 30 TABLET ORAL at 18:58

## 2025-08-17 RX ADMIN — OXYCODONE HYDROCHLORIDE 2.5 MILLIGRAM(S): 30 TABLET ORAL at 18:19

## 2025-08-17 RX ADMIN — WHITE PETROLATUM 1 APPLICATION(S): 1 OINTMENT TOPICAL at 14:19

## 2025-08-17 RX ADMIN — NYSTATIN 1 APPLICATION(S): 100000 CREAM TOPICAL at 21:49

## 2025-08-17 RX ADMIN — OXYCODONE HYDROCHLORIDE 2.5 MILLIGRAM(S): 30 TABLET ORAL at 00:25

## 2025-08-17 RX ADMIN — INSULIN LISPRO 1: 100 INJECTION, SOLUTION INTRAVENOUS; SUBCUTANEOUS at 14:20

## 2025-08-17 RX ADMIN — Medication 1 DOSE(S): at 05:44

## 2025-08-17 RX ADMIN — Medication 1 TABLET(S): at 14:16

## 2025-08-17 RX ADMIN — BUMETANIDE 2 MILLIGRAM(S): 1 TABLET ORAL at 05:42

## 2025-08-17 RX ADMIN — AMLODIPINE BESYLATE 5 MILLIGRAM(S): 10 TABLET ORAL at 05:43

## 2025-08-17 RX ADMIN — GABAPENTIN 300 MILLIGRAM(S): 400 CAPSULE ORAL at 21:48

## 2025-08-17 RX ADMIN — GABAPENTIN 300 MILLIGRAM(S): 400 CAPSULE ORAL at 14:17

## 2025-08-17 RX ADMIN — INSULIN GLARGINE-YFGN 27 UNIT(S): 100 INJECTION, SOLUTION SUBCUTANEOUS at 21:49

## 2025-08-17 RX ADMIN — Medication 5 MILLIGRAM(S): at 21:48

## 2025-08-17 RX ADMIN — IPRATROPIUM BROMIDE AND ALBUTEROL SULFATE 3 MILLILITER(S): .5; 2.5 SOLUTION RESPIRATORY (INHALATION) at 05:42

## 2025-08-17 RX ADMIN — Medication 100 MICROGRAM(S): at 05:45

## 2025-08-17 RX ADMIN — BUMETANIDE 2 MILLIGRAM(S): 1 TABLET ORAL at 14:25

## 2025-08-17 RX ADMIN — OXYCODONE HYDROCHLORIDE 2.5 MILLIGRAM(S): 30 TABLET ORAL at 05:43

## 2025-08-17 RX ADMIN — AMIODARONE HYDROCHLORIDE 200 MILLIGRAM(S): 50 INJECTION, SOLUTION INTRAVENOUS at 05:42

## 2025-08-17 RX ADMIN — INSULIN LISPRO 2: 100 INJECTION, SOLUTION INTRAVENOUS; SUBCUTANEOUS at 09:10

## 2025-08-17 RX ADMIN — Medication 650 MILLIGRAM(S): at 11:09

## 2025-08-17 RX ADMIN — GABAPENTIN 300 MILLIGRAM(S): 400 CAPSULE ORAL at 05:43

## 2025-08-17 RX ADMIN — INSULIN LISPRO 13 UNIT(S): 100 INJECTION, SOLUTION INTRAVENOUS; SUBCUTANEOUS at 14:19

## 2025-08-17 RX ADMIN — INSULIN LISPRO 3: 100 INJECTION, SOLUTION INTRAVENOUS; SUBCUTANEOUS at 18:34

## 2025-08-17 RX ADMIN — Medication 2 TABLET(S): at 21:49

## 2025-08-17 RX ADMIN — Medication 81 MILLIGRAM(S): at 14:18

## 2025-08-17 RX ADMIN — Medication 2 MILLIGRAM(S): at 22:43

## 2025-08-17 RX ADMIN — ATORVASTATIN CALCIUM 40 MILLIGRAM(S): 80 TABLET, FILM COATED ORAL at 21:49

## 2025-08-17 RX ADMIN — MUPIROCIN CALCIUM 1 APPLICATION(S): 20 CREAM TOPICAL at 05:41

## 2025-08-17 RX ADMIN — Medication 1 DOSE(S): at 18:53

## 2025-08-17 RX ADMIN — OXYCODONE HYDROCHLORIDE 2.5 MILLIGRAM(S): 30 TABLET ORAL at 06:40

## 2025-08-17 RX ADMIN — Medication 650 MILLIGRAM(S): at 12:11

## 2025-08-17 RX ADMIN — IPRATROPIUM BROMIDE AND ALBUTEROL SULFATE 3 MILLILITER(S): .5; 2.5 SOLUTION RESPIRATORY (INHALATION) at 21:49

## 2025-08-18 DIAGNOSIS — R06.00 DYSPNEA, UNSPECIFIED: ICD-10-CM

## 2025-08-18 DIAGNOSIS — Z71.89 OTHER SPECIFIED COUNSELING: ICD-10-CM

## 2025-08-18 DIAGNOSIS — N39.0 URINARY TRACT INFECTION, SITE NOT SPECIFIED: ICD-10-CM

## 2025-08-18 LAB
ALBUMIN SERPL ELPH-MCNC: 3.2 G/DL — LOW (ref 3.3–5)
ALP SERPL-CCNC: 67 U/L — SIGNIFICANT CHANGE UP (ref 40–120)
ALT FLD-CCNC: 11 U/L — SIGNIFICANT CHANGE UP (ref 10–45)
ANION GAP SERPL CALC-SCNC: 10 MMOL/L — SIGNIFICANT CHANGE UP (ref 5–17)
AST SERPL-CCNC: 13 U/L — SIGNIFICANT CHANGE UP (ref 10–40)
BASOPHILS # BLD AUTO: 0.05 K/UL — SIGNIFICANT CHANGE UP (ref 0–0.2)
BASOPHILS NFR BLD AUTO: 0.5 % — SIGNIFICANT CHANGE UP (ref 0–2)
BILIRUB SERPL-MCNC: 0.5 MG/DL — SIGNIFICANT CHANGE UP (ref 0.2–1.2)
BUN SERPL-MCNC: 38 MG/DL — HIGH (ref 7–23)
CALCIUM SERPL-MCNC: 8.9 MG/DL — SIGNIFICANT CHANGE UP (ref 8.4–10.5)
CHLORIDE SERPL-SCNC: 95 MMOL/L — LOW (ref 96–108)
CO2 SERPL-SCNC: 37 MMOL/L — HIGH (ref 22–31)
CREAT SERPL-MCNC: 1.27 MG/DL — SIGNIFICANT CHANGE UP (ref 0.5–1.3)
EGFR: 44 ML/MIN/1.73M2 — LOW
EGFR: 44 ML/MIN/1.73M2 — LOW
EOSINOPHIL # BLD AUTO: 0.46 K/UL — SIGNIFICANT CHANGE UP (ref 0–0.5)
EOSINOPHIL NFR BLD AUTO: 4.3 % — SIGNIFICANT CHANGE UP (ref 0–6)
GLUCOSE BLDC GLUCOMTR-MCNC: 167 MG/DL — HIGH (ref 70–99)
GLUCOSE BLDC GLUCOMTR-MCNC: 168 MG/DL — HIGH (ref 70–99)
GLUCOSE BLDC GLUCOMTR-MCNC: 193 MG/DL — HIGH (ref 70–99)
GLUCOSE BLDC GLUCOMTR-MCNC: 258 MG/DL — HIGH (ref 70–99)
GLUCOSE SERPL-MCNC: 218 MG/DL — HIGH (ref 70–99)
HCT VFR BLD CALC: 24.3 % — LOW (ref 34.5–45)
HGB BLD-MCNC: 7 G/DL — CRITICAL LOW (ref 11.5–15.5)
IMM GRANULOCYTES # BLD AUTO: 0.13 K/UL — HIGH (ref 0–0.07)
IMM GRANULOCYTES NFR BLD AUTO: 1.2 % — HIGH (ref 0–0.9)
LYMPHOCYTES # BLD AUTO: 1.3 K/UL — SIGNIFICANT CHANGE UP (ref 1–3.3)
LYMPHOCYTES NFR BLD AUTO: 12.1 % — LOW (ref 13–44)
MAGNESIUM SERPL-MCNC: 2.1 MG/DL — SIGNIFICANT CHANGE UP (ref 1.6–2.6)
MCHC RBC-ENTMCNC: 25.9 PG — LOW (ref 27–34)
MCHC RBC-ENTMCNC: 28.8 G/DL — LOW (ref 32–36)
MCV RBC AUTO: 90 FL — SIGNIFICANT CHANGE UP (ref 80–100)
MONOCYTES # BLD AUTO: 0.65 K/UL — SIGNIFICANT CHANGE UP (ref 0–0.9)
MONOCYTES NFR BLD AUTO: 6.1 % — SIGNIFICANT CHANGE UP (ref 2–14)
NEUTROPHILS # BLD AUTO: 8.15 K/UL — HIGH (ref 1.8–7.4)
NEUTROPHILS NFR BLD AUTO: 75.8 % — SIGNIFICANT CHANGE UP (ref 43–77)
NRBC # BLD AUTO: 0 K/UL — SIGNIFICANT CHANGE UP (ref 0–0)
NRBC # FLD: 0 K/UL — SIGNIFICANT CHANGE UP (ref 0–0)
NRBC BLD AUTO-RTO: 0 /100 WBCS — SIGNIFICANT CHANGE UP (ref 0–0)
PHOSPHATE SERPL-MCNC: 3.7 MG/DL — SIGNIFICANT CHANGE UP (ref 2.5–4.5)
PLATELET # BLD AUTO: 339 K/UL — SIGNIFICANT CHANGE UP (ref 150–400)
PMV BLD: 10.3 FL — SIGNIFICANT CHANGE UP (ref 7–13)
POTASSIUM SERPL-MCNC: 3.3 MMOL/L — LOW (ref 3.5–5.3)
POTASSIUM SERPL-SCNC: 3.3 MMOL/L — LOW (ref 3.5–5.3)
PROT SERPL-MCNC: 6.6 G/DL — SIGNIFICANT CHANGE UP (ref 6–8.3)
RBC # BLD: 2.7 M/UL — LOW (ref 3.8–5.2)
RBC # FLD: 17.6 % — HIGH (ref 10.3–14.5)
SODIUM SERPL-SCNC: 142 MMOL/L — SIGNIFICANT CHANGE UP (ref 135–145)
WBC # BLD: 10.74 K/UL — HIGH (ref 3.8–10.5)
WBC # FLD AUTO: 10.74 K/UL — HIGH (ref 3.8–10.5)

## 2025-08-18 PROCEDURE — 82962 GLUCOSE BLOOD TEST: CPT

## 2025-08-18 PROCEDURE — 87641 MR-STAPH DNA AMP PROBE: CPT

## 2025-08-18 PROCEDURE — 73700 CT LOWER EXTREMITY W/O DYE: CPT

## 2025-08-18 PROCEDURE — 84295 ASSAY OF SERUM SODIUM: CPT

## 2025-08-18 PROCEDURE — 84443 ASSAY THYROID STIM HORMONE: CPT

## 2025-08-18 PROCEDURE — 87205 SMEAR GRAM STAIN: CPT

## 2025-08-18 PROCEDURE — 82947 ASSAY GLUCOSE BLOOD QUANT: CPT

## 2025-08-18 PROCEDURE — 99497 ADVNCD CARE PLAN 30 MIN: CPT

## 2025-08-18 PROCEDURE — 71045 X-RAY EXAM CHEST 1 VIEW: CPT

## 2025-08-18 PROCEDURE — P9016: CPT

## 2025-08-18 PROCEDURE — 83935 ASSAY OF URINE OSMOLALITY: CPT

## 2025-08-18 PROCEDURE — 83735 ASSAY OF MAGNESIUM: CPT

## 2025-08-18 PROCEDURE — 93970 EXTREMITY STUDY: CPT

## 2025-08-18 PROCEDURE — 85018 HEMOGLOBIN: CPT

## 2025-08-18 PROCEDURE — 82435 ASSAY OF BLOOD CHLORIDE: CPT

## 2025-08-18 PROCEDURE — 94660 CPAP INITIATION&MGMT: CPT

## 2025-08-18 PROCEDURE — 36415 COLL VENOUS BLD VENIPUNCTURE: CPT

## 2025-08-18 PROCEDURE — 84100 ASSAY OF PHOSPHORUS: CPT

## 2025-08-18 PROCEDURE — 99233 SBSQ HOSP IP/OBS HIGH 50: CPT | Mod: GC

## 2025-08-18 PROCEDURE — 36430 TRANSFUSION BLD/BLD COMPNT: CPT

## 2025-08-18 PROCEDURE — 86140 C-REACTIVE PROTEIN: CPT

## 2025-08-18 PROCEDURE — 36569 INSJ PICC 5 YR+ W/O IMAGING: CPT

## 2025-08-18 PROCEDURE — 84132 ASSAY OF SERUM POTASSIUM: CPT

## 2025-08-18 PROCEDURE — P9011: CPT

## 2025-08-18 PROCEDURE — 85014 HEMATOCRIT: CPT

## 2025-08-18 PROCEDURE — 83605 ASSAY OF LACTIC ACID: CPT

## 2025-08-18 PROCEDURE — 93005 ELECTROCARDIOGRAM TRACING: CPT

## 2025-08-18 PROCEDURE — 86901 BLOOD TYPING SEROLOGIC RH(D): CPT

## 2025-08-18 PROCEDURE — 86923 COMPATIBILITY TEST ELECTRIC: CPT

## 2025-08-18 PROCEDURE — 97530 THERAPEUTIC ACTIVITIES: CPT

## 2025-08-18 PROCEDURE — 84436 ASSAY OF TOTAL THYROXINE: CPT

## 2025-08-18 PROCEDURE — 94640 AIRWAY INHALATION TREATMENT: CPT

## 2025-08-18 PROCEDURE — 87070 CULTURE OTHR SPECIMN AEROBIC: CPT

## 2025-08-18 PROCEDURE — 87040 BLOOD CULTURE FOR BACTERIA: CPT

## 2025-08-18 PROCEDURE — 93923 UPR/LXTR ART STDY 3+ LVLS: CPT

## 2025-08-18 PROCEDURE — 80053 COMPREHEN METABOLIC PANEL: CPT

## 2025-08-18 PROCEDURE — 93306 TTE W/DOPPLER COMPLETE: CPT

## 2025-08-18 PROCEDURE — 73630 X-RAY EXAM OF FOOT: CPT

## 2025-08-18 PROCEDURE — 87640 STAPH A DNA AMP PROBE: CPT

## 2025-08-18 PROCEDURE — 97162 PT EVAL MOD COMPLEX 30 MIN: CPT

## 2025-08-18 PROCEDURE — 87186 SC STD MICRODIL/AGAR DIL: CPT

## 2025-08-18 PROCEDURE — 76770 US EXAM ABDO BACK WALL COMP: CPT

## 2025-08-18 PROCEDURE — 85652 RBC SED RATE AUTOMATED: CPT

## 2025-08-18 PROCEDURE — 0225U NFCT DS DNA&RNA 21 SARSCOV2: CPT

## 2025-08-18 PROCEDURE — 84484 ASSAY OF TROPONIN QUANT: CPT

## 2025-08-18 PROCEDURE — 83036 HEMOGLOBIN GLYCOSYLATED A1C: CPT

## 2025-08-18 PROCEDURE — 82330 ASSAY OF CALCIUM: CPT

## 2025-08-18 PROCEDURE — 85025 COMPLETE CBC W/AUTO DIFF WBC: CPT

## 2025-08-18 PROCEDURE — 84540 ASSAY OF URINE/UREA-N: CPT

## 2025-08-18 PROCEDURE — 82803 BLOOD GASES ANY COMBINATION: CPT

## 2025-08-18 PROCEDURE — 83690 ASSAY OF LIPASE: CPT

## 2025-08-18 PROCEDURE — 87637 SARSCOV2&INF A&B&RSV AMP PRB: CPT

## 2025-08-18 PROCEDURE — 86985 SPLIT BLOOD OR PRODUCTS: CPT

## 2025-08-18 PROCEDURE — 84300 ASSAY OF URINE SODIUM: CPT

## 2025-08-18 PROCEDURE — 86900 BLOOD TYPING SEROLOGIC ABO: CPT

## 2025-08-18 PROCEDURE — 73590 X-RAY EXAM OF LOWER LEG: CPT

## 2025-08-18 PROCEDURE — 97110 THERAPEUTIC EXERCISES: CPT

## 2025-08-18 PROCEDURE — 97116 GAIT TRAINING THERAPY: CPT

## 2025-08-18 PROCEDURE — 81001 URINALYSIS AUTO W/SCOPE: CPT

## 2025-08-18 PROCEDURE — 84156 ASSAY OF PROTEIN URINE: CPT

## 2025-08-18 PROCEDURE — 80048 BASIC METABOLIC PNL TOTAL CA: CPT

## 2025-08-18 PROCEDURE — 83880 ASSAY OF NATRIURETIC PEPTIDE: CPT

## 2025-08-18 PROCEDURE — 80202 ASSAY OF VANCOMYCIN: CPT

## 2025-08-18 PROCEDURE — 71275 CT ANGIOGRAPHY CHEST: CPT

## 2025-08-18 PROCEDURE — 87899 AGENT NOS ASSAY W/OPTIC: CPT

## 2025-08-18 PROCEDURE — 86850 RBC ANTIBODY SCREEN: CPT

## 2025-08-18 PROCEDURE — 99233 SBSQ HOSP IP/OBS HIGH 50: CPT

## 2025-08-18 PROCEDURE — 82570 ASSAY OF URINE CREATININE: CPT

## 2025-08-18 PROCEDURE — C1751: CPT

## 2025-08-18 PROCEDURE — 84145 PROCALCITONIN (PCT): CPT

## 2025-08-18 RX ORDER — NALOXONE HYDROCHLORIDE 0.4 MG/ML
0.1 INJECTION, SOLUTION INTRAMUSCULAR; INTRAVENOUS; SUBCUTANEOUS
Refills: 0 | Status: DISCONTINUED | OUTPATIENT
Start: 2025-08-18 | End: 2025-09-04

## 2025-08-18 RX ORDER — OXYCODONE HYDROCHLORIDE 30 MG/1
2.5 TABLET ORAL EVERY 4 HOURS
Refills: 0 | Status: DISCONTINUED | OUTPATIENT
Start: 2025-08-18 | End: 2025-08-24

## 2025-08-18 RX ORDER — OXYCODONE HYDROCHLORIDE 30 MG/1
5 TABLET ORAL EVERY 4 HOURS
Refills: 0 | Status: DISCONTINUED | OUTPATIENT
Start: 2025-08-18 | End: 2025-08-25

## 2025-08-18 RX ORDER — BUMETANIDE 1 MG/1
2 TABLET ORAL EVERY 8 HOURS
Refills: 0 | Status: DISCONTINUED | OUTPATIENT
Start: 2025-08-18 | End: 2025-08-19

## 2025-08-18 RX ADMIN — BUMETANIDE 2 MILLIGRAM(S): 1 TABLET ORAL at 15:31

## 2025-08-18 RX ADMIN — APIXABAN 5 MILLIGRAM(S): 2.5 TABLET, FILM COATED ORAL at 06:48

## 2025-08-18 RX ADMIN — Medication 2 MILLIGRAM(S): at 22:33

## 2025-08-18 RX ADMIN — GABAPENTIN 300 MILLIGRAM(S): 400 CAPSULE ORAL at 06:48

## 2025-08-18 RX ADMIN — AMLODIPINE BESYLATE 5 MILLIGRAM(S): 10 TABLET ORAL at 06:48

## 2025-08-18 RX ADMIN — Medication 1 DOSE(S): at 17:26

## 2025-08-18 RX ADMIN — POLYETHYLENE GLYCOL 3350 17 GRAM(S): 17 POWDER, FOR SOLUTION ORAL at 17:26

## 2025-08-18 RX ADMIN — Medication 5 MILLIGRAM(S): at 22:32

## 2025-08-18 RX ADMIN — INSULIN LISPRO 13 UNIT(S): 100 INJECTION, SOLUTION INTRAVENOUS; SUBCUTANEOUS at 13:55

## 2025-08-18 RX ADMIN — MUPIROCIN CALCIUM 1 APPLICATION(S): 20 CREAM TOPICAL at 07:03

## 2025-08-18 RX ADMIN — Medication 1 TABLET(S): at 11:10

## 2025-08-18 RX ADMIN — Medication 2 TABLET(S): at 22:32

## 2025-08-18 RX ADMIN — GABAPENTIN 300 MILLIGRAM(S): 400 CAPSULE ORAL at 13:33

## 2025-08-18 RX ADMIN — INSULIN LISPRO 3: 100 INJECTION, SOLUTION INTRAVENOUS; SUBCUTANEOUS at 08:51

## 2025-08-18 RX ADMIN — IPRATROPIUM BROMIDE AND ALBUTEROL SULFATE 3 MILLILITER(S): .5; 2.5 SOLUTION RESPIRATORY (INHALATION) at 14:23

## 2025-08-18 RX ADMIN — Medication 81 MILLIGRAM(S): at 14:59

## 2025-08-18 RX ADMIN — NYSTATIN 1 APPLICATION(S): 100000 CREAM TOPICAL at 11:24

## 2025-08-18 RX ADMIN — ATORVASTATIN CALCIUM 40 MILLIGRAM(S): 80 TABLET, FILM COATED ORAL at 22:35

## 2025-08-18 RX ADMIN — INSULIN LISPRO 13 UNIT(S): 100 INJECTION, SOLUTION INTRAVENOUS; SUBCUTANEOUS at 17:36

## 2025-08-18 RX ADMIN — APIXABAN 5 MILLIGRAM(S): 2.5 TABLET, FILM COATED ORAL at 17:26

## 2025-08-18 RX ADMIN — INSULIN LISPRO 13 UNIT(S): 100 INJECTION, SOLUTION INTRAVENOUS; SUBCUTANEOUS at 08:53

## 2025-08-18 RX ADMIN — Medication 1 DOSE(S): at 06:49

## 2025-08-18 RX ADMIN — NYSTATIN 1 APPLICATION(S): 100000 CREAM TOPICAL at 22:33

## 2025-08-18 RX ADMIN — Medication 40 MILLIGRAM(S): at 06:48

## 2025-08-18 RX ADMIN — OXYCODONE HYDROCHLORIDE 2.5 MILLIGRAM(S): 30 TABLET ORAL at 08:09

## 2025-08-18 RX ADMIN — Medication 1 APPLICATION(S): at 17:11

## 2025-08-18 RX ADMIN — Medication 200 MICROGRAM(S): at 06:48

## 2025-08-18 RX ADMIN — Medication 40 MILLIEQUIVALENT(S): at 11:10

## 2025-08-18 RX ADMIN — OXYCODONE HYDROCHLORIDE 5 MILLIGRAM(S): 30 TABLET ORAL at 19:24

## 2025-08-18 RX ADMIN — GABAPENTIN 300 MILLIGRAM(S): 400 CAPSULE ORAL at 22:32

## 2025-08-18 RX ADMIN — INSULIN LISPRO 1: 100 INJECTION, SOLUTION INTRAVENOUS; SUBCUTANEOUS at 17:36

## 2025-08-18 RX ADMIN — IPRATROPIUM BROMIDE AND ALBUTEROL SULFATE 3 MILLILITER(S): .5; 2.5 SOLUTION RESPIRATORY (INHALATION) at 22:33

## 2025-08-18 RX ADMIN — OXYCODONE HYDROCHLORIDE 5 MILLIGRAM(S): 30 TABLET ORAL at 14:59

## 2025-08-18 RX ADMIN — OXYCODONE HYDROCHLORIDE 2.5 MILLIGRAM(S): 30 TABLET ORAL at 09:00

## 2025-08-18 RX ADMIN — WHITE PETROLATUM 1 APPLICATION(S): 1 OINTMENT TOPICAL at 11:23

## 2025-08-18 RX ADMIN — BUMETANIDE 2 MILLIGRAM(S): 1 TABLET ORAL at 06:48

## 2025-08-18 RX ADMIN — CEFTRIAXONE 100 MILLIGRAM(S): 500 INJECTION, POWDER, FOR SOLUTION INTRAMUSCULAR; INTRAVENOUS at 17:25

## 2025-08-18 RX ADMIN — INSULIN GLARGINE-YFGN 27 UNIT(S): 100 INJECTION, SOLUTION SUBCUTANEOUS at 22:29

## 2025-08-18 RX ADMIN — OXYCODONE HYDROCHLORIDE 5 MILLIGRAM(S): 30 TABLET ORAL at 16:00

## 2025-08-18 RX ADMIN — IPRATROPIUM BROMIDE AND ALBUTEROL SULFATE 3 MILLILITER(S): .5; 2.5 SOLUTION RESPIRATORY (INHALATION) at 06:49

## 2025-08-18 RX ADMIN — AMIODARONE HYDROCHLORIDE 200 MILLIGRAM(S): 50 INJECTION, SOLUTION INTRAVENOUS at 06:48

## 2025-08-18 RX ADMIN — INSULIN LISPRO 1: 100 INJECTION, SOLUTION INTRAVENOUS; SUBCUTANEOUS at 13:55

## 2025-08-19 LAB
ALBUMIN SERPL ELPH-MCNC: 3.1 G/DL — LOW (ref 3.3–5)
ALP SERPL-CCNC: 64 U/L — SIGNIFICANT CHANGE UP (ref 40–120)
ALT FLD-CCNC: 10 U/L — SIGNIFICANT CHANGE UP (ref 10–45)
ANION GAP SERPL CALC-SCNC: 12 MMOL/L — SIGNIFICANT CHANGE UP (ref 5–17)
AST SERPL-CCNC: 12 U/L — SIGNIFICANT CHANGE UP (ref 10–40)
BASOPHILS # BLD AUTO: 0.08 K/UL — SIGNIFICANT CHANGE UP (ref 0–0.2)
BASOPHILS NFR BLD AUTO: 0.8 % — SIGNIFICANT CHANGE UP (ref 0–2)
BILIRUB SERPL-MCNC: 0.6 MG/DL — SIGNIFICANT CHANGE UP (ref 0.2–1.2)
BLD GP AB SCN SERPL QL: NEGATIVE — SIGNIFICANT CHANGE UP
BUN SERPL-MCNC: 35 MG/DL — HIGH (ref 7–23)
CALCIUM SERPL-MCNC: 9.1 MG/DL — SIGNIFICANT CHANGE UP (ref 8.4–10.5)
CHLORIDE SERPL-SCNC: 94 MMOL/L — LOW (ref 96–108)
CO2 SERPL-SCNC: 37 MMOL/L — HIGH (ref 22–31)
CREAT SERPL-MCNC: 1.28 MG/DL — SIGNIFICANT CHANGE UP (ref 0.5–1.3)
EGFR: 43 ML/MIN/1.73M2 — LOW
EGFR: 43 ML/MIN/1.73M2 — LOW
EOSINOPHIL # BLD AUTO: 0.45 K/UL — SIGNIFICANT CHANGE UP (ref 0–0.5)
EOSINOPHIL NFR BLD AUTO: 4.6 % — SIGNIFICANT CHANGE UP (ref 0–6)
GLUCOSE BLDC GLUCOMTR-MCNC: 162 MG/DL — HIGH (ref 70–99)
GLUCOSE BLDC GLUCOMTR-MCNC: 184 MG/DL — HIGH (ref 70–99)
GLUCOSE BLDC GLUCOMTR-MCNC: 189 MG/DL — HIGH (ref 70–99)
GLUCOSE BLDC GLUCOMTR-MCNC: 250 MG/DL — HIGH (ref 70–99)
GLUCOSE SERPL-MCNC: 159 MG/DL — HIGH (ref 70–99)
HCT VFR BLD CALC: 25.9 % — LOW (ref 34.5–45)
HGB BLD-MCNC: 7.6 G/DL — LOW (ref 11.5–15.5)
IMM GRANULOCYTES # BLD AUTO: 0.12 K/UL — HIGH (ref 0–0.07)
IMM GRANULOCYTES NFR BLD AUTO: 1.2 % — HIGH (ref 0–0.9)
LYMPHOCYTES # BLD AUTO: 1.65 K/UL — SIGNIFICANT CHANGE UP (ref 1–3.3)
LYMPHOCYTES NFR BLD AUTO: 16.8 % — SIGNIFICANT CHANGE UP (ref 13–44)
MAGNESIUM SERPL-MCNC: 2.2 MG/DL — SIGNIFICANT CHANGE UP (ref 1.6–2.6)
MCHC RBC-ENTMCNC: 26.4 PG — LOW (ref 27–34)
MCHC RBC-ENTMCNC: 29.3 G/DL — LOW (ref 32–36)
MCV RBC AUTO: 89.9 FL — SIGNIFICANT CHANGE UP (ref 80–100)
MONOCYTES # BLD AUTO: 0.61 K/UL — SIGNIFICANT CHANGE UP (ref 0–0.9)
MONOCYTES NFR BLD AUTO: 6.2 % — SIGNIFICANT CHANGE UP (ref 2–14)
NEUTROPHILS # BLD AUTO: 6.94 K/UL — SIGNIFICANT CHANGE UP (ref 1.8–7.4)
NEUTROPHILS NFR BLD AUTO: 70.4 % — SIGNIFICANT CHANGE UP (ref 43–77)
NRBC # BLD AUTO: 0 K/UL — SIGNIFICANT CHANGE UP (ref 0–0)
NRBC # FLD: 0 K/UL — SIGNIFICANT CHANGE UP (ref 0–0)
NRBC BLD AUTO-RTO: 0 /100 WBCS — SIGNIFICANT CHANGE UP (ref 0–0)
PHOSPHATE SERPL-MCNC: 3.5 MG/DL — SIGNIFICANT CHANGE UP (ref 2.5–4.5)
PLATELET # BLD AUTO: 345 K/UL — SIGNIFICANT CHANGE UP (ref 150–400)
PMV BLD: 9.9 FL — SIGNIFICANT CHANGE UP (ref 7–13)
POTASSIUM SERPL-MCNC: 3.1 MMOL/L — LOW (ref 3.5–5.3)
POTASSIUM SERPL-SCNC: 3.1 MMOL/L — LOW (ref 3.5–5.3)
PROT SERPL-MCNC: 6.6 G/DL — SIGNIFICANT CHANGE UP (ref 6–8.3)
RBC # BLD: 2.88 M/UL — LOW (ref 3.8–5.2)
RBC # FLD: 17.6 % — HIGH (ref 10.3–14.5)
RH IG SCN BLD-IMP: POSITIVE — SIGNIFICANT CHANGE UP
SODIUM SERPL-SCNC: 143 MMOL/L — SIGNIFICANT CHANGE UP (ref 135–145)
WBC # BLD: 9.85 K/UL — SIGNIFICANT CHANGE UP (ref 3.8–10.5)
WBC # FLD AUTO: 9.85 K/UL — SIGNIFICANT CHANGE UP (ref 3.8–10.5)

## 2025-08-19 PROCEDURE — 99233 SBSQ HOSP IP/OBS HIGH 50: CPT

## 2025-08-19 PROCEDURE — 99233 SBSQ HOSP IP/OBS HIGH 50: CPT | Mod: GC

## 2025-08-19 RX ORDER — BUMETANIDE 1 MG/1
1 TABLET ORAL
Qty: 20 | Refills: 0 | Status: DISCONTINUED | OUTPATIENT
Start: 2025-08-19 | End: 2025-08-23

## 2025-08-19 RX ORDER — SPIRONOLACTONE 25 MG
50 TABLET ORAL DAILY
Refills: 0 | Status: DISCONTINUED | OUTPATIENT
Start: 2025-08-19 | End: 2025-09-04

## 2025-08-19 RX ADMIN — Medication 20 MILLIEQUIVALENT(S): at 19:00

## 2025-08-19 RX ADMIN — Medication 1 APPLICATION(S): at 13:14

## 2025-08-19 RX ADMIN — OXYCODONE HYDROCHLORIDE 2.5 MILLIGRAM(S): 30 TABLET ORAL at 17:28

## 2025-08-19 RX ADMIN — AMIODARONE HYDROCHLORIDE 200 MILLIGRAM(S): 50 INJECTION, SOLUTION INTRAVENOUS at 06:35

## 2025-08-19 RX ADMIN — OXYCODONE HYDROCHLORIDE 5 MILLIGRAM(S): 30 TABLET ORAL at 02:25

## 2025-08-19 RX ADMIN — APIXABAN 5 MILLIGRAM(S): 2.5 TABLET, FILM COATED ORAL at 06:35

## 2025-08-19 RX ADMIN — IPRATROPIUM BROMIDE AND ALBUTEROL SULFATE 3 MILLILITER(S): .5; 2.5 SOLUTION RESPIRATORY (INHALATION) at 06:35

## 2025-08-19 RX ADMIN — IPRATROPIUM BROMIDE AND ALBUTEROL SULFATE 3 MILLILITER(S): .5; 2.5 SOLUTION RESPIRATORY (INHALATION) at 21:29

## 2025-08-19 RX ADMIN — Medication 2 TABLET(S): at 21:29

## 2025-08-19 RX ADMIN — Medication 5 MILLIGRAM(S): at 23:03

## 2025-08-19 RX ADMIN — BUMETANIDE 5 MG/HR: 1 TABLET ORAL at 14:27

## 2025-08-19 RX ADMIN — Medication 1 DOSE(S): at 17:29

## 2025-08-19 RX ADMIN — NYSTATIN 1 APPLICATION(S): 100000 CREAM TOPICAL at 09:27

## 2025-08-19 RX ADMIN — NYSTATIN 1 APPLICATION(S): 100000 CREAM TOPICAL at 21:57

## 2025-08-19 RX ADMIN — Medication 20 MILLIEQUIVALENT(S): at 14:34

## 2025-08-19 RX ADMIN — INSULIN LISPRO 2: 100 INJECTION, SOLUTION INTRAVENOUS; SUBCUTANEOUS at 13:10

## 2025-08-19 RX ADMIN — Medication 1 TABLET(S): at 13:12

## 2025-08-19 RX ADMIN — INSULIN LISPRO 13 UNIT(S): 100 INJECTION, SOLUTION INTRAVENOUS; SUBCUTANEOUS at 13:10

## 2025-08-19 RX ADMIN — Medication 200 MICROGRAM(S): at 06:34

## 2025-08-19 RX ADMIN — Medication 2 MILLIGRAM(S): at 23:02

## 2025-08-19 RX ADMIN — APIXABAN 5 MILLIGRAM(S): 2.5 TABLET, FILM COATED ORAL at 17:29

## 2025-08-19 RX ADMIN — GABAPENTIN 300 MILLIGRAM(S): 400 CAPSULE ORAL at 06:35

## 2025-08-19 RX ADMIN — WHITE PETROLATUM 1 APPLICATION(S): 1 OINTMENT TOPICAL at 13:17

## 2025-08-19 RX ADMIN — INSULIN LISPRO 13 UNIT(S): 100 INJECTION, SOLUTION INTRAVENOUS; SUBCUTANEOUS at 18:02

## 2025-08-19 RX ADMIN — Medication 50 MILLIGRAM(S): at 13:17

## 2025-08-19 RX ADMIN — Medication 40 MILLIGRAM(S): at 06:35

## 2025-08-19 RX ADMIN — POLYETHYLENE GLYCOL 3350 17 GRAM(S): 17 POWDER, FOR SOLUTION ORAL at 21:29

## 2025-08-19 RX ADMIN — CEFTRIAXONE 100 MILLIGRAM(S): 500 INJECTION, POWDER, FOR SOLUTION INTRAMUSCULAR; INTRAVENOUS at 18:01

## 2025-08-19 RX ADMIN — IPRATROPIUM BROMIDE AND ALBUTEROL SULFATE 3 MILLILITER(S): .5; 2.5 SOLUTION RESPIRATORY (INHALATION) at 13:18

## 2025-08-19 RX ADMIN — Medication 1 DOSE(S): at 06:35

## 2025-08-19 RX ADMIN — GABAPENTIN 300 MILLIGRAM(S): 400 CAPSULE ORAL at 13:12

## 2025-08-19 RX ADMIN — INSULIN LISPRO 1: 100 INJECTION, SOLUTION INTRAVENOUS; SUBCUTANEOUS at 18:01

## 2025-08-19 RX ADMIN — Medication 5 MILLIGRAM(S): at 21:29

## 2025-08-19 RX ADMIN — Medication 40 MILLIEQUIVALENT(S): at 11:23

## 2025-08-19 RX ADMIN — Medication 81 MILLIGRAM(S): at 13:12

## 2025-08-19 RX ADMIN — GABAPENTIN 300 MILLIGRAM(S): 400 CAPSULE ORAL at 21:32

## 2025-08-19 RX ADMIN — MUPIROCIN CALCIUM 1 APPLICATION(S): 20 CREAM TOPICAL at 06:36

## 2025-08-19 RX ADMIN — BUMETANIDE 2 MILLIGRAM(S): 1 TABLET ORAL at 06:35

## 2025-08-19 RX ADMIN — OXYCODONE HYDROCHLORIDE 2.5 MILLIGRAM(S): 30 TABLET ORAL at 18:37

## 2025-08-19 RX ADMIN — INSULIN LISPRO 1: 100 INJECTION, SOLUTION INTRAVENOUS; SUBCUTANEOUS at 09:07

## 2025-08-19 RX ADMIN — ATORVASTATIN CALCIUM 40 MILLIGRAM(S): 80 TABLET, FILM COATED ORAL at 21:30

## 2025-08-19 RX ADMIN — INSULIN LISPRO 13 UNIT(S): 100 INJECTION, SOLUTION INTRAVENOUS; SUBCUTANEOUS at 09:07

## 2025-08-19 RX ADMIN — INSULIN GLARGINE-YFGN 27 UNIT(S): 100 INJECTION, SOLUTION SUBCUTANEOUS at 21:28

## 2025-08-20 LAB
ALBUMIN SERPL ELPH-MCNC: 3.2 G/DL — LOW (ref 3.3–5)
ALP SERPL-CCNC: 68 U/L — SIGNIFICANT CHANGE UP (ref 40–120)
ALT FLD-CCNC: 11 U/L — SIGNIFICANT CHANGE UP (ref 10–45)
ANION GAP SERPL CALC-SCNC: 13 MMOL/L — SIGNIFICANT CHANGE UP (ref 5–17)
ANION GAP SERPL CALC-SCNC: 16 MMOL/L — SIGNIFICANT CHANGE UP (ref 5–17)
APPEARANCE UR: CLEAR — SIGNIFICANT CHANGE UP
AST SERPL-CCNC: 14 U/L — SIGNIFICANT CHANGE UP (ref 10–40)
BACTERIA # UR AUTO: NEGATIVE /HPF — SIGNIFICANT CHANGE UP
BASOPHILS # BLD AUTO: 0.08 K/UL — SIGNIFICANT CHANGE UP (ref 0–0.2)
BASOPHILS NFR BLD AUTO: 0.8 % — SIGNIFICANT CHANGE UP (ref 0–2)
BILIRUB SERPL-MCNC: 0.6 MG/DL — SIGNIFICANT CHANGE UP (ref 0.2–1.2)
BILIRUB UR-MCNC: NEGATIVE — SIGNIFICANT CHANGE UP
BUN SERPL-MCNC: 42 MG/DL — HIGH (ref 7–23)
BUN SERPL-MCNC: 46 MG/DL — HIGH (ref 7–23)
CALCIUM SERPL-MCNC: 9.6 MG/DL — SIGNIFICANT CHANGE UP (ref 8.4–10.5)
CALCIUM SERPL-MCNC: 9.6 MG/DL — SIGNIFICANT CHANGE UP (ref 8.4–10.5)
CAST: 1 /LPF — SIGNIFICANT CHANGE UP (ref 0–4)
CHLORIDE SERPL-SCNC: 87 MMOL/L — LOW (ref 96–108)
CHLORIDE SERPL-SCNC: 89 MMOL/L — LOW (ref 96–108)
CO2 SERPL-SCNC: 38 MMOL/L — HIGH (ref 22–31)
CO2 SERPL-SCNC: 41 MMOL/L — HIGH (ref 22–31)
COLOR SPEC: YELLOW — SIGNIFICANT CHANGE UP
CREAT SERPL-MCNC: 1.4 MG/DL — HIGH (ref 0.5–1.3)
CREAT SERPL-MCNC: 1.53 MG/DL — HIGH (ref 0.5–1.3)
DIFF PNL FLD: ABNORMAL
EGFR: 35 ML/MIN/1.73M2 — LOW
EGFR: 35 ML/MIN/1.73M2 — LOW
EGFR: 39 ML/MIN/1.73M2 — LOW
EGFR: 39 ML/MIN/1.73M2 — LOW
EOSINOPHIL # BLD AUTO: 0.51 K/UL — HIGH (ref 0–0.5)
EOSINOPHIL NFR BLD AUTO: 5.2 % — SIGNIFICANT CHANGE UP (ref 0–6)
GLUCOSE BLDC GLUCOMTR-MCNC: 146 MG/DL — HIGH (ref 70–99)
GLUCOSE BLDC GLUCOMTR-MCNC: 179 MG/DL — HIGH (ref 70–99)
GLUCOSE BLDC GLUCOMTR-MCNC: 215 MG/DL — HIGH (ref 70–99)
GLUCOSE BLDC GLUCOMTR-MCNC: 275 MG/DL — HIGH (ref 70–99)
GLUCOSE SERPL-MCNC: 123 MG/DL — HIGH (ref 70–99)
GLUCOSE SERPL-MCNC: 257 MG/DL — HIGH (ref 70–99)
GLUCOSE UR QL: NEGATIVE MG/DL — SIGNIFICANT CHANGE UP
HCT VFR BLD CALC: 26.7 % — LOW (ref 34.5–45)
HGB BLD-MCNC: 7.9 G/DL — LOW (ref 11.5–15.5)
IMM GRANULOCYTES # BLD AUTO: 0.13 K/UL — HIGH (ref 0–0.07)
IMM GRANULOCYTES NFR BLD AUTO: 1.3 % — HIGH (ref 0–0.9)
KETONES UR QL: NEGATIVE MG/DL — SIGNIFICANT CHANGE UP
LEUKOCYTE ESTERASE UR-ACNC: NEGATIVE — SIGNIFICANT CHANGE UP
LYMPHOCYTES # BLD AUTO: 1.33 K/UL — SIGNIFICANT CHANGE UP (ref 1–3.3)
LYMPHOCYTES NFR BLD AUTO: 13.6 % — SIGNIFICANT CHANGE UP (ref 13–44)
MAGNESIUM SERPL-MCNC: 2 MG/DL — SIGNIFICANT CHANGE UP (ref 1.6–2.6)
MCHC RBC-ENTMCNC: 26.3 PG — LOW (ref 27–34)
MCHC RBC-ENTMCNC: 29.6 G/DL — LOW (ref 32–36)
MCV RBC AUTO: 89 FL — SIGNIFICANT CHANGE UP (ref 80–100)
MONOCYTES # BLD AUTO: 0.64 K/UL — SIGNIFICANT CHANGE UP (ref 0–0.9)
MONOCYTES NFR BLD AUTO: 6.5 % — SIGNIFICANT CHANGE UP (ref 2–14)
NEUTROPHILS # BLD AUTO: 7.1 K/UL — SIGNIFICANT CHANGE UP (ref 1.8–7.4)
NEUTROPHILS NFR BLD AUTO: 72.6 % — SIGNIFICANT CHANGE UP (ref 43–77)
NITRITE UR-MCNC: NEGATIVE — SIGNIFICANT CHANGE UP
NRBC # BLD AUTO: 0 K/UL — SIGNIFICANT CHANGE UP (ref 0–0)
NRBC # FLD: 0 K/UL — SIGNIFICANT CHANGE UP (ref 0–0)
NRBC BLD AUTO-RTO: 0 /100 WBCS — SIGNIFICANT CHANGE UP (ref 0–0)
PH UR: 7 — SIGNIFICANT CHANGE UP (ref 5–8)
PHOSPHATE SERPL-MCNC: 4 MG/DL — SIGNIFICANT CHANGE UP (ref 2.5–4.5)
PLATELET # BLD AUTO: 386 K/UL — SIGNIFICANT CHANGE UP (ref 150–400)
PMV BLD: 10.1 FL — SIGNIFICANT CHANGE UP (ref 7–13)
POTASSIUM SERPL-MCNC: 3.2 MMOL/L — LOW (ref 3.5–5.3)
POTASSIUM SERPL-MCNC: 3.8 MMOL/L — SIGNIFICANT CHANGE UP (ref 3.5–5.3)
POTASSIUM SERPL-SCNC: 3.2 MMOL/L — LOW (ref 3.5–5.3)
POTASSIUM SERPL-SCNC: 3.8 MMOL/L — SIGNIFICANT CHANGE UP (ref 3.5–5.3)
PROT SERPL-MCNC: 6.8 G/DL — SIGNIFICANT CHANGE UP (ref 6–8.3)
PROT UR-MCNC: NEGATIVE MG/DL — SIGNIFICANT CHANGE UP
RBC # BLD: 3 M/UL — LOW (ref 3.8–5.2)
RBC # FLD: 17 % — HIGH (ref 10.3–14.5)
RBC CASTS # UR COMP ASSIST: 27 /HPF — HIGH (ref 0–4)
REVIEW: SIGNIFICANT CHANGE UP
SODIUM SERPL-SCNC: 141 MMOL/L — SIGNIFICANT CHANGE UP (ref 135–145)
SODIUM SERPL-SCNC: 143 MMOL/L — SIGNIFICANT CHANGE UP (ref 135–145)
SP GR SPEC: 1.01 — SIGNIFICANT CHANGE UP (ref 1–1.03)
SQUAMOUS # UR AUTO: 1 /HPF — SIGNIFICANT CHANGE UP (ref 0–5)
UROBILINOGEN FLD QL: 0.2 MG/DL — SIGNIFICANT CHANGE UP (ref 0.2–1)
WBC # BLD: 9.79 K/UL — SIGNIFICANT CHANGE UP (ref 3.8–10.5)
WBC # FLD AUTO: 9.79 K/UL — SIGNIFICANT CHANGE UP (ref 3.8–10.5)
WBC UR QL: 1 /HPF — SIGNIFICANT CHANGE UP (ref 0–5)

## 2025-08-20 PROCEDURE — 99233 SBSQ HOSP IP/OBS HIGH 50: CPT | Mod: GC

## 2025-08-20 RX ORDER — ACETAZOLAMIDE 250 MG/1
250 TABLET ORAL ONCE
Refills: 0 | Status: COMPLETED | OUTPATIENT
Start: 2025-08-20 | End: 2025-08-20

## 2025-08-20 RX ORDER — ONDANSETRON HCL/PF 4 MG/2 ML
4 VIAL (ML) INJECTION ONCE
Refills: 0 | Status: COMPLETED | OUTPATIENT
Start: 2025-08-20 | End: 2025-08-22

## 2025-08-20 RX ORDER — LACTULOSE 10 G/15ML
10 SOLUTION ORAL DAILY
Refills: 0 | Status: DISCONTINUED | OUTPATIENT
Start: 2025-08-20 | End: 2025-08-21

## 2025-08-20 RX ORDER — POLYETHYLENE GLYCOL 3350 17 G/17G
17 POWDER, FOR SOLUTION ORAL
Refills: 0 | Status: DISCONTINUED | OUTPATIENT
Start: 2025-08-20 | End: 2025-09-04

## 2025-08-20 RX ADMIN — GABAPENTIN 300 MILLIGRAM(S): 400 CAPSULE ORAL at 21:19

## 2025-08-20 RX ADMIN — OXYCODONE HYDROCHLORIDE 5 MILLIGRAM(S): 30 TABLET ORAL at 01:03

## 2025-08-20 RX ADMIN — GABAPENTIN 300 MILLIGRAM(S): 400 CAPSULE ORAL at 13:02

## 2025-08-20 RX ADMIN — AMIODARONE HYDROCHLORIDE 200 MILLIGRAM(S): 50 INJECTION, SOLUTION INTRAVENOUS at 06:08

## 2025-08-20 RX ADMIN — NYSTATIN 1 APPLICATION(S): 100000 CREAM TOPICAL at 21:24

## 2025-08-20 RX ADMIN — Medication 40 MILLIGRAM(S): at 06:09

## 2025-08-20 RX ADMIN — OXYCODONE HYDROCHLORIDE 2.5 MILLIGRAM(S): 30 TABLET ORAL at 22:00

## 2025-08-20 RX ADMIN — LACTULOSE 10 GRAM(S): 10 SOLUTION ORAL at 22:00

## 2025-08-20 RX ADMIN — APIXABAN 5 MILLIGRAM(S): 2.5 TABLET, FILM COATED ORAL at 06:09

## 2025-08-20 RX ADMIN — ACETAZOLAMIDE 250 MILLIGRAM(S): 250 TABLET ORAL at 16:08

## 2025-08-20 RX ADMIN — ATORVASTATIN CALCIUM 40 MILLIGRAM(S): 80 TABLET, FILM COATED ORAL at 21:19

## 2025-08-20 RX ADMIN — Medication 50 MILLIGRAM(S): at 06:09

## 2025-08-20 RX ADMIN — NYSTATIN 1 APPLICATION(S): 100000 CREAM TOPICAL at 13:01

## 2025-08-20 RX ADMIN — INSULIN LISPRO 1: 100 INJECTION, SOLUTION INTRAVENOUS; SUBCUTANEOUS at 22:18

## 2025-08-20 RX ADMIN — AMLODIPINE BESYLATE 5 MILLIGRAM(S): 10 TABLET ORAL at 06:09

## 2025-08-20 RX ADMIN — Medication 1 DOSE(S): at 17:46

## 2025-08-20 RX ADMIN — Medication 2 TABLET(S): at 21:19

## 2025-08-20 RX ADMIN — OXYCODONE HYDROCHLORIDE 2.5 MILLIGRAM(S): 30 TABLET ORAL at 02:57

## 2025-08-20 RX ADMIN — INSULIN LISPRO 1: 100 INJECTION, SOLUTION INTRAVENOUS; SUBCUTANEOUS at 09:04

## 2025-08-20 RX ADMIN — Medication 2 MILLIGRAM(S): at 23:15

## 2025-08-20 RX ADMIN — IPRATROPIUM BROMIDE AND ALBUTEROL SULFATE 3 MILLILITER(S): .5; 2.5 SOLUTION RESPIRATORY (INHALATION) at 21:19

## 2025-08-20 RX ADMIN — WHITE PETROLATUM 1 APPLICATION(S): 1 OINTMENT TOPICAL at 13:03

## 2025-08-20 RX ADMIN — Medication 1 APPLICATION(S): at 13:03

## 2025-08-20 RX ADMIN — Medication 5 MILLIGRAM(S): at 21:19

## 2025-08-20 RX ADMIN — Medication 200 MICROGRAM(S): at 06:09

## 2025-08-20 RX ADMIN — OXYCODONE HYDROCHLORIDE 5 MILLIGRAM(S): 30 TABLET ORAL at 00:21

## 2025-08-20 RX ADMIN — MUPIROCIN CALCIUM 1 APPLICATION(S): 20 CREAM TOPICAL at 06:05

## 2025-08-20 RX ADMIN — INSULIN LISPRO 13 UNIT(S): 100 INJECTION, SOLUTION INTRAVENOUS; SUBCUTANEOUS at 13:02

## 2025-08-20 RX ADMIN — Medication 5 MILLIGRAM(S): at 22:18

## 2025-08-20 RX ADMIN — INSULIN LISPRO 13 UNIT(S): 100 INJECTION, SOLUTION INTRAVENOUS; SUBCUTANEOUS at 09:04

## 2025-08-20 RX ADMIN — INSULIN LISPRO 2: 100 INJECTION, SOLUTION INTRAVENOUS; SUBCUTANEOUS at 17:44

## 2025-08-20 RX ADMIN — IPRATROPIUM BROMIDE AND ALBUTEROL SULFATE 3 MILLILITER(S): .5; 2.5 SOLUTION RESPIRATORY (INHALATION) at 06:07

## 2025-08-20 RX ADMIN — APIXABAN 5 MILLIGRAM(S): 2.5 TABLET, FILM COATED ORAL at 17:46

## 2025-08-20 RX ADMIN — IPRATROPIUM BROMIDE AND ALBUTEROL SULFATE 3 MILLILITER(S): .5; 2.5 SOLUTION RESPIRATORY (INHALATION) at 13:03

## 2025-08-20 RX ADMIN — Medication 40 MILLIEQUIVALENT(S): at 16:08

## 2025-08-20 RX ADMIN — Medication 81 MILLIGRAM(S): at 13:02

## 2025-08-20 RX ADMIN — POLYETHYLENE GLYCOL 3350 17 GRAM(S): 17 POWDER, FOR SOLUTION ORAL at 17:47

## 2025-08-20 RX ADMIN — GABAPENTIN 300 MILLIGRAM(S): 400 CAPSULE ORAL at 06:07

## 2025-08-20 RX ADMIN — INSULIN GLARGINE-YFGN 27 UNIT(S): 100 INJECTION, SOLUTION SUBCUTANEOUS at 22:17

## 2025-08-20 RX ADMIN — INSULIN LISPRO 13 UNIT(S): 100 INJECTION, SOLUTION INTRAVENOUS; SUBCUTANEOUS at 17:45

## 2025-08-20 RX ADMIN — Medication 1 TABLET(S): at 13:02

## 2025-08-20 RX ADMIN — Medication 1 DOSE(S): at 06:07

## 2025-08-21 LAB
ALBUMIN SERPL ELPH-MCNC: 3 G/DL — LOW (ref 3.3–5)
ALP SERPL-CCNC: 67 U/L — SIGNIFICANT CHANGE UP (ref 40–120)
ALT FLD-CCNC: 11 U/L — SIGNIFICANT CHANGE UP (ref 10–45)
ANION GAP SERPL CALC-SCNC: 14 MMOL/L — SIGNIFICANT CHANGE UP (ref 5–17)
ANION GAP SERPL CALC-SCNC: 15 MMOL/L — SIGNIFICANT CHANGE UP (ref 5–17)
AST SERPL-CCNC: 14 U/L — SIGNIFICANT CHANGE UP (ref 10–40)
BASOPHILS # BLD AUTO: 0.07 K/UL — SIGNIFICANT CHANGE UP (ref 0–0.2)
BASOPHILS NFR BLD AUTO: 0.6 % — SIGNIFICANT CHANGE UP (ref 0–2)
BILIRUB SERPL-MCNC: 0.7 MG/DL — SIGNIFICANT CHANGE UP (ref 0.2–1.2)
BUN SERPL-MCNC: 47 MG/DL — HIGH (ref 7–23)
BUN SERPL-MCNC: 48 MG/DL — HIGH (ref 7–23)
CALCIUM SERPL-MCNC: 10.2 MG/DL — SIGNIFICANT CHANGE UP (ref 8.4–10.5)
CALCIUM SERPL-MCNC: 9.6 MG/DL — SIGNIFICANT CHANGE UP (ref 8.4–10.5)
CHLORIDE SERPL-SCNC: 85 MMOL/L — LOW (ref 96–108)
CHLORIDE SERPL-SCNC: 85 MMOL/L — LOW (ref 96–108)
CO2 SERPL-SCNC: 37 MMOL/L — HIGH (ref 22–31)
CO2 SERPL-SCNC: 40 MMOL/L — HIGH (ref 22–31)
CREAT SERPL-MCNC: 1.53 MG/DL — HIGH (ref 0.5–1.3)
CREAT SERPL-MCNC: 1.7 MG/DL — HIGH (ref 0.5–1.3)
EGFR: 31 ML/MIN/1.73M2 — LOW
EGFR: 31 ML/MIN/1.73M2 — LOW
EGFR: 35 ML/MIN/1.73M2 — LOW
EGFR: 35 ML/MIN/1.73M2 — LOW
EOSINOPHIL # BLD AUTO: 0.6 K/UL — HIGH (ref 0–0.5)
EOSINOPHIL NFR BLD AUTO: 5.5 % — SIGNIFICANT CHANGE UP (ref 0–6)
GLUCOSE BLDC GLUCOMTR-MCNC: 198 MG/DL — HIGH (ref 70–99)
GLUCOSE BLDC GLUCOMTR-MCNC: 207 MG/DL — HIGH (ref 70–99)
GLUCOSE BLDC GLUCOMTR-MCNC: 210 MG/DL — HIGH (ref 70–99)
GLUCOSE BLDC GLUCOMTR-MCNC: 212 MG/DL — HIGH (ref 70–99)
GLUCOSE SERPL-MCNC: 160 MG/DL — HIGH (ref 70–99)
GLUCOSE SERPL-MCNC: 199 MG/DL — HIGH (ref 70–99)
HCT VFR BLD CALC: 27.5 % — LOW (ref 34.5–45)
HGB BLD-MCNC: 8 G/DL — LOW (ref 11.5–15.5)
IMM GRANULOCYTES # BLD AUTO: 0.14 K/UL — HIGH (ref 0–0.07)
IMM GRANULOCYTES NFR BLD AUTO: 1.3 % — HIGH (ref 0–0.9)
LYMPHOCYTES # BLD AUTO: 1.6 K/UL — SIGNIFICANT CHANGE UP (ref 1–3.3)
LYMPHOCYTES NFR BLD AUTO: 14.7 % — SIGNIFICANT CHANGE UP (ref 13–44)
MAGNESIUM SERPL-MCNC: 2.1 MG/DL — SIGNIFICANT CHANGE UP (ref 1.6–2.6)
MCHC RBC-ENTMCNC: 26 PG — LOW (ref 27–34)
MCHC RBC-ENTMCNC: 29.1 G/DL — LOW (ref 32–36)
MCV RBC AUTO: 89.3 FL — SIGNIFICANT CHANGE UP (ref 80–100)
MONOCYTES # BLD AUTO: 0.58 K/UL — SIGNIFICANT CHANGE UP (ref 0–0.9)
MONOCYTES NFR BLD AUTO: 5.3 % — SIGNIFICANT CHANGE UP (ref 2–14)
NEUTROPHILS # BLD AUTO: 7.9 K/UL — HIGH (ref 1.8–7.4)
NEUTROPHILS NFR BLD AUTO: 72.6 % — SIGNIFICANT CHANGE UP (ref 43–77)
NRBC # BLD AUTO: 0.02 K/UL — HIGH (ref 0–0)
NRBC # FLD: 0.02 K/UL — HIGH (ref 0–0)
NRBC BLD AUTO-RTO: 0 /100 WBCS — SIGNIFICANT CHANGE UP (ref 0–0)
PHOSPHATE SERPL-MCNC: 4.9 MG/DL — HIGH (ref 2.5–4.5)
PLATELET # BLD AUTO: 402 K/UL — HIGH (ref 150–400)
PMV BLD: 9.9 FL — SIGNIFICANT CHANGE UP (ref 7–13)
POTASSIUM SERPL-MCNC: 3.2 MMOL/L — LOW (ref 3.5–5.3)
POTASSIUM SERPL-MCNC: 4.6 MMOL/L — SIGNIFICANT CHANGE UP (ref 3.5–5.3)
POTASSIUM SERPL-SCNC: 3.2 MMOL/L — LOW (ref 3.5–5.3)
POTASSIUM SERPL-SCNC: 4.6 MMOL/L — SIGNIFICANT CHANGE UP (ref 3.5–5.3)
PROT SERPL-MCNC: 6.9 G/DL — SIGNIFICANT CHANGE UP (ref 6–8.3)
RBC # BLD: 3.08 M/UL — LOW (ref 3.8–5.2)
RBC # FLD: 16.9 % — HIGH (ref 10.3–14.5)
SODIUM SERPL-SCNC: 137 MMOL/L — SIGNIFICANT CHANGE UP (ref 135–145)
SODIUM SERPL-SCNC: 139 MMOL/L — SIGNIFICANT CHANGE UP (ref 135–145)
WBC # BLD: 10.89 K/UL — HIGH (ref 3.8–10.5)
WBC # FLD AUTO: 10.89 K/UL — HIGH (ref 3.8–10.5)

## 2025-08-21 PROCEDURE — 99233 SBSQ HOSP IP/OBS HIGH 50: CPT | Mod: GC

## 2025-08-21 RX ORDER — LACTULOSE 10 G/15ML
10 SOLUTION ORAL
Refills: 0 | Status: DISCONTINUED | OUTPATIENT
Start: 2025-08-21 | End: 2025-08-21

## 2025-08-21 RX ORDER — LACTULOSE 10 G/15ML
15 SOLUTION ORAL THREE TIMES A DAY
Refills: 0 | Status: COMPLETED | OUTPATIENT
Start: 2025-08-21 | End: 2025-08-23

## 2025-08-21 RX ORDER — INSULIN GLARGINE-YFGN 100 [IU]/ML
29 INJECTION, SOLUTION SUBCUTANEOUS AT BEDTIME
Refills: 0 | Status: DISCONTINUED | OUTPATIENT
Start: 2025-08-21 | End: 2025-08-27

## 2025-08-21 RX ADMIN — LACTULOSE 15 GRAM(S): 10 SOLUTION ORAL at 21:55

## 2025-08-21 RX ADMIN — INSULIN GLARGINE-YFGN 29 UNIT(S): 100 INJECTION, SOLUTION SUBCUTANEOUS at 21:50

## 2025-08-21 RX ADMIN — APIXABAN 5 MILLIGRAM(S): 2.5 TABLET, FILM COATED ORAL at 06:17

## 2025-08-21 RX ADMIN — Medication 81 MILLIGRAM(S): at 12:06

## 2025-08-21 RX ADMIN — Medication 40 MILLIGRAM(S): at 06:17

## 2025-08-21 RX ADMIN — IPRATROPIUM BROMIDE AND ALBUTEROL SULFATE 3 MILLILITER(S): .5; 2.5 SOLUTION RESPIRATORY (INHALATION) at 13:02

## 2025-08-21 RX ADMIN — Medication 1 DOSE(S): at 06:17

## 2025-08-21 RX ADMIN — LACTULOSE 15 GRAM(S): 10 SOLUTION ORAL at 17:39

## 2025-08-21 RX ADMIN — BUMETANIDE 5 MG/HR: 1 TABLET ORAL at 12:05

## 2025-08-21 RX ADMIN — INSULIN LISPRO 2: 100 INJECTION, SOLUTION INTRAVENOUS; SUBCUTANEOUS at 13:01

## 2025-08-21 RX ADMIN — INSULIN LISPRO 13 UNIT(S): 100 INJECTION, SOLUTION INTRAVENOUS; SUBCUTANEOUS at 13:02

## 2025-08-21 RX ADMIN — Medication 40 MILLIEQUIVALENT(S): at 18:16

## 2025-08-21 RX ADMIN — GABAPENTIN 300 MILLIGRAM(S): 400 CAPSULE ORAL at 13:06

## 2025-08-21 RX ADMIN — AMLODIPINE BESYLATE 5 MILLIGRAM(S): 10 TABLET ORAL at 06:17

## 2025-08-21 RX ADMIN — Medication 200 MICROGRAM(S): at 06:17

## 2025-08-21 RX ADMIN — GABAPENTIN 300 MILLIGRAM(S): 400 CAPSULE ORAL at 21:48

## 2025-08-21 RX ADMIN — INSULIN LISPRO 13 UNIT(S): 100 INJECTION, SOLUTION INTRAVENOUS; SUBCUTANEOUS at 18:15

## 2025-08-21 RX ADMIN — Medication 1 APPLICATION(S): at 12:05

## 2025-08-21 RX ADMIN — POLYETHYLENE GLYCOL 3350 17 GRAM(S): 17 POWDER, FOR SOLUTION ORAL at 18:16

## 2025-08-21 RX ADMIN — MUPIROCIN CALCIUM 1 APPLICATION(S): 20 CREAM TOPICAL at 06:30

## 2025-08-21 RX ADMIN — INSULIN LISPRO 1: 100 INJECTION, SOLUTION INTRAVENOUS; SUBCUTANEOUS at 08:49

## 2025-08-21 RX ADMIN — NYSTATIN 1 APPLICATION(S): 100000 CREAM TOPICAL at 21:55

## 2025-08-21 RX ADMIN — WHITE PETROLATUM 1 APPLICATION(S): 1 OINTMENT TOPICAL at 12:06

## 2025-08-21 RX ADMIN — ATORVASTATIN CALCIUM 40 MILLIGRAM(S): 80 TABLET, FILM COATED ORAL at 21:48

## 2025-08-21 RX ADMIN — Medication 50 MILLIGRAM(S): at 06:17

## 2025-08-21 RX ADMIN — APIXABAN 5 MILLIGRAM(S): 2.5 TABLET, FILM COATED ORAL at 18:16

## 2025-08-21 RX ADMIN — Medication 40 MILLIEQUIVALENT(S): at 13:07

## 2025-08-21 RX ADMIN — INSULIN LISPRO 2: 100 INJECTION, SOLUTION INTRAVENOUS; SUBCUTANEOUS at 18:14

## 2025-08-21 RX ADMIN — Medication 1 TABLET(S): at 12:05

## 2025-08-21 RX ADMIN — Medication 2 MILLIGRAM(S): at 22:05

## 2025-08-21 RX ADMIN — Medication 5 MILLIGRAM(S): at 23:08

## 2025-08-21 RX ADMIN — IPRATROPIUM BROMIDE AND ALBUTEROL SULFATE 3 MILLILITER(S): .5; 2.5 SOLUTION RESPIRATORY (INHALATION) at 21:48

## 2025-08-21 RX ADMIN — Medication 1 DOSE(S): at 18:16

## 2025-08-21 RX ADMIN — GABAPENTIN 300 MILLIGRAM(S): 400 CAPSULE ORAL at 06:16

## 2025-08-21 RX ADMIN — POLYETHYLENE GLYCOL 3350 17 GRAM(S): 17 POWDER, FOR SOLUTION ORAL at 06:31

## 2025-08-21 RX ADMIN — LACTULOSE 10 GRAM(S): 10 SOLUTION ORAL at 12:05

## 2025-08-21 RX ADMIN — NYSTATIN 1 APPLICATION(S): 100000 CREAM TOPICAL at 12:02

## 2025-08-21 RX ADMIN — IPRATROPIUM BROMIDE AND ALBUTEROL SULFATE 3 MILLILITER(S): .5; 2.5 SOLUTION RESPIRATORY (INHALATION) at 06:17

## 2025-08-21 RX ADMIN — Medication 5 MILLIGRAM(S): at 21:55

## 2025-08-21 RX ADMIN — AMIODARONE HYDROCHLORIDE 200 MILLIGRAM(S): 50 INJECTION, SOLUTION INTRAVENOUS at 06:30

## 2025-08-21 RX ADMIN — BUMETANIDE 5 MG/HR: 1 TABLET ORAL at 21:56

## 2025-08-21 RX ADMIN — OXYCODONE HYDROCHLORIDE 5 MILLIGRAM(S): 30 TABLET ORAL at 23:07

## 2025-08-21 RX ADMIN — INSULIN LISPRO 13 UNIT(S): 100 INJECTION, SOLUTION INTRAVENOUS; SUBCUTANEOUS at 08:49

## 2025-08-22 LAB
ALBUMIN SERPL ELPH-MCNC: 3.4 G/DL — SIGNIFICANT CHANGE UP (ref 3.3–5)
ALP SERPL-CCNC: 68 U/L — SIGNIFICANT CHANGE UP (ref 40–120)
ALT FLD-CCNC: 11 U/L — SIGNIFICANT CHANGE UP (ref 10–45)
ANION GAP SERPL CALC-SCNC: 14 MMOL/L — SIGNIFICANT CHANGE UP (ref 5–17)
ANION GAP SERPL CALC-SCNC: 15 MMOL/L — SIGNIFICANT CHANGE UP (ref 5–17)
AST SERPL-CCNC: 11 U/L — SIGNIFICANT CHANGE UP (ref 10–40)
BASOPHILS # BLD AUTO: 0.08 K/UL — SIGNIFICANT CHANGE UP (ref 0–0.2)
BASOPHILS NFR BLD AUTO: 0.7 % — SIGNIFICANT CHANGE UP (ref 0–2)
BILIRUB SERPL-MCNC: 0.7 MG/DL — SIGNIFICANT CHANGE UP (ref 0.2–1.2)
BUN SERPL-MCNC: 52 MG/DL — HIGH (ref 7–23)
BUN SERPL-MCNC: 53 MG/DL — HIGH (ref 7–23)
CALCIUM SERPL-MCNC: 9.8 MG/DL — SIGNIFICANT CHANGE UP (ref 8.4–10.5)
CALCIUM SERPL-MCNC: 9.9 MG/DL — SIGNIFICANT CHANGE UP (ref 8.4–10.5)
CHLORIDE SERPL-SCNC: 85 MMOL/L — LOW (ref 96–108)
CHLORIDE SERPL-SCNC: 86 MMOL/L — LOW (ref 96–108)
CO2 SERPL-SCNC: 38 MMOL/L — HIGH (ref 22–31)
CO2 SERPL-SCNC: 40 MMOL/L — HIGH (ref 22–31)
CREAT SERPL-MCNC: 1.81 MG/DL — HIGH (ref 0.5–1.3)
CREAT SERPL-MCNC: 2.06 MG/DL — HIGH (ref 0.5–1.3)
EGFR: 24 ML/MIN/1.73M2 — LOW
EGFR: 24 ML/MIN/1.73M2 — LOW
EGFR: 28 ML/MIN/1.73M2 — LOW
EGFR: 28 ML/MIN/1.73M2 — LOW
EOSINOPHIL # BLD AUTO: 0.51 K/UL — HIGH (ref 0–0.5)
EOSINOPHIL NFR BLD AUTO: 4.4 % — SIGNIFICANT CHANGE UP (ref 0–6)
GLUCOSE BLDC GLUCOMTR-MCNC: 172 MG/DL — HIGH (ref 70–99)
GLUCOSE BLDC GLUCOMTR-MCNC: 191 MG/DL — HIGH (ref 70–99)
GLUCOSE BLDC GLUCOMTR-MCNC: 204 MG/DL — HIGH (ref 70–99)
GLUCOSE BLDC GLUCOMTR-MCNC: 364 MG/DL — HIGH (ref 70–99)
GLUCOSE SERPL-MCNC: 147 MG/DL — HIGH (ref 70–99)
GLUCOSE SERPL-MCNC: 166 MG/DL — HIGH (ref 70–99)
HCT VFR BLD CALC: 27.8 % — LOW (ref 34.5–45)
HGB BLD-MCNC: 8.3 G/DL — LOW (ref 11.5–15.5)
IMM GRANULOCYTES # BLD AUTO: 0.14 K/UL — HIGH (ref 0–0.07)
IMM GRANULOCYTES NFR BLD AUTO: 1.2 % — HIGH (ref 0–0.9)
LYMPHOCYTES # BLD AUTO: 1.75 K/UL — SIGNIFICANT CHANGE UP (ref 1–3.3)
LYMPHOCYTES NFR BLD AUTO: 15 % — SIGNIFICANT CHANGE UP (ref 13–44)
MAGNESIUM SERPL-MCNC: 2.4 MG/DL — SIGNIFICANT CHANGE UP (ref 1.6–2.6)
MCHC RBC-ENTMCNC: 26.4 PG — LOW (ref 27–34)
MCHC RBC-ENTMCNC: 29.9 G/DL — LOW (ref 32–36)
MCV RBC AUTO: 88.5 FL — SIGNIFICANT CHANGE UP (ref 80–100)
MONOCYTES # BLD AUTO: 0.66 K/UL — SIGNIFICANT CHANGE UP (ref 0–0.9)
MONOCYTES NFR BLD AUTO: 5.6 % — SIGNIFICANT CHANGE UP (ref 2–14)
NEUTROPHILS # BLD AUTO: 8.55 K/UL — HIGH (ref 1.8–7.4)
NEUTROPHILS NFR BLD AUTO: 73.1 % — SIGNIFICANT CHANGE UP (ref 43–77)
NRBC # BLD AUTO: 0 K/UL — SIGNIFICANT CHANGE UP (ref 0–0)
NRBC # FLD: 0 K/UL — SIGNIFICANT CHANGE UP (ref 0–0)
NRBC BLD AUTO-RTO: 0 /100 WBCS — SIGNIFICANT CHANGE UP (ref 0–0)
PHOSPHATE SERPL-MCNC: 5.4 MG/DL — HIGH (ref 2.5–4.5)
PLATELET # BLD AUTO: 433 K/UL — HIGH (ref 150–400)
PMV BLD: 10 FL — SIGNIFICANT CHANGE UP (ref 7–13)
POTASSIUM SERPL-MCNC: 3.8 MMOL/L — SIGNIFICANT CHANGE UP (ref 3.5–5.3)
POTASSIUM SERPL-MCNC: 4.4 MMOL/L — SIGNIFICANT CHANGE UP (ref 3.5–5.3)
POTASSIUM SERPL-SCNC: 3.8 MMOL/L — SIGNIFICANT CHANGE UP (ref 3.5–5.3)
POTASSIUM SERPL-SCNC: 4.4 MMOL/L — SIGNIFICANT CHANGE UP (ref 3.5–5.3)
PROT SERPL-MCNC: 7.1 G/DL — SIGNIFICANT CHANGE UP (ref 6–8.3)
RBC # BLD: 3.14 M/UL — LOW (ref 3.8–5.2)
RBC # FLD: 16.9 % — HIGH (ref 10.3–14.5)
SODIUM SERPL-SCNC: 139 MMOL/L — SIGNIFICANT CHANGE UP (ref 135–145)
SODIUM SERPL-SCNC: 139 MMOL/L — SIGNIFICANT CHANGE UP (ref 135–145)
WBC # BLD: 11.69 K/UL — HIGH (ref 3.8–10.5)
WBC # FLD AUTO: 11.69 K/UL — HIGH (ref 3.8–10.5)

## 2025-08-22 PROCEDURE — ZZZZZ: CPT

## 2025-08-22 PROCEDURE — 93010 ELECTROCARDIOGRAM REPORT: CPT

## 2025-08-22 RX ORDER — BISACODYL 5 MG
10 TABLET, DELAYED RELEASE (ENTERIC COATED) ORAL ONCE
Refills: 0 | Status: COMPLETED | OUTPATIENT
Start: 2025-08-22 | End: 2025-08-22

## 2025-08-22 RX ADMIN — OXYCODONE HYDROCHLORIDE 2.5 MILLIGRAM(S): 30 TABLET ORAL at 09:29

## 2025-08-22 RX ADMIN — Medication 5 MILLIGRAM(S): at 21:28

## 2025-08-22 RX ADMIN — IPRATROPIUM BROMIDE AND ALBUTEROL SULFATE 3 MILLILITER(S): .5; 2.5 SOLUTION RESPIRATORY (INHALATION) at 05:29

## 2025-08-22 RX ADMIN — Medication 1 APPLICATION(S): at 14:20

## 2025-08-22 RX ADMIN — APIXABAN 5 MILLIGRAM(S): 2.5 TABLET, FILM COATED ORAL at 05:30

## 2025-08-22 RX ADMIN — IPRATROPIUM BROMIDE AND ALBUTEROL SULFATE 3 MILLILITER(S): .5; 2.5 SOLUTION RESPIRATORY (INHALATION) at 14:20

## 2025-08-22 RX ADMIN — GABAPENTIN 300 MILLIGRAM(S): 400 CAPSULE ORAL at 21:29

## 2025-08-22 RX ADMIN — INSULIN GLARGINE-YFGN 29 UNIT(S): 100 INJECTION, SOLUTION SUBCUTANEOUS at 21:29

## 2025-08-22 RX ADMIN — MUPIROCIN CALCIUM 1 APPLICATION(S): 20 CREAM TOPICAL at 06:48

## 2025-08-22 RX ADMIN — ATORVASTATIN CALCIUM 40 MILLIGRAM(S): 80 TABLET, FILM COATED ORAL at 21:28

## 2025-08-22 RX ADMIN — Medication 50 MILLIGRAM(S): at 05:43

## 2025-08-22 RX ADMIN — GABAPENTIN 300 MILLIGRAM(S): 400 CAPSULE ORAL at 14:19

## 2025-08-22 RX ADMIN — NYSTATIN 1 APPLICATION(S): 100000 CREAM TOPICAL at 09:02

## 2025-08-22 RX ADMIN — INSULIN LISPRO 13 UNIT(S): 100 INJECTION, SOLUTION INTRAVENOUS; SUBCUTANEOUS at 13:50

## 2025-08-22 RX ADMIN — Medication 2 TABLET(S): at 21:28

## 2025-08-22 RX ADMIN — Medication 1 DOSE(S): at 18:26

## 2025-08-22 RX ADMIN — Medication 40 MILLIGRAM(S): at 05:43

## 2025-08-22 RX ADMIN — POLYETHYLENE GLYCOL 3350 17 GRAM(S): 17 POWDER, FOR SOLUTION ORAL at 18:25

## 2025-08-22 RX ADMIN — INSULIN LISPRO 13 UNIT(S): 100 INJECTION, SOLUTION INTRAVENOUS; SUBCUTANEOUS at 09:02

## 2025-08-22 RX ADMIN — INSULIN LISPRO 5: 100 INJECTION, SOLUTION INTRAVENOUS; SUBCUTANEOUS at 09:01

## 2025-08-22 RX ADMIN — LACTULOSE 15 GRAM(S): 10 SOLUTION ORAL at 14:20

## 2025-08-22 RX ADMIN — Medication 2 MILLIGRAM(S): at 22:49

## 2025-08-22 RX ADMIN — INSULIN LISPRO 13 UNIT(S): 100 INJECTION, SOLUTION INTRAVENOUS; SUBCUTANEOUS at 18:25

## 2025-08-22 RX ADMIN — Medication 1 DOSE(S): at 05:38

## 2025-08-22 RX ADMIN — IPRATROPIUM BROMIDE AND ALBUTEROL SULFATE 3 MILLILITER(S): .5; 2.5 SOLUTION RESPIRATORY (INHALATION) at 21:29

## 2025-08-22 RX ADMIN — APIXABAN 5 MILLIGRAM(S): 2.5 TABLET, FILM COATED ORAL at 18:25

## 2025-08-22 RX ADMIN — Medication 10 MILLIGRAM(S): at 18:25

## 2025-08-22 RX ADMIN — INSULIN LISPRO 1: 100 INJECTION, SOLUTION INTRAVENOUS; SUBCUTANEOUS at 13:50

## 2025-08-22 RX ADMIN — OXYCODONE HYDROCHLORIDE 5 MILLIGRAM(S): 30 TABLET ORAL at 00:05

## 2025-08-22 RX ADMIN — AMIODARONE HYDROCHLORIDE 200 MILLIGRAM(S): 50 INJECTION, SOLUTION INTRAVENOUS at 05:37

## 2025-08-22 RX ADMIN — INSULIN LISPRO 1: 100 INJECTION, SOLUTION INTRAVENOUS; SUBCUTANEOUS at 18:24

## 2025-08-22 RX ADMIN — Medication 200 MICROGRAM(S): at 05:37

## 2025-08-22 RX ADMIN — OXYCODONE HYDROCHLORIDE 5 MILLIGRAM(S): 30 TABLET ORAL at 12:29

## 2025-08-22 RX ADMIN — NYSTATIN 1 APPLICATION(S): 100000 CREAM TOPICAL at 21:30

## 2025-08-22 RX ADMIN — GABAPENTIN 300 MILLIGRAM(S): 400 CAPSULE ORAL at 05:29

## 2025-08-22 RX ADMIN — Medication 4 MILLIGRAM(S): at 23:26

## 2025-08-22 RX ADMIN — Medication 1 TABLET(S): at 14:20

## 2025-08-22 RX ADMIN — Medication 81 MILLIGRAM(S): at 14:19

## 2025-08-22 RX ADMIN — LACTULOSE 15 GRAM(S): 10 SOLUTION ORAL at 21:29

## 2025-08-22 RX ADMIN — WHITE PETROLATUM 1 APPLICATION(S): 1 OINTMENT TOPICAL at 14:21

## 2025-08-23 LAB
ALBUMIN SERPL ELPH-MCNC: 3.3 G/DL — SIGNIFICANT CHANGE UP (ref 3.3–5)
ALP SERPL-CCNC: 69 U/L — SIGNIFICANT CHANGE UP (ref 40–120)
ALT FLD-CCNC: 8 U/L — LOW (ref 10–45)
ANION GAP SERPL CALC-SCNC: 16 MMOL/L — SIGNIFICANT CHANGE UP (ref 5–17)
AST SERPL-CCNC: 10 U/L — SIGNIFICANT CHANGE UP (ref 10–40)
BILIRUB SERPL-MCNC: 0.7 MG/DL — SIGNIFICANT CHANGE UP (ref 0.2–1.2)
BUN SERPL-MCNC: 58 MG/DL — HIGH (ref 7–23)
CALCIUM SERPL-MCNC: 9.5 MG/DL — SIGNIFICANT CHANGE UP (ref 8.4–10.5)
CHLORIDE SERPL-SCNC: 84 MMOL/L — LOW (ref 96–108)
CO2 SERPL-SCNC: 36 MMOL/L — HIGH (ref 22–31)
CREAT SERPL-MCNC: 2.41 MG/DL — HIGH (ref 0.5–1.3)
EGFR: 20 ML/MIN/1.73M2 — LOW
EGFR: 20 ML/MIN/1.73M2 — LOW
GLUCOSE BLDC GLUCOMTR-MCNC: 144 MG/DL — HIGH (ref 70–99)
GLUCOSE BLDC GLUCOMTR-MCNC: 202 MG/DL — HIGH (ref 70–99)
GLUCOSE BLDC GLUCOMTR-MCNC: 207 MG/DL — HIGH (ref 70–99)
GLUCOSE BLDC GLUCOMTR-MCNC: 265 MG/DL — HIGH (ref 70–99)
GLUCOSE SERPL-MCNC: 188 MG/DL — HIGH (ref 70–99)
HCT VFR BLD CALC: 27.4 % — LOW (ref 34.5–45)
HGB BLD-MCNC: 8.1 G/DL — LOW (ref 11.5–15.5)
MAGNESIUM SERPL-MCNC: 2.6 MG/DL — SIGNIFICANT CHANGE UP (ref 1.6–2.6)
MCHC RBC-ENTMCNC: 26.3 PG — LOW (ref 27–34)
MCHC RBC-ENTMCNC: 29.6 G/DL — LOW (ref 32–36)
MCV RBC AUTO: 89 FL — SIGNIFICANT CHANGE UP (ref 80–100)
NRBC # BLD AUTO: 0 K/UL — SIGNIFICANT CHANGE UP (ref 0–0)
NRBC # FLD: 0 K/UL — SIGNIFICANT CHANGE UP (ref 0–0)
NRBC BLD AUTO-RTO: 0 /100 WBCS — SIGNIFICANT CHANGE UP (ref 0–0)
PHOSPHATE SERPL-MCNC: 6.3 MG/DL — HIGH (ref 2.5–4.5)
PLATELET # BLD AUTO: 434 K/UL — HIGH (ref 150–400)
PMV BLD: 9.9 FL — SIGNIFICANT CHANGE UP (ref 7–13)
POTASSIUM SERPL-MCNC: 4.2 MMOL/L — SIGNIFICANT CHANGE UP (ref 3.5–5.3)
POTASSIUM SERPL-SCNC: 4.2 MMOL/L — SIGNIFICANT CHANGE UP (ref 3.5–5.3)
PROT SERPL-MCNC: 7 G/DL — SIGNIFICANT CHANGE UP (ref 6–8.3)
RBC # BLD: 3.08 M/UL — LOW (ref 3.8–5.2)
RBC # FLD: 16.9 % — HIGH (ref 10.3–14.5)
SODIUM SERPL-SCNC: 136 MMOL/L — SIGNIFICANT CHANGE UP (ref 135–145)
WBC # BLD: 12.76 K/UL — HIGH (ref 3.8–10.5)
WBC # FLD AUTO: 12.76 K/UL — HIGH (ref 3.8–10.5)

## 2025-08-23 PROCEDURE — 99233 SBSQ HOSP IP/OBS HIGH 50: CPT | Mod: GC

## 2025-08-23 RX ORDER — BUMETANIDE 1 MG/1
3 TABLET ORAL ONCE
Refills: 0 | Status: COMPLETED | OUTPATIENT
Start: 2025-08-23 | End: 2025-08-23

## 2025-08-23 RX ORDER — BUMETANIDE 1 MG/1
3 TABLET ORAL
Refills: 0 | Status: DISCONTINUED | OUTPATIENT
Start: 2025-08-23 | End: 2025-08-24

## 2025-08-23 RX ORDER — BUMETANIDE 1 MG/1
3 TABLET ORAL
Refills: 0 | Status: DISCONTINUED | OUTPATIENT
Start: 2025-08-23 | End: 2025-08-23

## 2025-08-23 RX ADMIN — APIXABAN 5 MILLIGRAM(S): 2.5 TABLET, FILM COATED ORAL at 05:47

## 2025-08-23 RX ADMIN — INSULIN LISPRO 13 UNIT(S): 100 INJECTION, SOLUTION INTRAVENOUS; SUBCUTANEOUS at 18:09

## 2025-08-23 RX ADMIN — INSULIN LISPRO 2: 100 INJECTION, SOLUTION INTRAVENOUS; SUBCUTANEOUS at 09:35

## 2025-08-23 RX ADMIN — OXYCODONE HYDROCHLORIDE 2.5 MILLIGRAM(S): 30 TABLET ORAL at 14:48

## 2025-08-23 RX ADMIN — LACTULOSE 15 GRAM(S): 10 SOLUTION ORAL at 21:48

## 2025-08-23 RX ADMIN — Medication 2 MILLIGRAM(S): at 22:45

## 2025-08-23 RX ADMIN — INSULIN LISPRO 1: 100 INJECTION, SOLUTION INTRAVENOUS; SUBCUTANEOUS at 21:48

## 2025-08-23 RX ADMIN — OXYCODONE HYDROCHLORIDE 5 MILLIGRAM(S): 30 TABLET ORAL at 00:07

## 2025-08-23 RX ADMIN — APIXABAN 5 MILLIGRAM(S): 2.5 TABLET, FILM COATED ORAL at 18:21

## 2025-08-23 RX ADMIN — Medication 200 MICROGRAM(S): at 07:44

## 2025-08-23 RX ADMIN — POLYETHYLENE GLYCOL 3350 17 GRAM(S): 17 POWDER, FOR SOLUTION ORAL at 18:24

## 2025-08-23 RX ADMIN — BUMETANIDE 5 MG/HR: 1 TABLET ORAL at 05:46

## 2025-08-23 RX ADMIN — NYSTATIN 1 APPLICATION(S): 100000 CREAM TOPICAL at 09:36

## 2025-08-23 RX ADMIN — GABAPENTIN 300 MILLIGRAM(S): 400 CAPSULE ORAL at 21:47

## 2025-08-23 RX ADMIN — LACTULOSE 15 GRAM(S): 10 SOLUTION ORAL at 13:35

## 2025-08-23 RX ADMIN — NYSTATIN 1 APPLICATION(S): 100000 CREAM TOPICAL at 21:50

## 2025-08-23 RX ADMIN — GABAPENTIN 300 MILLIGRAM(S): 400 CAPSULE ORAL at 05:48

## 2025-08-23 RX ADMIN — Medication 40 MILLIGRAM(S): at 05:48

## 2025-08-23 RX ADMIN — ATORVASTATIN CALCIUM 40 MILLIGRAM(S): 80 TABLET, FILM COATED ORAL at 21:50

## 2025-08-23 RX ADMIN — BUMETANIDE 124 MILLIGRAM(S): 1 TABLET ORAL at 09:34

## 2025-08-23 RX ADMIN — Medication 1 DOSE(S): at 18:23

## 2025-08-23 RX ADMIN — Medication 1 DOSE(S): at 05:48

## 2025-08-23 RX ADMIN — INSULIN GLARGINE-YFGN 29 UNIT(S): 100 INJECTION, SOLUTION SUBCUTANEOUS at 21:47

## 2025-08-23 RX ADMIN — Medication 50 MILLIGRAM(S): at 05:48

## 2025-08-23 RX ADMIN — INSULIN LISPRO 13 UNIT(S): 100 INJECTION, SOLUTION INTRAVENOUS; SUBCUTANEOUS at 13:35

## 2025-08-23 RX ADMIN — Medication 2 TABLET(S): at 21:50

## 2025-08-23 RX ADMIN — INSULIN LISPRO 2: 100 INJECTION, SOLUTION INTRAVENOUS; SUBCUTANEOUS at 18:07

## 2025-08-23 RX ADMIN — Medication 1 APPLICATION(S): at 13:45

## 2025-08-23 RX ADMIN — Medication 1 TABLET(S): at 13:36

## 2025-08-23 RX ADMIN — OXYCODONE HYDROCHLORIDE 2.5 MILLIGRAM(S): 30 TABLET ORAL at 18:06

## 2025-08-23 RX ADMIN — BUMETANIDE 3 MILLIGRAM(S): 1 TABLET ORAL at 18:21

## 2025-08-23 RX ADMIN — Medication 81 MILLIGRAM(S): at 13:36

## 2025-08-23 RX ADMIN — AMIODARONE HYDROCHLORIDE 200 MILLIGRAM(S): 50 INJECTION, SOLUTION INTRAVENOUS at 05:47

## 2025-08-23 RX ADMIN — Medication 5 MILLIGRAM(S): at 21:49

## 2025-08-23 RX ADMIN — IPRATROPIUM BROMIDE AND ALBUTEROL SULFATE 3 MILLILITER(S): .5; 2.5 SOLUTION RESPIRATORY (INHALATION) at 05:49

## 2025-08-23 RX ADMIN — IPRATROPIUM BROMIDE AND ALBUTEROL SULFATE 3 MILLILITER(S): .5; 2.5 SOLUTION RESPIRATORY (INHALATION) at 21:48

## 2025-08-23 RX ADMIN — INSULIN LISPRO 13 UNIT(S): 100 INJECTION, SOLUTION INTRAVENOUS; SUBCUTANEOUS at 09:35

## 2025-08-23 RX ADMIN — GABAPENTIN 300 MILLIGRAM(S): 400 CAPSULE ORAL at 13:35

## 2025-08-23 RX ADMIN — OXYCODONE HYDROCHLORIDE 5 MILLIGRAM(S): 30 TABLET ORAL at 23:50

## 2025-08-23 RX ADMIN — IPRATROPIUM BROMIDE AND ALBUTEROL SULFATE 3 MILLILITER(S): .5; 2.5 SOLUTION RESPIRATORY (INHALATION) at 13:35

## 2025-08-23 RX ADMIN — WHITE PETROLATUM 1 APPLICATION(S): 1 OINTMENT TOPICAL at 13:38

## 2025-08-23 RX ADMIN — MUPIROCIN CALCIUM 1 APPLICATION(S): 20 CREAM TOPICAL at 05:49

## 2025-08-24 LAB
ALBUMIN SERPL ELPH-MCNC: 3.2 G/DL — LOW (ref 3.3–5)
ALP SERPL-CCNC: 63 U/L — SIGNIFICANT CHANGE UP (ref 40–120)
ALT FLD-CCNC: 10 U/L — SIGNIFICANT CHANGE UP (ref 10–45)
ANION GAP SERPL CALC-SCNC: 16 MMOL/L — SIGNIFICANT CHANGE UP (ref 5–17)
AST SERPL-CCNC: 11 U/L — SIGNIFICANT CHANGE UP (ref 10–40)
BILIRUB SERPL-MCNC: 0.6 MG/DL — SIGNIFICANT CHANGE UP (ref 0.2–1.2)
BUN SERPL-MCNC: 62 MG/DL — HIGH (ref 7–23)
CALCIUM SERPL-MCNC: 8.9 MG/DL — SIGNIFICANT CHANGE UP (ref 8.4–10.5)
CHLORIDE SERPL-SCNC: 83 MMOL/L — LOW (ref 96–108)
CO2 SERPL-SCNC: 35 MMOL/L — HIGH (ref 22–31)
CREAT SERPL-MCNC: 2.17 MG/DL — HIGH (ref 0.5–1.3)
EGFR: 23 ML/MIN/1.73M2 — LOW
EGFR: 23 ML/MIN/1.73M2 — LOW
GLUCOSE BLDC GLUCOMTR-MCNC: 210 MG/DL — HIGH (ref 70–99)
GLUCOSE BLDC GLUCOMTR-MCNC: 216 MG/DL — HIGH (ref 70–99)
GLUCOSE BLDC GLUCOMTR-MCNC: 236 MG/DL — HIGH (ref 70–99)
GLUCOSE BLDC GLUCOMTR-MCNC: 247 MG/DL — HIGH (ref 70–99)
GLUCOSE SERPL-MCNC: 191 MG/DL — HIGH (ref 70–99)
HCT VFR BLD CALC: 24.6 % — LOW (ref 34.5–45)
HGB BLD-MCNC: 7.4 G/DL — LOW (ref 11.5–15.5)
MAGNESIUM SERPL-MCNC: 2.7 MG/DL — HIGH (ref 1.6–2.6)
MCHC RBC-ENTMCNC: 26.7 PG — LOW (ref 27–34)
MCHC RBC-ENTMCNC: 30.1 G/DL — LOW (ref 32–36)
MCV RBC AUTO: 88.8 FL — SIGNIFICANT CHANGE UP (ref 80–100)
NRBC # BLD AUTO: 0 K/UL — SIGNIFICANT CHANGE UP (ref 0–0)
NRBC # FLD: 0 K/UL — SIGNIFICANT CHANGE UP (ref 0–0)
NRBC BLD AUTO-RTO: 0 /100 WBCS — SIGNIFICANT CHANGE UP (ref 0–0)
PHOSPHATE SERPL-MCNC: 6.4 MG/DL — HIGH (ref 2.5–4.5)
PLATELET # BLD AUTO: 393 K/UL — SIGNIFICANT CHANGE UP (ref 150–400)
PMV BLD: 10 FL — SIGNIFICANT CHANGE UP (ref 7–13)
POTASSIUM SERPL-MCNC: 3.9 MMOL/L — SIGNIFICANT CHANGE UP (ref 3.5–5.3)
POTASSIUM SERPL-SCNC: 3.9 MMOL/L — SIGNIFICANT CHANGE UP (ref 3.5–5.3)
PROT SERPL-MCNC: 6.6 G/DL — SIGNIFICANT CHANGE UP (ref 6–8.3)
RBC # BLD: 2.77 M/UL — LOW (ref 3.8–5.2)
RBC # FLD: 16.8 % — HIGH (ref 10.3–14.5)
SODIUM SERPL-SCNC: 134 MMOL/L — LOW (ref 135–145)
WBC # BLD: 13.41 K/UL — HIGH (ref 3.8–10.5)
WBC # FLD AUTO: 13.41 K/UL — HIGH (ref 3.8–10.5)

## 2025-08-24 PROCEDURE — 99233 SBSQ HOSP IP/OBS HIGH 50: CPT | Mod: GC

## 2025-08-24 RX ORDER — ONDANSETRON HCL/PF 4 MG/2 ML
4 VIAL (ML) INJECTION ONCE
Refills: 0 | Status: DISCONTINUED | OUTPATIENT
Start: 2025-08-24 | End: 2025-08-24

## 2025-08-24 RX ORDER — ALPRAZOLAM 0.5 MG
2 TABLET, EXTENDED RELEASE 24 HR ORAL AT BEDTIME
Refills: 0 | Status: DISCONTINUED | OUTPATIENT
Start: 2025-08-25 | End: 2025-09-01

## 2025-08-24 RX ORDER — BUMETANIDE 1 MG/1
3 TABLET ORAL
Refills: 0 | Status: DISCONTINUED | OUTPATIENT
Start: 2025-08-24 | End: 2025-08-26

## 2025-08-24 RX ORDER — ONDANSETRON HCL/PF 4 MG/2 ML
4 VIAL (ML) INJECTION ONCE
Refills: 0 | Status: COMPLETED | OUTPATIENT
Start: 2025-08-24 | End: 2025-08-24

## 2025-08-24 RX ADMIN — MUPIROCIN CALCIUM 1 APPLICATION(S): 20 CREAM TOPICAL at 05:53

## 2025-08-24 RX ADMIN — Medication 2 TABLET(S): at 21:52

## 2025-08-24 RX ADMIN — Medication 5 MILLIGRAM(S): at 21:52

## 2025-08-24 RX ADMIN — OXYCODONE HYDROCHLORIDE 2.5 MILLIGRAM(S): 30 TABLET ORAL at 16:56

## 2025-08-24 RX ADMIN — GABAPENTIN 300 MILLIGRAM(S): 400 CAPSULE ORAL at 21:50

## 2025-08-24 RX ADMIN — Medication 40 MILLIGRAM(S): at 05:53

## 2025-08-24 RX ADMIN — POLYETHYLENE GLYCOL 3350 17 GRAM(S): 17 POWDER, FOR SOLUTION ORAL at 18:10

## 2025-08-24 RX ADMIN — NYSTATIN 1 APPLICATION(S): 100000 CREAM TOPICAL at 09:43

## 2025-08-24 RX ADMIN — Medication 1 TABLET(S): at 13:31

## 2025-08-24 RX ADMIN — Medication 1 DOSE(S): at 18:09

## 2025-08-24 RX ADMIN — GABAPENTIN 300 MILLIGRAM(S): 400 CAPSULE ORAL at 05:51

## 2025-08-24 RX ADMIN — IPRATROPIUM BROMIDE AND ALBUTEROL SULFATE 3 MILLILITER(S): .5; 2.5 SOLUTION RESPIRATORY (INHALATION) at 05:52

## 2025-08-24 RX ADMIN — Medication 1 DOSE(S): at 05:52

## 2025-08-24 RX ADMIN — WHITE PETROLATUM 1 APPLICATION(S): 1 OINTMENT TOPICAL at 13:34

## 2025-08-24 RX ADMIN — INSULIN LISPRO 13 UNIT(S): 100 INJECTION, SOLUTION INTRAVENOUS; SUBCUTANEOUS at 18:11

## 2025-08-24 RX ADMIN — Medication 100 MICROGRAM(S): at 05:53

## 2025-08-24 RX ADMIN — INSULIN LISPRO 2: 100 INJECTION, SOLUTION INTRAVENOUS; SUBCUTANEOUS at 18:10

## 2025-08-24 RX ADMIN — AMIODARONE HYDROCHLORIDE 200 MILLIGRAM(S): 50 INJECTION, SOLUTION INTRAVENOUS at 05:52

## 2025-08-24 RX ADMIN — IPRATROPIUM BROMIDE AND ALBUTEROL SULFATE 3 MILLILITER(S): .5; 2.5 SOLUTION RESPIRATORY (INHALATION) at 13:30

## 2025-08-24 RX ADMIN — NYSTATIN 1 APPLICATION(S): 100000 CREAM TOPICAL at 21:53

## 2025-08-24 RX ADMIN — Medication 1 APPLICATION(S): at 14:18

## 2025-08-24 RX ADMIN — BUMETANIDE 3 MILLIGRAM(S): 1 TABLET ORAL at 05:51

## 2025-08-24 RX ADMIN — Medication 2 MILLIGRAM(S): at 22:34

## 2025-08-24 RX ADMIN — Medication 81 MILLIGRAM(S): at 13:31

## 2025-08-24 RX ADMIN — BUMETANIDE 124 MILLIGRAM(S): 1 TABLET ORAL at 09:37

## 2025-08-24 RX ADMIN — POLYETHYLENE GLYCOL 3350 17 GRAM(S): 17 POWDER, FOR SOLUTION ORAL at 05:53

## 2025-08-24 RX ADMIN — INSULIN LISPRO 2: 100 INJECTION, SOLUTION INTRAVENOUS; SUBCUTANEOUS at 09:24

## 2025-08-24 RX ADMIN — INSULIN LISPRO 13 UNIT(S): 100 INJECTION, SOLUTION INTRAVENOUS; SUBCUTANEOUS at 09:24

## 2025-08-24 RX ADMIN — GABAPENTIN 300 MILLIGRAM(S): 400 CAPSULE ORAL at 13:31

## 2025-08-24 RX ADMIN — Medication 50 MILLIGRAM(S): at 05:52

## 2025-08-24 RX ADMIN — INSULIN LISPRO 2: 100 INJECTION, SOLUTION INTRAVENOUS; SUBCUTANEOUS at 13:29

## 2025-08-24 RX ADMIN — OXYCODONE HYDROCHLORIDE 5 MILLIGRAM(S): 30 TABLET ORAL at 23:21

## 2025-08-24 RX ADMIN — INSULIN LISPRO 13 UNIT(S): 100 INJECTION, SOLUTION INTRAVENOUS; SUBCUTANEOUS at 13:30

## 2025-08-24 RX ADMIN — OXYCODONE HYDROCHLORIDE 2.5 MILLIGRAM(S): 30 TABLET ORAL at 16:26

## 2025-08-24 RX ADMIN — AMLODIPINE BESYLATE 5 MILLIGRAM(S): 10 TABLET ORAL at 05:52

## 2025-08-24 RX ADMIN — Medication 4 MILLIGRAM(S): at 16:26

## 2025-08-24 RX ADMIN — INSULIN GLARGINE-YFGN 29 UNIT(S): 100 INJECTION, SOLUTION SUBCUTANEOUS at 21:51

## 2025-08-24 RX ADMIN — ATORVASTATIN CALCIUM 40 MILLIGRAM(S): 80 TABLET, FILM COATED ORAL at 21:52

## 2025-08-24 RX ADMIN — IPRATROPIUM BROMIDE AND ALBUTEROL SULFATE 3 MILLILITER(S): .5; 2.5 SOLUTION RESPIRATORY (INHALATION) at 21:52

## 2025-08-25 LAB
ALBUMIN SERPL ELPH-MCNC: 3.4 G/DL — SIGNIFICANT CHANGE UP (ref 3.3–5)
ALP SERPL-CCNC: 65 U/L — SIGNIFICANT CHANGE UP (ref 40–120)
ALT FLD-CCNC: 10 U/L — SIGNIFICANT CHANGE UP (ref 10–45)
ANION GAP SERPL CALC-SCNC: 15 MMOL/L — SIGNIFICANT CHANGE UP (ref 5–17)
APTT BLD: 35.8 SEC — SIGNIFICANT CHANGE UP (ref 26.1–36.8)
AST SERPL-CCNC: 12 U/L — SIGNIFICANT CHANGE UP (ref 10–40)
BILIRUB SERPL-MCNC: 0.5 MG/DL — SIGNIFICANT CHANGE UP (ref 0.2–1.2)
BLD GP AB SCN SERPL QL: NEGATIVE — SIGNIFICANT CHANGE UP
BUN SERPL-MCNC: 64 MG/DL — HIGH (ref 7–23)
CALCIUM SERPL-MCNC: 8.7 MG/DL — SIGNIFICANT CHANGE UP (ref 8.4–10.5)
CHLORIDE SERPL-SCNC: 83 MMOL/L — LOW (ref 96–108)
CO2 SERPL-SCNC: 35 MMOL/L — HIGH (ref 22–31)
CREAT SERPL-MCNC: 2.01 MG/DL — HIGH (ref 0.5–1.3)
EGFR: 25 ML/MIN/1.73M2 — LOW
EGFR: 25 ML/MIN/1.73M2 — LOW
GLUCOSE BLDC GLUCOMTR-MCNC: 163 MG/DL — HIGH (ref 70–99)
GLUCOSE BLDC GLUCOMTR-MCNC: 201 MG/DL — HIGH (ref 70–99)
GLUCOSE BLDC GLUCOMTR-MCNC: 205 MG/DL — HIGH (ref 70–99)
GLUCOSE BLDC GLUCOMTR-MCNC: 214 MG/DL — HIGH (ref 70–99)
GLUCOSE BLDC GLUCOMTR-MCNC: 264 MG/DL — HIGH (ref 70–99)
GLUCOSE SERPL-MCNC: 188 MG/DL — HIGH (ref 70–99)
HCT VFR BLD CALC: 24 % — LOW (ref 34.5–45)
HGB BLD-MCNC: 7.1 G/DL — LOW (ref 11.5–15.5)
INR BLD: 1.36 RATIO — HIGH (ref 0.85–1.16)
MAGNESIUM SERPL-MCNC: 3 MG/DL — HIGH (ref 1.6–2.6)
MCHC RBC-ENTMCNC: 26.1 PG — LOW (ref 27–34)
MCHC RBC-ENTMCNC: 29.6 G/DL — LOW (ref 32–36)
MCV RBC AUTO: 88.2 FL — SIGNIFICANT CHANGE UP (ref 80–100)
NRBC # BLD AUTO: 0 K/UL — SIGNIFICANT CHANGE UP (ref 0–0)
NRBC # FLD: 0 K/UL — SIGNIFICANT CHANGE UP (ref 0–0)
NRBC BLD AUTO-RTO: 0 /100 WBCS — SIGNIFICANT CHANGE UP (ref 0–0)
PHOSPHATE SERPL-MCNC: 5.4 MG/DL — HIGH (ref 2.5–4.5)
PLATELET # BLD AUTO: 395 K/UL — SIGNIFICANT CHANGE UP (ref 150–400)
PMV BLD: 10.2 FL — SIGNIFICANT CHANGE UP (ref 7–13)
POTASSIUM SERPL-MCNC: 4.1 MMOL/L — SIGNIFICANT CHANGE UP (ref 3.5–5.3)
POTASSIUM SERPL-SCNC: 4.1 MMOL/L — SIGNIFICANT CHANGE UP (ref 3.5–5.3)
PROT SERPL-MCNC: 6.5 G/DL — SIGNIFICANT CHANGE UP (ref 6–8.3)
PROTHROM AB SERPL-ACNC: 15.7 SEC — HIGH (ref 9.9–13.4)
RBC # BLD: 2.72 M/UL — LOW (ref 3.8–5.2)
RBC # FLD: 16.8 % — HIGH (ref 10.3–14.5)
RH IG SCN BLD-IMP: POSITIVE — SIGNIFICANT CHANGE UP
SODIUM SERPL-SCNC: 133 MMOL/L — LOW (ref 135–145)
WBC # BLD: 12.15 K/UL — HIGH (ref 3.8–10.5)
WBC # FLD AUTO: 12.15 K/UL — HIGH (ref 3.8–10.5)

## 2025-08-25 PROCEDURE — 97162 PT EVAL MOD COMPLEX 30 MIN: CPT

## 2025-08-25 PROCEDURE — 87186 SC STD MICRODIL/AGAR DIL: CPT

## 2025-08-25 PROCEDURE — 84145 PROCALCITONIN (PCT): CPT

## 2025-08-25 PROCEDURE — 83735 ASSAY OF MAGNESIUM: CPT

## 2025-08-25 PROCEDURE — 87899 AGENT NOS ASSAY W/OPTIC: CPT

## 2025-08-25 PROCEDURE — 97116 GAIT TRAINING THERAPY: CPT

## 2025-08-25 PROCEDURE — 83880 ASSAY OF NATRIURETIC PEPTIDE: CPT

## 2025-08-25 PROCEDURE — 97110 THERAPEUTIC EXERCISES: CPT

## 2025-08-25 PROCEDURE — 71275 CT ANGIOGRAPHY CHEST: CPT

## 2025-08-25 PROCEDURE — 87637 SARSCOV2&INF A&B&RSV AMP PRB: CPT

## 2025-08-25 PROCEDURE — 86140 C-REACTIVE PROTEIN: CPT

## 2025-08-25 PROCEDURE — 84443 ASSAY THYROID STIM HORMONE: CPT

## 2025-08-25 PROCEDURE — 80048 BASIC METABOLIC PNL TOTAL CA: CPT

## 2025-08-25 PROCEDURE — 85730 THROMBOPLASTIN TIME PARTIAL: CPT

## 2025-08-25 PROCEDURE — 0225U NFCT DS DNA&RNA 21 SARSCOV2: CPT

## 2025-08-25 PROCEDURE — 94660 CPAP INITIATION&MGMT: CPT

## 2025-08-25 PROCEDURE — 84484 ASSAY OF TROPONIN QUANT: CPT

## 2025-08-25 PROCEDURE — 86985 SPLIT BLOOD OR PRODUCTS: CPT

## 2025-08-25 PROCEDURE — 85014 HEMATOCRIT: CPT

## 2025-08-25 PROCEDURE — 76770 US EXAM ABDO BACK WALL COMP: CPT

## 2025-08-25 PROCEDURE — 36430 TRANSFUSION BLD/BLD COMPNT: CPT

## 2025-08-25 PROCEDURE — 87641 MR-STAPH DNA AMP PROBE: CPT

## 2025-08-25 PROCEDURE — 86923 COMPATIBILITY TEST ELECTRIC: CPT

## 2025-08-25 PROCEDURE — 85018 HEMOGLOBIN: CPT

## 2025-08-25 PROCEDURE — 83036 HEMOGLOBIN GLYCOSYLATED A1C: CPT

## 2025-08-25 PROCEDURE — 86900 BLOOD TYPING SEROLOGIC ABO: CPT

## 2025-08-25 PROCEDURE — 82962 GLUCOSE BLOOD TEST: CPT

## 2025-08-25 PROCEDURE — 84436 ASSAY OF TOTAL THYROXINE: CPT

## 2025-08-25 PROCEDURE — 82803 BLOOD GASES ANY COMBINATION: CPT

## 2025-08-25 PROCEDURE — P9016: CPT

## 2025-08-25 PROCEDURE — 93970 EXTREMITY STUDY: CPT

## 2025-08-25 PROCEDURE — 85652 RBC SED RATE AUTOMATED: CPT

## 2025-08-25 PROCEDURE — 86850 RBC ANTIBODY SCREEN: CPT

## 2025-08-25 PROCEDURE — 85027 COMPLETE CBC AUTOMATED: CPT

## 2025-08-25 PROCEDURE — 85610 PROTHROMBIN TIME: CPT

## 2025-08-25 PROCEDURE — 84132 ASSAY OF SERUM POTASSIUM: CPT

## 2025-08-25 PROCEDURE — 99232 SBSQ HOSP IP/OBS MODERATE 35: CPT | Mod: GC

## 2025-08-25 PROCEDURE — 87070 CULTURE OTHR SPECIMN AEROBIC: CPT

## 2025-08-25 PROCEDURE — 73700 CT LOWER EXTREMITY W/O DYE: CPT

## 2025-08-25 PROCEDURE — 87205 SMEAR GRAM STAIN: CPT

## 2025-08-25 PROCEDURE — 93005 ELECTROCARDIOGRAM TRACING: CPT

## 2025-08-25 PROCEDURE — 84300 ASSAY OF URINE SODIUM: CPT

## 2025-08-25 PROCEDURE — 85025 COMPLETE CBC W/AUTO DIFF WBC: CPT

## 2025-08-25 PROCEDURE — 84295 ASSAY OF SERUM SODIUM: CPT

## 2025-08-25 PROCEDURE — 82947 ASSAY GLUCOSE BLOOD QUANT: CPT

## 2025-08-25 PROCEDURE — 36415 COLL VENOUS BLD VENIPUNCTURE: CPT

## 2025-08-25 PROCEDURE — 93923 UPR/LXTR ART STDY 3+ LVLS: CPT

## 2025-08-25 PROCEDURE — 93306 TTE W/DOPPLER COMPLETE: CPT

## 2025-08-25 PROCEDURE — 84540 ASSAY OF URINE/UREA-N: CPT

## 2025-08-25 PROCEDURE — 82435 ASSAY OF BLOOD CHLORIDE: CPT

## 2025-08-25 PROCEDURE — C1751: CPT

## 2025-08-25 PROCEDURE — P9011: CPT

## 2025-08-25 PROCEDURE — 86901 BLOOD TYPING SEROLOGIC RH(D): CPT

## 2025-08-25 PROCEDURE — 87640 STAPH A DNA AMP PROBE: CPT

## 2025-08-25 PROCEDURE — 84100 ASSAY OF PHOSPHORUS: CPT

## 2025-08-25 PROCEDURE — 73630 X-RAY EXAM OF FOOT: CPT

## 2025-08-25 PROCEDURE — 82570 ASSAY OF URINE CREATININE: CPT

## 2025-08-25 PROCEDURE — 80053 COMPREHEN METABOLIC PANEL: CPT

## 2025-08-25 PROCEDURE — 83935 ASSAY OF URINE OSMOLALITY: CPT

## 2025-08-25 PROCEDURE — 80202 ASSAY OF VANCOMYCIN: CPT

## 2025-08-25 PROCEDURE — 71045 X-RAY EXAM CHEST 1 VIEW: CPT

## 2025-08-25 PROCEDURE — 73590 X-RAY EXAM OF LOWER LEG: CPT

## 2025-08-25 PROCEDURE — 97530 THERAPEUTIC ACTIVITIES: CPT

## 2025-08-25 PROCEDURE — 83605 ASSAY OF LACTIC ACID: CPT

## 2025-08-25 PROCEDURE — 87040 BLOOD CULTURE FOR BACTERIA: CPT

## 2025-08-25 PROCEDURE — 81001 URINALYSIS AUTO W/SCOPE: CPT

## 2025-08-25 PROCEDURE — 94640 AIRWAY INHALATION TREATMENT: CPT

## 2025-08-25 PROCEDURE — 84156 ASSAY OF PROTEIN URINE: CPT

## 2025-08-25 PROCEDURE — 82330 ASSAY OF CALCIUM: CPT

## 2025-08-25 PROCEDURE — 36569 INSJ PICC 5 YR+ W/O IMAGING: CPT

## 2025-08-25 PROCEDURE — 83690 ASSAY OF LIPASE: CPT

## 2025-08-25 RX ORDER — OXYCODONE HYDROCHLORIDE 30 MG/1
5 TABLET ORAL EVERY 4 HOURS
Refills: 0 | Status: DISCONTINUED | OUTPATIENT
Start: 2025-08-25 | End: 2025-08-26

## 2025-08-25 RX ADMIN — Medication 1 DOSE(S): at 21:51

## 2025-08-25 RX ADMIN — OXYCODONE HYDROCHLORIDE 5 MILLIGRAM(S): 30 TABLET ORAL at 00:15

## 2025-08-25 RX ADMIN — Medication 40 MILLIGRAM(S): at 05:27

## 2025-08-25 RX ADMIN — IPRATROPIUM BROMIDE AND ALBUTEROL SULFATE 3 MILLILITER(S): .5; 2.5 SOLUTION RESPIRATORY (INHALATION) at 05:27

## 2025-08-25 RX ADMIN — Medication 5 MILLIGRAM(S): at 21:49

## 2025-08-25 RX ADMIN — BUMETANIDE 124 MILLIGRAM(S): 1 TABLET ORAL at 05:28

## 2025-08-25 RX ADMIN — INSULIN LISPRO 2: 100 INJECTION, SOLUTION INTRAVENOUS; SUBCUTANEOUS at 21:51

## 2025-08-25 RX ADMIN — GABAPENTIN 300 MILLIGRAM(S): 400 CAPSULE ORAL at 05:26

## 2025-08-25 RX ADMIN — INSULIN LISPRO 1: 100 INJECTION, SOLUTION INTRAVENOUS; SUBCUTANEOUS at 22:27

## 2025-08-25 RX ADMIN — Medication 81 MILLIGRAM(S): at 11:25

## 2025-08-25 RX ADMIN — BUMETANIDE 124 MILLIGRAM(S): 1 TABLET ORAL at 18:28

## 2025-08-25 RX ADMIN — AMIODARONE HYDROCHLORIDE 200 MILLIGRAM(S): 50 INJECTION, SOLUTION INTRAVENOUS at 05:26

## 2025-08-25 RX ADMIN — ATORVASTATIN CALCIUM 40 MILLIGRAM(S): 80 TABLET, FILM COATED ORAL at 21:50

## 2025-08-25 RX ADMIN — Medication 1 DOSE(S): at 05:26

## 2025-08-25 RX ADMIN — POLYETHYLENE GLYCOL 3350 17 GRAM(S): 17 POWDER, FOR SOLUTION ORAL at 05:26

## 2025-08-25 RX ADMIN — IPRATROPIUM BROMIDE AND ALBUTEROL SULFATE 3 MILLILITER(S): .5; 2.5 SOLUTION RESPIRATORY (INHALATION) at 21:51

## 2025-08-25 RX ADMIN — NYSTATIN 1 APPLICATION(S): 100000 CREAM TOPICAL at 08:46

## 2025-08-25 RX ADMIN — Medication 2 MILLIGRAM(S): at 22:27

## 2025-08-25 RX ADMIN — Medication 200 MICROGRAM(S): at 05:27

## 2025-08-25 RX ADMIN — MUPIROCIN CALCIUM 1 APPLICATION(S): 20 CREAM TOPICAL at 05:33

## 2025-08-25 RX ADMIN — Medication 2 TABLET(S): at 22:06

## 2025-08-25 RX ADMIN — INSULIN LISPRO 2: 100 INJECTION, SOLUTION INTRAVENOUS; SUBCUTANEOUS at 13:27

## 2025-08-25 RX ADMIN — INSULIN GLARGINE-YFGN 29 UNIT(S): 100 INJECTION, SOLUTION SUBCUTANEOUS at 22:27

## 2025-08-25 RX ADMIN — Medication 50 MILLIGRAM(S): at 05:26

## 2025-08-25 RX ADMIN — OXYCODONE HYDROCHLORIDE 5 MILLIGRAM(S): 30 TABLET ORAL at 23:44

## 2025-08-25 RX ADMIN — Medication 1 TABLET(S): at 11:25

## 2025-08-25 RX ADMIN — Medication 1 APPLICATION(S): at 11:30

## 2025-08-25 RX ADMIN — INSULIN LISPRO 2: 100 INJECTION, SOLUTION INTRAVENOUS; SUBCUTANEOUS at 08:45

## 2025-08-25 RX ADMIN — NYSTATIN 1 APPLICATION(S): 100000 CREAM TOPICAL at 22:07

## 2025-08-25 RX ADMIN — GABAPENTIN 300 MILLIGRAM(S): 400 CAPSULE ORAL at 22:07

## 2025-08-25 RX ADMIN — WHITE PETROLATUM 1 APPLICATION(S): 1 OINTMENT TOPICAL at 11:25

## 2025-08-26 LAB
GLUCOSE BLDC GLUCOMTR-MCNC: 273 MG/DL — HIGH (ref 70–99)
GLUCOSE BLDC GLUCOMTR-MCNC: 280 MG/DL — HIGH (ref 70–99)
GLUCOSE BLDC GLUCOMTR-MCNC: 330 MG/DL — HIGH (ref 70–99)
GLUCOSE BLDC GLUCOMTR-MCNC: 359 MG/DL — HIGH (ref 70–99)

## 2025-08-26 PROCEDURE — 99232 SBSQ HOSP IP/OBS MODERATE 35: CPT | Mod: GC

## 2025-08-26 RX ORDER — OXYCODONE HYDROCHLORIDE 30 MG/1
2.5 TABLET ORAL EVERY 4 HOURS
Refills: 0 | Status: DISCONTINUED | OUTPATIENT
Start: 2025-08-26 | End: 2025-09-02

## 2025-08-26 RX ORDER — APIXABAN 2.5 MG/1
5 TABLET, FILM COATED ORAL EVERY 12 HOURS
Refills: 0 | Status: DISCONTINUED | OUTPATIENT
Start: 2025-08-26 | End: 2025-09-04

## 2025-08-26 RX ORDER — TORSEMIDE 20 MG/1
40 TABLET ORAL EVERY 12 HOURS
Refills: 0 | Status: DISCONTINUED | OUTPATIENT
Start: 2025-08-26 | End: 2025-09-04

## 2025-08-26 RX ADMIN — Medication 2 TABLET(S): at 21:32

## 2025-08-26 RX ADMIN — Medication 1 DOSE(S): at 17:38

## 2025-08-26 RX ADMIN — OXYCODONE HYDROCHLORIDE 2.5 MILLIGRAM(S): 30 TABLET ORAL at 23:29

## 2025-08-26 RX ADMIN — MUPIROCIN CALCIUM 1 APPLICATION(S): 20 CREAM TOPICAL at 11:54

## 2025-08-26 RX ADMIN — INSULIN LISPRO 5: 100 INJECTION, SOLUTION INTRAVENOUS; SUBCUTANEOUS at 17:38

## 2025-08-26 RX ADMIN — INSULIN LISPRO 3: 100 INJECTION, SOLUTION INTRAVENOUS; SUBCUTANEOUS at 08:29

## 2025-08-26 RX ADMIN — NYSTATIN 1 APPLICATION(S): 100000 CREAM TOPICAL at 21:44

## 2025-08-26 RX ADMIN — INSULIN LISPRO 13 UNIT(S): 100 INJECTION, SOLUTION INTRAVENOUS; SUBCUTANEOUS at 12:53

## 2025-08-26 RX ADMIN — OXYCODONE HYDROCHLORIDE 5 MILLIGRAM(S): 30 TABLET ORAL at 11:02

## 2025-08-26 RX ADMIN — INSULIN LISPRO 1: 100 INJECTION, SOLUTION INTRAVENOUS; SUBCUTANEOUS at 21:42

## 2025-08-26 RX ADMIN — INSULIN LISPRO 13 UNIT(S): 100 INJECTION, SOLUTION INTRAVENOUS; SUBCUTANEOUS at 17:38

## 2025-08-26 RX ADMIN — TORSEMIDE 40 MILLIGRAM(S): 20 TABLET ORAL at 23:31

## 2025-08-26 RX ADMIN — Medication 1 APPLICATION(S): at 11:03

## 2025-08-26 RX ADMIN — WHITE PETROLATUM 1 APPLICATION(S): 1 OINTMENT TOPICAL at 11:02

## 2025-08-26 RX ADMIN — INSULIN LISPRO 13 UNIT(S): 100 INJECTION, SOLUTION INTRAVENOUS; SUBCUTANEOUS at 08:29

## 2025-08-26 RX ADMIN — BUMETANIDE 124 MILLIGRAM(S): 1 TABLET ORAL at 07:00

## 2025-08-26 RX ADMIN — Medication 50 MILLIGRAM(S): at 06:25

## 2025-08-26 RX ADMIN — Medication 5 MILLIGRAM(S): at 21:32

## 2025-08-26 RX ADMIN — POLYETHYLENE GLYCOL 3350 17 GRAM(S): 17 POWDER, FOR SOLUTION ORAL at 17:39

## 2025-08-26 RX ADMIN — Medication 2 MILLIGRAM(S): at 21:38

## 2025-08-26 RX ADMIN — INSULIN GLARGINE-YFGN 29 UNIT(S): 100 INJECTION, SOLUTION SUBCUTANEOUS at 21:35

## 2025-08-26 RX ADMIN — GABAPENTIN 300 MILLIGRAM(S): 400 CAPSULE ORAL at 06:25

## 2025-08-26 RX ADMIN — Medication 40 MILLIGRAM(S): at 06:24

## 2025-08-26 RX ADMIN — Medication 1 TABLET(S): at 11:03

## 2025-08-26 RX ADMIN — ATORVASTATIN CALCIUM 40 MILLIGRAM(S): 80 TABLET, FILM COATED ORAL at 21:32

## 2025-08-26 RX ADMIN — IPRATROPIUM BROMIDE AND ALBUTEROL SULFATE 3 MILLILITER(S): .5; 2.5 SOLUTION RESPIRATORY (INHALATION) at 13:04

## 2025-08-26 RX ADMIN — Medication 1 DOSE(S): at 06:25

## 2025-08-26 RX ADMIN — POLYETHYLENE GLYCOL 3350 17 GRAM(S): 17 POWDER, FOR SOLUTION ORAL at 06:26

## 2025-08-26 RX ADMIN — TORSEMIDE 40 MILLIGRAM(S): 20 TABLET ORAL at 11:51

## 2025-08-26 RX ADMIN — GABAPENTIN 300 MILLIGRAM(S): 400 CAPSULE ORAL at 21:32

## 2025-08-26 RX ADMIN — AMIODARONE HYDROCHLORIDE 200 MILLIGRAM(S): 50 INJECTION, SOLUTION INTRAVENOUS at 06:26

## 2025-08-26 RX ADMIN — Medication 200 MICROGRAM(S): at 06:24

## 2025-08-26 RX ADMIN — AMLODIPINE BESYLATE 5 MILLIGRAM(S): 10 TABLET ORAL at 06:25

## 2025-08-26 RX ADMIN — IPRATROPIUM BROMIDE AND ALBUTEROL SULFATE 3 MILLILITER(S): .5; 2.5 SOLUTION RESPIRATORY (INHALATION) at 21:32

## 2025-08-26 RX ADMIN — NYSTATIN 1 APPLICATION(S): 100000 CREAM TOPICAL at 09:13

## 2025-08-26 RX ADMIN — Medication 81 MILLIGRAM(S): at 11:03

## 2025-08-26 RX ADMIN — INSULIN LISPRO 4: 100 INJECTION, SOLUTION INTRAVENOUS; SUBCUTANEOUS at 12:53

## 2025-08-26 RX ADMIN — IPRATROPIUM BROMIDE AND ALBUTEROL SULFATE 3 MILLILITER(S): .5; 2.5 SOLUTION RESPIRATORY (INHALATION) at 06:25

## 2025-08-26 RX ADMIN — APIXABAN 5 MILLIGRAM(S): 2.5 TABLET, FILM COATED ORAL at 17:41

## 2025-08-26 RX ADMIN — OXYCODONE HYDROCHLORIDE 5 MILLIGRAM(S): 30 TABLET ORAL at 11:51

## 2025-08-26 RX ADMIN — GABAPENTIN 300 MILLIGRAM(S): 400 CAPSULE ORAL at 13:03

## 2025-08-27 LAB
ALBUMIN SERPL ELPH-MCNC: 3.3 G/DL — SIGNIFICANT CHANGE UP (ref 3.3–5)
ALP SERPL-CCNC: 64 U/L — SIGNIFICANT CHANGE UP (ref 40–120)
ALT FLD-CCNC: 11 U/L — SIGNIFICANT CHANGE UP (ref 10–45)
ANION GAP SERPL CALC-SCNC: 15 MMOL/L — SIGNIFICANT CHANGE UP (ref 5–17)
AST SERPL-CCNC: 13 U/L — SIGNIFICANT CHANGE UP (ref 10–40)
BASOPHILS # BLD AUTO: 0.07 K/UL — SIGNIFICANT CHANGE UP (ref 0–0.2)
BASOPHILS NFR BLD AUTO: 0.6 % — SIGNIFICANT CHANGE UP (ref 0–2)
BILIRUB SERPL-MCNC: 0.4 MG/DL — SIGNIFICANT CHANGE UP (ref 0.2–1.2)
BUN SERPL-MCNC: 63 MG/DL — HIGH (ref 7–23)
CALCIUM SERPL-MCNC: 8.6 MG/DL — SIGNIFICANT CHANGE UP (ref 8.4–10.5)
CHLORIDE SERPL-SCNC: 86 MMOL/L — LOW (ref 96–108)
CO2 SERPL-SCNC: 34 MMOL/L — HIGH (ref 22–31)
CREAT SERPL-MCNC: 2.03 MG/DL — HIGH (ref 0.5–1.3)
EGFR: 25 ML/MIN/1.73M2 — LOW
EGFR: 25 ML/MIN/1.73M2 — LOW
EOSINOPHIL # BLD AUTO: 0.46 K/UL — SIGNIFICANT CHANGE UP (ref 0–0.5)
EOSINOPHIL NFR BLD AUTO: 4.2 % — SIGNIFICANT CHANGE UP (ref 0–6)
GLUCOSE BLDC GLUCOMTR-MCNC: 194 MG/DL — HIGH (ref 70–99)
GLUCOSE BLDC GLUCOMTR-MCNC: 213 MG/DL — HIGH (ref 70–99)
GLUCOSE BLDC GLUCOMTR-MCNC: 216 MG/DL — HIGH (ref 70–99)
GLUCOSE BLDC GLUCOMTR-MCNC: 293 MG/DL — HIGH (ref 70–99)
GLUCOSE BLDC GLUCOMTR-MCNC: 460 MG/DL — CRITICAL HIGH (ref 70–99)
GLUCOSE SERPL-MCNC: 144 MG/DL — HIGH (ref 70–99)
HCT VFR BLD CALC: 24.2 % — LOW (ref 34.5–45)
HGB BLD-MCNC: 7.1 G/DL — LOW (ref 11.5–15.5)
IMM GRANULOCYTES # BLD AUTO: 0.12 K/UL — HIGH (ref 0–0.07)
IMM GRANULOCYTES NFR BLD AUTO: 1.1 % — HIGH (ref 0–0.9)
LYMPHOCYTES # BLD AUTO: 1.55 K/UL — SIGNIFICANT CHANGE UP (ref 1–3.3)
LYMPHOCYTES NFR BLD AUTO: 14.2 % — SIGNIFICANT CHANGE UP (ref 13–44)
MAGNESIUM SERPL-MCNC: 2.9 MG/DL — HIGH (ref 1.6–2.6)
MCHC RBC-ENTMCNC: 26.4 PG — LOW (ref 27–34)
MCHC RBC-ENTMCNC: 29.3 G/DL — LOW (ref 32–36)
MCV RBC AUTO: 90 FL — SIGNIFICANT CHANGE UP (ref 80–100)
MONOCYTES # BLD AUTO: 0.6 K/UL — SIGNIFICANT CHANGE UP (ref 0–0.9)
MONOCYTES NFR BLD AUTO: 5.5 % — SIGNIFICANT CHANGE UP (ref 2–14)
NEUTROPHILS # BLD AUTO: 8.12 K/UL — HIGH (ref 1.8–7.4)
NEUTROPHILS NFR BLD AUTO: 74.4 % — SIGNIFICANT CHANGE UP (ref 43–77)
NRBC # BLD AUTO: 0 K/UL — SIGNIFICANT CHANGE UP (ref 0–0)
NRBC # FLD: 0 K/UL — SIGNIFICANT CHANGE UP (ref 0–0)
NRBC BLD AUTO-RTO: 0 /100 WBCS — SIGNIFICANT CHANGE UP (ref 0–0)
PHOSPHATE SERPL-MCNC: 5 MG/DL — HIGH (ref 2.5–4.5)
PLATELET # BLD AUTO: 422 K/UL — HIGH (ref 150–400)
PMV BLD: 10.3 FL — SIGNIFICANT CHANGE UP (ref 7–13)
POTASSIUM SERPL-MCNC: 4.3 MMOL/L — SIGNIFICANT CHANGE UP (ref 3.5–5.3)
POTASSIUM SERPL-SCNC: 4.3 MMOL/L — SIGNIFICANT CHANGE UP (ref 3.5–5.3)
PROT SERPL-MCNC: 6.7 G/DL — SIGNIFICANT CHANGE UP (ref 6–8.3)
RBC # BLD: 2.69 M/UL — LOW (ref 3.8–5.2)
RBC # FLD: 17.1 % — HIGH (ref 10.3–14.5)
SODIUM SERPL-SCNC: 135 MMOL/L — SIGNIFICANT CHANGE UP (ref 135–145)
WBC # BLD: 10.92 K/UL — HIGH (ref 3.8–10.5)
WBC # FLD AUTO: 10.92 K/UL — HIGH (ref 3.8–10.5)

## 2025-08-27 PROCEDURE — 99232 SBSQ HOSP IP/OBS MODERATE 35: CPT | Mod: GC

## 2025-08-27 RX ORDER — INSULIN GLARGINE-YFGN 100 [IU]/ML
34 INJECTION, SOLUTION SUBCUTANEOUS AT BEDTIME
Refills: 0 | Status: DISCONTINUED | OUTPATIENT
Start: 2025-08-27 | End: 2025-08-28

## 2025-08-27 RX ORDER — INSULIN LISPRO 100 U/ML
15 INJECTION, SOLUTION INTRAVENOUS; SUBCUTANEOUS ONCE
Refills: 0 | Status: COMPLETED | OUTPATIENT
Start: 2025-08-27 | End: 2025-08-27

## 2025-08-27 RX ORDER — B1/B2/B3/B5/B6/B12/VIT C/FOLIC 500-0.5 MG
1 TABLET ORAL DAILY
Refills: 0 | Status: DISCONTINUED | OUTPATIENT
Start: 2025-08-27 | End: 2025-09-04

## 2025-08-27 RX ORDER — INSULIN LISPRO 100 U/ML
15 INJECTION, SOLUTION INTRAVENOUS; SUBCUTANEOUS
Refills: 0 | Status: DISCONTINUED | OUTPATIENT
Start: 2025-08-27 | End: 2025-08-28

## 2025-08-27 RX ADMIN — Medication 1 DOSE(S): at 07:04

## 2025-08-27 RX ADMIN — TORSEMIDE 40 MILLIGRAM(S): 20 TABLET ORAL at 22:28

## 2025-08-27 RX ADMIN — APIXABAN 5 MILLIGRAM(S): 2.5 TABLET, FILM COATED ORAL at 17:23

## 2025-08-27 RX ADMIN — POLYETHYLENE GLYCOL 3350 17 GRAM(S): 17 POWDER, FOR SOLUTION ORAL at 07:04

## 2025-08-27 RX ADMIN — INSULIN GLARGINE-YFGN 34 UNIT(S): 100 INJECTION, SOLUTION SUBCUTANEOUS at 22:03

## 2025-08-27 RX ADMIN — Medication 1 APPLICATION(S): at 11:13

## 2025-08-27 RX ADMIN — INSULIN LISPRO 15 UNIT(S): 100 INJECTION, SOLUTION INTRAVENOUS; SUBCUTANEOUS at 12:50

## 2025-08-27 RX ADMIN — Medication 650 MILLIGRAM(S): at 12:52

## 2025-08-27 RX ADMIN — WHITE PETROLATUM 1 APPLICATION(S): 1 OINTMENT TOPICAL at 11:13

## 2025-08-27 RX ADMIN — APIXABAN 5 MILLIGRAM(S): 2.5 TABLET, FILM COATED ORAL at 07:02

## 2025-08-27 RX ADMIN — GABAPENTIN 300 MILLIGRAM(S): 400 CAPSULE ORAL at 22:23

## 2025-08-27 RX ADMIN — NYSTATIN 1 APPLICATION(S): 100000 CREAM TOPICAL at 09:23

## 2025-08-27 RX ADMIN — INSULIN LISPRO 1: 100 INJECTION, SOLUTION INTRAVENOUS; SUBCUTANEOUS at 22:04

## 2025-08-27 RX ADMIN — INSULIN LISPRO 15 UNIT(S): 100 INJECTION, SOLUTION INTRAVENOUS; SUBCUTANEOUS at 09:23

## 2025-08-27 RX ADMIN — IPRATROPIUM BROMIDE AND ALBUTEROL SULFATE 3 MILLILITER(S): .5; 2.5 SOLUTION RESPIRATORY (INHALATION) at 22:24

## 2025-08-27 RX ADMIN — Medication 2 MILLIGRAM(S): at 22:58

## 2025-08-27 RX ADMIN — Medication 40 MILLIGRAM(S): at 07:02

## 2025-08-27 RX ADMIN — Medication 2 TABLET(S): at 22:24

## 2025-08-27 RX ADMIN — ATORVASTATIN CALCIUM 40 MILLIGRAM(S): 80 TABLET, FILM COATED ORAL at 22:24

## 2025-08-27 RX ADMIN — INSULIN LISPRO 2: 100 INJECTION, SOLUTION INTRAVENOUS; SUBCUTANEOUS at 17:24

## 2025-08-27 RX ADMIN — Medication 81 MILLIGRAM(S): at 11:09

## 2025-08-27 RX ADMIN — Medication 1 DOSE(S): at 17:23

## 2025-08-27 RX ADMIN — Medication 5 MILLIGRAM(S): at 22:24

## 2025-08-27 RX ADMIN — Medication 650 MILLIGRAM(S): at 13:15

## 2025-08-27 RX ADMIN — GABAPENTIN 300 MILLIGRAM(S): 400 CAPSULE ORAL at 07:02

## 2025-08-27 RX ADMIN — MUPIROCIN CALCIUM 1 APPLICATION(S): 20 CREAM TOPICAL at 11:11

## 2025-08-27 RX ADMIN — TORSEMIDE 40 MILLIGRAM(S): 20 TABLET ORAL at 11:09

## 2025-08-27 RX ADMIN — IPRATROPIUM BROMIDE AND ALBUTEROL SULFATE 3 MILLILITER(S): .5; 2.5 SOLUTION RESPIRATORY (INHALATION) at 13:09

## 2025-08-27 RX ADMIN — NYSTATIN 1 APPLICATION(S): 100000 CREAM TOPICAL at 23:05

## 2025-08-27 RX ADMIN — Medication 200 MICROGRAM(S): at 07:10

## 2025-08-27 RX ADMIN — IPRATROPIUM BROMIDE AND ALBUTEROL SULFATE 3 MILLILITER(S): .5; 2.5 SOLUTION RESPIRATORY (INHALATION) at 07:04

## 2025-08-27 RX ADMIN — Medication 1 TABLET(S): at 11:09

## 2025-08-27 RX ADMIN — GABAPENTIN 300 MILLIGRAM(S): 400 CAPSULE ORAL at 13:09

## 2025-08-27 RX ADMIN — INSULIN LISPRO 2: 100 INJECTION, SOLUTION INTRAVENOUS; SUBCUTANEOUS at 08:45

## 2025-08-27 RX ADMIN — INSULIN LISPRO 1: 100 INJECTION, SOLUTION INTRAVENOUS; SUBCUTANEOUS at 12:51

## 2025-08-27 RX ADMIN — POLYETHYLENE GLYCOL 3350 17 GRAM(S): 17 POWDER, FOR SOLUTION ORAL at 17:23

## 2025-08-27 RX ADMIN — INSULIN LISPRO 15 UNIT(S): 100 INJECTION, SOLUTION INTRAVENOUS; SUBCUTANEOUS at 17:24

## 2025-08-28 LAB
ALBUMIN SERPL ELPH-MCNC: 3.3 G/DL — SIGNIFICANT CHANGE UP (ref 3.3–5)
ALP SERPL-CCNC: 59 U/L — SIGNIFICANT CHANGE UP (ref 40–120)
ALT FLD-CCNC: 11 U/L — SIGNIFICANT CHANGE UP (ref 10–45)
ANION GAP SERPL CALC-SCNC: 13 MMOL/L — SIGNIFICANT CHANGE UP (ref 5–17)
AST SERPL-CCNC: 12 U/L — SIGNIFICANT CHANGE UP (ref 10–40)
BASOPHILS # BLD AUTO: 0.06 K/UL — SIGNIFICANT CHANGE UP (ref 0–0.2)
BASOPHILS # BLD MANUAL: 0.18 K/UL — SIGNIFICANT CHANGE UP (ref 0–0.2)
BASOPHILS NFR BLD AUTO: 0.6 % — SIGNIFICANT CHANGE UP (ref 0–2)
BASOPHILS NFR BLD MANUAL: 1.7 % — SIGNIFICANT CHANGE UP (ref 0–2)
BILIRUB SERPL-MCNC: 0.4 MG/DL — SIGNIFICANT CHANGE UP (ref 0.2–1.2)
BUN SERPL-MCNC: 65 MG/DL — HIGH (ref 7–23)
CALCIUM SERPL-MCNC: 8.8 MG/DL — SIGNIFICANT CHANGE UP (ref 8.4–10.5)
CHLORIDE SERPL-SCNC: 87 MMOL/L — LOW (ref 96–108)
CO2 SERPL-SCNC: 36 MMOL/L — HIGH (ref 22–31)
CREAT SERPL-MCNC: 2.04 MG/DL — HIGH (ref 0.5–1.3)
EGFR: 25 ML/MIN/1.73M2 — LOW
EGFR: 25 ML/MIN/1.73M2 — LOW
EOSINOPHIL # BLD AUTO: 0.5 K/UL — SIGNIFICANT CHANGE UP (ref 0–0.5)
EOSINOPHIL # BLD MANUAL: 1.07 K/UL — HIGH (ref 0–0.5)
EOSINOPHIL NFR BLD AUTO: 4.8 % — SIGNIFICANT CHANGE UP (ref 0–6)
EOSINOPHIL NFR BLD MANUAL: 10.2 % — HIGH (ref 0–6)
GAS PNL BLDA: SIGNIFICANT CHANGE UP
GLUCOSE BLDC GLUCOMTR-MCNC: 210 MG/DL — HIGH (ref 70–99)
GLUCOSE BLDC GLUCOMTR-MCNC: 241 MG/DL — HIGH (ref 70–99)
GLUCOSE BLDC GLUCOMTR-MCNC: 252 MG/DL — HIGH (ref 70–99)
GLUCOSE BLDC GLUCOMTR-MCNC: 267 MG/DL — HIGH (ref 70–99)
GLUCOSE SERPL-MCNC: 156 MG/DL — HIGH (ref 70–99)
HCT VFR BLD CALC: 22.2 % — LOW (ref 34.5–45)
HCT VFR BLD CALC: 26.9 % — LOW (ref 34.5–45)
HGB BLD-MCNC: 6.7 G/DL — CRITICAL LOW (ref 11.5–15.5)
HGB BLD-MCNC: 8.1 G/DL — LOW (ref 11.5–15.5)
IMM GRANULOCYTES # BLD AUTO: 0.09 K/UL — HIGH (ref 0–0.07)
IMM GRANULOCYTES NFR BLD AUTO: 0.9 % — SIGNIFICANT CHANGE UP (ref 0–0.9)
LYMPHOCYTES # BLD AUTO: 1.59 K/UL — SIGNIFICANT CHANGE UP (ref 1–3.3)
LYMPHOCYTES # BLD MANUAL: 1.34 K/UL — SIGNIFICANT CHANGE UP (ref 1–3.3)
LYMPHOCYTES NFR BLD AUTO: 15.1 % — SIGNIFICANT CHANGE UP (ref 13–44)
LYMPHOCYTES NFR BLD MANUAL: 12.7 % — LOW (ref 13–44)
MAGNESIUM SERPL-MCNC: 2.8 MG/DL — HIGH (ref 1.6–2.6)
MANUAL MYELOCYTE #: 0.08 K/UL — HIGH (ref 0–0)
MANUAL NEUTROPHIL BANDS #: 0.08 K/UL — SIGNIFICANT CHANGE UP (ref 0–0.84)
MCHC RBC-ENTMCNC: 26.6 PG — LOW (ref 27–34)
MCHC RBC-ENTMCNC: 27 PG — SIGNIFICANT CHANGE UP (ref 27–34)
MCHC RBC-ENTMCNC: 30.1 G/DL — LOW (ref 32–36)
MCHC RBC-ENTMCNC: 30.2 G/DL — LOW (ref 32–36)
MCV RBC AUTO: 88.5 FL — SIGNIFICANT CHANGE UP (ref 80–100)
MCV RBC AUTO: 89.5 FL — SIGNIFICANT CHANGE UP (ref 80–100)
MONOCYTES # BLD AUTO: 0.7 K/UL — SIGNIFICANT CHANGE UP (ref 0–0.9)
MONOCYTES # BLD MANUAL: 0.18 K/UL — SIGNIFICANT CHANGE UP (ref 0–0.9)
MONOCYTES NFR BLD AUTO: 6.7 % — SIGNIFICANT CHANGE UP (ref 2–14)
MONOCYTES NFR BLD MANUAL: 1.7 % — LOW (ref 2–14)
MYELOCYTES NFR BLD: 0.8 % — HIGH (ref 0–0)
NEUTROPHILS # BLD AUTO: 7.58 K/UL — HIGH (ref 1.8–7.4)
NEUTROPHILS # BLD MANUAL: 7.58 K/UL — HIGH (ref 1.8–7.4)
NEUTROPHILS NFR BLD AUTO: 71.9 % — SIGNIFICANT CHANGE UP (ref 43–77)
NEUTROPHILS NFR BLD MANUAL: 72.1 % — SIGNIFICANT CHANGE UP (ref 43–77)
NEUTS BAND # BLD: 0.8 % — SIGNIFICANT CHANGE UP (ref 0–8)
NEUTS BAND NFR BLD: 0.8 % — SIGNIFICANT CHANGE UP (ref 0–8)
NRBC # BLD AUTO: 0 K/UL — SIGNIFICANT CHANGE UP (ref 0–0)
NRBC # BLD AUTO: 0 K/UL — SIGNIFICANT CHANGE UP (ref 0–0)
NRBC # FLD: 0 K/UL — SIGNIFICANT CHANGE UP (ref 0–0)
NRBC # FLD: 0 K/UL — SIGNIFICANT CHANGE UP (ref 0–0)
NRBC BLD AUTO-RTO: 0 /100 WBCS — SIGNIFICANT CHANGE UP (ref 0–0)
NRBC BLD AUTO-RTO: 0 /100 WBCS — SIGNIFICANT CHANGE UP (ref 0–0)
PHOSPHATE SERPL-MCNC: 4.2 MG/DL — SIGNIFICANT CHANGE UP (ref 2.5–4.5)
PLAT MORPH BLD: NORMAL — SIGNIFICANT CHANGE UP
PLATELET # BLD AUTO: 360 K/UL — SIGNIFICANT CHANGE UP (ref 150–400)
PLATELET # BLD AUTO: 369 K/UL — SIGNIFICANT CHANGE UP (ref 150–400)
PMV BLD: 10.1 FL — SIGNIFICANT CHANGE UP (ref 7–13)
PMV BLD: 10.4 FL — SIGNIFICANT CHANGE UP (ref 7–13)
POTASSIUM SERPL-MCNC: 4 MMOL/L — SIGNIFICANT CHANGE UP (ref 3.5–5.3)
POTASSIUM SERPL-SCNC: 4 MMOL/L — SIGNIFICANT CHANGE UP (ref 3.5–5.3)
PROT SERPL-MCNC: 6.6 G/DL — SIGNIFICANT CHANGE UP (ref 6–8.3)
RBC # BLD: 2.48 M/UL — LOW (ref 3.8–5.2)
RBC # BLD: 3.04 M/UL — LOW (ref 3.8–5.2)
RBC # FLD: 16.4 % — HIGH (ref 10.3–14.5)
RBC # FLD: 16.9 % — HIGH (ref 10.3–14.5)
RBC BLD AUTO: SIGNIFICANT CHANGE UP
SODIUM SERPL-SCNC: 136 MMOL/L — SIGNIFICANT CHANGE UP (ref 135–145)
WBC # BLD: 10.52 K/UL — HIGH (ref 3.8–10.5)
WBC # BLD: 11.18 K/UL — HIGH (ref 3.8–10.5)
WBC # FLD AUTO: 10.52 K/UL — HIGH (ref 3.8–10.5)
WBC # FLD AUTO: 11.18 K/UL — HIGH (ref 3.8–10.5)

## 2025-08-28 PROCEDURE — 99233 SBSQ HOSP IP/OBS HIGH 50: CPT | Mod: GC

## 2025-08-28 RX ORDER — OXYCODONE HYDROCHLORIDE 30 MG/1
0 TABLET ORAL
Qty: 0 | Refills: 0 | DISCHARGE
Start: 2025-08-28

## 2025-08-28 RX ORDER — TIRZEPATIDE 7.5 MG/.5ML
2.5 INJECTION, SOLUTION SUBCUTANEOUS
Refills: 0 | DISCHARGE

## 2025-08-28 RX ORDER — INSULIN LISPRO 100 U/ML
16 INJECTION, SOLUTION INTRAVENOUS; SUBCUTANEOUS
Refills: 0 | Status: DISCONTINUED | OUTPATIENT
Start: 2025-08-28 | End: 2025-09-01

## 2025-08-28 RX ORDER — ASPIRIN 325 MG
1 TABLET ORAL
Qty: 0 | Refills: 0 | DISCHARGE
Start: 2025-08-28

## 2025-08-28 RX ORDER — GABAPENTIN 400 MG/1
1 CAPSULE ORAL
Qty: 0 | Refills: 0 | DISCHARGE
Start: 2025-08-28

## 2025-08-28 RX ORDER — INSULIN LISPRO 100 U/ML
0 INJECTION, SOLUTION INTRAVENOUS; SUBCUTANEOUS
Qty: 0 | Refills: 0 | DISCHARGE
Start: 2025-08-28

## 2025-08-28 RX ORDER — MUPIROCIN CALCIUM 20 MG/G
0 CREAM TOPICAL
Qty: 0 | Refills: 0 | DISCHARGE
Start: 2025-08-28

## 2025-08-28 RX ORDER — INSULIN DEGLUDEC 100 U/ML
24 INJECTION, SOLUTION SUBCUTANEOUS
Refills: 0 | DISCHARGE

## 2025-08-28 RX ORDER — INSULIN LISPRO 100 U/ML
16 INJECTION, SOLUTION INTRAVENOUS; SUBCUTANEOUS ONCE
Refills: 0 | Status: COMPLETED | OUTPATIENT
Start: 2025-08-28 | End: 2025-08-28

## 2025-08-28 RX ORDER — INSULIN LISPRO 100 U/ML
20 INJECTION, SOLUTION INTRAVENOUS; SUBCUTANEOUS
Qty: 0 | Refills: 0 | DISCHARGE
Start: 2025-08-28

## 2025-08-28 RX ORDER — INSULIN GLARGINE-YFGN 100 [IU]/ML
32 INJECTION, SOLUTION SUBCUTANEOUS
Qty: 0 | Refills: 0 | DISCHARGE
Start: 2025-08-28

## 2025-08-28 RX ORDER — INSULIN GLARGINE-YFGN 100 [IU]/ML
36 INJECTION, SOLUTION SUBCUTANEOUS AT BEDTIME
Refills: 0 | Status: DISCONTINUED | OUTPATIENT
Start: 2025-08-28 | End: 2025-08-31

## 2025-08-28 RX ORDER — WHITE PETROLATUM 1 G/G
1 OINTMENT TOPICAL
Qty: 0 | Refills: 0 | DISCHARGE
Start: 2025-08-28

## 2025-08-28 RX ORDER — SPIRONOLACTONE 25 MG
2 TABLET ORAL
Qty: 0 | Refills: 0 | DISCHARGE
Start: 2025-08-28

## 2025-08-28 RX ORDER — NYSTATIN 100000 [USP'U]/G
1 CREAM TOPICAL
Qty: 0 | Refills: 0 | DISCHARGE
Start: 2025-08-28

## 2025-08-28 RX ADMIN — Medication 1 TABLET(S): at 11:59

## 2025-08-28 RX ADMIN — INSULIN LISPRO 16 UNIT(S): 100 INJECTION, SOLUTION INTRAVENOUS; SUBCUTANEOUS at 09:24

## 2025-08-28 RX ADMIN — ATORVASTATIN CALCIUM 40 MILLIGRAM(S): 80 TABLET, FILM COATED ORAL at 21:46

## 2025-08-28 RX ADMIN — WHITE PETROLATUM 1 APPLICATION(S): 1 OINTMENT TOPICAL at 12:02

## 2025-08-28 RX ADMIN — Medication 5 MILLIGRAM(S): at 21:47

## 2025-08-28 RX ADMIN — APIXABAN 5 MILLIGRAM(S): 2.5 TABLET, FILM COATED ORAL at 06:43

## 2025-08-28 RX ADMIN — GABAPENTIN 300 MILLIGRAM(S): 400 CAPSULE ORAL at 21:40

## 2025-08-28 RX ADMIN — Medication 2 MILLIGRAM(S): at 22:50

## 2025-08-28 RX ADMIN — POLYETHYLENE GLYCOL 3350 17 GRAM(S): 17 POWDER, FOR SOLUTION ORAL at 06:44

## 2025-08-28 RX ADMIN — Medication 650 MILLIGRAM(S): at 21:30

## 2025-08-28 RX ADMIN — GABAPENTIN 300 MILLIGRAM(S): 400 CAPSULE ORAL at 13:06

## 2025-08-28 RX ADMIN — Medication 1 APPLICATION(S): at 12:34

## 2025-08-28 RX ADMIN — NYSTATIN 1 APPLICATION(S): 100000 CREAM TOPICAL at 21:52

## 2025-08-28 RX ADMIN — Medication 650 MILLIGRAM(S): at 20:42

## 2025-08-28 RX ADMIN — IPRATROPIUM BROMIDE AND ALBUTEROL SULFATE 3 MILLILITER(S): .5; 2.5 SOLUTION RESPIRATORY (INHALATION) at 06:44

## 2025-08-28 RX ADMIN — POLYETHYLENE GLYCOL 3350 17 GRAM(S): 17 POWDER, FOR SOLUTION ORAL at 17:03

## 2025-08-28 RX ADMIN — Medication 50 MILLIGRAM(S): at 06:41

## 2025-08-28 RX ADMIN — MUPIROCIN CALCIUM 1 APPLICATION(S): 20 CREAM TOPICAL at 12:02

## 2025-08-28 RX ADMIN — Medication 40 MILLIGRAM(S): at 06:41

## 2025-08-28 RX ADMIN — TORSEMIDE 40 MILLIGRAM(S): 20 TABLET ORAL at 12:34

## 2025-08-28 RX ADMIN — Medication 81 MILLIGRAM(S): at 11:59

## 2025-08-28 RX ADMIN — OXYCODONE HYDROCHLORIDE 2.5 MILLIGRAM(S): 30 TABLET ORAL at 23:59

## 2025-08-28 RX ADMIN — AMIODARONE HYDROCHLORIDE 200 MILLIGRAM(S): 50 INJECTION, SOLUTION INTRAVENOUS at 06:41

## 2025-08-28 RX ADMIN — IPRATROPIUM BROMIDE AND ALBUTEROL SULFATE 3 MILLILITER(S): .5; 2.5 SOLUTION RESPIRATORY (INHALATION) at 13:04

## 2025-08-28 RX ADMIN — INSULIN LISPRO 1: 100 INJECTION, SOLUTION INTRAVENOUS; SUBCUTANEOUS at 21:42

## 2025-08-28 RX ADMIN — INSULIN GLARGINE-YFGN 36 UNIT(S): 100 INJECTION, SOLUTION SUBCUTANEOUS at 21:41

## 2025-08-28 RX ADMIN — IPRATROPIUM BROMIDE AND ALBUTEROL SULFATE 3 MILLILITER(S): .5; 2.5 SOLUTION RESPIRATORY (INHALATION) at 21:41

## 2025-08-28 RX ADMIN — INSULIN LISPRO 16 UNIT(S): 100 INJECTION, SOLUTION INTRAVENOUS; SUBCUTANEOUS at 17:47

## 2025-08-28 RX ADMIN — TORSEMIDE 40 MILLIGRAM(S): 20 TABLET ORAL at 21:43

## 2025-08-28 RX ADMIN — INSULIN LISPRO 2: 100 INJECTION, SOLUTION INTRAVENOUS; SUBCUTANEOUS at 13:03

## 2025-08-28 RX ADMIN — INSULIN LISPRO 3: 100 INJECTION, SOLUTION INTRAVENOUS; SUBCUTANEOUS at 17:46

## 2025-08-28 RX ADMIN — INSULIN LISPRO 2: 100 INJECTION, SOLUTION INTRAVENOUS; SUBCUTANEOUS at 08:56

## 2025-08-28 RX ADMIN — Medication 200 MICROGRAM(S): at 06:42

## 2025-08-28 RX ADMIN — APIXABAN 5 MILLIGRAM(S): 2.5 TABLET, FILM COATED ORAL at 17:03

## 2025-08-28 RX ADMIN — GABAPENTIN 300 MILLIGRAM(S): 400 CAPSULE ORAL at 06:42

## 2025-08-28 RX ADMIN — AMLODIPINE BESYLATE 5 MILLIGRAM(S): 10 TABLET ORAL at 06:42

## 2025-08-28 RX ADMIN — INSULIN LISPRO 16 UNIT(S): 100 INJECTION, SOLUTION INTRAVENOUS; SUBCUTANEOUS at 13:03

## 2025-08-28 RX ADMIN — Medication 2 TABLET(S): at 21:46

## 2025-08-28 RX ADMIN — Medication 1 DOSE(S): at 06:44

## 2025-08-28 RX ADMIN — Medication 1 DOSE(S): at 17:02

## 2025-08-28 RX ADMIN — NYSTATIN 1 APPLICATION(S): 100000 CREAM TOPICAL at 12:01

## 2025-08-29 DIAGNOSIS — R10.9 UNSPECIFIED ABDOMINAL PAIN: ICD-10-CM

## 2025-08-29 LAB
ALBUMIN SERPL ELPH-MCNC: 3.5 G/DL — SIGNIFICANT CHANGE UP (ref 3.3–5)
ALP SERPL-CCNC: 63 U/L — SIGNIFICANT CHANGE UP (ref 40–120)
ALT FLD-CCNC: 10 U/L — SIGNIFICANT CHANGE UP (ref 10–45)
ANION GAP SERPL CALC-SCNC: 13 MMOL/L — SIGNIFICANT CHANGE UP (ref 5–17)
AST SERPL-CCNC: 11 U/L — SIGNIFICANT CHANGE UP (ref 10–40)
BASOPHILS # BLD AUTO: 0.05 K/UL — SIGNIFICANT CHANGE UP (ref 0–0.2)
BASOPHILS NFR BLD AUTO: 0.5 % — SIGNIFICANT CHANGE UP (ref 0–2)
BILIRUB SERPL-MCNC: 0.6 MG/DL — SIGNIFICANT CHANGE UP (ref 0.2–1.2)
BUN SERPL-MCNC: 65 MG/DL — HIGH (ref 7–23)
CALCIUM SERPL-MCNC: 9.5 MG/DL — SIGNIFICANT CHANGE UP (ref 8.4–10.5)
CHLORIDE SERPL-SCNC: 88 MMOL/L — LOW (ref 96–108)
CO2 SERPL-SCNC: 36 MMOL/L — HIGH (ref 22–31)
CREAT SERPL-MCNC: 1.88 MG/DL — HIGH (ref 0.5–1.3)
EGFR: 27 ML/MIN/1.73M2 — LOW
EGFR: 27 ML/MIN/1.73M2 — LOW
EOSINOPHIL # BLD AUTO: 0.59 K/UL — HIGH (ref 0–0.5)
EOSINOPHIL NFR BLD AUTO: 5.5 % — SIGNIFICANT CHANGE UP (ref 0–6)
GAS PNL BLDA: SIGNIFICANT CHANGE UP
GLUCOSE BLDC GLUCOMTR-MCNC: 185 MG/DL — HIGH (ref 70–99)
GLUCOSE BLDC GLUCOMTR-MCNC: 199 MG/DL — HIGH (ref 70–99)
GLUCOSE BLDC GLUCOMTR-MCNC: 219 MG/DL — HIGH (ref 70–99)
GLUCOSE BLDC GLUCOMTR-MCNC: 230 MG/DL — HIGH (ref 70–99)
GLUCOSE SERPL-MCNC: 158 MG/DL — HIGH (ref 70–99)
HCT VFR BLD CALC: 26.7 % — LOW (ref 34.5–45)
HGB BLD-MCNC: 8 G/DL — LOW (ref 11.5–15.5)
IMM GRANULOCYTES # BLD AUTO: 0.1 K/UL — HIGH (ref 0–0.07)
IMM GRANULOCYTES NFR BLD AUTO: 0.9 % — SIGNIFICANT CHANGE UP (ref 0–0.9)
LYMPHOCYTES # BLD AUTO: 1.54 K/UL — SIGNIFICANT CHANGE UP (ref 1–3.3)
LYMPHOCYTES NFR BLD AUTO: 14.3 % — SIGNIFICANT CHANGE UP (ref 13–44)
MAGNESIUM SERPL-MCNC: 2.7 MG/DL — HIGH (ref 1.6–2.6)
MCHC RBC-ENTMCNC: 26.4 PG — LOW (ref 27–34)
MCHC RBC-ENTMCNC: 30 G/DL — LOW (ref 32–36)
MCV RBC AUTO: 88.1 FL — SIGNIFICANT CHANGE UP (ref 80–100)
MONOCYTES # BLD AUTO: 0.61 K/UL — SIGNIFICANT CHANGE UP (ref 0–0.9)
MONOCYTES NFR BLD AUTO: 5.7 % — SIGNIFICANT CHANGE UP (ref 2–14)
NEUTROPHILS # BLD AUTO: 7.89 K/UL — HIGH (ref 1.8–7.4)
NEUTROPHILS NFR BLD AUTO: 73.1 % — SIGNIFICANT CHANGE UP (ref 43–77)
NRBC # BLD AUTO: 0 K/UL — SIGNIFICANT CHANGE UP (ref 0–0)
NRBC # FLD: 0 K/UL — SIGNIFICANT CHANGE UP (ref 0–0)
NRBC BLD AUTO-RTO: 0 /100 WBCS — SIGNIFICANT CHANGE UP (ref 0–0)
PHOSPHATE SERPL-MCNC: 4.3 MG/DL — SIGNIFICANT CHANGE UP (ref 2.5–4.5)
PLATELET # BLD AUTO: 363 K/UL — SIGNIFICANT CHANGE UP (ref 150–400)
PMV BLD: 10 FL — SIGNIFICANT CHANGE UP (ref 7–13)
POTASSIUM SERPL-MCNC: 3.8 MMOL/L — SIGNIFICANT CHANGE UP (ref 3.5–5.3)
POTASSIUM SERPL-SCNC: 3.8 MMOL/L — SIGNIFICANT CHANGE UP (ref 3.5–5.3)
PROT SERPL-MCNC: 6.9 G/DL — SIGNIFICANT CHANGE UP (ref 6–8.3)
RBC # BLD: 3.03 M/UL — LOW (ref 3.8–5.2)
RBC # FLD: 16.1 % — HIGH (ref 10.3–14.5)
SODIUM SERPL-SCNC: 137 MMOL/L — SIGNIFICANT CHANGE UP (ref 135–145)
WBC # BLD: 10.78 K/UL — HIGH (ref 3.8–10.5)
WBC # FLD AUTO: 10.78 K/UL — HIGH (ref 3.8–10.5)

## 2025-08-29 PROCEDURE — 99233 SBSQ HOSP IP/OBS HIGH 50: CPT | Mod: GC

## 2025-08-29 RX ORDER — MAGNESIUM SULFATE 500 MG/ML
2 SYRINGE (ML) INJECTION ONCE
Refills: 0 | Status: COMPLETED | OUTPATIENT
Start: 2025-08-29 | End: 2025-08-29

## 2025-08-29 RX ADMIN — Medication 50 MILLIGRAM(S): at 05:56

## 2025-08-29 RX ADMIN — INSULIN GLARGINE-YFGN 36 UNIT(S): 100 INJECTION, SOLUTION SUBCUTANEOUS at 21:54

## 2025-08-29 RX ADMIN — NYSTATIN 1 APPLICATION(S): 100000 CREAM TOPICAL at 11:58

## 2025-08-29 RX ADMIN — AMLODIPINE BESYLATE 5 MILLIGRAM(S): 10 TABLET ORAL at 05:56

## 2025-08-29 RX ADMIN — Medication 1 DOSE(S): at 05:56

## 2025-08-29 RX ADMIN — Medication 1 TABLET(S): at 12:33

## 2025-08-29 RX ADMIN — Medication 1 TABLET(S): at 12:32

## 2025-08-29 RX ADMIN — POLYETHYLENE GLYCOL 3350 17 GRAM(S): 17 POWDER, FOR SOLUTION ORAL at 16:54

## 2025-08-29 RX ADMIN — GABAPENTIN 300 MILLIGRAM(S): 400 CAPSULE ORAL at 21:53

## 2025-08-29 RX ADMIN — APIXABAN 5 MILLIGRAM(S): 2.5 TABLET, FILM COATED ORAL at 16:53

## 2025-08-29 RX ADMIN — APIXABAN 5 MILLIGRAM(S): 2.5 TABLET, FILM COATED ORAL at 05:55

## 2025-08-29 RX ADMIN — Medication 40 MILLIGRAM(S): at 05:54

## 2025-08-29 RX ADMIN — Medication 200 MICROGRAM(S): at 05:55

## 2025-08-29 RX ADMIN — IPRATROPIUM BROMIDE AND ALBUTEROL SULFATE 3 MILLILITER(S): .5; 2.5 SOLUTION RESPIRATORY (INHALATION) at 05:56

## 2025-08-29 RX ADMIN — NYSTATIN 1 APPLICATION(S): 100000 CREAM TOPICAL at 22:10

## 2025-08-29 RX ADMIN — MUPIROCIN CALCIUM 1 APPLICATION(S): 20 CREAM TOPICAL at 13:31

## 2025-08-29 RX ADMIN — INSULIN LISPRO 2: 100 INJECTION, SOLUTION INTRAVENOUS; SUBCUTANEOUS at 17:40

## 2025-08-29 RX ADMIN — INSULIN LISPRO 16 UNIT(S): 100 INJECTION, SOLUTION INTRAVENOUS; SUBCUTANEOUS at 13:21

## 2025-08-29 RX ADMIN — WHITE PETROLATUM 1 APPLICATION(S): 1 OINTMENT TOPICAL at 11:58

## 2025-08-29 RX ADMIN — Medication 1 APPLICATION(S): at 11:58

## 2025-08-29 RX ADMIN — INSULIN LISPRO 1: 100 INJECTION, SOLUTION INTRAVENOUS; SUBCUTANEOUS at 09:16

## 2025-08-29 RX ADMIN — Medication 81 MILLIGRAM(S): at 12:33

## 2025-08-29 RX ADMIN — INSULIN LISPRO 16 UNIT(S): 100 INJECTION, SOLUTION INTRAVENOUS; SUBCUTANEOUS at 09:15

## 2025-08-29 RX ADMIN — Medication 2 TABLET(S): at 21:54

## 2025-08-29 RX ADMIN — IPRATROPIUM BROMIDE AND ALBUTEROL SULFATE 3 MILLILITER(S): .5; 2.5 SOLUTION RESPIRATORY (INHALATION) at 21:54

## 2025-08-29 RX ADMIN — INSULIN LISPRO 16 UNIT(S): 100 INJECTION, SOLUTION INTRAVENOUS; SUBCUTANEOUS at 17:41

## 2025-08-29 RX ADMIN — TORSEMIDE 40 MILLIGRAM(S): 20 TABLET ORAL at 13:22

## 2025-08-29 RX ADMIN — IPRATROPIUM BROMIDE AND ALBUTEROL SULFATE 3 MILLILITER(S): .5; 2.5 SOLUTION RESPIRATORY (INHALATION) at 13:22

## 2025-08-29 RX ADMIN — OXYCODONE HYDROCHLORIDE 2.5 MILLIGRAM(S): 30 TABLET ORAL at 00:40

## 2025-08-29 RX ADMIN — GABAPENTIN 300 MILLIGRAM(S): 400 CAPSULE ORAL at 05:57

## 2025-08-29 RX ADMIN — AMIODARONE HYDROCHLORIDE 200 MILLIGRAM(S): 50 INJECTION, SOLUTION INTRAVENOUS at 05:55

## 2025-08-29 RX ADMIN — GABAPENTIN 300 MILLIGRAM(S): 400 CAPSULE ORAL at 13:21

## 2025-08-29 RX ADMIN — ATORVASTATIN CALCIUM 40 MILLIGRAM(S): 80 TABLET, FILM COATED ORAL at 21:53

## 2025-08-29 RX ADMIN — Medication 25 GRAM(S): at 15:50

## 2025-08-29 RX ADMIN — Medication 5 MILLIGRAM(S): at 21:54

## 2025-08-29 RX ADMIN — INSULIN LISPRO 1: 100 INJECTION, SOLUTION INTRAVENOUS; SUBCUTANEOUS at 13:21

## 2025-08-29 RX ADMIN — Medication 1 DOSE(S): at 16:54

## 2025-08-29 RX ADMIN — Medication 2 MILLIGRAM(S): at 22:44

## 2025-08-30 LAB
ALBUMIN SERPL ELPH-MCNC: 3.7 G/DL — SIGNIFICANT CHANGE UP (ref 3.3–5)
ALP SERPL-CCNC: 67 U/L — SIGNIFICANT CHANGE UP (ref 40–120)
ALT FLD-CCNC: 12 U/L — SIGNIFICANT CHANGE UP (ref 10–45)
ANION GAP SERPL CALC-SCNC: 16 MMOL/L — SIGNIFICANT CHANGE UP (ref 5–17)
AST SERPL-CCNC: 11 U/L — SIGNIFICANT CHANGE UP (ref 10–40)
BILIRUB SERPL-MCNC: 0.5 MG/DL — SIGNIFICANT CHANGE UP (ref 0.2–1.2)
BUN SERPL-MCNC: 69 MG/DL — HIGH (ref 7–23)
CALCIUM SERPL-MCNC: 9.9 MG/DL — SIGNIFICANT CHANGE UP (ref 8.4–10.5)
CHLORIDE SERPL-SCNC: 88 MMOL/L — LOW (ref 96–108)
CO2 SERPL-SCNC: 36 MMOL/L — HIGH (ref 22–31)
CREAT SERPL-MCNC: 1.88 MG/DL — HIGH (ref 0.5–1.3)
EGFR: 27 ML/MIN/1.73M2 — LOW
EGFR: 27 ML/MIN/1.73M2 — LOW
GLUCOSE BLDC GLUCOMTR-MCNC: 142 MG/DL — HIGH (ref 70–99)
GLUCOSE BLDC GLUCOMTR-MCNC: 206 MG/DL — HIGH (ref 70–99)
GLUCOSE BLDC GLUCOMTR-MCNC: 266 MG/DL — HIGH (ref 70–99)
GLUCOSE BLDC GLUCOMTR-MCNC: 320 MG/DL — HIGH (ref 70–99)
GLUCOSE SERPL-MCNC: 161 MG/DL — HIGH (ref 70–99)
HCT VFR BLD CALC: 27.5 % — LOW (ref 34.5–45)
HGB BLD-MCNC: 8.3 G/DL — LOW (ref 11.5–15.5)
MAGNESIUM SERPL-MCNC: 2.7 MG/DL — HIGH (ref 1.6–2.6)
MCHC RBC-ENTMCNC: 26.8 PG — LOW (ref 27–34)
MCHC RBC-ENTMCNC: 30.2 G/DL — LOW (ref 32–36)
MCV RBC AUTO: 88.7 FL — SIGNIFICANT CHANGE UP (ref 80–100)
NRBC # BLD AUTO: 0 K/UL — SIGNIFICANT CHANGE UP (ref 0–0)
NRBC # FLD: 0 K/UL — SIGNIFICANT CHANGE UP (ref 0–0)
NRBC BLD AUTO-RTO: 0 /100 WBCS — SIGNIFICANT CHANGE UP (ref 0–0)
OB PNL STL: POSITIVE
PHOSPHATE SERPL-MCNC: 4.6 MG/DL — HIGH (ref 2.5–4.5)
PLATELET # BLD AUTO: 406 K/UL — HIGH (ref 150–400)
PMV BLD: 10 FL — SIGNIFICANT CHANGE UP (ref 7–13)
POTASSIUM SERPL-MCNC: 3.8 MMOL/L — SIGNIFICANT CHANGE UP (ref 3.5–5.3)
POTASSIUM SERPL-SCNC: 3.8 MMOL/L — SIGNIFICANT CHANGE UP (ref 3.5–5.3)
PROT SERPL-MCNC: 7.3 G/DL — SIGNIFICANT CHANGE UP (ref 6–8.3)
RBC # BLD: 3.1 M/UL — LOW (ref 3.8–5.2)
RBC # FLD: 16.2 % — HIGH (ref 10.3–14.5)
SODIUM SERPL-SCNC: 140 MMOL/L — SIGNIFICANT CHANGE UP (ref 135–145)
WBC # BLD: 11.08 K/UL — HIGH (ref 3.8–10.5)
WBC # FLD AUTO: 11.08 K/UL — HIGH (ref 3.8–10.5)

## 2025-08-30 PROCEDURE — 99233 SBSQ HOSP IP/OBS HIGH 50: CPT | Mod: GC

## 2025-08-30 RX ADMIN — Medication 1 APPLICATION(S): at 13:58

## 2025-08-30 RX ADMIN — AMIODARONE HYDROCHLORIDE 200 MILLIGRAM(S): 50 INJECTION, SOLUTION INTRAVENOUS at 06:31

## 2025-08-30 RX ADMIN — GABAPENTIN 300 MILLIGRAM(S): 400 CAPSULE ORAL at 21:07

## 2025-08-30 RX ADMIN — Medication 1 TABLET(S): at 13:37

## 2025-08-30 RX ADMIN — APIXABAN 5 MILLIGRAM(S): 2.5 TABLET, FILM COATED ORAL at 17:19

## 2025-08-30 RX ADMIN — Medication 81 MILLIGRAM(S): at 13:38

## 2025-08-30 RX ADMIN — POLYETHYLENE GLYCOL 3350 17 GRAM(S): 17 POWDER, FOR SOLUTION ORAL at 05:13

## 2025-08-30 RX ADMIN — INSULIN LISPRO 16 UNIT(S): 100 INJECTION, SOLUTION INTRAVENOUS; SUBCUTANEOUS at 13:34

## 2025-08-30 RX ADMIN — INSULIN GLARGINE-YFGN 36 UNIT(S): 100 INJECTION, SOLUTION SUBCUTANEOUS at 21:56

## 2025-08-30 RX ADMIN — Medication 40 MILLIGRAM(S): at 05:13

## 2025-08-30 RX ADMIN — Medication 200 MICROGRAM(S): at 05:13

## 2025-08-30 RX ADMIN — WHITE PETROLATUM 1 APPLICATION(S): 1 OINTMENT TOPICAL at 13:38

## 2025-08-30 RX ADMIN — INSULIN LISPRO 2: 100 INJECTION, SOLUTION INTRAVENOUS; SUBCUTANEOUS at 08:49

## 2025-08-30 RX ADMIN — GABAPENTIN 300 MILLIGRAM(S): 400 CAPSULE ORAL at 13:34

## 2025-08-30 RX ADMIN — INSULIN LISPRO 16 UNIT(S): 100 INJECTION, SOLUTION INTRAVENOUS; SUBCUTANEOUS at 17:18

## 2025-08-30 RX ADMIN — IPRATROPIUM BROMIDE AND ALBUTEROL SULFATE 3 MILLILITER(S): .5; 2.5 SOLUTION RESPIRATORY (INHALATION) at 13:37

## 2025-08-30 RX ADMIN — Medication 1 DOSE(S): at 17:18

## 2025-08-30 RX ADMIN — Medication 2 TABLET(S): at 21:07

## 2025-08-30 RX ADMIN — IPRATROPIUM BROMIDE AND ALBUTEROL SULFATE 3 MILLILITER(S): .5; 2.5 SOLUTION RESPIRATORY (INHALATION) at 21:07

## 2025-08-30 RX ADMIN — ATORVASTATIN CALCIUM 40 MILLIGRAM(S): 80 TABLET, FILM COATED ORAL at 21:10

## 2025-08-30 RX ADMIN — Medication 5 MILLIGRAM(S): at 21:08

## 2025-08-30 RX ADMIN — GABAPENTIN 300 MILLIGRAM(S): 400 CAPSULE ORAL at 05:53

## 2025-08-30 RX ADMIN — Medication 1 DOSE(S): at 05:12

## 2025-08-30 RX ADMIN — INSULIN LISPRO 3: 100 INJECTION, SOLUTION INTRAVENOUS; SUBCUTANEOUS at 17:17

## 2025-08-30 RX ADMIN — TORSEMIDE 40 MILLIGRAM(S): 20 TABLET ORAL at 05:13

## 2025-08-30 RX ADMIN — Medication 1 TABLET(S): at 13:38

## 2025-08-30 RX ADMIN — MUPIROCIN CALCIUM 1 APPLICATION(S): 20 CREAM TOPICAL at 13:58

## 2025-08-30 RX ADMIN — IPRATROPIUM BROMIDE AND ALBUTEROL SULFATE 3 MILLILITER(S): .5; 2.5 SOLUTION RESPIRATORY (INHALATION) at 05:12

## 2025-08-30 RX ADMIN — APIXABAN 5 MILLIGRAM(S): 2.5 TABLET, FILM COATED ORAL at 05:13

## 2025-08-30 RX ADMIN — Medication 2 MILLIGRAM(S): at 23:02

## 2025-08-30 RX ADMIN — INSULIN LISPRO 16 UNIT(S): 100 INJECTION, SOLUTION INTRAVENOUS; SUBCUTANEOUS at 08:49

## 2025-08-30 RX ADMIN — NYSTATIN 1 APPLICATION(S): 100000 CREAM TOPICAL at 21:08

## 2025-08-30 RX ADMIN — INSULIN LISPRO 2: 100 INJECTION, SOLUTION INTRAVENOUS; SUBCUTANEOUS at 21:55

## 2025-08-30 RX ADMIN — AMLODIPINE BESYLATE 5 MILLIGRAM(S): 10 TABLET ORAL at 05:13

## 2025-08-30 RX ADMIN — Medication 50 MILLIGRAM(S): at 05:52

## 2025-08-30 RX ADMIN — NYSTATIN 1 APPLICATION(S): 100000 CREAM TOPICAL at 13:38

## 2025-08-30 RX ADMIN — TORSEMIDE 40 MILLIGRAM(S): 20 TABLET ORAL at 17:59

## 2025-08-31 LAB
ALBUMIN SERPL ELPH-MCNC: 3.7 G/DL — SIGNIFICANT CHANGE UP (ref 3.3–5)
ALP SERPL-CCNC: 63 U/L — SIGNIFICANT CHANGE UP (ref 40–120)
ALT FLD-CCNC: 13 U/L — SIGNIFICANT CHANGE UP (ref 10–45)
ANION GAP SERPL CALC-SCNC: 17 MMOL/L — SIGNIFICANT CHANGE UP (ref 5–17)
AST SERPL-CCNC: 11 U/L — SIGNIFICANT CHANGE UP (ref 10–40)
BILIRUB SERPL-MCNC: 0.5 MG/DL — SIGNIFICANT CHANGE UP (ref 0.2–1.2)
BUN SERPL-MCNC: 76 MG/DL — HIGH (ref 7–23)
CALCIUM SERPL-MCNC: 9.7 MG/DL — SIGNIFICANT CHANGE UP (ref 8.4–10.5)
CHLORIDE SERPL-SCNC: 89 MMOL/L — LOW (ref 96–108)
CO2 SERPL-SCNC: 34 MMOL/L — HIGH (ref 22–31)
CREAT SERPL-MCNC: 1.79 MG/DL — HIGH (ref 0.5–1.3)
EGFR: 29 ML/MIN/1.73M2 — LOW
EGFR: 29 ML/MIN/1.73M2 — LOW
GAS PNL BLDV: SIGNIFICANT CHANGE UP
GLUCOSE BLDC GLUCOMTR-MCNC: 213 MG/DL — HIGH (ref 70–99)
GLUCOSE BLDC GLUCOMTR-MCNC: 241 MG/DL — HIGH (ref 70–99)
GLUCOSE BLDC GLUCOMTR-MCNC: 274 MG/DL — HIGH (ref 70–99)
GLUCOSE BLDC GLUCOMTR-MCNC: 281 MG/DL — HIGH (ref 70–99)
GLUCOSE SERPL-MCNC: 171 MG/DL — HIGH (ref 70–99)
HCT VFR BLD CALC: 27.3 % — LOW (ref 34.5–45)
HGB BLD-MCNC: 8 G/DL — LOW (ref 11.5–15.5)
MAGNESIUM SERPL-MCNC: 2.5 MG/DL — SIGNIFICANT CHANGE UP (ref 1.6–2.6)
MCHC RBC-ENTMCNC: 25.9 PG — LOW (ref 27–34)
MCHC RBC-ENTMCNC: 29.3 G/DL — LOW (ref 32–36)
MCV RBC AUTO: 88.3 FL — SIGNIFICANT CHANGE UP (ref 80–100)
NRBC # BLD AUTO: 0 K/UL — SIGNIFICANT CHANGE UP (ref 0–0)
NRBC # FLD: 0 K/UL — SIGNIFICANT CHANGE UP (ref 0–0)
NRBC BLD AUTO-RTO: 0 /100 WBCS — SIGNIFICANT CHANGE UP (ref 0–0)
NT-PROBNP SERPL-SCNC: 401 PG/ML — HIGH (ref 0–300)
PHOSPHATE SERPL-MCNC: 4.7 MG/DL — HIGH (ref 2.5–4.5)
PLATELET # BLD AUTO: 413 K/UL — HIGH (ref 150–400)
PMV BLD: 10 FL — SIGNIFICANT CHANGE UP (ref 7–13)
POTASSIUM SERPL-MCNC: 3.6 MMOL/L — SIGNIFICANT CHANGE UP (ref 3.5–5.3)
POTASSIUM SERPL-SCNC: 3.6 MMOL/L — SIGNIFICANT CHANGE UP (ref 3.5–5.3)
PROT SERPL-MCNC: 7.3 G/DL — SIGNIFICANT CHANGE UP (ref 6–8.3)
RBC # BLD: 3.09 M/UL — LOW (ref 3.8–5.2)
RBC # FLD: 16.2 % — HIGH (ref 10.3–14.5)
SODIUM SERPL-SCNC: 140 MMOL/L — SIGNIFICANT CHANGE UP (ref 135–145)
WBC # BLD: 10.91 K/UL — HIGH (ref 3.8–10.5)
WBC # FLD AUTO: 10.91 K/UL — HIGH (ref 3.8–10.5)

## 2025-08-31 PROCEDURE — 99233 SBSQ HOSP IP/OBS HIGH 50: CPT | Mod: GC

## 2025-08-31 RX ORDER — IPRATROPIUM BROMIDE AND ALBUTEROL SULFATE .5; 2.5 MG/3ML; MG/3ML
3 SOLUTION RESPIRATORY (INHALATION) EVERY 6 HOURS
Refills: 0 | Status: DISCONTINUED | OUTPATIENT
Start: 2025-08-31 | End: 2025-09-04

## 2025-08-31 RX ORDER — INSULIN GLARGINE-YFGN 100 [IU]/ML
39 INJECTION, SOLUTION SUBCUTANEOUS AT BEDTIME
Refills: 0 | Status: DISCONTINUED | OUTPATIENT
Start: 2025-08-31 | End: 2025-09-01

## 2025-08-31 RX ADMIN — INSULIN LISPRO 16 UNIT(S): 100 INJECTION, SOLUTION INTRAVENOUS; SUBCUTANEOUS at 13:11

## 2025-08-31 RX ADMIN — Medication 1 APPLICATION(S): at 13:07

## 2025-08-31 RX ADMIN — APIXABAN 5 MILLIGRAM(S): 2.5 TABLET, FILM COATED ORAL at 05:13

## 2025-08-31 RX ADMIN — Medication 1 TABLET(S): at 13:12

## 2025-08-31 RX ADMIN — NYSTATIN 1 APPLICATION(S): 100000 CREAM TOPICAL at 21:36

## 2025-08-31 RX ADMIN — APIXABAN 5 MILLIGRAM(S): 2.5 TABLET, FILM COATED ORAL at 18:13

## 2025-08-31 RX ADMIN — INSULIN LISPRO 16 UNIT(S): 100 INJECTION, SOLUTION INTRAVENOUS; SUBCUTANEOUS at 08:48

## 2025-08-31 RX ADMIN — GABAPENTIN 300 MILLIGRAM(S): 400 CAPSULE ORAL at 13:10

## 2025-08-31 RX ADMIN — Medication 4 MILLILITER(S): at 18:21

## 2025-08-31 RX ADMIN — MUPIROCIN CALCIUM 1 APPLICATION(S): 20 CREAM TOPICAL at 11:23

## 2025-08-31 RX ADMIN — INSULIN LISPRO 16 UNIT(S): 100 INJECTION, SOLUTION INTRAVENOUS; SUBCUTANEOUS at 18:11

## 2025-08-31 RX ADMIN — OXYCODONE HYDROCHLORIDE 2.5 MILLIGRAM(S): 30 TABLET ORAL at 01:26

## 2025-08-31 RX ADMIN — WHITE PETROLATUM 1 APPLICATION(S): 1 OINTMENT TOPICAL at 13:10

## 2025-08-31 RX ADMIN — Medication 1 DOSE(S): at 05:14

## 2025-08-31 RX ADMIN — Medication 5 MILLIGRAM(S): at 21:34

## 2025-08-31 RX ADMIN — INSULIN LISPRO 2: 100 INJECTION, SOLUTION INTRAVENOUS; SUBCUTANEOUS at 18:12

## 2025-08-31 RX ADMIN — GABAPENTIN 300 MILLIGRAM(S): 400 CAPSULE ORAL at 21:34

## 2025-08-31 RX ADMIN — INSULIN LISPRO 2: 100 INJECTION, SOLUTION INTRAVENOUS; SUBCUTANEOUS at 08:48

## 2025-08-31 RX ADMIN — Medication 81 MILLIGRAM(S): at 13:12

## 2025-08-31 RX ADMIN — Medication 100 MICROGRAM(S): at 05:13

## 2025-08-31 RX ADMIN — AMLODIPINE BESYLATE 5 MILLIGRAM(S): 10 TABLET ORAL at 05:13

## 2025-08-31 RX ADMIN — Medication 2 MILLIGRAM(S): at 23:40

## 2025-08-31 RX ADMIN — INSULIN GLARGINE-YFGN 39 UNIT(S): 100 INJECTION, SOLUTION SUBCUTANEOUS at 21:35

## 2025-08-31 RX ADMIN — IPRATROPIUM BROMIDE AND ALBUTEROL SULFATE 3 MILLILITER(S): .5; 2.5 SOLUTION RESPIRATORY (INHALATION) at 18:13

## 2025-08-31 RX ADMIN — INSULIN LISPRO 3: 100 INJECTION, SOLUTION INTRAVENOUS; SUBCUTANEOUS at 13:11

## 2025-08-31 RX ADMIN — TORSEMIDE 40 MILLIGRAM(S): 20 TABLET ORAL at 18:13

## 2025-08-31 RX ADMIN — Medication 40 MILLIGRAM(S): at 05:14

## 2025-08-31 RX ADMIN — IPRATROPIUM BROMIDE AND ALBUTEROL SULFATE 3 MILLILITER(S): .5; 2.5 SOLUTION RESPIRATORY (INHALATION) at 13:12

## 2025-08-31 RX ADMIN — OXYCODONE HYDROCHLORIDE 2.5 MILLIGRAM(S): 30 TABLET ORAL at 01:56

## 2025-08-31 RX ADMIN — Medication 50 MILLIGRAM(S): at 06:44

## 2025-08-31 RX ADMIN — POLYETHYLENE GLYCOL 3350 17 GRAM(S): 17 POWDER, FOR SOLUTION ORAL at 05:13

## 2025-08-31 RX ADMIN — GABAPENTIN 300 MILLIGRAM(S): 400 CAPSULE ORAL at 05:13

## 2025-08-31 RX ADMIN — ATORVASTATIN CALCIUM 40 MILLIGRAM(S): 80 TABLET, FILM COATED ORAL at 21:35

## 2025-08-31 RX ADMIN — NYSTATIN 1 APPLICATION(S): 100000 CREAM TOPICAL at 13:10

## 2025-08-31 RX ADMIN — INSULIN LISPRO 1: 100 INJECTION, SOLUTION INTRAVENOUS; SUBCUTANEOUS at 21:35

## 2025-08-31 RX ADMIN — TORSEMIDE 40 MILLIGRAM(S): 20 TABLET ORAL at 06:44

## 2025-08-31 RX ADMIN — Medication 2 TABLET(S): at 21:34

## 2025-08-31 RX ADMIN — Medication 1 DOSE(S): at 18:12

## 2025-08-31 RX ADMIN — AMIODARONE HYDROCHLORIDE 200 MILLIGRAM(S): 50 INJECTION, SOLUTION INTRAVENOUS at 06:44

## 2025-08-31 RX ADMIN — IPRATROPIUM BROMIDE AND ALBUTEROL SULFATE 3 MILLILITER(S): .5; 2.5 SOLUTION RESPIRATORY (INHALATION) at 05:13

## 2025-09-01 LAB
ALBUMIN SERPL ELPH-MCNC: 3.6 G/DL — SIGNIFICANT CHANGE UP (ref 3.3–5)
ALP SERPL-CCNC: 63 U/L — SIGNIFICANT CHANGE UP (ref 40–120)
ALT FLD-CCNC: 12 U/L — SIGNIFICANT CHANGE UP (ref 10–45)
ANION GAP SERPL CALC-SCNC: 15 MMOL/L — SIGNIFICANT CHANGE UP (ref 5–17)
AST SERPL-CCNC: 13 U/L — SIGNIFICANT CHANGE UP (ref 10–40)
BILIRUB SERPL-MCNC: 0.6 MG/DL — SIGNIFICANT CHANGE UP (ref 0.2–1.2)
BUN SERPL-MCNC: 85 MG/DL — HIGH (ref 7–23)
CALCIUM SERPL-MCNC: 9.6 MG/DL — SIGNIFICANT CHANGE UP (ref 8.4–10.5)
CHLORIDE SERPL-SCNC: 86 MMOL/L — LOW (ref 96–108)
CO2 SERPL-SCNC: 34 MMOL/L — HIGH (ref 22–31)
CREAT SERPL-MCNC: 1.86 MG/DL — HIGH (ref 0.5–1.3)
EGFR: 28 ML/MIN/1.73M2 — LOW
EGFR: 28 ML/MIN/1.73M2 — LOW
GAS PNL BLDV: SIGNIFICANT CHANGE UP
GLUCOSE BLDC GLUCOMTR-MCNC: 212 MG/DL — HIGH (ref 70–99)
GLUCOSE BLDC GLUCOMTR-MCNC: 214 MG/DL — HIGH (ref 70–99)
GLUCOSE BLDC GLUCOMTR-MCNC: 281 MG/DL — HIGH (ref 70–99)
GLUCOSE BLDC GLUCOMTR-MCNC: 281 MG/DL — HIGH (ref 70–99)
GLUCOSE SERPL-MCNC: 164 MG/DL — HIGH (ref 70–99)
HCT VFR BLD CALC: 26.6 % — LOW (ref 34.5–45)
HGB BLD-MCNC: 7.8 G/DL — LOW (ref 11.5–15.5)
MAGNESIUM SERPL-MCNC: 2.5 MG/DL — SIGNIFICANT CHANGE UP (ref 1.6–2.6)
MCHC RBC-ENTMCNC: 25.7 PG — LOW (ref 27–34)
MCHC RBC-ENTMCNC: 29.3 G/DL — LOW (ref 32–36)
MCV RBC AUTO: 87.5 FL — SIGNIFICANT CHANGE UP (ref 80–100)
NRBC # BLD AUTO: 0 K/UL — SIGNIFICANT CHANGE UP (ref 0–0)
NRBC # FLD: 0 K/UL — SIGNIFICANT CHANGE UP (ref 0–0)
NRBC BLD AUTO-RTO: 0 /100 WBCS — SIGNIFICANT CHANGE UP (ref 0–0)
PHOSPHATE SERPL-MCNC: 4.9 MG/DL — HIGH (ref 2.5–4.5)
PLATELET # BLD AUTO: 409 K/UL — HIGH (ref 150–400)
PMV BLD: 10.2 FL — SIGNIFICANT CHANGE UP (ref 7–13)
POTASSIUM SERPL-MCNC: 3.5 MMOL/L — SIGNIFICANT CHANGE UP (ref 3.5–5.3)
POTASSIUM SERPL-SCNC: 3.5 MMOL/L — SIGNIFICANT CHANGE UP (ref 3.5–5.3)
PROT SERPL-MCNC: 7.2 G/DL — SIGNIFICANT CHANGE UP (ref 6–8.3)
RBC # BLD: 3.04 M/UL — LOW (ref 3.8–5.2)
RBC # FLD: 16.1 % — HIGH (ref 10.3–14.5)
SODIUM SERPL-SCNC: 135 MMOL/L — SIGNIFICANT CHANGE UP (ref 135–145)
WBC # BLD: 10.31 K/UL — SIGNIFICANT CHANGE UP (ref 3.8–10.5)
WBC # FLD AUTO: 10.31 K/UL — SIGNIFICANT CHANGE UP (ref 3.8–10.5)

## 2025-09-01 PROCEDURE — 99232 SBSQ HOSP IP/OBS MODERATE 35: CPT | Mod: GC

## 2025-09-01 PROCEDURE — 71250 CT THORAX DX C-: CPT | Mod: 26

## 2025-09-01 RX ORDER — INSULIN GLARGINE-YFGN 100 [IU]/ML
43 INJECTION, SOLUTION SUBCUTANEOUS AT BEDTIME
Refills: 0 | Status: DISCONTINUED | OUTPATIENT
Start: 2025-09-01 | End: 2025-09-02

## 2025-09-01 RX ORDER — INSULIN LISPRO 100 U/ML
17 INJECTION, SOLUTION INTRAVENOUS; SUBCUTANEOUS
Refills: 0 | Status: DISCONTINUED | OUTPATIENT
Start: 2025-09-01 | End: 2025-09-02

## 2025-09-01 RX ADMIN — Medication 1 DOSE(S): at 05:02

## 2025-09-01 RX ADMIN — NYSTATIN 1 APPLICATION(S): 100000 CREAM TOPICAL at 09:03

## 2025-09-01 RX ADMIN — Medication 50 MILLIGRAM(S): at 06:33

## 2025-09-01 RX ADMIN — POLYETHYLENE GLYCOL 3350 17 GRAM(S): 17 POWDER, FOR SOLUTION ORAL at 05:02

## 2025-09-01 RX ADMIN — Medication 1 TABLET(S): at 12:03

## 2025-09-01 RX ADMIN — INSULIN LISPRO 17 UNIT(S): 100 INJECTION, SOLUTION INTRAVENOUS; SUBCUTANEOUS at 17:26

## 2025-09-01 RX ADMIN — Medication 81 MILLIGRAM(S): at 12:05

## 2025-09-01 RX ADMIN — Medication 650 MILLIGRAM(S): at 12:03

## 2025-09-01 RX ADMIN — MUPIROCIN CALCIUM 1 APPLICATION(S): 20 CREAM TOPICAL at 12:06

## 2025-09-01 RX ADMIN — TORSEMIDE 40 MILLIGRAM(S): 20 TABLET ORAL at 17:25

## 2025-09-01 RX ADMIN — Medication 2 TABLET(S): at 21:05

## 2025-09-01 RX ADMIN — GABAPENTIN 300 MILLIGRAM(S): 400 CAPSULE ORAL at 21:04

## 2025-09-01 RX ADMIN — Medication 1 TABLET(S): at 12:05

## 2025-09-01 RX ADMIN — Medication 5 MILLIGRAM(S): at 21:05

## 2025-09-01 RX ADMIN — Medication 200 MICROGRAM(S): at 05:01

## 2025-09-01 RX ADMIN — IPRATROPIUM BROMIDE AND ALBUTEROL SULFATE 3 MILLILITER(S): .5; 2.5 SOLUTION RESPIRATORY (INHALATION) at 12:04

## 2025-09-01 RX ADMIN — Medication 4 MILLILITER(S): at 17:25

## 2025-09-01 RX ADMIN — POLYETHYLENE GLYCOL 3350 17 GRAM(S): 17 POWDER, FOR SOLUTION ORAL at 17:25

## 2025-09-01 RX ADMIN — TORSEMIDE 40 MILLIGRAM(S): 20 TABLET ORAL at 06:32

## 2025-09-01 RX ADMIN — INSULIN LISPRO 2: 100 INJECTION, SOLUTION INTRAVENOUS; SUBCUTANEOUS at 08:57

## 2025-09-01 RX ADMIN — Medication 40 MILLIGRAM(S): at 05:01

## 2025-09-01 RX ADMIN — Medication 650 MILLIGRAM(S): at 13:03

## 2025-09-01 RX ADMIN — INSULIN LISPRO 1: 100 INJECTION, SOLUTION INTRAVENOUS; SUBCUTANEOUS at 21:49

## 2025-09-01 RX ADMIN — IPRATROPIUM BROMIDE AND ALBUTEROL SULFATE 3 MILLILITER(S): .5; 2.5 SOLUTION RESPIRATORY (INHALATION) at 05:02

## 2025-09-01 RX ADMIN — Medication 2 MILLIGRAM(S): at 22:25

## 2025-09-01 RX ADMIN — INSULIN LISPRO 17 UNIT(S): 100 INJECTION, SOLUTION INTRAVENOUS; SUBCUTANEOUS at 12:58

## 2025-09-01 RX ADMIN — Medication 4 MILLILITER(S): at 05:01

## 2025-09-01 RX ADMIN — ATORVASTATIN CALCIUM 40 MILLIGRAM(S): 80 TABLET, FILM COATED ORAL at 21:04

## 2025-09-01 RX ADMIN — GABAPENTIN 300 MILLIGRAM(S): 400 CAPSULE ORAL at 14:38

## 2025-09-01 RX ADMIN — IPRATROPIUM BROMIDE AND ALBUTEROL SULFATE 3 MILLILITER(S): .5; 2.5 SOLUTION RESPIRATORY (INHALATION) at 17:25

## 2025-09-01 RX ADMIN — IPRATROPIUM BROMIDE AND ALBUTEROL SULFATE 3 MILLILITER(S): .5; 2.5 SOLUTION RESPIRATORY (INHALATION) at 00:48

## 2025-09-01 RX ADMIN — GABAPENTIN 300 MILLIGRAM(S): 400 CAPSULE ORAL at 05:01

## 2025-09-01 RX ADMIN — WHITE PETROLATUM 1 APPLICATION(S): 1 OINTMENT TOPICAL at 12:03

## 2025-09-01 RX ADMIN — Medication 650 MILLIGRAM(S): at 06:32

## 2025-09-01 RX ADMIN — INSULIN LISPRO 16 UNIT(S): 100 INJECTION, SOLUTION INTRAVENOUS; SUBCUTANEOUS at 08:57

## 2025-09-01 RX ADMIN — Medication 1 DOSE(S): at 17:26

## 2025-09-01 RX ADMIN — Medication 1 APPLICATION(S): at 12:06

## 2025-09-01 RX ADMIN — Medication 40 MILLIEQUIVALENT(S): at 12:05

## 2025-09-01 RX ADMIN — Medication 650 MILLIGRAM(S): at 07:30

## 2025-09-01 RX ADMIN — AMLODIPINE BESYLATE 5 MILLIGRAM(S): 10 TABLET ORAL at 05:01

## 2025-09-01 RX ADMIN — AMIODARONE HYDROCHLORIDE 200 MILLIGRAM(S): 50 INJECTION, SOLUTION INTRAVENOUS at 05:01

## 2025-09-01 RX ADMIN — NYSTATIN 1 APPLICATION(S): 100000 CREAM TOPICAL at 21:07

## 2025-09-01 RX ADMIN — APIXABAN 5 MILLIGRAM(S): 2.5 TABLET, FILM COATED ORAL at 17:25

## 2025-09-01 RX ADMIN — INSULIN LISPRO 3: 100 INJECTION, SOLUTION INTRAVENOUS; SUBCUTANEOUS at 17:27

## 2025-09-01 RX ADMIN — INSULIN GLARGINE-YFGN 43 UNIT(S): 100 INJECTION, SOLUTION SUBCUTANEOUS at 21:49

## 2025-09-01 RX ADMIN — INSULIN LISPRO 2: 100 INJECTION, SOLUTION INTRAVENOUS; SUBCUTANEOUS at 12:59

## 2025-09-01 RX ADMIN — APIXABAN 5 MILLIGRAM(S): 2.5 TABLET, FILM COATED ORAL at 04:58

## 2025-09-02 LAB
ANION GAP SERPL CALC-SCNC: 19 MMOL/L — HIGH (ref 5–17)
BUN SERPL-MCNC: 89 MG/DL — HIGH (ref 7–23)
CALCIUM SERPL-MCNC: 9.2 MG/DL — SIGNIFICANT CHANGE UP (ref 8.4–10.5)
CHLORIDE SERPL-SCNC: 89 MMOL/L — LOW (ref 96–108)
CO2 SERPL-SCNC: 30 MMOL/L — SIGNIFICANT CHANGE UP (ref 22–31)
CREAT SERPL-MCNC: 2.06 MG/DL — HIGH (ref 0.5–1.3)
EGFR: 24 ML/MIN/1.73M2 — LOW
EGFR: 24 ML/MIN/1.73M2 — LOW
GAS PNL BLDV: SIGNIFICANT CHANGE UP
GAS PNL BLDV: SIGNIFICANT CHANGE UP
GLUCOSE BLDC GLUCOMTR-MCNC: 228 MG/DL — HIGH (ref 70–99)
GLUCOSE BLDC GLUCOMTR-MCNC: 286 MG/DL — HIGH (ref 70–99)
GLUCOSE BLDC GLUCOMTR-MCNC: 287 MG/DL — HIGH (ref 70–99)
GLUCOSE BLDC GLUCOMTR-MCNC: 318 MG/DL — HIGH (ref 70–99)
GLUCOSE BLDC GLUCOMTR-MCNC: 376 MG/DL — HIGH (ref 70–99)
GLUCOSE SERPL-MCNC: 222 MG/DL — HIGH (ref 70–99)
HCT VFR BLD CALC: 25.5 % — LOW (ref 34.5–45)
HGB BLD-MCNC: 7.5 G/DL — LOW (ref 11.5–15.5)
LACTATE SERPL-SCNC: 1.9 MMOL/L — SIGNIFICANT CHANGE UP (ref 0.5–2)
MAGNESIUM SERPL-MCNC: 2.7 MG/DL — HIGH (ref 1.6–2.6)
MCHC RBC-ENTMCNC: 25.9 PG — LOW (ref 27–34)
MCHC RBC-ENTMCNC: 29.4 G/DL — LOW (ref 32–36)
MCV RBC AUTO: 87.9 FL — SIGNIFICANT CHANGE UP (ref 80–100)
NRBC # BLD AUTO: 0 K/UL — SIGNIFICANT CHANGE UP (ref 0–0)
NRBC # FLD: 0 K/UL — SIGNIFICANT CHANGE UP (ref 0–0)
NRBC BLD AUTO-RTO: 0 /100 WBCS — SIGNIFICANT CHANGE UP (ref 0–0)
PHOSPHATE SERPL-MCNC: 5.5 MG/DL — HIGH (ref 2.5–4.5)
PLATELET # BLD AUTO: 378 K/UL — SIGNIFICANT CHANGE UP (ref 150–400)
PMV BLD: 10.4 FL — SIGNIFICANT CHANGE UP (ref 7–13)
POTASSIUM SERPL-MCNC: 4.2 MMOL/L — SIGNIFICANT CHANGE UP (ref 3.5–5.3)
POTASSIUM SERPL-SCNC: 4.2 MMOL/L — SIGNIFICANT CHANGE UP (ref 3.5–5.3)
RBC # BLD: 2.9 M/UL — LOW (ref 3.8–5.2)
RBC # FLD: 15.9 % — HIGH (ref 10.3–14.5)
SODIUM SERPL-SCNC: 138 MMOL/L — SIGNIFICANT CHANGE UP (ref 135–145)
WBC # BLD: 10.18 K/UL — SIGNIFICANT CHANGE UP (ref 3.8–10.5)
WBC # FLD AUTO: 10.18 K/UL — SIGNIFICANT CHANGE UP (ref 3.8–10.5)

## 2025-09-02 PROCEDURE — 99233 SBSQ HOSP IP/OBS HIGH 50: CPT | Mod: GC

## 2025-09-02 PROCEDURE — P9016: CPT

## 2025-09-02 PROCEDURE — 36415 COLL VENOUS BLD VENIPUNCTURE: CPT

## 2025-09-02 PROCEDURE — 87205 SMEAR GRAM STAIN: CPT

## 2025-09-02 PROCEDURE — 82947 ASSAY GLUCOSE BLOOD QUANT: CPT

## 2025-09-02 PROCEDURE — 86850 RBC ANTIBODY SCREEN: CPT

## 2025-09-02 PROCEDURE — 76770 US EXAM ABDO BACK WALL COMP: CPT | Mod: 26

## 2025-09-02 PROCEDURE — 85027 COMPLETE CBC AUTOMATED: CPT

## 2025-09-02 PROCEDURE — 80202 ASSAY OF VANCOMYCIN: CPT

## 2025-09-02 PROCEDURE — 93970 EXTREMITY STUDY: CPT

## 2025-09-02 PROCEDURE — 76770 US EXAM ABDO BACK WALL COMP: CPT

## 2025-09-02 PROCEDURE — 36569 INSJ PICC 5 YR+ W/O IMAGING: CPT

## 2025-09-02 PROCEDURE — 71250 CT THORAX DX C-: CPT

## 2025-09-02 PROCEDURE — 85018 HEMOGLOBIN: CPT

## 2025-09-02 PROCEDURE — 84436 ASSAY OF TOTAL THYROXINE: CPT

## 2025-09-02 PROCEDURE — 83935 ASSAY OF URINE OSMOLALITY: CPT

## 2025-09-02 PROCEDURE — 93306 TTE W/DOPPLER COMPLETE: CPT

## 2025-09-02 PROCEDURE — 84484 ASSAY OF TROPONIN QUANT: CPT

## 2025-09-02 PROCEDURE — 80048 BASIC METABOLIC PNL TOTAL CA: CPT

## 2025-09-02 PROCEDURE — 93923 UPR/LXTR ART STDY 3+ LVLS: CPT

## 2025-09-02 PROCEDURE — 97162 PT EVAL MOD COMPLEX 30 MIN: CPT

## 2025-09-02 PROCEDURE — 85014 HEMATOCRIT: CPT

## 2025-09-02 PROCEDURE — 93005 ELECTROCARDIOGRAM TRACING: CPT

## 2025-09-02 PROCEDURE — C1889: CPT

## 2025-09-02 PROCEDURE — C1894: CPT

## 2025-09-02 PROCEDURE — 86140 C-REACTIVE PROTEIN: CPT

## 2025-09-02 PROCEDURE — 87640 STAPH A DNA AMP PROBE: CPT

## 2025-09-02 PROCEDURE — 87641 MR-STAPH DNA AMP PROBE: CPT

## 2025-09-02 PROCEDURE — 86923 COMPATIBILITY TEST ELECTRIC: CPT

## 2025-09-02 PROCEDURE — 82272 OCCULT BLD FECES 1-3 TESTS: CPT

## 2025-09-02 PROCEDURE — 83036 HEMOGLOBIN GLYCOSYLATED A1C: CPT

## 2025-09-02 PROCEDURE — 84300 ASSAY OF URINE SODIUM: CPT

## 2025-09-02 PROCEDURE — 85025 COMPLETE CBC W/AUTO DIFF WBC: CPT

## 2025-09-02 PROCEDURE — 83735 ASSAY OF MAGNESIUM: CPT

## 2025-09-02 PROCEDURE — 73590 X-RAY EXAM OF LOWER LEG: CPT

## 2025-09-02 PROCEDURE — 80053 COMPREHEN METABOLIC PANEL: CPT

## 2025-09-02 PROCEDURE — 84443 ASSAY THYROID STIM HORMONE: CPT

## 2025-09-02 PROCEDURE — 94660 CPAP INITIATION&MGMT: CPT

## 2025-09-02 PROCEDURE — 82570 ASSAY OF URINE CREATININE: CPT

## 2025-09-02 PROCEDURE — 87637 SARSCOV2&INF A&B&RSV AMP PRB: CPT

## 2025-09-02 PROCEDURE — 86901 BLOOD TYPING SEROLOGIC RH(D): CPT

## 2025-09-02 PROCEDURE — 87040 BLOOD CULTURE FOR BACTERIA: CPT

## 2025-09-02 PROCEDURE — 97116 GAIT TRAINING THERAPY: CPT

## 2025-09-02 PROCEDURE — 84540 ASSAY OF URINE/UREA-N: CPT

## 2025-09-02 PROCEDURE — 81001 URINALYSIS AUTO W/SCOPE: CPT

## 2025-09-02 PROCEDURE — 82330 ASSAY OF CALCIUM: CPT

## 2025-09-02 PROCEDURE — 84295 ASSAY OF SERUM SODIUM: CPT

## 2025-09-02 PROCEDURE — 71045 X-RAY EXAM CHEST 1 VIEW: CPT

## 2025-09-02 PROCEDURE — 87070 CULTURE OTHR SPECIMN AEROBIC: CPT

## 2025-09-02 PROCEDURE — 97530 THERAPEUTIC ACTIVITIES: CPT

## 2025-09-02 PROCEDURE — C1751: CPT

## 2025-09-02 PROCEDURE — 82962 GLUCOSE BLOOD TEST: CPT

## 2025-09-02 PROCEDURE — 87186 SC STD MICRODIL/AGAR DIL: CPT

## 2025-09-02 PROCEDURE — 86985 SPLIT BLOOD OR PRODUCTS: CPT

## 2025-09-02 PROCEDURE — 73630 X-RAY EXAM OF FOOT: CPT

## 2025-09-02 PROCEDURE — 71275 CT ANGIOGRAPHY CHEST: CPT

## 2025-09-02 PROCEDURE — 85652 RBC SED RATE AUTOMATED: CPT

## 2025-09-02 PROCEDURE — 36430 TRANSFUSION BLD/BLD COMPNT: CPT

## 2025-09-02 PROCEDURE — 94640 AIRWAY INHALATION TREATMENT: CPT

## 2025-09-02 PROCEDURE — 84100 ASSAY OF PHOSPHORUS: CPT

## 2025-09-02 PROCEDURE — 85610 PROTHROMBIN TIME: CPT

## 2025-09-02 PROCEDURE — 83690 ASSAY OF LIPASE: CPT

## 2025-09-02 PROCEDURE — 82435 ASSAY OF BLOOD CHLORIDE: CPT

## 2025-09-02 PROCEDURE — P9011: CPT

## 2025-09-02 PROCEDURE — 84132 ASSAY OF SERUM POTASSIUM: CPT

## 2025-09-02 PROCEDURE — 85730 THROMBOPLASTIN TIME PARTIAL: CPT

## 2025-09-02 PROCEDURE — 83880 ASSAY OF NATRIURETIC PEPTIDE: CPT

## 2025-09-02 PROCEDURE — 73700 CT LOWER EXTREMITY W/O DYE: CPT

## 2025-09-02 PROCEDURE — 87899 AGENT NOS ASSAY W/OPTIC: CPT

## 2025-09-02 PROCEDURE — 86900 BLOOD TYPING SEROLOGIC ABO: CPT

## 2025-09-02 PROCEDURE — 82803 BLOOD GASES ANY COMBINATION: CPT

## 2025-09-02 PROCEDURE — 93451 RIGHT HEART CATH: CPT

## 2025-09-02 PROCEDURE — 84145 PROCALCITONIN (PCT): CPT

## 2025-09-02 PROCEDURE — 97110 THERAPEUTIC EXERCISES: CPT

## 2025-09-02 PROCEDURE — 84156 ASSAY OF PROTEIN URINE: CPT

## 2025-09-02 PROCEDURE — 0225U NFCT DS DNA&RNA 21 SARSCOV2: CPT

## 2025-09-02 PROCEDURE — 83605 ASSAY OF LACTIC ACID: CPT

## 2025-09-02 RX ORDER — ALPRAZOLAM 0.5 MG
2 TABLET, EXTENDED RELEASE 24 HR ORAL AT BEDTIME
Refills: 0 | Status: DISCONTINUED | OUTPATIENT
Start: 2025-09-02 | End: 2025-09-04

## 2025-09-02 RX ORDER — BISACODYL 5 MG
5 TABLET, DELAYED RELEASE (ENTERIC COATED) ORAL AT BEDTIME
Refills: 0 | Status: DISCONTINUED | OUTPATIENT
Start: 2025-09-02 | End: 2025-09-04

## 2025-09-02 RX ORDER — INSULIN LISPRO 100 U/ML
20 INJECTION, SOLUTION INTRAVENOUS; SUBCUTANEOUS
Refills: 0 | Status: DISCONTINUED | OUTPATIENT
Start: 2025-09-02 | End: 2025-09-04

## 2025-09-02 RX ORDER — IRON SUCROSE 20 MG/ML
200 INJECTION, SOLUTION INTRAVENOUS EVERY 24 HOURS
Refills: 0 | Status: DISCONTINUED | OUTPATIENT
Start: 2025-09-02 | End: 2025-09-04

## 2025-09-02 RX ORDER — OXYCODONE HYDROCHLORIDE 30 MG/1
2.5 TABLET ORAL EVERY 4 HOURS
Refills: 0 | Status: DISCONTINUED | OUTPATIENT
Start: 2025-09-02 | End: 2025-09-04

## 2025-09-02 RX ORDER — INSULIN GLARGINE-YFGN 100 [IU]/ML
47 INJECTION, SOLUTION SUBCUTANEOUS AT BEDTIME
Refills: 0 | Status: DISCONTINUED | OUTPATIENT
Start: 2025-09-02 | End: 2025-09-04

## 2025-09-02 RX ADMIN — IPRATROPIUM BROMIDE AND ALBUTEROL SULFATE 3 MILLILITER(S): .5; 2.5 SOLUTION RESPIRATORY (INHALATION) at 13:52

## 2025-09-02 RX ADMIN — IRON SUCROSE 100 MILLIGRAM(S): 20 INJECTION, SOLUTION INTRAVENOUS at 13:51

## 2025-09-02 RX ADMIN — Medication 200 MICROGRAM(S): at 06:17

## 2025-09-02 RX ADMIN — AMIODARONE HYDROCHLORIDE 200 MILLIGRAM(S): 50 INJECTION, SOLUTION INTRAVENOUS at 06:18

## 2025-09-02 RX ADMIN — INSULIN LISPRO 17 UNIT(S): 100 INJECTION, SOLUTION INTRAVENOUS; SUBCUTANEOUS at 09:00

## 2025-09-02 RX ADMIN — Medication 1 TABLET(S): at 13:54

## 2025-09-02 RX ADMIN — TORSEMIDE 40 MILLIGRAM(S): 20 TABLET ORAL at 06:18

## 2025-09-02 RX ADMIN — APIXABAN 5 MILLIGRAM(S): 2.5 TABLET, FILM COATED ORAL at 18:07

## 2025-09-02 RX ADMIN — Medication 81 MILLIGRAM(S): at 13:53

## 2025-09-02 RX ADMIN — INSULIN GLARGINE-YFGN 47 UNIT(S): 100 INJECTION, SOLUTION SUBCUTANEOUS at 22:00

## 2025-09-02 RX ADMIN — TORSEMIDE 40 MILLIGRAM(S): 20 TABLET ORAL at 18:07

## 2025-09-02 RX ADMIN — Medication 50 MILLIGRAM(S): at 06:18

## 2025-09-02 RX ADMIN — Medication 1 APPLICATION(S): at 13:52

## 2025-09-02 RX ADMIN — Medication 650 MILLIGRAM(S): at 07:05

## 2025-09-02 RX ADMIN — IPRATROPIUM BROMIDE AND ALBUTEROL SULFATE 3 MILLILITER(S): .5; 2.5 SOLUTION RESPIRATORY (INHALATION) at 00:16

## 2025-09-02 RX ADMIN — IPRATROPIUM BROMIDE AND ALBUTEROL SULFATE 3 MILLILITER(S): .5; 2.5 SOLUTION RESPIRATORY (INHALATION) at 06:20

## 2025-09-02 RX ADMIN — NYSTATIN 1 APPLICATION(S): 100000 CREAM TOPICAL at 09:46

## 2025-09-02 RX ADMIN — Medication 1 DOSE(S): at 18:07

## 2025-09-02 RX ADMIN — MUPIROCIN CALCIUM 1 APPLICATION(S): 20 CREAM TOPICAL at 13:53

## 2025-09-02 RX ADMIN — INSULIN LISPRO 2: 100 INJECTION, SOLUTION INTRAVENOUS; SUBCUTANEOUS at 22:01

## 2025-09-02 RX ADMIN — IPRATROPIUM BROMIDE AND ALBUTEROL SULFATE 3 MILLILITER(S): .5; 2.5 SOLUTION RESPIRATORY (INHALATION) at 18:07

## 2025-09-02 RX ADMIN — Medication 1 DOSE(S): at 06:20

## 2025-09-02 RX ADMIN — Medication 2 MILLIGRAM(S): at 23:21

## 2025-09-02 RX ADMIN — APIXABAN 5 MILLIGRAM(S): 2.5 TABLET, FILM COATED ORAL at 06:17

## 2025-09-02 RX ADMIN — Medication 40 MILLIGRAM(S): at 06:18

## 2025-09-02 RX ADMIN — WHITE PETROLATUM 1 APPLICATION(S): 1 OINTMENT TOPICAL at 13:52

## 2025-09-02 RX ADMIN — INSULIN LISPRO 5: 100 INJECTION, SOLUTION INTRAVENOUS; SUBCUTANEOUS at 19:23

## 2025-09-02 RX ADMIN — Medication 5 MILLIGRAM(S): at 22:00

## 2025-09-02 RX ADMIN — POLYETHYLENE GLYCOL 3350 17 GRAM(S): 17 POWDER, FOR SOLUTION ORAL at 06:21

## 2025-09-02 RX ADMIN — INSULIN LISPRO 3: 100 INJECTION, SOLUTION INTRAVENOUS; SUBCUTANEOUS at 09:01

## 2025-09-02 RX ADMIN — AMLODIPINE BESYLATE 5 MILLIGRAM(S): 10 TABLET ORAL at 06:18

## 2025-09-02 RX ADMIN — GABAPENTIN 300 MILLIGRAM(S): 400 CAPSULE ORAL at 22:00

## 2025-09-02 RX ADMIN — GABAPENTIN 300 MILLIGRAM(S): 400 CAPSULE ORAL at 06:18

## 2025-09-02 RX ADMIN — ATORVASTATIN CALCIUM 40 MILLIGRAM(S): 80 TABLET, FILM COATED ORAL at 22:01

## 2025-09-02 RX ADMIN — INSULIN LISPRO 20 UNIT(S): 100 INJECTION, SOLUTION INTRAVENOUS; SUBCUTANEOUS at 19:24

## 2025-09-02 RX ADMIN — Medication 5 MILLIGRAM(S): at 22:07

## 2025-09-02 RX ADMIN — NYSTATIN 1 APPLICATION(S): 100000 CREAM TOPICAL at 22:07

## 2025-09-02 RX ADMIN — Medication 4 MILLILITER(S): at 06:21

## 2025-09-02 RX ADMIN — INSULIN LISPRO 2: 100 INJECTION, SOLUTION INTRAVENOUS; SUBCUTANEOUS at 14:14

## 2025-09-02 RX ADMIN — GABAPENTIN 300 MILLIGRAM(S): 400 CAPSULE ORAL at 13:57

## 2025-09-02 RX ADMIN — Medication 650 MILLIGRAM(S): at 06:17

## 2025-09-02 RX ADMIN — Medication 4 MILLILITER(S): at 18:07

## 2025-09-02 RX ADMIN — POLYETHYLENE GLYCOL 3350 17 GRAM(S): 17 POWDER, FOR SOLUTION ORAL at 18:08

## 2025-09-02 RX ADMIN — Medication 1 TABLET(S): at 13:53

## 2025-09-03 LAB
ANION GAP SERPL CALC-SCNC: 18 MMOL/L — HIGH (ref 5–17)
BLD GP AB SCN SERPL QL: NEGATIVE — SIGNIFICANT CHANGE UP
BUN SERPL-MCNC: 101 MG/DL — HIGH (ref 7–23)
CALCIUM SERPL-MCNC: 9.4 MG/DL — SIGNIFICANT CHANGE UP (ref 8.4–10.5)
CHLORIDE SERPL-SCNC: 88 MMOL/L — LOW (ref 96–108)
CO2 SERPL-SCNC: 31 MMOL/L — SIGNIFICANT CHANGE UP (ref 22–31)
CREAT SERPL-MCNC: 2.27 MG/DL — HIGH (ref 0.5–1.3)
EGFR: 22 ML/MIN/1.73M2 — LOW
EGFR: 22 ML/MIN/1.73M2 — LOW
GAS PNL BLDV: SIGNIFICANT CHANGE UP
GLUCOSE BLDC GLUCOMTR-MCNC: 236 MG/DL — HIGH (ref 70–99)
GLUCOSE BLDC GLUCOMTR-MCNC: 237 MG/DL — HIGH (ref 70–99)
GLUCOSE BLDC GLUCOMTR-MCNC: 268 MG/DL — HIGH (ref 70–99)
GLUCOSE BLDC GLUCOMTR-MCNC: 355 MG/DL — HIGH (ref 70–99)
GLUCOSE SERPL-MCNC: 201 MG/DL — HIGH (ref 70–99)
HCT VFR BLD CALC: 26.1 % — LOW (ref 34.5–45)
HGB BLD-MCNC: 7.9 G/DL — LOW (ref 11.5–15.5)
MAGNESIUM SERPL-MCNC: 2.7 MG/DL — HIGH (ref 1.6–2.6)
MCHC RBC-ENTMCNC: 26.8 PG — LOW (ref 27–34)
MCHC RBC-ENTMCNC: 30.3 G/DL — LOW (ref 32–36)
MCV RBC AUTO: 88.5 FL — SIGNIFICANT CHANGE UP (ref 80–100)
NRBC # BLD AUTO: 0 K/UL — SIGNIFICANT CHANGE UP (ref 0–0)
NRBC # FLD: 0 K/UL — SIGNIFICANT CHANGE UP (ref 0–0)
NRBC BLD AUTO-RTO: 0 /100 WBCS — SIGNIFICANT CHANGE UP (ref 0–0)
PHOSPHATE SERPL-MCNC: 4.9 MG/DL — HIGH (ref 2.5–4.5)
PLATELET # BLD AUTO: 382 K/UL — SIGNIFICANT CHANGE UP (ref 150–400)
PMV BLD: 10.5 FL — SIGNIFICANT CHANGE UP (ref 7–13)
POTASSIUM SERPL-MCNC: 3.8 MMOL/L — SIGNIFICANT CHANGE UP (ref 3.5–5.3)
POTASSIUM SERPL-SCNC: 3.8 MMOL/L — SIGNIFICANT CHANGE UP (ref 3.5–5.3)
RBC # BLD: 2.95 M/UL — LOW (ref 3.8–5.2)
RBC # FLD: 15.9 % — HIGH (ref 10.3–14.5)
RH IG SCN BLD-IMP: POSITIVE — SIGNIFICANT CHANGE UP
SODIUM SERPL-SCNC: 137 MMOL/L — SIGNIFICANT CHANGE UP (ref 135–145)
WBC # BLD: 10.93 K/UL — HIGH (ref 3.8–10.5)
WBC # FLD AUTO: 10.93 K/UL — HIGH (ref 3.8–10.5)

## 2025-09-03 PROCEDURE — 99232 SBSQ HOSP IP/OBS MODERATE 35: CPT | Mod: GC

## 2025-09-03 RX ORDER — MUPIROCIN CALCIUM 20 MG/G
1 CREAM TOPICAL ONCE
Refills: 0 | Status: COMPLETED | OUTPATIENT
Start: 2025-09-03 | End: 2025-09-04

## 2025-09-03 RX ADMIN — Medication 1 TABLET(S): at 13:26

## 2025-09-03 RX ADMIN — INSULIN GLARGINE-YFGN 47 UNIT(S): 100 INJECTION, SOLUTION SUBCUTANEOUS at 21:25

## 2025-09-03 RX ADMIN — IPRATROPIUM BROMIDE AND ALBUTEROL SULFATE 3 MILLILITER(S): .5; 2.5 SOLUTION RESPIRATORY (INHALATION) at 13:17

## 2025-09-03 RX ADMIN — Medication 1 TABLET(S): at 13:25

## 2025-09-03 RX ADMIN — AMIODARONE HYDROCHLORIDE 200 MILLIGRAM(S): 50 INJECTION, SOLUTION INTRAVENOUS at 05:08

## 2025-09-03 RX ADMIN — INSULIN LISPRO 2: 100 INJECTION, SOLUTION INTRAVENOUS; SUBCUTANEOUS at 09:32

## 2025-09-03 RX ADMIN — ATORVASTATIN CALCIUM 40 MILLIGRAM(S): 80 TABLET, FILM COATED ORAL at 21:26

## 2025-09-03 RX ADMIN — Medication 50 MILLIGRAM(S): at 05:08

## 2025-09-03 RX ADMIN — GABAPENTIN 300 MILLIGRAM(S): 400 CAPSULE ORAL at 05:09

## 2025-09-03 RX ADMIN — Medication 1 DOSE(S): at 18:03

## 2025-09-03 RX ADMIN — APIXABAN 5 MILLIGRAM(S): 2.5 TABLET, FILM COATED ORAL at 05:08

## 2025-09-03 RX ADMIN — INSULIN LISPRO 20 UNIT(S): 100 INJECTION, SOLUTION INTRAVENOUS; SUBCUTANEOUS at 18:05

## 2025-09-03 RX ADMIN — INSULIN LISPRO 2: 100 INJECTION, SOLUTION INTRAVENOUS; SUBCUTANEOUS at 18:06

## 2025-09-03 RX ADMIN — INSULIN LISPRO 20 UNIT(S): 100 INJECTION, SOLUTION INTRAVENOUS; SUBCUTANEOUS at 09:33

## 2025-09-03 RX ADMIN — INSULIN LISPRO 3: 100 INJECTION, SOLUTION INTRAVENOUS; SUBCUTANEOUS at 13:18

## 2025-09-03 RX ADMIN — Medication 1 DOSE(S): at 05:09

## 2025-09-03 RX ADMIN — WHITE PETROLATUM 1 APPLICATION(S): 1 OINTMENT TOPICAL at 13:26

## 2025-09-03 RX ADMIN — INSULIN LISPRO 3: 100 INJECTION, SOLUTION INTRAVENOUS; SUBCUTANEOUS at 21:25

## 2025-09-03 RX ADMIN — Medication 40 MILLIGRAM(S): at 05:08

## 2025-09-03 RX ADMIN — Medication 200 MICROGRAM(S): at 05:08

## 2025-09-03 RX ADMIN — Medication 1 APPLICATION(S): at 15:14

## 2025-09-03 RX ADMIN — TORSEMIDE 40 MILLIGRAM(S): 20 TABLET ORAL at 18:09

## 2025-09-03 RX ADMIN — Medication 81 MILLIGRAM(S): at 13:19

## 2025-09-03 RX ADMIN — NYSTATIN 1 APPLICATION(S): 100000 CREAM TOPICAL at 21:26

## 2025-09-03 RX ADMIN — GABAPENTIN 300 MILLIGRAM(S): 400 CAPSULE ORAL at 21:26

## 2025-09-03 RX ADMIN — Medication 5 MILLIGRAM(S): at 21:25

## 2025-09-03 RX ADMIN — GABAPENTIN 300 MILLIGRAM(S): 400 CAPSULE ORAL at 15:48

## 2025-09-03 RX ADMIN — IPRATROPIUM BROMIDE AND ALBUTEROL SULFATE 3 MILLILITER(S): .5; 2.5 SOLUTION RESPIRATORY (INHALATION) at 18:02

## 2025-09-03 RX ADMIN — Medication 2 MILLIGRAM(S): at 22:52

## 2025-09-03 RX ADMIN — NYSTATIN 1 APPLICATION(S): 100000 CREAM TOPICAL at 09:34

## 2025-09-03 RX ADMIN — MUPIROCIN CALCIUM 1 APPLICATION(S): 20 CREAM TOPICAL at 13:18

## 2025-09-03 RX ADMIN — Medication 2 TABLET(S): at 21:25

## 2025-09-03 RX ADMIN — Medication 4 MILLILITER(S): at 18:04

## 2025-09-03 RX ADMIN — TORSEMIDE 40 MILLIGRAM(S): 20 TABLET ORAL at 05:09

## 2025-09-03 RX ADMIN — POLYETHYLENE GLYCOL 3350 17 GRAM(S): 17 POWDER, FOR SOLUTION ORAL at 18:02

## 2025-09-03 RX ADMIN — Medication 5 MILLIGRAM(S): at 21:26

## 2025-09-03 RX ADMIN — APIXABAN 5 MILLIGRAM(S): 2.5 TABLET, FILM COATED ORAL at 18:02

## 2025-09-03 RX ADMIN — IRON SUCROSE 100 MILLIGRAM(S): 20 INJECTION, SOLUTION INTRAVENOUS at 15:14

## 2025-09-03 RX ADMIN — INSULIN LISPRO 20 UNIT(S): 100 INJECTION, SOLUTION INTRAVENOUS; SUBCUTANEOUS at 13:19

## 2025-09-04 VITALS
TEMPERATURE: 98 F | RESPIRATION RATE: 18 BRPM | OXYGEN SATURATION: 97 % | DIASTOLIC BLOOD PRESSURE: 65 MMHG | HEART RATE: 65 BPM | SYSTOLIC BLOOD PRESSURE: 112 MMHG

## 2025-09-04 LAB
ANION GAP SERPL CALC-SCNC: 18 MMOL/L — HIGH (ref 5–17)
APPEARANCE UR: CLEAR — SIGNIFICANT CHANGE UP
BACTERIA # UR AUTO: ABNORMAL /HPF
BILIRUB UR-MCNC: NEGATIVE — SIGNIFICANT CHANGE UP
BUN SERPL-MCNC: 100 MG/DL — HIGH (ref 7–23)
CALCIUM SERPL-MCNC: 9.2 MG/DL — SIGNIFICANT CHANGE UP (ref 8.4–10.5)
CAST: 1 /LPF — SIGNIFICANT CHANGE UP (ref 0–4)
CHLORIDE SERPL-SCNC: 87 MMOL/L — LOW (ref 96–108)
CO2 SERPL-SCNC: 32 MMOL/L — HIGH (ref 22–31)
COLOR SPEC: YELLOW — SIGNIFICANT CHANGE UP
CREAT ?TM UR-MCNC: 35 MG/DL — SIGNIFICANT CHANGE UP
CREAT SERPL-MCNC: 2.15 MG/DL — HIGH (ref 0.5–1.3)
DIFF PNL FLD: ABNORMAL
EGFR: 23 ML/MIN/1.73M2 — LOW
EGFR: 23 ML/MIN/1.73M2 — LOW
GLUCOSE BLDC GLUCOMTR-MCNC: 222 MG/DL — HIGH (ref 70–99)
GLUCOSE BLDC GLUCOMTR-MCNC: 239 MG/DL — HIGH (ref 70–99)
GLUCOSE BLDC GLUCOMTR-MCNC: 265 MG/DL — HIGH (ref 70–99)
GLUCOSE BLDC GLUCOMTR-MCNC: 299 MG/DL — HIGH (ref 70–99)
GLUCOSE SERPL-MCNC: 186 MG/DL — HIGH (ref 70–99)
GLUCOSE UR QL: NEGATIVE MG/DL — SIGNIFICANT CHANGE UP
HCT VFR BLD CALC: 24.7 % — LOW (ref 34.5–45)
HGB BLD-MCNC: 7.6 G/DL — LOW (ref 11.5–15.5)
KETONES UR QL: NEGATIVE MG/DL — SIGNIFICANT CHANGE UP
LEUKOCYTE ESTERASE UR-ACNC: ABNORMAL
MCHC RBC-ENTMCNC: 27.2 PG — SIGNIFICANT CHANGE UP (ref 27–34)
MCHC RBC-ENTMCNC: 30.8 G/DL — LOW (ref 32–36)
MCV RBC AUTO: 88.5 FL — SIGNIFICANT CHANGE UP (ref 80–100)
NITRITE UR-MCNC: NEGATIVE — SIGNIFICANT CHANGE UP
NRBC # BLD AUTO: 0.03 K/UL — HIGH (ref 0–0)
NRBC # FLD: 0.03 K/UL — HIGH (ref 0–0)
NRBC BLD AUTO-RTO: 0 /100 WBCS — SIGNIFICANT CHANGE UP (ref 0–0)
OSMOLALITY UR: 352 MOS/KG — SIGNIFICANT CHANGE UP (ref 300–900)
PH UR: 7.5 — SIGNIFICANT CHANGE UP (ref 5–8)
PLATELET # BLD AUTO: 365 K/UL — SIGNIFICANT CHANGE UP (ref 150–400)
PMV BLD: 10.4 FL — SIGNIFICANT CHANGE UP (ref 7–13)
POTASSIUM SERPL-MCNC: 3.8 MMOL/L — SIGNIFICANT CHANGE UP (ref 3.5–5.3)
POTASSIUM SERPL-SCNC: 3.8 MMOL/L — SIGNIFICANT CHANGE UP (ref 3.5–5.3)
PROT UR-MCNC: NEGATIVE MG/DL — SIGNIFICANT CHANGE UP
RBC # BLD: 2.79 M/UL — LOW (ref 3.8–5.2)
RBC # FLD: 15.9 % — HIGH (ref 10.3–14.5)
RBC CASTS # UR COMP ASSIST: 7 /HPF — HIGH (ref 0–4)
REVIEW: SIGNIFICANT CHANGE UP
SODIUM SERPL-SCNC: 137 MMOL/L — SIGNIFICANT CHANGE UP (ref 135–145)
SODIUM UR-SCNC: 86 MMOL/L — SIGNIFICANT CHANGE UP
SP GR SPEC: 1.01 — SIGNIFICANT CHANGE UP (ref 1–1.03)
SQUAMOUS # UR AUTO: 1 /HPF — SIGNIFICANT CHANGE UP (ref 0–5)
UROBILINOGEN FLD QL: 0.2 MG/DL — SIGNIFICANT CHANGE UP (ref 0.2–1)
WBC # BLD: 12.82 K/UL — HIGH (ref 3.8–10.5)
WBC # FLD AUTO: 12.82 K/UL — HIGH (ref 3.8–10.5)
WBC UR QL: 41 /HPF — HIGH (ref 0–5)

## 2025-09-04 PROCEDURE — 87070 CULTURE OTHR SPECIMN AEROBIC: CPT

## 2025-09-04 PROCEDURE — 36415 COLL VENOUS BLD VENIPUNCTURE: CPT

## 2025-09-04 PROCEDURE — 84132 ASSAY OF SERUM POTASSIUM: CPT

## 2025-09-04 PROCEDURE — 73590 X-RAY EXAM OF LOWER LEG: CPT

## 2025-09-04 PROCEDURE — C1889: CPT

## 2025-09-04 PROCEDURE — 87640 STAPH A DNA AMP PROBE: CPT

## 2025-09-04 PROCEDURE — P9016: CPT

## 2025-09-04 PROCEDURE — 84156 ASSAY OF PROTEIN URINE: CPT

## 2025-09-04 PROCEDURE — 97116 GAIT TRAINING THERAPY: CPT

## 2025-09-04 PROCEDURE — 82803 BLOOD GASES ANY COMBINATION: CPT

## 2025-09-04 PROCEDURE — 0225U NFCT DS DNA&RNA 21 SARSCOV2: CPT

## 2025-09-04 PROCEDURE — 82330 ASSAY OF CALCIUM: CPT

## 2025-09-04 PROCEDURE — P9011: CPT

## 2025-09-04 PROCEDURE — C1894: CPT

## 2025-09-04 PROCEDURE — 84436 ASSAY OF TOTAL THYROXINE: CPT

## 2025-09-04 PROCEDURE — 87040 BLOOD CULTURE FOR BACTERIA: CPT

## 2025-09-04 PROCEDURE — 97530 THERAPEUTIC ACTIVITIES: CPT

## 2025-09-04 PROCEDURE — 84295 ASSAY OF SERUM SODIUM: CPT

## 2025-09-04 PROCEDURE — 85027 COMPLETE CBC AUTOMATED: CPT

## 2025-09-04 PROCEDURE — 85025 COMPLETE CBC W/AUTO DIFF WBC: CPT

## 2025-09-04 PROCEDURE — 87637 SARSCOV2&INF A&B&RSV AMP PRB: CPT

## 2025-09-04 PROCEDURE — 94660 CPAP INITIATION&MGMT: CPT

## 2025-09-04 PROCEDURE — 94640 AIRWAY INHALATION TREATMENT: CPT

## 2025-09-04 PROCEDURE — 83690 ASSAY OF LIPASE: CPT

## 2025-09-04 PROCEDURE — 87899 AGENT NOS ASSAY W/OPTIC: CPT

## 2025-09-04 PROCEDURE — 36569 INSJ PICC 5 YR+ W/O IMAGING: CPT

## 2025-09-04 PROCEDURE — 81001 URINALYSIS AUTO W/SCOPE: CPT

## 2025-09-04 PROCEDURE — 71045 X-RAY EXAM CHEST 1 VIEW: CPT

## 2025-09-04 PROCEDURE — 84100 ASSAY OF PHOSPHORUS: CPT

## 2025-09-04 PROCEDURE — 84300 ASSAY OF URINE SODIUM: CPT

## 2025-09-04 PROCEDURE — 76770 US EXAM ABDO BACK WALL COMP: CPT

## 2025-09-04 PROCEDURE — 93451 RIGHT HEART CATH: CPT

## 2025-09-04 PROCEDURE — 85014 HEMATOCRIT: CPT

## 2025-09-04 PROCEDURE — 83880 ASSAY OF NATRIURETIC PEPTIDE: CPT

## 2025-09-04 PROCEDURE — 83935 ASSAY OF URINE OSMOLALITY: CPT

## 2025-09-04 PROCEDURE — 86923 COMPATIBILITY TEST ELECTRIC: CPT

## 2025-09-04 PROCEDURE — 86985 SPLIT BLOOD OR PRODUCTS: CPT

## 2025-09-04 PROCEDURE — 93005 ELECTROCARDIOGRAM TRACING: CPT

## 2025-09-04 PROCEDURE — 85018 HEMOGLOBIN: CPT

## 2025-09-04 PROCEDURE — 82435 ASSAY OF BLOOD CHLORIDE: CPT

## 2025-09-04 PROCEDURE — 87186 SC STD MICRODIL/AGAR DIL: CPT

## 2025-09-04 PROCEDURE — 83036 HEMOGLOBIN GLYCOSYLATED A1C: CPT

## 2025-09-04 PROCEDURE — 97110 THERAPEUTIC EXERCISES: CPT

## 2025-09-04 PROCEDURE — 82570 ASSAY OF URINE CREATININE: CPT

## 2025-09-04 PROCEDURE — 82947 ASSAY GLUCOSE BLOOD QUANT: CPT

## 2025-09-04 PROCEDURE — 36430 TRANSFUSION BLD/BLD COMPNT: CPT

## 2025-09-04 PROCEDURE — 80202 ASSAY OF VANCOMYCIN: CPT

## 2025-09-04 PROCEDURE — 85652 RBC SED RATE AUTOMATED: CPT

## 2025-09-04 PROCEDURE — C1751: CPT

## 2025-09-04 PROCEDURE — 97162 PT EVAL MOD COMPLEX 30 MIN: CPT

## 2025-09-04 PROCEDURE — 84443 ASSAY THYROID STIM HORMONE: CPT

## 2025-09-04 PROCEDURE — 85610 PROTHROMBIN TIME: CPT

## 2025-09-04 PROCEDURE — 84540 ASSAY OF URINE/UREA-N: CPT

## 2025-09-04 PROCEDURE — 93970 EXTREMITY STUDY: CPT

## 2025-09-04 PROCEDURE — 80048 BASIC METABOLIC PNL TOTAL CA: CPT

## 2025-09-04 PROCEDURE — 85730 THROMBOPLASTIN TIME PARTIAL: CPT

## 2025-09-04 PROCEDURE — 86140 C-REACTIVE PROTEIN: CPT

## 2025-09-04 PROCEDURE — 84145 PROCALCITONIN (PCT): CPT

## 2025-09-04 PROCEDURE — 86901 BLOOD TYPING SEROLOGIC RH(D): CPT

## 2025-09-04 PROCEDURE — 99239 HOSP IP/OBS DSCHRG MGMT >30: CPT

## 2025-09-04 PROCEDURE — 82272 OCCULT BLD FECES 1-3 TESTS: CPT

## 2025-09-04 PROCEDURE — 73700 CT LOWER EXTREMITY W/O DYE: CPT

## 2025-09-04 PROCEDURE — 71275 CT ANGIOGRAPHY CHEST: CPT

## 2025-09-04 PROCEDURE — 87641 MR-STAPH DNA AMP PROBE: CPT

## 2025-09-04 PROCEDURE — 86900 BLOOD TYPING SEROLOGIC ABO: CPT

## 2025-09-04 PROCEDURE — 82962 GLUCOSE BLOOD TEST: CPT

## 2025-09-04 PROCEDURE — 83735 ASSAY OF MAGNESIUM: CPT

## 2025-09-04 PROCEDURE — 93306 TTE W/DOPPLER COMPLETE: CPT

## 2025-09-04 PROCEDURE — 80053 COMPREHEN METABOLIC PANEL: CPT

## 2025-09-04 PROCEDURE — 86850 RBC ANTIBODY SCREEN: CPT

## 2025-09-04 PROCEDURE — 84484 ASSAY OF TROPONIN QUANT: CPT

## 2025-09-04 PROCEDURE — 83605 ASSAY OF LACTIC ACID: CPT

## 2025-09-04 PROCEDURE — 87205 SMEAR GRAM STAIN: CPT

## 2025-09-04 PROCEDURE — 99291 CRITICAL CARE FIRST HOUR: CPT

## 2025-09-04 PROCEDURE — 93923 UPR/LXTR ART STDY 3+ LVLS: CPT

## 2025-09-04 PROCEDURE — 73630 X-RAY EXAM OF FOOT: CPT

## 2025-09-04 PROCEDURE — 71250 CT THORAX DX C-: CPT

## 2025-09-04 PROCEDURE — 96374 THER/PROPH/DIAG INJ IV PUSH: CPT

## 2025-09-04 PROCEDURE — 96375 TX/PRO/DX INJ NEW DRUG ADDON: CPT

## 2025-09-04 RX ORDER — TIRZEPATIDE 7.5 MG/.5ML
2.5 INJECTION, SOLUTION SUBCUTANEOUS
Qty: 0 | Refills: 0 | DISCHARGE
Start: 2025-09-04

## 2025-09-04 RX ORDER — TORSEMIDE 20 MG/1
1 TABLET ORAL
Qty: 0 | Refills: 0 | DISCHARGE
Start: 2025-09-04

## 2025-09-04 RX ADMIN — Medication 50 MILLIGRAM(S): at 06:11

## 2025-09-04 RX ADMIN — Medication 81 MILLIGRAM(S): at 11:59

## 2025-09-04 RX ADMIN — Medication 1 DOSE(S): at 17:27

## 2025-09-04 RX ADMIN — IPRATROPIUM BROMIDE AND ALBUTEROL SULFATE 3 MILLILITER(S): .5; 2.5 SOLUTION RESPIRATORY (INHALATION) at 06:09

## 2025-09-04 RX ADMIN — IPRATROPIUM BROMIDE AND ALBUTEROL SULFATE 3 MILLILITER(S): .5; 2.5 SOLUTION RESPIRATORY (INHALATION) at 17:27

## 2025-09-04 RX ADMIN — IPRATROPIUM BROMIDE AND ALBUTEROL SULFATE 3 MILLILITER(S): .5; 2.5 SOLUTION RESPIRATORY (INHALATION) at 11:58

## 2025-09-04 RX ADMIN — INSULIN LISPRO 20 UNIT(S): 100 INJECTION, SOLUTION INTRAVENOUS; SUBCUTANEOUS at 08:22

## 2025-09-04 RX ADMIN — INSULIN LISPRO 20 UNIT(S): 100 INJECTION, SOLUTION INTRAVENOUS; SUBCUTANEOUS at 17:43

## 2025-09-04 RX ADMIN — INSULIN LISPRO 2: 100 INJECTION, SOLUTION INTRAVENOUS; SUBCUTANEOUS at 17:42

## 2025-09-04 RX ADMIN — APIXABAN 5 MILLIGRAM(S): 2.5 TABLET, FILM COATED ORAL at 06:09

## 2025-09-04 RX ADMIN — GABAPENTIN 300 MILLIGRAM(S): 400 CAPSULE ORAL at 06:09

## 2025-09-04 RX ADMIN — INSULIN LISPRO 2: 100 INJECTION, SOLUTION INTRAVENOUS; SUBCUTANEOUS at 13:18

## 2025-09-04 RX ADMIN — Medication 1 APPLICATION(S): at 12:00

## 2025-09-04 RX ADMIN — INSULIN GLARGINE-YFGN 47 UNIT(S): 100 INJECTION, SOLUTION SUBCUTANEOUS at 21:27

## 2025-09-04 RX ADMIN — INSULIN LISPRO 20 UNIT(S): 100 INJECTION, SOLUTION INTRAVENOUS; SUBCUTANEOUS at 13:18

## 2025-09-04 RX ADMIN — AMLODIPINE BESYLATE 5 MILLIGRAM(S): 10 TABLET ORAL at 06:10

## 2025-09-04 RX ADMIN — Medication 4 MILLILITER(S): at 06:09

## 2025-09-04 RX ADMIN — Medication 1 TABLET(S): at 11:58

## 2025-09-04 RX ADMIN — Medication 40 MILLIGRAM(S): at 06:10

## 2025-09-04 RX ADMIN — Medication 200 MICROGRAM(S): at 06:10

## 2025-09-04 RX ADMIN — Medication 5 MILLIGRAM(S): at 21:28

## 2025-09-04 RX ADMIN — AMIODARONE HYDROCHLORIDE 200 MILLIGRAM(S): 50 INJECTION, SOLUTION INTRAVENOUS at 06:57

## 2025-09-04 RX ADMIN — IRON SUCROSE 100 MILLIGRAM(S): 20 INJECTION, SOLUTION INTRAVENOUS at 13:24

## 2025-09-04 RX ADMIN — Medication 1 TABLET(S): at 11:59

## 2025-09-04 RX ADMIN — APIXABAN 5 MILLIGRAM(S): 2.5 TABLET, FILM COATED ORAL at 17:28

## 2025-09-04 RX ADMIN — POLYETHYLENE GLYCOL 3350 17 GRAM(S): 17 POWDER, FOR SOLUTION ORAL at 06:11

## 2025-09-04 RX ADMIN — NYSTATIN 1 APPLICATION(S): 100000 CREAM TOPICAL at 11:58

## 2025-09-04 RX ADMIN — INSULIN LISPRO 1: 100 INJECTION, SOLUTION INTRAVENOUS; SUBCUTANEOUS at 21:28

## 2025-09-04 RX ADMIN — TORSEMIDE 40 MILLIGRAM(S): 20 TABLET ORAL at 17:28

## 2025-09-04 RX ADMIN — MUPIROCIN CALCIUM 1 APPLICATION(S): 20 CREAM TOPICAL at 06:08

## 2025-09-04 RX ADMIN — GABAPENTIN 300 MILLIGRAM(S): 400 CAPSULE ORAL at 13:23

## 2025-09-04 RX ADMIN — WHITE PETROLATUM 1 APPLICATION(S): 1 OINTMENT TOPICAL at 12:00

## 2025-09-04 RX ADMIN — GABAPENTIN 300 MILLIGRAM(S): 400 CAPSULE ORAL at 21:29

## 2025-09-04 RX ADMIN — Medication 1 DOSE(S): at 06:11

## 2025-09-04 RX ADMIN — MUPIROCIN CALCIUM 1 APPLICATION(S): 20 CREAM TOPICAL at 11:59

## 2025-09-04 RX ADMIN — POLYETHYLENE GLYCOL 3350 17 GRAM(S): 17 POWDER, FOR SOLUTION ORAL at 17:29

## 2025-09-04 RX ADMIN — TORSEMIDE 40 MILLIGRAM(S): 20 TABLET ORAL at 06:57

## 2025-09-04 RX ADMIN — ATORVASTATIN CALCIUM 40 MILLIGRAM(S): 80 TABLET, FILM COATED ORAL at 21:28

## 2025-09-04 RX ADMIN — INSULIN LISPRO 3: 100 INJECTION, SOLUTION INTRAVENOUS; SUBCUTANEOUS at 08:22

## 2025-09-04 RX ADMIN — NYSTATIN 1 APPLICATION(S): 100000 CREAM TOPICAL at 21:56

## 2025-09-04 RX ADMIN — Medication 2 MILLIGRAM(S): at 21:50

## 2025-09-10 PROBLEM — I25.10 ATHEROSCLEROTIC HEART DISEASE OF NATIVE CORONARY ARTERY WITHOUT ANGINA PECTORIS: Chronic | Status: ACTIVE | Noted: 2025-07-25

## 2025-09-10 RX ORDER — FERROUS FUMARATE 324(106)MG
1 TABLET ORAL
Qty: 0 | Refills: 0 | DISCHARGE

## 2025-09-10 RX ORDER — ATORVASTATIN CALCIUM 80 MG/1
1 TABLET, FILM COATED ORAL
Refills: 0 | DISCHARGE

## 2025-09-10 RX ORDER — LEVOTHYROXINE SODIUM 300 MCG
1 TABLET ORAL
Refills: 0 | DISCHARGE

## 2025-09-15 ENCOUNTER — RESULT REVIEW (OUTPATIENT)
Age: 75
End: 2025-09-15

## 2025-09-16 ENCOUNTER — TRANSCRIPTION ENCOUNTER (OUTPATIENT)
Age: 75
End: 2025-09-16